# Patient Record
Sex: FEMALE | Race: WHITE | NOT HISPANIC OR LATINO | Employment: OTHER
[De-identification: names, ages, dates, MRNs, and addresses within clinical notes are randomized per-mention and may not be internally consistent; named-entity substitution may affect disease eponyms.]

---

## 2017-03-16 ENCOUNTER — RECORDS - HEALTHEAST (OUTPATIENT)
Dept: ADMINISTRATIVE | Facility: OTHER | Age: 62
End: 2017-03-16

## 2017-05-04 ENCOUNTER — RECORDS - HEALTHEAST (OUTPATIENT)
Dept: ADMINISTRATIVE | Facility: OTHER | Age: 62
End: 2017-05-04

## 2017-05-25 ENCOUNTER — RECORDS - HEALTHEAST (OUTPATIENT)
Dept: ADMINISTRATIVE | Facility: OTHER | Age: 62
End: 2017-05-25

## 2017-06-29 ENCOUNTER — COMMUNICATION - HEALTHEAST (OUTPATIENT)
Dept: SCHEDULING | Facility: CLINIC | Age: 62
End: 2017-06-29

## 2017-06-30 ENCOUNTER — COMMUNICATION - HEALTHEAST (OUTPATIENT)
Dept: INTERNAL MEDICINE | Facility: CLINIC | Age: 62
End: 2017-06-30

## 2017-06-30 ENCOUNTER — COMMUNICATION - HEALTHEAST (OUTPATIENT)
Dept: SCHEDULING | Facility: CLINIC | Age: 62
End: 2017-06-30

## 2017-06-30 ENCOUNTER — HOSPITAL ENCOUNTER (OUTPATIENT)
Dept: CT IMAGING | Facility: CLINIC | Age: 62
Discharge: HOME | End: 2017-06-30
Attending: INTERNAL MEDICINE

## 2017-06-30 ENCOUNTER — OFFICE VISIT - HEALTHEAST (OUTPATIENT)
Dept: INTERNAL MEDICINE | Facility: CLINIC | Age: 62
End: 2017-06-30

## 2017-06-30 DIAGNOSIS — I63.9 CVA (CEREBRAL VASCULAR ACCIDENT) (H): ICD-10-CM

## 2017-06-30 LAB
CHOLEST SERPL-MCNC: 154 MG/DL
FASTING STATUS PATIENT QL REPORTED: ABNORMAL
HDLC SERPL-MCNC: 20 MG/DL
LDLC SERPL CALC-MCNC: 62 MG/DL
TRIGL SERPL-MCNC: 361 MG/DL

## 2017-07-02 LAB — HBA1C MFR BLD: 13.8 % (ref 4.2–6.1)

## 2017-07-03 ENCOUNTER — COMMUNICATION - HEALTHEAST (OUTPATIENT)
Dept: INTERNAL MEDICINE | Facility: CLINIC | Age: 62
End: 2017-07-03

## 2017-07-03 ENCOUNTER — AMBULATORY - HEALTHEAST (OUTPATIENT)
Dept: INTERNAL MEDICINE | Facility: CLINIC | Age: 62
End: 2017-07-03

## 2017-07-03 DIAGNOSIS — E11.9 DIABETES (H): ICD-10-CM

## 2017-07-06 ENCOUNTER — RECORDS - HEALTHEAST (OUTPATIENT)
Dept: ADMINISTRATIVE | Facility: OTHER | Age: 62
End: 2017-07-06

## 2017-07-07 ENCOUNTER — RECORDS - HEALTHEAST (OUTPATIENT)
Dept: ADMINISTRATIVE | Facility: OTHER | Age: 62
End: 2017-07-07

## 2017-07-12 ENCOUNTER — OFFICE VISIT - HEALTHEAST (OUTPATIENT)
Dept: EDUCATION SERVICES | Facility: CLINIC | Age: 62
End: 2017-07-12

## 2017-07-14 ENCOUNTER — COMMUNICATION - HEALTHEAST (OUTPATIENT)
Dept: INTERNAL MEDICINE | Facility: CLINIC | Age: 62
End: 2017-07-14

## 2017-07-14 ENCOUNTER — COMMUNICATION - HEALTHEAST (OUTPATIENT)
Dept: ADMINISTRATIVE | Facility: CLINIC | Age: 62
End: 2017-07-14

## 2017-07-19 ENCOUNTER — RECORDS - HEALTHEAST (OUTPATIENT)
Dept: LAB | Facility: CLINIC | Age: 62
End: 2017-07-19

## 2017-07-19 ENCOUNTER — RECORDS - HEALTHEAST (OUTPATIENT)
Dept: ADMINISTRATIVE | Facility: OTHER | Age: 62
End: 2017-07-19

## 2017-07-19 ENCOUNTER — HOSPITAL ENCOUNTER (OUTPATIENT)
Dept: ULTRASOUND IMAGING | Facility: CLINIC | Age: 62
Discharge: HOME | End: 2017-07-19
Attending: PSYCHIATRY & NEUROLOGY

## 2017-07-19 ENCOUNTER — HOSPITAL ENCOUNTER (OUTPATIENT)
Dept: MRI IMAGING | Facility: CLINIC | Age: 62
Discharge: HOME | End: 2017-07-19
Attending: PSYCHIATRY & NEUROLOGY

## 2017-07-19 ENCOUNTER — HOSPITAL ENCOUNTER (OUTPATIENT)
Dept: CARDIOLOGY | Facility: CLINIC | Age: 62
Discharge: HOME | End: 2017-07-19
Attending: PSYCHIATRY & NEUROLOGY

## 2017-07-19 DIAGNOSIS — G45.9 TIA (TRANSIENT ISCHEMIC ATTACK): ICD-10-CM

## 2017-07-19 DIAGNOSIS — G45.1 TIA INVOLVING CAROTID ARTERY: ICD-10-CM

## 2017-07-19 DIAGNOSIS — G45.1 HEMISPHERIC CAROTID ARTERY SYNDROME: ICD-10-CM

## 2017-07-19 LAB
CHOLEST SERPL-MCNC: 156 MG/DL
DOP CALC LVOT AREA: 3.14 CM2
DOP CALC LVOT DIAMETER: 2 CM
DOP CALC LVOT PEAK VEL: 107 CM/S
DOP CALC LVOT STROKE VOLUME: 78.8 CM3
DOP CALCLVOT PEAK VEL VTI: 25.1 CM
ECHO EJECTION FRACTION ESTIMATED: 60 %
EJECTION FRACTION: 54 % (ref 55–75)
FASTING STATUS PATIENT QL REPORTED: YES
FRACTIONAL SHORTENING: 36.7 % (ref 28–44)
HDLC SERPL-MCNC: 39 MG/DL
INTERVENTRICULAR SEPTUM IN END DIASTOLE: 1.4 CM (ref 0.6–0.9)
IVS/PW RATIO: 1
LDLC SERPL CALC-MCNC: 76 MG/DL
LEFT ATRIUM AREA: 20.9 CM2
LEFT ATRIUM SIZE: 3.5 CM
LEFT VENTRICLE DIASTOLIC VOLUME: 108 CM3 (ref 46–106)
LEFT VENTRICLE SYSTOLIC VOLUME: 50 CM3 (ref 14–42)
LEFT VENTRICULAR INTERNAL DIMENSION IN DIASTOLE: 4.9 CM (ref 3.8–5.2)
LEFT VENTRICULAR INTERNAL DIMENSION IN SYSTOLE: 3.1 CM (ref 2.2–3.5)
LEFT VENTRICULAR MASS: 282.6 G
LEFT VENTRICULAR OUTFLOW TRACT MEAN GRADIENT: 2 MMHG
LEFT VENTRICULAR OUTFLOW TRACT MEAN VELOCITY: 69.3 CM/S
LEFT VENTRICULAR OUTFLOW TRACT PEAK GRADIENT: 5 MMHG
LEFT VENTRICULAR POSTERIOR WALL IN END DIASTOLE: 1.4 CM (ref 0.6–0.9)
MITRAL VALVE E/A RATIO: 1.2
MV AVERAGE E/E' RATIO: 11.8 CM/S
MV DECELERATION TIME: 292 MS
MV E'TISSUE VEL-LAT: 9.65 CM/S
MV E'TISSUE VEL-MED: 6.43 CM/S
MV LATERAL E/E' RATIO: 9.8
MV MEDIAL E/E' RATIO: 14.7
MV PEAK A VELOCITY: 81.9 CM/S
MV PEAK E VELOCITY: 94.8 CM/S
TRICUSPID VALVE ANULAR PLANE SYSTOLIC EXCURSION: 2.3 CM
TRIGL SERPL-MCNC: 204 MG/DL

## 2017-07-28 ENCOUNTER — COMMUNICATION - HEALTHEAST (OUTPATIENT)
Dept: INTERNAL MEDICINE | Facility: CLINIC | Age: 62
End: 2017-07-28

## 2017-07-28 ENCOUNTER — OFFICE VISIT - HEALTHEAST (OUTPATIENT)
Dept: INTERNAL MEDICINE | Facility: CLINIC | Age: 62
End: 2017-07-28

## 2017-07-28 DIAGNOSIS — E11.9 DIABETES MELLITUS, TYPE 2 (H): ICD-10-CM

## 2017-07-28 LAB
CHOLEST SERPL-MCNC: 144 MG/DL
FASTING STATUS PATIENT QL REPORTED: YES
HBA1C MFR BLD: 11.9 % (ref 3.5–6)
HDLC SERPL-MCNC: 38 MG/DL
LDLC SERPL CALC-MCNC: 74 MG/DL
TRIGL SERPL-MCNC: 158 MG/DL

## 2017-07-28 ASSESSMENT — MIFFLIN-ST. JEOR: SCORE: 1728.73

## 2017-08-10 ENCOUNTER — COMMUNICATION - HEALTHEAST (OUTPATIENT)
Dept: INTERNAL MEDICINE | Facility: CLINIC | Age: 62
End: 2017-08-10

## 2017-08-11 ENCOUNTER — OFFICE VISIT - HEALTHEAST (OUTPATIENT)
Dept: INTERNAL MEDICINE | Facility: CLINIC | Age: 62
End: 2017-08-11

## 2017-08-11 DIAGNOSIS — Z00.00 ROUTINE GENERAL MEDICAL EXAMINATION AT A HEALTH CARE FACILITY: ICD-10-CM

## 2017-08-11 DIAGNOSIS — D51.0 PERNICIOUS ANEMIA: ICD-10-CM

## 2017-08-11 LAB
CHOLEST SERPL-MCNC: 146 MG/DL
FASTING STATUS PATIENT QL REPORTED: YES
HBA1C MFR BLD: 10.6 % (ref 3.5–6)
HDLC SERPL-MCNC: 37 MG/DL
LDLC SERPL CALC-MCNC: 75 MG/DL
TRIGL SERPL-MCNC: 169 MG/DL

## 2017-08-11 ASSESSMENT — MIFFLIN-ST. JEOR: SCORE: 1737.8

## 2017-08-14 ENCOUNTER — COMMUNICATION - HEALTHEAST (OUTPATIENT)
Dept: INTERNAL MEDICINE | Facility: CLINIC | Age: 62
End: 2017-08-14

## 2017-08-30 ENCOUNTER — OFFICE VISIT - HEALTHEAST (OUTPATIENT)
Dept: EDUCATION SERVICES | Facility: CLINIC | Age: 62
End: 2017-08-30

## 2017-08-30 DIAGNOSIS — E11.9 DM (DIABETES MELLITUS), TYPE 2 (H): ICD-10-CM

## 2017-09-01 ENCOUNTER — OFFICE VISIT - HEALTHEAST (OUTPATIENT)
Dept: INTERNAL MEDICINE | Facility: CLINIC | Age: 62
End: 2017-09-01

## 2017-09-01 ENCOUNTER — COMMUNICATION - HEALTHEAST (OUTPATIENT)
Dept: INTERNAL MEDICINE | Facility: CLINIC | Age: 62
End: 2017-09-01

## 2017-09-01 DIAGNOSIS — E11.9 DIABETES MELLITUS, TYPE 2 (H): ICD-10-CM

## 2017-09-01 LAB
CHOLEST SERPL-MCNC: 148 MG/DL
FASTING STATUS PATIENT QL REPORTED: YES
HBA1C MFR BLD: 9.2 % (ref 3.5–6)
HDLC SERPL-MCNC: 42 MG/DL
LDLC SERPL CALC-MCNC: 77 MG/DL
TRIGL SERPL-MCNC: 147 MG/DL

## 2017-09-01 ASSESSMENT — MIFFLIN-ST. JEOR: SCORE: 1742.34

## 2017-09-05 ENCOUNTER — AMBULATORY - HEALTHEAST (OUTPATIENT)
Dept: INTERNAL MEDICINE | Facility: CLINIC | Age: 62
End: 2017-09-05

## 2017-09-05 ENCOUNTER — RECORDS - HEALTHEAST (OUTPATIENT)
Dept: ADMINISTRATIVE | Facility: OTHER | Age: 62
End: 2017-09-05

## 2017-09-05 ENCOUNTER — COMMUNICATION - HEALTHEAST (OUTPATIENT)
Dept: INTERNAL MEDICINE | Facility: CLINIC | Age: 62
End: 2017-09-05

## 2017-09-11 ENCOUNTER — SURGERY - HEALTHEAST (OUTPATIENT)
Dept: GASTROENTEROLOGY | Facility: HOSPITAL | Age: 62
End: 2017-09-11

## 2017-09-11 ASSESSMENT — MIFFLIN-ST. JEOR: SCORE: 1724.77

## 2017-09-13 ENCOUNTER — AMBULATORY - HEALTHEAST (OUTPATIENT)
Dept: NURSING | Facility: CLINIC | Age: 62
End: 2017-09-13

## 2017-09-13 LAB — PAP SMEAR - HIM PATIENT REPORTED: NEGATIVE

## 2017-09-18 ENCOUNTER — COMMUNICATION - HEALTHEAST (OUTPATIENT)
Dept: INTERNAL MEDICINE | Facility: CLINIC | Age: 62
End: 2017-09-18

## 2017-09-18 DIAGNOSIS — E11.9 DIABETES (H): ICD-10-CM

## 2017-09-19 ENCOUNTER — OFFICE VISIT - HEALTHEAST (OUTPATIENT)
Dept: ENDOCRINOLOGY | Facility: CLINIC | Age: 62
End: 2017-09-19

## 2017-09-19 DIAGNOSIS — E11.9 TYPE 2 DIABETES MELLITUS (H): ICD-10-CM

## 2017-09-19 ASSESSMENT — MIFFLIN-ST. JEOR: SCORE: 1761.06

## 2017-10-03 ENCOUNTER — COMMUNICATION - HEALTHEAST (OUTPATIENT)
Dept: INTERNAL MEDICINE | Facility: CLINIC | Age: 62
End: 2017-10-03

## 2017-10-03 ENCOUNTER — OFFICE VISIT - HEALTHEAST (OUTPATIENT)
Dept: INTERNAL MEDICINE | Facility: CLINIC | Age: 62
End: 2017-10-03

## 2017-10-03 DIAGNOSIS — E11.9 DIABETES MELLITUS, TYPE 2 (H): ICD-10-CM

## 2017-10-03 LAB
CHOLEST SERPL-MCNC: 129 MG/DL
FASTING STATUS PATIENT QL REPORTED: ABNORMAL
HBA1C MFR BLD: 8.3 % (ref 3.5–6)
HDLC SERPL-MCNC: 42 MG/DL
LDLC SERPL CALC-MCNC: 61 MG/DL
TRIGL SERPL-MCNC: 131 MG/DL

## 2017-10-03 ASSESSMENT — MIFFLIN-ST. JEOR: SCORE: 1756.52

## 2017-10-23 ENCOUNTER — AMBULATORY - HEALTHEAST (OUTPATIENT)
Dept: INTERNAL MEDICINE | Facility: CLINIC | Age: 62
End: 2017-10-23

## 2017-10-23 ENCOUNTER — COMMUNICATION - HEALTHEAST (OUTPATIENT)
Dept: INTERNAL MEDICINE | Facility: CLINIC | Age: 62
End: 2017-10-23

## 2017-11-01 ENCOUNTER — COMMUNICATION - HEALTHEAST (OUTPATIENT)
Dept: EDUCATION SERVICES | Facility: CLINIC | Age: 62
End: 2017-11-01

## 2017-11-01 ENCOUNTER — COMMUNICATION - HEALTHEAST (OUTPATIENT)
Dept: INTERNAL MEDICINE | Facility: CLINIC | Age: 62
End: 2017-11-01

## 2017-11-22 ENCOUNTER — OFFICE VISIT - HEALTHEAST (OUTPATIENT)
Dept: EDUCATION SERVICES | Facility: CLINIC | Age: 62
End: 2017-11-22

## 2017-11-22 DIAGNOSIS — E11.9 DM (DIABETES MELLITUS), TYPE 2 (H): ICD-10-CM

## 2017-11-27 ENCOUNTER — COMMUNICATION - HEALTHEAST (OUTPATIENT)
Dept: EDUCATION SERVICES | Facility: CLINIC | Age: 62
End: 2017-11-27

## 2017-12-04 ENCOUNTER — AMBULATORY - HEALTHEAST (OUTPATIENT)
Dept: INTERNAL MEDICINE | Facility: CLINIC | Age: 62
End: 2017-12-04

## 2017-12-04 ENCOUNTER — OFFICE VISIT - HEALTHEAST (OUTPATIENT)
Dept: INTERNAL MEDICINE | Facility: CLINIC | Age: 62
End: 2017-12-04

## 2017-12-04 DIAGNOSIS — E11.9 DIABETES MELLITUS, TYPE 2 (H): ICD-10-CM

## 2017-12-04 DIAGNOSIS — E11.9 DIABETES (H): ICD-10-CM

## 2017-12-04 LAB
CHOLEST SERPL-MCNC: 127 MG/DL
FASTING STATUS PATIENT QL REPORTED: YES
HBA1C MFR BLD: 8.7 % (ref 3.5–6)
HDLC SERPL-MCNC: 38 MG/DL
LDLC SERPL CALC-MCNC: 68 MG/DL
TRIGL SERPL-MCNC: 103 MG/DL

## 2017-12-04 ASSESSMENT — MIFFLIN-ST. JEOR: SCORE: 1792.81

## 2017-12-05 ENCOUNTER — COMMUNICATION - HEALTHEAST (OUTPATIENT)
Dept: INTERNAL MEDICINE | Facility: CLINIC | Age: 62
End: 2017-12-05

## 2017-12-07 ENCOUNTER — COMMUNICATION - HEALTHEAST (OUTPATIENT)
Dept: INTERNAL MEDICINE | Facility: CLINIC | Age: 62
End: 2017-12-07

## 2017-12-07 ENCOUNTER — COMMUNICATION - HEALTHEAST (OUTPATIENT)
Dept: EDUCATION SERVICES | Facility: CLINIC | Age: 62
End: 2017-12-07

## 2018-02-16 ENCOUNTER — AMBULATORY - HEALTHEAST (OUTPATIENT)
Dept: NURSING | Facility: CLINIC | Age: 63
End: 2018-02-16

## 2018-03-01 ENCOUNTER — AMBULATORY - HEALTHEAST (OUTPATIENT)
Dept: ENDOCRINOLOGY | Facility: CLINIC | Age: 63
End: 2018-03-01

## 2018-03-01 DIAGNOSIS — E11.9 TYPE 2 DIABETES MELLITUS (H): ICD-10-CM

## 2018-03-13 ENCOUNTER — AMBULATORY - HEALTHEAST (OUTPATIENT)
Dept: LAB | Facility: CLINIC | Age: 63
End: 2018-03-13

## 2018-03-13 DIAGNOSIS — E11.9 TYPE 2 DIABETES MELLITUS (H): ICD-10-CM

## 2018-03-13 LAB
CREAT SERPL-MCNC: 1.12 MG/DL (ref 0.6–1.1)
CREAT UR-MCNC: 22.9 MG/DL
GFR SERPL CREATININE-BSD FRML MDRD: 49 ML/MIN/1.73M2
HBA1C MFR BLD: 10.6 % (ref 3.5–6)
MICROALBUMIN UR-MCNC: 3.75 MG/DL (ref 0–1.99)
MICROALBUMIN/CREAT UR: 163.8 MG/G
POTASSIUM BLD-SCNC: 4.3 MMOL/L (ref 3.5–5)

## 2018-04-17 ENCOUNTER — COMMUNICATION - HEALTHEAST (OUTPATIENT)
Dept: INTERNAL MEDICINE | Facility: CLINIC | Age: 63
End: 2018-04-17

## 2018-04-20 ENCOUNTER — OFFICE VISIT - HEALTHEAST (OUTPATIENT)
Dept: INTERNAL MEDICINE | Facility: CLINIC | Age: 63
End: 2018-04-20

## 2018-04-20 ENCOUNTER — AMBULATORY - HEALTHEAST (OUTPATIENT)
Dept: INTERNAL MEDICINE | Facility: CLINIC | Age: 63
End: 2018-04-20

## 2018-04-20 DIAGNOSIS — E11.9 DIABETES (H): ICD-10-CM

## 2018-04-20 DIAGNOSIS — E11.9 DIABETES MELLITUS, TYPE 2 (H): ICD-10-CM

## 2018-04-20 LAB
CHOLEST SERPL-MCNC: 139 MG/DL
CREAT UR-MCNC: 52.9 MG/DL
FASTING STATUS PATIENT QL REPORTED: YES
FASTING STATUS PATIENT QL REPORTED: YES
GLUCOSE BLD-MCNC: 280 MG/DL (ref 70–125)
HBA1C MFR BLD: 12.3 % (ref 3.5–6)
HDLC SERPL-MCNC: 41 MG/DL
LDLC SERPL CALC-MCNC: 73 MG/DL
MICROALBUMIN UR-MCNC: 8.71 MG/DL (ref 0–1.99)
MICROALBUMIN/CREAT UR: 164.7 MG/G
TRIGL SERPL-MCNC: 126 MG/DL

## 2018-04-20 ASSESSMENT — MIFFLIN-ST. JEOR: SCORE: 1810.96

## 2018-04-23 ENCOUNTER — COMMUNICATION - HEALTHEAST (OUTPATIENT)
Dept: INTERNAL MEDICINE | Facility: CLINIC | Age: 63
End: 2018-04-23

## 2018-05-08 ENCOUNTER — COMMUNICATION - HEALTHEAST (OUTPATIENT)
Dept: INTERNAL MEDICINE | Facility: CLINIC | Age: 63
End: 2018-05-08

## 2018-05-17 ENCOUNTER — COMMUNICATION - HEALTHEAST (OUTPATIENT)
Dept: INTERNAL MEDICINE | Facility: CLINIC | Age: 63
End: 2018-05-17

## 2018-05-25 ENCOUNTER — RECORDS - HEALTHEAST (OUTPATIENT)
Dept: ADMINISTRATIVE | Facility: OTHER | Age: 63
End: 2018-05-25

## 2018-06-07 ENCOUNTER — RECORDS - HEALTHEAST (OUTPATIENT)
Dept: ADMINISTRATIVE | Facility: OTHER | Age: 63
End: 2018-06-07

## 2018-06-18 ENCOUNTER — OFFICE VISIT - HEALTHEAST (OUTPATIENT)
Dept: ENDOCRINOLOGY | Facility: CLINIC | Age: 63
End: 2018-06-18

## 2018-06-18 DIAGNOSIS — E11.9 TYPE 2 DIABETES MELLITUS (H): ICD-10-CM

## 2018-06-18 ASSESSMENT — MIFFLIN-ST. JEOR: SCORE: 1807.33

## 2018-06-20 ENCOUNTER — COMMUNICATION - HEALTHEAST (OUTPATIENT)
Dept: ENDOCRINOLOGY | Facility: CLINIC | Age: 63
End: 2018-06-20

## 2018-07-15 ENCOUNTER — COMMUNICATION - HEALTHEAST (OUTPATIENT)
Dept: ENDOCRINOLOGY | Facility: CLINIC | Age: 63
End: 2018-07-15

## 2018-08-10 ENCOUNTER — AMBULATORY - HEALTHEAST (OUTPATIENT)
Dept: INTERNAL MEDICINE | Facility: CLINIC | Age: 63
End: 2018-08-10

## 2018-08-13 ENCOUNTER — OFFICE VISIT - HEALTHEAST (OUTPATIENT)
Dept: INTERNAL MEDICINE | Facility: CLINIC | Age: 63
End: 2018-08-13

## 2018-08-13 DIAGNOSIS — D51.0 PERNICIOUS ANEMIA: ICD-10-CM

## 2018-08-13 DIAGNOSIS — Z00.00 ROUTINE GENERAL MEDICAL EXAMINATION AT A HEALTH CARE FACILITY: ICD-10-CM

## 2018-08-13 LAB
ALBUMIN SERPL-MCNC: 3.3 G/DL (ref 3.5–5)
ALBUMIN UR-MCNC: ABNORMAL MG/DL
ALP SERPL-CCNC: 134 U/L (ref 45–120)
ALT SERPL W P-5'-P-CCNC: 31 U/L (ref 0–45)
ANION GAP SERPL CALCULATED.3IONS-SCNC: 14 MMOL/L (ref 5–18)
APPEARANCE UR: ABNORMAL
AST SERPL W P-5'-P-CCNC: 21 U/L (ref 0–40)
BACTERIA #/AREA URNS HPF: ABNORMAL HPF
BILIRUB SERPL-MCNC: 0.5 MG/DL (ref 0–1)
BILIRUB UR QL STRIP: NEGATIVE
BUN SERPL-MCNC: 26 MG/DL (ref 8–22)
CALCIUM SERPL-MCNC: 9.7 MG/DL (ref 8.5–10.5)
CHLORIDE BLD-SCNC: 103 MMOL/L (ref 98–107)
CHOLEST SERPL-MCNC: 134 MG/DL
CO2 SERPL-SCNC: 19 MMOL/L (ref 22–31)
COLOR UR AUTO: YELLOW
CREAT SERPL-MCNC: 1.21 MG/DL (ref 0.6–1.1)
ERYTHROCYTE [DISTWIDTH] IN BLOOD BY AUTOMATED COUNT: 12.4 % (ref 11–14.5)
FASTING STATUS PATIENT QL REPORTED: YES
GFR SERPL CREATININE-BSD FRML MDRD: 45 ML/MIN/1.73M2
GLUCOSE BLD-MCNC: 250 MG/DL (ref 70–125)
GLUCOSE UR STRIP-MCNC: NEGATIVE MG/DL
HBA1C MFR BLD: 10.9 % (ref 3.5–6)
HCT VFR BLD AUTO: 43.6 % (ref 35–47)
HDLC SERPL-MCNC: 36 MG/DL
HGB BLD-MCNC: 14.4 G/DL (ref 12–16)
HGB UR QL STRIP: ABNORMAL
KETONES UR STRIP-MCNC: NEGATIVE MG/DL
LDLC SERPL CALC-MCNC: 68 MG/DL
LEUKOCYTE ESTERASE UR QL STRIP: NEGATIVE
MCH RBC QN AUTO: 29.5 PG (ref 27–34)
MCHC RBC AUTO-ENTMCNC: 32.9 G/DL (ref 32–36)
MCV RBC AUTO: 90 FL (ref 80–100)
NITRATE UR QL: NEGATIVE
PH UR STRIP: 6 [PH] (ref 5–8)
PLATELET # BLD AUTO: 330 THOU/UL (ref 140–440)
PMV BLD AUTO: 7.9 FL (ref 7–10)
POTASSIUM BLD-SCNC: 4.7 MMOL/L (ref 3.5–5)
PROT SERPL-MCNC: 7 G/DL (ref 6–8)
RBC # BLD AUTO: 4.86 MILL/UL (ref 3.8–5.4)
RBC #/AREA URNS AUTO: ABNORMAL HPF
SODIUM SERPL-SCNC: 136 MMOL/L (ref 136–145)
SP GR UR STRIP: 1.01 (ref 1–1.03)
SQUAMOUS #/AREA URNS AUTO: ABNORMAL LPF
TRIGL SERPL-MCNC: 148 MG/DL
TSH SERPL DL<=0.005 MIU/L-ACNC: 1.94 UIU/ML (ref 0.3–5)
UROBILINOGEN UR STRIP-ACNC: ABNORMAL
WBC #/AREA URNS AUTO: ABNORMAL HPF
WBC: 12.9 THOU/UL (ref 4–11)

## 2018-08-13 ASSESSMENT — MIFFLIN-ST. JEOR: SCORE: 1851.78

## 2018-08-15 LAB — BACTERIA SPEC CULT: ABNORMAL

## 2018-08-16 ENCOUNTER — COMMUNICATION - HEALTHEAST (OUTPATIENT)
Dept: INTERNAL MEDICINE | Facility: CLINIC | Age: 63
End: 2018-08-16

## 2018-09-07 ENCOUNTER — COMMUNICATION - HEALTHEAST (OUTPATIENT)
Dept: ENDOCRINOLOGY | Facility: CLINIC | Age: 63
End: 2018-09-07

## 2018-09-08 ENCOUNTER — COMMUNICATION - HEALTHEAST (OUTPATIENT)
Dept: INTERNAL MEDICINE | Facility: CLINIC | Age: 63
End: 2018-09-08

## 2018-09-20 ENCOUNTER — COMMUNICATION - HEALTHEAST (OUTPATIENT)
Dept: ENDOCRINOLOGY | Facility: CLINIC | Age: 63
End: 2018-09-20

## 2018-10-01 ENCOUNTER — COMMUNICATION - HEALTHEAST (OUTPATIENT)
Dept: ENDOCRINOLOGY | Facility: CLINIC | Age: 63
End: 2018-10-01

## 2018-10-18 ENCOUNTER — OFFICE VISIT - HEALTHEAST (OUTPATIENT)
Dept: ENDOCRINOLOGY | Facility: CLINIC | Age: 63
End: 2018-10-18

## 2018-10-18 DIAGNOSIS — E11.9 TYPE 2 DIABETES MELLITUS (H): ICD-10-CM

## 2018-10-18 ASSESSMENT — MIFFLIN-ST. JEOR: SCORE: 1878.99

## 2018-10-19 ENCOUNTER — OFFICE VISIT - HEALTHEAST (OUTPATIENT)
Dept: INTERNAL MEDICINE | Facility: CLINIC | Age: 63
End: 2018-10-19

## 2018-10-19 DIAGNOSIS — E11.9 DIABETES MELLITUS, TYPE 2 (H): ICD-10-CM

## 2018-10-19 DIAGNOSIS — Z23 NEED FOR IMMUNIZATION AGAINST INFLUENZA: ICD-10-CM

## 2018-10-19 LAB
CHOLEST SERPL-MCNC: 143 MG/DL
FASTING STATUS PATIENT QL REPORTED: YES
FASTING STATUS PATIENT QL REPORTED: YES
GLUCOSE BLD-MCNC: 239 MG/DL (ref 70–125)
HBA1C MFR BLD: 10.5 % (ref 3.5–6)
HDLC SERPL-MCNC: 41 MG/DL
LDLC SERPL CALC-MCNC: 73 MG/DL
TRIGL SERPL-MCNC: 143 MG/DL

## 2018-10-19 ASSESSMENT — MIFFLIN-ST. JEOR: SCORE: 1865.39

## 2018-10-22 ENCOUNTER — COMMUNICATION - HEALTHEAST (OUTPATIENT)
Dept: INTERNAL MEDICINE | Facility: CLINIC | Age: 63
End: 2018-10-22

## 2018-10-23 ENCOUNTER — COMMUNICATION - HEALTHEAST (OUTPATIENT)
Dept: ENDOCRINOLOGY | Facility: CLINIC | Age: 63
End: 2018-10-23

## 2018-11-17 ENCOUNTER — COMMUNICATION - HEALTHEAST (OUTPATIENT)
Dept: ENDOCRINOLOGY | Facility: CLINIC | Age: 63
End: 2018-11-17

## 2018-12-02 ENCOUNTER — COMMUNICATION - HEALTHEAST (OUTPATIENT)
Dept: ENDOCRINOLOGY | Facility: CLINIC | Age: 63
End: 2018-12-02

## 2018-12-02 DIAGNOSIS — E11.9 TYPE 2 DIABETES MELLITUS (H): ICD-10-CM

## 2018-12-09 ENCOUNTER — COMMUNICATION - HEALTHEAST (OUTPATIENT)
Dept: ENDOCRINOLOGY | Facility: CLINIC | Age: 63
End: 2018-12-09

## 2018-12-11 ENCOUNTER — COMMUNICATION - HEALTHEAST (OUTPATIENT)
Dept: INTERNAL MEDICINE | Facility: CLINIC | Age: 63
End: 2018-12-11

## 2019-01-01 ENCOUNTER — COMMUNICATION - HEALTHEAST (OUTPATIENT)
Dept: ENDOCRINOLOGY | Facility: CLINIC | Age: 64
End: 2019-01-01

## 2019-01-01 DIAGNOSIS — E11.9 TYPE 2 DIABETES MELLITUS (H): ICD-10-CM

## 2019-02-05 ENCOUNTER — COMMUNICATION - HEALTHEAST (OUTPATIENT)
Dept: ADMINISTRATIVE | Facility: CLINIC | Age: 64
End: 2019-02-05

## 2019-02-05 DIAGNOSIS — E11.9 TYPE 2 DIABETES MELLITUS (H): ICD-10-CM

## 2019-02-23 ENCOUNTER — COMMUNICATION - HEALTHEAST (OUTPATIENT)
Dept: ENDOCRINOLOGY | Facility: CLINIC | Age: 64
End: 2019-02-23

## 2019-03-12 ENCOUNTER — COMMUNICATION - HEALTHEAST (OUTPATIENT)
Dept: INTERNAL MEDICINE | Facility: CLINIC | Age: 64
End: 2019-03-12

## 2019-03-19 ENCOUNTER — COMMUNICATION - HEALTHEAST (OUTPATIENT)
Dept: ENDOCRINOLOGY | Facility: CLINIC | Age: 64
End: 2019-03-19

## 2019-03-19 DIAGNOSIS — E11.29 TYPE 2 DIABETES MELLITUS WITH MICROALBUMINURIA, WITH LONG-TERM CURRENT USE OF INSULIN (H): ICD-10-CM

## 2019-03-19 DIAGNOSIS — Z79.4 TYPE 2 DIABETES MELLITUS WITH MICROALBUMINURIA, WITH LONG-TERM CURRENT USE OF INSULIN (H): ICD-10-CM

## 2019-03-19 DIAGNOSIS — R80.9 TYPE 2 DIABETES MELLITUS WITH MICROALBUMINURIA, WITH LONG-TERM CURRENT USE OF INSULIN (H): ICD-10-CM

## 2019-03-22 ENCOUNTER — AMBULATORY - HEALTHEAST (OUTPATIENT)
Dept: ENDOCRINOLOGY | Facility: CLINIC | Age: 64
End: 2019-03-22

## 2019-03-22 DIAGNOSIS — Z79.4 TYPE 2 DIABETES MELLITUS WITHOUT COMPLICATION, WITH LONG-TERM CURRENT USE OF INSULIN (H): ICD-10-CM

## 2019-03-22 DIAGNOSIS — E11.9 TYPE 2 DIABETES MELLITUS WITHOUT COMPLICATION, WITH LONG-TERM CURRENT USE OF INSULIN (H): ICD-10-CM

## 2019-04-04 ENCOUNTER — COMMUNICATION - HEALTHEAST (OUTPATIENT)
Dept: ENDOCRINOLOGY | Facility: CLINIC | Age: 64
End: 2019-04-04

## 2019-04-04 DIAGNOSIS — E11.9 TYPE 2 DIABETES MELLITUS (H): ICD-10-CM

## 2019-04-10 ENCOUNTER — AMBULATORY - HEALTHEAST (OUTPATIENT)
Dept: LAB | Facility: CLINIC | Age: 64
End: 2019-04-10

## 2019-04-10 DIAGNOSIS — E11.9 TYPE 2 DIABETES MELLITUS WITHOUT COMPLICATION, WITH LONG-TERM CURRENT USE OF INSULIN (H): ICD-10-CM

## 2019-04-10 DIAGNOSIS — Z79.4 TYPE 2 DIABETES MELLITUS WITHOUT COMPLICATION, WITH LONG-TERM CURRENT USE OF INSULIN (H): ICD-10-CM

## 2019-04-10 LAB
CREAT SERPL-MCNC: 1.34 MG/DL (ref 0.6–1.1)
GFR SERPL CREATININE-BSD FRML MDRD: 40 ML/MIN/1.73M2
HBA1C MFR BLD: 10.3 % (ref 3.5–6)
POTASSIUM BLD-SCNC: 4.7 MMOL/L (ref 3.5–5)

## 2019-04-15 ENCOUNTER — COMMUNICATION - HEALTHEAST (OUTPATIENT)
Dept: ENDOCRINOLOGY | Facility: CLINIC | Age: 64
End: 2019-04-15

## 2019-04-19 ENCOUNTER — OFFICE VISIT - HEALTHEAST (OUTPATIENT)
Dept: INTERNAL MEDICINE | Facility: CLINIC | Age: 64
End: 2019-04-19

## 2019-04-19 ENCOUNTER — COMMUNICATION - HEALTHEAST (OUTPATIENT)
Dept: INTERNAL MEDICINE | Facility: CLINIC | Age: 64
End: 2019-04-19

## 2019-04-19 DIAGNOSIS — E11.8 TYPE 2 DIABETES MELLITUS WITH COMPLICATION, WITHOUT LONG-TERM CURRENT USE OF INSULIN (H): ICD-10-CM

## 2019-04-19 DIAGNOSIS — Z12.31 VISIT FOR SCREENING MAMMOGRAM: ICD-10-CM

## 2019-04-19 LAB
CHOLEST SERPL-MCNC: 133 MG/DL
CREAT UR-MCNC: 82.6 MG/DL
FASTING STATUS PATIENT QL REPORTED: YES
FASTING STATUS PATIENT QL REPORTED: YES
GLUCOSE BLD-MCNC: 239 MG/DL (ref 70–125)
HBA1C MFR BLD: 10 % (ref 3.5–6)
HDLC SERPL-MCNC: 40 MG/DL
LDLC SERPL CALC-MCNC: 70 MG/DL
MICROALBUMIN UR-MCNC: 12.82 MG/DL (ref 0–1.99)
MICROALBUMIN/CREAT UR: 155.2 MG/G
TRIGL SERPL-MCNC: 113 MG/DL

## 2019-04-19 ASSESSMENT — MIFFLIN-ST. JEOR: SCORE: 1892.6

## 2019-04-30 ENCOUNTER — COMMUNICATION - HEALTHEAST (OUTPATIENT)
Dept: ENDOCRINOLOGY | Facility: CLINIC | Age: 64
End: 2019-04-30

## 2019-04-30 DIAGNOSIS — E11.9 TYPE 2 DIABETES MELLITUS (H): ICD-10-CM

## 2019-05-02 ENCOUNTER — COMMUNICATION - HEALTHEAST (OUTPATIENT)
Dept: ENDOCRINOLOGY | Facility: CLINIC | Age: 64
End: 2019-05-02

## 2019-05-02 DIAGNOSIS — E11.9 TYPE 2 DIABETES MELLITUS (H): ICD-10-CM

## 2019-05-07 ENCOUNTER — RECORDS - HEALTHEAST (OUTPATIENT)
Dept: ADMINISTRATIVE | Facility: OTHER | Age: 64
End: 2019-05-07

## 2019-05-10 ENCOUNTER — AMBULATORY - HEALTHEAST (OUTPATIENT)
Dept: INTERNAL MEDICINE | Facility: CLINIC | Age: 64
End: 2019-05-10

## 2019-05-10 DIAGNOSIS — Z12.31 VISIT FOR SCREENING MAMMOGRAM: ICD-10-CM

## 2019-05-17 ENCOUNTER — COMMUNICATION - HEALTHEAST (OUTPATIENT)
Dept: ENDOCRINOLOGY | Facility: CLINIC | Age: 64
End: 2019-05-17

## 2019-05-17 DIAGNOSIS — E11.9 TYPE 2 DIABETES MELLITUS (H): ICD-10-CM

## 2019-05-20 ENCOUNTER — COMMUNICATION - HEALTHEAST (OUTPATIENT)
Dept: INTERNAL MEDICINE | Facility: CLINIC | Age: 64
End: 2019-05-20

## 2019-06-10 ENCOUNTER — COMMUNICATION - HEALTHEAST (OUTPATIENT)
Dept: ENDOCRINOLOGY | Facility: CLINIC | Age: 64
End: 2019-06-10

## 2019-06-26 ENCOUNTER — COMMUNICATION - HEALTHEAST (OUTPATIENT)
Dept: ENDOCRINOLOGY | Facility: CLINIC | Age: 64
End: 2019-06-26

## 2019-06-26 DIAGNOSIS — E11.9 TYPE 2 DIABETES MELLITUS (H): ICD-10-CM

## 2019-07-04 ENCOUNTER — COMMUNICATION - HEALTHEAST (OUTPATIENT)
Dept: ENDOCRINOLOGY | Facility: CLINIC | Age: 64
End: 2019-07-04

## 2019-07-16 ENCOUNTER — COMMUNICATION - HEALTHEAST (OUTPATIENT)
Dept: INTERNAL MEDICINE | Facility: CLINIC | Age: 64
End: 2019-07-16

## 2019-07-25 ENCOUNTER — COMMUNICATION - HEALTHEAST (OUTPATIENT)
Dept: INTERNAL MEDICINE | Facility: CLINIC | Age: 64
End: 2019-07-25

## 2019-07-25 ENCOUNTER — OFFICE VISIT - HEALTHEAST (OUTPATIENT)
Dept: INTERNAL MEDICINE | Facility: CLINIC | Age: 64
End: 2019-07-25

## 2019-07-25 DIAGNOSIS — E11.8 TYPE 2 DIABETES MELLITUS WITH COMPLICATION, WITHOUT LONG-TERM CURRENT USE OF INSULIN (H): ICD-10-CM

## 2019-07-25 DIAGNOSIS — T14.8XXA OPEN WOUND: ICD-10-CM

## 2019-07-25 LAB
CHOLEST SERPL-MCNC: 140 MG/DL
CREAT UR-MCNC: 52.8 MG/DL
FASTING STATUS PATIENT QL REPORTED: ABNORMAL
FASTING STATUS PATIENT QL REPORTED: ABNORMAL
GLUCOSE BLD-MCNC: 274 MG/DL (ref 70–125)
HBA1C MFR BLD: 11.7 % (ref 3.5–6)
HDLC SERPL-MCNC: 33 MG/DL
LDLC SERPL CALC-MCNC: 81 MG/DL
MICROALBUMIN UR-MCNC: 12.04 MG/DL (ref 0–1.99)
MICROALBUMIN/CREAT UR: 228 MG/G
TRIGL SERPL-MCNC: 128 MG/DL

## 2019-07-25 ASSESSMENT — MIFFLIN-ST. JEOR: SCORE: 1842.71

## 2019-07-26 ENCOUNTER — COMMUNICATION - HEALTHEAST (OUTPATIENT)
Dept: ENDOCRINOLOGY | Facility: CLINIC | Age: 64
End: 2019-07-26

## 2019-07-26 DIAGNOSIS — E11.9 TYPE 2 DIABETES MELLITUS (H): ICD-10-CM

## 2019-07-29 ENCOUNTER — AMBULATORY - HEALTHEAST (OUTPATIENT)
Dept: INTERNAL MEDICINE | Facility: CLINIC | Age: 64
End: 2019-07-29

## 2019-07-29 ENCOUNTER — COMMUNICATION - HEALTHEAST (OUTPATIENT)
Dept: INTERNAL MEDICINE | Facility: CLINIC | Age: 64
End: 2019-07-29

## 2019-07-29 DIAGNOSIS — E11.9 TYPE 2 DIABETES MELLITUS (H): ICD-10-CM

## 2019-08-08 ENCOUNTER — COMMUNICATION - HEALTHEAST (OUTPATIENT)
Dept: VASCULAR SURGERY | Facility: CLINIC | Age: 64
End: 2019-08-08

## 2019-08-26 ENCOUNTER — COMMUNICATION - HEALTHEAST (OUTPATIENT)
Dept: ENDOCRINOLOGY | Facility: CLINIC | Age: 64
End: 2019-08-26

## 2019-08-26 ENCOUNTER — COMMUNICATION - HEALTHEAST (OUTPATIENT)
Dept: INTERNAL MEDICINE | Facility: CLINIC | Age: 64
End: 2019-08-26

## 2019-08-26 ENCOUNTER — RECORDS - HEALTHEAST (OUTPATIENT)
Dept: ADMINISTRATIVE | Facility: OTHER | Age: 64
End: 2019-08-26

## 2019-08-26 ENCOUNTER — AMBULATORY - HEALTHEAST (OUTPATIENT)
Dept: INTERNAL MEDICINE | Facility: CLINIC | Age: 64
End: 2019-08-26

## 2019-08-26 DIAGNOSIS — N93.9 VAGINAL BLEEDING: ICD-10-CM

## 2019-08-27 ENCOUNTER — COMMUNICATION - HEALTHEAST (OUTPATIENT)
Dept: INTERNAL MEDICINE | Facility: CLINIC | Age: 64
End: 2019-08-27

## 2019-08-27 DIAGNOSIS — B37.9 YEAST INFECTION: ICD-10-CM

## 2019-08-30 ENCOUNTER — COMMUNICATION - HEALTHEAST (OUTPATIENT)
Dept: INTERNAL MEDICINE | Facility: CLINIC | Age: 64
End: 2019-08-30

## 2019-08-30 ENCOUNTER — AMBULATORY - HEALTHEAST (OUTPATIENT)
Dept: INTERNAL MEDICINE | Facility: CLINIC | Age: 64
End: 2019-08-30

## 2019-08-30 ENCOUNTER — OFFICE VISIT - HEALTHEAST (OUTPATIENT)
Dept: INTERNAL MEDICINE | Facility: CLINIC | Age: 64
End: 2019-08-30

## 2019-08-30 DIAGNOSIS — D51.0 PERNICIOUS ANEMIA: ICD-10-CM

## 2019-08-30 DIAGNOSIS — Z01.818 PRE-OP EXAM: ICD-10-CM

## 2019-08-30 LAB
ALBUMIN SERPL-MCNC: 3.2 G/DL (ref 3.5–5)
ALP SERPL-CCNC: 122 U/L (ref 45–120)
ALT SERPL W P-5'-P-CCNC: 15 U/L (ref 0–45)
ANION GAP SERPL CALCULATED.3IONS-SCNC: 11 MMOL/L (ref 5–18)
AST SERPL W P-5'-P-CCNC: 14 U/L (ref 0–40)
BILIRUB SERPL-MCNC: 0.4 MG/DL (ref 0–1)
BUN SERPL-MCNC: 20 MG/DL (ref 8–22)
CALCIUM SERPL-MCNC: 9.6 MG/DL (ref 8.5–10.5)
CHLORIDE BLD-SCNC: 102 MMOL/L (ref 98–107)
CHOLEST SERPL-MCNC: 129 MG/DL
CO2 SERPL-SCNC: 23 MMOL/L (ref 22–31)
CREAT SERPL-MCNC: 1.3 MG/DL (ref 0.6–1.1)
ERYTHROCYTE [DISTWIDTH] IN BLOOD BY AUTOMATED COUNT: 12.4 % (ref 11–14.5)
FASTING STATUS PATIENT QL REPORTED: YES
GFR SERPL CREATININE-BSD FRML MDRD: 41 ML/MIN/1.73M2
GLUCOSE BLD-MCNC: 253 MG/DL (ref 70–125)
HBA1C MFR BLD: 11.1 % (ref 3.5–6)
HCT VFR BLD AUTO: 42.2 % (ref 35–47)
HDLC SERPL-MCNC: 35 MG/DL
HGB BLD-MCNC: 14 G/DL (ref 12–16)
LDLC SERPL CALC-MCNC: 68 MG/DL
MCH RBC QN AUTO: 29 PG (ref 27–34)
MCHC RBC AUTO-ENTMCNC: 33.3 G/DL (ref 32–36)
MCV RBC AUTO: 87 FL (ref 80–100)
PLATELET # BLD AUTO: 353 THOU/UL (ref 140–440)
PMV BLD AUTO: 8.5 FL (ref 7–10)
POTASSIUM BLD-SCNC: 4.9 MMOL/L (ref 3.5–5)
PROT SERPL-MCNC: 7 G/DL (ref 6–8)
RBC # BLD AUTO: 4.85 MILL/UL (ref 3.8–5.4)
SODIUM SERPL-SCNC: 136 MMOL/L (ref 136–145)
TRIGL SERPL-MCNC: 128 MG/DL
WBC: 11.4 THOU/UL (ref 4–11)

## 2019-08-30 ASSESSMENT — MIFFLIN-ST. JEOR: SCORE: 1838.17

## 2019-09-03 ENCOUNTER — COMMUNICATION - HEALTHEAST (OUTPATIENT)
Dept: INTERNAL MEDICINE | Facility: CLINIC | Age: 64
End: 2019-09-03

## 2019-09-05 ENCOUNTER — ANESTHESIA - HEALTHEAST (OUTPATIENT)
Dept: SURGERY | Facility: HOSPITAL | Age: 64
End: 2019-09-05

## 2019-09-05 ENCOUNTER — SURGERY - HEALTHEAST (OUTPATIENT)
Dept: SURGERY | Facility: HOSPITAL | Age: 64
End: 2019-09-05

## 2019-09-09 ENCOUNTER — COMMUNICATION - HEALTHEAST (OUTPATIENT)
Dept: ENDOCRINOLOGY | Facility: CLINIC | Age: 64
End: 2019-09-09

## 2019-09-09 DIAGNOSIS — E11.9 TYPE 2 DIABETES MELLITUS (H): ICD-10-CM

## 2019-09-18 ENCOUNTER — OFFICE VISIT - HEALTHEAST (OUTPATIENT)
Dept: VASCULAR SURGERY | Facility: CLINIC | Age: 64
End: 2019-09-18

## 2019-09-18 DIAGNOSIS — Z79.4 TYPE 2 DIABETES MELLITUS WITH OTHER SKIN ULCER, WITH LONG-TERM CURRENT USE OF INSULIN (H): ICD-10-CM

## 2019-09-18 DIAGNOSIS — E66.01 MORBID OBESITY WITH BMI OF 50.0-59.9, ADULT (H): ICD-10-CM

## 2019-09-18 DIAGNOSIS — E11.622 TYPE 2 DIABETES MELLITUS WITH OTHER SKIN ULCER, WITH LONG-TERM CURRENT USE OF INSULIN (H): ICD-10-CM

## 2019-09-18 DIAGNOSIS — S31.109A OPEN WOUND OF ABDOMINAL WALL, INITIAL ENCOUNTER: ICD-10-CM

## 2019-09-18 ASSESSMENT — MIFFLIN-ST. JEOR: SCORE: 1842.71

## 2019-09-20 ENCOUNTER — COMMUNICATION - HEALTHEAST (OUTPATIENT)
Dept: VASCULAR SURGERY | Facility: CLINIC | Age: 64
End: 2019-09-20

## 2019-09-23 ENCOUNTER — COMMUNICATION - HEALTHEAST (OUTPATIENT)
Dept: ENDOCRINOLOGY | Facility: CLINIC | Age: 64
End: 2019-09-23

## 2019-10-03 ENCOUNTER — COMMUNICATION - HEALTHEAST (OUTPATIENT)
Dept: INTERNAL MEDICINE | Facility: CLINIC | Age: 64
End: 2019-10-03

## 2019-10-09 ENCOUNTER — OFFICE VISIT - HEALTHEAST (OUTPATIENT)
Dept: VASCULAR SURGERY | Facility: CLINIC | Age: 64
End: 2019-10-09

## 2019-10-09 DIAGNOSIS — E11.622 TYPE 2 DIABETES MELLITUS WITH OTHER SKIN ULCER, WITH LONG-TERM CURRENT USE OF INSULIN (H): ICD-10-CM

## 2019-10-09 DIAGNOSIS — Z79.4 TYPE 2 DIABETES MELLITUS WITH OTHER SKIN ULCER, WITH LONG-TERM CURRENT USE OF INSULIN (H): ICD-10-CM

## 2019-10-09 DIAGNOSIS — S31.109A OPEN WOUND OF ABDOMINAL WALL, INITIAL ENCOUNTER: ICD-10-CM

## 2019-10-09 DIAGNOSIS — E66.01 MORBID OBESITY WITH BMI OF 50.0-59.9, ADULT (H): ICD-10-CM

## 2019-10-09 ASSESSMENT — MIFFLIN-ST. JEOR: SCORE: 1876.73

## 2019-10-15 ENCOUNTER — COMMUNICATION - HEALTHEAST (OUTPATIENT)
Dept: ENDOCRINOLOGY | Facility: CLINIC | Age: 64
End: 2019-10-15

## 2019-10-15 DIAGNOSIS — E11.9 TYPE 2 DIABETES MELLITUS (H): ICD-10-CM

## 2019-10-17 ENCOUNTER — COMMUNICATION - HEALTHEAST (OUTPATIENT)
Dept: INTERNAL MEDICINE | Facility: CLINIC | Age: 64
End: 2019-10-17

## 2019-10-17 DIAGNOSIS — E11.9 TYPE 2 DIABETES MELLITUS (H): ICD-10-CM

## 2019-10-22 ENCOUNTER — COMMUNICATION - HEALTHEAST (OUTPATIENT)
Dept: ENDOCRINOLOGY | Facility: CLINIC | Age: 64
End: 2019-10-22

## 2019-10-25 ENCOUNTER — OFFICE VISIT - HEALTHEAST (OUTPATIENT)
Dept: INTERNAL MEDICINE | Facility: CLINIC | Age: 64
End: 2019-10-25

## 2019-10-25 ENCOUNTER — COMMUNICATION - HEALTHEAST (OUTPATIENT)
Dept: INTERNAL MEDICINE | Facility: CLINIC | Age: 64
End: 2019-10-25

## 2019-10-25 DIAGNOSIS — Z00.00 ROUTINE GENERAL MEDICAL EXAMINATION AT A HEALTH CARE FACILITY: ICD-10-CM

## 2019-10-25 LAB
ALBUMIN SERPL-MCNC: 3.3 G/DL (ref 3.5–5)
ALBUMIN UR-MCNC: ABNORMAL MG/DL
ALP SERPL-CCNC: 132 U/L (ref 45–120)
ALT SERPL W P-5'-P-CCNC: 16 U/L (ref 0–45)
ANION GAP SERPL CALCULATED.3IONS-SCNC: 10 MMOL/L (ref 5–18)
APPEARANCE UR: ABNORMAL
AST SERPL W P-5'-P-CCNC: 14 U/L (ref 0–40)
BACTERIA #/AREA URNS HPF: ABNORMAL HPF
BILIRUB SERPL-MCNC: 0.4 MG/DL (ref 0–1)
BILIRUB UR QL STRIP: NEGATIVE
BUN SERPL-MCNC: 20 MG/DL (ref 8–22)
CALCIUM SERPL-MCNC: 9.4 MG/DL (ref 8.5–10.5)
CHLORIDE BLD-SCNC: 101 MMOL/L (ref 98–107)
CHOLEST SERPL-MCNC: 142 MG/DL
CO2 SERPL-SCNC: 23 MMOL/L (ref 22–31)
COLOR UR AUTO: YELLOW
CREAT SERPL-MCNC: 1.39 MG/DL (ref 0.6–1.1)
CREAT UR-MCNC: 50 MG/DL
ERYTHROCYTE [DISTWIDTH] IN BLOOD BY AUTOMATED COUNT: 12.2 % (ref 11–14.5)
FASTING STATUS PATIENT QL REPORTED: YES
GFR SERPL CREATININE-BSD FRML MDRD: 38 ML/MIN/1.73M2
GLUCOSE BLD-MCNC: 256 MG/DL (ref 70–125)
GLUCOSE UR STRIP-MCNC: ABNORMAL MG/DL
HBA1C MFR BLD: 10.9 % (ref 3.5–6)
HCT VFR BLD AUTO: 42 % (ref 35–47)
HDLC SERPL-MCNC: 41 MG/DL
HGB BLD-MCNC: 14 G/DL (ref 12–16)
HGB UR QL STRIP: ABNORMAL
KETONES UR STRIP-MCNC: NEGATIVE MG/DL
LDLC SERPL CALC-MCNC: 74 MG/DL
LEUKOCYTE ESTERASE UR QL STRIP: ABNORMAL
MCH RBC QN AUTO: 29.2 PG (ref 27–34)
MCHC RBC AUTO-ENTMCNC: 33.4 G/DL (ref 32–36)
MCV RBC AUTO: 88 FL (ref 80–100)
MICROALBUMIN UR-MCNC: 77.61 MG/DL (ref 0–1.99)
MICROALBUMIN/CREAT UR: 1552.2 MG/G
MUCOUS THREADS #/AREA URNS LPF: ABNORMAL LPF
NITRATE UR QL: POSITIVE
PH UR STRIP: 6 [PH] (ref 5–8)
PLATELET # BLD AUTO: 341 THOU/UL (ref 140–440)
PMV BLD AUTO: 8.2 FL (ref 7–10)
POTASSIUM BLD-SCNC: 4.4 MMOL/L (ref 3.5–5)
PROT SERPL-MCNC: 7.3 G/DL (ref 6–8)
RBC # BLD AUTO: 4.79 MILL/UL (ref 3.8–5.4)
RBC #/AREA URNS AUTO: ABNORMAL HPF
SODIUM SERPL-SCNC: 134 MMOL/L (ref 136–145)
SP GR UR STRIP: 1.02 (ref 1–1.03)
SQUAMOUS #/AREA URNS AUTO: ABNORMAL LPF
TRIGL SERPL-MCNC: 136 MG/DL
TSH SERPL DL<=0.005 MIU/L-ACNC: 2.43 UIU/ML (ref 0.3–5)
UROBILINOGEN UR STRIP-ACNC: ABNORMAL
WBC #/AREA URNS AUTO: ABNORMAL HPF
WBC: 11 THOU/UL (ref 4–11)

## 2019-10-25 ASSESSMENT — MIFFLIN-ST. JEOR: SCORE: 1860.85

## 2019-10-27 LAB — BACTERIA SPEC CULT: ABNORMAL

## 2019-10-28 ENCOUNTER — COMMUNICATION - HEALTHEAST (OUTPATIENT)
Dept: INTERNAL MEDICINE | Facility: CLINIC | Age: 64
End: 2019-10-28

## 2019-10-28 DIAGNOSIS — Z00.00 ROUTINE GENERAL MEDICAL EXAMINATION AT A HEALTH CARE FACILITY: ICD-10-CM

## 2019-10-30 ENCOUNTER — OFFICE VISIT - HEALTHEAST (OUTPATIENT)
Dept: VASCULAR SURGERY | Facility: CLINIC | Age: 64
End: 2019-10-30

## 2019-10-30 DIAGNOSIS — Z79.4 TYPE 2 DIABETES MELLITUS WITH OTHER SKIN ULCER, WITH LONG-TERM CURRENT USE OF INSULIN (H): ICD-10-CM

## 2019-10-30 DIAGNOSIS — E66.01 MORBID OBESITY WITH BMI OF 50.0-59.9, ADULT (H): ICD-10-CM

## 2019-10-30 DIAGNOSIS — E11.622 TYPE 2 DIABETES MELLITUS WITH OTHER SKIN ULCER, WITH LONG-TERM CURRENT USE OF INSULIN (H): ICD-10-CM

## 2019-10-30 DIAGNOSIS — S31.109D OPEN WOUND OF ABDOMINAL WALL, SUBSEQUENT ENCOUNTER: ICD-10-CM

## 2019-10-30 ASSESSMENT — MIFFLIN-ST. JEOR: SCORE: 1892.6

## 2019-11-15 ENCOUNTER — COMMUNICATION - HEALTHEAST (OUTPATIENT)
Dept: INTERNAL MEDICINE | Facility: CLINIC | Age: 64
End: 2019-11-15

## 2019-11-15 DIAGNOSIS — E11.9 TYPE 2 DIABETES MELLITUS (H): ICD-10-CM

## 2019-11-16 RX ORDER — PEN NEEDLE, DIABETIC 32 GX 1/4"
NEEDLE, DISPOSABLE MISCELLANEOUS
Qty: 360 EACH | Refills: 3 | Status: SHIPPED | OUTPATIENT
Start: 2019-11-16

## 2019-11-21 ENCOUNTER — COMMUNICATION - HEALTHEAST (OUTPATIENT)
Dept: INTERNAL MEDICINE | Facility: CLINIC | Age: 64
End: 2019-11-21

## 2019-11-22 ENCOUNTER — OFFICE VISIT - HEALTHEAST (OUTPATIENT)
Dept: INTERNAL MEDICINE | Facility: CLINIC | Age: 64
End: 2019-11-22

## 2019-11-22 DIAGNOSIS — Z01.818 PREOPERATIVE EXAMINATION: ICD-10-CM

## 2019-11-22 LAB
ALBUMIN SERPL-MCNC: 3.2 G/DL (ref 3.5–5)
ALP SERPL-CCNC: 127 U/L (ref 45–120)
ALT SERPL W P-5'-P-CCNC: 14 U/L (ref 0–45)
ANION GAP SERPL CALCULATED.3IONS-SCNC: 13 MMOL/L (ref 5–18)
AST SERPL W P-5'-P-CCNC: 13 U/L (ref 0–40)
BILIRUB SERPL-MCNC: 0.4 MG/DL (ref 0–1)
BUN SERPL-MCNC: 24 MG/DL (ref 8–22)
CALCIUM SERPL-MCNC: 9.5 MG/DL (ref 8.5–10.5)
CHLORIDE BLD-SCNC: 102 MMOL/L (ref 98–107)
CO2 SERPL-SCNC: 22 MMOL/L (ref 22–31)
CREAT SERPL-MCNC: 1.42 MG/DL (ref 0.6–1.1)
ERYTHROCYTE [DISTWIDTH] IN BLOOD BY AUTOMATED COUNT: 12.2 % (ref 11–14.5)
GFR SERPL CREATININE-BSD FRML MDRD: 37 ML/MIN/1.73M2
GLUCOSE BLD-MCNC: 230 MG/DL (ref 70–125)
HCT VFR BLD AUTO: 40.1 % (ref 35–47)
HGB BLD-MCNC: 13.3 G/DL (ref 12–16)
MCH RBC QN AUTO: 28.9 PG (ref 27–34)
MCHC RBC AUTO-ENTMCNC: 33.3 G/DL (ref 32–36)
MCV RBC AUTO: 87 FL (ref 80–100)
PLATELET # BLD AUTO: 343 THOU/UL (ref 140–440)
PMV BLD AUTO: 8.1 FL (ref 7–10)
POTASSIUM BLD-SCNC: 4.4 MMOL/L (ref 3.5–5)
PROT SERPL-MCNC: 7.1 G/DL (ref 6–8)
RBC # BLD AUTO: 4.62 MILL/UL (ref 3.8–5.4)
SODIUM SERPL-SCNC: 137 MMOL/L (ref 136–145)
WBC: 13.4 THOU/UL (ref 4–11)

## 2019-11-22 ASSESSMENT — MIFFLIN-ST. JEOR: SCORE: 1869.92

## 2019-11-25 ENCOUNTER — COMMUNICATION - HEALTHEAST (OUTPATIENT)
Dept: INTERNAL MEDICINE | Facility: CLINIC | Age: 64
End: 2019-11-25

## 2019-11-25 ENCOUNTER — COMMUNICATION - HEALTHEAST (OUTPATIENT)
Dept: VASCULAR SURGERY | Facility: CLINIC | Age: 64
End: 2019-11-25

## 2019-11-26 ENCOUNTER — COMMUNICATION - HEALTHEAST (OUTPATIENT)
Dept: INTERNAL MEDICINE | Facility: CLINIC | Age: 64
End: 2019-11-26

## 2019-12-06 ASSESSMENT — MIFFLIN-ST. JEOR: SCORE: 1869.92

## 2019-12-09 ENCOUNTER — SURGERY - HEALTHEAST (OUTPATIENT)
Dept: SURGERY | Facility: HOSPITAL | Age: 64
End: 2019-12-09

## 2019-12-09 ENCOUNTER — ANESTHESIA - HEALTHEAST (OUTPATIENT)
Dept: SURGERY | Facility: HOSPITAL | Age: 64
End: 2019-12-09

## 2019-12-24 ENCOUNTER — COMMUNICATION - HEALTHEAST (OUTPATIENT)
Dept: INTERNAL MEDICINE | Facility: CLINIC | Age: 64
End: 2019-12-24

## 2019-12-24 DIAGNOSIS — E11.9 TYPE 2 DIABETES MELLITUS (H): ICD-10-CM

## 2020-01-14 ENCOUNTER — AMBULATORY - HEALTHEAST (OUTPATIENT)
Dept: INTERNAL MEDICINE | Facility: CLINIC | Age: 65
End: 2020-01-14

## 2020-01-14 ENCOUNTER — COMMUNICATION - HEALTHEAST (OUTPATIENT)
Dept: INTERNAL MEDICINE | Facility: CLINIC | Age: 65
End: 2020-01-14

## 2020-01-14 DIAGNOSIS — Z00.00 ROUTINE GENERAL MEDICAL EXAMINATION AT A HEALTH CARE FACILITY: ICD-10-CM

## 2020-01-21 ENCOUNTER — COMMUNICATION - HEALTHEAST (OUTPATIENT)
Dept: INTERNAL MEDICINE | Facility: CLINIC | Age: 65
End: 2020-01-21

## 2020-02-03 ENCOUNTER — RECORDS - HEALTHEAST (OUTPATIENT)
Dept: LAB | Facility: CLINIC | Age: 65
End: 2020-02-03

## 2020-02-03 LAB
ANION GAP SERPL CALCULATED.3IONS-SCNC: 6 MMOL/L (ref 5–18)
BUN SERPL-MCNC: 63 MG/DL (ref 8–22)
CALCIUM SERPL-MCNC: 8.3 MG/DL (ref 8.5–10.5)
CHLORIDE BLD-SCNC: 108 MMOL/L (ref 98–107)
CO2 SERPL-SCNC: 25 MMOL/L (ref 22–31)
CREAT SERPL-MCNC: 3.36 MG/DL (ref 0.6–1.1)
GFR SERPL CREATININE-BSD FRML MDRD: 14 ML/MIN/1.73M2
GLUCOSE BLD-MCNC: 113 MG/DL (ref 70–125)
INR PPP: 1.75 (ref 0.9–1.1)
POTASSIUM BLD-SCNC: 5.5 MMOL/L (ref 3.5–5)
SODIUM SERPL-SCNC: 139 MMOL/L (ref 136–145)

## 2020-02-04 ENCOUNTER — RECORDS - HEALTHEAST (OUTPATIENT)
Dept: LAB | Facility: CLINIC | Age: 65
End: 2020-02-04

## 2020-02-04 LAB
ANION GAP SERPL CALCULATED.3IONS-SCNC: 8 MMOL/L (ref 5–18)
BUN SERPL-MCNC: 64 MG/DL (ref 8–22)
CALCIUM SERPL-MCNC: 8.5 MG/DL (ref 8.5–10.5)
CHLORIDE BLD-SCNC: 109 MMOL/L (ref 98–107)
CO2 SERPL-SCNC: 23 MMOL/L (ref 22–31)
CREAT SERPL-MCNC: 3.13 MG/DL (ref 0.6–1.1)
ERYTHROCYTE [DISTWIDTH] IN BLOOD BY AUTOMATED COUNT: 14.7 % (ref 11–14.5)
GFR SERPL CREATININE-BSD FRML MDRD: 15 ML/MIN/1.73M2
GLUCOSE BLD-MCNC: 108 MG/DL (ref 70–125)
HCT VFR BLD AUTO: 31.8 % (ref 35–47)
HGB BLD-MCNC: 9.5 G/DL (ref 12–16)
MCH RBC QN AUTO: 29.1 PG (ref 27–34)
MCHC RBC AUTO-ENTMCNC: 29.9 G/DL (ref 32–36)
MCV RBC AUTO: 97 FL (ref 80–100)
PLATELET # BLD AUTO: 314 THOU/UL (ref 140–440)
PMV BLD AUTO: 11.2 FL (ref 8.5–12.5)
POTASSIUM BLD-SCNC: 6.2 MMOL/L (ref 3.5–5)
RBC # BLD AUTO: 3.27 MILL/UL (ref 3.8–5.4)
SODIUM SERPL-SCNC: 140 MMOL/L (ref 136–145)
WBC: 11 THOU/UL (ref 4–11)

## 2020-02-06 ENCOUNTER — RECORDS - HEALTHEAST (OUTPATIENT)
Dept: LAB | Facility: CLINIC | Age: 65
End: 2020-02-06

## 2020-02-06 LAB
ANION GAP SERPL CALCULATED.3IONS-SCNC: 7 MMOL/L (ref 5–18)
BUN SERPL-MCNC: 54 MG/DL (ref 8–22)
CALCIUM SERPL-MCNC: 8.5 MG/DL (ref 8.5–10.5)
CHLORIDE BLD-SCNC: 110 MMOL/L (ref 98–107)
CO2 SERPL-SCNC: 23 MMOL/L (ref 22–31)
CREAT SERPL-MCNC: 2.88 MG/DL (ref 0.6–1.1)
GFR SERPL CREATININE-BSD FRML MDRD: 16 ML/MIN/1.73M2
GLUCOSE BLD-MCNC: 162 MG/DL (ref 70–125)
INR PPP: 1.89 (ref 0.9–1.1)
POTASSIUM BLD-SCNC: 5.5 MMOL/L (ref 3.5–5)
SODIUM SERPL-SCNC: 140 MMOL/L (ref 136–145)

## 2020-02-07 ENCOUNTER — RECORDS - HEALTHEAST (OUTPATIENT)
Dept: LAB | Facility: CLINIC | Age: 65
End: 2020-02-07

## 2020-02-07 LAB
ANION GAP SERPL CALCULATED.3IONS-SCNC: 6 MMOL/L (ref 5–18)
ANION GAP SERPL CALCULATED.3IONS-SCNC: 8 MMOL/L (ref 5–18)
BUN SERPL-MCNC: 54 MG/DL (ref 8–22)
BUN SERPL-MCNC: 57 MG/DL (ref 8–22)
CALCIUM SERPL-MCNC: 8.9 MG/DL (ref 8.5–10.5)
CALCIUM SERPL-MCNC: 9.1 MG/DL (ref 8.5–10.5)
CHLORIDE BLD-SCNC: 105 MMOL/L (ref 98–107)
CHLORIDE BLD-SCNC: 108 MMOL/L (ref 98–107)
CO2 SERPL-SCNC: 23 MMOL/L (ref 22–31)
CO2 SERPL-SCNC: 26 MMOL/L (ref 22–31)
CREAT SERPL-MCNC: 2.88 MG/DL (ref 0.6–1.1)
CREAT SERPL-MCNC: 2.9 MG/DL (ref 0.6–1.1)
GFR SERPL CREATININE-BSD FRML MDRD: 16 ML/MIN/1.73M2
GFR SERPL CREATININE-BSD FRML MDRD: 16 ML/MIN/1.73M2
GLUCOSE BLD-MCNC: 122 MG/DL (ref 70–125)
GLUCOSE BLD-MCNC: 128 MG/DL (ref 70–125)
INR PPP: 1.78 (ref 0.9–1.1)
POTASSIUM BLD-SCNC: 5.9 MMOL/L (ref 3.5–5)
POTASSIUM BLD-SCNC: 6.2 MMOL/L (ref 3.5–5)
SODIUM SERPL-SCNC: 137 MMOL/L (ref 136–145)
SODIUM SERPL-SCNC: 139 MMOL/L (ref 136–145)

## 2020-02-10 LAB
ANION GAP SERPL CALCULATED.3IONS-SCNC: 11 MMOL/L (ref 5–18)
BUN SERPL-MCNC: 48 MG/DL (ref 8–22)
CALCIUM SERPL-MCNC: 8.4 MG/DL (ref 8.5–10.5)
CHLORIDE BLD-SCNC: 105 MMOL/L (ref 98–107)
CO2 SERPL-SCNC: 25 MMOL/L (ref 22–31)
CREAT SERPL-MCNC: 3.01 MG/DL (ref 0.6–1.1)
GFR SERPL CREATININE-BSD FRML MDRD: 16 ML/MIN/1.73M2
GLUCOSE BLD-MCNC: 95 MG/DL (ref 70–125)
INR PPP: 2.2 (ref 0.9–1.1)
POTASSIUM BLD-SCNC: 5.5 MMOL/L (ref 3.5–5)
SODIUM SERPL-SCNC: 141 MMOL/L (ref 136–145)

## 2020-02-11 ENCOUNTER — RECORDS - HEALTHEAST (OUTPATIENT)
Dept: LAB | Facility: CLINIC | Age: 65
End: 2020-02-11

## 2020-02-11 LAB
ANION GAP SERPL CALCULATED.3IONS-SCNC: 10 MMOL/L (ref 5–18)
BUN SERPL-MCNC: 46 MG/DL (ref 8–22)
CALCIUM SERPL-MCNC: 8.6 MG/DL (ref 8.5–10.5)
CHLORIDE BLD-SCNC: 105 MMOL/L (ref 98–107)
CO2 SERPL-SCNC: 25 MMOL/L (ref 22–31)
CREAT SERPL-MCNC: 2.9 MG/DL (ref 0.6–1.1)
GFR SERPL CREATININE-BSD FRML MDRD: 16 ML/MIN/1.73M2
GLUCOSE BLD-MCNC: 72 MG/DL (ref 70–125)
POTASSIUM BLD-SCNC: 5 MMOL/L (ref 3.5–5)
SODIUM SERPL-SCNC: 140 MMOL/L (ref 136–145)

## 2020-02-13 ENCOUNTER — RECORDS - HEALTHEAST (OUTPATIENT)
Dept: LAB | Facility: CLINIC | Age: 65
End: 2020-02-13

## 2020-02-13 LAB
ANION GAP SERPL CALCULATED.3IONS-SCNC: 8 MMOL/L (ref 5–18)
ANION GAP SERPL CALCULATED.3IONS-SCNC: 8 MMOL/L (ref 5–18)
BASOPHILS # BLD AUTO: 0 THOU/UL (ref 0–0.2)
BASOPHILS # BLD AUTO: 0 THOU/UL (ref 0–0.2)
BASOPHILS NFR BLD AUTO: 0 % (ref 0–2)
BASOPHILS NFR BLD AUTO: 1 % (ref 0–2)
BUN SERPL-MCNC: 47 MG/DL (ref 8–22)
BUN SERPL-MCNC: 47 MG/DL (ref 8–22)
CALCIUM SERPL-MCNC: 8.4 MG/DL (ref 8.5–10.5)
CALCIUM SERPL-MCNC: 8.4 MG/DL (ref 8.5–10.5)
CHLORIDE BLD-SCNC: 102 MMOL/L (ref 98–107)
CHLORIDE BLD-SCNC: 102 MMOL/L (ref 98–107)
CO2 SERPL-SCNC: 27 MMOL/L (ref 22–31)
CO2 SERPL-SCNC: 27 MMOL/L (ref 22–31)
CREAT SERPL-MCNC: 2.8 MG/DL (ref 0.6–1.1)
CREAT SERPL-MCNC: 2.8 MG/DL (ref 0.6–1.1)
EOSINOPHIL # BLD AUTO: 0.1 THOU/UL (ref 0–0.4)
EOSINOPHIL # BLD AUTO: 0.1 THOU/UL (ref 0–0.4)
EOSINOPHIL NFR BLD AUTO: 1 % (ref 0–6)
EOSINOPHIL NFR BLD AUTO: 2 % (ref 0–6)
ERYTHROCYTE [DISTWIDTH] IN BLOOD BY AUTOMATED COUNT: 14.6 % (ref 11–14.5)
ERYTHROCYTE [DISTWIDTH] IN BLOOD BY AUTOMATED COUNT: 14.7 % (ref 11–14.5)
GFR SERPL CREATININE-BSD FRML MDRD: 17 ML/MIN/1.73M2
GFR SERPL CREATININE-BSD FRML MDRD: 17 ML/MIN/1.73M2
GLUCOSE BLD-MCNC: 93 MG/DL (ref 70–125)
GLUCOSE BLD-MCNC: 93 MG/DL (ref 70–125)
HCT VFR BLD AUTO: 30.6 % (ref 35–47)
HCT VFR BLD AUTO: 30.6 % (ref 35–47)
HGB BLD-MCNC: 9.2 G/DL (ref 12–16)
HGB BLD-MCNC: 9.2 G/DL (ref 12–16)
INR PPP: 3.23 (ref 0.9–1.1)
LYMPHOCYTES # BLD AUTO: 1.6 THOU/UL (ref 0.8–4.4)
LYMPHOCYTES # BLD AUTO: 1.7 THOU/UL (ref 0.8–4.4)
LYMPHOCYTES NFR BLD AUTO: 25 % (ref 20–40)
LYMPHOCYTES NFR BLD AUTO: 27 % (ref 20–40)
MCH RBC QN AUTO: 28.6 PG (ref 27–34)
MCH RBC QN AUTO: 28.8 PG (ref 27–34)
MCHC RBC AUTO-ENTMCNC: 30.1 G/DL (ref 32–36)
MCHC RBC AUTO-ENTMCNC: 30.1 G/DL (ref 32–36)
MCV RBC AUTO: 95 FL (ref 80–100)
MCV RBC AUTO: 96 FL (ref 80–100)
MONOCYTES # BLD AUTO: 0.7 THOU/UL (ref 0–0.9)
MONOCYTES # BLD AUTO: 0.7 THOU/UL (ref 0–0.9)
MONOCYTES NFR BLD AUTO: 11 % (ref 2–10)
MONOCYTES NFR BLD AUTO: 11 % (ref 2–10)
NEUTROPHILS # BLD AUTO: 3.7 THOU/UL (ref 2–7.7)
NEUTROPHILS # BLD AUTO: 3.8 THOU/UL (ref 2–7.7)
NEUTROPHILS NFR BLD AUTO: 59 % (ref 50–70)
NEUTROPHILS NFR BLD AUTO: 61 % (ref 50–70)
PLATELET # BLD AUTO: 249 THOU/UL (ref 140–440)
PLATELET # BLD AUTO: 253 THOU/UL (ref 140–440)
PMV BLD AUTO: 11.1 FL (ref 8.5–12.5)
PMV BLD AUTO: 11.3 FL (ref 8.5–12.5)
POTASSIUM BLD-SCNC: 4.8 MMOL/L (ref 3.5–5)
POTASSIUM BLD-SCNC: 4.8 MMOL/L (ref 3.5–5)
RBC # BLD AUTO: 3.2 MILL/UL (ref 3.8–5.4)
RBC # BLD AUTO: 3.22 MILL/UL (ref 3.8–5.4)
SODIUM SERPL-SCNC: 137 MMOL/L (ref 136–145)
SODIUM SERPL-SCNC: 137 MMOL/L (ref 136–145)
WBC: 6.2 THOU/UL (ref 4–11)
WBC: 6.3 THOU/UL (ref 4–11)

## 2020-02-17 ENCOUNTER — RECORDS - HEALTHEAST (OUTPATIENT)
Dept: ADMINISTRATIVE | Facility: OTHER | Age: 65
End: 2020-02-17

## 2020-02-17 ENCOUNTER — RECORDS - HEALTHEAST (OUTPATIENT)
Dept: LAB | Facility: CLINIC | Age: 65
End: 2020-02-17

## 2020-02-17 LAB — POTASSIUM BLD-SCNC: 4.5 MMOL/L (ref 3.5–5)

## 2020-02-19 ENCOUNTER — RECORDS - HEALTHEAST (OUTPATIENT)
Dept: LAB | Facility: CLINIC | Age: 65
End: 2020-02-19

## 2020-02-19 ENCOUNTER — RECORDS - HEALTHEAST (OUTPATIENT)
Dept: ADMINISTRATIVE | Facility: OTHER | Age: 65
End: 2020-02-19

## 2020-02-20 ENCOUNTER — RECORDS - HEALTHEAST (OUTPATIENT)
Dept: LAB | Facility: CLINIC | Age: 65
End: 2020-02-20

## 2020-02-20 LAB — INR PPP: 3.26 (ref 0.9–1.1)

## 2020-02-21 LAB — INR PPP: 2.35 (ref 0.9–1.1)

## 2020-02-24 ENCOUNTER — COMMUNICATION - HEALTHEAST (OUTPATIENT)
Dept: INTERNAL MEDICINE | Facility: CLINIC | Age: 65
End: 2020-02-24

## 2020-02-25 ENCOUNTER — OFFICE VISIT - HEALTHEAST (OUTPATIENT)
Dept: CARDIOLOGY | Facility: CLINIC | Age: 65
End: 2020-02-25

## 2020-02-25 DIAGNOSIS — E66.813 CLASS 3 SEVERE OBESITY DUE TO EXCESS CALORIES WITH BODY MASS INDEX (BMI) OF 50.0 TO 59.9 IN ADULT, UNSPECIFIED WHETHER SERIOUS COMORBIDITY PRESENT (H): ICD-10-CM

## 2020-02-25 DIAGNOSIS — I10 ESSENTIAL HYPERTENSION: ICD-10-CM

## 2020-02-25 DIAGNOSIS — I48.0 PAROXYSMAL ATRIAL FIBRILLATION (H): ICD-10-CM

## 2020-02-25 DIAGNOSIS — E66.01 CLASS 3 SEVERE OBESITY DUE TO EXCESS CALORIES WITH BODY MASS INDEX (BMI) OF 50.0 TO 59.9 IN ADULT, UNSPECIFIED WHETHER SERIOUS COMORBIDITY PRESENT (H): ICD-10-CM

## 2020-02-27 ENCOUNTER — OFFICE VISIT - HEALTHEAST (OUTPATIENT)
Dept: INTERNAL MEDICINE | Facility: CLINIC | Age: 65
End: 2020-02-27

## 2020-02-27 ENCOUNTER — RECORDS - HEALTHEAST (OUTPATIENT)
Dept: ADMINISTRATIVE | Facility: OTHER | Age: 65
End: 2020-02-27

## 2020-02-27 ENCOUNTER — COMMUNICATION - HEALTHEAST (OUTPATIENT)
Dept: INTERNAL MEDICINE | Facility: CLINIC | Age: 65
End: 2020-02-27

## 2020-02-27 ENCOUNTER — RECORDS - HEALTHEAST (OUTPATIENT)
Dept: LAB | Facility: CLINIC | Age: 65
End: 2020-02-27

## 2020-02-27 DIAGNOSIS — E11.69 TYPE 2 DIABETES MELLITUS WITH OTHER SPECIFIED COMPLICATION, WITHOUT LONG-TERM CURRENT USE OF INSULIN (H): ICD-10-CM

## 2020-02-27 LAB
ALBUMIN SERPL-MCNC: 2.8 G/DL (ref 3.5–5)
ALBUMIN UR-MCNC: ABNORMAL MG/DL
ALP SERPL-CCNC: 188 U/L (ref 45–120)
ALT SERPL W P-5'-P-CCNC: 15 U/L (ref 0–45)
ANION GAP SERPL CALCULATED.3IONS-SCNC: 12 MMOL/L (ref 5–18)
APPEARANCE UR: ABNORMAL
AST SERPL W P-5'-P-CCNC: 11 U/L (ref 0–40)
BACTERIA #/AREA URNS HPF: ABNORMAL HPF
BILIRUB SERPL-MCNC: 0.4 MG/DL (ref 0–1)
BILIRUB UR QL STRIP: NEGATIVE
BUN SERPL-MCNC: 47 MG/DL (ref 8–22)
CALCIUM SERPL-MCNC: 10.6 MG/DL (ref 8.5–10.5)
CHLORIDE BLD-SCNC: 103 MMOL/L (ref 98–107)
CHOLEST SERPL-MCNC: 137 MG/DL
CO2 SERPL-SCNC: 23 MMOL/L (ref 22–31)
COLOR UR AUTO: YELLOW
CREAT SERPL-MCNC: 2.55 MG/DL (ref 0.6–1.1)
ERYTHROCYTE [DISTWIDTH] IN BLOOD BY AUTOMATED COUNT: 13.2 % (ref 11–14.5)
FASTING STATUS PATIENT QL REPORTED: ABNORMAL
GFR SERPL CREATININE-BSD FRML MDRD: 19 ML/MIN/1.73M2
GLUCOSE BLD-MCNC: 135 MG/DL (ref 70–125)
GLUCOSE UR STRIP-MCNC: NEGATIVE MG/DL
HBA1C MFR BLD: 7.2 % (ref 3.5–6)
HCT VFR BLD AUTO: 28.9 % (ref 35–47)
HDLC SERPL-MCNC: 30 MG/DL
HGB BLD-MCNC: 9.6 G/DL (ref 12–16)
HGB UR QL STRIP: ABNORMAL
INR PPP: 1.66 (ref 0.9–1.1)
KETONES UR STRIP-MCNC: NEGATIVE MG/DL
LDLC SERPL CALC-MCNC: 66 MG/DL
LEUKOCYTE ESTERASE UR QL STRIP: ABNORMAL
MCH RBC QN AUTO: 29.2 PG (ref 27–34)
MCHC RBC AUTO-ENTMCNC: 33.1 G/DL (ref 32–36)
MCV RBC AUTO: 88 FL (ref 80–100)
NITRATE UR QL: POSITIVE
PH UR STRIP: 6 [PH] (ref 5–8)
PLATELET # BLD AUTO: 389 THOU/UL (ref 140–440)
PMV BLD AUTO: 8.1 FL (ref 7–10)
POTASSIUM BLD-SCNC: 4.9 MMOL/L (ref 3.5–5)
PROT SERPL-MCNC: 6.8 G/DL (ref 6–8)
RBC # BLD AUTO: 3.27 MILL/UL (ref 3.8–5.4)
RBC #/AREA URNS AUTO: ABNORMAL HPF
SODIUM SERPL-SCNC: 138 MMOL/L (ref 136–145)
SP GR UR STRIP: 1.01 (ref 1–1.03)
SQUAMOUS #/AREA URNS AUTO: ABNORMAL LPF
TRIGL SERPL-MCNC: 204 MG/DL
UROBILINOGEN UR STRIP-ACNC: ABNORMAL
WBC #/AREA URNS AUTO: ABNORMAL HPF
WBC CLUMPS #/AREA URNS HPF: PRESENT /[HPF]
WBC: 13.5 THOU/UL (ref 4–11)

## 2020-03-01 LAB — BACTERIA SPEC CULT: ABNORMAL

## 2020-03-02 ENCOUNTER — COMMUNICATION - HEALTHEAST (OUTPATIENT)
Dept: INTERNAL MEDICINE | Facility: CLINIC | Age: 65
End: 2020-03-02

## 2020-03-02 ENCOUNTER — RECORDS - HEALTHEAST (OUTPATIENT)
Dept: LAB | Facility: CLINIC | Age: 65
End: 2020-03-02

## 2020-03-02 DIAGNOSIS — Z00.00 ROUTINE GENERAL MEDICAL EXAMINATION AT A HEALTH CARE FACILITY: ICD-10-CM

## 2020-03-03 LAB
ANION GAP SERPL CALCULATED.3IONS-SCNC: 9 MMOL/L (ref 5–18)
BUN SERPL-MCNC: 41 MG/DL (ref 8–22)
CALCIUM SERPL-MCNC: 10.9 MG/DL (ref 8.5–10.5)
CHLORIDE BLD-SCNC: 103 MMOL/L (ref 98–107)
CO2 SERPL-SCNC: 27 MMOL/L (ref 22–31)
CREAT SERPL-MCNC: 2.44 MG/DL (ref 0.6–1.1)
ERYTHROCYTE [DISTWIDTH] IN BLOOD BY AUTOMATED COUNT: 15.3 % (ref 11–14.5)
GFR SERPL CREATININE-BSD FRML MDRD: 20 ML/MIN/1.73M2
GLUCOSE BLD-MCNC: 120 MG/DL (ref 70–125)
HCT VFR BLD AUTO: 28.1 % (ref 35–47)
HGB BLD-MCNC: 8.3 G/DL (ref 12–16)
MCH RBC QN AUTO: 28.8 PG (ref 27–34)
MCHC RBC AUTO-ENTMCNC: 29.5 G/DL (ref 32–36)
MCV RBC AUTO: 98 FL (ref 80–100)
PLATELET # BLD AUTO: 314 THOU/UL (ref 140–440)
PMV BLD AUTO: 10.8 FL (ref 8.5–12.5)
POTASSIUM BLD-SCNC: 4.8 MMOL/L (ref 3.5–5)
RBC # BLD AUTO: 2.88 MILL/UL (ref 3.8–5.4)
SODIUM SERPL-SCNC: 139 MMOL/L (ref 136–145)
WBC: 11.4 THOU/UL (ref 4–11)

## 2020-03-05 ENCOUNTER — RECORDS - HEALTHEAST (OUTPATIENT)
Dept: LAB | Facility: CLINIC | Age: 65
End: 2020-03-05

## 2020-03-05 LAB — INR PPP: 1.68 (ref 0.9–1.1)

## 2020-03-09 ENCOUNTER — RECORDS - HEALTHEAST (OUTPATIENT)
Dept: LAB | Facility: CLINIC | Age: 65
End: 2020-03-09

## 2020-03-10 LAB
ANION GAP SERPL CALCULATED.3IONS-SCNC: 10 MMOL/L (ref 5–18)
BASOPHILS # BLD AUTO: 0.1 THOU/UL (ref 0–0.2)
BASOPHILS NFR BLD AUTO: 0 % (ref 0–2)
BUN SERPL-MCNC: 36 MG/DL (ref 8–22)
CALCIUM SERPL-MCNC: 10.1 MG/DL (ref 8.5–10.5)
CHLORIDE BLD-SCNC: 104 MMOL/L (ref 98–107)
CO2 SERPL-SCNC: 24 MMOL/L (ref 22–31)
CREAT SERPL-MCNC: 2.97 MG/DL (ref 0.6–1.1)
EOSINOPHIL # BLD AUTO: 0.4 THOU/UL (ref 0–0.4)
EOSINOPHIL NFR BLD AUTO: 3 % (ref 0–6)
ERYTHROCYTE [DISTWIDTH] IN BLOOD BY AUTOMATED COUNT: 15.8 % (ref 11–14.5)
GFR SERPL CREATININE-BSD FRML MDRD: 16 ML/MIN/1.73M2
GLUCOSE BLD-MCNC: 94 MG/DL (ref 70–125)
HCT VFR BLD AUTO: 28.4 % (ref 35–47)
HGB BLD-MCNC: 8.6 G/DL (ref 12–16)
LYMPHOCYTES # BLD AUTO: 1.8 THOU/UL (ref 0.8–4.4)
LYMPHOCYTES NFR BLD AUTO: 14 % (ref 20–40)
MCH RBC QN AUTO: 29.3 PG (ref 27–34)
MCHC RBC AUTO-ENTMCNC: 30.3 G/DL (ref 32–36)
MCV RBC AUTO: 97 FL (ref 80–100)
MONOCYTES # BLD AUTO: 0.9 THOU/UL (ref 0–0.9)
MONOCYTES NFR BLD AUTO: 7 % (ref 2–10)
NEUTROPHILS # BLD AUTO: 10 THOU/UL (ref 2–7.7)
NEUTROPHILS NFR BLD AUTO: 75 % (ref 50–70)
PLATELET # BLD AUTO: 368 THOU/UL (ref 140–440)
PMV BLD AUTO: 10.6 FL (ref 8.5–12.5)
POTASSIUM BLD-SCNC: 5.9 MMOL/L (ref 3.5–5)
RBC # BLD AUTO: 2.94 MILL/UL (ref 3.8–5.4)
SODIUM SERPL-SCNC: 138 MMOL/L (ref 136–145)
WBC: 13.3 THOU/UL (ref 4–11)

## 2020-03-11 ENCOUNTER — RECORDS - HEALTHEAST (OUTPATIENT)
Dept: LAB | Facility: CLINIC | Age: 65
End: 2020-03-11

## 2020-03-11 LAB
ANION GAP SERPL CALCULATED.3IONS-SCNC: 13 MMOL/L (ref 5–18)
BUN SERPL-MCNC: 38 MG/DL (ref 8–22)
CALCIUM SERPL-MCNC: 10.4 MG/DL (ref 8.5–10.5)
CHLORIDE BLD-SCNC: 103 MMOL/L (ref 98–107)
CO2 SERPL-SCNC: 24 MMOL/L (ref 22–31)
CREAT SERPL-MCNC: 3.27 MG/DL (ref 0.6–1.1)
GFR SERPL CREATININE-BSD FRML MDRD: 14 ML/MIN/1.73M2
GLUCOSE BLD-MCNC: 125 MG/DL (ref 70–125)
INR PPP: 1.73 (ref 0.9–1.1)
POTASSIUM BLD-SCNC: 5.2 MMOL/L (ref 3.5–5)
SODIUM SERPL-SCNC: 140 MMOL/L (ref 136–145)

## 2020-03-13 ENCOUNTER — RECORDS - HEALTHEAST (OUTPATIENT)
Dept: LAB | Facility: CLINIC | Age: 65
End: 2020-03-13

## 2020-03-13 LAB
ALBUMIN UR-MCNC: ABNORMAL MG/DL
APPEARANCE UR: ABNORMAL
BACTERIA #/AREA URNS HPF: ABNORMAL HPF
BILIRUB UR QL STRIP: NEGATIVE
COLOR UR AUTO: YELLOW
GLUCOSE UR STRIP-MCNC: NEGATIVE MG/DL
HGB UR QL STRIP: ABNORMAL
KETONES UR STRIP-MCNC: NEGATIVE MG/DL
LEUKOCYTE ESTERASE UR QL STRIP: ABNORMAL
MUCOUS THREADS #/AREA URNS LPF: ABNORMAL LPF
NITRATE UR QL: NEGATIVE
PH UR STRIP: 7 [PH] (ref 4.5–8)
RBC #/AREA URNS AUTO: ABNORMAL HPF
SP GR UR STRIP: 1.01 (ref 1–1.03)
SQUAMOUS #/AREA URNS AUTO: ABNORMAL LPF
UROBILINOGEN UR STRIP-ACNC: ABNORMAL
WBC #/AREA URNS AUTO: >100 HPF

## 2020-03-19 ENCOUNTER — COMMUNICATION - HEALTHEAST (OUTPATIENT)
Dept: GERIATRICS | Facility: CLINIC | Age: 65
End: 2020-03-19

## 2020-03-23 ENCOUNTER — OFFICE VISIT - HEALTHEAST (OUTPATIENT)
Dept: GERIATRICS | Facility: CLINIC | Age: 65
End: 2020-03-23

## 2020-03-23 DIAGNOSIS — I10 ESSENTIAL HYPERTENSION: ICD-10-CM

## 2020-03-23 DIAGNOSIS — I48.91 ATRIAL FIBRILLATION WITH RVR (H): ICD-10-CM

## 2020-03-23 DIAGNOSIS — N17.9 ACUTE KIDNEY INJURY (H): ICD-10-CM

## 2020-03-23 DIAGNOSIS — E11.42 TYPE 2 DIABETES MELLITUS WITH DIABETIC POLYNEUROPATHY, WITH LONG-TERM CURRENT USE OF INSULIN (H): ICD-10-CM

## 2020-03-23 DIAGNOSIS — Z79.4 TYPE 2 DIABETES MELLITUS WITH DIABETIC POLYNEUROPATHY, WITH LONG-TERM CURRENT USE OF INSULIN (H): ICD-10-CM

## 2020-03-23 DIAGNOSIS — N18.30 ANEMIA DUE TO STAGE 3 CHRONIC KIDNEY DISEASE (H): ICD-10-CM

## 2020-03-23 DIAGNOSIS — N30.00 ACUTE CYSTITIS WITHOUT HEMATURIA: ICD-10-CM

## 2020-03-23 DIAGNOSIS — D63.1 ANEMIA DUE TO STAGE 3 CHRONIC KIDNEY DISEASE (H): ICD-10-CM

## 2020-03-25 ENCOUNTER — COMMUNICATION - HEALTHEAST (OUTPATIENT)
Dept: GERIATRICS | Facility: CLINIC | Age: 65
End: 2020-03-25

## 2020-03-27 ENCOUNTER — OFFICE VISIT - HEALTHEAST (OUTPATIENT)
Dept: GERIATRICS | Facility: CLINIC | Age: 65
End: 2020-03-27

## 2020-03-27 DIAGNOSIS — E66.01 CLASS 3 SEVERE OBESITY DUE TO EXCESS CALORIES WITH BODY MASS INDEX (BMI) OF 50.0 TO 59.9 IN ADULT, UNSPECIFIED WHETHER SERIOUS COMORBIDITY PRESENT (H): ICD-10-CM

## 2020-03-27 DIAGNOSIS — N17.9 ACUTE KIDNEY INJURY (H): ICD-10-CM

## 2020-03-27 DIAGNOSIS — I48.91 ATRIAL FIBRILLATION WITH RVR (H): ICD-10-CM

## 2020-03-27 DIAGNOSIS — N18.9 ACUTE KIDNEY INJURY SUPERIMPOSED ON CKD (H): ICD-10-CM

## 2020-03-27 DIAGNOSIS — I10 ESSENTIAL HYPERTENSION: ICD-10-CM

## 2020-03-27 DIAGNOSIS — N17.9 ACUTE KIDNEY INJURY SUPERIMPOSED ON CKD (H): ICD-10-CM

## 2020-03-27 DIAGNOSIS — N30.00 ACUTE CYSTITIS WITHOUT HEMATURIA: ICD-10-CM

## 2020-03-27 DIAGNOSIS — E11.69 TYPE 2 DIABETES MELLITUS WITH OTHER SPECIFIED COMPLICATION, WITH LONG-TERM CURRENT USE OF INSULIN (H): ICD-10-CM

## 2020-03-27 DIAGNOSIS — Z79.4 TYPE 2 DIABETES MELLITUS WITH OTHER SPECIFIED COMPLICATION, WITH LONG-TERM CURRENT USE OF INSULIN (H): ICD-10-CM

## 2020-03-27 DIAGNOSIS — E66.813 CLASS 3 SEVERE OBESITY DUE TO EXCESS CALORIES WITH BODY MASS INDEX (BMI) OF 50.0 TO 59.9 IN ADULT, UNSPECIFIED WHETHER SERIOUS COMORBIDITY PRESENT (H): ICD-10-CM

## 2020-03-30 ENCOUNTER — COMMUNICATION - HEALTHEAST (OUTPATIENT)
Dept: GERIATRICS | Facility: CLINIC | Age: 65
End: 2020-03-30

## 2020-04-01 ENCOUNTER — COMMUNICATION - HEALTHEAST (OUTPATIENT)
Dept: GERIATRICS | Facility: CLINIC | Age: 65
End: 2020-04-01

## 2020-04-02 ENCOUNTER — COMMUNICATION - HEALTHEAST (OUTPATIENT)
Dept: GERIATRICS | Facility: CLINIC | Age: 65
End: 2020-04-02

## 2020-04-03 ENCOUNTER — OFFICE VISIT - HEALTHEAST (OUTPATIENT)
Dept: GERIATRICS | Facility: CLINIC | Age: 65
End: 2020-04-03

## 2020-04-03 DIAGNOSIS — Z79.4 TYPE 2 DIABETES MELLITUS WITH OTHER SPECIFIED COMPLICATION, WITH LONG-TERM CURRENT USE OF INSULIN (H): ICD-10-CM

## 2020-04-03 DIAGNOSIS — N30.00 ACUTE CYSTITIS WITHOUT HEMATURIA: ICD-10-CM

## 2020-04-03 DIAGNOSIS — I10 ESSENTIAL HYPERTENSION: ICD-10-CM

## 2020-04-03 DIAGNOSIS — I48.91 ATRIAL FIBRILLATION WITH RVR (H): ICD-10-CM

## 2020-04-03 DIAGNOSIS — E11.69 TYPE 2 DIABETES MELLITUS WITH OTHER SPECIFIED COMPLICATION, WITH LONG-TERM CURRENT USE OF INSULIN (H): ICD-10-CM

## 2020-04-07 ENCOUNTER — OFFICE VISIT - HEALTHEAST (OUTPATIENT)
Dept: GERIATRICS | Facility: CLINIC | Age: 65
End: 2020-04-07

## 2020-04-07 DIAGNOSIS — D63.1 ANEMIA DUE TO STAGE 3 CHRONIC KIDNEY DISEASE (H): ICD-10-CM

## 2020-04-07 DIAGNOSIS — N18.30 ANEMIA DUE TO STAGE 3 CHRONIC KIDNEY DISEASE (H): ICD-10-CM

## 2020-04-07 DIAGNOSIS — Z79.4 TYPE 2 DIABETES MELLITUS WITH OTHER SPECIFIED COMPLICATION, WITH LONG-TERM CURRENT USE OF INSULIN (H): ICD-10-CM

## 2020-04-07 DIAGNOSIS — I48.91 ATRIAL FIBRILLATION WITH RVR (H): ICD-10-CM

## 2020-04-07 DIAGNOSIS — I10 ESSENTIAL HYPERTENSION: ICD-10-CM

## 2020-04-07 DIAGNOSIS — E11.69 TYPE 2 DIABETES MELLITUS WITH OTHER SPECIFIED COMPLICATION, WITH LONG-TERM CURRENT USE OF INSULIN (H): ICD-10-CM

## 2020-04-07 DIAGNOSIS — N30.00 ACUTE CYSTITIS WITHOUT HEMATURIA: ICD-10-CM

## 2020-04-08 ENCOUNTER — COMMUNICATION - HEALTHEAST (OUTPATIENT)
Dept: GERIATRICS | Facility: CLINIC | Age: 65
End: 2020-04-08

## 2020-04-13 ENCOUNTER — COMMUNICATION - HEALTHEAST (OUTPATIENT)
Dept: GERIATRICS | Facility: CLINIC | Age: 65
End: 2020-04-13

## 2020-04-16 ENCOUNTER — COMMUNICATION - HEALTHEAST (OUTPATIENT)
Dept: GERIATRICS | Facility: CLINIC | Age: 65
End: 2020-04-16

## 2020-04-16 ENCOUNTER — OFFICE VISIT - HEALTHEAST (OUTPATIENT)
Dept: GERIATRICS | Facility: CLINIC | Age: 65
End: 2020-04-16

## 2020-04-16 DIAGNOSIS — I10 ESSENTIAL HYPERTENSION: ICD-10-CM

## 2020-04-23 ENCOUNTER — COMMUNICATION - HEALTHEAST (OUTPATIENT)
Dept: GERIATRICS | Facility: CLINIC | Age: 65
End: 2020-04-23

## 2020-04-24 ENCOUNTER — COMMUNICATION - HEALTHEAST (OUTPATIENT)
Dept: INTERNAL MEDICINE | Facility: CLINIC | Age: 65
End: 2020-04-24

## 2020-04-27 ENCOUNTER — AMBULATORY - HEALTHEAST (OUTPATIENT)
Dept: GERIATRICS | Facility: CLINIC | Age: 65
End: 2020-04-27

## 2020-04-27 ENCOUNTER — COMMUNICATION - HEALTHEAST (OUTPATIENT)
Dept: GERIATRICS | Facility: CLINIC | Age: 65
End: 2020-04-27

## 2020-04-30 ENCOUNTER — COMMUNICATION - HEALTHEAST (OUTPATIENT)
Dept: INTERNAL MEDICINE | Facility: CLINIC | Age: 65
End: 2020-04-30

## 2020-04-30 ENCOUNTER — OFFICE VISIT - HEALTHEAST (OUTPATIENT)
Dept: INTERNAL MEDICINE | Facility: CLINIC | Age: 65
End: 2020-04-30

## 2020-04-30 ENCOUNTER — AMBULATORY - HEALTHEAST (OUTPATIENT)
Dept: INTERNAL MEDICINE | Facility: CLINIC | Age: 65
End: 2020-04-30

## 2020-04-30 DIAGNOSIS — E11.9 DIABETES (H): ICD-10-CM

## 2020-04-30 DIAGNOSIS — E11.69 TYPE 2 DIABETES MELLITUS WITH OTHER SPECIFIED COMPLICATION, WITHOUT LONG-TERM CURRENT USE OF INSULIN (H): ICD-10-CM

## 2020-04-30 DIAGNOSIS — N18.9 ACUTE KIDNEY INJURY SUPERIMPOSED ON CKD (H): ICD-10-CM

## 2020-04-30 DIAGNOSIS — E11.9 TYPE 2 DIABETES MELLITUS (H): ICD-10-CM

## 2020-04-30 DIAGNOSIS — N17.9 ACUTE KIDNEY INJURY SUPERIMPOSED ON CKD (H): ICD-10-CM

## 2020-05-01 ENCOUNTER — COMMUNICATION - HEALTHEAST (OUTPATIENT)
Dept: INTERNAL MEDICINE | Facility: CLINIC | Age: 65
End: 2020-05-01

## 2020-05-01 DIAGNOSIS — E11.9 DIABETES (H): ICD-10-CM

## 2020-05-04 ENCOUNTER — COMMUNICATION - HEALTHEAST (OUTPATIENT)
Dept: INTERNAL MEDICINE | Facility: CLINIC | Age: 65
End: 2020-05-04

## 2020-05-04 DIAGNOSIS — N18.9 ACUTE KIDNEY INJURY SUPERIMPOSED ON CKD (H): ICD-10-CM

## 2020-05-04 DIAGNOSIS — N17.9 ACUTE KIDNEY INJURY SUPERIMPOSED ON CKD (H): ICD-10-CM

## 2020-05-05 ENCOUNTER — COMMUNICATION - HEALTHEAST (OUTPATIENT)
Dept: INTERNAL MEDICINE | Facility: CLINIC | Age: 65
End: 2020-05-05

## 2020-05-06 ENCOUNTER — COMMUNICATION - HEALTHEAST (OUTPATIENT)
Dept: INTERNAL MEDICINE | Facility: CLINIC | Age: 65
End: 2020-05-06

## 2020-05-06 DIAGNOSIS — N18.9 ACUTE KIDNEY INJURY SUPERIMPOSED ON CKD (H): ICD-10-CM

## 2020-05-06 DIAGNOSIS — N17.9 ACUTE KIDNEY INJURY SUPERIMPOSED ON CKD (H): ICD-10-CM

## 2020-05-08 ENCOUNTER — RECORDS - HEALTHEAST (OUTPATIENT)
Dept: ADMINISTRATIVE | Facility: OTHER | Age: 65
End: 2020-05-08

## 2020-05-11 ENCOUNTER — COMMUNICATION - HEALTHEAST (OUTPATIENT)
Dept: INTERNAL MEDICINE | Facility: CLINIC | Age: 65
End: 2020-05-11

## 2020-05-11 DIAGNOSIS — I48.91 ATRIAL FIBRILLATION WITH RVR (H): ICD-10-CM

## 2020-05-12 ENCOUNTER — RECORDS - HEALTHEAST (OUTPATIENT)
Dept: ADMINISTRATIVE | Facility: OTHER | Age: 65
End: 2020-05-12

## 2020-05-28 ENCOUNTER — COMMUNICATION - HEALTHEAST (OUTPATIENT)
Dept: INTERNAL MEDICINE | Facility: CLINIC | Age: 65
End: 2020-05-28

## 2020-05-28 DIAGNOSIS — E11.9 DIABETES (H): ICD-10-CM

## 2020-06-04 ENCOUNTER — COMMUNICATION - HEALTHEAST (OUTPATIENT)
Dept: INTERNAL MEDICINE | Facility: CLINIC | Age: 65
End: 2020-06-04

## 2020-06-08 ENCOUNTER — RECORDS - HEALTHEAST (OUTPATIENT)
Dept: ADMINISTRATIVE | Facility: OTHER | Age: 65
End: 2020-06-08

## 2020-06-11 ENCOUNTER — COMMUNICATION - HEALTHEAST (OUTPATIENT)
Dept: SCHEDULING | Facility: CLINIC | Age: 65
End: 2020-06-11

## 2020-06-12 ENCOUNTER — OFFICE VISIT - HEALTHEAST (OUTPATIENT)
Dept: INTERNAL MEDICINE | Facility: CLINIC | Age: 65
End: 2020-06-12

## 2020-06-12 DIAGNOSIS — L97.503: ICD-10-CM

## 2020-06-12 DIAGNOSIS — E11.69 TYPE 2 DIABETES MELLITUS WITH OTHER SPECIFIED COMPLICATION, WITHOUT LONG-TERM CURRENT USE OF INSULIN (H): ICD-10-CM

## 2020-06-12 DIAGNOSIS — E13.621: ICD-10-CM

## 2020-06-16 ENCOUNTER — AMBULATORY - HEALTHEAST (OUTPATIENT)
Dept: INTERNAL MEDICINE | Facility: CLINIC | Age: 65
End: 2020-06-16

## 2020-06-16 DIAGNOSIS — L97.503: ICD-10-CM

## 2020-06-16 DIAGNOSIS — E13.621: ICD-10-CM

## 2020-06-16 ASSESSMENT — MIFFLIN-ST. JEOR
SCORE: 1717.38
SCORE: 1854.04
SCORE: 1717.38
SCORE: 1854.04

## 2020-06-17 ASSESSMENT — MIFFLIN-ST. JEOR
SCORE: 1727.49
SCORE: 1727.49

## 2020-06-18 ENCOUNTER — SURGERY - HEALTHEAST (OUTPATIENT)
Dept: SURGERY | Facility: HOSPITAL | Age: 65
End: 2020-06-18

## 2020-06-18 ENCOUNTER — ANESTHESIA - HEALTHEAST (OUTPATIENT)
Dept: SURGERY | Facility: HOSPITAL | Age: 65
End: 2020-06-18

## 2020-06-19 ENCOUNTER — AMBULATORY - HEALTHEAST (OUTPATIENT)
Dept: OTHER | Facility: CLINIC | Age: 65
End: 2020-06-19

## 2020-06-21 ENCOUNTER — ANESTHESIA - HEALTHEAST (OUTPATIENT)
Dept: SURGERY | Facility: HOSPITAL | Age: 65
End: 2020-06-21

## 2020-06-23 ENCOUNTER — SURGERY - HEALTHEAST (OUTPATIENT)
Dept: SURGERY | Facility: HOSPITAL | Age: 65
End: 2020-06-23

## 2020-06-24 ENCOUNTER — COMMUNICATION - HEALTHEAST (OUTPATIENT)
Dept: CARDIOLOGY | Facility: CLINIC | Age: 65
End: 2020-06-24

## 2020-06-24 DIAGNOSIS — R79.89 TROPONIN LEVEL ELEVATED: ICD-10-CM

## 2020-06-24 DIAGNOSIS — I48.0 PAROXYSMAL ATRIAL FIBRILLATION (H): ICD-10-CM

## 2020-06-24 DIAGNOSIS — R94.31 ABNORMAL ELECTROCARDIOGRAM: ICD-10-CM

## 2020-06-29 ENCOUNTER — COMMUNICATION - HEALTHEAST (OUTPATIENT)
Dept: INTERNAL MEDICINE | Facility: CLINIC | Age: 65
End: 2020-06-29

## 2020-06-29 ENCOUNTER — RECORDS - HEALTHEAST (OUTPATIENT)
Dept: ADMINISTRATIVE | Facility: OTHER | Age: 65
End: 2020-06-29

## 2020-06-30 ENCOUNTER — COMMUNICATION - HEALTHEAST (OUTPATIENT)
Dept: GERIATRICS | Facility: CLINIC | Age: 65
End: 2020-06-30

## 2020-06-30 ENCOUNTER — OFFICE VISIT - HEALTHEAST (OUTPATIENT)
Dept: GERIATRICS | Facility: CLINIC | Age: 65
End: 2020-06-30

## 2020-06-30 ENCOUNTER — RECORDS - HEALTHEAST (OUTPATIENT)
Dept: GERIATRICS | Facility: CLINIC | Age: 65
End: 2020-06-30

## 2020-06-30 DIAGNOSIS — S31.109D CHRONIC WOUND INFECTION OF ABDOMEN, SUBSEQUENT ENCOUNTER: ICD-10-CM

## 2020-06-30 DIAGNOSIS — M86.471 CHRONIC OSTEOMYELITIS OF RIGHT FOOT WITH DRAINING SINUS (H): ICD-10-CM

## 2020-06-30 DIAGNOSIS — E11.69 TYPE 2 DIABETES MELLITUS WITH OTHER SPECIFIED COMPLICATION, WITH LONG-TERM CURRENT USE OF INSULIN (H): ICD-10-CM

## 2020-06-30 DIAGNOSIS — L08.9 CHRONIC WOUND INFECTION OF ABDOMEN, SUBSEQUENT ENCOUNTER: ICD-10-CM

## 2020-06-30 DIAGNOSIS — Z89.511 HX OF RIGHT BKA (H): ICD-10-CM

## 2020-06-30 DIAGNOSIS — Z79.4 TYPE 2 DIABETES MELLITUS WITH OTHER SPECIFIED COMPLICATION, WITH LONG-TERM CURRENT USE OF INSULIN (H): ICD-10-CM

## 2020-07-03 ENCOUNTER — RECORDS - HEALTHEAST (OUTPATIENT)
Dept: ADMINISTRATIVE | Facility: OTHER | Age: 65
End: 2020-07-03

## 2020-07-03 ENCOUNTER — OFFICE VISIT - HEALTHEAST (OUTPATIENT)
Dept: GERIATRICS | Facility: CLINIC | Age: 65
End: 2020-07-03

## 2020-07-03 DIAGNOSIS — E11.69 TYPE 2 DIABETES MELLITUS WITH OTHER SPECIFIED COMPLICATION, WITH LONG-TERM CURRENT USE OF INSULIN (H): ICD-10-CM

## 2020-07-03 DIAGNOSIS — N18.4 CKD (CHRONIC KIDNEY DISEASE) STAGE 4, GFR 15-29 ML/MIN (H): ICD-10-CM

## 2020-07-03 DIAGNOSIS — Z89.511 HX OF RIGHT BKA (H): ICD-10-CM

## 2020-07-03 DIAGNOSIS — I48.0 PAROXYSMAL ATRIAL FIBRILLATION (H): ICD-10-CM

## 2020-07-03 DIAGNOSIS — Z79.4 TYPE 2 DIABETES MELLITUS WITH OTHER SPECIFIED COMPLICATION, WITH LONG-TERM CURRENT USE OF INSULIN (H): ICD-10-CM

## 2020-07-03 DIAGNOSIS — G54.6 PHANTOM LIMB PAIN (H): ICD-10-CM

## 2020-07-03 DIAGNOSIS — I10 ESSENTIAL HYPERTENSION: ICD-10-CM

## 2020-07-07 ENCOUNTER — COMMUNICATION - HEALTHEAST (OUTPATIENT)
Dept: GERIATRICS | Facility: CLINIC | Age: 65
End: 2020-07-07

## 2020-07-07 ENCOUNTER — OFFICE VISIT - HEALTHEAST (OUTPATIENT)
Dept: GERIATRICS | Facility: CLINIC | Age: 65
End: 2020-07-07

## 2020-07-07 DIAGNOSIS — R33.9 URINARY RETENTION: ICD-10-CM

## 2020-07-07 DIAGNOSIS — Z89.511 HX OF RIGHT BKA (H): ICD-10-CM

## 2020-07-09 ENCOUNTER — OFFICE VISIT - HEALTHEAST (OUTPATIENT)
Dept: SURGERY | Facility: CLINIC | Age: 65
End: 2020-07-09

## 2020-07-09 DIAGNOSIS — Z89.511 HX OF RIGHT BKA (H): ICD-10-CM

## 2020-07-10 ENCOUNTER — COMMUNICATION - HEALTHEAST (OUTPATIENT)
Dept: INTERNAL MEDICINE | Facility: CLINIC | Age: 65
End: 2020-07-10

## 2020-07-10 ENCOUNTER — OFFICE VISIT - HEALTHEAST (OUTPATIENT)
Dept: GERIATRICS | Facility: CLINIC | Age: 65
End: 2020-07-10

## 2020-07-10 DIAGNOSIS — E87.5 HYPERKALEMIA: ICD-10-CM

## 2020-07-10 DIAGNOSIS — I10 ESSENTIAL HYPERTENSION: ICD-10-CM

## 2020-07-10 DIAGNOSIS — E11.69 TYPE 2 DIABETES MELLITUS WITH OTHER SPECIFIED COMPLICATION, WITH LONG-TERM CURRENT USE OF INSULIN (H): ICD-10-CM

## 2020-07-10 DIAGNOSIS — E87.20 METABOLIC ACIDOSIS: ICD-10-CM

## 2020-07-10 DIAGNOSIS — Z79.4 TYPE 2 DIABETES MELLITUS WITH OTHER SPECIFIED COMPLICATION, WITH LONG-TERM CURRENT USE OF INSULIN (H): ICD-10-CM

## 2020-07-10 DIAGNOSIS — N18.4 CKD (CHRONIC KIDNEY DISEASE) STAGE 4, GFR 15-29 ML/MIN (H): ICD-10-CM

## 2020-07-10 DIAGNOSIS — N17.9 ACUTE KIDNEY INJURY SUPERIMPOSED ON CKD (H): ICD-10-CM

## 2020-07-10 DIAGNOSIS — N17.9 ACUTE KIDNEY INJURY (H): ICD-10-CM

## 2020-07-10 DIAGNOSIS — R06.2 WHEEZING: ICD-10-CM

## 2020-07-10 DIAGNOSIS — Z89.511 HX OF RIGHT BKA (H): ICD-10-CM

## 2020-07-10 DIAGNOSIS — N18.9 ACUTE KIDNEY INJURY SUPERIMPOSED ON CKD (H): ICD-10-CM

## 2020-07-10 DIAGNOSIS — N18.30 CHRONIC KIDNEY DISEASE, STAGE III (MODERATE) (H): ICD-10-CM

## 2020-07-13 ENCOUNTER — RECORDS - HEALTHEAST (OUTPATIENT)
Dept: ADMINISTRATIVE | Facility: OTHER | Age: 65
End: 2020-07-13

## 2020-07-14 ENCOUNTER — OFFICE VISIT - HEALTHEAST (OUTPATIENT)
Dept: GERIATRICS | Facility: CLINIC | Age: 65
End: 2020-07-14

## 2020-07-14 ENCOUNTER — RECORDS - HEALTHEAST (OUTPATIENT)
Dept: ADMINISTRATIVE | Facility: OTHER | Age: 65
End: 2020-07-14

## 2020-07-14 DIAGNOSIS — R33.9 URINARY RETENTION: ICD-10-CM

## 2020-07-14 DIAGNOSIS — Z89.511 HX OF RIGHT BKA (H): ICD-10-CM

## 2020-07-15 ENCOUNTER — COMMUNICATION - HEALTHEAST (OUTPATIENT)
Dept: GERIATRICS | Facility: CLINIC | Age: 65
End: 2020-07-15

## 2020-07-16 ENCOUNTER — OFFICE VISIT - HEALTHEAST (OUTPATIENT)
Dept: SURGERY | Facility: CLINIC | Age: 65
End: 2020-07-16

## 2020-07-16 ENCOUNTER — COMMUNICATION - HEALTHEAST (OUTPATIENT)
Dept: SCHEDULING | Facility: CLINIC | Age: 65
End: 2020-07-16

## 2020-07-16 DIAGNOSIS — T14.8XXA OPEN WOUND: ICD-10-CM

## 2020-07-16 DIAGNOSIS — Z89.511 HX OF RIGHT BKA (H): ICD-10-CM

## 2020-07-17 ENCOUNTER — OFFICE VISIT - HEALTHEAST (OUTPATIENT)
Dept: GERIATRICS | Facility: CLINIC | Age: 65
End: 2020-07-17

## 2020-07-17 ENCOUNTER — HOSPITAL ENCOUNTER (OUTPATIENT)
Dept: CARDIOLOGY | Facility: HOSPITAL | Age: 65
Discharge: HOME | End: 2020-07-17
Attending: INTERNAL MEDICINE

## 2020-07-17 ENCOUNTER — COMMUNICATION - HEALTHEAST (OUTPATIENT)
Dept: CARDIOLOGY | Facility: CLINIC | Age: 65
End: 2020-07-17

## 2020-07-17 DIAGNOSIS — Z79.4 TYPE 2 DIABETES MELLITUS WITH OTHER SPECIFIED COMPLICATION, WITH LONG-TERM CURRENT USE OF INSULIN (H): ICD-10-CM

## 2020-07-17 DIAGNOSIS — N18.4 CKD (CHRONIC KIDNEY DISEASE) STAGE 4, GFR 15-29 ML/MIN (H): ICD-10-CM

## 2020-07-17 DIAGNOSIS — Z89.511 HX OF RIGHT BKA (H): ICD-10-CM

## 2020-07-17 DIAGNOSIS — E11.69 TYPE 2 DIABETES MELLITUS WITH OTHER SPECIFIED COMPLICATION, WITH LONG-TERM CURRENT USE OF INSULIN (H): ICD-10-CM

## 2020-07-20 ENCOUNTER — COMMUNICATION - HEALTHEAST (OUTPATIENT)
Dept: GERIATRICS | Facility: CLINIC | Age: 65
End: 2020-07-20

## 2020-07-21 ENCOUNTER — COMMUNICATION - HEALTHEAST (OUTPATIENT)
Dept: GERIATRICS | Facility: CLINIC | Age: 65
End: 2020-07-21

## 2020-07-21 ENCOUNTER — OFFICE VISIT - HEALTHEAST (OUTPATIENT)
Dept: GERIATRICS | Facility: CLINIC | Age: 65
End: 2020-07-21

## 2020-07-21 DIAGNOSIS — R33.9 URINARY RETENTION: ICD-10-CM

## 2020-07-21 DIAGNOSIS — R53.1 WEAKNESS: ICD-10-CM

## 2020-07-23 ENCOUNTER — HOSPITAL ENCOUNTER (OUTPATIENT)
Dept: NUCLEAR MEDICINE | Facility: HOSPITAL | Age: 65
Discharge: HOME | End: 2020-07-23
Attending: INTERNAL MEDICINE

## 2020-07-23 ENCOUNTER — HOSPITAL ENCOUNTER (OUTPATIENT)
Dept: CARDIOLOGY | Facility: HOSPITAL | Age: 65
Discharge: HOME | End: 2020-07-23
Attending: INTERNAL MEDICINE

## 2020-07-23 ENCOUNTER — RECORDS - HEALTHEAST (OUTPATIENT)
Dept: ADMINISTRATIVE | Facility: OTHER | Age: 65
End: 2020-07-23

## 2020-07-23 DIAGNOSIS — R79.89 OTHER SPECIFIED ABNORMAL FINDINGS OF BLOOD CHEMISTRY: ICD-10-CM

## 2020-07-23 DIAGNOSIS — R79.89 TROPONIN LEVEL ELEVATED: ICD-10-CM

## 2020-07-23 DIAGNOSIS — I48.0 PAROXYSMAL ATRIAL FIBRILLATION (H): ICD-10-CM

## 2020-07-23 DIAGNOSIS — R94.31 ABNORMAL ELECTROCARDIOGRAM: ICD-10-CM

## 2020-07-23 LAB
CV STRESS CURRENT BP HE: NORMAL
CV STRESS CURRENT HR HE: 61
CV STRESS CURRENT HR HE: 69
CV STRESS CURRENT HR HE: 70
CV STRESS CURRENT HR HE: 70
CV STRESS CURRENT HR HE: 71
CV STRESS CURRENT HR HE: 74
CV STRESS CURRENT HR HE: 76
CV STRESS CURRENT HR HE: 76
CV STRESS CURRENT HR HE: 77
CV STRESS CURRENT HR HE: 78
CV STRESS DEVIATION TIME HE: NORMAL
CV STRESS ECHO PERCENT HR HE: NORMAL
CV STRESS EXERCISE STAGE HE: NORMAL
CV STRESS FINAL RESTING BP HE: NORMAL
CV STRESS FINAL RESTING HR HE: 69
CV STRESS MAX HR HE: 79
CV STRESS MAX TREADMILL GRADE HE: 0
CV STRESS MAX TREADMILL SPEED HE: 0
CV STRESS PEAK DIA BP HE: NORMAL
CV STRESS PEAK SYS BP HE: NORMAL
CV STRESS PHASE HE: NORMAL
CV STRESS PROTOCOL HE: NORMAL
CV STRESS RESTING PT POSITION HE: NORMAL
CV STRESS RESTING PT POSITION HE: NORMAL
CV STRESS ST DEVIATION AMOUNT HE: NORMAL
CV STRESS ST DEVIATION ELEVATION HE: NORMAL
CV STRESS ST EVELATION AMOUNT HE: NORMAL
CV STRESS TEST TYPE HE: NORMAL
CV STRESS TOTAL STAGE TIME MIN 1 HE: NORMAL
NUC STRESS EJECTION FRACTION: 66 %
RATE PRESSURE PRODUCT: 8611
STRESS ECHO BASELINE DIASTOLIC HE: 58
STRESS ECHO BASELINE HR: 61
STRESS ECHO BASELINE SYSTOLIC BP: 127
STRESS ECHO CALCULATED PERCENT HR: 51 %
STRESS ECHO LAST STRESS DIASTOLIC BP: 52
STRESS ECHO LAST STRESS HR: 76
STRESS ECHO LAST STRESS SYSTOLIC BP: 109
STRESS ECHO TARGET HR: 155

## 2020-07-23 ASSESSMENT — MIFFLIN-ST. JEOR: SCORE: 1566.01

## 2020-07-24 ENCOUNTER — COMMUNICATION - HEALTHEAST (OUTPATIENT)
Dept: GERIATRICS | Facility: CLINIC | Age: 65
End: 2020-07-24

## 2020-07-28 ENCOUNTER — OFFICE VISIT - HEALTHEAST (OUTPATIENT)
Dept: GERIATRICS | Facility: CLINIC | Age: 65
End: 2020-07-28

## 2020-07-28 ENCOUNTER — COMMUNICATION - HEALTHEAST (OUTPATIENT)
Dept: GERIATRICS | Facility: CLINIC | Age: 65
End: 2020-07-28

## 2020-07-28 DIAGNOSIS — G89.18 ACUTE POST-OPERATIVE PAIN: ICD-10-CM

## 2020-07-28 DIAGNOSIS — S31.109D CHRONIC WOUND INFECTION OF ABDOMEN, SUBSEQUENT ENCOUNTER: ICD-10-CM

## 2020-07-28 DIAGNOSIS — G54.6 PHANTOM LIMB PAIN (H): ICD-10-CM

## 2020-07-28 DIAGNOSIS — Z89.511 HX OF RIGHT BKA (H): ICD-10-CM

## 2020-07-28 DIAGNOSIS — K59.01 SLOW TRANSIT CONSTIPATION: ICD-10-CM

## 2020-07-28 DIAGNOSIS — L08.9 CHRONIC WOUND INFECTION OF ABDOMEN, SUBSEQUENT ENCOUNTER: ICD-10-CM

## 2020-07-28 DIAGNOSIS — M86.471 CHRONIC OSTEOMYELITIS OF RIGHT FOOT WITH DRAINING SINUS (H): ICD-10-CM

## 2020-07-31 ENCOUNTER — OFFICE VISIT - HEALTHEAST (OUTPATIENT)
Dept: GERIATRICS | Facility: CLINIC | Age: 65
End: 2020-07-31

## 2020-07-31 DIAGNOSIS — I48.91 ATRIAL FIBRILLATION, UNSPECIFIED TYPE (H): ICD-10-CM

## 2020-08-03 ENCOUNTER — RECORDS - HEALTHEAST (OUTPATIENT)
Dept: ADMINISTRATIVE | Facility: OTHER | Age: 65
End: 2020-08-03

## 2020-08-05 ENCOUNTER — COMMUNICATION - HEALTHEAST (OUTPATIENT)
Dept: CARDIOLOGY | Facility: CLINIC | Age: 65
End: 2020-08-05

## 2020-08-06 ENCOUNTER — COMMUNICATION - HEALTHEAST (OUTPATIENT)
Dept: CARDIOLOGY | Facility: CLINIC | Age: 65
End: 2020-08-06

## 2020-08-07 ENCOUNTER — COMMUNICATION - HEALTHEAST (OUTPATIENT)
Dept: GERIATRICS | Facility: CLINIC | Age: 65
End: 2020-08-07

## 2020-08-07 ENCOUNTER — OFFICE VISIT - HEALTHEAST (OUTPATIENT)
Dept: CARDIOLOGY | Facility: CLINIC | Age: 65
End: 2020-08-07

## 2020-08-07 DIAGNOSIS — I48.0 PAROXYSMAL ATRIAL FIBRILLATION (H): ICD-10-CM

## 2020-08-07 DIAGNOSIS — I10 ESSENTIAL HYPERTENSION: ICD-10-CM

## 2020-08-07 RX ORDER — GABAPENTIN 100 MG/1
200 CAPSULE ORAL EVERY EVENING
Status: SHIPPED | COMMUNITY
Start: 2020-08-07

## 2020-08-12 ENCOUNTER — OFFICE VISIT - HEALTHEAST (OUTPATIENT)
Dept: GERIATRICS | Facility: CLINIC | Age: 65
End: 2020-08-12

## 2020-08-12 ENCOUNTER — COMMUNICATION - HEALTHEAST (OUTPATIENT)
Dept: GERIATRICS | Facility: CLINIC | Age: 65
End: 2020-08-12

## 2020-08-12 DIAGNOSIS — R53.81 PHYSICAL DECONDITIONING: ICD-10-CM

## 2020-08-12 DIAGNOSIS — Z89.511 HX OF RIGHT BKA (H): ICD-10-CM

## 2020-08-12 DIAGNOSIS — G54.6 PHANTOM LIMB PAIN (H): ICD-10-CM

## 2020-08-13 ENCOUNTER — COMMUNICATION - HEALTHEAST (OUTPATIENT)
Dept: GERIATRICS | Facility: CLINIC | Age: 65
End: 2020-08-13

## 2020-08-14 ENCOUNTER — COMMUNICATION - HEALTHEAST (OUTPATIENT)
Dept: GERIATRICS | Facility: CLINIC | Age: 65
End: 2020-08-14

## 2020-08-14 ENCOUNTER — OFFICE VISIT - HEALTHEAST (OUTPATIENT)
Dept: GERIATRICS | Facility: CLINIC | Age: 65
End: 2020-08-14

## 2020-08-14 DIAGNOSIS — I48.0 PAROXYSMAL ATRIAL FIBRILLATION (H): ICD-10-CM

## 2020-08-14 DIAGNOSIS — N18.4 CKD (CHRONIC KIDNEY DISEASE) STAGE 4, GFR 15-29 ML/MIN (H): ICD-10-CM

## 2020-08-14 DIAGNOSIS — E11.69 TYPE 2 DIABETES MELLITUS WITH OTHER SPECIFIED COMPLICATION, WITH LONG-TERM CURRENT USE OF INSULIN (H): ICD-10-CM

## 2020-08-14 DIAGNOSIS — Z79.4 TYPE 2 DIABETES MELLITUS WITH OTHER SPECIFIED COMPLICATION, WITH LONG-TERM CURRENT USE OF INSULIN (H): ICD-10-CM

## 2020-08-14 DIAGNOSIS — I10 ESSENTIAL HYPERTENSION: ICD-10-CM

## 2020-08-14 DIAGNOSIS — Z89.511 HX OF RIGHT BKA (H): ICD-10-CM

## 2020-08-16 ENCOUNTER — COMMUNICATION - HEALTHEAST (OUTPATIENT)
Dept: SCHEDULING | Facility: CLINIC | Age: 65
End: 2020-08-16

## 2020-08-19 ENCOUNTER — ANESTHESIA - HEALTHEAST (OUTPATIENT)
Dept: CARDIOLOGY | Facility: HOSPITAL | Age: 65
End: 2020-08-19

## 2020-08-19 ENCOUNTER — SURGERY - HEALTHEAST (OUTPATIENT)
Dept: SURGERY | Facility: HOSPITAL | Age: 65
End: 2020-08-19

## 2020-08-21 ENCOUNTER — COMMUNICATION - HEALTHEAST (OUTPATIENT)
Dept: GERIATRICS | Facility: CLINIC | Age: 65
End: 2020-08-21

## 2020-08-24 ENCOUNTER — COMMUNICATION - HEALTHEAST (OUTPATIENT)
Dept: GERIATRICS | Facility: CLINIC | Age: 65
End: 2020-08-24

## 2020-08-25 ENCOUNTER — COMMUNICATION - HEALTHEAST (OUTPATIENT)
Dept: GERIATRICS | Facility: CLINIC | Age: 65
End: 2020-08-25

## 2020-08-26 ENCOUNTER — COMMUNICATION - HEALTHEAST (OUTPATIENT)
Dept: GERIATRICS | Facility: CLINIC | Age: 65
End: 2020-08-26

## 2020-08-27 ENCOUNTER — OFFICE VISIT - HEALTHEAST (OUTPATIENT)
Dept: GERIATRICS | Facility: CLINIC | Age: 65
End: 2020-08-27

## 2020-08-27 DIAGNOSIS — L08.9 CHRONIC WOUND INFECTION OF ABDOMEN, SUBSEQUENT ENCOUNTER: ICD-10-CM

## 2020-08-27 DIAGNOSIS — N18.30 CHRONIC KIDNEY DISEASE, STAGE III (MODERATE) (H): ICD-10-CM

## 2020-08-27 DIAGNOSIS — G89.4 CHRONIC PAIN SYNDROME: ICD-10-CM

## 2020-08-27 DIAGNOSIS — Z89.511 HX OF RIGHT BKA (H): ICD-10-CM

## 2020-08-27 DIAGNOSIS — S31.109D CHRONIC WOUND INFECTION OF ABDOMEN, SUBSEQUENT ENCOUNTER: ICD-10-CM

## 2020-08-28 ENCOUNTER — COMMUNICATION - HEALTHEAST (OUTPATIENT)
Dept: INFECTIOUS DISEASES | Facility: CLINIC | Age: 65
End: 2020-08-28

## 2020-08-28 ENCOUNTER — COMMUNICATION - HEALTHEAST (OUTPATIENT)
Dept: GERIATRICS | Facility: CLINIC | Age: 65
End: 2020-08-28

## 2020-09-01 ENCOUNTER — OFFICE VISIT - HEALTHEAST (OUTPATIENT)
Dept: GERIATRICS | Facility: CLINIC | Age: 65
End: 2020-09-01

## 2020-09-01 DIAGNOSIS — T14.8XXA WOUND INFECTION: ICD-10-CM

## 2020-09-01 DIAGNOSIS — Z89.511 HX OF RIGHT BKA (H): ICD-10-CM

## 2020-09-01 DIAGNOSIS — I48.91 ATRIAL FIBRILLATION WITH RVR (H): ICD-10-CM

## 2020-09-01 DIAGNOSIS — G89.4 CHRONIC PAIN SYNDROME: ICD-10-CM

## 2020-09-01 DIAGNOSIS — N18.4 CKD (CHRONIC KIDNEY DISEASE) STAGE 4, GFR 15-29 ML/MIN (H): ICD-10-CM

## 2020-09-01 DIAGNOSIS — L08.9 WOUND INFECTION: ICD-10-CM

## 2020-09-02 ENCOUNTER — COMMUNICATION - HEALTHEAST (OUTPATIENT)
Dept: GERIATRICS | Facility: CLINIC | Age: 65
End: 2020-09-02

## 2020-09-03 ENCOUNTER — OFFICE VISIT - HEALTHEAST (OUTPATIENT)
Dept: GERIATRICS | Facility: CLINIC | Age: 65
End: 2020-09-03

## 2020-09-03 DIAGNOSIS — T14.8XXA WOUND INFECTION: ICD-10-CM

## 2020-09-03 DIAGNOSIS — I48.0 PAROXYSMAL ATRIAL FIBRILLATION (H): ICD-10-CM

## 2020-09-03 DIAGNOSIS — L08.9 WOUND INFECTION: ICD-10-CM

## 2020-09-04 ENCOUNTER — OFFICE VISIT - HEALTHEAST (OUTPATIENT)
Dept: GERIATRICS | Facility: CLINIC | Age: 65
End: 2020-09-04

## 2020-09-04 DIAGNOSIS — R78.81 MRSA BACTEREMIA: ICD-10-CM

## 2020-09-04 DIAGNOSIS — B95.62 MRSA BACTEREMIA: ICD-10-CM

## 2020-09-08 ENCOUNTER — COMMUNICATION - HEALTHEAST (OUTPATIENT)
Dept: GERIATRICS | Facility: CLINIC | Age: 65
End: 2020-09-08

## 2020-09-08 ENCOUNTER — OFFICE VISIT - HEALTHEAST (OUTPATIENT)
Dept: GERIATRICS | Facility: CLINIC | Age: 65
End: 2020-09-08

## 2020-09-08 DIAGNOSIS — L08.9 WOUND INFECTION: ICD-10-CM

## 2020-09-08 DIAGNOSIS — Z89.511 HX OF RIGHT BKA (H): ICD-10-CM

## 2020-09-08 DIAGNOSIS — G89.4 CHRONIC PAIN SYNDROME: ICD-10-CM

## 2020-09-08 DIAGNOSIS — T14.8XXA WOUND INFECTION: ICD-10-CM

## 2020-09-10 ENCOUNTER — OFFICE VISIT - HEALTHEAST (OUTPATIENT)
Dept: GERIATRICS | Facility: CLINIC | Age: 65
End: 2020-09-10

## 2020-09-10 DIAGNOSIS — E83.52 HYPERCALCEMIA: ICD-10-CM

## 2020-09-10 DIAGNOSIS — N18.4 CKD (CHRONIC KIDNEY DISEASE) STAGE 4, GFR 15-29 ML/MIN (H): ICD-10-CM

## 2020-09-10 DIAGNOSIS — L03.032 PARONYCHIA OF GREAT TOE, LEFT: ICD-10-CM

## 2020-09-10 DIAGNOSIS — L08.9 CHRONIC WOUND INFECTION OF ABDOMEN, SUBSEQUENT ENCOUNTER: ICD-10-CM

## 2020-09-10 DIAGNOSIS — I48.0 PAROXYSMAL ATRIAL FIBRILLATION (H): ICD-10-CM

## 2020-09-10 DIAGNOSIS — S31.109D CHRONIC WOUND INFECTION OF ABDOMEN, SUBSEQUENT ENCOUNTER: ICD-10-CM

## 2020-09-11 ENCOUNTER — COMMUNICATION - HEALTHEAST (OUTPATIENT)
Dept: GERIATRICS | Facility: CLINIC | Age: 65
End: 2020-09-11

## 2020-09-14 ENCOUNTER — COMMUNICATION - HEALTHEAST (OUTPATIENT)
Dept: GERIATRICS | Facility: CLINIC | Age: 65
End: 2020-09-14

## 2020-09-15 ENCOUNTER — OFFICE VISIT - HEALTHEAST (OUTPATIENT)
Dept: GERIATRICS | Facility: CLINIC | Age: 65
End: 2020-09-15

## 2020-09-15 DIAGNOSIS — Z89.511 HX OF RIGHT BKA (H): ICD-10-CM

## 2020-09-15 DIAGNOSIS — E83.52 HYPERCALCEMIA: ICD-10-CM

## 2020-09-15 DIAGNOSIS — R78.81 MRSA BACTEREMIA: ICD-10-CM

## 2020-09-15 DIAGNOSIS — L03.032 PARONYCHIA OF GREAT TOE, LEFT: ICD-10-CM

## 2020-09-15 DIAGNOSIS — B95.62 MRSA BACTEREMIA: ICD-10-CM

## 2020-09-17 ENCOUNTER — COMMUNICATION - HEALTHEAST (OUTPATIENT)
Dept: GERIATRICS | Facility: CLINIC | Age: 65
End: 2020-09-17

## 2020-09-17 ENCOUNTER — AMBULATORY - HEALTHEAST (OUTPATIENT)
Dept: GERIATRICS | Facility: CLINIC | Age: 65
End: 2020-09-17

## 2020-09-18 ENCOUNTER — COMMUNICATION - HEALTHEAST (OUTPATIENT)
Dept: GERIATRICS | Facility: CLINIC | Age: 65
End: 2020-09-18

## 2020-09-21 ENCOUNTER — OFFICE VISIT - HEALTHEAST (OUTPATIENT)
Dept: GERIATRICS | Facility: CLINIC | Age: 65
End: 2020-09-21

## 2020-09-21 ENCOUNTER — COMMUNICATION - HEALTHEAST (OUTPATIENT)
Dept: GERIATRICS | Facility: CLINIC | Age: 65
End: 2020-09-21

## 2020-09-21 DIAGNOSIS — R53.1 WEAKNESS: ICD-10-CM

## 2020-09-21 DIAGNOSIS — Z89.511 HX OF RIGHT BKA (H): ICD-10-CM

## 2020-09-21 DIAGNOSIS — M25.512 ACUTE PAIN OF LEFT SHOULDER: ICD-10-CM

## 2020-09-22 ENCOUNTER — COMMUNICATION - HEALTHEAST (OUTPATIENT)
Dept: GERIATRICS | Facility: CLINIC | Age: 65
End: 2020-09-22

## 2020-09-23 ENCOUNTER — OFFICE VISIT - HEALTHEAST (OUTPATIENT)
Dept: GERIATRICS | Facility: CLINIC | Age: 65
End: 2020-09-23

## 2020-09-23 DIAGNOSIS — F32.89 OTHER DEPRESSION: ICD-10-CM

## 2020-09-23 DIAGNOSIS — S88.119A BELOW-KNEE AMPUTATION (H): ICD-10-CM

## 2020-09-28 ENCOUNTER — AMBULATORY - HEALTHEAST (OUTPATIENT)
Dept: GERIATRICS | Facility: CLINIC | Age: 65
End: 2020-09-28

## 2020-09-29 ENCOUNTER — COMMUNICATION - HEALTHEAST (OUTPATIENT)
Dept: GERIATRICS | Facility: CLINIC | Age: 65
End: 2020-09-29

## 2020-09-29 ENCOUNTER — OFFICE VISIT - HEALTHEAST (OUTPATIENT)
Dept: GERIATRICS | Facility: CLINIC | Age: 65
End: 2020-09-29

## 2020-09-29 ENCOUNTER — AMBULATORY - HEALTHEAST (OUTPATIENT)
Dept: GERIATRICS | Facility: CLINIC | Age: 65
End: 2020-09-29

## 2020-09-29 DIAGNOSIS — M15.0 PRIMARY OSTEOARTHRITIS INVOLVING MULTIPLE JOINTS: ICD-10-CM

## 2020-09-29 DIAGNOSIS — D51.0 PERNICIOUS ANEMIA: ICD-10-CM

## 2020-09-29 DIAGNOSIS — R63.4 UNINTENDED WEIGHT LOSS: ICD-10-CM

## 2020-10-05 ENCOUNTER — AMBULATORY - HEALTHEAST (OUTPATIENT)
Dept: GERIATRICS | Facility: CLINIC | Age: 65
End: 2020-10-05

## 2020-10-05 ENCOUNTER — COMMUNICATION - HEALTHEAST (OUTPATIENT)
Dept: GERIATRICS | Facility: CLINIC | Age: 65
End: 2020-10-05

## 2020-10-05 DIAGNOSIS — M15.0 PRIMARY OSTEOARTHRITIS INVOLVING MULTIPLE JOINTS: ICD-10-CM

## 2020-10-06 ENCOUNTER — OFFICE VISIT - HEALTHEAST (OUTPATIENT)
Dept: GERIATRICS | Facility: CLINIC | Age: 65
End: 2020-10-06

## 2020-10-06 DIAGNOSIS — G89.4 CHRONIC PAIN SYNDROME: ICD-10-CM

## 2020-10-06 DIAGNOSIS — E66.01 MORBID OBESITY (H): ICD-10-CM

## 2020-10-06 DIAGNOSIS — R63.4 UNINTENDED WEIGHT LOSS: ICD-10-CM

## 2020-10-06 DIAGNOSIS — S88.119A BELOW-KNEE AMPUTATION (H): ICD-10-CM

## 2020-10-07 ENCOUNTER — COMMUNICATION - HEALTHEAST (OUTPATIENT)
Dept: GERIATRICS | Facility: CLINIC | Age: 65
End: 2020-10-07

## 2020-10-07 ENCOUNTER — COMMUNICATION - HEALTHEAST (OUTPATIENT)
Dept: PHARMACY | Facility: CLINIC | Age: 65
End: 2020-10-07

## 2020-10-13 ENCOUNTER — COMMUNICATION - HEALTHEAST (OUTPATIENT)
Dept: SCHEDULING | Facility: CLINIC | Age: 65
End: 2020-10-13

## 2020-10-15 ENCOUNTER — OFFICE VISIT - HEALTHEAST (OUTPATIENT)
Dept: GERIATRICS | Facility: CLINIC | Age: 65
End: 2020-10-15

## 2020-10-15 ENCOUNTER — COMMUNICATION - HEALTHEAST (OUTPATIENT)
Dept: GERIATRICS | Facility: CLINIC | Age: 65
End: 2020-10-15

## 2020-10-15 DIAGNOSIS — T14.8XXA WOUND INFECTION: ICD-10-CM

## 2020-10-15 DIAGNOSIS — N18.4 CKD (CHRONIC KIDNEY DISEASE) STAGE 4, GFR 15-29 ML/MIN (H): ICD-10-CM

## 2020-10-15 DIAGNOSIS — D51.0 PERNICIOUS ANEMIA: ICD-10-CM

## 2020-10-15 DIAGNOSIS — S88.119A BELOW-KNEE AMPUTATION (H): ICD-10-CM

## 2020-10-15 DIAGNOSIS — L08.9 WOUND INFECTION: ICD-10-CM

## 2020-10-15 RX ORDER — SACCHAROMYCES BOULARDII 250 MG
250 CAPSULE ORAL DAILY
Status: SHIPPED | COMMUNITY
Start: 2020-10-15

## 2020-10-15 ASSESSMENT — MIFFLIN-ST. JEOR: SCORE: 1506.59

## 2020-10-22 ENCOUNTER — AMBULATORY - HEALTHEAST (OUTPATIENT)
Dept: OTHER | Facility: CLINIC | Age: 65
End: 2020-10-22

## 2020-10-26 ENCOUNTER — RECORDS - HEALTHEAST (OUTPATIENT)
Dept: ADMINISTRATIVE | Facility: OTHER | Age: 65
End: 2020-10-26

## 2020-10-29 ENCOUNTER — COMMUNICATION - HEALTHEAST (OUTPATIENT)
Dept: GERIATRICS | Facility: CLINIC | Age: 65
End: 2020-10-29

## 2020-11-03 ENCOUNTER — OFFICE VISIT - HEALTHEAST (OUTPATIENT)
Dept: GERIATRICS | Facility: CLINIC | Age: 65
End: 2020-11-03

## 2020-11-03 DIAGNOSIS — I48.0 PAROXYSMAL ATRIAL FIBRILLATION (H): ICD-10-CM

## 2020-11-03 DIAGNOSIS — E66.813 CLASS 3 SEVERE OBESITY DUE TO EXCESS CALORIES WITH SERIOUS COMORBIDITY AND BODY MASS INDEX (BMI) OF 50.0 TO 59.9 IN ADULT (H): ICD-10-CM

## 2020-11-03 DIAGNOSIS — L08.9 CHRONIC WOUND INFECTION OF ABDOMEN, SUBSEQUENT ENCOUNTER: ICD-10-CM

## 2020-11-03 DIAGNOSIS — E66.01 CLASS 3 SEVERE OBESITY DUE TO EXCESS CALORIES WITH SERIOUS COMORBIDITY AND BODY MASS INDEX (BMI) OF 50.0 TO 59.9 IN ADULT (H): ICD-10-CM

## 2020-11-03 DIAGNOSIS — Z51.89 VISIT FOR WOUND CHECK: ICD-10-CM

## 2020-11-03 DIAGNOSIS — S31.109D CHRONIC WOUND INFECTION OF ABDOMEN, SUBSEQUENT ENCOUNTER: ICD-10-CM

## 2020-11-03 ASSESSMENT — MIFFLIN-ST. JEOR: SCORE: 1509.77

## 2020-11-05 ENCOUNTER — COMMUNICATION - HEALTHEAST (OUTPATIENT)
Dept: GERIATRICS | Facility: CLINIC | Age: 65
End: 2020-11-05

## 2020-11-09 ENCOUNTER — COMMUNICATION - HEALTHEAST (OUTPATIENT)
Dept: GERIATRICS | Facility: CLINIC | Age: 65
End: 2020-11-09

## 2020-11-10 ENCOUNTER — OFFICE VISIT - HEALTHEAST (OUTPATIENT)
Dept: GERIATRICS | Facility: CLINIC | Age: 65
End: 2020-11-10

## 2020-11-10 DIAGNOSIS — Z51.89 VISIT FOR WOUND CHECK: ICD-10-CM

## 2020-11-10 DIAGNOSIS — S31.109D CHRONIC WOUND INFECTION OF ABDOMEN, SUBSEQUENT ENCOUNTER: ICD-10-CM

## 2020-11-10 DIAGNOSIS — S88.119A BELOW-KNEE AMPUTATION (H): ICD-10-CM

## 2020-11-10 DIAGNOSIS — L08.9 CHRONIC WOUND INFECTION OF ABDOMEN, SUBSEQUENT ENCOUNTER: ICD-10-CM

## 2020-11-10 ASSESSMENT — MIFFLIN-ST. JEOR: SCORE: 1534.26

## 2020-12-01 ENCOUNTER — HOSPITAL ENCOUNTER (OUTPATIENT)
Dept: CT IMAGING | Facility: HOSPITAL | Age: 65
Discharge: HOME | End: 2020-12-01
Attending: FAMILY MEDICINE

## 2020-12-01 DIAGNOSIS — R91.1 PULMONARY NODULE: ICD-10-CM

## 2020-12-04 ENCOUNTER — AMBULATORY - HEALTHEAST (OUTPATIENT)
Dept: GERIATRICS | Facility: CLINIC | Age: 65
End: 2020-12-04

## 2020-12-08 ENCOUNTER — OFFICE VISIT - HEALTHEAST (OUTPATIENT)
Dept: GERIATRICS | Facility: CLINIC | Age: 65
End: 2020-12-08

## 2020-12-08 DIAGNOSIS — L08.9 CHRONIC WOUND INFECTION OF ABDOMEN, SUBSEQUENT ENCOUNTER: ICD-10-CM

## 2020-12-08 DIAGNOSIS — I48.0 PAROXYSMAL ATRIAL FIBRILLATION (H): ICD-10-CM

## 2020-12-08 DIAGNOSIS — U07.1 2019 NOVEL CORONAVIRUS DISEASE (COVID-19): ICD-10-CM

## 2020-12-08 DIAGNOSIS — N18.4 CKD (CHRONIC KIDNEY DISEASE) STAGE 4, GFR 15-29 ML/MIN (H): ICD-10-CM

## 2020-12-08 DIAGNOSIS — S31.109D CHRONIC WOUND INFECTION OF ABDOMEN, SUBSEQUENT ENCOUNTER: ICD-10-CM

## 2020-12-08 ASSESSMENT — MIFFLIN-ST. JEOR: SCORE: 1548.78

## 2020-12-09 ENCOUNTER — COMMUNICATION - HEALTHEAST (OUTPATIENT)
Dept: GERIATRICS | Facility: CLINIC | Age: 65
End: 2020-12-09

## 2020-12-10 ENCOUNTER — OFFICE VISIT - HEALTHEAST (OUTPATIENT)
Dept: GERIATRICS | Facility: CLINIC | Age: 65
End: 2020-12-10

## 2020-12-10 DIAGNOSIS — U07.1 2019 NOVEL CORONAVIRUS DISEASE (COVID-19): ICD-10-CM

## 2020-12-10 DIAGNOSIS — N18.4 CKD (CHRONIC KIDNEY DISEASE) STAGE 4, GFR 15-29 ML/MIN (H): ICD-10-CM

## 2020-12-10 ASSESSMENT — MIFFLIN-ST. JEOR: SCORE: 1548.78

## 2020-12-11 ENCOUNTER — COMMUNICATION - HEALTHEAST (OUTPATIENT)
Dept: GERIATRICS | Facility: CLINIC | Age: 65
End: 2020-12-11

## 2020-12-14 ENCOUNTER — COMMUNICATION - HEALTHEAST (OUTPATIENT)
Dept: GERIATRICS | Facility: CLINIC | Age: 65
End: 2020-12-14

## 2020-12-15 ENCOUNTER — OFFICE VISIT - HEALTHEAST (OUTPATIENT)
Dept: GERIATRICS | Facility: CLINIC | Age: 65
End: 2020-12-15

## 2020-12-15 DIAGNOSIS — Z79.01 ANTICOAGULATED: ICD-10-CM

## 2020-12-15 DIAGNOSIS — I48.0 PAROXYSMAL ATRIAL FIBRILLATION (H): ICD-10-CM

## 2020-12-15 DIAGNOSIS — R19.7 DIARRHEA, UNSPECIFIED TYPE: ICD-10-CM

## 2020-12-15 ASSESSMENT — MIFFLIN-ST. JEOR: SCORE: 1548.78

## 2020-12-16 ENCOUNTER — COMMUNICATION - HEALTHEAST (OUTPATIENT)
Dept: GERIATRICS | Facility: CLINIC | Age: 65
End: 2020-12-16

## 2020-12-18 ENCOUNTER — COMMUNICATION - HEALTHEAST (OUTPATIENT)
Dept: GERIATRICS | Facility: CLINIC | Age: 65
End: 2020-12-18

## 2020-12-21 ASSESSMENT — MIFFLIN-ST. JEOR: SCORE: 1553.31

## 2020-12-22 ENCOUNTER — OFFICE VISIT - HEALTHEAST (OUTPATIENT)
Dept: GERIATRICS | Facility: CLINIC | Age: 65
End: 2020-12-22

## 2020-12-22 ENCOUNTER — COMMUNICATION - HEALTHEAST (OUTPATIENT)
Dept: GERIATRICS | Facility: CLINIC | Age: 65
End: 2020-12-22

## 2020-12-22 DIAGNOSIS — M15.0 PRIMARY OSTEOARTHRITIS INVOLVING MULTIPLE JOINTS: ICD-10-CM

## 2020-12-22 DIAGNOSIS — R19.7 DIARRHEA, UNSPECIFIED TYPE: ICD-10-CM

## 2020-12-28 ENCOUNTER — COMMUNICATION - HEALTHEAST (OUTPATIENT)
Dept: GERIATRICS | Facility: CLINIC | Age: 65
End: 2020-12-28

## 2020-12-31 ENCOUNTER — COMMUNICATION - HEALTHEAST (OUTPATIENT)
Dept: GERIATRICS | Facility: CLINIC | Age: 65
End: 2020-12-31

## 2020-12-31 DIAGNOSIS — M15.0 PRIMARY OSTEOARTHRITIS INVOLVING MULTIPLE JOINTS: ICD-10-CM

## 2021-01-04 ENCOUNTER — COMMUNICATION - HEALTHEAST (OUTPATIENT)
Dept: GERIATRICS | Facility: CLINIC | Age: 66
End: 2021-01-04

## 2021-01-04 ENCOUNTER — RECORDS - HEALTHEAST (OUTPATIENT)
Dept: GERIATRICS | Facility: CLINIC | Age: 66
End: 2021-01-04

## 2021-01-05 ENCOUNTER — COMMUNICATION - HEALTHEAST (OUTPATIENT)
Dept: GERIATRICS | Facility: CLINIC | Age: 66
End: 2021-01-05

## 2021-01-07 ENCOUNTER — OFFICE VISIT - HEALTHEAST (OUTPATIENT)
Dept: GERIATRICS | Facility: CLINIC | Age: 66
End: 2021-01-07

## 2021-01-07 DIAGNOSIS — E44.0 MODERATE PROTEIN MALNUTRITION (H): ICD-10-CM

## 2021-01-07 DIAGNOSIS — N18.4 CKD (CHRONIC KIDNEY DISEASE) STAGE 4, GFR 15-29 ML/MIN (H): ICD-10-CM

## 2021-01-07 DIAGNOSIS — Z79.4 TYPE 2 DIABETES MELLITUS WITH OTHER SPECIFIED COMPLICATION, WITH LONG-TERM CURRENT USE OF INSULIN (H): ICD-10-CM

## 2021-01-07 DIAGNOSIS — R19.7 DIARRHEA, UNSPECIFIED TYPE: ICD-10-CM

## 2021-01-07 DIAGNOSIS — M17.0 PRIMARY OSTEOARTHRITIS OF BOTH KNEES: ICD-10-CM

## 2021-01-07 DIAGNOSIS — E11.69 TYPE 2 DIABETES MELLITUS WITH OTHER SPECIFIED COMPLICATION, WITH LONG-TERM CURRENT USE OF INSULIN (H): ICD-10-CM

## 2021-01-07 DIAGNOSIS — Z89.511 HX OF RIGHT BKA (H): ICD-10-CM

## 2021-01-07 ASSESSMENT — MIFFLIN-ST. JEOR: SCORE: 1491.62

## 2021-01-14 ENCOUNTER — COMMUNICATION - HEALTHEAST (OUTPATIENT)
Dept: GERIATRICS | Facility: CLINIC | Age: 66
End: 2021-01-14

## 2021-01-19 ENCOUNTER — COMMUNICATION - HEALTHEAST (OUTPATIENT)
Dept: GERIATRICS | Facility: CLINIC | Age: 66
End: 2021-01-19

## 2021-01-21 ENCOUNTER — OFFICE VISIT - HEALTHEAST (OUTPATIENT)
Dept: GERIATRICS | Facility: CLINIC | Age: 66
End: 2021-01-21

## 2021-01-21 DIAGNOSIS — I48.0 PAROXYSMAL ATRIAL FIBRILLATION (H): ICD-10-CM

## 2021-01-21 DIAGNOSIS — M17.0 PRIMARY OSTEOARTHRITIS OF BOTH KNEES: ICD-10-CM

## 2021-01-21 ASSESSMENT — MIFFLIN-ST. JEOR: SCORE: 1493.89

## 2021-01-22 ENCOUNTER — COMMUNICATION - HEALTHEAST (OUTPATIENT)
Dept: GERIATRICS | Facility: CLINIC | Age: 66
End: 2021-01-22

## 2021-01-22 ENCOUNTER — OFFICE VISIT - HEALTHEAST (OUTPATIENT)
Dept: GERIATRICS | Facility: CLINIC | Age: 66
End: 2021-01-22

## 2021-01-22 DIAGNOSIS — Z79.4 TYPE 2 DIABETES MELLITUS WITH OTHER SPECIFIED COMPLICATION, WITH LONG-TERM CURRENT USE OF INSULIN (H): ICD-10-CM

## 2021-01-22 DIAGNOSIS — I48.0 PAROXYSMAL ATRIAL FIBRILLATION (H): ICD-10-CM

## 2021-01-22 DIAGNOSIS — E11.69 TYPE 2 DIABETES MELLITUS WITH OTHER SPECIFIED COMPLICATION, WITH LONG-TERM CURRENT USE OF INSULIN (H): ICD-10-CM

## 2021-01-22 DIAGNOSIS — N18.4 CKD (CHRONIC KIDNEY DISEASE) STAGE 4, GFR 15-29 ML/MIN (H): ICD-10-CM

## 2021-01-22 DIAGNOSIS — I10 ESSENTIAL HYPERTENSION: ICD-10-CM

## 2021-01-22 ASSESSMENT — MIFFLIN-ST. JEOR: SCORE: 1493.89

## 2021-01-26 ENCOUNTER — COMMUNICATION - HEALTHEAST (OUTPATIENT)
Dept: GERIATRICS | Facility: CLINIC | Age: 66
End: 2021-01-26

## 2021-01-27 ENCOUNTER — OFFICE VISIT - HEALTHEAST (OUTPATIENT)
Dept: GERIATRICS | Facility: CLINIC | Age: 66
End: 2021-01-27

## 2021-01-27 DIAGNOSIS — Z00.00 WELLNESS EXAMINATION: ICD-10-CM

## 2021-01-27 DIAGNOSIS — M17.0 PRIMARY OSTEOARTHRITIS OF BOTH KNEES: ICD-10-CM

## 2021-01-27 ASSESSMENT — MIFFLIN-ST. JEOR: SCORE: 1493.89

## 2021-02-01 ENCOUNTER — COMMUNICATION - HEALTHEAST (OUTPATIENT)
Dept: GERIATRICS | Facility: CLINIC | Age: 66
End: 2021-02-01

## 2021-02-01 RX ORDER — LIDOCAINE 50 MG/G
1 PATCH TOPICAL EVERY 24 HOURS
Status: ON HOLD | COMMUNITY
Start: 2021-02-01 | End: 2021-10-12

## 2021-02-05 ENCOUNTER — COMMUNICATION - HEALTHEAST (OUTPATIENT)
Dept: GERIATRICS | Facility: CLINIC | Age: 66
End: 2021-02-05

## 2021-02-08 ENCOUNTER — COMMUNICATION - HEALTHEAST (OUTPATIENT)
Dept: GERIATRICS | Facility: CLINIC | Age: 66
End: 2021-02-08

## 2021-02-11 ENCOUNTER — COMMUNICATION - HEALTHEAST (OUTPATIENT)
Dept: ADMINISTRATIVE | Facility: CLINIC | Age: 66
End: 2021-02-11

## 2021-02-11 ENCOUNTER — OFFICE VISIT - HEALTHEAST (OUTPATIENT)
Dept: GERIATRICS | Facility: CLINIC | Age: 66
End: 2021-02-11

## 2021-02-11 DIAGNOSIS — R19.7 DIARRHEA, UNSPECIFIED TYPE: ICD-10-CM

## 2021-02-11 DIAGNOSIS — A49.8 CLOSTRIDIUM DIFFICILE INFECTION: ICD-10-CM

## 2021-02-11 ASSESSMENT — MIFFLIN-ST. JEOR: SCORE: 1536.08

## 2021-02-15 ENCOUNTER — COMMUNICATION - HEALTHEAST (OUTPATIENT)
Dept: GERIATRICS | Facility: CLINIC | Age: 66
End: 2021-02-15

## 2021-02-15 ENCOUNTER — AMBULATORY - HEALTHEAST (OUTPATIENT)
Dept: GERIATRICS | Facility: CLINIC | Age: 66
End: 2021-02-15

## 2021-02-24 ENCOUNTER — COMMUNICATION - HEALTHEAST (OUTPATIENT)
Dept: GERIATRICS | Facility: CLINIC | Age: 66
End: 2021-02-24

## 2021-02-25 ENCOUNTER — OFFICE VISIT - HEALTHEAST (OUTPATIENT)
Dept: GERIATRICS | Facility: CLINIC | Age: 66
End: 2021-02-25

## 2021-02-25 DIAGNOSIS — E66.01 MORBID OBESITY (H): ICD-10-CM

## 2021-02-25 DIAGNOSIS — A49.8 CLOSTRIDIUM DIFFICILE INFECTION: ICD-10-CM

## 2021-02-25 ASSESSMENT — MIFFLIN-ST. JEOR: SCORE: 1575.99

## 2021-03-25 ENCOUNTER — OFFICE VISIT - HEALTHEAST (OUTPATIENT)
Dept: GERIATRICS | Facility: CLINIC | Age: 66
End: 2021-03-25

## 2021-03-25 DIAGNOSIS — N18.4 CKD (CHRONIC KIDNEY DISEASE) STAGE 4, GFR 15-29 ML/MIN (H): ICD-10-CM

## 2021-03-25 DIAGNOSIS — A49.8 CLOSTRIDIUM DIFFICILE INFECTION: ICD-10-CM

## 2021-03-25 DIAGNOSIS — Z89.511 HX OF RIGHT BKA (H): ICD-10-CM

## 2021-03-25 DIAGNOSIS — M17.0 PRIMARY OSTEOARTHRITIS OF BOTH KNEES: ICD-10-CM

## 2021-03-25 ASSESSMENT — MIFFLIN-ST. JEOR: SCORE: 1578.71

## 2021-03-29 ENCOUNTER — COMMUNICATION - HEALTHEAST (OUTPATIENT)
Dept: GERIATRICS | Facility: CLINIC | Age: 66
End: 2021-03-29

## 2021-04-01 ENCOUNTER — OFFICE VISIT - HEALTHEAST (OUTPATIENT)
Dept: GERIATRICS | Facility: CLINIC | Age: 66
End: 2021-04-01

## 2021-04-01 DIAGNOSIS — R19.7 DIARRHEA, UNSPECIFIED TYPE: ICD-10-CM

## 2021-04-01 DIAGNOSIS — M17.0 PRIMARY OSTEOARTHRITIS OF BOTH KNEES: ICD-10-CM

## 2021-04-01 ASSESSMENT — MIFFLIN-ST. JEOR: SCORE: 1573.27

## 2021-04-09 ENCOUNTER — COMMUNICATION - HEALTHEAST (OUTPATIENT)
Dept: GERIATRICS | Facility: CLINIC | Age: 66
End: 2021-04-09

## 2021-04-09 ENCOUNTER — AMBULATORY - HEALTHEAST (OUTPATIENT)
Dept: GERIATRICS | Facility: CLINIC | Age: 66
End: 2021-04-09

## 2021-04-26 ENCOUNTER — HOSPITAL ENCOUNTER (OUTPATIENT)
Dept: ULTRASOUND IMAGING | Facility: HOSPITAL | Age: 66
Discharge: HOME | End: 2021-04-26
Attending: UROLOGY

## 2021-04-26 DIAGNOSIS — N28.89 RENAL MASS: ICD-10-CM

## 2021-04-26 DIAGNOSIS — N28.89 OTHER SPECIFIED DISORDERS OF KIDNEY AND URETER: ICD-10-CM

## 2021-05-04 ENCOUNTER — COMMUNICATION - HEALTHEAST (OUTPATIENT)
Dept: GERIATRICS | Facility: CLINIC | Age: 66
End: 2021-05-04

## 2021-05-04 DIAGNOSIS — M15.0 PRIMARY OSTEOARTHRITIS INVOLVING MULTIPLE JOINTS: ICD-10-CM

## 2021-05-04 RX ORDER — HYDROMORPHONE HYDROCHLORIDE 2 MG/1
TABLET ORAL
Qty: 90 TABLET | Refills: 0 | Status: SHIPPED | OUTPATIENT
Start: 2021-05-04 | End: 2021-07-29

## 2021-05-06 ENCOUNTER — RECORDS - HEALTHEAST (OUTPATIENT)
Dept: ADMINISTRATIVE | Facility: OTHER | Age: 66
End: 2021-05-06

## 2021-05-11 ENCOUNTER — OFFICE VISIT - HEALTHEAST (OUTPATIENT)
Dept: GERIATRICS | Facility: CLINIC | Age: 66
End: 2021-05-11

## 2021-05-11 DIAGNOSIS — L29.9 PRURITUS: ICD-10-CM

## 2021-05-11 DIAGNOSIS — E11.69 TYPE 2 DIABETES MELLITUS WITH OTHER SPECIFIED COMPLICATION, WITH LONG-TERM CURRENT USE OF INSULIN (H): ICD-10-CM

## 2021-05-11 DIAGNOSIS — Z79.4 TYPE 2 DIABETES MELLITUS WITH OTHER SPECIFIED COMPLICATION, WITH LONG-TERM CURRENT USE OF INSULIN (H): ICD-10-CM

## 2021-05-11 ASSESSMENT — MIFFLIN-ST. JEOR: SCORE: 1590.51

## 2021-05-14 RX ORDER — BENZOCAINE/MENTHOL 6 MG-10 MG
LOZENGE MUCOUS MEMBRANE 2 TIMES DAILY
Status: SHIPPED | COMMUNITY
Start: 2021-05-14

## 2021-05-14 RX ORDER — MICONAZOLE
POWDER (GRAM) MISCELLANEOUS 2 TIMES DAILY
Status: ON HOLD | COMMUNITY
Start: 2021-05-14 | End: 2021-10-12

## 2021-05-20 ENCOUNTER — RECORDS - HEALTHEAST (OUTPATIENT)
Dept: ADMINISTRATIVE | Facility: OTHER | Age: 66
End: 2021-05-20

## 2021-05-20 ENCOUNTER — OFFICE VISIT - HEALTHEAST (OUTPATIENT)
Dept: GERIATRICS | Facility: CLINIC | Age: 66
End: 2021-05-20

## 2021-05-20 DIAGNOSIS — E11.69 TYPE 2 DIABETES MELLITUS WITH OTHER SPECIFIED COMPLICATION, WITH LONG-TERM CURRENT USE OF INSULIN (H): ICD-10-CM

## 2021-05-20 DIAGNOSIS — Z89.511 HX OF RIGHT BKA (H): ICD-10-CM

## 2021-05-20 DIAGNOSIS — Z79.4 TYPE 2 DIABETES MELLITUS WITH OTHER SPECIFIED COMPLICATION, WITH LONG-TERM CURRENT USE OF INSULIN (H): ICD-10-CM

## 2021-05-20 DIAGNOSIS — I10 HTN (HYPERTENSION): ICD-10-CM

## 2021-05-20 DIAGNOSIS — A49.8 CLOSTRIDIUM DIFFICILE INFECTION: ICD-10-CM

## 2021-05-20 DIAGNOSIS — E66.01 MORBID OBESITY (H): ICD-10-CM

## 2021-05-20 DIAGNOSIS — N18.4 CKD (CHRONIC KIDNEY DISEASE) STAGE 4, GFR 15-29 ML/MIN (H): ICD-10-CM

## 2021-05-20 DIAGNOSIS — M17.0 PRIMARY OSTEOARTHRITIS OF BOTH KNEES: ICD-10-CM

## 2021-05-20 ASSESSMENT — MIFFLIN-ST. JEOR: SCORE: 1586.88

## 2021-05-24 ENCOUNTER — RECORDS - HEALTHEAST (OUTPATIENT)
Dept: ADMINISTRATIVE | Facility: CLINIC | Age: 66
End: 2021-05-24

## 2021-05-26 RX ORDER — INSULIN GLARGINE 100 [IU]/ML
22 INJECTION, SOLUTION SUBCUTANEOUS AT BEDTIME
Qty: 15 ML | Refills: 0 | Status: ON HOLD | OUTPATIENT
Start: 2021-05-26 | End: 2021-10-12

## 2021-05-27 ENCOUNTER — RECORDS - HEALTHEAST (OUTPATIENT)
Dept: ADMINISTRATIVE | Facility: CLINIC | Age: 66
End: 2021-05-27

## 2021-05-27 ENCOUNTER — COMMUNICATION - HEALTHEAST (OUTPATIENT)
Dept: ADMINISTRATIVE | Facility: CLINIC | Age: 66
End: 2021-05-27

## 2021-05-27 VITALS
OXYGEN SATURATION: 96 % | RESPIRATION RATE: 18 BRPM | SYSTOLIC BLOOD PRESSURE: 130 MMHG | HEART RATE: 66 BPM | DIASTOLIC BLOOD PRESSURE: 58 MMHG

## 2021-05-27 VITALS
SYSTOLIC BLOOD PRESSURE: 142 MMHG | TEMPERATURE: 97.3 F | RESPIRATION RATE: 18 BRPM | DIASTOLIC BLOOD PRESSURE: 76 MMHG | OXYGEN SATURATION: 96 % | HEART RATE: 64 BPM

## 2021-05-27 VITALS — BODY MASS INDEX: 46.22 KG/M2 | WEIGHT: 244.6 LBS | HEIGHT: 61 IN

## 2021-05-27 NOTE — TELEPHONE ENCOUNTER
----- Message from Max Perez MD sent at 4/13/2019  2:40 PM CDT -----  Please discontinue losartan and increase insulin by 3 units across the board.

## 2021-05-28 ENCOUNTER — COMMUNICATION - HEALTHEAST (OUTPATIENT)
Dept: GERIATRICS | Facility: CLINIC | Age: 66
End: 2021-05-28

## 2021-05-28 ENCOUNTER — RECORDS - HEALTHEAST (OUTPATIENT)
Dept: ADMINISTRATIVE | Facility: CLINIC | Age: 66
End: 2021-05-28

## 2021-05-28 NOTE — PROGRESS NOTES
"Office Visit - Follow up    Jazmin Bauman   63 y.o. female    Date of Visit: 4/19/2019    Chief Complaint   Patient presents with     Diabetes     Hypertension       Subjective: Diabetes mellitus type 2 with hypertension.    Super morbid obesity BMI 59.    The patient offers no new complaints denies chest pain or shortness of breath no blood in stool or urine medication list reviewed well-tolerated normal effects.  Multiple drug allergies noted and reviewed including sulfa nitrofurantoin Naprosyn latex and hydralazine.    Long history of pernicious anemia and vitamin B12 injection given today 1000 mcg IM.    Mammogram allCLEAR on March 16, 2017 and colonoscopy showed tubular adenomatous polyp 1 on March 27, 2012 there is a family history of colon cancer 1 parent dying of same.    Medication list reviewed well-tolerated normal effects.    ROS: A comprehensive review of systems was performed and was otherwise negative    Medications:  Prior to Admission medications    Medication Sig Start Date End Date Taking? Authorizing Provider   ACCU-CHEK ASM PLUS TEST STRP strips TEST 6 TIMES A DAY  Patient taking differently: TEST 1 TIME A DAY 9/18/17  Yes Lester Flores MD   acetaminophen (TYLENOL) 325 MG tablet Take 325-650 mg by mouth every 8 (eight) hours as needed.    Yes PROVIDER, HISTORICAL   allopurinol (ZYLOPRIM) 100 MG tablet Take 1 tablet (100 mg total) by mouth daily. 6/29/16  Yes Lester Flores MD   amLODIPine (NORVASC) 10 MG tablet Take 10 mg by mouth daily.   Yes PROVIDER, HISTORICAL   aspirin 81 MG EC tablet Take 81 mg by mouth daily.   Yes PROVIDER, HISTORICAL   BASAGLAR KWIKPEN U-100 INSULIN 100 unit/mL (3 mL) pen inject 49 units under the skin every morning. do not mix with any other insulin.  Patient taking differently: inject 53 units under the skin every morning. do not mix with any other insulin. 3/12/19  Yes Lester Flores MD   BD ULTRA-FINE MICRO PEN NEEDLE 32 gauge x 1/4\" Ndle " USE AS DIRECTED 4 TIMES DAILY. 4/15/19  Yes Max Perez MD   blood glucose test strips Use 1 each As Directed 6 (six) times a day. Dispense brand per patient's insurance at pharmacy discretion. 4/17/18  Yes Lester Flores MD   cholecalciferol, vitamin D3, (VITAMIN D3) 1,000 unit capsule Take 1,000 Units by mouth daily.   Yes PROVIDER, HISTORICAL   docusate sodium (COLACE) 50 MG capsule Take by mouth once daily.   Yes PROVIDER, HISTORICAL   ferrous sulfate 65 mg elemental iron (IRON) Take 1 tablet by mouth 2 (two) times a day.   Yes PROVIDER, HISTORICAL   insulin aspart U-100 (NOVOLOG FLEXPEN U-100 INSULIN) 100 unit/mL injection pen Inject 15 Units under the skin 3 (three) times a day before meals. 2/5/19 5/6/19 Yes Max Perez MD   lancets (ACCU-CHEK MULTICLIX LANCET) Misc TEST 6 TIMES A DAY  Patient taking differently: TEST 1 TIME A DAY 9/28/15  Yes Lseter Flores MD   losartan (COZAAR) 25 MG tablet Take 1 tablet (25 mg total) by mouth daily. 4/4/19  Yes Max Perez MD   magnesium 250 mg Tab Take 500 mg by mouth 2 (two) times a day.    Yes PROVIDER, HISTORICAL   colchicine (MITIGARE) 0.6 mg cap Take 0.6 mg by mouth 2 (two) times a day. 12/21/16   Lester Flores MD   glipiZIDE (GLUCOTROL) 10 MG tablet Take 1 tablet (10 mg total) by mouth 2 (two) times a day. 3/20/19   Max Perez MD       Allergies:   Allergies   Allergen Reactions     Hydralazine      Latex Itching     Skin Burns     Naprosyn [Naproxen] Swelling     throat     Nitrofurantoin Hives     Sulfa (Sulfonamide Antibiotics) Rash     Vicks Vaporub [Camphor-Eucalyptus Oil-Menthol] Rash       Immunizations:   Immunization History   Administered Date(s) Administered     INFLUENZA,RECOMBINANT,INJ,PF QUADRIVALENT 18+YRS 10/19/2018     Influenza [id number], 12/18/2009     Influenza, inj, historic,unspecified 12/18/2009, 10/14/2011     Influenza, seasonal,quad inj 36+ mos 09/28/2015     Influenza,  seasonal,quad inj 6-35 mos 10/30/2012, 09/30/2013, 11/21/2014     Influenza,seasonal quad, PF, 36+MOS 09/29/2016, 10/03/2017     Td,adult,historic,unspecified 03/02/2006     Tdap 05/15/2015       Exam Chest clear to auscultation and percussion.  Heart tones regular rhythm without murmur rub or gallop.  Abdomen soft nontender no organomegaly.  No peritoneal signs.  Extremities free of edema cyanosis or clubbing.  Neck veins nondistended no thyromegaly or scleral icterus noted, carotids full.  Skin warm and dry easily conversant good spirited.  Normal intelligence.  Neurologically intact no gross localizing findings.    144/70 pulse 71 respirations 18 O2 sats 98% on room air.    Assessment and Plan  Diabetes mellitus type 2 secondary to insulin resistance and super morbid obesity BMI 59.  Check A1c blood sugar lipid panel urine for microalbumin today.    Pernicious anemia.  Vitamin B12 1000 mcg administered intramuscularly right deltoid.    Tubular adenomatous colon polyp see colonoscopy report March 27, 2012.    Family history of colon cancer and past health history of diverticulosis.  Asymptomatic at this juncture reemphasized high-fiber diet.  Metamucil.    Super morbid obesity with insulin resistance.    Multiple drug allergies see list and reviewed.    Time: total time spent with the patient was 25 minutes of which >50% was spent in counseling and coordination of care    The following high BMI interventions were performed this visit: encouragement to exercise    Lester Flores MD    Patient Active Problem List   Diagnosis     Thrombocytopenia     Fibromyalgia     Carpal Tunnel Syndrome     Urinary Tract Infection     Endometrial Hyperplasia     Cholelithiasis     Leukocytosis     Urine Tests Nonspecific Abnormal Findings     Obesity     Essential Hypertension     Pernicious Anemia     Immunology Studies Raised Immunoglobulin Level     Vaginal bleeding     Type 2 diabetes mellitus (H)

## 2021-05-30 ENCOUNTER — RECORDS - HEALTHEAST (OUTPATIENT)
Dept: ADMINISTRATIVE | Facility: CLINIC | Age: 66
End: 2021-05-30

## 2021-05-30 NOTE — PROGRESS NOTES
Office Visit - Follow up    Jazmin Bauman   64 y.o. female    Date of Visit: 7/25/2019    Chief Complaint   Patient presents with     Hypertension     Diabetes       Subjective: Diabetes mellitus type 2 with hypertension for this 64-year-old female.  Also abdominal wound right lower abdominal area rubbing on her leg patient has super morbid obesity only 5 foot 1 inch tall but 301 pounds.  Down 11 pounds from last visit.    Cephalexin prescribed 500 mg 4 times a day and mupirocin to be applied for abdominal wound.  Plus general surgery consult.    Wound is not healing.  Could this be neoplastic.    No blood in stool or urine medication list reviewed well-tolerated normal effects.  Last laboratory tests done for diabetes April 19, 2019 reviewed in their entirety.    Pernicious anemia.  Vitamin B12 given 1000 mcg IM right deltoid.    Because of the fear of possible carcinogenic effects from losartan this was stopped.  She also stopped amlodipine.  Patient was advised to continue these especially the amlodipine.    Blood pressure was elevated today 156/70 4 repeat 150/76.  Not using glipizide for uncertain reasons.    No blood in stool or urine no chest pain or shortness of breath.    Meds reconciled.  Noncompliant.    Mammogram allCLEAR March 16, 2017 last colonoscopy dated March 27, 2012 showed tubular adenomatous polyp with Dr. India Salinas presiding.    The patient does not smoke or abuse alcohol.  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    ROS: A comprehensive review of systems was performed and was otherwise negative    Medications:  Prior to Admission medications    Medication Sig Start Date End Date Taking? Authorizing Provider   ACCU-CHEK SAM PLUS TEST STRP strips TEST 6 TIMES A DAY  Patient taking differently: TEST 1 TIME A DAY 9/18/17  Yes Lester Flores MD   acetaminophen (TYLENOL) 325 MG tablet Take 325-650 mg by mouth every 8 (eight) hours as needed.    Yes PROVIDER, HISTORICAL  "  allopurinol (ZYLOPRIM) 100 MG tablet Take 1 tablet (100 mg total) by mouth daily. 6/29/16  Yes Lester Flores MD   aspirin 81 MG EC tablet Take 81 mg by mouth daily.   Yes PROVIDER, HISTORICAL   BASAGLAR KWIKPEN U-100 INSULIN 100 unit/mL (3 mL) pen inject 53 units under the skin every morning. do not mix with any other insulin. 7/16/19  Yes Lester Flores MD   BD ULTRA-FINE MICRO PEN NEEDLE 32 gauge x 1/4\" Ndle USE AS DIRECTED 4 TIMES DAILY. 7/5/19  Yes Lester Flores MD   blood glucose test strips Use 1 each As Directed 6 (six) times a day. Dispense brand per patient's insurance at pharmacy discretion. 4/17/18  Yes Lester Flores MD   cholecalciferol, vitamin D3, (VITAMIN D3) 1,000 unit capsule Take 1,000 Units by mouth daily.   Yes PROVIDER, HISTORICAL   docusate sodium (COLACE) 50 MG capsule Take by mouth once daily.   Yes PROVIDER, HISTORICAL   ferrous sulfate 65 mg elemental iron (IRON) Take 1 tablet by mouth 2 (two) times a day.   Yes PROVIDER, HISTORICAL   insulin aspart U-100 (NOVOLOG FLEXPEN U-100 INSULIN) 100 unit/mL (3 mL) injection pen Inject 15 Units under the skin 3 (three) times a day before meals. contact dr for further refills. 6/27/19  Yes Lester Flores MD   magnesium 250 mg Tab Take 500 mg by mouth 2 (two) times a day.    Yes PROVIDER, HISTORICAL   cephalexin (KEFLEX) 500 MG capsule Take 1 capsule (500 mg total) by mouth 4 (four) times a day for 10 days. 7/25/19 8/4/19  Lester Flores MD   colchicine (MITIGARE) 0.6 mg cap Take 0.6 mg by mouth 2 (two) times a day. 12/21/16   Lester Flores MD   glipiZIDE (GLUCOTROL) 10 MG tablet Take 1 tablet (10 mg total) by mouth 2 (two) times a day. 3/20/19   Max Perez MD   insulin aspart U-100 (NOVOLOG FLEXPEN U-100 INSULIN) 100 unit/mL injection pen Inject 15 Units under the skin 3 (three) times a day before meals. 2/5/19 5/6/19  Max Perez MD   lancets (ACCU-CHEK MULTICLIX LANCET) [facility] TEST 6 " TIMES A DAY  Patient taking differently: TEST 1 TIME A DAY 9/28/15   Lester Flores MD   mupirocin (BACTROBAN) 2 % ointment Apply to affected area 3 times daily 7/25/19 8/1/19  Lester Flores MD   amLODIPine (NORVASC) 10 MG tablet Take 10 mg by mouth daily.  7/25/19  PROVIDER, HISTORICAL   losartan (COZAAR) 25 MG tablet Take 1 tablet (25 mg total) by mouth daily. 4/4/19 7/25/19  Max Perez MD       Allergies:   Allergies   Allergen Reactions     Hydralazine      Latex Itching     Skin Burns     Naprosyn [Naproxen] Swelling     throat     Nitrofurantoin Hives     Sulfa (Sulfonamide Antibiotics) Rash     Vicks Vaporub [Camphor-Eucalyptus Oil-Menthol] Rash       Immunizations:   Immunization History   Administered Date(s) Administered     INFLUENZA,RECOMBINANT,INJ,PF QUADRIVALENT 18+YRS 10/19/2018     Influenza [id number], 12/18/2009     Influenza, inj, historic,unspecified 12/18/2009, 10/14/2011     Influenza, seasonal,quad inj 36+ mos 09/28/2015     Influenza, seasonal,quad inj 6-35 mos 10/30/2012, 09/30/2013, 11/21/2014     Influenza,seasonal quad, PF, 36+MOS 09/29/2016, 10/03/2017     Td,adult,historic,unspecified 03/02/2006     Tdap 05/15/2015       Exam Chest clear to auscultation and percussion.  Heart tones regular rhythm without murmur rub or gallop.  Abdomen soft nontender no organomegaly.  No peritoneal signs.  Extremities free of edema cyanosis or clubbing.  Neck veins nondistended no thyromegaly or scleral icterus noted, carotids full.  Skin warm and dry easily conversant good spirited.  Normal intelligence.  Neurologically intact no gross localizing findings.    Assessment and Plan  Diabetes mellitus type 2 secondary to insulin resistance and super morbid obesity.  See above needs weight loss with caloric restriction regular exercise may be a bariatric surgery consult recommendation.  Check today A1c blood sugar lipid panel plus urine for microalbumin.    Hypertension not adequately  controlled.  Suggest restarting amlodipine.  She is reluctant to restart losartan.    156/74 and 150/76 today.    Diabetic foot examination okay.    Pernicious anemia vitamin B12 1000 mcg given IM right deltoid.    Open wound right lower abdominal area large try cephalexin 500 mg 4 times a day for 10-day.  Plus topical mupirocin.  It may be a candidate for wound care or clinic consultation plus [facility]    Multiple drug allergies including hydralazine latex Naprosyn nitrofurantoin sulfa and Vicks VapoRub.    Tubular adenomatous colon polyp see colonoscopy report March 27, 2012 Dr. India Gtz presiding.  RTC 3 months time.    Time: total time spent with the patient was 40 minutes of which >50% was spent in counseling and coordination of care    The following high BMI interventions were performed this visit: encouragement to exercise    Lester Flores MD    Patient Active Problem List   Diagnosis     Thrombocytopenia     Fibromyalgia     Carpal Tunnel Syndrome     Urinary Tract Infection     Endometrial Hyperplasia     Cholelithiasis     Leukocytosis     Urine Tests Nonspecific Abnormal Findings     Obesity     Essential Hypertension     Pernicious Anemia     Immunology Studies Raised Immunoglobulin Level     Vaginal bleeding     Type 2 diabetes mellitus (H)

## 2021-05-30 NOTE — TELEPHONE ENCOUNTER
Medication Question or Clarification  Who is calling: Pharmacy: [facility]  What medication are you calling about? (include dose and sig) mupirocin 2% ointment. Apply to affected area three times a day.  Who prescribed the medication?: Lester Flores MD  What is your question/concern?: The pharmacy is requesting clarification regarding the below questions:    -how much is the patient to use per application?  -what is the duration that the patient will be using this medication?  -would the provider like to increase the quantity? A 22 gram tube is very small.    Pharmacy: [id number]  Okay to leave a detailed message?: No  Site CMT - Please call the pharmacy to obtain any additional needed information.

## 2021-05-30 NOTE — TELEPHONE ENCOUNTER
Refill Approved    Rx renewed per Medication Renewal Policy. Medication was last renewed on 5/21/19.    Iza Rudolph, [facility] Triage/Med Refill 7/16/2019     Requested Prescriptions   Pending Prescriptions Disp Refills     BASAGLAR KWIKPEN U-100 INSULIN 100 unit/mL (3 mL) pen [Pharmacy Med Name: Basaglar KwikPen Subcutaneous Solution Pen-injector 100 UNIT/ML] 6 mL 3     Sig: inject 53 units under the skin every morning. do not mix with any other insulin.       Insulin/GLP-1 Refill Protocol Passed - 7/16/2019  7:55 AM        Passed - Visit with PCP or prescribing provider visit in last 6 months     Last office visit with prescriber/PCP: 4/19/2019 OR same dept: 4/19/2019 Lester Flores MD OR same specialty: 4/19/2019 Lester Flores MD Last physical: Visit date not found Last MTM visit: Visit date not found     Next appt within 3 mo: Visit date not found  Next physical within 3 mo: Visit date not found  Prescriber OR PCP: Lester Flores MD  Last diagnosis associated with med order: 1. Diabetes mellitus type 2, uncontrolled (H)  - BASAGLAR KWIKPEN U-100 INSULIN 100 unit/mL (3 mL) pen [Pharmacy Med Name: Basaglar KwikPen Subcutaneous Solution Pen-injector 100 UNIT/ML]; inject 53 units under the skin every morning. do not mix with any other insulin.  Dispense: 6 mL; Refill: 3    If protocol passes may refill for 6 months if within 3 months of last provider visit (or a total of 9 months).              Passed - A1C in last 6 months     Hemoglobin A1c   Date Value Ref Range Status   04/19/2019 10.0 (H) 3.5 - 6.0 % Final               Passed - Microalbumin in last year     Microalbumin, Random Urine   Date Value Ref Range Status   04/19/2019 12.82 (H) 0.00 - 1.99 mg/dL Final                  Passed - Blood pressure in last year     BP Readings from Last 1 Encounters:   04/19/19 144/70             Passed - Creatinine done in last year     Creatinine   Date Value Ref Range Status   04/10/2019 1.34 (H)  0.60 - 1.10 mg/dL Final

## 2021-05-30 NOTE — TELEPHONE ENCOUNTER
Wrong order placed:    Order was placed for general surgery. This needs to be changed to Amb referral to Wound Care.       Thank you,

## 2021-05-30 NOTE — TELEPHONE ENCOUNTER
Okay for 60 g tube of mupirocin ointment and 3 refills and please asked patient to apply a thin layer of mupirocin at least once daily to abdominal wound.  Please call for me.  With 3 refills.

## 2021-05-30 NOTE — TELEPHONE ENCOUNTER
Referral to wound care has been set up for Dr. Flores to review.  Lurdes GONZALEZ, MARIA EUGENIA/CMT....................10:59 AM

## 2021-05-31 VITALS — BODY MASS INDEX: 51.33 KG/M2 | WEIGHT: 273.9 LBS

## 2021-05-31 VITALS — HEIGHT: 61 IN | WEIGHT: 275 LBS | BODY MASS INDEX: 51.92 KG/M2

## 2021-05-31 VITALS — HEIGHT: 61 IN | WEIGHT: 290 LBS | BODY MASS INDEX: 54.75 KG/M2

## 2021-05-31 VITALS — BODY MASS INDEX: 51.92 KG/M2 | WEIGHT: 275 LBS | HEIGHT: 61 IN

## 2021-05-31 VITALS — BODY MASS INDEX: 54.59 KG/M2 | WEIGHT: 288.9 LBS

## 2021-05-31 VITALS — WEIGHT: 282 LBS | HEIGHT: 61 IN | BODY MASS INDEX: 53.24 KG/M2

## 2021-05-31 VITALS — WEIGHT: 277 LBS | HEIGHT: 61 IN | BODY MASS INDEX: 52.3 KG/M2

## 2021-05-31 VITALS — WEIGHT: 278 LBS | BODY MASS INDEX: 52.49 KG/M2 | HEIGHT: 61 IN

## 2021-05-31 VITALS — BODY MASS INDEX: 53.22 KG/M2 | WEIGHT: 284 LBS

## 2021-05-31 VITALS — WEIGHT: 283 LBS | HEIGHT: 61 IN | BODY MASS INDEX: 53.43 KG/M2

## 2021-05-31 VITALS — WEIGHT: 280.4 LBS | BODY MASS INDEX: 52.55 KG/M2

## 2021-05-31 NOTE — TELEPHONE ENCOUNTER
Mj Flores for orders requested below?  Orders have been set up for you to review.  Thank you.  Lurdes GONZALEZ, MARIA EUGENIA/CHILO....................2:38 PM

## 2021-05-31 NOTE — TELEPHONE ENCOUNTER
Ok for diflucan 150mg tabs and no. 3 tabs and take one daily for 3 days and one rf and please call pt and her pharm

## 2021-05-31 NOTE — TELEPHONE ENCOUNTER
Referral Request  Type of referral: OB/GYN  Who s requesting: Patient  Why the request: Possible total hysterectomy.    Have you been seen for this request: Yes  Does patient have a preference on a group/provider? Dr Beal at [facility] in [place].   Okay to leave a detailed message?  Yes

## 2021-05-31 NOTE — TELEPHONE ENCOUNTER
"Who is calling:  Jazmin  Reason for Call:  Message for Dr. Flores.  \"I spoke to the insurance company and I no longer need a call from him.\"  Date of last appointment with primary care: 8/30/19  Okay to leave a detailed message: No need to call back      "

## 2021-05-31 NOTE — PROGRESS NOTES
"Office Visit - Physical    Jazmin Bauman   64 y.o. female    Date of Visit: 8/30/2019    Chief Complaint   Patient presents with     Pre-op Exam     Hysteroscopy dilation and curettage  Dr. Grant Walden [facility] 9/5/2019       Subjective: Preoperative examination on this 64-year-old female retired from the [employer] for abnormal bleeding she anticipates hysteroscopy and D&C with Dr. Aaron Beal [facility] September 5, 2019.    Heavy postmenopausal bleeding for this 64-year-old female with type 2 diabetes.  She does have super morbid obesity with BMI 57.  She is currently retired she went through menopause years ago.  Vitamin B12 injection given today 1000 mcg IM.    ROS: A comprehensive review of systems was performed and was otherwise negative    Medications:   Prior to Admission medications    Medication Sig Start Date End Date Taking? Authorizing Provider   ACCU-CHEK SAM PLUS TEST STRP strips TEST 6 TIMES A DAY  Patient taking differently: TEST 1 TIME A DAY 9/18/17  Yes Lester Flores MD   acetaminophen (TYLENOL) 325 MG tablet Take 325-650 mg by mouth every 8 (eight) hours as needed.    Yes PROVIDER, HISTORICAL   allopurinol (ZYLOPRIM) 100 MG tablet Take 1 tablet (100 mg total) by mouth daily. 6/29/16  Yes Lester Flores MD   aspirin 81 MG EC tablet Take 81 mg by mouth daily.   Yes PROVIDER, HISTORICAL   BASAGLAR KWIKPEN U-100 INSULIN 100 unit/mL (3 mL) pen inject 53 units under the skin every morning. do not mix with any other insulin. 7/16/19  Yes Lester Flores MD   BD ULTRA-FINE MICRO PEN NEEDLE 32 gauge x 1/4\" Ndle USE AS DIRECTED 4 TIMES DAILY. 8/27/19  Yes Lester Flores MD   blood glucose test strips Use 1 each As Directed 6 (six) times a day. Dispense brand per patient's insurance at pharmacy discretion. 4/17/18  Yes Lester Flores MD   cholecalciferol, vitamin D3, (VITAMIN D3) 1,000 unit capsule Take 1,000 Units by mouth daily.   Yes PROVIDER, " HISTORICAL   docusate sodium (COLACE) 50 MG capsule Take by mouth once daily.   Yes PROVIDER, HISTORICAL   ferrous sulfate 65 mg elemental iron (IRON) Take 1 tablet by mouth 2 (two) times a day.   Yes PROVIDER, HISTORICAL   fluconazole (DIFLUCAN) 150 MG tablet Take 1 tablet (150 mg total) by mouth daily for 3 days. 8/27/19 8/30/19 Yes Lester Flores MD   insulin aspart U-100 (NOVOLOG FLEXPEN U-100 INSULIN) 100 unit/mL (3 mL) injection pen Inject 15 Units under the skin 3 (three) times a day before meals. contact dr for further refills. 7/26/19  Yes Lester Flores MD   lancets (ACCU-CHEK MULTICLIX LANCET) Misc TEST 6 TIMES A DAY  Patient taking differently: TEST 1 TIME A DAY 9/28/15  Yes Lester Flores MD   magnesium 250 mg Tab Take 500 mg by mouth 2 (two) times a day.    Yes PROVIDER, HISTORICAL   colchicine (MITIGARE) 0.6 mg cap Take 0.6 mg by mouth 2 (two) times a day. 12/21/16   Lester Flores MD   glipiZIDE (GLUCOTROL) 10 MG tablet Take 1 tablet (10 mg total) by mouth 2 (two) times a day. 3/20/19   Max Perez MD   insulin aspart U-100 (NOVOLOG FLEXPEN U-100 INSULIN) 100 unit/mL injection pen Inject 15 Units under the skin 3 (three) times a day before meals. 2/5/19 5/6/19  Max Perez MD       Allergies:  Allergies   Allergen Reactions     Hydralazine      Latex Itching     Skin Burns     Naprosyn [Naproxen] Swelling     throat     Nitrofurantoin Hives     Sulfa (Sulfonamide Antibiotics) Rash     Vicks Vaporub [Camphor-Eucalyptus Oil-Menthol] Rash       Immunizations:   Immunization History   Administered Date(s) Administered     INFLUENZA,RECOMBINANT,INJ,PF QUADRIVALENT 18+YRS 10/19/2018     Influenza T1f7-24, 12/18/2009     Influenza, inj, historic,unspecified 12/18/2009, 10/14/2011     Influenza, seasonal,quad inj 6-35 mos 10/30/2012, 09/30/2013, 11/21/2014     Influenza,seasonal quad, PF, =/> 6months 09/29/2016, 10/03/2017     Influenza,seasonal,quad inj =/>  6months 2015     Td,adult,historic,unspecified 2006     Tdap 05/15/2015       Health Maintenance: Immunizations reviewed and up-to-date.  She does not smoke or abuse alcohol.    Patient has multiple drug allergies including hydralazine latex Naprosyn nitrofurantoin with hives sulfa rash and Vicks VapoRub rash.    Mammogram -2017.    Tubular adenomatous colon polyp one in number seen in colonoscopy with Dr. India Salinas on 2012 there is a family history of colon cancer.  See below.    Past Medical History: Diabetes mellitus type 2 with history of super morbid obesity and BMI 57 insulin resistance.  History of 2 prior hysteroscopies and D&Cs for uterine bleeding postmenopausal but no cancer found.    Unexplained leukocytosis with thrombocytopenia and history of prior multiple bone marrow biopsies and hematology consultations without any malignancy being diagnosed.    Pernicious anemia vitamin B12 administered today intramuscularly 1000 mcg IM.    Pulmonary nodules and renal mass seen by [place] [facility] Dr. Feroz Sims presiding no surgery has been advised.    Past Surgical History: Bilateral carpal tunnel release.    Cholecystectomy.    2 prior hysteroscopies and D&Cs by Dr. Aaron Beal for postmenopausal heavy menstrual bleeding.    Family History: Mother living age 94.    Father  in his 80s colon cancer.    Single never  no children.  Uses crutches for ambulation.  Super morbid obesity BMI 57.    Social History: Retired from the state of [place].    Exam Chest clear to auscultation and percussion.  Heart tones regular rhythm without murmur rub or gallop.  Abdomen soft nontender no organomegaly.  No peritoneal signs.  Extremities free of edema cyanosis or clubbing.  Neck veins nondistended no thyromegaly or scleral icterus noted, carotids full.  Skin warm and dry easily conversant good spirited.  Normal intelligence.  Neurologically intact no gross localizing  findings.  Super morbid obesity with BMI 57 easily conversing good spirited.  Blood pressure slightly elevated 144/72 pulse 69 regular respirations 18 O2 sats 98%.  Not in acute distress uses a cane for ambulation bilateral.    Assessment and Plan  Medically acceptable risk for anticipated surgery preoperatively for this type II diabetic related to insulin resistance and super morbid obesity will check A1c lipid panel plus hemogram and comprehensive metabolic profile multiple drug allergies noted and reviewed.  No prior problems with general anesthesia.  There is no family history of malignant hyperthermia associated with general anesthesia.    Total time spent with the patient today was 40 minutes of which greater than 50% was spent in counseling and coordination of care.    Lester Flores MD    Patient Active Problem List   Diagnosis     Thrombocytopenia     Fibromyalgia     Carpal Tunnel Syndrome     Urinary Tract Infection     Endometrial Hyperplasia     Cholelithiasis     Leukocytosis     Urine Tests Nonspecific Abnormal Findings     Obesity     Essential Hypertension     Pernicious Anemia     Immunology Studies Raised Immunoglobulin Level     Vaginal bleeding     Type 2 diabetes mellitus (H)

## 2021-05-31 NOTE — TELEPHONE ENCOUNTER
Dr. Flores,  Spoke with the patient and relayed message below.  She verbalized understanding, but states that she now has a yeast infection and would like a prescription.    Please advise.  Thank you.  Lurdes GONZALEZ CMA/CHILO....................3:07 PM

## 2021-06-01 ENCOUNTER — RECORDS - HEALTHEAST (OUTPATIENT)
Dept: ADMINISTRATIVE | Facility: CLINIC | Age: 66
End: 2021-06-01

## 2021-06-01 ENCOUNTER — HOSPITAL ENCOUNTER (OUTPATIENT)
Dept: CT IMAGING | Facility: HOSPITAL | Age: 66
Discharge: HOME | End: 2021-06-01
Attending: UROLOGY

## 2021-06-01 VITALS — HEIGHT: 61 IN | WEIGHT: 293 LBS | BODY MASS INDEX: 55.32 KG/M2

## 2021-06-01 VITALS — HEIGHT: 61 IN | BODY MASS INDEX: 55.32 KG/M2 | WEIGHT: 293 LBS

## 2021-06-01 VITALS — WEIGHT: 293 LBS | BODY MASS INDEX: 55.32 KG/M2 | HEIGHT: 61 IN

## 2021-06-01 DIAGNOSIS — N28.89 OTHER SPECIFIED DISORDERS OF KIDNEY AND URETER: ICD-10-CM

## 2021-06-01 NOTE — ANESTHESIA PREPROCEDURE EVALUATION
Anesthesia Evaluation      Patient summary reviewed   No history of anesthetic complications     Airway   Mallampati: III  Neck ROM: full   Pulmonary     breath sounds clear to auscultation  (-) not a smokerSleep apnea: CRISTINA risk factors.                         Cardiovascular   Exercise tolerance: (Able to do stairs  Ambulates with taryn)  (+) hypertension, ,     Rhythm: regular        Neuro/Psych    (+) neuromuscular disease,      Endo/Other    (+) diabetes mellitus (A1c 11) poorly controlled using insulin, arthritis, obesity (BMI 57),      GI/Hepatic/Renal    (+)   chronic renal disease CRI,   (-) GERD     Other findings:     NPO > 8 hrs      Thrombocytopenia - resolved    Fibromyalgia    Carpal Tunnel Syndrome    Urinary Tract Infection    Endometrial Hyperplasia    Cholelithiasis    Leukocytosis    Urine Tests Nonspecific Abnormal Findings    Obesity    Essential Hypertension    Pernicious Anemia    Immunology Studies Raised Immunoglobulin Level    Vaginal bleeding    Type 2 diabetes mellitus (H)         Results for GRETA KATI K (MRN [id number]) as of 9/5/2019 8/30/2019 11:33  Sodium: 136  Potassium: 4.9  Chloride: 102  CO2: 23  Anion Gap, Calculation: 11  BUN: 20  Creatinine: 1.30 (H)  GFR MDRD Af Amer: 50 (L)  GFR MDRD Non Af Amer: 41 (L)  Calcium: 9.6  AST: 14  ALT: 15  ALBUMIN: 3.2 (L)  Protein, Total: 7.0  Cholesterol: 129  Alkaline Phosphatase: 122 (H)  Bilirubin, Total: 0.4  Triglycerides: 128  HDL Cholesterol: 35 (L)  LDL Calculated: 68  Glucose: 253 (H)  Hemoglobin A1c: 11.1 (H)  WBC: 11.4 (H)  RBC: 4.85  Hemoglobin: 14.0  Hematocrit: 42.2  MCV: 87  MCH: 29.0  MCHC: 33.3  RDW: 12.4  Platelets: 353        Dental    (+) poor dentition    Comment: Multiple missing and chipped teeth                       Anesthesia Plan  Planned anesthetic: MAC      LMA prn  Toradol if ok with surgeon at EOC    ASA 3     Anesthetic plan and risks discussed with: patient  Anesthesia plan special considerations:  antiemetics,   Post-op plan: routine recovery

## 2021-06-01 NOTE — ANESTHESIA CARE TRANSFER NOTE
Last vitals:   Vitals:    09/05/19 0820   BP: 169/71   Pulse: 93   Resp: 18   Temp: 36.4  C (97.6  F)   SpO2: 100%     Patient's level of consciousness is drowsy  Spontaneous respirations: yes  Maintains airway independently: yes  Dentition unchanged: yes  Oropharynx: oropharynx clear of all foreign objects    QCDR Measures:  ASA# 20 - Surgical Safety Checklist: WHO surgical safety checklist completed prior to induction    PQRS# 430 - Adult PONV Prevention: 4558F - Pt received => 2 anti-emetic agents (different classes) preop & intraop  ASA# 8 - Peds PONV Prevention: NA - Not pediatric patient, not GA or 2 or more risk factors NOT present  PQRS# 424 - Devi-op Temp Management: 4559F - At least one body temp DOCUMENTED => 35.5C or 95.9F within required timeframe  PQRS# 426 - PACU Transfer Protocol: - Transfer of care checklist used  ASA# 14 - Acute Post-op Pain: ASA14B - Patient did NOT experience pain >= 7 out of 10

## 2021-06-01 NOTE — ANESTHESIA POSTPROCEDURE EVALUATION
Patient: Jazmin Bauman  HYSTEROSCOPY, DILATION AND CURETTAGE  Anesthesia type: MAC    Patient location: Phase II Recovery  Last vitals:   Vitals Value Taken Time   /92 9/5/2019  8:31 AM   Temp 36.4  C (97.6  F) 9/5/2019  8:20 AM   Pulse 84 9/5/2019  8:33 AM   Resp 18 9/5/2019  8:20 AM   SpO2 98 % 9/5/2019  8:34 AM   Vitals shown include unvalidated device data.  Post vital signs: stable  Level of consciousness: awake and responds to simple questions  Post-anesthesia pain: pain controlled  Post-anesthesia nausea and vomiting: no  Pulmonary: unassisted, return to baseline  Cardiovascular: stable and blood pressure at baseline  Hydration: adequate  Anesthetic events: no    QCDR Measures:  ASA# 11 - Devi-op Cardiac Arrest: ASA11B - Patient did NOT experience unanticipated cardiac arrest  ASA# 12 - Devi-op Mortality Rate: ASA12B - Patient did NOT die  ASA# 13 - PACU Re-Intubation Rate: NA - No ETT / LMA used for case  ASA# 10 - Composite Anes Safety: ASA10A - No serious adverse event    Additional Notes:

## 2021-06-01 NOTE — TELEPHONE ENCOUNTER
Pt called and stated that she has not received her supplies from [facility]. [facility]  is down, they will contact the pt once it is up. E-mail also sent to [facility] manager Mehrdad RUDD To help the patient get her supplies. Writer informed her if it is not resolved by Monday to call us back and she can come to the vascular center to get more supplies. She has enough to change her dressing tomorrow.

## 2021-06-01 NOTE — TELEPHONE ENCOUNTER
Patient received 4 manuka honey alginate and 4 foam adhesive, No skin prep. I spoke with Mehrdad at [facility]. He will call patient and apologize. I will give patient 2 of each and patient will pick them up.

## 2021-06-01 NOTE — PATIENT INSTRUCTIONS - HE
Wound Care Instructions    every 2 days Cleanse your right abdominal wound(s) with Normal Saline or Wound Cleanser    Pat Dry    Apply skin prep to skin surrounding the wound    Apply medihoney alginate or manuka honey into/onto the wounds    Cover with allevyn border 5x5    It is ok to get your wound wet in the bath or shower; shower on dressing change days only    SEEK MEDICAL CARE IF:    You have an increase in swelling, pain, or redness around the wound.    You have an increase in the amount of pus coming from the wound.    There is a bad smell coming from the wound.    The wound appears to be worsening/enlarging    You have a fever greater than 101.5 F      Carly Arora DNP, RN, CNP, [facility]  [phone]

## 2021-06-02 VITALS — BODY MASS INDEX: 55.32 KG/M2 | HEIGHT: 61 IN | WEIGHT: 293 LBS

## 2021-06-02 VITALS — WEIGHT: 293 LBS | HEIGHT: 61 IN | BODY MASS INDEX: 55.32 KG/M2

## 2021-06-02 NOTE — PATIENT INSTRUCTIONS - HE
NOTE: wound was treated with silver nitrate this will discolor the wound a silver gray black for 1-2 weeks      Wound Care Instructions    every 2 days Cleanse your right abdominal wound(s) with Normal Saline or Wound Cleanser    Pat Dry    Apply skin prep to skin surrounding the wound    Apply medihoney alginate or manuka honey into/onto the wounds    Cover with allevyn border 5x5    It is ok to get your wound wet in the bath or shower; shower on dressing change days only    SEEK MEDICAL CARE IF:    You have an increase in swelling, pain, or redness around the wound.    You have an increase in the amount of pus coming from the wound.    There is a bad smell coming from the wound.    The wound appears to be worsening/enlarging    You have a fever greater than 101.5 F      Carly Arora DNP, RN, CNP, [facility]  [facility]  [phone]

## 2021-06-02 NOTE — PROGRESS NOTES
"Office Visit - Physical    Jazmin Bauman   64 y.o. female    Date of Visit: 10/25/2019    Chief Complaint   Patient presents with     Annual Exam     Physical Exam   fasting       Subjective: Physical examination.    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  Found recently to have precancerous lesions on hysteroscopy and D&C with Dr. Grant Beal of [facility].  This was for evaluation of postmenopausal bleeding which she is had recurrent times and recurrent hysteroscopies and D&Cs.  Most recently precancerous lesions were noted.  This was in early September 2019.  Repeat D&C hysteroscopy is planned no hysterectomy has been recommended.  Patient suffers from super morbid obesity with BMI of 58 which may preclude her from safe intervention from history of colectomy standpoint.    Colonoscopy allCLEAR but for tubular adenomatous polyp one in number of March 27, 2012 with Dr. India Gtz is presiding.  History of pernicious anemia and vitamin B12 injection 1000 mcg given IM today.    Non-smoker no excess alcohol the patient has multiple drug allergies including hydralazine latex Naprosyn nitrofurantoin sulfa and Vicks VapoRub.        ROS: A comprehensive review of systems was performed and was otherwise negative    Medications:   Prior to Admission medications    Medication Sig Start Date End Date Taking? Authorizing Provider   ACCU-CHEK SAM PLUS TEST STRP strips TEST 6 TIMES A DAY  Patient taking differently: TEST 1 TIME A DAY 9/18/17  Yes Lester Flores MD   ACCU-CHEK SAM PLUS TEST STRP strips test blood sugar level 6 times daily 10/18/19  Yes Lester Flores MD   acetaminophen (TYLENOL) 325 MG tablet Take 325-650 mg by mouth every 8 (eight) hours as needed.    Yes PROVIDER, HISTORICAL   aspirin 81 MG EC tablet Take 81 mg by mouth daily.   Yes PROVIDER, HISTORICAL   BD ULTRA-FINE MICRO PEN NEEDLE 32 gauge x 1/4\" Ndle USE AS DIRECTED 4 TIMES DAILY. 10/22/19  " Yes Lester Flores MD   cholecalciferol, vitamin D3, (VITAMIN D3) 1,000 unit capsule Take 1,000 Units by mouth daily.   Yes PROVIDER, HISTORICAL   docusate sodium (COLACE) 50 MG capsule Take by mouth once daily.   Yes PROVIDER, HISTORICAL   ferrous sulfate 65 mg elemental iron (IRON) Take 1 tablet by mouth 2 (two) times a day.   Yes PROVIDER, HISTORICAL   insulin aspart U-100 (NOVOLOG FLEXPEN U-100 INSULIN) 100 unit/mL (3 mL) injection pen Inject 15 Units under the skin 3 (three) times a day before meals. contact dr for further refills. 10/15/19  Yes Lester Flores MD   insulin glargine (BASAGLAR KWIKPEN) 100 unit/mL (3 mL) pen inject 57 units under the skin every morning. do not mix with any other insulin. 10/3/19  Yes Lester Flores MD   lancets (ACCU-CHEK MULTICLIX LANCET) Misc TEST 6 TIMES A DAY  Patient taking differently: TEST 1 TIME A DAY 9/28/15  Yes Lester Flores MD   magnesium 250 mg Tab Take 500 mg by mouth 2 (two) times a day.    Yes PROVIDER, HISTORICAL   allopurinol (ZYLOPRIM) 100 MG tablet Take 1 tablet (100 mg total) by mouth daily.  Patient taking differently: Take 100 mg by mouth daily as needed.        6/29/16   Lester Flores MD   colchicine (MITIGARE) 0.6 mg cap Take 0.6 mg by mouth 2 (two) times a day.  Patient taking differently: Take 0.6 mg by mouth 2 (two) times a day as needed.        12/21/16   Lester Flores MD   glipiZIDE (GLUCOTROL) 10 MG tablet Take 10 mg by mouth 2 (two) times a day as needed.    PROVIDER, HISTORICAL       Allergies:  Allergies   Allergen Reactions     Hydralazine      Paralysis of legs     Latex Itching     Skin Burns     Naprosyn [Naproxen] Swelling     throat     Nitrofurantoin Hives     Sulfa (Sulfonamide Antibiotics) Rash     Vicks Vaporub [Camphor-Eucalyptus Oil-Menthol] Rash       Immunizations:   Immunization History   Administered Date(s) Administered     INFLUENZA,RECOMBINANT,INJ,PF QUADRIVALENT 18+YRS 10/19/2018,  10/25/2019     Influenza A8j3-30, 2009     Influenza, inj, historic,unspecified 2009, 10/14/2011     Influenza, seasonal,quad inj 6-35 mos 10/30/2012, 2013, 2014     Influenza,seasonal quad, PF, =/> 6months 2016, 10/03/2017     Influenza,seasonal,quad inj =/> 6months 2015     Td,adult,historic,unspecified 2006     Tdap 05/15/2015       Health Maintenance: Immunizations reviewed and up-to-date.  The patient's last mammogram was done 2017 [facility].  Annual breast pelvic and rectal and Pap smear exams are done through Dr. Grant Beal her consulting gynecologist of [facility] not repeated by this examiner.    Past Medical History: Diabetes mellitus type 2 with super morbid obesity and BMI 58.  Insulin resistance.    Postmenopausal bleeding menopause in her 40s and prior hysteroscopies and D&Cs benign of late precancerous lesions were found on most recent hysteroscopy D&C.  Dr. Grant Beal.    Prior bone marrow biopsies and unexplained hemo-histologic abnormality of leukocytosis.  Pernicious anemia with vitamin B12 1000 mcg and injected today.    Pulmonary nodules renal mass followed by Dr. Feroz Sims [place] [facility].    Past Surgical History: Bilateral carpal tunnel release.    Cholecystectomy.    Multiple prior hysteroscopies and D&Cs done for benign disease of late precancerous lesions were seen on most recent hysteroscopy D&C hysterectomy is deferred possible repeat hysteroscopy and D&C as planned.    Family History: Mother living in her mid 90s.    Father  in his 80s colon cancer.    Single never  no children.    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  Uses a cane or crutches for ambulation super morbid obesity BMI 58.    Exam Chest clear to auscultation and percussion.  Heart tones regular rhythm without murmur rub or gallop.  Abdomen soft nontender no organomegaly.  No peritoneal signs.  Extremities  free of edema cyanosis or clubbing.  Neck veins nondistended no thyromegaly or scleral icterus noted, carotids full.  Skin warm and dry easily conversant good spirited.  Normal intelligence.  Neurologically intact no gross localizing findings.  Skin negative lymph negative neuro negative psych normal HEENT negative back straight no severe spine tenderness breast pelvic and rectal exam with Pap smear done per Dr. Grant Beal.  Not repeated by this examiner.  Patient uses cane or crutches for ambulation BMI is 58.    Recheck blood pressure 158/82 pulse 72 respirations 18 O2 sats 99%.    Assessment and Plan  General medical examination done at healthcare facility check comprehensive lab tests including hemogram plus comprehensive metabolic profile urinalysis lipid panel TSH A1c urine for microalbumin today.  Encourage patient weight and weight loss and more exercise patient is not a candidate she feels for bariatric surgical consultation.  Pernicious anemia and flu shot given today 1000 mcg IM vitamin B12 administered.  Family history positive for colon cancer last colonoscopy showed no sign of cancer but one tubular adenomatous colon polyp on March 27, 2012 Dr. India Gtz presiding.    The following high BMI interventions were performed this visit: encouragement to exercise    Lester Flores MD    Patient Active Problem List   Diagnosis     Thrombocytopenia     Fibromyalgia     Carpal Tunnel Syndrome     Urinary Tract Infection     Endometrial Hyperplasia     Cholelithiasis     Leukocytosis     Urine Tests Nonspecific Abnormal Findings     Obesity     Essential Hypertension     Pernicious Anemia     Immunology Studies Raised Immunoglobulin Level     Vaginal bleeding     Type 2 diabetes mellitus (H)

## 2021-06-02 NOTE — TELEPHONE ENCOUNTER
Refill Approved    Rx renewed per Medication Renewal Policy. Medication was last renewed on 4/17/18.    Iza Rudolph, [facility] Triage/Med Refill 10/18/2019     Requested Prescriptions   Pending Prescriptions Disp Refills     ACCU-CHEK SAM PLUS TEST STRP strips [Pharmacy Med Name: Accu-Chek Sam Plus In Vitro Strip]  4     Sig: test blood sugar level 6 times daily       Diabetic Supplies Refill Protocol Passed - 10/17/2019  2:52 PM        Passed - Visit with PCP or prescribing provider visit in last 6 months     Last office visit with prescriber/PCP: 7/25/2019 Lester Flores MD OR same dept: 7/25/2019 Lester Flores MD OR same specialty: 7/25/2019 Lester Flores MD  Last physical: 8/30/2019 Last MTM visit: Visit date not found   Next visit within 3 mo: Visit date not found  Next physical within 3 mo: Visit date not found  Prescriber OR PCP: Lester Flores MD  Last diagnosis associated with med order: There are no diagnoses linked to this encounter.  If protocol passes may refill for 12 months if within 3 months of last provider visit (or a total of 15 months).             Passed - A1C in last 6 months     Hemoglobin A1c   Date Value Ref Range Status   08/30/2019 11.1 (H) 3.5 - 6.0 % Final

## 2021-06-02 NOTE — PATIENT INSTRUCTIONS - HE
NOTE: wound was treated with silver nitrate this will discolor the wound a silver gray black for 1-2 weeks      Wound Care Instructions    every 2 days Cleanse your right abdominal wound(s) with Normal Saline or Wound Cleanser    Pat Dry    Apply skin prep to skin surrounding the wound    Apply medihoney alginate or manuka honey into/onto the wounds    Cover with allevyn border 5x5    It is ok to get your wound wet in the bath or shower; shower on dressing change days only    SEEK MEDICAL CARE IF:    You have an increase in swelling, pain, or redness around the wound.    You have an increase in the amount of pus coming from the wound.    There is a bad smell coming from the wound.    The wound appears to be worsening/enlarging    You have a fever greater than 101.5 F      Carly Arora DNP, RN, CNP, [facility]  [facility]  [phone]

## 2021-06-02 NOTE — TELEPHONE ENCOUNTER
RN cannot approve Refill Request    RN can NOT refill this medication med is not covered by policy/route to provider. Last office visit: 7/25/2019 Lester Flores MD Last Physical: 8/30/2019 Last MTM visit: Visit date not found Last visit same specialty: 10/18/2018 Max Perez MD.  Next visit within 3 mo: Visit date not found  Next physical within 3 mo: Visit date not found      Amanda Baez, [facility] Triage/Med Refill 10/15/2019    Requested Prescriptions   Pending Prescriptions Disp Refills     insulin aspart U-100 (NOVOLOG FLEXPEN U-100 INSULIN) 100 unit/mL (3 mL) injection pen [Pharmacy Med Name: NovoLOG FlexPen Subcutaneous Solution Pen-injector 100 UNIT/ML] 15 mL 0     Sig: Inject 15 Units under the skin 3 (three) times a day before meals. contact dr for further refills.       There is no refill protocol information for this order

## 2021-06-02 NOTE — TELEPHONE ENCOUNTER
Refill Approved    Rx renewed per Medication Renewal Policy. Medication was last renewed on 10/3/19.    Cynthia Colon, [facility] Triage/Med Refill 10/25/2019     Requested Prescriptions   Pending Prescriptions Disp Refills     insulin glargine (BASAGLAR KWIKPEN) 100 unit/mL (3 mL) pen [Pharmacy Med Name: Basaglar KwikPen Subcutaneous Solution Pen-injector 100 UNIT/ML] 6 mL 0     Sig: inject 57 units under the skin every morning. do not mix with any other insulin.       Insulin/GLP-1 Refill Protocol Passed - 10/25/2019 12:07 PM        Passed - Visit with PCP or prescribing provider visit in last 6 months     Last office visit with prescriber/PCP: 7/25/2019 OR same dept: 7/25/2019 Lester Flores MD OR same specialty: 7/25/2019 Lester Flores MD Last physical: 10/25/2019 Last MTM visit: Visit date not found     Next appt within 3 mo: Visit date not found  Next physical within 3 mo: Visit date not found  Prescriber OR PCP: Lester Flores MD  Last diagnosis associated with med order: 1. Diabetes mellitus type 2, uncontrolled (H)  - insulin glargine (BASAGLAR KWIKPEN) 100 unit/mL (3 mL) pen [Pharmacy Med Name: Basaglar KwikPen Subcutaneous Solution Pen-injector 100 UNIT/ML]; inject 57 units under the skin every morning. do not mix with any other insulin.  Dispense: 6 mL; Refill: 0    If protocol passes may refill for 6 months if within 3 months of last provider visit (or a total of 9 months).              Passed - A1C in last 6 months     Hemoglobin A1c   Date Value Ref Range Status   10/25/2019 10.9 (H) 3.5 - 6.0 % Final               Passed - Microalbumin in last year     Microalbumin, Random Urine   Date Value Ref Range Status   10/25/2019 77.61 (H) 0.00 - 1.99 mg/dL Final                  Passed - Blood pressure in last year     BP Readings from Last 1 Encounters:   10/25/19 158/82             Passed - Creatinine done in last year     Creatinine   Date Value Ref Range Status   10/25/2019 1.39  (H) 0.60 - 1.10 mg/dL Final

## 2021-06-03 VITALS
DIASTOLIC BLOOD PRESSURE: 80 MMHG | WEIGHT: 293 LBS | SYSTOLIC BLOOD PRESSURE: 150 MMHG | BODY MASS INDEX: 55.32 KG/M2 | TEMPERATURE: 97.9 F | HEART RATE: 88 BPM | HEIGHT: 61 IN | RESPIRATION RATE: 22 BRPM

## 2021-06-03 VITALS
HEART RATE: 76 BPM | WEIGHT: 293 LBS | RESPIRATION RATE: 20 BRPM | BODY MASS INDEX: 55.32 KG/M2 | DIASTOLIC BLOOD PRESSURE: 70 MMHG | HEIGHT: 61 IN | TEMPERATURE: 98.3 F | SYSTOLIC BLOOD PRESSURE: 132 MMHG

## 2021-06-03 VITALS
TEMPERATURE: 97.6 F | RESPIRATION RATE: 22 BRPM | HEART RATE: 84 BPM | SYSTOLIC BLOOD PRESSURE: 160 MMHG | HEIGHT: 61 IN | BODY MASS INDEX: 55.32 KG/M2 | DIASTOLIC BLOOD PRESSURE: 68 MMHG | WEIGHT: 293 LBS

## 2021-06-03 VITALS — WEIGHT: 293 LBS | HEIGHT: 61 IN | BODY MASS INDEX: 55.32 KG/M2

## 2021-06-03 VITALS
SYSTOLIC BLOOD PRESSURE: 158 MMHG | HEART RATE: 71 BPM | HEIGHT: 61 IN | BODY MASS INDEX: 55.32 KG/M2 | DIASTOLIC BLOOD PRESSURE: 82 MMHG | OXYGEN SATURATION: 99 % | WEIGHT: 293 LBS

## 2021-06-03 VITALS — BODY MASS INDEX: 55.32 KG/M2 | WEIGHT: 293 LBS | HEIGHT: 61 IN

## 2021-06-03 VITALS — BODY MASS INDEX: 56.68 KG/M2 | WEIGHT: 293 LBS

## 2021-06-03 NOTE — TELEPHONE ENCOUNTER
Pt is unable to come to her follow-up due to having surgery. She needs more supplies-please order. No f/u scheduled. LOV 10/30/19.

## 2021-06-03 NOTE — TELEPHONE ENCOUNTER
Refill Approved    Rx renewed per Medication Renewal Policy. Medication was last renewed on 11/17/19.    Ov: 11/22/19    Sarai Lennon, [facility] Triage/Med Refill 11/28/2019     Requested Prescriptions   Pending Prescriptions Disp Refills     insulin glargine (BASAGLAR KWIKPEN) 100 unit/mL (3 mL) pen [Pharmacy Med Name: Basaglar KwikPen Subcutaneous Solution Pen-injector 100 UNIT/ML] 6 mL 0     Sig: inject 57 units under the skin every morning. do not mix with any other insulin.       Insulin/GLP-1 Refill Protocol Passed - 11/26/2019 10:56 PM        Passed - Visit with PCP or prescribing provider visit in last 6 months     Last office visit with prescriber/PCP: 7/25/2019 OR same dept: 7/25/2019 Lester Flores MD OR same specialty: 7/25/2019 Lester Flores MD Last physical: 11/22/2019 Last MTM visit: Visit date not found     Next appt within 3 mo: Visit date not found  Next physical within 3 mo: Visit date not found  Prescriber OR PCP: Lester Flores MD  Last diagnosis associated with med order: 1. Diabetes mellitus type 2, uncontrolled (H)  - insulin glargine (BASAGLAR KWIKPEN) 100 unit/mL (3 mL) pen [Pharmacy Med Name: Basaglar KwikPen Subcutaneous Solution Pen-injector 100 UNIT/ML]; inject 57 units under the skin every morning. do not mix with any other insulin.  Dispense: 6 mL; Refill: 0    If protocol passes may refill for 6 months if within 3 months of last provider visit (or a total of 9 months).              Passed - A1C in last 6 months     Hemoglobin A1c   Date Value Ref Range Status   10/25/2019 10.9 (H) 3.5 - 6.0 % Final               Passed - Microalbumin in last year     Microalbumin, Random Urine   Date Value Ref Range Status   10/25/2019 77.61 (H) 0.00 - 1.99 mg/dL Final                  Passed - Blood pressure in last year     BP Readings from Last 1 Encounters:   11/22/19 154/78             Passed - Creatinine done in last year     Creatinine   Date Value Ref Range  Status   11/22/2019 1.42 (H) 0.60 - 1.10 mg/dL Final

## 2021-06-03 NOTE — PROGRESS NOTES
"Office Visit - Physical    Jazmin Bauman   64 y.o. female    Date of Visit: 11/22/2019    Chief Complaint   Patient presents with     Pre-op Exam     Hysteroscopy dilation and curettage Dr. ARAVIND Beal at [facility] on 12/9/2019       Subjective: Preoperative examination for this 64-year-old female with postmenopausal bleeding and menopause at age 50 not on HRT.  Hysteroscopy and dilatation curettage is planned now for the fourth time with Dr. Aaron Beal.  Previously benign lesions or precancerous polyp was noted.  Surgery is slated for December 9, 2019 [facility].  Single never  no children.  Super morbid obesity compounds the issue with BMI 58.  B12 injection given left deltoid today for pernicious anemia 1000 mcg.    Non-smoker no excess alcohol.  Multiple drug allergies noted including hydralazine latex Naprosyn nitrofurantoin sulfa and Vicks VapoRub.  Prior colonoscopy showed a tubular adenomatous polyp on March 27, 2012 with Dr. India Gtz presiding.  History of pernicious anemia and monthly B12 injections 1000 mcg administered.    ROS: A comprehensive review of systems was performed and was otherwise negative    Medications:   Prior to Admission medications    Medication Sig Start Date End Date Taking? Authorizing Provider   ACCU-CHEK SAM PLUS TEST STRP strips TEST 6 TIMES A DAY  Patient taking differently: TEST 1 TIME A DAY 9/18/17  Yes Lester Flores MD   ACCU-CHEK SAM PLUS TEST STRP strips test blood sugar level 6 times daily 10/18/19  Yes Lester Flores MD   acetaminophen (TYLENOL) 325 MG tablet Take 325-650 mg by mouth every 8 (eight) hours as needed.    Yes PROVIDER, HISTORICAL   aspirin 81 MG EC tablet Take 81 mg by mouth daily.   Yes PROVIDER, HISTORICAL   BD ULTRA-FINE MICRO PEN NEEDLE 32 gauge x 1/4\" Ndle USE AS DIRECTED 4 TIMES DAILY. 11/16/19  Yes Lester Flores MD   cholecalciferol, vitamin D3, (VITAMIN D3) 1,000 unit capsule Take 1,000 Units by mouth " daily.   Yes PROVIDER, HISTORICAL   ferrous sulfate 65 mg elemental iron (IRON) Take 1 tablet by mouth 2 (two) times a day.   Yes PROVIDER, HISTORICAL   insulin aspart U-100 (NOVOLOG FLEXPEN U-100 INSULIN) 100 unit/mL (3 mL) injection pen Inject 15 Units under the skin 3 (three) times a day before meals. contact dr for further refills. 11/17/19  Yes Lester Flores MD   insulin glargine (BASAGLAR KWIKPEN) 100 unit/mL (3 mL) pen inject 57 units under the skin every morning. do not mix with any other insulin. 11/17/19  Yes Lester Flores MD   lancets (ACCU-CHEK MULTICLIX LANCET) Misc TEST 6 TIMES A DAY  Patient taking differently: TEST 1 TIME A DAY 9/28/15  Yes Lester Flores MD   magnesium 250 mg Tab Take 500 mg by mouth 2 (two) times a day.    Yes PROVIDER, HISTORICAL   allopurinol (ZYLOPRIM) 100 MG tablet Take 1 tablet (100 mg total) by mouth daily.  Patient taking differently: Take 100 mg by mouth daily as needed.        6/29/16   Lester Flores MD   colchicine (MITIGARE) 0.6 mg cap Take 0.6 mg by mouth 2 (two) times a day.  Patient taking differently: Take 0.6 mg by mouth 2 (two) times a day as needed.        12/21/16   Lester Flores MD   docusate sodium (COLACE) 50 MG capsule Take by mouth once daily.    PROVIDER, HISTORICAL   glipiZIDE (GLUCOTROL) 10 MG tablet Take 10 mg by mouth 2 (two) times a day as needed.    PROVIDER, HISTORICAL       Allergies:  Allergies   Allergen Reactions     Hydralazine      Paralysis of legs     Latex Itching     Skin Burns     Naprosyn [Naproxen] Swelling     throat     Nitrofurantoin Hives     Sulfa (Sulfonamide Antibiotics) Rash     Vicks Vaporub [Camphor-Eucalyptus Oil-Menthol] Rash       Immunizations:   Immunization History   Administered Date(s) Administered     INFLUENZA,RECOMBINANT,INJ,PF QUADRIVALENT 18+YRS 10/19/2018, 10/25/2019     Influenza [id number], 12/18/2009     Influenza, inj, historic,unspecified 12/18/2009, 10/14/2011     Influenza,  seasonal,quad inj 6-35 mos 10/30/2012, 2013, 2014     Influenza,seasonal quad, PF, =/> 6months 2016, 10/03/2017     Influenza,seasonal,quad inj =/> 6months 2015     Td,adult,historic,unspecified 2006     Tdap 05/15/2015       Health Maintenance: Immunizations reviewed and up-to-date.  Last mammogram recorded includes that of 2017 allCLEAR.  Annual breast pelvic and rectal examination with Pap smears through Dr. Grant Beal her consulting gynecologist of [facility] not repeated by this examiner.    Past Medical History: Super morbid obesity with insulin resistance and  pounds down 5 pounds from last visit.    Blood pressure mild systolic elevation 154/78 pulse 80 respirations 18 O2 sats 98%.  Resultant type 2 diabetes.    Host menopausal bleeding not on HRT hysteroscopies and D&Cs previously x3 benign except for a precancerous polyp seen most recently.    Unexplained leukocytosis and subsequent prior bone marrow biopsies and aspirates unexplained.  Pernicious anemia with vitamin B12 administered 1000 mcg IM.    Renal mass with pulmonary nodules followed by Dr. Feroz Sims of [place] [facility] not done this year 2019 by patient    Past Surgical History: Bilateral carpal tunnel release.    Cholecystectomy.    3 prior hysteroscopies and D&Cs for benign disease and/or precancerous uterine polyps with postmenopausal bleeding not on HRT.    Family History: Mother living in her late 90s.    Father  colon cancer age 80.    Single never  no children.    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    Cane or crutch for ambulation.    Exam Chest clear to auscultation and percussion.  Heart tones regular rhythm without murmur rub or gallop.  Abdomen soft nontender no organomegaly.  No peritoneal signs.  Extremities free of edema cyanosis or clubbing.  Neck veins nondistended no thyromegaly or scleral icterus noted, carotids full.  Skin warm and  dry easily conversant good spirited.  Normal intelligence.  Neurologically intact no gross localizing findings.  Morbid obesity with BMI 58.    Weight 307 down 5 pounds from previous.  Blood pressure mild systolic elevation 154/78 pulse 81 respirations 18 O2 sats 98% uses a cane or crutch for ambulation walking and ambulation difficult because of super morbid obesity short stature not in acute distress not toxic.    Assessment and Plan  Medically acceptable risk for anticipated hysteroscopy and dilatation and curettage December 9, 2019 by Dr. Aaron Beal at [facility].    Total time spent with the patient today was 40 minutes of which greater than 50% was spent in counseling and coordination of care.    Lester Flores MD    Patient Active Problem List   Diagnosis     Thrombocytopenia     Fibromyalgia     Carpal Tunnel Syndrome     Urinary Tract Infection     Endometrial Hyperplasia     Cholelithiasis     Leukocytosis     Urine Tests Nonspecific Abnormal Findings     Obesity     Essential Hypertension     Pernicious Anemia     Immunology Studies Raised Immunoglobulin Level     Vaginal bleeding     Type 2 diabetes mellitus (H)

## 2021-06-03 NOTE — TELEPHONE ENCOUNTER
"Refill Approved: BD Pen Needles  Rx renewed per Medication Renewal Policy..    RN cannot approve Refill Requests: Novolog and Basaglar    RN can NOT refill this medication see sig for Novolog:\"contact Dr for further refills\". Please check with provider.. Last office visit: 7/25/2019 Lester Flores MD Last Physical: 10/25/2019 Last MTM visit: Visit date not found Last visit same specialty: 7/25/2019 Lester Flores MD.  Next visit within 3 mo: Visit date not found  Next physical within 3 mo: Visit date not found      Josiane Knutson, [facility] Triage/Med Refill 11/16/2019    Requested Prescriptions   Pending Prescriptions Disp Refills     insulin glargine (BASAGLAR KWIKPEN) 100 unit/mL (3 mL) pen [Pharmacy Med Name: Basaglar KwikPen Subcutaneous Solution Pen-injector 100 UNIT/ML] 6 mL 0     Sig: inject 57 units under the skin every morning. do not mix with any other insulin.       Insulin/GLP-1 Refill Protocol Passed - 11/15/2019 12:20 PM        Passed - Visit with PCP or prescribing provider visit in last 6 months     Last office visit with prescriber/PCP: 7/25/2019 OR same dept: 7/25/2019 Lester Flores MD OR same specialty: 7/25/2019 Lester Flores MD Last physical: 10/25/2019 Last MTM visit: Visit date not found     Next appt within 3 mo: Visit date not found  Next physical within 3 mo: Visit date not found  Prescriber OR PCP: Lester Flores MD  Last diagnosis associated with med order: 1. Diabetes mellitus type 2, uncontrolled (H)  - insulin glargine (BASAGLAR KWIKPEN) 100 unit/mL (3 mL) pen [Pharmacy Med Name: Basaglar KwikPen Subcutaneous Solution Pen-injector 100 UNIT/ML]; inject 57 units under the skin every morning. do not mix with any other insulin.  Dispense: 6 mL; Refill: 0  - BD ULTRA-FINE MICRO PEN NEEDLE 32 gauge x 1/4\" Ndle; USE AS DIRECTED 4 TIMES DAILY.  Dispense: 360 each; Refill: 3    2. Type 2 diabetes mellitus (H)  - insulin aspart U-100 (NOVOLOG FLEXPEN U-100 " "INSULIN) 100 unit/mL (3 mL) injection pen; Inject 15 Units under the skin 3 (three) times a day before meals. contact dr for further refills.  Dispense: 15 mL; Refill: 0    If protocol passes may refill for 6 months if within 3 months of last provider visit (or a total of 9 months).              Passed - A1C in last 6 months     Hemoglobin A1c   Date Value Ref Range Status   10/25/2019 10.9 (H) 3.5 - 6.0 % Final               Passed - Microalbumin in last year     Microalbumin, Random Urine   Date Value Ref Range Status   10/25/2019 77.61 (H) 0.00 - 1.99 mg/dL Final                  Passed - Blood pressure in last year     BP Readings from Last 1 Encounters:   10/30/19 160/68             Passed - Creatinine done in last year     Creatinine   Date Value Ref Range Status   10/25/2019 1.39 (H) 0.60 - 1.10 mg/dL Final             insulin aspart U-100 (NOVOLOG FLEXPEN U-100 INSULIN) 100 unit/mL (3 mL) injection pen 15 mL 0     Sig: Inject 15 Units under the skin 3 (three) times a day before meals. contact dr for further refills.       Insulin/GLP-1 Refill Protocol Passed - 11/15/2019 12:20 PM        Passed - Visit with PCP or prescribing provider visit in last 6 months     Last office visit with prescriber/PCP: 7/25/2019 OR same dept: 7/25/2019 Lester Flores MD OR same specialty: 7/25/2019 Lester Flores MD Last physical: 10/25/2019 Last MTM visit: Visit date not found     Next appt within 3 mo: Visit date not found  Next physical within 3 mo: Visit date not found  Prescriber OR PCP: Lester Flores MD  Last diagnosis associated with med order: 1. Diabetes mellitus type 2, uncontrolled (H)  - insulin glargine (BASAGLAR KWIKPEN) 100 unit/mL (3 mL) pen [Pharmacy Med Name: Basaglar KwikPen Subcutaneous Solution Pen-injector 100 UNIT/ML]; inject 57 units under the skin every morning. do not mix with any other insulin.  Dispense: 6 mL; Refill: 0  - BD ULTRA-FINE MICRO PEN NEEDLE 32 gauge x 1/4\" Ndle; USE AS " "DIRECTED 4 TIMES DAILY.  Dispense: 360 each; Refill: 3    2. Type 2 diabetes mellitus (H)  - insulin aspart U-100 (NOVOLOG FLEXPEN U-100 INSULIN) 100 unit/mL (3 mL) injection pen; Inject 15 Units under the skin 3 (three) times a day before meals. contact dr for further refills.  Dispense: 15 mL; Refill: 0    If protocol passes may refill for 6 months if within 3 months of last provider visit (or a total of 9 months).              Passed - A1C in last 6 months     Hemoglobin A1c   Date Value Ref Range Status   10/25/2019 10.9 (H) 3.5 - 6.0 % Final               Passed - Microalbumin in last year     Microalbumin, Random Urine   Date Value Ref Range Status   10/25/2019 77.61 (H) 0.00 - 1.99 mg/dL Final                  Passed - Blood pressure in last year     BP Readings from Last 1 Encounters:   10/30/19 160/68             Passed - Creatinine done in last year     Creatinine   Date Value Ref Range Status   10/25/2019 1.39 (H) 0.60 - 1.10 mg/dL Final           Signed Prescriptions Disp Refills    BD ULTRA-FINE MICRO PEN NEEDLE 32 gauge x 1/4\" Ndle 360 each 3     Sig: USE AS DIRECTED 4 TIMES DAILY.       Diabetic Supplies Refill Protocol Passed - 11/15/2019 12:20 PM        Passed - Visit with PCP or prescribing provider visit in last 6 months     Last office visit with prescriber/PCP: 7/25/2019 Lester Flores MD OR same dept: 7/25/2019 Lester Flores MD OR same specialty: 7/25/2019 Lester Flores MD  Last physical: 10/25/2019 Last MTM visit: Visit date not found   Next visit within 3 mo: Visit date not found  Next physical within 3 mo: Visit date not found  Prescriber OR PCP: Lester Flores MD  Last diagnosis associated with med order: 1. Diabetes mellitus type 2, uncontrolled (H)  - insulin glargine (BASAGLAR KWIKPEN) 100 unit/mL (3 mL) pen [Pharmacy Med Name: Basaglar KwikPen Subcutaneous Solution Pen-injector 100 UNIT/ML]; inject 57 units under the skin every morning. do not mix with any other " "insulin.  Dispense: 6 mL; Refill: 0  - BD ULTRA-FINE MICRO PEN NEEDLE 32 gauge x 1/4\" Ndle; USE AS DIRECTED 4 TIMES DAILY.  Dispense: 360 each; Refill: 3    2. Type 2 diabetes mellitus (H)  - insulin aspart U-100 (NOVOLOG FLEXPEN U-100 INSULIN) 100 unit/mL (3 mL) injection pen; Inject 15 Units under the skin 3 (three) times a day before meals. contact dr for further refills.  Dispense: 15 mL; Refill: 0    If protocol passes may refill for 12 months if within 3 months of last provider visit (or a total of 15 months).             Passed - A1C in last 6 months     Hemoglobin A1c   Date Value Ref Range Status   10/25/2019 10.9 (H) 3.5 - 6.0 % Final                  "

## 2021-06-03 NOTE — TELEPHONE ENCOUNTER
New Appointment Needed  What is the reason for the visit:    Pre-Op Appt Request  When is the surgery? :  Dec 09  Where is the surgery?:   [facility]  Who is the surgeon? :  Dr. Grant Beal  What type of surgery is being done?: D and C  Provider Preference: PCP only  How soon do you need to be seen?: Before surgery - Patient would like to be seen on Dec. 02 if available   Waitlist offered?: No  Okay to leave a detailed message:  Yes

## 2021-06-04 VITALS
HEIGHT: 61 IN | WEIGHT: 293 LBS | BODY MASS INDEX: 55.32 KG/M2 | DIASTOLIC BLOOD PRESSURE: 78 MMHG | OXYGEN SATURATION: 98 % | SYSTOLIC BLOOD PRESSURE: 154 MMHG | HEART RATE: 81 BPM

## 2021-06-04 VITALS — HEIGHT: 61 IN | BODY MASS INDEX: 55.32 KG/M2 | WEIGHT: 293 LBS

## 2021-06-04 VITALS — HEIGHT: 61 IN | BODY MASS INDEX: 45.31 KG/M2 | WEIGHT: 240 LBS

## 2021-06-04 NOTE — ANESTHESIA PREPROCEDURE EVALUATION
Anesthesia Evaluation      Patient summary reviewed   No history of anesthetic complications (tolerated procedure under MAC previously - fent/versed/propofol gtt 80-100mcg/kg/min)     Airway   Mallampati: III  Neck ROM: full   Pulmonary     breath sounds clear to auscultation  (-) not a smokerSleep apnea: CRISTINA risk factors.                         Cardiovascular   Exercise tolerance: (Able to do stairs  Ambulates with canes)  (+) hypertension, ,     Rhythm: regular        Neuro/Psych    (+) neuromuscular disease (fibromyalgia),      Endo/Other    (+) diabetes mellitus (A1c 11) poorly controlled using insulin, arthritis, obesity (BMI 57),      GI/Hepatic/Renal    (+)   chronic renal disease,   (-) GERD     Other findings:     NPO > 8 hrs      Thrombocytopenia - resolved    Fibromyalgia    Carpal Tunnel Syndrome    Urinary Tract Infection    Endometrial Hyperplasia    Cholelithiasis    Leukocytosis    Urine Tests Nonspecific Abnormal Findings    Obesity    Essential Hypertension    Pernicious Anemia    Immunology Studies Raised Immunoglobulin Level    Vaginal bleeding    Type 2 diabetes mellitus (H)          Dental    (+) poor dentition    Comment: Multiple missing and chipped teeth                         Anesthesia Plan  Planned anesthetic: MAC  Plan for propofol gtt w/ ketamine / fentanyl PRN for pain.  Discussed potential need to convert to GA w/ ETT vs LMA if not tolerating.  Explained that it is possible to remember certain aspects of the OR experience during MAC dependent on the depth of sedation and patient understands this.   zofran for PONV ppx.  Toradol 15mg at case completion if okay per surgeon   ASA 3     Anesthetic plan and risks discussed with: patient  Anesthesia plan special considerations: antiemetics,   Post-op plan: routine recovery

## 2021-06-04 NOTE — ANESTHESIA CARE TRANSFER NOTE
Last vitals:   Vitals:    12/09/19 1345   BP: 131/73   Pulse: (!) 109   Resp: 16   Temp: 37.6  C (99.7  F)   SpO2: 96%     Patient's level of consciousness is awake  Spontaneous respirations: yes  Maintains airway independently: yes  Dentition unchanged: yes  Oropharynx: oropharynx clear of all foreign objects    QCDR Measures:  ASA# 20 - Surgical Safety Checklist: WHO surgical safety checklist completed prior to induction    PQRS# 430 - Adult PONV Prevention: 4558F - Pt received => 2 anti-emetic agents (different classes) preop & intraop  ASA# 8 - Peds PONV Prevention: NA - Not pediatric patient, not GA or 2 or more risk factors NOT present  PQRS# 424 - Devi-op Temp Management: 4559F - At least one body temp DOCUMENTED => 35.5C or 95.9F within required timeframe  PQRS# 426 - PACU Transfer Protocol: - Transfer of care checklist used  ASA# 14 - Acute Post-op Pain: ASA14B - Patient did NOT experience pain >= 7 out of 10

## 2021-06-04 NOTE — ANESTHESIA POSTPROCEDURE EVALUATION
Patient: Jazmin Bauman  HYSTEROSCOPY, DILATION AND CURETTAGE  Anesthesia type: MAC    Patient location: Phase II Recovery  Last vitals:   Vitals Value Taken Time   /79 12/9/2019  3:03 PM   Temp 37.6  C (99.7  F) 12/9/2019  1:45 PM   Pulse 78 12/9/2019  3:03 PM   Resp 16 12/9/2019  2:10 PM   SpO2 94 % 12/9/2019  3:03 PM   Vitals shown include unvalidated device data.  Post vital signs: stable  Level of consciousness: awake and responds to simple questions  Post-anesthesia pain: pain controlled  Post-anesthesia nausea and vomiting: no  Pulmonary: unassisted, return to baseline  Cardiovascular: stable and blood pressure at baseline  Hydration: adequate  Anesthetic events: no    QCDR Measures:  ASA# 11 - Devi-op Cardiac Arrest: ASA11B - Patient did NOT experience unanticipated cardiac arrest  ASA# 12 - Devi-op Mortality Rate: ASA12B - Patient did NOT die  ASA# 13 - PACU Re-Intubation Rate: NA - No ETT / LMA used for case  ASA# 10 - Composite Anes Safety: ASA10A - No serious adverse event    Additional Notes:

## 2021-06-04 NOTE — TELEPHONE ENCOUNTER
Refill Approved    Rx renewed per Medication Renewal Policy. Medication was last renewed on 11/17/19.    Cassandra Restrepo, [facility] Triage/Med Refill 12/25/2019     Requested Prescriptions   Pending Prescriptions Disp Refills     insulin aspart U-100 (NOVOLOG FLEXPEN U-100 INSULIN) 100 unit/mL (3 mL) injection pen [Pharmacy Med Name: NovoLOG FlexPen Subcutaneous Solution Pen-injector 100 UNIT/ML] 15 mL 0     Sig: Inject 15 Units under the skin 3 (three) times a day before meals. contact dr for further refills.       Insulin/GLP-1 Refill Protocol Passed - 12/24/2019  9:43 AM        Passed - Visit with PCP or prescribing provider visit in last 6 months     Last office visit with prescriber/PCP: 7/25/2019 OR same dept: 7/25/2019 Lester Flores MD OR same specialty: 7/25/2019 Lester Flores MD Last physical: 11/22/2019 Last MTM visit: Visit date not found     Next appt within 3 mo: Visit date not found  Next physical within 3 mo: Visit date not found  Prescriber OR PCP: Lester Flores MD  Last diagnosis associated with med order: 1. Type 2 diabetes mellitus (H)  - NOVOLOG FLEXPEN U-100 INSULIN 100 unit/mL (3 mL) injection pen [Pharmacy Med Name: NovoLOG FlexPen Subcutaneous Solution Pen-injector 100 UNIT/ML]; Inject 15 Units under the skin 3 (three) times a day before meals. contact dr for further refills.  Dispense: 15 mL; Refill: 0    If protocol passes may refill for 6 months if within 3 months of last provider visit (or a total of 9 months).              Passed - A1C in last 6 months     Hemoglobin A1c   Date Value Ref Range Status   10/25/2019 10.9 (H) 3.5 - 6.0 % Final               Passed - Microalbumin in last year     Microalbumin, Random Urine   Date Value Ref Range Status   10/25/2019 77.61 (H) 0.00 - 1.99 mg/dL Final                  Passed - Blood pressure in last year     BP Readings from Last 1 Encounters:   12/09/19 174/88             Passed - Creatinine done in last year      Creatinine   Date Value Ref Range Status   11/22/2019 1.42 (H) 0.60 - 1.10 mg/dL Final

## 2021-06-05 NOTE — TELEPHONE ENCOUNTER
Medication Request  Medication name: Amlodipine 10 mg, one daily  Requested Pharmacy: [facility]  Reason for request: Patient stopped into [facility] and told pharmacist her doctor wants her to restart amlodipine.  If this is true, please send an order.  When did you use medication last?:  Unknown, request came from pharmacy.   Patient offered appointment:  Request came from pharmacy  Okay to leave a detailed message: no

## 2021-06-05 NOTE — TELEPHONE ENCOUNTER
FYI - Status Update  Who is Calling: Melissa the Nurse Care Manager at [facility] on [address]  Update: Melissa explained that patient is in ICU at [facility], and Melissa would like to know if the clinic has a Health Care Directive for patient, there at the clinic.  Please call Melissa back to advise; a call back today would be preferred. Thanks.  Melissa also advised that she had called earlier for emergency contacts, and got in touch with a friend of the patient's, and perhaps the mother will have children's contact information in her phone.  Okay to leave a detailed message?:  Yes, [phone], may have someone else's name on the voicemail, but it is a confidential voicemail and okay to leave a message.

## 2021-06-05 NOTE — TELEPHONE ENCOUNTER
Dr. Flores,  I don't see a health care directive scanned in the patient's chart.  Do you know if she has one?  Please advise.  Thank you.  Lurdes GONZALEZ, MARIA EUGENIA/CHILO....................2:16 PM

## 2021-06-06 NOTE — TELEPHONE ENCOUNTER
Tried calling number listed in message. No answer, no voicemail. Will try again.    Hope Rodriguez, CMA

## 2021-06-06 NOTE — TELEPHONE ENCOUNTER
Who is calling:  Patient  Reason for Call:  Patient will be coming with transportation to her appt with Dr. Flores on Thursday and she needs someone to come and get her once transportation drops her off and needs someone to take her down once transportation picks her up. I did speak with someone via [employer] regarding this. I did relay to patient that transportation usually drops off and picks up patients from the location but she stated that the transportation she's taking is requesting someone picks her up. She said she'll need someone to pick her up around 8:30 since her appt is at 9am.   Date of last appointment with primary care: NA  Okay to leave a detailed message: No - Pt is currently at a rehab facility at the moment and doesn't know what the phone number is to reach her.

## 2021-06-06 NOTE — TELEPHONE ENCOUNTER
Left detailed message for the patient relaying message below from Dr. Flores.  Asked that she call with any further questions.    Prescription has been set up for Dr. Flores to review per message below.  Lurdes GONZALEZ CMA/CMT....................3:24 PM

## 2021-06-06 NOTE — TELEPHONE ENCOUNTER
If the knees are injected I am not certain that they have to do anything with contrast mediated diet like iodine.  The patient has corrected iodine contrast media may make her renal function worse but I believe for this procedure of knee injection note iodine dye would be necessary.  The injection is usually done with steroids and or Synvisc--neither of  which would have a deleterious effect on kidney function.  Please call patient for me.

## 2021-06-06 NOTE — TELEPHONE ENCOUNTER
"Spoke with patient- she had her knee injected today- very lengthy discussion ensured- she has concerns about doing her taxes, if the forms have changed, if having an injection in her spine will \"kill my kidneys\"    Offered to move up her appt with PCP to discuss her health concerns, she declines for now and will plan on coming in on 3/31/20  "

## 2021-06-06 NOTE — TELEPHONE ENCOUNTER
Drug Change Request  Who is calling?:  The patient   What is the current medication?:Ciprofloxacin HCL (CIPRO) 500 MG tablet  What alternative is being requested?: Patient states something different for her UTI  Why the request to change?:  Patient states she would like a different medication because she read all the side effects and she states she is in rehab building up her muscles and doesn't want her muscles to get worse. The patient states she has not started the Cipro yet .  Requested [facility]acy?: [facility]  Okay to leave a detailed message?:  Yes

## 2021-06-06 NOTE — PROGRESS NOTES
Office Visit - Follow up    Jazmin Bauman   64 y.o. female    Date of Visit: 2/27/2020    Chief Complaint   Patient presents with     Hospital Visit Follow Up     weaness, confusion, UTI       Subjective: Diabetes mellitus type 2 with recent hospital stay from January 20 through 31 January 2020 weakness confusion urinary tract infection.  Urosepsis.  Emergency room evaluation on February 4, 2020 for hyperkalemia.  Now at [facility].    Feels stronger wheelchair-bound has a urinary catheter in place it is draining clear.    Denies chest pain shortness of breath or weakness [place] lucent and coherent denies headache fever chills night sweats.    Medication list reviewed reconciled.  Records from recent hospital stay at [facility] and discharge summary from January 31, 2020 reviewed in their entirety.  Medications were reconciled.  No blood in stool or urine medication list reviewed allergies include hydralazine latex Naprosyn plus nitrofurantoin sulfa and Vicks VapoRub.    The patient does have morbid obesity and she weighs in excess of 300 pounds.  She has insulin resistance.    ROS: A comprehensive review of systems was performed and was otherwise negative    Medications:  Prior to Admission medications    Medication Sig Start Date End Date Taking? Authorizing Provider   ACCU-CHEK SAM PLUS TEST STRP strips TEST 6 TIMES A DAY  Patient taking differently: TEST 1 TIME A DAY 9/18/17  Yes Lester Flores MD   ACCU-CHEK SAM PLUS TEST STRP strips test blood sugar level 6 times daily 10/18/19  Yes Lester Flores MD   acetaminophen (TYLENOL) 325 MG tablet Take 2 tablets (650 mg total) by mouth 3 (three) times a day. 1/31/20  Yes Francisco Vasquez MD   aspirin 81 MG EC tablet Take 81 mg by mouth daily.   Yes PROVIDER, HISTORICAL   calcium acetate (PHOSLO) 667 mg capsule Take 1 capsule (667 mg total) by mouth 3 (three) times a day with meals. 1/31/20  Yes Francisco Vasquez MD   cholecalciferol,  "vitamin D3, (VITAMIN D3) 1,000 unit capsule Take 1,000 Units by mouth daily.   Yes PROVIDER, HISTORICAL   docusate sodium (COLACE) 50 MG capsule Take by mouth daily.    Yes PROVIDER, HISTORICAL   ferrous sulfate 325 (65 FE) MG tablet Take 1 tablet by mouth 2 (two) times a day with meals.   Yes PROVIDER, HISTORICAL   gabapentin (NEURONTIN) 100 MG capsule Take 100 mg by mouth 2 (two) times a day as needed (pain).  Patient taking differently: Take 300 mg by mouth 2 (two) times a day as needed (pain).  1/31/20  Yes Francisco Vasquez MD   HYDROcodone-acetaminophen 5-325 mg per tablet Take 1 tablet by mouth every 4 (four) hours as needed for pain.   Yes PROVIDER, HISTORICAL   insulin glargine (BASAGLAR KWIKPEN) 100 unit/mL (3 mL) pen Inject 25 Units under the skin 2 (two) times a day. 1/31/20  Yes Francisco Vasquez MD   magnesium 250 mg Tab Take 500 mg by mouth 2 (two) times a day.    Yes PROVIDER, HISTORICAL   metoprolol tartrate (LOPRESSOR) 25 MG tablet Take 1 tablet (25 mg total) by mouth 2 (two) times a day. 1/31/20  Yes Francisco Vasquez MD   omeprazole (PRILOSEC) 20 MG capsule Take 1 capsule (20 mg total) by mouth at bedtime. 1/31/20  Yes Francisco Vasquez MD   warfarin ANTICOAGULANT (COUMADIN/JANTOVEN) 1 MG tablet Take 1.5 tablet daily, repeat INR in 3 days. Adjust dose based on INR results as directed. 1/31/20  Yes Francisco Vasquez MD   BD ULTRA-FINE MICRO PEN NEEDLE 32 gauge x 1/4\" Ndle USE AS DIRECTED 4 TIMES DAILY. 11/16/19   Lester Flores MD   lancets (ACCU-CHEK MULTICLIX LANCET) Misc TEST 6 TIMES A DAY  Patient taking differently: TEST 1 TIME A DAY 9/28/15   Lester Flores MD   NOVOLOG FLEXPEN U-100 INSULIN 100 unit/mL (3 mL) injection pen As directed 1/31/20   Francisco Vasquez MD   oseltamivir (TAMIFLU) 30 MG capsule Take 30 mg by mouth.    PROVIDER, HISTORICAL       Allergies:   Allergies   Allergen Reactions     Hydralazine      Paralysis of legs     Latex Itching     Skin Burns     Naprosyn [Naproxen] Swelling     " throat     Nitrofurantoin Hives     Sulfa (Sulfonamide Antibiotics) Rash     Vicks Vaporub [Camphor-Eucalyptus Oil-Menthol] Rash       Immunizations:   Immunization History   Administered Date(s) Administered     INFLUENZA,RECOMBINANT,INJ,PF QUADRIVALENT 18+YRS 10/19/2018, 10/25/2019     Influenza S3e6-20, 12/18/2009     Influenza, inj, historic,unspecified 12/18/2009, 10/14/2011     Influenza, seasonal,quad inj 6-35 mos 10/30/2012, 09/30/2013, 11/21/2014     Influenza,seasonal quad, PF, =/> 6months 09/29/2016, 10/03/2017     Influenza,seasonal,quad inj =/> 6months 09/28/2015     Td,adult,historic,unspecified 03/02/2006     Tdap 05/15/2015       Exam Chest clear to auscultation and percussion.  Heart tones regular rhythm without murmur rub or gallop.  Abdomen soft nontender no organomegaly.  No peritoneal signs.  Extremities free of edema cyanosis or clubbing.  Neck veins nondistended no thyromegaly or scleral icterus noted, carotids full.  Skin warm and dry easily conversant good spirited.  Normal intelligence.  Neurologically intact no gross localizing findings.  Wheelchair-bound urinary catheter in place draining clear urine.  No blood not turbid.  134/70 pulse 64 respirations 18 O2 sats 98% a mask is on the lower part of her face but not over her nose or mouth.  She is nonacute distress not toxic not acutely ill no central or acrocyanosis noted and no tachypnea.    Assessment and Plan  Diabetes mellitus type 2 related to super morbid obesity and insulin resistance.  Check today hemogram comprehensive metabolic profile plus urinalysis and lipid panel A1c.    Urinary tract infection with urosepsis and confusion resolving.    Hyperkalemia I counseled patient to avoid potassium in the diet and repeat potassium level pending see above comprehensive metabolic profile.    Multiple drug allergies.    Super morbid obesity with insulin resistance and diabetes mellitus type 2.    History of nontraumatic rhabdomyolysis  after fall.    Time: total time spent with the patient was 40 minutes of which >50% was spent in counseling and coordination of care    The following high BMI interventions were performed this visit: encouragement to exercise    Lester Flores MD    Patient Active Problem List   Diagnosis     Carpal Tunnel Syndrome     Urinary Tract Infection     Endometrial Hyperplasia     Cholelithiasis     Leukocytosis     Urine Tests Nonspecific Abnormal Findings     Obesity     Essential hypertension     Pernicious Anemia     Immunology Studies Raised Immunoglobulin Level     Vaginal bleeding     Type 2 diabetes mellitus (H)     Atrial fibrillation with RVR (H)     Acute kidney injury superimposed on CKD (H)     Non-traumatic rhabdomyolysis     Metabolic acidosis     Troponin level elevated     Bacteremia due to Gram-negative bacteria     Acute respiratory failure with hypoxia (H)     Acute encephalopathy     Acute pulmonary edema (H)     Wheezing     Moderate protein malnutrition (H)

## 2021-06-06 NOTE — TELEPHONE ENCOUNTER
Dr. Flores,  Spoke with the patient and relayed message below.  She would like to make sure it is okay for her to have both the back and knee injections.  Patient is questioning if you think her kidneys will be strong enough to handle the dye that they will have to use.  States that she does not know what type of dye is used, but they will need that to make sure they get the injection in the correct place.  Please advise.  Thank you.  Lurdes GONZALEZ, MARIA EUGENIA/CHILO....................10:29 AM

## 2021-06-06 NOTE — TELEPHONE ENCOUNTER
Who is calling:    Patient    Reason for Call:    Patient is scheduled to have injections in her RT knee and low back through [place].    Question if PCP is ok with patient moving forward with procedure?    Date of last appointment with primary care: 02/27/2020    Okay to leave a detailed message:   Yes    Please call patient to discuss the plan of care.

## 2021-06-07 NOTE — TELEPHONE ENCOUNTER
[facility] Nurse Triage Anticoagulation Note      Provider: VIJAY Tapia  Facility: [facility]    Facility Type: TCU    Caller: Misty  Call Back Number:  [phone]    Reason for call: INR    Today s INR: 3.9  Previous INR: 3/23 1.9(4mg daily)    Diagnosis/Goal: AFIB  Heparin/Lovenox: No   Currently on ABX: No  Other interacting Medications: Other: EC ASA 81mg daily  Missed or refused doses: No    Verbal Order/Direction given by Provider: Hold Warfarin x 2 days.  Next INR 4/1/20.      Provider giving order: VIJAY Tapia    Verbal order given to: Misty Jacobsen RN    23-Feb-2020

## 2021-06-07 NOTE — TELEPHONE ENCOUNTER
Orders being requested: INR; Vitamin B12  Reason service is needed/diagnosis: Opened up for Home care, able to do them in house instead of waiting for a clinic  When are orders needed by: ASAINGA  Where to send Orders: Phone:  [phone]  Okay to leave detailed message?  Yes

## 2021-06-07 NOTE — TELEPHONE ENCOUNTER
[facility] Nurse Triage Anticoagulation Note      Provider: VIJAY Tapia  Facility: [facility]    Facility Type: TCU    Caller: Sarai  Call Back Number:  [phone]    Reason for call: INR    Today s INR: 1.6  Previous INR: 3/17 1.7(3mg daily), 3/16 1.86(3mg)    Diagnosis/Goal: AFIB  Heparin/Lovenox: No   Currently on ABX: Yes; Amoxicillin 250mg two times a day until 3/23/20.    Other interacting Medications: EC ASA 81mg daily  Missed or refused doses: No    Nurse also reporting BMP results.  Sodium-141, potassium-4.8, Cl-103, CO2-25, Glu-88, BUN-42(prev 37 on 3/17), Creat-2.43(prev 2.54 on 3/17), GFR 20(prev 19 on 3/17), calcium-9.7.  Notable meds:  Metoprolol 25mg two times a day.  Patient has seen nephrology in the hospital([facility]).      Verbal Order/Direction given by Provider: Take Warfarin 4mg daily.  Next INR 3/23/20.  Make appointment with [facility] for follow up.      Provider giving order: BARRY Smith    Verbal order given to: Jamar Jacobsen RN

## 2021-06-07 NOTE — TELEPHONE ENCOUNTER
[facility] Nurse Triage Anticoagulation Note      Provider: VIJAY Tapia  Facility: [facility]    Facility Type: TCU    Caller: Reina  Call Back Number:  [phone]    Reason for call: INR    Today s INR: 2.8  Previous INR: 4/13/20 INR 2.9 4mg T-Th-Sat, 3.5mg AOD.    Diagnosis/Goal: AFIB  Heparin/Lovenox: No   Currently on ABX: No  Other interacting Medications: None  Missed or refused doses: No    Verbal Order/Direction given by Provider: Cont 4mg T-Th-Sat, 3.5mg AOD. NExt INR 4/23.    Provider giving order: VIJAY Tapia    Verbal order given to: Yenny Shepard RN

## 2021-06-07 NOTE — TELEPHONE ENCOUNTER
Prior Authorization Request  Who s requesting:  Pharmacy  Pharmacy Name and Location: [facility] pharmacy   Medication Name:    Disp  Refills  Start  End     blood glucose test (ACCU-CHEK SAM PLUS TEST STRP) strips  100 strip  10  4/30/2020      Sig: TEST 1 TIME A DAY     Sent to pharmacy as: blood sugar diagnostic strips (Accu-Chek Sam Plus test strp)     E-Prescribing Status: Receipt confirmed by pharmacy (4/30/2020  1:57 PM CDT)         Insurance Plan: [employer]  Insurance Member ID Number:  [id number]  CoverMyMeds Key: [id number]  Informed patient that prior authorizations can take up to 10 business days for response:   No  Okay to leave a detailed message: No

## 2021-06-07 NOTE — PROGRESS NOTES
[facility] Patient Outreach:     Discharge Date::  4/24/20      Reason for TCU stay (discharge diagnosis)::  SIMON on CKD, UTI      Are you feeling better, the same or worse since your discharge?:  Patient is feeling the same (patient is staying with a friend since her mother is too old and sick to care for her.  )          As part of your discharge plan, did they discuss home care with you?: No            Did you receive any new medications, or was there a change to your medications?: No            Do you have any follow up visits scheduled with your PCP or Specialist?:  No          I'm glad to hear you're doing well and we want you to continue to do well. Your PCP would like to see you for a follow-up visit. Can we help set that up for your today?: No        (RN) Provided patient the PCP's phone number to call if they have any questions or concerns?: No (Patient's mother knows the number and will have patient contact PCP's office.  )

## 2021-06-07 NOTE — TELEPHONE ENCOUNTER
[facility] Nurse Triage Telephone Note      Provider: VIJAY Tapia  Facility: [facility]    Facility Type: TCU    Caller: Yamini  Call Back Number:  [phone]    Allergies: Hydralazine; Latex; Naprosyn [naproxen]; Nitrofurantoin; Sulfa (sulfonamide antibiotics); and Vicks vaporub [camphor-eucalyptus oil-menthol]    Reason for call: Nurse reporting UC results which show 50,000-100,000 Enterococcus faecialis.  Sensitivities are pending.  This was ordered per nephrology.       Verbal Order/Direction given by Provider: Update Nephrology with final results.      Provider giving order: VIJAY Tapia    Verbal order given to: Yamini Jacobsen RN

## 2021-06-07 NOTE — TELEPHONE ENCOUNTER
Medication Question or Clarification  Who is calling: Pharmacy  What medication are you calling about (include dose and sig)?:   insulin aspart U-100 (NOVOLOG FLEXPEN U-100 INSULIN) 100 unit/mL (3 mL) injection pen  5 Pre-filled Pen Syringe  0  2020      Sig: Inject 2-14 Units under the skin 3 (three) times a day with meals. 0-120 0 units 121-200 2 units 201-250 4 units 251-300 6 units 301-350 8 units 351-400 10 units 401-450 12 units 451-500 14 units     Sent to pharmacy as: insulin aspart (U-100) 100 unit/mL (3 mL) subcutaneous pen (NovoLOG Flexpen U-100 Insulin)     Notes to Pharmacy: E11.9     E-Prescribing Status: Receipt confirmed by pharmacy (2020  1:18 PM CDT)       insulin lispro (HUMALOG) 100 unit/mL injection  10 mL  PRN  2020      Si.65 Type 2 with hyperglycemia     Class: Print     Notes to Pharmacy: Use sliding scale please         Who prescribed the medication?:   Lester Flores MD      What is your question/concern?:   Pharmacy states they can now get the Novolog box of Pens for $20.  Does Provider want to stay with the Novolog?  The Humalog script is for vials and no sliding scale included.  Patient has never used vials.  Please reach out to pharmacy and advise.     Requested Pharmacy: [id number]    Okay to leave a detailed message?: Yes

## 2021-06-07 NOTE — TELEPHONE ENCOUNTER
Who is calling:  [facility]  Reason for Call:    The Humalog is covered.  The Novolog is not covered by insurance.  Please send the Humalog.  Thank you  Date of last appointment with primary care: 4/30/2020  Okay to leave a detailed message: No

## 2021-06-07 NOTE — PROGRESS NOTES
Code Status:  FULL CODE  Visit Type: Follow Up (Diabetes, UTI, constipation)     Facility:  [facility] [[id number]]      Facility Type: SNF (Skilled Nursing Facility, TCU)    History of Present Illness:   Hospital Admission Date: 3/13/2020 Hospital Discharge Date: 3/17/2020      Jazmin Bauman is a 64 y.o. female who has a past medical history for type 2 diabetes, atrial fibrillation on Coumadin, CKD III-IV, urinary retention and incontinence.  She was hospitalized approximately 1.5 months ago and required temporary dialysis and [place] catheterization..  During this hospitalization she was found to have a UTI with SIMON on CKD.  She had previously completed a 10-day course of Cipro and continued to have dysuria and suprapubic tenderness.  She was given Levaquin in the ED.  Urine culture showed enterococcus facealis which was resistant to Levaquin and so then she was started on amoxicillin for 7 days.  Her creatinine of 3.2 did improve with IV fluids down to 2.54.  She also had an elevated white count at discharge at 13.8.  She also had replacement for hypomagnesemia.    Today, she reports ongoing feeling of urinary pressure with frequency.  She finishes her amoxicillin antibiotic today. She denies any CVA tenderness.  She does have Vicodin as needed for any pain.  Fasting blood sugars are good 90s to 120s.  Other blood sugars are slightly elevated in the 180s to low 200s.  She does have sliding scale insulin and is typically taking 2 units AC.    Past Medical History:   Diagnosis Date     Anemia     pernicious     Diabetes mellitus (H)     borderline     Endometrial hyperplasia      Fibromyalgia      Heart murmur     pt states her mitral valve leaks     History of recurrent UTI (urinary tract infection)      Hypertension      Thrombocytopenia (H)      Vaginal bleeding 1/2015     Past Surgical History:   Procedure Laterality Date     bilateral carpal tunnel release  2009     CHOLECYSTECTOMY        COLONOSCOPY N/A 9/11/2017    Procedure: COLONOSCOPY;  Surgeon: Tyler Bhakta MD;  Location: [facility];  Service:      COMBINED HYSTEROSCOPY DIAGNOSTIC / D&C N/A 1/28/2015    Procedure: DILATION AND CURETTAGE WITH HYSTEROSCOPY;  Surgeon: Grant Beal MD;  Location: [facility];  Service:      DILATION AND CURETTAGE OF UTERUS       IR NON TUNNELED CATHETER >5 YEARS  1/21/2020     PICC  1/20/2020          ID HYSTEROSCOPY,W/ENDO BX N/A 9/5/2019    Procedure: HYSTEROSCOPY, DILATION AND CURETTAGE;  Surgeon: Grant Beal MD;  Location: [facility] OR;  Service: Gynecology     ID HYSTEROSCOPY,W/ENDO BX N/A 12/9/2019    Procedure: HYSTEROSCOPY, DILATION AND CURETTAGE;  Surgeon: Grant Beal MD;  [place] [facility] [place];  Service: Gynecology     Family History   Problem Relation Age of Onset     Colon cancer Father      Social History     Socioeconomic History     Marital status: Single     Spouse name: Not on file     Number of children: Not on file     Years of education: Not on file     Highest education level: Not on file   Occupational History     Not on file   Social Needs     Financial resource strain: Not on file     Food insecurity     Worry: Not on file     Inability: Not on file     Transportation needs     Medical: Not on file     Non-medical: Not on file   Tobacco Use     Smoking status: Never Smoker     Smokeless tobacco: Never Used   Substance and Sexual Activity     Alcohol use: Not Currently     Comment: rare     Drug use: No     Sexual activity: Not on file   Lifestyle     Physical activity     Days per week: Not on file     Minutes per session: Not on file     Stress: Not on file   Relationships     Social connections     Talks on phone: Not on file     Gets together: Not on file     Attends [employer] service: Not on file     Active member of club or organization: Not on file     Attends meetings of clubs or organizations: Not on file     Relationship status: Not on  "file     Intimate partner violence     Fear of current or ex partner: Not on file     Emotionally abused: Not on file     Physically abused: Not on file     Forced sexual activity: Not on file   Other Topics Concern     Not on file   Social History Narrative     Not on file     Current Outpatient Medications   Medication Sig Dispense Refill     insulin lispro (HUMALOG) 100 unit/mL injection Inject 2 Units under the skin 3 (three) times a day before meals.       ACCU-CHEK SAM PLUS TEST STRP strips TEST 6 TIMES A DAY (Patient taking differently: TEST 1 TIME A DAY) 100 strip 10     ACCU-CHEK SAM PLUS TEST STRP strips test blood sugar level 6 times daily 600 strip 4     acetaminophen (TYLENOL) 325 MG tablet Take 2 tablets (650 mg total) by mouth 3 (three) times a day. (Patient taking differently: Take 650 mg by mouth 2 (two) times a day. )  0     amoxicillin (AMOXIL) 250 MG capsule Take 1 capsule (250 mg total) by mouth 2 (two) times a day for 6 days. 12 capsule 0     aspirin 81 MG EC tablet Take 81 mg by mouth daily.       BD ULTRA-FINE MICRO PEN NEEDLE 32 gauge x 1/4\" Ndle USE AS DIRECTED 4 TIMES DAILY. 360 each 3     calcium acetate (PHOSLO) 667 mg capsule Take 1 capsule (667 mg total) by mouth 3 (three) times a day with meals.  0     cholecalciferol, vitamin D3, (VITAMIN D3) 1,000 unit capsule Take 1,000 Units by mouth daily.       docusate sodium (COLACE) 50 MG capsule Take by mouth daily.        ferrous sulfate 325 (65 FE) MG tablet Take 1 tablet by mouth 2 (two) times a day with meals.       gabapentin (NEURONTIN) 300 MG capsule Take 300 mg by mouth 2 (two) times a day.       HYDROcodone-acetaminophen 5-325 mg per tablet Take 1 tablet by mouth every 4 (four) hours as needed for pain. 8 tablet 0     insulin glargine (BASAGLAR KWIKPEN) 100 unit/mL (3 mL) pen Inject 25 Units under the skin 2 (two) times a day.  0     insulin lispro (ADMELOG SOLOSTAR U-100 INSULIN) 100 unit/mL pen Inject 2-14 Units under the skin " 3 (three) times a day with meals. 0-120 0 units  121-200 2 units  201-250 4 units  251-300 6 units  301-350 8 units  351-400 10 units  401-450 12 units  451-500 14 units  501+ call TCP       lancets (ACCU-CHEK MULTICLIX LANCET) Misc TEST 6 TIMES A DAY (Patient taking differently: TEST 1 TIME A DAY) 200 each 11     magnesium 250 mg Tab Take 500 mg by mouth 2 (two) times a day.        metoprolol tartrate (LOPRESSOR) 25 MG tablet Take 1 tablet (25 mg total) by mouth 2 (two) times a day.  0     omeprazole (PRILOSEC) 20 MG capsule Take 1 capsule (20 mg total) by mouth at bedtime.  0     patiromer calcium sorbitex (VELTESSA) 8.4 gram PwPk powder packet Take 8.4 g by mouth daily.       senna-docusate (SENNOSIDES-DOCUSATE SODIUM) 8.6-50 mg tablet Take 1 tablet by mouth daily.       warfarin sodium (WARFARIN ORAL) Take by mouth. 3/19/20 INR 1.6  Take 4mg daily.  Next INR 3/23/20.       Current Facility-Administered Medications   Medication Dose Route Frequency Provider Last Rate Last Dose     cyanocobalamin injection 1,000 mcg  1,000 mcg Intramuscular Q30 Days Lester Flores MD   1,000 mcg at 11/22/19 1422     lidocaine 2 % jelly (XYLOCAINE)   Topical PRN Carly Arora NP   1 application at 09/18/19 0848     Allergies   Allergen Reactions     Hydralazine      Paralysis of legs     Latex Itching     Skin Burns     Naprosyn [Naproxen] Swelling     throat     Nitrofurantoin Hives     Sulfa (Sulfonamide Antibiotics) Rash     Vicks Vaporub [Camphor-Eucalyptus Oil-Menthol] Rash     Immunization History   Administered Date(s) Administered     DT (pediatric) 03/02/2006     INFLUENZA,RECOMBINANT,INJ,PF QUADRIVALENT 18+YRS 10/19/2018, 10/25/2019     Influenza [id number], 12/18/2009     Influenza, Seasonal, Inj PF IIV3 12/18/2009     Influenza, inj, historic,unspecified 12/18/2009, 10/14/2011     Influenza, seasonal,quad inj 6-35 mos 10/30/2012, 09/30/2013, 11/21/2014     Influenza,seasonal quad, PF, =/> 6months 09/29/2016,  10/03/2017     Influenza,seasonal, Inj IIV3 11/10/2005, 10/14/2011, 10/30/2012, 09/30/2013, 11/21/2014     Influenza,seasonal,quad inj =/> 6months 09/28/2015     Td, Adult, Absorbed 03/02/2006     Td,adult,historic,unspecified 03/02/2006     Tdap 05/15/2015       Post Discharge Medication Reconciliation Status: discharge medications reconciled and changed, per note/orders (see AVS)    Review of Systems   Patient denies fever, chills, headache, lightheadedness, dizziness, rhinorrhea, cough, congestion, shortness of breath, chest pain, palpitations, abdominal pain, n/v, diarrhea, constipation, change in appetite, dysuria, frequency, burning or pain with urination.  Other than stated in HPI all other review of systems is negative.         Physical Exam   Vital signs: /52, heart rate 59, respiratory 18, temp 97.8.  GENERAL APPEARANCE: Well developed, well nourished, in no acute distress.  HEENT: normocephalic, atraumatic  LUNGS:  respiratory effort normal.  CARD: Perfusing well to all extremities.   MSK: Muscle strength and tone were normal.  EXTREMITIES: No cyanosis, clubbing or edema.  NEURO: Alert and oriented x 3. Face is symmetric.  SKIN: Inspection of the skin reveals no rashes, ulcerations or petechiae.  PSYCH: euthymic            Labs:  All labs reviewed in the nursing home record.    Assessment:  1. Acute cystitis without hematuria     2. Acute kidney injury (H)     3. Essential hypertension     4. Atrial fibrillation with RVR (H)     5. Type 2 diabetes mellitus with diabetic polyneuropathy, with long-term current use of insulin (H)         Plan:   UTI: Complete amoxicillin today, continue to monitor.  Encourage patient to push water of at least 40 ounces a day.  Will monitor white count.  Offered Pyridium for urinary discomfort however patient does not want the side effect of staining clothing.  Bowels moving well continue to monitor for any diarrhea.  Continue on senna S    Acute kidney injury: We will  check a BMP on Wednesday.  Avoid nephrotoxic medications    Hypertension: Blood pressures are stable, continue metoprolol.    Atrial fibrillation: She has had a history of RVR continue with metoprolol.  On warfarin having an INR checked today.  She denies any bleeding.    Diabetes: Blood sugars greater than goal at this time she is on sliding scale and is averaging 2 to 4 units AC 3 times daily.  I will put her on Admelog 2 units AC 3 times daily in efforts to discontinue her sliding scale insulin.  Continue with glargine 25 units daily.    Anemia: Check a CBC        Electronically signed by: Azlu Humphrey, CNP

## 2021-06-07 NOTE — PROGRESS NOTES
[facility]    Facility:  [facility] [[id number]]    Code Status:  FULL CODE    Chief Complaint   Patient presents with     Problem Visit   : Abnormal urine culture, disability of ambulation, CKD 4, warfarin therapy                  Patient Active Problem List   Diagnosis     Carpal Tunnel Syndrome     Urinary Tract Infection     Endometrial Hyperplasia     Cholelithiasis     Leukocytosis     Urine Tests Nonspecific Abnormal Findings     Obesity     Essential hypertension     Pernicious Anemia     Immunology Studies Raised Immunoglobulin Level     Vaginal bleeding     Type 2 diabetes mellitus (H)     Atrial fibrillation with RVR (H)     Acute kidney injury superimposed on CKD (H)     Non-traumatic rhabdomyolysis     Metabolic acidosis     Troponin level elevated     Bacteremia due to Gram-negative bacteria     Acute respiratory failure with hypoxia (H)     Acute encephalopathy     Acute pulmonary edema (H)     Wheezing     Moderate protein malnutrition (H)     Abnormal cytological findings in specimens from other female genital organs     Chronic kidney disease, stage III (moderate) (H)     Hyperkalemia     Osteoarthritis     UTI (urinary tract infection)     Acute kidney injury (H)       History:   Jazmin Bauman  is a 64 year old female with history of CKD4, morbid obesity, insulin-dependent type 2 diabetes, chronic atrial fibrillation, peripheral neuropathy, GERD, peripheral edema, seen for admission to TCU on 3/28/2020     Hospital Course: Patient was admitted between March 3 and March 17 most recently.  However she was admitted from the TCU following complicated hospital stay between January 20 and January 31 where she was treated for acute encephalopathy (stroke ruled out), sepsis due to UTI (E. coli), acute kidney injury/rhabdomyolysis (did not require dialysis this time but has required it transiently once in the past), new onset atrial fibrillation with RVR, acute  "urinary retention with failed voiding trial leading to catheterization, acute respiratory failure hypoxia treated with BiPAP and right leg weakness without evidence of CVA, questionable myelopathy secondary to lumbar canal stenosis.     This admission was remarkable for acute kidney injury which was noted in the TCU but could not be reversed under those conditions.  Urine culture revealed Enterococcus faecalis treated with amoxicillin for 7 days.  Nephrology felt that the patient was \"prerenal\" and indeed her renal function improved with IV fluids.  Patient also had hypokalemia which has been a recurrent problem for her and has difficulty tolerating Kayexalate.  She is newly started on patiromer ca sorbitex.         Subjective/ROS:    -augmented by discussion with facility staff involved in direct care      Several issues today:  - Asked to evaluate patient's abnormal urine culture.  Apparently this was ordered by nephrology?  I cannot see the actual order in the chart nor can the staff track the origin.  Nurse practitioner told the staff to call it to the nephrologist office, no one can confirm that there has been any communication between this facility and that office regarding treatment.    Major issue is whether this represents true infection or not.  It is difficult to tell.  Patient reports that her urine does not feel normal.  She says she does have some burning.  However it is quite vague and somewhat inconsistent.  She does not have suprapubic pain or pressure.  She does not have back pain or flank pain or generalized belly pain.  There is been no fever sweats or chills.    - INR April 1 2.7.  She has been having elevations of her INR with need for holding of the warfarin at times.  She is due for INR on Monday.  She has not had any bleeding bruising hematuria    - Therapist talks to me today about the need for 4 wheeled walker.  They already have the order for the wheelchair.  Please see their " "documentation for additional details.    -I did obtain additional history regarding her chronic leukocytosis.  She reports that she was followed by hematologist who is now retired.  She reports that she had at least 3 bone marrow biopsies \"to be sure I did not have leukemia\".  She says as far she knows no certain diagnosis was made.  The situation has been lost to follow-up.    She reports she is otherwise doing okay.  Her bowels are working well.  She is eating well.  She has no nausea or vomiting.  She is very concerned about her nephrology follow-up scheduled for early next week.    Past Medical History:   Diagnosis Date     Anemia     pernicious     Diabetes mellitus (H)     borderline     Endometrial hyperplasia      Fibromyalgia      Heart murmur     pt states her mitral valve leaks     History of recurrent UTI (urinary tract infection)      Hypertension      Thrombocytopenia (H)      Vaginal bleeding 1/2015     Past Surgical History:   Procedure Laterality Date     bilateral carpal tunnel release  2009     CHOLECYSTECTOMY       COLONOSCOPY N/A 9/11/2017    Procedure: COLONOSCOPY;  Surgeon: Tyler Bhakta MD;  Location: [facility];  Service:      COMBINED HYSTEROSCOPY DIAGNOSTIC / D&C N/A 1/28/2015    Procedure: DILATION AND CURETTAGE WITH HYSTEROSCOPY;  Surgeon: Grant Beal MD;  Location: [facility];  Service:      DILATION AND CURETTAGE OF UTERUS       IR NON TUNNELED CATHETER >5 YEARS  1/21/2020     [facility]  1/20/2020          MS HYSTEROSCOPY,W/ENDO BX N/A 9/5/2019    Procedure: HYSTEROSCOPY, DILATION AND CURETTAGE;  Surgeon: Grant Beal MD;  Location: [facility] OR;  Service: Gynecology     MS HYSTEROSCOPY,W/ENDO BX N/A 12/9/2019    Procedure: HYSTEROSCOPY, DILATION AND CURETTAGE;  Surgeon: Grant Beal MD;  Location: [facility] OR;  Service: Gynecology          Family History   Problem Relation Age of Onset     Colon cancer Father    :       Social History "     Socioeconomic History     Marital status: Single     Spouse name: Not on file     Number of children: Not on file     Years of education: Not on file     Highest education level: Not on file   Occupational History     Not on file   Social Needs     Financial resource strain: Not on file     Food insecurity     Worry: Not on file     Inability: Not on file     Transportation needs     Medical: Not on file     Non-medical: Not on file   Tobacco Use     Smoking status: Never Smoker     Smokeless tobacco: Never Used   Substance and Sexual Activity     Alcohol use: Not Currently     Comment: rare     Drug use: No     Sexual activity: Not on file   Lifestyle     Physical activity     Days per week: Not on file     Minutes per session: Not on file     Stress: Not on file   Relationships     Social connections     Talks on phone: Not on file     Gets together: Not on file     Attends Christianity service: Not on file     Active member of club or organization: Not on file     Attends meetings of clubs or organizations: Not on file     Relationship status: Not on file     Intimate partner violence     Fear of current or ex partner: Not on file     Emotionally abused: Not on file     Physically abused: Not on file     Forced sexual activity: Not on file   Other Topics Concern     Not on file   Social History Narrative     Not on file   :        Current Outpatient Medications on File Prior to Visit   Medication Sig Dispense Refill     amoxicillin (AMOXIL) 250 MG capsule Take 250 mg by mouth 2 (two) times a day.       ACCU-CHEK SAM PLUS TEST STRP strips TEST 6 TIMES A DAY (Patient taking differently: TEST 1 TIME A DAY) 100 strip 10     ACCU-CHEK SAM PLUS TEST STRP strips test blood sugar level 6 times daily 600 strip 4     acetaminophen (TYLENOL) 325 MG tablet Take 2 tablets (650 mg total) by mouth 3 (three) times a day. (Patient taking differently: Take 650 mg by mouth 2 (two) times a day. )  0     aspirin 81 MG EC tablet  "Take 81 mg by mouth daily.       BD ULTRA-FINE MICRO PEN NEEDLE 32 gauge x 1/4\" Ndle USE AS DIRECTED 4 TIMES DAILY. 360 each 3     calcium acetate (PHOSLO) 667 mg capsule Take 1 capsule (667 mg total) by mouth 3 (three) times a day with meals.  0     cholecalciferol, vitamin D3, (VITAMIN D3) 1,000 unit capsule Take 1,000 Units by mouth daily.       docusate sodium (COLACE) 50 MG capsule Take by mouth daily.        ferrous sulfate 325 (65 FE) MG tablet Take 1 tablet by mouth 2 (two) times a day with meals.       gabapentin (NEURONTIN) 300 MG capsule Take 300 mg by mouth 2 (two) times a day.       HYDROcodone-acetaminophen 5-325 mg per tablet Take 1 tablet by mouth every 4 (four) hours as needed for pain. 8 tablet 0     insulin glargine (BASAGLAR KWIKPEN) 100 unit/mL (3 mL) pen Inject 25 Units under the skin 2 (two) times a day.  0     insulin lispro (ADMELOG SOLOSTAR U-100 INSULIN) 100 unit/mL pen Inject 2-14 Units under the skin 3 (three) times a day with meals. 0-120 0 units  121-200 2 units  201-250 4 units  251-300 6 units  301-350 8 units  351-400 10 units  401-450 12 units  451-500 14 units  501+ call TCP       insulin lispro (HUMALOG) 100 unit/mL injection Inject 2 Units under the skin 3 (three) times a day before meals.       lancets (ACCU-CHEK MULTICLIX LANCET) Misc TEST 6 TIMES A DAY (Patient taking differently: TEST 1 TIME A DAY) 200 each 11     magnesium 250 mg Tab Take 500 mg by mouth 2 (two) times a day.        metoprolol tartrate (LOPRESSOR) 25 MG tablet Take 1 tablet (25 mg total) by mouth 2 (two) times a day.  0     omeprazole (PRILOSEC) 20 MG capsule Take 1 capsule (20 mg total) by mouth at bedtime.  0     patiromer calcium sorbitex (VELTESSA) 8.4 gram PwPk powder packet Take 8.4 g by mouth daily.       senna-docusate (SENNOSIDES-DOCUSATE SODIUM) 8.6-50 mg tablet Take 1 tablet by mouth daily.       warfarin sodium (WARFARIN ORAL) Take by mouth. 4/1/20 INR 2.7 4mg Tu-Th-Sat, 3.5mg AOD. Next INR " 4/8.  3/30/20 INR 3.9  Hold x 2 days.  Next INR 4/1/20.    3/23/20 INR 1.9  Cont 4mg daily.  Next INR 3/30/20.  3/19/20 INR 1.6  Take 4mg daily.  Next INR 3/23/20.       Current Facility-Administered Medications on File Prior to Visit   Medication Dose Route Frequency Provider Last Rate Last Dose     cyanocobalamin injection 1,000 mcg  1,000 mcg Intramuscular Q30 Days Lester Flores MD   1,000 mcg at 11/22/19 1422     lidocaine 2 % jelly (XYLOCAINE)   Topical PRN Carly Arora NP   1 application at 09/18/19 0848   :      ALLERGIES:  Hydralazine; Latex; Naprosyn [naproxen]; Nitrofurantoin; Sulfa (sulfonamide antibiotics); and Vicks vaporub [camphor-eucalyptus oil-menthol]    Vitals:  There were no vitals taken for this visit. except as noted below    Vital signs: Reviewed per facility EMR vitals including as follows:              Blood pressure 167/50 though most of her systolics have been in the 130s to 150s.  Temperature 96.6 heart rate 68 respirations 19 blood glucose 119 most of her sugars have been below 200 although not all of them.  O2 sats 100% on room air  There is no height or weight on file to calculate BMI.    Physical exam:    General:  Alert  oriented x3 appears well, in no apparent distress.  She demonstrates good motion of her head.  Her gaze is conjugate with clear sclera.  She moves all 4 extremities without pain      Due to the 2020 Covid 19 pandemic, except as noted above, the patient was visually observed at a 6 foot plus distance.  An observational exam was performed in an effort to keep patient safe from Covid 19 and other communicable diseases.   Labs:  Lab Results   Component Value Date    WBC 13.8 (H) 03/17/2020    HGB 9.2 (L) 03/17/2020    HCT 30.5 (L) 03/17/2020    MCV 96 03/17/2020     03/17/2020     Results for orders placed or performed during the hospital encounter of 03/13/20   Basic Metabolic Panel   Result Value Ref Range    Sodium 141 136 - 145 mmol/L    Potassium  4.6 3.5 - 5.0 mmol/L    Chloride 101 98 - 107 mmol/L    CO2 29 22 - 31 mmol/L    Anion Gap, Calculation 11 5 - 18 mmol/L    Glucose 111 70 - 125 mg/dL    Calcium 10.2 8.5 - 10.5 mg/dL    BUN 37 (H) 8 - 22 mg/dL    Creatinine 2.54 (H) 0.60 - 1.10 mg/dL    GFR MDRD Af Amer 23 (L) >60 mL/min/1.73m2    GFR MDRD Non Af Amer 19 (L) >60 mL/min/1.73m2         Lab Results   Component Value Date    TSH 2.20 01/20/2020     Lab Results   Component Value Date    HGBA1C 7.2 (H) 02/27/2020     [unfilled]  Lab Results   Component Value Date    QEZXLJHA56 285 01/07/2011     Lab Results   Component Value Date     (H) 01/20/2020     [unfilled]  Most Recent EKG     Units 03/14/20  1304   VENTRATE BPM 71   ATRIALRATE BPM 71   QRSDURATION ms 90   QTINTERVAL ms 416   QTCCALC ms 452   P [place] degrees 30   RAXIS degrees -37   TAXIS degrees 49   MUSEDX  Normal sinus rhythm with sinus arrhythmia  Left axis deviation  Inferior infarct (cited on or before 13-MAR-2020)  Abnormal ECG  When compared with ECG of 13-MAR-2020 21:26,  No significant change was found  Confirmed by TONY GIFFORD MD LOC: ([id number]) on 3/14/2020 2:38:51 PM           Assessment/Plan:      ICD-10-CM    1. Essential hypertension  I10    2. Type 2 diabetes mellitus with other specified complication, with long-term current use of insulin (H)  E11.69     Z79.4    3. Atrial fibrillation with RVR (H)  I48.91    4. Acute cystitis without hematuria  N30.00        Abnormal urine culture  Enterococcus faecalis  K  Mild intermittent dysuria   Patient was treated for the same organism during her hospital stay recently.  It is unclear whether this might represent colonization.  This  was a catheterized specimen and she does have mild dysuria so we elected to treat in this case  - Amoxicillin 250 mg (renal dosed) twice daily for 7 days  -We will attempt to add BMP to her blood work which was drawn earlier today, if unable we will add to the blood work on Monday when she is  due for her next INR.    CKD 4   Creatinine on March 17 2.54 GFR estimated 19.  Renal dose of amoxicillin as above.  Anticipate nephrology input next week    Hyperkalemia   No new information.  4.6 on March 17.  She continues on patiromer     Generalized weakness  Peripheral neuropathy  Morbid obesity   Appreciate assistance of therapy.  Patient qualifies for four-wheel walker, bariatric type, order placed today    Leukocytosis  Thrombocytosis  Anemia of chronic kidney disease   I was unaware patient's history of hematology/bone marrow biopsies  -I asked staff to try to track down bone marrow biopsies and hematology notes for review.  That may prove difficult as patient is shaky on the dates and locations and apparently her physician has retired.  -Recommend reestablishing with hematology following discharge from TCU    Multiple other medical issues as noted in my prior note are reviewed without change in treatment today                                                          Case discussed with:  Facility staff    .  Harry Blackwood  4/3/2020      The patient has mobility limitation significantly impairing his/her ability to participate in mobility-related activities of daily living, such as toileting, feeding, dressing, grooming, and bathing in customary locations in the home. The mobility limitation can at times, in certain situations, be sufficiently and safely resolved by use of an appropriately fitted four-wheel walker walker.  The patient's home provides adequate space/access between the rooms, and maneuvering space within the rooms/counters for use of the walker the patient has not expressed an unwillingness to use the walker that is provided in his home..   Four-wheel bariatric walker is appropriate, see therapy for additional details      Harry Blackwood  4/3/2020      Harry Blackwood MD

## 2021-06-07 NOTE — PROGRESS NOTES
"[facility]    Facility:   [facility] [[id number]]   Code Status: FULL CODE  PCP: Lester Flores MD   Phone: [phone]   Fax: [phone]      CHIEF COMPLAINT/REASON FOR VISIT:  Chief Complaint   Patient presents with     Discharge Summary       HISTORY COURSE:  Jazmin Bauman  is a 64 year old female with history of CKD4, morbid obesity, insulin-dependent type 2 diabetes, chronic atrial fibrillation, peripheral neuropathy, GERD, peripheral edema, seen for admission to TCU on 3/28/2020     Hospital Course: Patient was admitted between March 3 and March 17 most recently.  However she was admitted from the TCU following complicated hospital stay between January 20 and January 31 where she was treated for acute encephalopathy (stroke ruled out), sepsis due to UTI (E. coli), acute kidney injury/rhabdomyolysis (did not require dialysis this time but has required it transiently once in the past), new onset atrial fibrillation with RVR, acute urinary retention with failed voiding trial leading to catheterization, acute respiratory failure hypoxia treated with BiPAP and right leg weakness without evidence of CVA, questionable myelopathy secondary to lumbar canal stenosis.     This admission was remarkable for acute kidney injury which was noted in the TCU but could not be reversed under those conditions.  Urine culture revealed Enterococcus faecalis treated with amoxicillin for 7 days.  Nephrology felt that the patient was \"prerenal\" and indeed her renal function improved with IV fluids.  Patient also had hypokalemia which has been a recurrent problem for her and has difficulty tolerating Kayexalate.  She is newly started on patiromer ca sorbitex.      Facility course:  -Patient complained of recurrent dysuria.  Repeat urine culture showed  K Enterococcus faecalis, this was a catheterized specimen.  We elected to treat again with amoxicillin 250 twice daily for 7 days " (renal dosing) with good response.  -CKD 4 was monitored with reassuring laboratory work on April 8 with creatinine 1.73 and normal electrolytes.  -Insulin-dependent type 2 diabetes with adequate control of her sugars are on home insulin regimen.  -History of atrial fibrillation though patient currently in sinus rhythm.  Warfarin was monitored with therapeutic INRs most recently 2.9 on April 13  -No recurrence of hyperkalemia    -I became aware of patient's history of multiple bone marrow biopsies in work-up for chronic leukocytosis/thrombocytosis/anemia of chronic disease.  Hematologist is now retired.  I asked for old records which have been difficult to obtain directly from that office according to staff.  It is possible that Dr. Flores is aware of that work-up but I asked the patient to follow-up with him regarding this issue which is ongoing.  Patient told me that no diagnosis could be reached but that she was generally reassured by the now retired physician and has not thought too much about it in the years since.    -Generalized weakness complicated by morbid obesity and peripheral neuropathy were managed by PT, OT.  Patient will need a four-wheel walker bariatric type which was ordered.  She plans to discharge to a friend's house with support there.  She needs PT, OT, RN, home health aide in addition to the walker.  See therapy notes for additional details.  -Patient developed loose stools April 15/April 16 with improvement over that 48-hour time period, following discontinuation of her laxatives.  As of this dictation, C. difficile will not be tested as long as her loose stools resolved, however if they continue after discontinuation of laxatives C. difficile will be sent and will need to update the record.    -Hypomagnesemia was replaced    Review of Systems    Current Vitals   BP: 142/64 mmHg  4/16/2020 15:39  Temp:96.8  F  4/16/2020 15:39  Pulse: 61 bpm  4/16/2020 15:39  Weight: 277.2 Lbs  4/15/2020  "07:57  Resp: 17 Breaths/min  4/16/2020 15:39  BS: 154 mg/dL  4/16/2020 19:21  O2: 97 %  4/16/2020 15:39  Pain: 0  4/16/2020 14:06  Physical Exam    MEDICATION LIST:  Current Outpatient Medications   Medication Sig     ACCU-CHEK SAM PLUS TEST STRP strips TEST 6 TIMES A DAY (Patient taking differently: TEST 1 TIME A DAY)     ACCU-CHEK SAM PLUS TEST STRP strips test blood sugar level 6 times daily     acetaminophen (TYLENOL) 325 MG tablet Take 2 tablets (650 mg total) by mouth 3 (three) times a day. (Patient taking differently: Take 650 mg by mouth 2 (two) times a day. )     aspirin 81 MG EC tablet Take 81 mg by mouth daily.     BD ULTRA-FINE MICRO PEN NEEDLE 32 gauge x 1/4\" Ndle USE AS DIRECTED 4 TIMES DAILY.     calcium acetate (PHOSLO) 667 mg capsule Take 1 capsule (667 mg total) by mouth 3 (three) times a day with meals.     cholecalciferol, vitamin D3, (VITAMIN D3) 1,000 unit capsule Take 1,000 Units by mouth daily.     docusate sodium (COLACE) 50 MG capsule Take by mouth daily.      ferrous sulfate 325 (65 FE) MG tablet Take 1 tablet by mouth 2 (two) times a day with meals.     gabapentin (NEURONTIN) 300 MG capsule Take 300 mg by mouth 2 (two) times a day.     HYDROcodone-acetaminophen 5-325 mg per tablet Take 1 tablet by mouth every 4 (four) hours as needed for pain.     insulin glargine (BASAGLAR KWIKPEN) 100 unit/mL (3 mL) pen Inject 25 Units under the skin 2 (two) times a day.     insulin lispro (ADMELOG SOLOSTAR U-100 INSULIN) 100 unit/mL pen Inject 2-14 Units under the skin 3 (three) times a day with meals. 0-120 0 units  121-200 2 units  201-250 4 units  251-300 6 units  301-350 8 units  351-400 10 units  401-450 12 units  451-500 14 units  501+ call TCP     insulin lispro (HUMALOG) 100 unit/mL injection Inject 2 Units under the skin 3 (three) times a day before meals.     lancets (ACCU-CHEK MULTICLIX LANCET) Misc TEST 6 TIMES A DAY (Patient taking differently: TEST 1 TIME A DAY)     magnesium 250 mg " Tab Take 500 mg by mouth 2 (two) times a day.      metoprolol tartrate (LOPRESSOR) 25 MG tablet Take 1 tablet (25 mg total) by mouth 2 (two) times a day.     omeprazole (PRILOSEC) 20 MG capsule Take 1 capsule (20 mg total) by mouth at bedtime.     patiromer calcium sorbitex (VELTESSA) 8.4 gram PwPk powder packet Take 8.4 g by mouth daily.     senna-docusate (SENNOSIDES-DOCUSATE SODIUM) 8.6-50 mg tablet Take 1 tablet by mouth daily.     warfarin sodium (WARFARIN ORAL) Take by mouth. 4/13/20 INR 2.9 Cont 4mg T-Th-Sat, 3.5mg AOD. Next INR 4/16.  4/8/20 INR 2.6  Cont 4mg Tues-Thurs-Sat and 3.5mg AOD.  Next INR 4/13/20.    4/1/20 INR 2.7 4mg Tu-Th-Sat, 3.5mg AOD. Next INR 4/8.  3/30/20 INR 3.9  Hold x 2 days.  Next INR 4/1/20.    3/23/20 INR 1.9  Cont 4mg daily.  Next INR 3/30/20.  3/19/20 INR 1.6  Take 4mg daily.  Next INR 3/23/20.       DISCHARGE DIAGNOSIS:    ICD-10-CM    1. Essential hypertension  I10    Acute kidney injury on CKD 4             Improved during her course here as noted above  -Avoid nephrotoxins  -Has follow-up with nephrology     Hyperkalemia              Resolved, newly taking patiromer.       Peripheral edema             Much improved with lymphedema treatment here     Generalized weakness              PT, OT, RN, home health aide involved, discharge anticipated to friend's house for additional support next week    Insulin-dependent type 2 diabetes              Continue glargine 25 twice daily as current.  Consider lowering of at bedtime dose of she has any true hypoglycemia.     Atrial fibrillation              Relatively new diagnosis as noted above.  She is on warfarin and metoprolol.    Most recent EKG with sinus rhythm.   INR has remained therapeutic.  See orders for next INR check     Peripheral neuropathy              Continue gabapentin     GERD              Continue omeprazole     Leukocytosis              See above discussion     Morbid obesity              With attendant risks.  Mobilize  as able.  Continue physical therapy at home.     Enterococcus faecalis UTI              Improvement after second course of amoxicillin here, the first was in the hospital    MEDICAL EQUIPMENT NEEDS:  Four-wheel walker please see previous documentation/face-to-face    DISCHARGE PLAN/FACE TO FACE:  I certify that services are/were furnished while this patient was under the care of a physician and that a physician or an allowed non-physician practitioner (NPP), had a face-to-face encounter that meets the physician face-to-face encounter requirements. The encounter was in whole, or in part, related to the primary reason for home health. The patient is confined to his/her home and needs intermittent skilled nursing, physical therapy,  continued need for occupational therapy, home health aide. A plan of care has been established by a physician and is periodically reviewed by a physician.  Date of Face-to-Face Encounter: 4/16/2020      I certify that, based on my findings, the following services are medically necessary home health services:     My clinical findings support the need for the above skilled services because: Mobility impairment due to peripheral neuropathy associated with diabetes and multiple comorbidities including obesity.    This patient is homebound because: She is unable to leave the home without assist of another person and with assistive devices    The patient is, or has been, under my care and I have initiated the establishment of the plan of care. This patient will be followed by a physician who will periodically review the plan of care.    Schedule follow up visit with primary care provider within 7 days to reestablish care.      40 minutes    Electronically signed by: Harry Blackwood MD

## 2021-06-07 NOTE — TELEPHONE ENCOUNTER
Central PA team  [phone]  Pool: HE PA MED ([id number])          PA has been initiated.       PA form completed and faxed insurance via Cover My Meds     Key:  [id number]     Medication:  accuchek jose d test strips    Insurance:  [employer]        Response will be received via fax and may take up to 5-10 business days depending on plan

## 2021-06-07 NOTE — TELEPHONE ENCOUNTER
Medication Request  Medication name:    Disp  Refills  Start  End     calcium acetate,phosphat bind, (PHOSLO) 667 mg capsule   0  4/30/2020      Sig - Route: Take 1 capsule (667 mg total) by mouth 3 (three) times a day with meals. - Oral     Class: No Print         Requested Pharmacy: [id number]  Reason for request: medication was suppose to get sent over but never did.  When did you use medication last?:    Patient offered appointment:  patient declined  Okay to leave a detailed message: yes

## 2021-06-07 NOTE — PROGRESS NOTES
[facility]    NAME:  Jazmin Bauman             :  1955  MRN: [id number]  CODE STATUS:  FULL CODE and POLST AVAILABLE    FACILITY:  [facility] [[id number]]         Chief Complaint   Patient presents with     Problem Visit     UTI and Acute on CKD 4     HISTORY OF PRESENT ILLNESS: Jazmin Bauman is a 64 y.o. female morbidly obese  with IDDMII, afib on coumadin/CKDIII-IV required temporary hemodialysis last  Admission 1.5months ago, urinary retention/incontinence requiring yeager catheter currently not on yeager who was brought in by TCU for worsening renal fxn/mild hyperkalemia/burning on urination admitted with UTI/worsening renal function.     1. SIMON on CKDIV:   -Cr of 3.20. She required temporary HD last admission 1.5 months ago. +urinary retention/incontinence requiring yeager and urology consult last admission. Recently finished 10 day course of cipro. She c/o some dysuria and suprapubic tenderness.   -Was given 500mg of levaquin in ED.   -U/C showed enterococcus fecalis resistant to levaquin. Started PO Amoxicillin x total 7 days  -Renal function is improving. Recieved IVF per nephrology     2. Leukocytosis:   -Likely d/t UTI. Finished 10 day course of cipro last dose 3/12/2020. Was given one time does of 500mg levaquin IV in ED.  -Urine culture with enterococcus fecalis. Started PO amoxicillin  -Trend leucocytosis     3. Recurrent hyperkalemia:   -K of 5.6 on admission. Came down with D50/insulin, now up to 6.1 again.  -Pt states she had bad reaction with kayexalate in the past  -Treat per protocol. Consulted nephrology     4. IDDMII:   -Resumed home insulin regimen     5. Afib:   -Rate  Controlled. EKG NSR. Warfarin dosing per pharmacy     6. Hypomagnesemia:  -Replaced  Patient was stabilized and transferred to TCU for continued rehabilitation.    Today, patient is seen at the bedside.  She reports chronic generalized pain.  Currently on  Gabapentin, Tylenol, Norco, and ASA. Today at 1:20pm, patient had a Virtual visit with Dr. Frost at [facility] except follow up UA/UC with f/u sometime in June.  Date is dependent on the COVID19.     Past Medical History:   Diagnosis Date     Anemia     pernicious     Diabetes mellitus (H)     borderline     Endometrial hyperplasia      Fibromyalgia      Heart murmur     pt states her mitral valve leaks     History of recurrent UTI (urinary tract infection)      Hypertension      Thrombocytopenia (H)      Vaginal bleeding 1/2015     Past Surgical History:   Procedure Laterality Date     bilateral carpal tunnel release  2009     CHOLECYSTECTOMY       COLONOSCOPY N/A 9/11/2017    Procedure: COLONOSCOPY;  Surgeon: Tyler Bhakta MD;  Location: [facility];  Service:      COMBINED HYSTEROSCOPY DIAGNOSTIC / D&C N/A 1/28/2015    Procedure: DILATION AND CURETTAGE WITH HYSTEROSCOPY;  Surgeon: Grant Beal MD;  Location: [facility];  Service:      DILATION AND CURETTAGE OF UTERUS       IR NON TUNNELED CATHETER >5 YEARS  1/21/2020     [facility]  1/20/2020          NE HYSTEROSCOPY,W/ENDO BX N/A 9/5/2019    Procedure: HYSTEROSCOPY, DILATION AND CURETTAGE;  Surgeon: Grant Beal MD;  Location: [facility] OR;  Service: Gynecology     NE HYSTEROSCOPY,W/ENDO BX N/A 12/9/2019    Procedure: HYSTEROSCOPY, DILATION AND CURETTAGE;  Surgeon: Grant Beal MD;  Location: [facility];  Service: Gynecology     Family History   Problem Relation Age of Onset     Colon cancer Father      Social History     Socioeconomic History     Marital status: Single     Spouse name: Not on file     Number of children: Not on file     Years of education: Not on file     Highest education level: Not on file   Occupational History     Not on file   Social Needs     Financial resource strain: Not on file     Food insecurity     Worry: Not on file     Inability: Not on file     Transportation  "needs     Medical: Not on file     Non-medical: Not on file   Tobacco Use     Smoking status: Never Smoker     Smokeless tobacco: Never Used   Substance and Sexual Activity     Alcohol use: Not Currently     Comment: rare     Drug use: No     Sexual activity: Not on file   Lifestyle     Physical activity     Days per week: Not on file     Minutes per session: Not on file     Stress: Not on file   Relationships     Social connections     Talks on phone: Not on file     Gets together: Not on file     Attends Sikh service: Not on file     Active member of club or organization: Not on file     Attends meetings of clubs or organizations: Not on file     Relationship status: Not on file     Intimate partner violence     Fear of current or ex partner: Not on file     Emotionally abused: Not on file     Physically abused: Not on file     Forced sexual activity: Not on file   Other Topics Concern     Not on file   Social History Narrative     Not on file     Allergies   Allergen Reactions     Hydralazine      Paralysis of legs     Latex Itching     Skin Burns     Naprosyn [Naproxen] Swelling     throat     Nitrofurantoin Hives     Sulfa (Sulfonamide Antibiotics) Rash     Vicks Vaporub [Camphor-Eucalyptus Oil-Menthol] Rash     Current Outpatient Medications   Medication Sig Dispense Refill     ACCU-CHEK SAM PLUS TEST STRP strips TEST 6 TIMES A DAY (Patient taking differently: TEST 1 TIME A DAY) 100 strip 10     ACCU-CHEK SAM PLUS TEST STRP strips test blood sugar level 6 times daily 600 strip 4     acetaminophen (TYLENOL) 325 MG tablet Take 2 tablets (650 mg total) by mouth 3 (three) times a day. (Patient taking differently: Take 650 mg by mouth 2 (two) times a day. )  0     aspirin 81 MG EC tablet Take 81 mg by mouth daily.       BD ULTRA-FINE MICRO PEN NEEDLE 32 gauge x 1/4\" Ndle USE AS DIRECTED 4 TIMES DAILY. 360 each 3     calcium acetate (PHOSLO) 667 mg capsule Take 1 capsule (667 mg total) by mouth 3 (three) " times a day with meals.  0     cholecalciferol, vitamin D3, (VITAMIN D3) 1,000 unit capsule Take 1,000 Units by mouth daily.       docusate sodium (COLACE) 50 MG capsule Take by mouth daily.        ferrous sulfate 325 (65 FE) MG tablet Take 1 tablet by mouth 2 (two) times a day with meals.       gabapentin (NEURONTIN) 300 MG capsule Take 300 mg by mouth 2 (two) times a day.       HYDROcodone-acetaminophen 5-325 mg per tablet Take 1 tablet by mouth every 4 (four) hours as needed for pain. 8 tablet 0     insulin glargine (BASAGLAR KWIKPEN) 100 unit/mL (3 mL) pen Inject 25 Units under the skin 2 (two) times a day.  0     insulin lispro (ADMELOG SOLOSTAR U-100 INSULIN) 100 unit/mL pen Inject 2-14 Units under the skin 3 (three) times a day with meals. 0-120 0 units  121-200 2 units  201-250 4 units  251-300 6 units  301-350 8 units  351-400 10 units  401-450 12 units  451-500 14 units  501+ call TCP       insulin lispro (HUMALOG) 100 unit/mL injection Inject 2 Units under the skin 3 (three) times a day before meals.       lancets (ACCU-CHEK MULTICLIX LANCET) Misc TEST 6 TIMES A DAY (Patient taking differently: TEST 1 TIME A DAY) 200 each 11     magnesium 250 mg Tab Take 500 mg by mouth 2 (two) times a day.        metoprolol tartrate (LOPRESSOR) 25 MG tablet Take 1 tablet (25 mg total) by mouth 2 (two) times a day.  0     omeprazole (PRILOSEC) 20 MG capsule Take 1 capsule (20 mg total) by mouth at bedtime.  0     patiromer calcium sorbitex (VELTESSA) 8.4 gram PwPk powder packet Take 8.4 g by mouth daily.       senna-docusate (SENNOSIDES-DOCUSATE SODIUM) 8.6-50 mg tablet Take 1 tablet by mouth daily.       warfarin sodium (WARFARIN ORAL) Take by mouth. 4/8/20 INR 2.6  Cont 4mg Tues-Thurs-Sat and 3.5mg AOD.  Next INR 4/13/20.    4/1/20 INR 2.7 4mg Tu-Th-Sat, 3.5mg AOD. Next INR 4/8.  3/30/20 INR 3.9  Hold x 2 days.  Next INR 4/1/20.    3/23/20 INR 1.9  Cont 4mg daily.  Next INR 3/30/20.  3/19/20 INR 1.6  Take 4mg daily.   Next INR 3/23/20.       Current Facility-Administered Medications   Medication Dose Route Frequency Provider Last Rate Last Dose     cyanocobalamin injection 1,000 mcg  1,000 mcg Intramuscular Q30 Days Lester Flores MD   1,000 mcg at 11/22/19 1422     lidocaine 2 % jelly (XYLOCAINE)   Topical PRN Carly Arora, NP   1 application at 09/18/19 0848       REVIEW OF SYSTEMS:    Currently, no fever, chills, or rigors. Does not have any visual or hearing problems. Denies any chest pain, headaches, palpitations, lightheadedness, dizziness, shortness of breath, or cough. Appetite is good. Denies any GERD symptoms. Denies any difficulty with swallowing, nausea, or vomiting.  Denies any abdominal pain, diarrhea or constipation. Denies any urinary symptoms. No insomnia. No active bleeding. No rash.       PHYSICAL EXAMINATION:  Vitals:    04/12/20 1736   BP: 144/78   Pulse: 60   Resp: 18   Temp: 97  F (36.1  C)   SpO2: 94%   Weight: (!) 285 lb 9.6 oz (129.5 kg)       GENERAL: Awake, Alert, oriented x3, not in any form of acute distress, answers questions appropriately, follows simple commands, conversant  HEENT: Head is normocephalic with normal hair distribution. No evidence of trauma. Ears: No acute purulent discharge. Eyes: Conjunctivae pink with no scleral jaundice. Nose: Normal mucosa and septum. NECK: Supple with no cervical or supraclavicular lymphadenopathy. Trachea is midline.   CHEST: No tenderness or deformity, no crepitus  LUNG: Clear to auscultation with good chest expansion. There are no crackles or wheezes, normal AP diameter.  BACK: No kyphosis of the thoracic spine. Symmetric, no curvature, ROM normal, no CVA tenderness, no spinal tenderness   CVS: There is good S1  S2, there are no murmurs, rubs, gallops, or heaves, rhythm is regular,  2+ pulses symmetric in all extremities.  ABDOMEN: Globular and soft, nontender to palpation, non distended, no masses, no organomegaly, good bowel sounds, no rebound  or guarding, no peritoneal signs.   EXTREMITIES:  Full range of motion on both upper and lower extremities, there is no tenderness to palpation, no pedal edema, no cyanosis or clubbing, no calf tenderness.  Pulses equal in all extremities, normal cap refill, no joint swelling.  SKIN: Warm and dry, no erythema noted.  Skin color, texture, no rashes or lesions.  NEUROLOGICAL: The patient is oriented to person, place and time. Strength and sensation are grossly intact. Face is symmetric.    LABS:      Lab Results   Component Value Date    WBC 13.8 (H) 03/17/2020    HGB 9.2 (L) 03/17/2020    HCT 30.5 (L) 03/17/2020    MCV 96 03/17/2020     03/17/2020     Results for orders placed or performed during the hospital encounter of 03/13/20   Basic Metabolic Panel   Result Value Ref Range    Sodium 141 136 - 145 mmol/L    Potassium 4.6 3.5 - 5.0 mmol/L    Chloride 101 98 - 107 mmol/L    CO2 29 22 - 31 mmol/L    Anion Gap, Calculation 11 5 - 18 mmol/L    Glucose 111 70 - 125 mg/dL    Calcium 10.2 8.5 - 10.5 mg/dL    BUN 37 (H) 8 - 22 mg/dL    Creatinine 2.54 (H) 0.60 - 1.10 mg/dL    GFR MDRD Af Amer 23 (L) >60 mL/min/1.73m2    GFR MDRD Non Af Amer 19 (L) >60 mL/min/1.73m2     Lab Results   Component Value Date    HGBA1C 7.2 (H) 02/27/2020     Vitamin D, Total (25-Hydroxy)   Date Value Ref Range Status   04/28/2016 29.8 (L) 30.0 - 80.0 ng/mL Final     Lab Results   Component Value Date    NYTYJHWA87 285 01/07/2011       ASSESSMENT/PLAN:    1. Acute cystitis without hematuria  - Finished 10 day course of Cipro last dose 3/12/2020. Was given one time does of 500mg Levaquin IV in ED. Urine culture with enterococcus fecalis. Started PO amoxicillin. Will obtain a follow up UA/UC Margot elkins at [facility].    2. Essential hypertension - Blood pressures are within target range, will continue Metoprolol   3. Type 2 diabetes mellitus with other specified complication, with long-term current use of insulin (H) -  Continue current insulin regimen   4. Atrial fibrillation with RVR (H) - Rate controlled with Coumadin   5. Anemia due to stage 3 chronic kidney disease (H) -  Continue Ferrous sulfate daily, will monitor closely         Electronically signed by:  Manolo Castillo CNP

## 2021-06-07 NOTE — TELEPHONE ENCOUNTER
LMOM that PCP is out of the office today- will forward to him for Monday to confirm he will be following

## 2021-06-07 NOTE — TELEPHONE ENCOUNTER
[facility] Nurse Triage Telephone Note      Provider: VIJAY Tapia  Facility: [facility]    Facility Type: TCU    Caller: Reina  Call Back Number:  [phone]    Allergies: Hydralazine; Latex; Naprosyn [naproxen]; Nitrofurantoin; Sulfa (sulfonamide antibiotics); and Vicks vaporub [camphor-eucalyptus oil-menthol]    Reason for call: Nurse reporting Heme 1 and BMP results.  WBC-13.9(prev 13.8 on 3/17), RBC-3.31, Hgb-9.9(prev 9.2 on 3/17), Hct-32.5, MCV-98.2, PLT-339.  BMP:  Sodium-138, potassium-4.7, Cl-104, CO2-25, Glu-195, BUN-38(prev 42 on 3/19), Creat-2.03(prev 2.43 on 3/19), GFR 25(prev 20 on 3/19), calcium 10.2.  Patient recently complete a course of Amoxicillin for a UTI.  Afebrile.  VSS.      Verbal Order/Direction given by Provider: No new orders.      Provider giving order: VIJAY Walker    Verbal order given to: Cassandra Jacobsen RN

## 2021-06-07 NOTE — TELEPHONE ENCOUNTER
Who is calling:  Adenike from [facility]   Reason for Call: Caller is requesting provider will follow patient home care once he discharges from TCU . Please advise .  Date of last appointment with primary care: 02/27/20  Okay to leave a detailed message: No

## 2021-06-07 NOTE — TELEPHONE ENCOUNTER
April notified ok for requested orders below per PCP. Home care will also check patient's INR on the initial home care visit.

## 2021-06-07 NOTE — TELEPHONE ENCOUNTER
[facility] Nurse Triage Anticoagulation Note      Provider: VIJAY Tapia  Facility: [facility]    Facility Type: TCU    Caller: Shaina  Call Back Number:  [phone]    Reason for call: INR    Today s INR: 2.2  Previous INR: 4/16 2.8(4mg Tues-Thurs-Sat and 3.5mg AOD)    Diagnosis/Goal: AFIB  Heparin/Lovenox: No   Currently on ABX: No  Other interacting Medications: ASA  Missed or refused doses: No    Verbal Order/Direction given by Provider: Continue Warfarin 4mg Tues-Thurs-Sat and 3.5mg all other days.  Next INR 4/30/20.      Provider giving order: VIJAY Tapia    Verbal order given to: Shaina Jacobsen RN

## 2021-06-07 NOTE — TELEPHONE ENCOUNTER
[facility] Nurse Triage Anticoagulation Note      Provider: VIJAY Tapia  Facility: [facility]    Facility Type: TCU    Caller: Dominguez  Call Back Number:  [phone]    Reason for call: INR    Today s INR: 2.9  Previous INR: 4/8/20 INR 2.6 4mg T-Th-Sat, 3.5mg AOD.    Diagnosis/Goal: AFIB  Heparin/Lovenox: No   Currently on ABX: No  LD 4/10  Other interacting Medications: None  Missed or refused doses: No    Verbal Order/Direction given by Provider: Cont 4mg T-Th-Sat, 3.5mg AOD. Next INR 4/16.    Provider giving order: VIJAY Tapia    Verbal order given to: Dominguez Shepard RN

## 2021-06-07 NOTE — TELEPHONE ENCOUNTER
[facility] Nurse Triage Anticoagulation Note      Provider: VIJAY Tapia  Facility: [facility]    Facility Type: TCU    Caller: Shaina  Call Back Number:  [phone]    Reason for call: INR    Today s INR: 2.6  Previous INR: 4/1 2.7(4mg Tues-Thurs-Sat and 3.5mg AOD), 3/30 3.9(hold x 2 days)    Diagnosis/Goal: AFIB  Heparin/Lovenox: No   Currently on ABX: Yes; Amoxicillin ends on 4/10.    Other interacting Medications: EC ASA 81mg daily  Missed or refused doses: No    Nurse reporting BMP results.  Of note patient does follow with nephrology.  Sodium-138, potassium-4.3, Cl-106, CO2-20, Glu-217, BUN-40(prev 38 on 3/25), creat-1.73(prev 2.03 on 3/25), GFR 31(prev 25 on 3/25), calcium-9.8.      Verbal Order/Direction given by Provider: Continue Warfarin 4mg Tues-Thurs-Sat and 3.5mg all other days.  Next INR 4/13/20.      Provider giving order: VIJAY Tapia    Verbal order given to: Shaina Jacobsen RN

## 2021-06-07 NOTE — PROGRESS NOTES
[facility]    [facility] [[id number]]    Code Status:  FULL CODE    Chief Complaint   Patient presents with     H & P   :                    Patient Active Problem List   Diagnosis     Carpal Tunnel Syndrome     Urinary Tract Infection     Endometrial Hyperplasia     Cholelithiasis     Leukocytosis     Urine Tests Nonspecific Abnormal Findings     Obesity     Essential hypertension     Pernicious Anemia     Immunology Studies Raised Immunoglobulin Level     Vaginal bleeding     Type 2 diabetes mellitus (H)     Atrial fibrillation with RVR (H)     Acute kidney injury superimposed on CKD (H)     Non-traumatic rhabdomyolysis     Metabolic acidosis     Troponin level elevated     Bacteremia due to Gram-negative bacteria     Acute respiratory failure with hypoxia (H)     Acute encephalopathy     Acute pulmonary edema (H)     Wheezing     Moderate protein malnutrition (H)     Abnormal cytological findings in specimens from other female genital organs     Chronic kidney disease, stage III (moderate) (H)     Hyperkalemia     Osteoarthritis     UTI (urinary tract infection)     Acute kidney injury (H)       History:  Jazmin Bauman  is a 64 year old female with history of CKD4, morbid obesity, insulin-dependent type 2 diabetes, chronic atrial fibrillation, peripheral neuropathy, GERD, peripheral edema, seen for admission to TCU on 3/28/2020    Hospital Course: Patient was admitted between March 3 and March 17 most recently.  However she was admitted from the TCU following complicated hospital stay between January 20 and January 31 where she was treated for acute encephalopathy (stroke ruled out), sepsis due to UTI (E. coli), acute kidney injury/rhabdomyolysis (did not require dialysis this time but has required it transiently once in the past), new onset atrial fibrillation with RVR, acute urinary retention with failed voiding trial leading to catheterization, acute  "respiratory failure hypoxia treated with BiPAP and right leg weakness without evidence of CVA, questionable myelopathy secondary to lumbar canal stenosis.    This admission was remarkable for acute kidney injury which was noted in the TCU but could not be reversed under those conditions.  Urine culture revealed Enterococcus faecalis treated with amoxicillin for 7 days.  Nephrology felt that the patient was \"prerenal\" and indeed her renal function improved with IV fluids.  Patient also had hypokalemia which has been a recurrent problem for her and has difficulty tolerating Kayexalate.  She is newly started on patiromer ca sorbitex.    Subjective/ROS:    -augmented by discussion with facility staff involved in direct care      Patient reports dissatisfaction with her care not only here but at the last TCU.  She says things go better in the hospital where she gets more attention for her needs.  History is difficult as patient tends to perseverate on past injustices she has suffered at the hands of the medical system.  When I asked her her current complaints and if there is anything I can do to help her, she says she has been asking for copies of her lab work and not getting them.  I reviewed those carefully with her today.  She is also very upset that she is not going to be able to see Dr hughes personally due to the pandemic and change in protocols associated with it.    Otherwise it turns out that the patient does not have any active concerns.  She reports that her peripheral neuropathy is \"the same\" and that her pain is adequately treated with gabapentin and PRN acetaminophen.  She reports satisfaction with her blood sugars which we reviewed.  She does get a little low for my liking at times but she has not had any true hypoglycemia and says she can still sense that and wants to continue her current glargine dosage of 25 twice daily.  She is not feeling heartburn and takes omeprazole.  She has many questions sign " request prognostication regarding her renal failure.  She denies fever sweats chills change in vision speaking swallowing falls injuries skin rashes lymphadenopathy mood changes orthopnea PND chest pain.  She denies peripheral edema though it is clearly present even on the distance exam performed today.  Remainder 13 system ROS negative    Past Medical History:   Diagnosis Date     Anemia     pernicious     Diabetes mellitus (H)     borderline     Endometrial hyperplasia      Fibromyalgia      Heart murmur     pt states her mitral valve leaks     History of recurrent UTI (urinary tract infection)      Hypertension      Thrombocytopenia (H)      Vaginal bleeding 1/2015     Past Surgical History:   Procedure Laterality Date     bilateral carpal tunnel release  2009     CHOLECYSTECTOMY       COLONOSCOPY N/A 9/11/2017    Procedure: COLONOSCOPY;  Surgeon: Tyler Bhakta MD;  Location: [facility];  Service:      COMBINED HYSTEROSCOPY DIAGNOSTIC / D&C N/A 1/28/2015    Procedure: DILATION AND CURETTAGE WITH HYSTEROSCOPY;  Surgeon: Grant Beal MD;  Location: [facility];  Service:      DILATION AND CURETTAGE OF UTERUS       IR NON TUNNELED CATHETER >5 YEARS  1/21/2020     PICC  1/20/2020          MN HYSTEROSCOPY,W/ENDO BX N/A 9/5/2019    Procedure: HYSTEROSCOPY, DILATION AND CURETTAGE;  Surgeon: Gratn Beal MD;  Location: [facility] OR;  Service: Gynecology     MN HYSTEROSCOPY,W/ENDO BX N/A 12/9/2019    Procedure: HYSTEROSCOPY, DILATION AND CURETTAGE;  Surgeon: Grant Beal MD;  Location: [facility];  Service: Gynecology          Family History   Problem Relation Age of Onset     Colon cancer Father    :       Social History     Socioeconomic History     Marital status: Single     Spouse name: Not on file     Number of children: Not on file     Years of education: Not on file     Highest education level: Not on file   Occupational History     Not on file   Social Needs     Financial  "resource strain: Not on file     Food insecurity     Worry: Not on file     Inability: Not on file     Transportation needs     Medical: Not on file     Non-medical: Not on file   Tobacco Use     Smoking status: Never Smoker     Smokeless tobacco: Never Used   Substance and Sexual Activity     Alcohol use: Not Currently     Comment: rare     Drug use: No     Sexual activity: Not on file   Lifestyle     Physical activity     Days per week: Not on file     Minutes per session: Not on file     Stress: Not on file   Relationships     Social connections     Talks on phone: Not on file     Gets together: Not on file     Attends Judaism service: Not on file     Active member of club or organization: Not on file     Attends meetings of clubs or organizations: Not on file     Relationship status: Not on file     Intimate partner violence     Fear of current or ex partner: Not on file     Emotionally abused: Not on file     Physically abused: Not on file     Forced sexual activity: Not on file   Other Topics Concern     Not on file   Social History Narrative     Not on file   :    Patient reports that she lives with her mother nearby this facility.  She says she has stairs to get in but only 3    Current Outpatient Medications on File Prior to Visit   Medication Sig Dispense Refill     ACCU-CHEK SAM PLUS TEST STRP strips TEST 6 TIMES A DAY (Patient taking differently: TEST 1 TIME A DAY) 100 strip 10     ACCU-CHEK SAM PLUS TEST STRP strips test blood sugar level 6 times daily 600 strip 4     acetaminophen (TYLENOL) 325 MG tablet Take 2 tablets (650 mg total) by mouth 3 (three) times a day. (Patient taking differently: Take 650 mg by mouth 2 (two) times a day. )  0     aspirin 81 MG EC tablet Take 81 mg by mouth daily.       BD ULTRA-FINE MICRO PEN NEEDLE 32 gauge x 1/4\" Ndle USE AS DIRECTED 4 TIMES DAILY. 360 each 3     calcium acetate (PHOSLO) 667 mg capsule Take 1 capsule (667 mg total) by mouth 3 (three) times a day " with meals.  0     cholecalciferol, vitamin D3, (VITAMIN D3) 1,000 unit capsule Take 1,000 Units by mouth daily.       docusate sodium (COLACE) 50 MG capsule Take by mouth daily.        ferrous sulfate 325 (65 FE) MG tablet Take 1 tablet by mouth 2 (two) times a day with meals.       gabapentin (NEURONTIN) 300 MG capsule Take 300 mg by mouth 2 (two) times a day.       HYDROcodone-acetaminophen 5-325 mg per tablet Take 1 tablet by mouth every 4 (four) hours as needed for pain. 8 tablet 0     insulin glargine (BASAGLAR KWIKPEN) 100 unit/mL (3 mL) pen Inject 25 Units under the skin 2 (two) times a day.  0     insulin lispro (ADMELOG SOLOSTAR U-100 INSULIN) 100 unit/mL pen Inject 2-14 Units under the skin 3 (three) times a day with meals. 0-120 0 units  121-200 2 units  201-250 4 units  251-300 6 units  301-350 8 units  351-400 10 units  401-450 12 units  451-500 14 units  501+ call TCP       insulin lispro (HUMALOG) 100 unit/mL injection Inject 2 Units under the skin 3 (three) times a day before meals.       lancets (ACCU-CHEK MULTICLIX LANCET) Misc TEST 6 TIMES A DAY (Patient taking differently: TEST 1 TIME A DAY) 200 each 11     magnesium 250 mg Tab Take 500 mg by mouth 2 (two) times a day.        metoprolol tartrate (LOPRESSOR) 25 MG tablet Take 1 tablet (25 mg total) by mouth 2 (two) times a day.  0     omeprazole (PRILOSEC) 20 MG capsule Take 1 capsule (20 mg total) by mouth at bedtime.  0     patiromer calcium sorbitex (VELTESSA) 8.4 gram PwPk powder packet Take 8.4 g by mouth daily.       senna-docusate (SENNOSIDES-DOCUSATE SODIUM) 8.6-50 mg tablet Take 1 tablet by mouth daily.       warfarin sodium (WARFARIN ORAL) Take by mouth. 3/19/20 INR 1.6  Take 4mg daily.  Next INR 3/23/20.       Current Facility-Administered Medications on File Prior to Visit   Medication Dose Route Frequency Provider Last Rate Last Dose     cyanocobalamin injection 1,000 mcg  1,000 mcg Intramuscular Q30 Days Lester Flores MD    1,000 mcg at 11/22/19 1422     lidocaine 2 % jelly (XYLOCAINE)   Topical PRN Carly Arora, NP   1 application at 09/18/19 0848   :      ALLERGIES:  Hydralazine; Latex; Naprosyn [naproxen]; Nitrofurantoin; Sulfa (sulfonamide antibiotics); and Vicks vaporub [camphor-eucalyptus oil-menthol]    Vitals:  There were no vitals taken for this visit.      Vital signs from the facility record:              Blood pressure 165/68 which is the highest she has had.  She is generally running in the normal to mildly elevated range with her systolic pressures on review.  Her blood sugars are generally between 90 and 170.  Temperature is 98 respirations 20 heart rate 56 generally running between 50 and 70.  O2 sats 98% weight is 286 pounds  There is no height or weight on file to calculate BMI.    Physical exam:    General:  Alert  oriented x3 appears comfortable and normally conversant    Head appears normal I think would see clear sclera gaze is conjugate with EOMI she demonstrates good range of motion of her neck.  She can raise both arms above her head.  She can lift both knees and straighten both lower legs.  She has fairly deep sock lines in her ankles indicating symmetric edema.  Skin is clear without venous stasis dermatitis however      Due to the 2020 Covid 19 pandemic, the patient was visually observed at a 6 foot plus distance.  An observational exam was performed in an effort to keep patient safe from Covid 19 and other communicable diseases.   Labs:  Lab Results   Component Value Date    WBC 13.8 (H) 03/17/2020    HGB 9.2 (L) 03/17/2020    HCT 30.5 (L) 03/17/2020    MCV 96 03/17/2020     03/17/2020     Results for orders placed or performed during the hospital encounter of 03/13/20   Basic Metabolic Panel   Result Value Ref Range    Sodium 141 136 - 145 mmol/L    Potassium 4.6 3.5 - 5.0 mmol/L    Chloride 101 98 - 107 mmol/L    CO2 29 22 - 31 mmol/L    Anion Gap, Calculation 11 5 - 18 mmol/L    Glucose 111 70  - 125 mg/dL    Calcium 10.2 8.5 - 10.5 mg/dL    BUN 37 (H) 8 - 22 mg/dL    Creatinine 2.54 (H) 0.60 - 1.10 mg/dL    GFR MDRD Af Amer 23 (L) >60 mL/min/1.73m2    GFR MDRD Non Af Amer 19 (L) >60 mL/min/1.73m2         Lab Results   Component Value Date    TSH 2.20 01/20/2020     @LASTA!C@  [unfilled]  Lab Results   Component Value Date    PNEIYXYP53 285 01/07/2011     Lab Results   Component Value Date     (H) 01/20/2020     [unfilled]  Most Recent EKG     Units 03/14/20  1304   VENTRATE BPM 71   ATRIALRATE BPM 71   QRSDURATION ms 90   QTINTERVAL ms 416   QTCCALC ms 452   P [place] degrees 30   RAXIS degrees -37   TAXIS degrees 49   MUSEDX  Normal sinus rhythm with sinus arrhythmia  Left axis deviation  Inferior infarct (cited on or before 13-MAR-2020)  Abnormal ECG  When compared with ECG of 13-MAR-2020 21:26,  No significant change was found  Confirmed by ИРИНА THORPE, TONY LOC: ([id number]) on 3/14/2020 2:38:51 PM           Assessment/Plan:      ICD-10-CM    1. Essential hypertension  I10    2. Type 2 diabetes mellitus with other specified complication, with long-term current use of insulin (H)  E11.69     Z79.4    3. Atrial fibrillation with RVR (H)  I48.91    4. Acute cystitis without hematuria  N30.00    5. Class 3 severe obesity due to excess calories with body mass index (BMI) of 50.0 to 59.9 in adult, unspecified whether serious comorbidity present (H)  E66.01     Z68.43    6. Acute kidney injury (H)  N17.9    7. Acute kidney injury superimposed on CKD (H)  N17.9     N18.9        Acute kidney injury on CKD 4   Patient with worrisome history of prior transient dialysis need, and some progression of her CKD now stage IV.  However her creatinine has continued to improve since discharge down to 2.03 on March 25.  -Avoid nephrotoxins  -Has follow-up with nephrology    Hyperkalemia   newly taking patiromer.  Potassium 4.7.  No changes made    Peripheral edema   Encourage elevation of legs above heart level when  she is resting rather than dangling down in his wheelchair all day    Generalized weakness   PT, OT,  involved, patient anticipates discharge back to her home    Insulin-dependent type 2 diabetes   Continue glargine 25 twice daily as current.  Consider lowering of at bedtime dose of she has any true hypoglycemia.    Atrial fibrillation   Relatively new diagnosis as noted above.  She is on warfarin and metoprolol.  Rate control appears adequate.  INR is subtherapeutic at 1.76 on March 17.  She has another INR scheduled for March 30    Peripheral neuropathy   Continue gabapentin    GERD   Continue omeprazole    Leukocytosis   Persistent.  Has been noted in previous hospitalizations and attributed to stress demargination.  If patient is able to establish stable health for a few weeks and if the white blood count is rechecked and remains elevated at that point, I would recommend peripheral smear, probable hematology consultation.    Morbid obesity   With attendant risks.  Mobilize as able.  Continue physical therapy.    Enterococcus faecalis UTI   Patient reports she is asymptomatic at this point has completed 7 days amoxicillin        Case discussed with:    Facility staff               Harry Blackwood MD

## 2021-06-07 NOTE — TELEPHONE ENCOUNTER
Left message to call back for: Jazmin  Information to relay to patient:  Dr Flores will follow patient.

## 2021-06-07 NOTE — TELEPHONE ENCOUNTER
"Medication Question or Clarification  Who is calling: [facility] Pharmacist    What medication are you calling about (include dose and sig)?:   1)  Warfarin, as directed.  2) Admelog Solostar, sliding scale  3) Humalog vial, as directed    Who prescribed the medication?: Lester Flores MD     What is your question/concern?:     1) \"As directed\" is not acceptable by insurance.  Prescription needs to state how long 100 tablets of warfarin may last patient or list her current warfarin dose.    2) Ademelog Solostar is not a preferred medication.  Insurance wants Novolog    3) \"As directed\" is not acceptable by insurance without the sliding scale.    Pharmacist would like to speak to Dr. Flores to correct all three presriptions.      Requested Pharmacy: [facility]  Okay to leave a detailed message?: No        "

## 2021-06-07 NOTE — TELEPHONE ENCOUNTER
Called and left a detailed message with patient. Advised that Novolog and Humalog scripts were sent to pharmacy. Patient can let the pharmacy know which one to fill as both scripts are there.     Luci Mckinley LPN

## 2021-06-07 NOTE — TELEPHONE ENCOUNTER
Needs help with the exact dosing and call the pharmacy for me regarding the warfarin and the insulin.

## 2021-06-07 NOTE — PROGRESS NOTES
"Jazmin Bauman is a 64 y.o. female who is being evaluated via a billable telephone visit.      The patient has been notified of following:     \"This telephone visit will be conducted via a call between you and your physician/provider. We have found that certain health care needs can be provided without the need for a physical exam.  This service lets us provide the care you need with a short phone conversation.  If a prescription is necessary we can send it directly to your pharmacy.  If lab work is needed we can place an order for that and you can then stop by our lab to have the test done at a later time.    Telephone visits are billed at different rates depending on your insurance coverage. During this emergency period, for some insurers they may be billed the same as an in-person visit.  Please reach out to your insurance provider with any questions.    If during the course of the call the physician/provider feels a telephone visit is not appropriate, you will not be charged for this service.\"    Patient has given verbal consent to a Telephone visit? Yes    Patient would like to receive their AVS by AVS Preference: Yolanda.    Additional provider notes: Diabetes mellitus on insulin.    Fell off couch in January injured her right leg required lengthy hospital stays at least 3 in number with TCU between then.  First hospital stay was in the ICU for 2 weeks.    Currently without chest pain or shortness of breath no blood in stool or urine.    Medication list reviewed reconciled.    Needs refill for calcium acetate test strips and insulin bolus and warfarin.  Feels that warfarin makes her fatigued or weak    Assessment/Plan:  Diabetes mellitus type 2 refill meds as listed above needs lab testing done once the clinic opens up again would suggest hemogram comprehensive metabolic profile A1c lipid panel and attenuation of same meds encouraged.  Call if problems.  Phone visit in 1 month.      Phone call duration:  21 " minutes    India Coreas, CMA    .e

## 2021-06-07 NOTE — TELEPHONE ENCOUNTER
[facility] Nurse Triage Anticoagulation Note      Provider: VIJAY Tapia  Facility: [facility]    Facility Type: TCU    Caller: Elizabet  Call Back Number:  [phone]    Reason for call: INR  UA results    Today s INR: 2.7  Previous INR: 3/30/20 INR 3.9 Held x 2 days    Diagnosis/Goal: AFIB  Heparin/Lovenox: No   Currently on ABX: No  Other interacting Medications:  ASA 81mg qd  Missed or refused doses: Yes held X 2 days    Verbal Order/Direction given by Provider: 4mg Tu-Th-Sat, 3.5mg AOD. Next INR 4/8. Await UC final & sens.    Provider giving order: VIJAY Tapia    Verbal order given to: Elizabet Shepard RN

## 2021-06-08 NOTE — TELEPHONE ENCOUNTER
Concerned friend calling about patient that is staying in her home.    She has been helping her soak her foot in epsom salts for her daily.  The sore one her right heal is not healing. She is diabetic.    She is calling to make sure pcp knows the sore is bad, covers her whole right heal.  Loose skin on it.    She feels patient is eating a lot and not taking care of her diabetes either.  Eating large proportions still when at her home.  Patient is suppose to live with her 96 yo mother who no longer can care for Jazmin.    Caller is getting trouble with her landlord over Jamzin staying at her home overnight too much and not being on the lease.    She has a phone visit tomorrow with pcp.    Amanda Baez RN [facility] Triage Nurse Advisor

## 2021-06-08 NOTE — TELEPHONE ENCOUNTER
All 3 rxs keyed up and sent to Dr Flores to sign.  LMOM that these will be sent  Julienne Russo CMA

## 2021-06-08 NOTE — PROGRESS NOTES
"Jazmin Bauman is a 65 y.o. female who is being evaluated via a billable telephone visit.      The patient has been notified of following:     \"This telephone visit will be conducted via a call between you and your physician/provider. We have found that certain health care needs can be provided without the need for a physical exam.  This service lets us provide the care you need with a short phone conversation.  If a prescription is necessary we can send it directly to your pharmacy.  If lab work is needed we can place an order for that and you can then stop by our lab to have the test done at a later time.    Telephone visits are billed at different rates depending on your insurance coverage. During this emergency period, for some insurers they may be billed the same as an in-person visit.  Please reach out to your insurance provider with any questions.    If during the course of the call the physician/provider feels a telephone visit is not appropriate, you will not be charged for this service.\"    Patient has given verbal consent to a Telephone visit? Yes    What phone number would you like to be contacted at? [phone]    Patient would like to receive their AVS by AVS Preference: Yolanda.    Additional provider notes: Diabetes mellitus type 2 with insulin resistance and super morbid obesity.    The patient has skin infections sounds like cellulitis and/or diabetic foot ulcers that have become secondarily infected.  Mupirocin will be ordered plus cephalexin multiple drug allergies are noted in the chart but none to cephalexin or penicillin.    No blood in stool or urine no chest pain shortness of breath.    Medicines reviewed and reconciled.    Assessment/Plan:  Diabetes mellitus type 2 has not been checking blood sugars of late.    Diabetic foot ulcer with multiple areas of cellulitis infection rule out pregangrenous changes general surgery consult ordered plus I did asked the patient to start cephalexin " 500 mg 4 times a day for 10-day.  Plus mupirocin ointment as a topical ointment to use to infected areas that are open 2 or 3 times a day a thin layer.  Call in 1 week's time regarding status.      Phone call duration:  21 minutes    India Coreas, [facility]

## 2021-06-08 NOTE — TELEPHONE ENCOUNTER
Called and notified patient- states she can figure out who the original prescriber is and ask them.

## 2021-06-08 NOTE — TELEPHONE ENCOUNTER
"She is complaining that this medication \"burns going down\" and wants to know if there is an alternative?   "

## 2021-06-08 NOTE — TELEPHONE ENCOUNTER
I am not certain if there is an alternative medication for this.  I am not certain I even wrote this for the patient originally.  Please check with the original prescribing doctor if there is a alternative.

## 2021-06-08 NOTE — TELEPHONE ENCOUNTER
Left message to call back for: Jazmin (?)   Information to relay to patient:  See message from PCP

## 2021-06-08 NOTE — TELEPHONE ENCOUNTER
Medication Request  Medication name: metoprolol tartrate 25 mg - take twice a day  Medication name: gabapentin 300 mg - take twice a day  Medication name:  patiromer calcium sorbitex (VELTESSA) 8.4 gram PwPk powder packet   Requested Pharmacy: [facility]  Reason for request: Patient stated she was in a nursing home and now she is out of refills. Patient stated she would like to pick these up tomorrow.  When did you use medication last?:  This morning  Patient offered appointment:  yes, patient stated she has an appointment with Lester Flores MD next week already.  Okay to leave a detailed message: yes [phone]

## 2021-06-08 NOTE — TELEPHONE ENCOUNTER
Medication Question or Clarification    Who is calling: Patient    What medication are you calling about (include dose and sig)?:   patiromer calcium sorbitex (VELTESSA) 8.4 gram PwPk powder packet  90 packet  03  5/11/2020      Sig - Route: Take 1 packet (8.4 g total) by mouth daily        Who prescribed the medication?: PCP    What is your question/concern?:   Requesting an alternative med.  States it burns going down.    Requested Pharmacy: [id number] [place] [id number] [address]      Okay to leave a detailed message?: Yes    Please send script to patients pharmacy and inform the patient of the outcome to this request.

## 2021-06-08 NOTE — TELEPHONE ENCOUNTER
RN cannot approve Refill Request    RN can NOT refill this medication med is not covered by policy/route to provider. Last office visit: 2/27/2020 Lester Flores MD Last Physical: 11/22/2019 Last MTM visit: Visit date not found Last visit same specialty: 2/27/2020 Lester Flores MD.  Next visit within 3 mo: Visit date not found  Next physical within 3 mo: Visit date not found      Luh Cooley, [facility] Triage/Med Refill 5/30/2020    Requested Prescriptions   Pending Prescriptions Disp Refills     warfarin ANTICOAGULANT (COUMADIN/JANTOVEN) 1 MG tablet [Pharmacy Med Name: Warfarin Sodium Oral Tablet 1 MG] 100 tablet 0     Sig: take 4 tablets (4mg) on Tues, Thurs, and Sat. take 3 & 1/2 (three and a half) tablets (3.5mg) other days per week.       Warfarin Refill Protocol  Failed - 5/28/2020 10:40 AM        Failed -  Route to appropriate pool/provider     Last Anticoagulation Summary:           Passed - Provider visit in last year     Last office visit with prescriber/PCP: 2/27/2020 Lester Flores MD OR same dept: 2/27/2020 Lester Flores MD OR same specialty: 2/27/2020 Lester Flores MD  Last physical: 11/22/2019 Last MTM visit: Visit date not found    Next appt within 3 mo: Visit date not found Next physical within 3 mo: Visit date not found  Prescriber OR PCP: Lester Flores MD  Last diagnosis associated with med order: 1. Diabetes (H)  - warfarin ANTICOAGULANT (COUMADIN/JANTOVEN) 1 MG tablet [Pharmacy Med Name: Warfarin Sodium Oral Tablet 1 MG]; take 4 tablets (4mg) on Tues, Thurs, and Sat. take 3 & 1/2 (three and a half) tablets (3.5mg) other days per week.  Dispense: 100 tablet; Refill: 0    If protocol passes may refill for 6 months if within 3 months of last provider visit (or a total of 9 months).

## 2021-06-08 NOTE — ANESTHESIA PROCEDURE NOTES
Peripheral Block    Patient location during procedure: pre-op  Start time: 6/18/2020 8:56 AM  End time: 6/18/2020 9:02 AM  post-op analgesia per surgeon order as noted in medical record  Staffing:  Performing  Anesthesiologist: Camacho Antonio MD  Preanesthetic Checklist  Completed: patient identified, site marked, risks, benefits, and alternatives discussed, timeout performed, consent obtained, airway assessed, oxygen available, suction available, emergency drugs available and hand hygiene performed  Peripheral Block  Block type: sciatic, popliteal  Prep: ChloraPrep  Patient position: supine  Patient monitoring: cardiac monitor, continuous pulse oximetry, heart rate and blood pressure  Laterality: right  Injection technique: ultrasound guided    Ultrasound used to visualize needle placement in proximity to nerve being blocked: yes   US used to visualize anesthetic spread  Visualized anatomic structures normal  No Pathological Findings  Permanent ultrasound image captured for medical record  Sterile gel and probe cover used for ultrasound.  Needle  Needle type: Stimuplex   Needle gauge: 20G  Needle length: 6 in  no peripheral nerve catheter placed  Assessment  Injection assessment: no difficulty with injection, negative aspiration for heme, no paresthesia on injection and incremental injection  Additional Notes  This was a challenging block given obesity and anatomy. I did the best I could to scroll up leg and get peroneal branch in addition to the tibial nerve branch, but anatomy was deep, and the patient had significant trouble keeping her leg in neutral position. She preferred to have her leg significantly externally rotated which made it difficult.

## 2021-06-08 NOTE — ANESTHESIA PREPROCEDURE EVALUATION
Anesthesia Evaluation      Patient summary reviewed   No history of anesthetic complications (tolerated procedure under MAC previously - fent/versed/propofol gtt 80-100mcg/kg/min)     Airway   Mallampati: III  Neck ROM: full   Pulmonary     breath sounds clear to auscultation  (-) not a smokerSleep apnea: CRISTINA risk factors.                         Cardiovascular   Exercise tolerance: < 4 METS (Able to do stairs  Ambulates with canes)  (+) hypertension, ,     ECG reviewed  Rhythm: regular     ROS comment: 6/2020 TTE  Summary     Normal left ventricular size and systolic function.  Left ventricle ejection fraction is normal. The calculated left ventricular ejection fraction is 61%.  Normal right ventricular size and systolic function.  No hemodynamically significant valvular heart abnormalities.  When compared to the previous study dated 1/21/2020, normal RV function on current study         Neuro/Psych    (+) neuromuscular disease (fibromyalgia),      Endo/Other    (+) diabetes mellitus (A1c 11) poorly controlled using insulin, arthritis, obesity (BMI 57),      GI/Hepatic/Renal    (+)   chronic renal disease,   (-) GERD     Other findings:   Results for KATI HERNANDES (MRN [id number]) as of 6/18/2020 08:30    6/18/2020 05:13  WBC: 11.8 (H)  RBC: 3.05 (L)  Hemoglobin: 8.7 (L)  Hematocrit: 28.4 (L)  MCV: 93  MCH: 28.5  MCHC: 30.6 (L)  RDW: 14.4  Platelets: 363    NPO > 8 hrs      Thrombocytopenia - resolved    Fibromyalgia    Carpal Tunnel Syndrome    Urinary Tract Infection    Endometrial Hyperplasia    Cholelithiasis    Leukocytosis    Urine Tests Nonspecific Abnormal Findings    Obesity    Essential Hypertension    Pernicious Anemia    Immunology Studies Raised Immunoglobulin Level    Vaginal bleeding    Type 2 diabetes mellitus (H)          Dental    (+) poor dentition    Comment: Multiple missing and chipped teeth                         Anesthesia Plan  Planned anesthetic: general  endotracheal  GAETT  Antiemetics  Pop/saph block for POP    ASA 3   Induction: intravenous   Anesthetic plan and risks discussed with: patient  Anesthesia plan special considerations: antiemetics,   Post-op plan: routine recovery

## 2021-06-09 NOTE — TELEPHONE ENCOUNTER
[facility] Nurse Triage Anticoagulation Note      Provider: VIJAY Vargas  Facility: [facility]    Facility Type: TCU    Caller: Carolyn  Call Back Number:  [phone]    Reason for call: INR    Today s INR: 3.1  Previous INR: 7/14 2.3(5mg daily), 7/13 2.3(5mg), 7/7 2.5(5mg daily).      Diagnosis/Goal: AFIB  Heparin/Lovenox: No   Currently on ABX: Yes; Cipro x 3 days for UTI  Other interacting Medications: Other: ASA 81mg daily  Missed or refused doses: No      Nurse also reporting new UA result collected yesterday:  Clarity-turbid, blood-small, leukocyte esterase-large, WBC-41, Bacteria-occasional.      Verbal Order/Direction given by Provider: Decrease Warfarin to 3mg daily.  Next INR 7/24/20.  Call with UC and sensitivities.      Provider giving order: VIJAY Vargas    Verbal order given to: Carolyn Jacobsen RN

## 2021-06-09 NOTE — PROGRESS NOTES
"  [facility]      NAME:  Jazmin Bauman             :  1955    MRN: [id number]    CODE STATUS:  FULL CODE    FACILITY: [facility] [[id number]]         CHIEF COMPLAIN/REASON FOR VISIT:  Chief Complaint   Patient presents with     Review Of Multiple Medical Conditions     urinary retention       HISTORY OF PRESENT ILLNESS: Jazmin Bauman is a 65 y.o. female being seen for review of multiple medical conditions. Seen in her room , she has been moved to a LTC but remains skilled at this time.  Pt was at [facility] from  to  and underwent a RBKA r/t ongoing DM ulcer with osteomyelitis.Per her EMR dc summary \" with HTN,morbid obesity, DM, A fib, CKD 4 presented for evaluation of R foot swelling, difficulty ambulation, pain found to have osteomyelitis now s/p amputation. She has now been to the OR twice this stay, last was on 20.   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   Patient had a chronic diabetic foot ulcer on her R heel, presented for increased swelling, difficulty ambulating, and pain. MRI showed extensive soft tissue necrosis and some osteomyelitis along with cellulitis  Met sepsis criteria on admission with leukocytosis and elevated CRP  Podiatry saw and the foot was not felt to be salvageable and BKA was recommended, -now POD #9  From guillotine amputation by Dr Crocoran. POD #5 revision of stump.  Treated with zosyn /vanvo, blood culture positive for Peptostreptococcus and Streptococcus, changing to Augmentin for 4 more days.  WBC now normal.  Appreciate ID consultation.  Continue home gabapentin and has PRN dilaudid as well.  Pain team following.     Acute on chronic blood loss anemia-likely from anemia chronic disease, iron deficiency, chronic kidney disease, surgery.  needed pRBC on --stable at 7.9.  Iron deficient, nephrology started IV iron.  Discharged on oral iron     Gram-positive " bacteremia x2 species  Patient with positive blood culture from 6/16 growing Strep anginosus and Peptostreptococcus  Changed from Zosyn to Augmentin.     Left abdominal wall cellulitis,  On antibiotics as above without improvement, surg did debridement too.  Ultrasound showed no abscess.  ID and surgery doubts calciphylaxis, minimal pain, no significant necrosis though she does have risk factors.  Does not need biopsy at this point, continue current wound care and antibiotics.    Discussed with surgery, nephrology and ID.  Needs close follow-up at the TCU     Chronic right abdominal wound-no signs of worsening, small scab, no induration or signs of abscess or fluctuance.  Has been present for months.  Wound care following, continue to monitor.  Doubt calciphylaxis Will need to follow-up with nephrology as outpatient, holding on thiosulfate here.     Insulin-dependent diabetes  Poorly controlled with A1c 10.3  Had some hypoglycemia, Lantus dose decreased to 10 units, discharged on NovoLog 4 units pre-meal and sliding scale with better control.  Suspect she was noncompliant with her insulin as her A1c was so and she did not require what her med rec stated for her insulin.       Essential hypertension  Elevated, asymptomatic, continued metoprolol, added furosemide and amlodipine with improved blood pressure control.      CKD 4  -Home PhosLo  -Avoid nephrotoxins  -Follows with Dr Frost, with concern of possible Calciphylaxis, renal seeing here  Creatinine stable.  Started furosemide.       HyperK  On admission  -got kayexalate  -This is chronic intermittent problem for her  -low K diet   Potassium normal at discharge     Paroxysmal atrial fibrillation.  Held warfarin for surgery.  Restarted after surgery.  Pharmacy dosing.  Continue home metoprolol.  INR still subtherapeutic.  Recheck tomorrow     Coronary artery disease  She had a detectable troponin level when hospitalized back in January and outpatient stress test  "with a 2-day protocol was recommended but she never followed up  Cardiology saw and recommend outpatient stress test, but ok with surgery  Echocardiogram showed no wall motion abnormality and a normal EF  -also needs outpt CRISTINA eval  Continue metoprolol and aspirin.     Morbid obesity  Affecting her mobility and her ability to live independently as well as her overall health  Cardiology also recommend a sleep evaluation for probable CRISTINA     Urinary retention-Yegaer placed due to retaining 900 cc of urine, trial of Yeager removal at the TCU in 1 week, follow-up with [facility] as an outpatient if she fails trial of removal.\"     On coumadin for Afib, no excessive bleeding or bruising we dosed her coumadin at 5 mg po daily with recheck scheduled in a week. She now has a yeager in place due to retention, urine is clear and she denies burning, a UA is outstanding.  I was able to look at her right BKA stump and this is well healed. Jazmin reports some frustration due to her therapy progress. She is still having difficulties with pivot transfers, her left leg is weak and knee will buckle, she uses the EZ stand for transfers.    Allergies   Allergen Reactions     Hydralazine      Paralysis of legs     Latex Itching     Skin Burns     Naprosyn [Naproxen] Swelling     throat     Nitrofurantoin Hives     Sulfa (Sulfonamide Antibiotics) Rash     Vicks Vaporub [Camphor-Eucalyptus Oil-Menthol] Rash   :     Current Outpatient Medications   Medication Sig     acetaminophen (TYLENOL) 500 MG tablet Take 2 tablets (1,000 mg total) by mouth every 6 (six) hours as needed for pain or fever.     amLODIPine (NORVASC) 10 MG tablet Take 1 tablet (10 mg total) by mouth daily. (Patient taking differently: Take 10 mg by mouth daily. Hold for systolic blood pressure less than 105)     aspirin 81 MG EC tablet Take 81 mg by mouth daily.     BD ULTRA-FINE MICRO PEN NEEDLE 32 gauge x 1/4\" Ndle USE AS DIRECTED 4 TIMES DAILY.     blood glucose test " (ACCU-CHEK SAM PLUS TEST STRP) strips TEST 1 TIME A DAY     blood glucose test (ACCU-CHEK SAM PLUS TEST STRP) strips test blood sugar level 6 times daily     calcium acetate,phosphat bind, (PHOSLO) 667 mg capsule Take 1 capsule (667 mg total) by mouth 3 (three) times a day with meals.     cholecalciferol, vitamin D3, (VITAMIN D3) 1,000 unit capsule Take 1,000 Units by mouth daily.     collagenase ointment Apply daily per WOC     ferrous sulfate 325 (65 FE) MG tablet Take 1 tablet by mouth 2 (two) times a day with meals.     furosemide (LASIX) 20 MG tablet Take 1 tablet (20 mg total) by mouth daily.     gabapentin (NEURONTIN) 400 MG capsule Take 1 capsule (400 mg total) by mouth 3 (three) times a day.     HYDROmorphone (DILAUDID) 2 MG tablet Take 1 tablet (2 mg total) by mouth every 4 (four) hours as needed.     insulin glargine (BASAGLAR KWIKPEN) 100 unit/mL (3 mL) pen Inject 10 Units under the skin at bedtime. (Patient taking differently: Inject 20 Units under the skin at bedtime. )     lancets (ACCU-CHEK MULTICLIX LANCET) Misc TEST 6 TIMES A DAY     magnesium 250 mg Tab Take 500 mg by mouth 2 (two) times a day.      metoprolol tartrate (LOPRESSOR) 25 MG tablet Take 1 tablet (25 mg total) by mouth 2 (two) times a day. (Patient taking differently: Take 25 mg by mouth 2 (two) times a day. Hold for systolic blood pressure less than 105)     NOVOLOG FLEXPEN U-100 INSULIN 100 unit/mL (3 mL) injection pen Check blood sugar four (4) times daily.  11. (Patient taking differently: Inject 10 Units under the skin 3 (three) times a day before meals. 10 unit three times a day with meals     PLUS:      if 141 - 180 = 1 unit;   181 - 220 = 2 units;  Plus sliding scale   221 - 260 = 3 units;   261 - 300 = 4 units;   301 - 340 = 5 units;   341 - 400 = 6 units;   401 - 999 = 7 units,)     NOVOLOG FLEXPEN U-100 INSULIN 100 unit/mL (3 mL) injection pen Check blood sugar four (4) times daily.  {     omeprazole (PRILOSEC) 20 MG  capsule Take 20 mg by mouth.     senna-docusate (PERICOLACE) 8.6-50 mg tablet Take 1 tablet by mouth 2 (two) times a day.     sodium bicarbonate 650 MG tablet Take 1 tablet (650 mg total) by mouth 2 (two) times a day. OTC product     warfarin sodium (WARFARIN ORAL) Take by mouth. 7/21/20 INR 3.1  Take 3mg daily.  Next INR 7/24/20.(currently on Cipro x 3 days)    7/14/20 INR 2.3  Cont 5mg daily.  Next INR 7/21/20.  7/13/20 INR 2.3  Take 5mg.  Next INR 7/14/20.  7/7/20 INR 2.5  Cont 5mg daily.  Next INR 7/10/20.         REVIEW OF SYSTEMS:    Currently, no fever, chills, or rigors. Does not have any visual or hearing problems. Denies any chest pain, headaches, palpitations, lightheadedness, dizziness, shortness of breath, or cough. Appetite is good. Denies any GERD symptoms. Denies any difficulty with swallowing, nausea, or vomiting.  Denies any abdominal pain, diarrhea or constipation. Denies any urinary symptoms, yeager in place. No insomnia. No active bleeding. No rash.       PHYSICAL EXAMINATION:  Vitals:    07/22/20 1911   BP: 129/88   Pulse: 70   Temp: 97.1  F (36.2  C)   Weight: (!) 259 lb (117.5 kg)         GENERAL: Awake, Alert, oriented x3, not in any form of acute distress, answers questions appropriately, follows simple commands, conversant with flat affect  HEENT: Head is normocephalic with normal hair distribution. No evidence of trauma. Ears: No acute purulent discharge. Eyes: Conjunctivae pink with no scleral jaundice. Nose: Normal mucosa and septum. NECK: Supple with no cervical or supraclavicular lymphadenopathy. Trachea is midline.   CHEST: No tenderness or deformity, no crepitus  LUNG: Clear to auscultation with good chest expansion. There DM BS  BACK: No kyphosis of the thoracic spine. Symmetric, no curvature, ROM normal, no CVA tenderness, no spinal tenderness   CVS: There is good S1  S2,  rhythm is regular.  ABDOMEN: Globular and ROTUND tender to palpation, non distended, no masses, no  organomegaly, good bowel sounds, no rebound or guarding, no peritoneal signs. Wound to left side of abdomen, dressed today, no open area to left hip as well. Unable to visualize as they are dressed and she is fully clothed. Followed by wound   EXTREMITIES: UE Full ROM. Right BKA, IN a  with protected brace  SKIN: Warm and dry, no erythema noted, open area with dressing on abdomen  NEUROLOGICAL: The patient is oriented to person, place and time. Strength and sensation are grossly intact. Face is symmetric.          LABS:    Lab Results   Component Value Date    WBC 9.5 06/27/2020    HGB 7.9 (L) 06/27/2020    HCT 25.5 (L) 06/27/2020    MCV 95 06/27/2020     06/27/2020       Results for orders placed or performed during the hospital encounter of 06/16/20   Basic Metabolic Panel   Result Value Ref Range    Sodium 141 136 - 145 mmol/L    Potassium 4.2 3.5 - 5.0 mmol/L    Chloride 107 98 - 107 mmol/L    CO2 27 22 - 31 mmol/L    Anion Gap, Calculation 7 5 - 18 mmol/L    Glucose 116 70 - 125 mg/dL    Calcium 8.8 8.5 - 10.5 mg/dL    BUN 30 (H) 8 - 22 mg/dL    Creatinine 1.57 (H) 0.60 - 1.10 mg/dL    GFR MDRD Af Amer 40 (L) >60 mL/min/1.73m2    GFR MDRD Non Af Amer 33 (L) >60 mL/min/1.73m2           Lab Results   Component Value Date    HGBA1C 10.3 (H) 06/17/2020     Vitamin D, Total (25-Hydroxy)   Date Value Ref Range Status   04/28/2016 29.8 (L) 30.0 - 80.0 ng/mL Final     Lab Results   Component Value Date    FKVICGBJ90 285 01/07/2011       ASSESSMENT/PLAN:  1. Urinary retention    2. Weakness      1.. RBKA: Reports pain stable during visit. Unable to assess actual stump, in  and brace. Attend therapies as needed PT/OT. SN for chronic medical conditions management. Pt goal isto walk, working on transfers out of bed at this time, using mechanical lift.Coumadin dosing completed, 5 mg po daily, next week we will recheck INR.    2. Urinary retenton: [place] has been reinserted. Outstanding UA, awaiting  results..       Electronically signed by:  Azalea Kevin CNP  This progress note was completed using Dragon software and there may be grammatical errors.      .

## 2021-06-09 NOTE — ANESTHESIA PROCEDURE NOTES
Peripheral Block    Patient location during procedure: pre-op  Start time: 6/23/2020 2:43 PM  End time: 6/23/2020 2:47 PM  post-op analgesia per surgeon order as noted in medical record  Staffing:  Performing  Anesthesiologist: Vane Mcarthur MD  Preanesthetic Checklist  Completed: patient identified, site marked, risks, benefits, and alternatives discussed, timeout performed, consent obtained, airway assessed, oxygen available, suction available, emergency drugs available and hand hygiene performed  Peripheral Block  Block type: sciatic, popliteal  Prep: ChloraPrep  Patient position: supine  Patient monitoring: cardiac monitor, continuous pulse oximetry, heart rate and blood pressure  Laterality: right  Injection technique: ultrasound guided    Ultrasound used to visualize needle placement in proximity to nerve being blocked: yes   US used to visualize anesthetic spread  Visualized anatomic structures normal  No Pathological Findings  Permanent ultrasound image captured for medical record  Sterile gel and probe cover used for ultrasound.  Needle  Needle type: echogenic   Needle gauge: 20G  Needle length: 4 in  no peripheral nerve catheter placed  Assessment  Injection assessment: no difficulty with injection, negative aspiration for heme, no paresthesia on injection and incremental injection  Additional Notes    Vane Mcarthur MD  Anesthesiologist  [facility]

## 2021-06-09 NOTE — TELEPHONE ENCOUNTER
[facility] Nurse Triage Anticoagulation Note      Provider: Harry Blackwood MD   Facility: [facility]    Facility Type: TCU    Caller: Reina Fox Back Number:  [phone]    Reason for call: INR    Today s INR: 2.5  Previous INR: 6/30 1.7(5mg daily)    Diagnosis/Goal: AFIB  Heparin/Lovenox: No   Currently on ABX: No  Other interacting Medications: Other: ASA 81mg daily  Missed or refused doses: No    Nurse also reporting Heme 2 and BMP results:  WBC-10.9(prev 9.7 on 6/30), RBC-3.59, Hgb-10.3(prev 8.9 on 6/30), Hct-34.4, MCV-95.8, PLT-391.  BMP:  Sodium-135, potassium 4.7, Cl-98, CO2-28, Glu-212, BUN-29(prev 26 on 6/30), creat-1.78(prev 1.52 on 6/30), GFR 30(prev 41 on 6/30), calcium 9.9.      Notable meds:  Ferrous sulfate 325mg two times a day, Norvasc 10mg daily, Lasix 20mg daily, Metoprolol 25mg two times a day.  Patient has an order to follow up with nephrology(Dr. Frost), but that appointment has not been set up yet.  Staff at TCU say they'll set up that appointment tomorrow.      Verbal Order/Direction given by Provider: Continue Warfarin 5mg daily.  Check INR and BMP on 7/10/20.      Provider giving order: Harry Blackwood MD     Verbal order given to: Reina Jacobsen, RN

## 2021-06-09 NOTE — TELEPHONE ENCOUNTER
[facility] Nurse Triage Anticoagulation Note      Provider: Harry Blackwood MD   Facility: [facility]    Facility Type: LT    Caller: Yesenia  Call Back Number:  [phone]    Reason for call: INR    Today s INR: 2.4   Previous INR: 7/21 3mg daily     Diagnosis/Goal: AFIB  Heparin/Lovenox: No   Currently on ABX: No  Other interacting Medications: None  Missed or refused doses: No    BMP- , K+4.7, BUN 53, Creat 2.14, GFR 26,CA 10.9      Verbal Order/Direction given by Provider: 4mg M,W,F and 3mg AOD. Next INR 7/28 and BMP      Provider giving order: Harry Blackwood MD     Verbal order given to: Karina Cartagena RN

## 2021-06-09 NOTE — TELEPHONE ENCOUNTER
[facility] Nurse Triage Anticoagulation Note      Provider: VIJAY Vargas  Facility: [facility]    Facility Type: TCU    Caller: James  Call Back Number:  [phone]    Reason for call: INR    Today s INR: 1.7  Previous INR: 6/28 1.64 5mg daily     Diagnosis/Goal: AFIB  Heparin/Lovenox: No   Currently on ABX: Yes  Other interacting Medications: None  Missed or refused doses: No    Mag- 1.8, CBC- Hgb 8.9, WBC 9.7, PLT-415  BMP- Creat 1.52, BUN 26, , K+ 4.2 GFR 41  Verbal Order/Direction given by Provider: 5mg daily Next INR 1 week    Provider giving order: VIJAY Vargas    Verbal order given to: James Cartagena RN

## 2021-06-09 NOTE — PROGRESS NOTES
"  [facility]      NAME:  Jazmin Bauman             :  1955    MRN: [id number]    CODE STATUS:  FULL CODE    FACILITY: [facility] [[id number]]         CHIEF COMPLAIN/REASON FOR VISIT:  Chief Complaint   Patient presents with     Review Of Multiple Medical Conditions     weakness       HISTORY OF PRESENT ILLNESS: Jazmin Bauman is a 65 y.o. female being seen for review of multiple medical conditions. Seen in her room after breakfast this am.  Pt was at [facility] from  to  and underwent a RBKA r/t ongoing DM ulcer with osteomyelitis.Per her EMR dc summary \" with HTN,morbid obesity, DM, A fib, CKD 4 presented for evaluation of R foot swelling, difficulty ambulation, pain found to have osteomyelitis now s/p amputation. She has now been to the OR twice this stay, last was on 20.   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   Patient had a chronic diabetic foot ulcer on her R heel, presented for increased swelling, difficulty ambulating, and pain. MRI showed extensive soft tissue necrosis and some osteomyelitis along with cellulitis  Met sepsis criteria on admission with leukocytosis and elevated CRP  Podiatry saw and the foot was not felt to be salvageable and BKA was recommended, -now POD #9  From guillotine amputation by Dr Corcoran. POD #5 revision of stump.  Treated with zosyn /vanvo, blood culture positive for Peptostreptococcus and Streptococcus, changing to Augmentin for 4 more days.  WBC now normal.  Appreciate ID consultation.  Continue home gabapentin and has PRN dilaudid as well.  Pain team following.     Acute on chronic blood loss anemia-likely from anemia chronic disease, iron deficiency, chronic kidney disease, surgery.  needed pRBC on --stable at 7.9.  Iron deficient, nephrology started IV iron.  Discharged on oral iron     Gram-positive bacteremia x2 species  Patient with positive blood " culture from 6/16 growing Strep anginosus and Peptostreptococcus  Changed from Zosyn to Augmentin.     Left abdominal wall cellulitis,  On antibiotics as above without improvement, surg did debridement too.  Ultrasound showed no abscess.  ID and surgery doubts calciphylaxis, minimal pain, no significant necrosis though she does have risk factors.  Does not need biopsy at this point, continue current wound care and antibiotics.    Discussed with surgery, nephrology and ID.  Needs close follow-up at the TCU     Chronic right abdominal wound-no signs of worsening, small scab, no induration or signs of abscess or fluctuance.  Has been present for months.  Wound care following, continue to monitor.  Doubt calciphylaxis Will need to follow-up with nephrology as outpatient, holding on thiosulfate here.     Insulin-dependent diabetes  Poorly controlled with A1c 10.3  Had some hypoglycemia, Lantus dose decreased to 10 units, discharged on NovoLog 4 units pre-meal and sliding scale with better control.  Suspect she was noncompliant with her insulin as her A1c was so and she did not require what her med rec stated for her insulin.       Essential hypertension  Elevated, asymptomatic, continued metoprolol, added furosemide and amlodipine with improved blood pressure control.      CKD 4  -Home PhosLo  -Avoid nephrotoxins  -Follows with Dr Frost, with concern of possible Calciphylaxis, renal seeing here  Creatinine stable.  Started furosemide.       HyperK  On admission  -got kayexalate  -This is chronic intermittent problem for her  -low K diet   Potassium normal at discharge     Paroxysmal atrial fibrillation.  Held warfarin for surgery.  Restarted after surgery.  Pharmacy dosing.  Continue home metoprolol.  INR still subtherapeutic.  Recheck tomorrow     Coronary artery disease  She had a detectable troponin level when hospitalized back in January and outpatient stress test with a 2-day protocol was recommended but she never  "followed up  Cardiology saw and recommend outpatient stress test, but ok with surgery  Echocardiogram showed no wall motion abnormality and a normal EF  -also needs outpt CRISTINA eval  Continue metoprolol and aspirin.     Morbid obesity  Affecting her mobility and her ability to live independently as well as her overall health  Cardiology also recommend a sleep evaluation for probable CRISTINA     Urinary retention-Canchola placed due to retaining 900 cc of urine, trial of Canchola removal at the TCU in 1 week, follow-up with [facility] as an outpatient if she fails trial of removal.\"   On coumadin for Afib, no excessive bleeding or bruising observed. INR draw today, no excessive bleeding or bruising is observed.     Allergies   Allergen Reactions     Hydralazine      Paralysis of legs     Latex Itching     Skin Burns     Naprosyn [Naproxen] Swelling     throat     Nitrofurantoin Hives     Sulfa (Sulfonamide Antibiotics) Rash     Vicks Vaporub [Camphor-Eucalyptus Oil-Menthol] Rash   :     Current Outpatient Medications   Medication Sig     acetaminophen (TYLENOL) 500 MG tablet Take 2 tablets (1,000 mg total) by mouth every 6 (six) hours as needed for pain or fever.     amLODIPine (NORVASC) 10 MG tablet Take 1 tablet (10 mg total) by mouth daily.     aspirin 81 MG EC tablet Take 81 mg by mouth daily.     BD ULTRA-FINE MICRO PEN NEEDLE 32 gauge x 1/4\" Ndle USE AS DIRECTED 4 TIMES DAILY.     blood glucose test (ACCU-CHEK SAM PLUS TEST STRP) strips TEST 1 TIME A DAY     blood glucose test (ACCU-CHEK SAM PLUS TEST STRP) strips test blood sugar level 6 times daily     calcium acetate,phosphat bind, (PHOSLO) 667 mg capsule Take 1 capsule (667 mg total) by mouth 3 (three) times a day with meals.     cholecalciferol, vitamin D3, (VITAMIN D3) 1,000 unit capsule Take 1,000 Units by mouth daily.     collagenase ointment Apply daily per WOC     ferrous sulfate 325 (65 FE) MG tablet Take 1 tablet by mouth 2 (two) times a day with meals. "     furosemide (LASIX) 20 MG tablet Take 1 tablet (20 mg total) by mouth daily.     gabapentin (NEURONTIN) 400 MG capsule Take 1 capsule (400 mg total) by mouth 3 (three) times a day.     HYDROmorphone (DILAUDID) 2 MG tablet Take 1 tablet (2 mg total) by mouth every 4 (four) hours as needed.     insulin glargine (BASAGLAR KWIKPEN) 100 unit/mL (3 mL) pen Inject 10 Units under the skin at bedtime.     lancets (ACCU-CHEK MULTICLIX LANCET) Misc TEST 6 TIMES A DAY     magnesium 250 mg Tab Take 500 mg by mouth 2 (two) times a day.      metoprolol tartrate (LOPRESSOR) 25 MG tablet Take 1 tablet (25 mg total) by mouth 2 (two) times a day.     NOVOLOG FLEXPEN U-100 INSULIN 100 unit/mL (3 mL) injection pen Check blood sugar four (4) times daily.  11. (Patient taking differently: Inject 4 Units under the skin 3 (three) times a day before meals. )     NOVOLOG FLEXPEN U-100 INSULIN 100 unit/mL (3 mL) injection pen Check blood sugar four (4) times daily.  {     senna-docusate (PERICOLACE) 8.6-50 mg tablet Take 1 tablet by mouth 2 (two) times a day.     sodium bicarbonate 650 MG tablet Take 1 tablet (650 mg total) by mouth 2 (two) times a day. OTC product     warfarin sodium (WARFARIN ORAL) Take by mouth. 7/7/20 INR 2.5  Cont 5mg daily.  Next INR 7/10/20.         REVIEW OF SYSTEMS:    Currently, no fever, chills, or rigors. Does not have any visual or hearing problems. Denies any chest pain, headaches, palpitations, lightheadedness, dizziness, shortness of breath, or cough. Appetite is good. Denies any GERD symptoms. Denies any difficulty with swallowing, nausea, or vomiting.  Denies any abdominal pain, diarrhea or constipation. Denies any urinary symptoms. No insomnia. No active bleeding. No rash.       PHYSICAL EXAMINATION:  Vitals:    07/08/20 0800   BP: 135/60   Pulse: 63   Temp: 98.2  F (36.8  C)   Weight: (!) 273 lb 6.4 oz (124 kg)         GENERAL: Awake, Alert, oriented x3, not in any form of acute distress, answers  questions appropriately, follows simple commands, conversant with flat affect  HEENT: Head is normocephalic with normal hair distribution. No evidence of trauma. Ears: No acute purulent discharge. Eyes: Conjunctivae pink with no scleral jaundice. Nose: Normal mucosa and septum. NECK: Supple with no cervical or supraclavicular lymphadenopathy. Trachea is midline.   CHEST: No tenderness or deformity, no crepitus  LUNG: Clear to auscultation with good chest expansion. There DM BS  BACK: No kyphosis of the thoracic spine. Symmetric, no curvature, ROM normal, no CVA tenderness, no spinal tenderness   CVS: There is good S1  S2,  rhythm is regular.  ABDOMEN: Globular and ROTUND tender to palpation, non distended, no masses, no organomegaly, good bowel sounds, no rebound or guarding, no peritoneal signs. Wound to left side of abdomen, upper 2 o'clock with tunneling, yellow slough to bed. Followed by wound dr at facility.  EXTREMITIES: UE Full ROM. Right BKA, IN a  with protected brace  SKIN: Warm and dry, no erythema noted, open area with dressing on abdomen  NEUROLOGICAL: The patient is oriented to person, place and time. Strength and sensation are grossly intact. Face is symmetric.          LABS:    Lab Results   Component Value Date    WBC 9.5 06/27/2020    HGB 7.9 (L) 06/27/2020    HCT 25.5 (L) 06/27/2020    MCV 95 06/27/2020     06/27/2020       Results for orders placed or performed during the hospital encounter of 06/16/20   Basic Metabolic Panel   Result Value Ref Range    Sodium 141 136 - 145 mmol/L    Potassium 4.2 3.5 - 5.0 mmol/L    Chloride 107 98 - 107 mmol/L    CO2 27 22 - 31 mmol/L    Anion Gap, Calculation 7 5 - 18 mmol/L    Glucose 116 70 - 125 mg/dL    Calcium 8.8 8.5 - 10.5 mg/dL    BUN 30 (H) 8 - 22 mg/dL    Creatinine 1.57 (H) 0.60 - 1.10 mg/dL    GFR MDRD Af Amer 40 (L) >60 mL/min/1.73m2    GFR MDRD Non Af Amer 33 (L) >60 mL/min/1.73m2           Lab Results   Component Value Date     HGBA1C 10.3 (H) 06/17/2020     Vitamin D, Total (25-Hydroxy)   Date Value Ref Range Status   04/28/2016 29.8 (L) 30.0 - 80.0 ng/mL Final     Lab Results   Component Value Date    EMYVTIHO49 285 01/07/2011       ASSESSMENT/PLAN:  1. Hx of right BKA (H)    2. Urinary retention      1.. RBKA: Reports pain stable during visit. Unable to assess actual stump, in  and brace. Attend therapies as needed PT/OT. SN for chronic medical conditions management. Pt goal isto walk, working on transfers out of bed at this time, using mechanical lift.    2. Urinary retenton: No PVR to review,, catheter is out and pt reporting vding, however using bedpan at night due to transfer difficulties at night, upset this is not going well. Spoke to LN on duty with pt to see if pt could get up on commode to vd today and check PVR.        Electronically signed by:  Azalea Kevin CNP  This progress note was completed using Dragon software and there may be grammatical errors.

## 2021-06-09 NOTE — TELEPHONE ENCOUNTER
[facility] Nurse Triage Telephone Note      Provider: Harry Blackwood MD   Facility[facility]    Facility Type: TCU    Caller: Carolyn  Call Back Number:  [phone]    Allergies: Hydralazine; Latex; Naprosyn [naproxen]; Nitrofurantoin; Sulfa (sulfonamide antibiotics); and Vicks vaporub [camphor-eucalyptus oil-menthol]    Reason for call: Mag 2.3, , CA 11.3 (10.8), BUN 51 (49), Creat 2.13 (2.36) GFR 24 (21)  Lasix was D/C'd. Yeager reinserted 7/17, UA/UC obtained. UC: >100,000 Multiple Organisms. (Gm neg bacilli). Still C/O burning, Afeb.    Verbal Order/Direction given by Provider: Change yeager, send new UC. Check Ca , ionized Ca with INR in am.    Provider giving order: Harry Blackwood MD     Verbal order given to: Carolyn Shepard RN

## 2021-06-09 NOTE — ANESTHESIA CARE TRANSFER NOTE
Last vitals:   Vitals:    06/23/20 1706   BP: (!) 190/80   Pulse: 66   Resp: 22   Temp:    SpO2: 100%     Patient's level of consciousness is drowsy  Spontaneous respirations: yes  Maintains airway independently: yes  Dentition unchanged: yes  Oropharynx: oropharynx clear of all foreign objects    QCDR Measures:  ASA# 20 - Surgical Safety Checklist: WHO surgical safety checklist completed prior to induction    PQRS# 430 - Adult PONV Prevention: 4558F - Pt received => 2 anti-emetic agents (different classes) preop & intraop  ASA# 8 - Peds PONV Prevention: NA - Not pediatric patient, not GA or 2 or more risk factors NOT present  PQRS# 424 - Devi-op Temp Management: 4559F - At least one body temp DOCUMENTED => 35.5C or 95.9F within required timeframe  PQRS# 426 - PACU Transfer Protocol: - Transfer of care checklist used  ASA# 14 - Acute Post-op Pain: ASA14B - Patient did NOT experience pain >= 7 out of 10

## 2021-06-09 NOTE — TELEPHONE ENCOUNTER
Pt is currently resides in the TCU at [facility].   Pt has a stress test and radiology appointment.   Pt wondering if she can take her meds before the stress nuclear test.   Stress test was ordered by Dr. Rosa Zepeda, [facility] on 6/24/20.  Pt also states she cannot have dye in her body d/t her hx and meds. Triage RN does not find anything in Chart Review/notes that indicates.    8:49 PM called nurse at TCU at [facility]. Spoke to NAYAN Leon [phone]. Not aware of any meds being held.     9:01 PM Paged [facility] on call Dr. Beth Murray. Confirmed that the pharmacological nuclear stress test uses a tracer/not a dye. Reviewed meds to ensure that pt does not have med such as Persantine. Triage RN verifies, Persantine is not on current med list. Dr. Lobato advises that pt should not have caffeine for 24 hours but can have all her medications.     9:13 PM called nurse at TCU at [facility]. Spoke to NAYAN Leon, relayed on call message for pt to take all meds as scheduled tomorrow before stress test. Verbalizes understanding.     9:16 PM called and spoke with pt. Relayed above information. Pt verbalizes understanding.     Paty Kowalski RN Triage Nurse Advisor 9:18 PM

## 2021-06-09 NOTE — PROGRESS NOTES
HPI: Pt is here for follow up s/p right BKA with Dr. Corcoran on 6/23/20.   she is doing well.  Pain is well controlled:  Yes. No difficulties with the surgical wound/wounds.  she is eating well and denies fever and chills. She was in last week where a couple of the staples and sutures were removed, but the rest were left in place to allow for more healing.            EXAM:  GENERAL:Appears well  Abd: mepilex in place LLQ, no surrounding erythema, wound bed 50% slough, no areas of fluctuance, drainage, or other signs of infection  SURGICAL WOUNDS:  Incisions healing well, no induration or drainage., the rest of the staples and sutures were removed, steristrips were left in place      Assessment/Plan: she is recovering well. She should start wearing  on RLQ. Continue current wound cares at TCU for abdominal wound. Follow up as needed.      Harman Jean PA-C  [phone]  General Surgery

## 2021-06-09 NOTE — PROGRESS NOTES
HPI: Pt is here for follow up s/p right BKA with Dr. Corcoran on 6/23/20.   she is doing well.  Pain is well controlled:  Yes. No difficulties with the surgical wound/wounds.  she is eating well and denies fever and chills.        There were no vitals taken for this visit.    EXAM:  GENERAL:Appears well  SURGICAL WOUNDS:  Incisions healing well, no induration or drainage., removed 1 staple from the medial edge of the incision and the skin edges seperated slightly, the rest of the staples were left intact, I did remove 3 sutures along the incision as well that had staples in the same area.      Assessment/Plan: She is doing well overall, but would like to give her incision some more time to heal up. Will have her follow up in 1 week for possible staple and suture removal.     Harman Jean PA-C  [phone]  General Surgery

## 2021-06-09 NOTE — TELEPHONE ENCOUNTER
Received form.     Filled out and faxed form.    Diane BOJORQUEZ LPN .......... 1:45 PM  06/30/20  [facility]

## 2021-06-09 NOTE — TELEPHONE ENCOUNTER
----- Message from Rosa Zepeda MD sent at 6/19/2020  9:36 AM CDT -----  Patient that I saw in January, going home today, needs outpatient pharmacological stress nuclear in July, will need follow-up with me in August.  Can you help facilitate?LF          Noted. Pt remains in patient but stress test ordered. Msg sent to schedulers to arrange this when discharged and when stable- July and then follow up with [facility] in August. -[facility] [place]

## 2021-06-09 NOTE — PROGRESS NOTES
"  [facility]      NAME:  Jazmin Bauman             :  1955    MRN: [id number]    CODE STATUS:  FULL CODE    FACILITY: [facility] [[id number]]         CHIEF COMPLAIN/REASON FOR VISIT:  Chief Complaint   Patient presents with     Review Of Multiple Medical Conditions     REHAB PROGRESS       HISTORY OF PRESENT ILLNESS: Jazmin Bauman is a 65 y.o. female being seen for review of multiple medical conditions. Seen in her room , she has been moved to a LTC but remains skilled at this time. Spoke to SW about room change, and the IDT felt she would have slow progress and need LTC. Pt is hoping to go home to care for her elderly mother, however that is a challenge she is unable to meet at this time.  Pt was at [facility] from  to  and underwent a RBKA r/t ongoing DM ulcer with osteomyelitis.Per her EMR dc summary \" with HTN,morbid obesity, DM, A fib, CKD 4 presented for evaluation of R foot swelling, difficulty ambulation, pain found to have osteomyelitis now s/p amputation. She has now been to the OR twice this stay, last was on 20.   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   Patient had a chronic diabetic foot ulcer on her R heel, presented for increased swelling, difficulty ambulating, and pain. MRI showed extensive soft tissue necrosis and some osteomyelitis along with cellulitis  Met sepsis criteria on admission with leukocytosis and elevated CRP  Podiatry saw and the foot was not felt to be salvageable and BKA was recommended, -now POD #9  From guillotine amputation by Dr Corcoran. POD #5 revision of stump.  Treated with zosyn /vanvo, blood culture positive for Peptostreptococcus and Streptococcus, changing to Augmentin for 4 more days.  WBC now normal.  Appreciate ID consultation.  Continue home gabapentin and has PRN dilaudid as well.  Pain team following.     Acute on chronic blood loss anemia-likely " from anemia chronic disease, iron deficiency, chronic kidney disease, surgery.  needed pRBC on 6/23--stable at 7.9.  Iron deficient, nephrology started IV iron.  Discharged on oral iron     Gram-positive bacteremia x2 species  Patient with positive blood culture from 6/16 growing Strep anginosus and Peptostreptococcus  Changed from Zosyn to Augmentin.     Left abdominal wall cellulitis,  On antibiotics as above without improvement, surg did debridement too.  Ultrasound showed no abscess.  ID and surgery doubts calciphylaxis, minimal pain, no significant necrosis though she does have risk factors.  Does not need biopsy at this point, continue current wound care and antibiotics.    Discussed with surgery, nephrology and ID.  Needs close follow-up at the TCU     Chronic right abdominal wound-no signs of worsening, small scab, no induration or signs of abscess or fluctuance.  Has been present for months.  Wound care following, continue to monitor.  Doubt calciphylaxis Will need to follow-up with nephrology as outpatient, holding on thiosulfate here.     Insulin-dependent diabetes  Poorly controlled with A1c 10.3  Had some hypoglycemia, Lantus dose decreased to 10 units, discharged on NovoLog 4 units pre-meal and sliding scale with better control.  Suspect she was noncompliant with her insulin as her A1c was so and she did not require what her med rec stated for her insulin.       Essential hypertension  Elevated, asymptomatic, continued metoprolol, added furosemide and amlodipine with improved blood pressure control.      CKD 4  -Home PhosLo  -Avoid nephrotoxins  -Follows with Dr Frost, with concern of possible Calciphylaxis, renal seeing here  Creatinine stable.  Started furosemide.       HyperK  On admission  -got kayexalate  -This is chronic intermittent problem for her  -low K diet   Potassium normal at discharge     Paroxysmal atrial fibrillation.  Held warfarin for surgery.  Restarted after surgery.  Pharmacy  "dosing.  Continue home metoprolol.  INR still subtherapeutic.  Recheck tomorrow     Coronary artery disease  She had a detectable troponin level when hospitalized back in January and outpatient stress test with a 2-day protocol was recommended but she never followed up  Cardiology saw and recommend outpatient stress test, but ok with surgery  Echocardiogram showed no wall motion abnormality and a normal EF  -also needs outpt CRISTINA eval  Continue metoprolol and aspirin.     Morbid obesity  Affecting her mobility and her ability to live independently as well as her overall health  Cardiology also recommend a sleep evaluation for probable CRISTINA     Urinary retention-Yeager placed due to retaining 900 cc of urine, trial of Yeager removal at the TCU in 1 week, follow-up with [facility] as an outpatient if she fails trial of removal.\"   On coumadin for Afib, no excessive bleeding or bruising we dosed her coumadin at 5 mg po daily with recheck scheduled in a week. She continues w/o yeager. She wishes she had it as difficult to transfer to toilet. Explained risk benefits of having indwelling yeager remain. Unable to see any residuals but per nursing staff she is voiding QS.     Allergies   Allergen Reactions     Hydralazine      Paralysis of legs     Latex Itching     Skin Burns     Naprosyn [Naproxen] Swelling     throat     Nitrofurantoin Hives     Sulfa (Sulfonamide Antibiotics) Rash     Vicks Vaporub [Camphor-Eucalyptus Oil-Menthol] Rash   :     Current Outpatient Medications   Medication Sig     acetaminophen (TYLENOL) 500 MG tablet Take 2 tablets (1,000 mg total) by mouth every 6 (six) hours as needed for pain or fever.     amLODIPine (NORVASC) 10 MG tablet Take 1 tablet (10 mg total) by mouth daily. (Patient taking differently: Take 10 mg by mouth daily. Hold for systolic blood pressure less than 105)     aspirin 81 MG EC tablet Take 81 mg by mouth daily.     BD ULTRA-FINE MICRO PEN NEEDLE 32 gauge x 1/4\" Ndle USE AS " DIRECTED 4 TIMES DAILY.     blood glucose test (ACCU-CHEK SAM PLUS TEST STRP) strips TEST 1 TIME A DAY     blood glucose test (ACCU-CHEK SAM PLUS TEST STRP) strips test blood sugar level 6 times daily     calcium acetate,phosphat bind, (PHOSLO) 667 mg capsule Take 1 capsule (667 mg total) by mouth 3 (three) times a day with meals.     cholecalciferol, vitamin D3, (VITAMIN D3) 1,000 unit capsule Take 1,000 Units by mouth daily.     collagenase ointment Apply daily per WOC     ferrous sulfate 325 (65 FE) MG tablet Take 1 tablet by mouth 2 (two) times a day with meals.     furosemide (LASIX) 20 MG tablet Take 1 tablet (20 mg total) by mouth daily.     gabapentin (NEURONTIN) 400 MG capsule Take 1 capsule (400 mg total) by mouth 3 (three) times a day.     HYDROmorphone (DILAUDID) 2 MG tablet Take 1 tablet (2 mg total) by mouth every 4 (four) hours as needed.     insulin glargine (BASAGLAR KWIKPEN) 100 unit/mL (3 mL) pen Inject 10 Units under the skin at bedtime. (Patient taking differently: Inject 15 Units under the skin at bedtime. )     lancets (ACCU-CHEK MULTICLIX LANCET) Misc TEST 6 TIMES A DAY     magnesium 250 mg Tab Take 500 mg by mouth 2 (two) times a day.      metoprolol tartrate (LOPRESSOR) 25 MG tablet Take 1 tablet (25 mg total) by mouth 2 (two) times a day. (Patient taking differently: Take 25 mg by mouth 2 (two) times a day. Hold for systolic blood pressure less than 105)     NOVOLOG FLEXPEN U-100 INSULIN 100 unit/mL (3 mL) injection pen Check blood sugar four (4) times daily.  11. (Patient taking differently: Inject 7 Units under the skin 3 (three) times a day before meals. if 141 - 180 = 1 unit;   181 - 220 = 2 units;  Plus sliding scale   221 - 260 = 3 units;   261 - 300 = 4 units;   301 - 340 = 5 units;   341 - 400 = 6 units;   401 - 999 = 7 units,)     NOVOLOG FLEXPEN U-100 INSULIN 100 unit/mL (3 mL) injection pen Check blood sugar four (4) times daily.  {     omeprazole (PRILOSEC) 20 MG capsule  Take 20 mg by mouth.     senna-docusate (PERICOLACE) 8.6-50 mg tablet Take 1 tablet by mouth 2 (two) times a day.     sodium bicarbonate 650 MG tablet Take 1 tablet (650 mg total) by mouth 2 (two) times a day. OTC product     warfarin sodium (WARFARIN ORAL) Take by mouth. 7/7/20 INR 2.5  Cont 5mg daily.  Next INR 7/10/20.         REVIEW OF SYSTEMS:    Currently, no fever, chills, or rigors. Does not have any visual or hearing problems. Denies any chest pain, headaches, palpitations, lightheadedness, dizziness, shortness of breath, or cough. Appetite is good. Denies any GERD symptoms. Denies any difficulty with swallowing, nausea, or vomiting.  Denies any abdominal pain, diarrhea or constipation. Denies any urinary symptoms. No insomnia. No active bleeding. No rash.       PHYSICAL EXAMINATION:  Vitals:    07/15/20 1515   BP: 134/86   Pulse: 60   Temp: 96.9  F (36.1  C)   Weight: (!) 259 lb 9.6 oz (117.8 kg)         GENERAL: Awake, Alert, oriented x3, not in any form of acute distress, answers questions appropriately, follows simple commands, conversant with flat affect  HEENT: Head is normocephalic with normal hair distribution. No evidence of trauma. Ears: No acute purulent discharge. Eyes: Conjunctivae pink with no scleral jaundice. Nose: Normal mucosa and septum. NECK: Supple with no cervical or supraclavicular lymphadenopathy. Trachea is midline.   CHEST: No tenderness or deformity, no crepitus  LUNG: Clear to auscultation with good chest expansion. There DM BS  BACK: No kyphosis of the thoracic spine. Symmetric, no curvature, ROM normal, no CVA tenderness, no spinal tenderness   CVS: There is good S1  S2,  rhythm is regular.  ABDOMEN: Globular and ROTUND tender to palpation, non distended, no masses, no organomegaly, good bowel sounds, no rebound or guarding, no peritoneal signs. Wound to left side of abdomen, upper 2 o'clock with tunneling, yellow slough to bed. Followed by wound dr at facility.  EXTREMITIES:  UE Full ROM. Right BKA, IN a  with protected brace  SKIN: Warm and dry, no erythema noted, open area with dressing on abdomen  NEUROLOGICAL: The patient is oriented to person, place and time. Strength and sensation are grossly intact. Face is symmetric.          LABS:    Lab Results   Component Value Date    WBC 9.5 06/27/2020    HGB 7.9 (L) 06/27/2020    HCT 25.5 (L) 06/27/2020    MCV 95 06/27/2020     06/27/2020       Results for orders placed or performed during the hospital encounter of 06/16/20   Basic Metabolic Panel   Result Value Ref Range    Sodium 141 136 - 145 mmol/L    Potassium 4.2 3.5 - 5.0 mmol/L    Chloride 107 98 - 107 mmol/L    CO2 27 22 - 31 mmol/L    Anion Gap, Calculation 7 5 - 18 mmol/L    Glucose 116 70 - 125 mg/dL    Calcium 8.8 8.5 - 10.5 mg/dL    BUN 30 (H) 8 - 22 mg/dL    Creatinine 1.57 (H) 0.60 - 1.10 mg/dL    GFR MDRD Af Amer 40 (L) >60 mL/min/1.73m2    GFR MDRD Non Af Amer 33 (L) >60 mL/min/1.73m2           Lab Results   Component Value Date    HGBA1C 10.3 (H) 06/17/2020     Vitamin D, Total (25-Hydroxy)   Date Value Ref Range Status   04/28/2016 29.8 (L) 30.0 - 80.0 ng/mL Final     Lab Results   Component Value Date    MYDPGEBJ91 285 01/07/2011       ASSESSMENT/PLAN:  1. Hx of right BKA (H)    2. Urinary retention      1.. RBKA: Reports pain stable during visit. Unable to assess actual stump, in  and brace. Attend therapies as needed PT/OT. SN for chronic medical conditions management. Pt goal isto walk, working on transfers out of bed at this time, using mechanical lift.Coumadin dosing completed, 5 mg po daily, next week we will recheck INR.    2. Urinary retenton: No PVR to review,, catheter is out and pt reporting vding,nurses reports of QS voiding. Educated provided to pt on leaving yeager out vs convenience of keeping one in place.     Electronically signed by:  Azalea Kevin CNP  This progress note was completed using Dragon software and there may be  grammatical errors.

## 2021-06-09 NOTE — PROGRESS NOTES
"S: Patient is a 64 y/o female, 5'2\", 273 lbs seen at [facility], room 408 for the fitting and delivery of BK shrinkers for her right side on order from Aaliyah Diaz PA-C for the treatment of Dx: s/p complete amputation of right leg, below knee.     O: Patient presented supine in her hospital bed at the time of my arrival. Patient was semi-conscious through our fitting and could no be fully awakened. Patient displays body morphology that would not allow her to be fit with a BK protector per normal post-op amputation protocol. Patient has excessive abdominal tissues that would adversely contact the superior trimline of a protector and cannot full extend her knee, both of which could cause high risk of skin issues through the use of a BK protector. Patient also has a large abscess on the left lateral aspect of her abdomen which would be irritated with the use of a suspension belt.     G: Patient's shrinkers will provide compression for the patient's residual limb that will aid in decreasing swelling of her residual limb and venous return. Patient should not wear her shrinkers until such time as her sutures from her surgery have been removed so as not to cause dehiscence of her suture line.     A: I measured the patient for BK shrinkers and found she would best be fit with a size VI. I tried to discuss care and use with the patient, but she could not be awakened. Patient indicated that she wanted nursing staff to sign off on delivery ticket as she did not want to herself.     P: Patient was given my contact information and asked to alert nursing staff if there are any questions regarding her Bk shrinkers in the future.   "

## 2021-06-09 NOTE — ANESTHESIA POSTPROCEDURE EVALUATION
Patient: Jazmin Bauman  Procedure(s):  REVISION AMPUTATION, BELOW KNEE (Right)  DEBRIDEMENT ABDOMINAL WOUND  Anesthesia type: general    Patient location: PACU  Last vitals:   Vitals Value Taken Time   /69 6/23/2020  5:50 PM   Temp 36.7  C (98  F) 6/23/2020  5:39 PM   Pulse 57 6/23/2020  5:56 PM   Resp 11 6/23/2020  5:56 PM   SpO2 100 % 6/23/2020  5:56 PM   Vitals shown include unvalidated device data.  Post vital signs: stable  Level of consciousness: awake and responds to simple questions  Post-anesthesia pain: pain controlled  Post-anesthesia nausea and vomiting: no  Pulmonary: unassisted, return to baseline  Cardiovascular: stable and blood pressure at baseline  Hydration: adequate  Anesthetic events: no    QCDR Measures:  ASA# 11 - Devi-op Cardiac Arrest: ASA11B - Patient did NOT experience unanticipated cardiac arrest  ASA# 12 - Devi-op Mortality Rate: ASA12B - Patient did NOT die  ASA# 13 - PACU Re-Intubation Rate: ASA13B - Patient did NOT require a new airway mgmt  ASA# 10 - Composite Anes Safety: ASA10A - No serious adverse event    Additional Notes:

## 2021-06-09 NOTE — PROGRESS NOTES
[facility]    [facility] [[id number]]    Code Status:  FULL CODE    Chief Complaint   Patient presents with     Review Of Multiple Medical Conditions   :                    Patient Active Problem List   Diagnosis     Carpal Tunnel Syndrome     Urinary Tract Infection     Endometrial Hyperplasia     Cholelithiasis     Leukocytosis     Urine Tests Nonspecific Abnormal Findings     Obesity     Essential hypertension     Pernicious Anemia     Immunology Studies Raised Immunoglobulin Level     Vaginal bleeding     Type 2 diabetes mellitus (H)     Atrial fibrillation with RVR (H)     Acute kidney injury superimposed on CKD (H)     Non-traumatic rhabdomyolysis     Metabolic acidosis     Troponin level elevated     Bacteremia     Acute respiratory failure with hypoxia (H)     Acute encephalopathy     Acute pulmonary edema (H)     Wheezing     Moderate protein malnutrition (H)     Abnormal cytological findings in specimens from other female genital organs     Chronic kidney disease, stage III (moderate) (H)     Hyperkalemia     Osteoarthritis     Acute kidney injury (H)     Sepsis (H)     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)     Sepsis, due to unspecified organism, unspecified whether acute organ dysfunction present (H)     Cellulitis of right foot     CKD (chronic kidney disease) stage 4, GFR 15-29 ml/min (H)     Chronic wound infection of abdomen, subsequent encounter     Acute post-operative pain     Phantom limb pain (H)     Paroxysmal atrial fibrillation (H)     Urinary retention     Coronary artery disease without angina pectoris     Hx of right BKA (H)       History:  Jazmin Bauman  is a 65 year old female with history of CKD 4, morbid obesity, insulin-dependent type 2 diabetes, chronic atrial fibrillation, peripheral neuropathy, GERD, peripheral edema, obesity, anemia of chronic disease with iron deficiency seen for follow-up of multiple  medical issues as well as new acute kidney injury on 7/17/2020    Hospital Course: Patient was admitted between June 16 and June 28.    Patient has history of chronic nonhealing right heel ulcer which became infected without her knowledge.  Patient's housemate summoned help and patient was transported to the emergency room.  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  Patient was septic and treated with Vanco/Zosyn.  Blood culture was positive for Peptostreptococcus and Streptococcus.  As patient improved she was transitioned to Augmentin.     Guillotine amputation converted to BKA on June 23.    Hospitalization was complicated for bilateral abdominal wall issues.  On the right side patient was felt to have chronic wounds/rash.  On the left she had acute abscess and infection resulting in I&D in the operating room.  Calciphyxis considered possible though not confirmed.  Patient left with wound ulcer on the left abdomen requiring wound care treatment which is ongoing.  Please bilateral infectious issues occur in the skin and subcutaneous tissues in the dependent pannus of the lower flanks.  I do not see that any wound cultures were obtained from these areas.  Patient describes many months of work with wound care and soft tissue surgery/debridements to clear up the infection of the right abdominal wall in the past.    Hospitalization also remarkable for hypokalemia at outset improved with Kayexalate.  Patient has paroxysmal atrial fibrillation, her warfarin needed to be held due to the multiple surgeries but she is back on it.  There is also concern over coronary artery disease with positive troponin January 2020 for which she was lost to follow-up with stress test/cardiology never did so.  Finally she had retention resulted in Canchola catheterization which is ongoing.    Subjective/ROS:    -augmented by discussion with facility  staff involved in direct care    - Patient's creatinine was worse this past Monday 2.36.  It is been 2.21 last week so only mildly so.  Nonetheless this result was not called to our office.  No one has been notified of it.  Baseline after discharge was 1.78.  We discontinued her Lasix but despite that she is lost several more pounds down to 260.    -Patient was to to have her stress test today.  She says she canceled that this morning as she did not feel well.  She had some sort of vague abdominal complaint which was transient.  She rescheduled that for next Thursday the 23rd.    - I was present for wound treatment today, large deep ulcers noted with partial granulation and little slough.  There is some scabbing on the left side on the skin adjacent.  I do not know if there is some adhesive issue there or not.    -Patient's blood sugars remain inadequately controlled although she remains below her baseline insulin delivery as well.  She has not had any hypoglycemia.    -Staff is worried about blood clot found in her underwear/depends garment apparently from the urethral area.  She has needed catheterization several times this week for intermittent PVRs greater than 300.  Indeed once it was 900 cc.  At other times she feels like she empties her bladder completely.  Part of it may be dependent on trying to void in bed from awkward position and bedpan etc.  Nonetheless I am concerned about some obstructive phenomenon contributing to her worsening renal function and I am recommending leaving the Canchola in this time and rescheduling with urologist.    -Her renal failure is a concern.  Lasix has been on hold.  We will recheck her blood work on Monday morning.  Dr. Kay knows her and may be able to help out.    -Magnesium level was also elevated at 2.9.  Again this was not called to our office and has been unknown by anyone that I can see for the last 4 days.  I did discuss this with facility staff so that they can run an  incident review and see where the problem lies.    INR therapeutic 2.3    Otherwise patient is feeling a bit more comfortable here in the facility.  She is moved to room 310.  She is eating well without nausea vomiting.  She says her sleep is improved.  She has no constipation or diarrhea remainder negative  ===================================================        Past Medical History:   Diagnosis Date     Anemia     pernicious     Diabetes mellitus (H)     borderline     Endometrial hyperplasia      Fibromyalgia      History of recurrent UTI (urinary tract infection)      History of transfusion      Hypertension      Thrombocytopenia (H)      Vaginal bleeding 1/2015     Past Surgical History:   Procedure Laterality Date     BELOW KNEE LEG AMPUTATION Right 6/18/2020    Procedure: AMPUTATION, BELOW KNEE;  Surgeon: Epi Corcoran MD;  Location: [facility];  Service: General     BELOW KNEE LEG AMPUTATION Right 6/23/2020    Procedure: REVISION AMPUTATION, BELOW KNEE;  Surgeon: Epi Corcoran MD;  Location: [facility];  Service: General     bilateral carpal tunnel release  2009     CHOLECYSTECTOMY       COLONOSCOPY N/A 9/11/2017    Procedure: COLONOSCOPY;  Surgeon: Tyler Bhakta MD;  Location: [facility];  Service:      COMBINED HYSTEROSCOPY DIAGNOSTIC / D&C N/A 1/28/2015    Procedure: DILATION AND CURETTAGE WITH HYSTEROSCOPY;  Surgeon: Grant Beal MD;  Location: [facility];  Service:      DILATION AND CURETTAGE OF UTERUS       IR NON TUNNELED CATHETER >5 YEARS  1/21/2020     [facility]  1/20/2020          VA HYSTEROSCOPY,W/ENDO BX N/A 9/5/2019    Procedure: HYSTEROSCOPY, DILATION AND CURETTAGE;  Surgeon: Grant Beal MD;  Location: [facility];  Service: Gynecology     VA HYSTEROSCOPY,W/ENDO BX N/A 12/9/2019    Procedure: HYSTEROSCOPY, DILATION AND CURETTAGE;  Surgeon: Grant Beal MD;  Location: [facility];  Service: Gynecology          Family History    Problem Relation Age of Onset     Colon cancer Father    :       Social History     Socioeconomic History     Marital status: Single     Spouse name: Not on file     Number of children: Not on file     Years of education: Not on file     Highest education level: Not on file   Occupational History     Not on file   Social Needs     Financial resource strain: Not on file     Food insecurity     Worry: Not on file     Inability: Not on file     Transportation needs     Medical: Not on file     Non-medical: Not on file   Tobacco Use     Smoking status: Never Smoker     Smokeless tobacco: Never Used   Substance and Sexual Activity     Alcohol use: Not Currently     Comment: rare     Drug use: No     Sexual activity: Not on file   Lifestyle     Physical activity     Days per week: Not on file     Minutes per session: Not on file     Stress: Not on file   Relationships     Social connections     Talks on phone: Not on file     Gets together: Not on file     Attends [employer]ism service: Not on file     Active member of club or organization: Not on file     Attends meetings of clubs or organizations: Not on file     Relationship status: Not on file     Intimate partner violence     Fear of current or ex partner: Not on file     Emotionally abused: Not on file     Physically abused: Not on file     Forced sexual activity: Not on file   Other Topics Concern     Not on file   Social History Narrative     Not on file   :    Currently living at a friend's house.  Normally she would live at her most.    Current Outpatient Medications on File Prior to Visit   Medication Sig Dispense Refill     acetaminophen (TYLENOL) 500 MG tablet Take 2 tablets (1,000 mg total) by mouth every 6 (six) hours as needed for pain or fever.  0     calcium acetate,phosphat bind, (PHOSLO) 667 mg capsule Take 1 capsule (667 mg total) by mouth 3 (three) times a day with meals. 270 capsule 3     collagenase ointment Apply daily per WOC  0     ferrous sulfate  "325 (65 FE) MG tablet Take 1 tablet by mouth 2 (two) times a day with meals.       insulin glargine (BASAGLAR KWIKPEN) 100 unit/mL (3 mL) pen Inject 10 Units under the skin at bedtime. (Patient taking differently: Inject 20 Units under the skin at bedtime. )  0     NOVOLOG FLEXPEN U-100 INSULIN 100 unit/mL (3 mL) injection pen Check blood sugar four (4) times daily.  11. (Patient taking differently: Inject 10 Units under the skin 3 (three) times a day before meals. 10 unit three times a day with meals     PLUS:      if 141 - 180 = 1 unit;   181 - 220 = 2 units;  Plus sliding scale   221 - 260 = 3 units;   261 - 300 = 4 units;   301 - 340 = 5 units;   341 - 400 = 6 units;   401 - 999 = 7 units,)  0     sodium bicarbonate 650 MG tablet Take 1 tablet (650 mg total) by mouth 2 (two) times a day. OTC product  0     amLODIPine (NORVASC) 10 MG tablet Take 1 tablet (10 mg total) by mouth daily. (Patient taking differently: Take 10 mg by mouth daily. Hold for systolic blood pressure less than 105)  0     aspirin 81 MG EC tablet Take 81 mg by mouth daily.       BD ULTRA-FINE MICRO PEN NEEDLE 32 gauge x 1/4\" Ndle USE AS DIRECTED 4 TIMES DAILY. 360 each 3     blood glucose test (ACCU-CHEK SAM PLUS TEST STRP) strips TEST 1 TIME A  strip 10     blood glucose test (ACCU-CHEK SAM PLUS TEST STRP) strips test blood sugar level 6 times daily 600 strip 4     cholecalciferol, vitamin D3, (VITAMIN D3) 1,000 unit capsule Take 1,000 Units by mouth daily.       furosemide (LASIX) 20 MG tablet Take 1 tablet (20 mg total) by mouth daily.  0     gabapentin (NEURONTIN) 400 MG capsule Take 1 capsule (400 mg total) by mouth 3 (three) times a day.  0     HYDROmorphone (DILAUDID) 2 MG tablet Take 1 tablet (2 mg total) by mouth every 4 (four) hours as needed. 30 tablet 0     lancets (ACCU-CHEK MULTICLIX LANCET) Misc TEST 6 TIMES A  each 11     magnesium 250 mg Tab Take 500 mg by mouth 2 (two) times a day.        metoprolol tartrate " (LOPRESSOR) 25 MG tablet Take 1 tablet (25 mg total) by mouth 2 (two) times a day. (Patient taking differently: Take 25 mg by mouth 2 (two) times a day. Hold for systolic blood pressure less than 105) 180 tablet 3     NOVOLOG FLEXPEN U-100 INSULIN 100 unit/mL (3 mL) injection pen Check blood sugar four (4) times daily.  {  0     omeprazole (PRILOSEC) 20 MG capsule Take 20 mg by mouth.       senna-docusate (PERICOLACE) 8.6-50 mg tablet Take 1 tablet by mouth 2 (two) times a day.  0     warfarin sodium (WARFARIN ORAL) Take by mouth. 7/7/20 INR 2.5  Cont 5mg daily.  Next INR 7/10/20.       No current facility-administered medications on file prior to visit.    :      ALLERGIES:  Hydralazine; Latex; Naprosyn [naproxen]; Nitrofurantoin; Sulfa (sulfonamide antibiotics); and Vicks vaporub [camphor-eucalyptus oil-menthol]    Vitals:      Current Vitals   BP: 130/64 mmHg  7/17/2020 14:41    Temp:96.9  F  7/17/2020 14:41  Pulse: 64 bpm  7/17/2020 14:41    Weight: 249 Lbs  7/17/2020 15:45  Resp: 18 Breaths/min  7/17/2020 14:41  BS: 219 mg/dL  7/17/2020 17:06  O2: 98 %  7/17/2020 14:41  Pain: 9  7/17/2020 15:0  Physical exam:    General:  Alert  oriented x3 appears comfortable    Patient is lying in bed.  She is having her wounds dressed by the nurse.  She tolerates that very well and says she has minimal pain in those areas.  She is alert oriented x3 normally conversant she is breathing comfortably without tachypnea or accessory muscle use.  Speech is fluent she answers questions appropriately.  She has the brace on her right BKA.  Left leg shows some wrinkling in the pretibial skin and ankle with very little edema and good perfusion.    Wounds are large ovoid and deep with partial granulation and very little slough.  Some skin scabbing adjacent to the one on the left questionable adhesive injury?      Due to the 2020 Covid 19 pandemic, except as noted above, the patient was visually observed at a 6 foot plus distance.  An  observational exam was performed in an effort to keep patient safe from Covid 19 and other communicable diseases.   Labs:  Lab Results   Component Value Date    WBC 9.5 06/27/2020    HGB 7.9 (L) 06/27/2020    HCT 25.5 (L) 06/27/2020    MCV 95 06/27/2020     06/27/2020     Results for orders placed or performed during the hospital encounter of 06/16/20   Basic Metabolic Panel   Result Value Ref Range    Sodium 141 136 - 145 mmol/L    Potassium 4.2 3.5 - 5.0 mmol/L    Chloride 107 98 - 107 mmol/L    CO2 27 22 - 31 mmol/L    Anion Gap, Calculation 7 5 - 18 mmol/L    Glucose 116 70 - 125 mg/dL    Calcium 8.8 8.5 - 10.5 mg/dL    BUN 30 (H) 8 - 22 mg/dL    Creatinine 1.57 (H) 0.60 - 1.10 mg/dL    GFR MDRD Af Amer 40 (L) >60 mL/min/1.73m2    GFR MDRD Non Af Amer 33 (L) >60 mL/min/1.73m2         Lab Results   Component Value Date    TSH 1.83 06/17/2020     Lab Results   Component Value Date    HGBA1C 10.3 (H) 06/17/2020     [unfilled]  Lab Results   Component Value Date    ULUTKZXU64 285 01/07/2011     Lab Results   Component Value Date     (H) 01/20/2020     [unfilled]  Most Recent EKG     Units 06/16/20  1820   VENTRATE BPM 76   ATRIALRATE BPM 76   QRSDURATION ms 94   QTINTERVAL ms 428   QTCCALC ms 481   P Axis degrees 55   RAXIS degrees -43   TAXIS degrees 12   MUSEDX  Normal sinus rhythm  Left axis deviation  Inferior infarct (cited on or before 13-MAR-2020)  Abnormal ECG  When compared with ECG of 14-MAR-2020 13:04,  No significant change was found  Confirmed by DENNIS THORPE, [facility] LOC:MAK ([id number]) on 6/17/2020 1:41:23 PM       From Monday sodium 137 potassium down to 4.5 (had been 5.2) chloride 27 CO2 29 BUN 49 currently 2.36 up from 2.21 last week and 1.78 on July 7.  INR is 2.3 on July 13.  Magnesium was elevated at 2.9.  Assessment/Plan:      ICD-10-CM    1. Hx of right BKA (H)  Z89.511    2. CKD (chronic kidney disease) stage 4, GFR 15-29 ml/min (H)  N18.4    3. Type 2 diabetes mellitus with other  specified complication, with long-term current use of insulin (H)  E11.69     Z79.4    Acute kidney injury, recurrent  Suspected volume depletion  CKD 4  -We stopped Lasix last week due to the bump in the creatinine.  Unfortunately her creatinine did not improve over the weekend.  She is down several more pounds without any diuretic.  Unclear if obstructive phenomena may be contributing as she does need to be catheterized several times during the week and even when not needing to be cathed might still have PVRs significantly elevated just less than 300.  -Continue off Lasix  -Continue to push fluids orally  -Hold parameters continue on her beta-blocker and amlodipine  - Continue renal dosing of her gabapentin, decreased it to 200 mg twice daily (200-700 single daily dose recommended considering her estimated creatinine clearance)  - Discontinue PVRs in favor of Canchola catheterization at this point  -Canchola catheter  -UA UC as patient is having mild dysuria although that might be from repetitive cath situation.  No antibiotics were started but she does not have signs of systemic infection.  -Urology consultation ordered.  Might need urodynamic studies?  - Follow-up lab work necessary on Monday, to make sure we are reversing her failure.  -Incident investigation requested due to failure to call critical labs this week.    Electrolyte disturbance   Potassium improved off Lasix.  However her creatinine did worsen somewhat.  She now also has hypomagnesemia  -Discontinue magnesium replacement  -Continue off potassium replacement  -Push fluids  -Continue to hold Lasix  -Recheck on Monday    Acute urinary retention  Urethral bleed   See discussion above.  Intermittent elevations above 300 all the way up to 900 requiring intermittent catheterization.  It looks like she had some urethral bleeding that nurses report that there has been some blood-tinged urine.  -At this point it makes sense to put in a Canchola catheter to avoid  further trauma as it looks like she is going to continue to retain urine at least at times.  -Urology follow-up/possible urodynamic studies requested and ordered.  -UA UC to rule out infection    Type 2 diabetes, insulin-dependent, uncontrolled   See my previous notes.  Inadequately controlled.  Increase her Lantus to 20 units, recall that her baseline dose at home is 25 units.  We are going to increase her mealtime aspart insulin to 10 units, recall that her baseline at home is 15 units with each meal.  She also has sliding scale.  If she has no hypoglycemia we will likely increase to her baseline doses next week.    .  Recall that her most recent A1c 10.3 so she could need higher than her baseline doses eventually.    - We should continue to adjust her doses as necessary in the weeks to come    Chronic right foot ulcer now status post BKA  Right foot/leg cellulitis now status post BKA  Guillotine amputation June 30  Stump revision/BKA June 23   Patient has been back to the surgeon.  They remove the rest of the sutures.  They have okayed her for her compression sleeves.  She remains concerned that it is too soon.  Encouraged her to continue to work with her specialist.  Pain is improved.    -Gabapentin needs to be decreased due to her renal function as noted above.      Constipation   Resolved.  Bowel programs in place with senna docusate 1 tablet twice daily.     Abdominal wounds  Calciphylaxis considered possible here, though far from a certainty.  There was lack of agreement among her specialists.. Left side is worse but is showing some granulation with very little slough.  Continue collagenase ointment and other wound care as outlined by wound care specialist  -Follow-up with wound care team here at the facility and/or at the wound care clinic.        Paroxysmal atrial fibrillation   INR has fallen down into the therapeutic range again this week.  Recheck INR Monday      Chronic anemia   Likely anemia of  chronic disease.  Her nephrologist notes an iron deficiency component and she is on iron infusion therapy last hemoglobin 7.9 which she is tolerating well.  She is generally more in the 9 range.  Hemoglobin was stable.  Do not anticipate further monitoring/treatment in the next couple of weeks anyway.      Obesity   Complicates her movement, limits mobility and increase his difficulty of therapy.  Associated with other increased risk of morbidity as well.      Hypertension   No new thoughts here.  Blood pressures are better than when she first got here, but perhaps at the expense of volume status?  Continue metoprolol with hold parameters.  Continue amlodipine with hold parameters.  Lasix is on hold.     Social stressors and factors   Social service involved.        Case discussed with:    Facility staff             Harry Blackwood MD

## 2021-06-09 NOTE — PROGRESS NOTES
[facility]    [facility] [[id number]]    Code Status:  FULL CODE    Chief Complaint   Patient presents with     Review Of Multiple Medical Conditions     Problem Visit   :                    Patient Active Problem List   Diagnosis     Carpal Tunnel Syndrome     Urinary Tract Infection     Endometrial Hyperplasia     Cholelithiasis     Leukocytosis     Urine Tests Nonspecific Abnormal Findings     Obesity     Essential hypertension     Pernicious Anemia     Immunology Studies Raised Immunoglobulin Level     Vaginal bleeding     Type 2 diabetes mellitus (H)     Atrial fibrillation with RVR (H)     Acute kidney injury superimposed on CKD (H)     Non-traumatic rhabdomyolysis     Metabolic acidosis     Troponin level elevated     Bacteremia     Acute respiratory failure with hypoxia (H)     Acute encephalopathy     Acute pulmonary edema (H)     Wheezing     Moderate protein malnutrition (H)     Abnormal cytological findings in specimens from other female genital organs     Chronic kidney disease, stage III (moderate) (H)     Hyperkalemia     Osteoarthritis     Acute kidney injury (H)     Sepsis (H)     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)     Sepsis, due to unspecified organism, unspecified whether acute organ dysfunction present (H)     Cellulitis of right foot     CKD (chronic kidney disease) stage 4, GFR 15-29 ml/min (H)     Chronic wound infection of abdomen, subsequent encounter     Acute post-operative pain     Phantom limb pain (H)     Paroxysmal atrial fibrillation (H)     Urinary retention     Coronary artery disease without angina pectoris     Hx of right BKA (H)       History:  Jazmin Bauman  is a 65 year old female with history of CKD 4, morbid obesity, insulin-dependent type 2 diabetes, chronic atrial fibrillation, peripheral neuropathy, GERD, peripheral edema, obesity, anemia of chronic disease with iron deficiency seen for  admission to TCU on 7/10/2020    Hospital Course: Patient was admitted between June 16 and June 28.    Patient has history of chronic nonhealing right heel ulcer which became infected without her knowledge.  Patient's housemate summoned help and patient was transported to the emergency room.  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  Patient was septic and treated with Vanco/Zosyn.  Blood culture was positive for Peptostreptococcus and Streptococcus.  As patient improved she was transitioned to Augmentin.     Guillotine amputation converted to BKA on June 23.    Hospitalization was complicated for bilateral abdominal wall issues.  On the right side patient was felt to have chronic wounds/rash.  On the left she had acute abscess and infection resulting in I&D in the operating room.  Calciphyxis considered possible though not confirmed.  Patient left with wound ulcer on the left abdomen requiring wound care treatment which is ongoing.  Please bilateral infectious issues occur in the skin and subcutaneous tissues in the dependent pannus of the lower flanks.  I do not see that any wound cultures were obtained from these areas.  Patient describes many months of work with wound care and soft tissue surgery/debridements to clear up the infection of the right abdominal wall in the past.    Hospitalization also remarkable for hypokalemia at outset improved with Kayexalate.  Patient has paroxysmal atrial fibrillation, her warfarin needed to be held due to the multiple surgeries but she is back on it.  There is also concern over coronary artery disease with positive troponin January 2020 for which she was lost to follow-up with stress test/cardiology never did so.  Finally she had retention resulted in Canchola catheterization which is ongoing.    Subjective/ROS:    -augmented by discussion with facility staff involved in direct  care      Patient is upset with me today.  She says my comment last week about her medical situation being in pretty good shape and that she was here to focus on therapy made her feel uncomfortable.  She says she has lots of medical problems and they are not in good shape.  We talked that through and I explained I was simply trying to be positive and encouraging her to work with the therapy teams with the hope that her medical issues will remain stable.  Unfortunately that has not been the case.    Patient's blood work comes back today showing that her creatinine is up to 2.21 and a potassium is up to 5.2.  Sodium 137 chloride 100 CO2 27 anion gap 10 BUN 38.  Her INR was also supratherapeutic at 3.5.    She reports that she has not been feeling well but that she never feels well.  She feels generally weak.  She notes that her blood sugars have been consistently high.  Her appetite is been okay.  She is not had diarrhea.  She is very frustrated with her inability to get to the bathroom.  Nurse practitioner HUI had talked about getting a commode at bedside that has not happened.  It appears that she has not been deemed safe for transfers and is having trouble using the sliding board.    Patient's weight is down to 265 pounds, 10 pounds below recent weight.  She continues to take the Lasix.  She has a history of recurrent acute kidney injury and hyperkalemia in the past.  She also has a recent history of urinary retention and needed Canchola catheter.    She has had some intertrigo although it is been treated and getting better.    She had her Canchola catheter out earlier this week, and feels like she is voiding fine.  I do not see her post void results    Blood pressures have generally been okay, improved though that may be at the expense of her volume status?      Blood sugars have worsened.  She is above 300 this morning.  She has been usually above 200.  She has had no low blood sugars and only rare below 150.  She  points out that she is used to taking 25 of Lantus +15 units of aspart with meals and is well below that.  We discussed the reasoning for that at the hospital but now that she is getting back to her usual appetite will need to make insulin changes.  She is in favor of it.    Coronary artery disease work-up has been put on hold due to the pandemic, but she requires follow-up when appropriate.    Her INR was subtherapeutic until today when it is not supratherapeutic, perhaps because she is not feeling as well.  Is up to 3.5.  There is been no bleeding issues.    Obstructive sleep apnea suspected, sleep study recommended from the hospital.      Patient continues to deny fever sweats chills.  She is eating well.  Her bowels are working fine.  She is having no dysuria.  No back or flank pain.  No abdominal pain other than the superficial wound pain with dressing changes which is somewhat better.  She denies falls or injuries.  She is not having headaches.  She does not feel shortness of breath or chest pain.  Remainder 13 system ROS negative    Past Medical History:   Diagnosis Date     Anemia     pernicious     Diabetes mellitus (H)     borderline     Endometrial hyperplasia      Fibromyalgia      History of recurrent UTI (urinary tract infection)      History of transfusion      Hypertension      Thrombocytopenia (H)      Vaginal bleeding 1/2015     Past Surgical History:   Procedure Laterality Date     BELOW KNEE LEG AMPUTATION Right 6/18/2020    Procedure: AMPUTATION, BELOW KNEE;  Surgeon: Epi Corcoran MD;  Location: [facility] OR;  Service: General     BELOW KNEE LEG AMPUTATION Right 6/23/2020    Procedure: REVISION AMPUTATION, BELOW KNEE;  Surgeon: Epi Corcoran MD;  Location: [facility] OR;  Service: General     bilateral carpal tunnel release  2009     CHOLECYSTECTOMY       COLONOSCOPY N/A 9/11/2017    Procedure: COLONOSCOPY;  Surgeon: Tyler Bhakta MD;  Location: [facility] GI;  Service:       COMBINED HYSTEROSCOPY DIAGNOSTIC / D&C N/A 1/28/2015    Procedure: DILATION AND CURETTAGE WITH HYSTEROSCOPY;  Surgeon: Grant Beal MD;  Location: [facility];  Service:      DILATION AND CURETTAGE OF UTERUS       IR NON TUNNELED CATHETER >5 YEARS  1/21/2020     [facility]  1/20/2020          IA HYSTEROSCOPY,W/ENDO BX N/A 9/5/2019    Procedure: HYSTEROSCOPY, DILATION AND CURETTAGE;  Surgeon: Grant Beal MD;  Location: [facility];  Service: Gynecology     IA HYSTEROSCOPY,W/ENDO BX N/A 12/9/2019    Procedure: HYSTEROSCOPY, DILATION AND CURETTAGE;  Surgeon: Grant Beal MD;  Location: [facility];  Service: Gynecology          Family History   Problem Relation Age of Onset     Colon cancer Father    :       Social History     Socioeconomic History     Marital status: Single     Spouse name: Not on file     Number of children: Not on file     Years of education: Not on file     Highest education level: Not on file   Occupational History     Not on file   Social Needs     Financial resource strain: Not on file     Food insecurity     Worry: Not on file     Inability: Not on file     Transportation needs     Medical: Not on file     Non-medical: Not on file   Tobacco Use     Smoking status: Never Smoker     Smokeless tobacco: Never Used   Substance and Sexual Activity     Alcohol use: Not Currently     Comment: rare     Drug use: No     Sexual activity: Not on file   Lifestyle     Physical activity     Days per week: Not on file     Minutes per session: Not on file     Stress: Not on file   Relationships     Social connections     Talks on phone: Not on file     Gets together: Not on file     Attends [employer] service: Not on file     Active member of club or organization: Not on file     Attends meetings of clubs or organizations: Not on file     Relationship status: Not on file     Intimate partner violence     Fear of current or ex partner: Not on file     Emotionally abused: Not on  file     Physically abused: Not on file     Forced sexual activity: Not on file   Other Topics Concern     Not on file   Social History Narrative     Not on file   :    Currently living at a friend's house.  Normally she would live at her most.    Current Outpatient Medications on File Prior to Visit   Medication Sig Dispense Refill     acetaminophen (TYLENOL) 500 MG tablet Take 2 tablets (1,000 mg total) by mouth every 6 (six) hours as needed for pain or fever.  0     amLODIPine (NORVASC) 10 MG tablet Take 1 tablet (10 mg total) by mouth daily. (Patient taking differently: Take 10 mg by mouth daily. Hold for systolic blood pressure less than 105)  0     aspirin 81 MG EC tablet Take 81 mg by mouth daily.       calcium acetate,phosphat bind, (PHOSLO) 667 mg capsule Take 1 capsule (667 mg total) by mouth 3 (three) times a day with meals. 270 capsule 3     cholecalciferol, vitamin D3, (VITAMIN D3) 1,000 unit capsule Take 1,000 Units by mouth daily.       collagenase ointment Apply daily per WOC  0     ferrous sulfate 325 (65 FE) MG tablet Take 1 tablet by mouth 2 (two) times a day with meals.       gabapentin (NEURONTIN) 400 MG capsule Take 1 capsule (400 mg total) by mouth 3 (three) times a day.  0     HYDROmorphone (DILAUDID) 2 MG tablet Take 1 tablet (2 mg total) by mouth every 4 (four) hours as needed. 30 tablet 0     insulin glargine (BASAGLAR KWIKPEN) 100 unit/mL (3 mL) pen Inject 10 Units under the skin at bedtime. (Patient taking differently: Inject 15 Units under the skin at bedtime. )  0     magnesium 250 mg Tab Take 500 mg by mouth 2 (two) times a day.        metoprolol tartrate (LOPRESSOR) 25 MG tablet Take 1 tablet (25 mg total) by mouth 2 (two) times a day. (Patient taking differently: Take 25 mg by mouth 2 (two) times a day. Hold for systolic blood pressure less than 105) 180 tablet 3     NOVOLOG FLEXPEN U-100 INSULIN 100 unit/mL (3 mL) injection pen Check blood sugar four (4) times daily.  11. (Patient  "taking differently: Inject 7 Units under the skin 3 (three) times a day before meals. if 141 - 180 = 1 unit;   181 - 220 = 2 units;  Plus sliding scale   221 - 260 = 3 units;   261 - 300 = 4 units;   301 - 340 = 5 units;   341 - 400 = 6 units;   401 - 999 = 7 units,)  0     NOVOLOG FLEXPEN U-100 INSULIN 100 unit/mL (3 mL) injection pen Check blood sugar four (4) times daily.  {  0     omeprazole (PRILOSEC) 20 MG capsule Take 20 mg by mouth.       senna-docusate (PERICOLACE) 8.6-50 mg tablet Take 1 tablet by mouth 2 (two) times a day.  0     sodium bicarbonate 650 MG tablet Take 1 tablet (650 mg total) by mouth 2 (two) times a day. OTC product  0     warfarin sodium (WARFARIN ORAL) Take by mouth. 7/7/20 INR 2.5  Cont 5mg daily.  Next INR 7/10/20.       BD ULTRA-FINE MICRO PEN NEEDLE 32 gauge x 1/4\" Ndle USE AS DIRECTED 4 TIMES DAILY. 360 each 3     blood glucose test (ACCU-CHEK SAM PLUS TEST STRP) strips TEST 1 TIME A  strip 10     blood glucose test (ACCU-CHEK SAM PLUS TEST STRP) strips test blood sugar level 6 times daily 600 strip 4     furosemide (LASIX) 20 MG tablet Take 1 tablet (20 mg total) by mouth daily.  0     lancets (ACCU-CHEK MULTICLIX LANCET) Misc TEST 6 TIMES A  each 11     No current facility-administered medications on file prior to visit.    :      ALLERGIES:  Hydralazine; Latex; Naprosyn [naproxen]; Nitrofurantoin; Sulfa (sulfonamide antibiotics); and Vicks vaporub [camphor-eucalyptus oil-menthol]    Vitals:    Current Vitals   BP: 125/30 mmHg  7/10/2020 18:07    Temp:60  F  7/10/2020 18:07  Pulse: 60 bpm  7/10/2020 20:38    Weight: 264.6 Lbs  7/8/2020 12:48  Resp: 18 Breaths/min  7/10/2020 10:24  BS: 297 mg/dL  7/10/2020 20:46  O2: 99 %  7/10/2020 18:07  Pain: 1  7/10/2020 09:32  Physical exam:    General:  Alert  oriented x3 appears comfortable    Patient is up in her chair.  She is brushing her hair.  She is just getting ready to eat lunch.  She is alert oriented x3 and " normally conversant.  She is breathing comfortably without tachypnea or accessory muscle use.  She has her orthotic on the BKA stump.  She moves her other 3 extremities purposefully.  She demonstrates good range of motion of her neck with rotation of her head.  Speech is strong and fluent she answers questions appropriately      Due to the 2020 Covid 19 pandemic, except as noted above, the patient was visually observed at a 6 foot plus distance.  An observational exam was performed in an effort to keep patient safe from Covid 19 and other communicable diseases.   Labs:  Lab Results   Component Value Date    WBC 9.5 06/27/2020    HGB 7.9 (L) 06/27/2020    HCT 25.5 (L) 06/27/2020    MCV 95 06/27/2020     06/27/2020     Results for orders placed or performed during the hospital encounter of 06/16/20   Basic Metabolic Panel   Result Value Ref Range    Sodium 141 136 - 145 mmol/L    Potassium 4.2 3.5 - 5.0 mmol/L    Chloride 107 98 - 107 mmol/L    CO2 27 22 - 31 mmol/L    Anion Gap, Calculation 7 5 - 18 mmol/L    Glucose 116 70 - 125 mg/dL    Calcium 8.8 8.5 - 10.5 mg/dL    BUN 30 (H) 8 - 22 mg/dL    Creatinine 1.57 (H) 0.60 - 1.10 mg/dL    GFR MDRD Af Amer 40 (L) >60 mL/min/1.73m2    GFR MDRD Non Af Amer 33 (L) >60 mL/min/1.73m2         Lab Results   Component Value Date    TSH 1.83 06/17/2020     Lab Results   Component Value Date    HGBA1C 10.3 (H) 06/17/2020     [unfilled]  Lab Results   Component Value Date    QUPNREXT84 285 01/07/2011     Lab Results   Component Value Date     (H) 01/20/2020     [unfilled]  Most Recent EKG     Units 06/16/20  1820   VENTRATE BPM 76   ATRIALRATE BPM 76   QRSDURATION ms 94   QTINTERVAL ms 428   QTCCALC ms 481   P Axis degrees 55   RAXIS degrees -43   TAXIS degrees 12   MUSEDX  Normal sinus rhythm  Left axis deviation  Inferior infarct (cited on or before 13-MAR-2020)  Abnormal ECG  When compared with ECG of 14-MAR-2020 13:04,  No significant change was  found  Confirmed by DENNIS THORPE, MARYANA LOC:JN ([id number]) on 6/17/2020 1:41:23 PM       Potassium 5.2, creatinine 2.21, see above  3.5 today  Assessment/Plan:      ICD-10-CM    1. CKD (chronic kidney disease) stage 4, GFR 15-29 ml/min (H)  N18.4    2. Hx of right BKA (H)  Z89.511    3. Chronic kidney disease, stage III (moderate) (H)  N18.3    4. Hyperkalemia  E87.5    5. Acute kidney injury (H)  N17.9    6. Metabolic acidosis  E87.2    7. Acute kidney injury superimposed on CKD (H)  N17.9     N18.9    8. Wheezing  R06.2    9. Essential hypertension  I10    10. Type 2 diabetes mellitus with other specified complication, with long-term current use of insulin (H)  E11.69     Z79.4    Acute kidney injury, recurrent  Suspected volume depletion  CKD 4  -Discussed with patient.  She is disappointed.  Her weight is down to 265 pounds.  I suspect volume depletion.  Postobstructive renal failure is another consideration considering her recent acute urinary retention and removal of her Canchola earlier this week  -Discontinue Lasix  -Push fluids  -Hold parameters on her beta-blocker and amlodipine  - Renal dosing of her gabapentin, decreased it to 200 mg twice daily (200-700 single daily dose recommended considering her estimated creatinine clearance)  - Check PVRs, if she has greater than 300 cc given regarding the need for Canchola and urology follow-up  - Follow-up lab work necessary on Monday, to make sure we are reversing her failure.  We will also need to decide when to reinstitute Lasix.    Electrolyte disturbance   Potassium is high again at 5.2 after being low last week and getting some replacement doses.  This is related to her fibrillation/renal failure  -She is not on any replacement at this time  -Push fluids  -Hold Lasix  -Recheck on Monday    Acute urinary retention   The thought was it had resolved.  Her Canchola was removed several days ago and she was supposed to have post void residual checks although I do not find them on  the record.  Patient is on aware if she had them or not.  We will restart postvoid residual checks now, replace Canchola if she is retaining as postobstructive renal failure is always a possibility here, though as above I favor volume depletion more likely    Type 2 diabetes, insulin-dependent, uncontrolled   Patient's blood sugar control has worsened in the last week.  This is unfortunate as she is trying to heal her BKA stump.  She did see her surgery team this week who did not like the look of things well enough to remove her staples.  They can recheck things next week.  Meanwhile we will try to bring her sugars under better control.  She is well below her insulin doses at baseline.  She usually takes 25 of Lantus and 15 units 3 times daily of aspartame roughly.  She was doing some carbohydrate counting at home.  Recall that her most recent A1c 10.3  -Increase Lantus to 15 units  -Increase mealtime insulin to 7 units 3 times daily  -Continues on sliding scale in addition  - We should continue to adjust her doses as necessary in the weeks to come    Chronic right foot ulcer now status post BKA  Right foot/leg cellulitis now status post BKA  Guillotine amputation June 30  Stump revision/BKA June 23   Patient completed her surgical follow-up.  They did not remove all the staples.  She says they took 1 did not like the look of them so they decided to leave the rest in.  She is going to follow-up again next week.  Despite her concerns about transport things went quite well she says.   Patient is no longer on antibiotics and doing well.  PT, OT,  all involved.  Pain control is reviewed with patient, things are improving.  She does not feel ready to be off the hydromorphone but is trying to not take it as much.  She is not showing any sign of excessive sedation  -Gabapentin needs to be decreased due to her renal function as noted above.      Constipation   Bowel programs in place with senna docusate 1 tablet  twice daily.  She reports good results    Abdominal wounds  Calciphylaxis considered possible here, though far from a certainty.  There was lack of agreement among her specialists..  However she appears to be responding to wound care.  She had left-sided abscess drained during her recent hospitalization.  Continue collagenase ointment and other wound care as outlined by wound care specialist  -Follow-up with wound care team here at the facility and/or at the wound care clinic.  -New Lasix discontinued today as noted above      Paroxysmal atrial fibrillation   Back on warfarin but now supratherapeutic.  Unfortunately we are headed into the weekend.  Thus I am going to hold her warfarin tonight and tomorrow give her 3 mg on Sunday and recheck in on Monday to make further decisions.  Hopefully she will be feeling better and may be back to her usual metabolism.       Chronic anemia   Likely anemia of chronic disease.  Her nephrologist notes an iron deficiency component and she is on iron infusion therapy last hemoglobin 7.9 which she is tolerating well.  She is generally more in the 9 range.  Hemoglobin was stable.  Do not anticipate further monitoring/treatment in the next couple of weeks anyway.      Obesity   Complicates her movement, limits mobility and increase his difficulty of therapy.  Associated with other increased risk of morbidity as well.      Hypertension   Blood pressures are better this week, but perhaps at the expense of volume status?  Continue metoprolol.  She was also newly started on amlodipine and Lasix.  However her Lasix is discontinued today for above reasons.  I also put hold parameters on her metoprolol and amlodipine.    Social stressors and factors   Social service consultation        Case discussed with:    Facility staff       Time: 44 minutes with > 50% in counseling and coordination of care as noted above         Harry Blackwood MD

## 2021-06-09 NOTE — PROGRESS NOTES
[facility]/ [facility]iatrics    Facility:  [facility] AT [facility] [[id number]]    Code Status:  FULL CODE    Chief Complaint   Patient presents with     H & P   :                    Patient Active Problem List   Diagnosis     Carpal Tunnel Syndrome     Urinary Tract Infection     Endometrial Hyperplasia     Cholelithiasis     Leukocytosis     Urine Tests Nonspecific Abnormal Findings     Obesity     Essential hypertension     Pernicious Anemia     Immunology Studies Raised Immunoglobulin Level     Vaginal bleeding     Type 2 diabetes mellitus (H)     Atrial fibrillation with RVR (H)     Acute kidney injury superimposed on CKD (H)     Non-traumatic rhabdomyolysis     Metabolic acidosis     Troponin level elevated     Bacteremia     Acute respiratory failure with hypoxia (H)     Acute encephalopathy     Acute pulmonary edema (H)     Wheezing     Moderate protein malnutrition (H)     Abnormal cytological findings in specimens from other female genital organs     Chronic kidney disease, stage III (moderate) (H)     Hyperkalemia     Osteoarthritis     Acute kidney injury (H)     Sepsis (H)     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)     Sepsis, due to unspecified organism, unspecified whether acute organ dysfunction present (H)     Cellulitis of right foot     CKD (chronic kidney disease) stage 4, GFR 15-29 ml/min (H)     Chronic wound infection of abdomen, subsequent encounter     Acute post-operative pain     Phantom limb pain (H)     Paroxysmal atrial fibrillation (H)     Urinary retention     Coronary artery disease without angina pectoris     Hx of right BKA (H)       History:  Jazmin Bauman  is a 65 year old female with history of CKD 4, morbid obesity, insulin-dependent type 2 diabetes, chronic atrial fibrillation, peripheral neuropathy, GERD, peripheral edema, obesity, anemia of chronic disease with iron deficiency seen for admission to TCU on 7/3/2020    Hospital Course:  Patient was admitted between June 16 and June 28.    Patient has history of chronic nonhealing right heel ulcer which became infected without her knowledge.  Patient's housemate summoned help and patient was transported to the emergency room.  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  Patient was septic and treated with Vanco/Zosyn.  Blood culture was positive for Peptostreptococcus and Streptococcus.  As patient improved she was transitioned to Augmentin.     Guillotine amputation converted to BKA on June 23.    Hospitalization was complicated for bilateral abdominal wall issues.  On the right side patient was felt to have chronic wounds/rash.  On the left she had acute abscess and infection resulting in I&D in the operating room.  Calciphyxis considered possible though not confirmed.  Patient left with wound ulcer on the left abdomen requiring wound care treatment which is ongoing.  Please bilateral infectious issues occur in the skin and subcutaneous tissues in the dependent pannus of the lower flanks.  I do not see that any wound cultures were obtained from these areas.  Patient describes many months of work with wound care and soft tissue surgery/debridements to clear up the infection of the right abdominal wall in the past.    Hospitalization also remarkable for hypokalemia at outset improved with Kayexalate.  Patient has paroxysmal atrial fibrillation, her warfarin needed to be held due to the multiple surgeries but she is back on it.  There is also concern over coronary artery disease with positive troponin January 2020 for which she was lost to follow-up with stress test/cardiology never did so.  Finally she had retention resulted in Canchola catheterization which is ongoing.    Subjective/ROS:    -augmented by discussion with facility staff involved in direct care      Patient is quite discouraged today.  She discusses  her medical and social problems and does not see a way out.  She normally would be living with her mother but has been out of that house living with her friend since her last TCU discharge as the friend's house has no stairs.  Meanwhile she says her sister has cleaned out her materials out of her mother's house and that she is lost important documents including her certificates.  She also says her sister is trying to move her mother out of the house and sell the house which would leave her homeless.  She has had a hard time keeping up with her bills from a distance.  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    She feels that she cannot wait months for her prosthesis that she needs to be up and around now to take care of business.    On the right side she feels well physically.  She describes difficulty with physical therapy so far.  She could not handle the sliding board.  She is Neftali lift dependent at the moment.  She had phantom leg pains at the hospital which are better.  However she still using Dilaudid.  She is on gabapentin as well.    Her blood pressure was elevated at the hospital she had addition of amlodipine and Lasix and has been continued on metoprolol.  She is tolerating these changes well and her blood pressures generally been okay here.    Blood sugars have been acceptable here generally less than 200 with some exceptions.    She is tolerating the Canchola catheter well.  She is to have a voiding trial about today.  However it is a holiday with reduced staff and I do not think it is the best day to do it.  She wants to keep the catheter in but we discussed the ramifications of that.  We agreed to suspend the voiding trial until early next week when we have got a full staff and can check PVRs closely etc.    Coronary artery disease she says that everything got put on hold due to pandemic but that she is willing to follow-up when available.    Her INR remains  subtherapeutic 1.7 on June 30.  She has an INR early next week.    Obstructive sleep apnea suspected, sleep study recommended from the hospital.      Patient denies fever sweats chills.  She is eating well.  Her bowels are working fine.  She is having no dysuria.  No back or flank pain.  No abdominal pain other than the superficial wound pain.  She denies falls or injuries.  She is not having headaches.  She does not feel shortness of breath or chest pain.  Remainder 13 system ROS negative    Past Medical History:   Diagnosis Date     Anemia     pernicious     Diabetes mellitus (H)     borderline     Endometrial hyperplasia      Fibromyalgia      History of recurrent UTI (urinary tract infection)      History of transfusion      Hypertension      Thrombocytopenia (H)      Vaginal bleeding 1/2015     Past Surgical History:   Procedure Laterality Date     BELOW KNEE LEG AMPUTATION Right 6/18/2020    Procedure: AMPUTATION, BELOW KNEE;  Surgeon: Epi Corcoran MD;  Location: [facility];  Service: General     BELOW KNEE LEG AMPUTATION Right 6/23/2020    Procedure: REVISION AMPUTATION, BELOW KNEE;  Surgeon: Epi Corcoran MD;  Location: [facility] OR;  Service: General     bilateral carpal tunnel release  2009     CHOLECYSTECTOMY       COLONOSCOPY N/A 9/11/2017    Procedure: COLONOSCOPY;  Surgeon: Tyler Bhakta MD;  Location: [facility];  Service:      COMBINED HYSTEROSCOPY DIAGNOSTIC / D&C N/A 1/28/2015    Procedure: DILATION AND CURETTAGE WITH HYSTEROSCOPY;  Surgeon: Grant Beal MD;  Location: [facility] OR;  Service:      DILATION AND CURETTAGE OF UTERUS       IR NON TUNNELED CATHETER >5 YEARS  1/21/2020     PICC  1/20/2020          NM HYSTEROSCOPY,W/ENDO BX N/A 9/5/2019    Procedure: HYSTEROSCOPY, DILATION AND CURETTAGE;  Surgeon: Grant Beal MD;  Location: [facility];  Service: Gynecology     NM HYSTEROSCOPY,W/ENDO BX N/A 12/9/2019    Procedure: HYSTEROSCOPY,  DILATION AND CURETTAGE;  Surgeon: Grant Beal MD;  Location: [facility] OR;  Service: Gynecology          Family History   Problem Relation Age of Onset     Colon cancer Father    :       Social History     Socioeconomic History     Marital status: Single     Spouse name: Not on file     Number of children: Not on file     Years of education: Not on file     Highest education level: Not on file   Occupational History     Not on file   Social Needs     Financial resource strain: Not on file     Food insecurity     Worry: Not on file     Inability: Not on file     Transportation needs     Medical: Not on file     Non-medical: Not on file   Tobacco Use     Smoking status: Never Smoker     Smokeless tobacco: Never Used   Substance and Sexual Activity     Alcohol use: Not Currently     Comment: rare     Drug use: No     Sexual activity: Not on file   Lifestyle     Physical activity     Days per week: Not on file     Minutes per session: Not on file     Stress: Not on file   Relationships     Social connections     Talks on phone: Not on file     Gets together: Not on file     Attends Christianity service: Not on file     Active member of club or organization: Not on file     Attends meetings of clubs or organizations: Not on file     Relationship status: Not on file     Intimate partner violence     Fear of current or ex partner: Not on file     Emotionally abused: Not on file     Physically abused: Not on file     Forced sexual activity: Not on file   Other Topics Concern     Not on file   Social History Narrative     Not on file   :    Currently living at a friend's house.  Normally she would live at her most.    Current Outpatient Medications on File Prior to Visit   Medication Sig Dispense Refill     acetaminophen (TYLENOL) 500 MG tablet Take 2 tablets (1,000 mg total) by mouth every 6 (six) hours as needed for pain or fever.  0     amLODIPine (NORVASC) 10 MG tablet Take 1 tablet (10 mg total) by mouth daily.  " 0     aspirin 81 MG EC tablet Take 81 mg by mouth daily.       calcium acetate,phosphat bind, (PHOSLO) 667 mg capsule Take 1 capsule (667 mg total) by mouth 3 (three) times a day with meals. 270 capsule 3     cholecalciferol, vitamin D3, (VITAMIN D3) 1,000 unit capsule Take 1,000 Units by mouth daily.       collagenase ointment Apply daily per WOC  0     ferrous sulfate 325 (65 FE) MG tablet Take 1 tablet by mouth 2 (two) times a day with meals.       furosemide (LASIX) 20 MG tablet Take 1 tablet (20 mg total) by mouth daily.  0     gabapentin (NEURONTIN) 400 MG capsule Take 1 capsule (400 mg total) by mouth 3 (three) times a day.  0     HYDROmorphone (DILAUDID) 2 MG tablet Take 1 tablet (2 mg total) by mouth every 4 (four) hours as needed. 30 tablet 0     insulin glargine (BASAGLAR KWIKPEN) 100 unit/mL (3 mL) pen Inject 10 Units under the skin at bedtime.  0     magnesium 250 mg Tab Take 500 mg by mouth 2 (two) times a day.        metoprolol tartrate (LOPRESSOR) 25 MG tablet Take 1 tablet (25 mg total) by mouth 2 (two) times a day. 180 tablet 3     NOVOLOG FLEXPEN U-100 INSULIN 100 unit/mL (3 mL) injection pen Check blood sugar four (4) times daily.  11. (Patient taking differently: Inject 4 Units under the skin 3 (three) times a day before meals. )  0     senna-docusate (PERICOLACE) 8.6-50 mg tablet Take 1 tablet by mouth 2 (two) times a day.  0     sodium bicarbonate 650 MG tablet Take 1 tablet (650 mg total) by mouth 2 (two) times a day. OTC product  0     warfarin ANTICOAGULANT (COUMADIN/JANTOVEN) 1 MG tablet Take 5 mg on 6/28/20, redose warfarin based on INR on 6/29  0     BD ULTRA-FINE MICRO PEN NEEDLE 32 gauge x 1/4\" Ndle USE AS DIRECTED 4 TIMES DAILY. 360 each 3     blood glucose test (ACCU-CHEK SAM PLUS TEST STRP) strips TEST 1 TIME A  strip 10     blood glucose test (ACCU-CHEK SAM PLUS TEST STRP) strips test blood sugar level 6 times daily 600 strip 4     lancets (ACCU-CHEK MULTICLIX LANCET) " Misc TEST 6 TIMES A  each 11     NOVOLOG FLEXPEN U-100 INSULIN 100 unit/mL (3 mL) injection pen Check blood sugar four (4) times daily.  {  0     No current facility-administered medications on file prior to visit.    :      ALLERGIES:  Hydralazine; Latex; Naprosyn [naproxen]; Nitrofurantoin; Sulfa (sulfonamide antibiotics); and Vicks vaporub [camphor-eucalyptus oil-menthol]    Vitals:  There were no vitals taken for this visit. except as noted below    Vital signs: Reviewed per facility EMR vitals including as follows:                Current Vitals   BP: 130/43 mmHg  7/3/2020 15:52  Temp:97.8  F  7/3/2020 15:52  Pulse: 62 bpm  7/3/2020 15:52  Weight: 273.4 Lbs  7/2/2020 10:45  Resp: 18 Breaths/min  7/3/2020 15:52  BS: 252 mg/dL  7/3/2020 15:41  O2: 90 %  7/3/2020 15:52  Pain: 6  7/3/2020 12:  There is no height or weight on file to calculate BMI.    Physical exam:    General:  Alert  oriented x3 appears comfortable    Normocephalic atraumatic sclera clear EOMI gaze is conjugate demonstrates good range of motion of neck with rotation of head in all directions.  Demonstrates purposeful movement of all extremities.  Patient has BKA right with rigid plastic splint/supporting device in place.  Left foot shows excellent perfusion with strong pulses and no edema.  Left abdominal wound is approximately an inch and a half in diameter estimated 4 mm depth with minimal slough on the base.  Surrounding purplish erythema and slight induration which is mildly tender.  On the right she has chronic hyperpigmentation no drainage with small ulcer    Abdomen shows that she has redundant soft adipose tissues and it is the dependent area of these in the flanks that have the ulcerations and chronic edematous changes.    Patient is normally conversant with strong speech breathing comfortably without tachypnea or accessory muscle use    Canchola catheter shows clear yellow urine      Due to the 2020 Covid 19 pandemic, except as noted  above, the patient was visually observed at a 6 foot plus distance.  An observational exam was performed in an effort to keep patient safe from Covid 19 and other communicable diseases.   Labs:  Lab Results   Component Value Date    WBC 9.5 06/27/2020    HGB 7.9 (L) 06/27/2020    HCT 25.5 (L) 06/27/2020    MCV 95 06/27/2020     06/27/2020     Results for orders placed or performed during the hospital encounter of 06/16/20   Basic Metabolic Panel   Result Value Ref Range    Sodium 141 136 - 145 mmol/L    Potassium 4.2 3.5 - 5.0 mmol/L    Chloride 107 98 - 107 mmol/L    CO2 27 22 - 31 mmol/L    Anion Gap, Calculation 7 5 - 18 mmol/L    Glucose 116 70 - 125 mg/dL    Calcium 8.8 8.5 - 10.5 mg/dL    BUN 30 (H) 8 - 22 mg/dL    Creatinine 1.57 (H) 0.60 - 1.10 mg/dL    GFR MDRD Af Amer 40 (L) >60 mL/min/1.73m2    GFR MDRD Non Af Amer 33 (L) >60 mL/min/1.73m2         Lab Results   Component Value Date    TSH 1.83 06/17/2020     Lab Results   Component Value Date    HGBA1C 10.3 (H) 06/17/2020     [unfilled]  Lab Results   Component Value Date    YVKWGBXU41 285 01/07/2011     Lab Results   Component Value Date     (H) 01/20/2020     [unfilled]  Most Recent EKG     Units 06/16/20  1820   VENTRATE BPM 76   ATRIALRATE BPM 76   QRSDURATION ms 94   QTINTERVAL ms 428   QTCCALC ms 481   P Axis degrees 55   RAXIS degrees -43   TAXIS degrees 12   MUSEDX  Normal sinus rhythm  Left axis deviation  Inferior infarct (cited on or before 13-MAR-2020)  Abnormal ECG  When compared with ECG of 14-MAR-2020 13:04,  No significant change was found  Confirmed by MARYANA COLLINS MD LOC:[facility] ([id number]) on 6/17/2020 1:41:23 PM         INR 1.7 on June 30  Assessment/Plan:      ICD-10-CM    1. CKD (chronic kidney disease) stage 4, GFR 15-29 ml/min (H)  N18.4    2. Phantom limb pain (H)  G54.6    3. Hx of right BKA (H)  Z89.511    4. Paroxysmal atrial fibrillation (H)  I48.0    5. Type 2 diabetes mellitus with other specified complication, with  long-term current use of insulin (H)  E11.69     Z79.4    6. Essential hypertension  I10        Chronic right foot ulcer now status post BKA  Right foot/leg cellulitis now status post BKA  Guillotine amputation June 30  Stump revision/BKA June 23   Patient has surgical follow-up on the ninth.  She does not want to pay to go there.  I discussed this with staff will take it up with her.  She needs appropriate surgical follow-up to assure adequate healing and plans for prosthesis etc.  Patient is no longer on antibiotics and doing well.  PT, OT,  all involved.  Pain control has been adequate.  Would hope to taper off the hydromorphone over the next week or 2 and we set some goals in that regard.  She is also on gabapentin and has acetaminophen.    Constipation   Bowel programs in place with senna docusate 1 tablet twice daily.  She reports good results    Abdominal wounds  Calciphylaxis considered possible here, though far from a certainty.  There was some doubt from some of her specialist..  However she appears to be responding to wound care.  She had left-sided abscess drained.  Continue collagenase ointment and other wound care as outlined by wound care specialist  -Follow-up with wound care team here at the facility and/or at the wound care clinic.  -New Lasix might help the edematous issue that she is having in the dependent flank areas.    Hypokalemia   Resolved    Paroxysmal atrial fibrillation   Back on warfarin but subtherapeutic.  Her warfarin has been adjusted and she will have an INR early next week.  Rate is controlled on metoprolol.    CKD 4   I added BMP and CBC to her next INR draw early next week    Chronic anemia   Likely anemia of chronic disease.  Her nephrologist notes an iron deficiency component and she is on iron infusion therapy last hemoglobin 7.9 which she is tolerating well.  She is generally more in the 9 range.  Recheck CBC next week as noted.  She takes oral iron as well  unclear that she is observing it.    Acute urinary retention   Patient reports it was really hard to urinate in bed on a bedpan etc.  Hopefully she will be more mobile in the next few days.  -Anticipate voiding trial early next week, Canchola was left in place until then    Obesity   Complicates her movement, limits mobility and increase his difficulty of therapy.  Associated with other increased risk of morbidity as well.    Insulin-dependent type 2 diabetes.  Uncontrolled   A1c 10.3.  Her sugars are better than that here likely due to carbohydrate controlled diet and structured setting.  She is on aspartame 4 units with each meal.  She has on 10 units glargine at at bedtime.  I make no changes today based on recent results.  However I suspect she is somewhat underdosed we may need to increase over the next days.    Hypertension   Continue metoprolol.  New Lasix and amlodipine are in order.    Social stressors and factors   Social service consultation        Case discussed with:    Facility staff       Time: 55 minutes with > 50% in counseling and coordination of care as noted above         Harry Blackwood MD

## 2021-06-09 NOTE — PROGRESS NOTES
"  [facility]      NAME:  Jazmin Bauman             :  1955    MRN: [id number]    CODE STATUS:  FULL CODE    FACILITY: [facility] [[id number]]         CHIEF COMPLAIN/REASON FOR VISIT:  Chief Complaint   Patient presents with     Review Of Multiple Medical Conditions     wound check       HISTORY OF PRESENT ILLNESS: Jazmin Bauman is a 65 y.o. female being seen for review of multiple medical conditions. Seen in her room as lab unable to get phlebotomy draw. INR due today as well as other labs as ordered per hospital. Approved to draw from left ft this am. On coumadin for Afib, no excessive bleeding or bruising observed. Pt was at [facility] from  to  and underwent a RBKA r/t ongoing DM ulcer with osteomyelitis.Per her EMR dc summary \" with HTN,morbid obesity, DM, A fib, CKD 4 presented for evaluation of R foot swelling, difficulty ambulation, pain found to have osteomyelitis now s/p amputation. She has now been to the OR twice this stay, last was on 20. 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   Patient had a chronic diabetic foot ulcer on her R heel, presented for increased swelling, difficulty ambulating, and pain. MRI showed extensive soft tissue necrosis and some osteomyelitis along with cellulitis  Met sepsis criteria on admission with leukocytosis and elevated CRP  Podiatry saw and the foot was not felt to be salvageable and BKA was recommended, -now POD #9  From guillotine amputation by Dr Corcoran. POD #5 revision of stump.  Treated with zosyn /vanvo, blood culture positive for Peptostreptococcus and Streptococcus, changing to Augmentin for 4 more days.  WBC now normal.  Appreciate ID consultation.  Continue home gabapentin and has PRN dilaudid as well.  Pain team following.     Acute on chronic blood loss anemia-likely from anemia chronic disease, iron " deficiency, chronic kidney disease, surgery.  needed pRBC on 6/23--stable at 7.9.  Iron deficient, nephrology started IV iron.  Discharged on oral iron     Gram-positive bacteremia x2 species  Patient with positive blood culture from 6/16 growing Strep anginosus and Peptostreptococcus  Changed from Zosyn to Augmentin.     Left abdominal wall cellulitis,  On antibiotics as above without improvement, surg did debridement too.  Ultrasound showed no abscess.  ID and surgery doubts calciphylaxis, minimal pain, no significant necrosis though she does have risk factors.  Does not need biopsy at this point, continue current wound care and antibiotics.    Discussed with surgery, nephrology and ID.  Needs close follow-up at the TCU     Chronic right abdominal wound-no signs of worsening, small scab, no induration or signs of abscess or fluctuance.  Has been present for months.  Wound care following, continue to monitor.  Doubt calciphylaxis Will need to follow-up with nephrology as outpatient, holding on thiosulfate here.     Insulin-dependent diabetes  Poorly controlled with A1c 10.3  Had some hypoglycemia, Lantus dose decreased to 10 units, discharged on NovoLog 4 units pre-meal and sliding scale with better control.  Suspect she was noncompliant with her insulin as her A1c was so and she did not require what her med rec stated for her insulin.       Essential hypertension  Elevated, asymptomatic, continued metoprolol, added furosemide and amlodipine with improved blood pressure control.      CKD 4  -Home PhosLo  -Avoid nephrotoxins  -Follows with Dr Frost, with concern of possible Calciphylaxis, renal seeing here  Creatinine stable.  Started furosemide.       HyperK  On admission  -got kayexalate  -This is chronic intermittent problem for her  -low K diet   Potassium normal at discharge     Paroxysmal atrial fibrillation.  Held warfarin for surgery.  Restarted after surgery.  Pharmacy dosing.  Continue home metoprolol.  " INR still subtherapeutic.  Recheck tomorrow     Coronary artery disease  She had a detectable troponin level when hospitalized back in January and outpatient stress test with a 2-day protocol was recommended but she never followed up  Cardiology saw and recommend outpatient stress test, but ok with surgery  Echocardiogram showed no wall motion abnormality and a normal EF  -also needs outpt CRISTINA eval  Continue metoprolol and aspirin.     Morbid obesity  Affecting her mobility and her ability to live independently as well as her overall health  Cardiology also recommend a sleep evaluation for probable CRISTINA     Urinary retention-Canchola placed due to retaining 900 cc of urine, trial of Canchola removal at the TCU in 1 week, follow-up with [facility] as an outpatient if she fails trial of removal.\"  We will attempt vding trial next week. She is seen in room and has flat affect and has toldme that God has talked to her twice about family matters. She would like to go home, states she cares for her elderly 94 yo mother. We discussed her ongoing care needs including therapies and med management today.    Allergies   Allergen Reactions     Hydralazine      Paralysis of legs     Latex Itching     Skin Burns     Naprosyn [Naproxen] Swelling     throat     Nitrofurantoin Hives     Sulfa (Sulfonamide Antibiotics) Rash     Vicks Vaporub [Camphor-Eucalyptus Oil-Menthol] Rash   :     Current Outpatient Medications   Medication Sig     acetaminophen (TYLENOL) 500 MG tablet Take 2 tablets (1,000 mg total) by mouth every 6 (six) hours as needed for pain or fever.     amLODIPine (NORVASC) 10 MG tablet Take 1 tablet (10 mg total) by mouth daily.     amoxicillin-clavulanate (AUGMENTIN) 875-125 mg per tablet Take 1 tablet by mouth 2 (two) times a day for 2 days.     aspirin 81 MG EC tablet Take 81 mg by mouth daily.     BD ULTRA-FINE MICRO PEN NEEDLE 32 gauge x 1/4\" Ndle USE AS DIRECTED 4 TIMES DAILY.     blood glucose test (ACCU-CHEK SAM " PLUS TEST STRP) strips TEST 1 TIME A DAY     blood glucose test (ACCU-CHEK SAM PLUS TEST STRP) strips test blood sugar level 6 times daily     calcium acetate,phosphat bind, (PHOSLO) 667 mg capsule Take 1 capsule (667 mg total) by mouth 3 (three) times a day with meals.     cholecalciferol, vitamin D3, (VITAMIN D3) 1,000 unit capsule Take 1,000 Units by mouth daily.     collagenase ointment Apply daily per WOC     ferrous sulfate 325 (65 FE) MG tablet Take 1 tablet by mouth 2 (two) times a day with meals.     furosemide (LASIX) 20 MG tablet Take 1 tablet (20 mg total) by mouth daily.     gabapentin (NEURONTIN) 400 MG capsule Take 1 capsule (400 mg total) by mouth 3 (three) times a day.     HYDROmorphone (DILAUDID) 2 MG tablet Take 1 tablet (2 mg total) by mouth every 4 (four) hours as needed.     insulin glargine (BASAGLAR KWIKPEN) 100 unit/mL (3 mL) pen Inject 10 Units under the skin at bedtime.     lancets (ACCU-CHEK MULTICLIX LANCET) Misc TEST 6 TIMES A DAY     magnesium 250 mg Tab Take 500 mg by mouth 2 (two) times a day.      metoprolol tartrate (LOPRESSOR) 25 MG tablet Take 1 tablet (25 mg total) by mouth 2 (two) times a day.     NOVOLOG FLEXPEN U-100 INSULIN 100 unit/mL (3 mL) injection pen Check blood sugar four (4) times daily.  11.     NOVOLOG FLEXPEN U-100 INSULIN 100 unit/mL (3 mL) injection pen Check blood sugar four (4) times daily.  {     senna-docusate (PERICOLACE) 8.6-50 mg tablet Take 1 tablet by mouth 2 (two) times a day.     sodium bicarbonate 650 MG tablet Take 1 tablet (650 mg total) by mouth 2 (two) times a day. OTC product     warfarin ANTICOAGULANT (COUMADIN/JANTOVEN) 1 MG tablet Take 5 mg on 6/28/20, redose warfarin based on INR on 6/29         REVIEW OF SYSTEMS:    Currently, no fever, chills, or rigors. Does not have any visual or hearing problems. Denies any chest pain, headaches, palpitations, lightheadedness, dizziness, shortness of breath, or cough. Appetite is good. Denies any GERD  symptoms. Denies any difficulty with swallowing, nausea, or vomiting.  Denies any abdominal pain, diarrhea or constipation. Denies any urinary symptoms. No insomnia. No active bleeding. No rash.       PHYSICAL EXAMINATION:  Vitals:    06/30/20 1704   BP: 146/67   Pulse: 77   Temp: 97.7  F (36.5  C)   Weight: (!) 284 lb 8 oz (129 kg)         GENERAL: Awake, Alert, oriented x3, not in any form of acute distress, answers questions appropriately, follows simple commands, conversant with flat affect  HEENT: Head is normocephalic with normal hair distribution. No evidence of trauma. Ears: No acute purulent discharge. Eyes: Conjunctivae pink with no scleral jaundice. Nose: Normal mucosa and septum. NECK: Supple with no cervical or supraclavicular lymphadenopathy. Trachea is midline.   CHEST: No tenderness or deformity, no crepitus  LUNG: Clear to auscultation with good chest expansion. There DM BS  BACK: No kyphosis of the thoracic spine. Symmetric, no curvature, ROM normal, no CVA tenderness, no spinal tenderness   CVS: There is good S1  S2,  rhythm is regular.  ABDOMEN: Globular and ROTUND tender to palpation, non distended, no masses, no organomegaly, good bowel sounds, no rebound or guarding, no peritoneal signs. Wound to left side of abdomen, upper 2 o'clock with tunneling, yellow slough to bed. Followed by wound dr at facility.  EXTREMITIES: UE Full ROM. Right BKA, IN a  with protected brace  SKIN: Warm and dry, no erythema noted, open area with dressing on abdomen  NEUROLOGICAL: The patient is oriented to person, place and time. Strength and sensation are grossly intact. Face is symmetric.          LABS:    Lab Results   Component Value Date    WBC 9.5 06/27/2020    HGB 7.9 (L) 06/27/2020    HCT 25.5 (L) 06/27/2020    MCV 95 06/27/2020     06/27/2020       Results for orders placed or performed during the hospital encounter of 06/16/20   Basic Metabolic Panel   Result Value Ref Range    Sodium 141 136  - 145 mmol/L    Potassium 4.2 3.5 - 5.0 mmol/L    Chloride 107 98 - 107 mmol/L    CO2 27 22 - 31 mmol/L    Anion Gap, Calculation 7 5 - 18 mmol/L    Glucose 116 70 - 125 mg/dL    Calcium 8.8 8.5 - 10.5 mg/dL    BUN 30 (H) 8 - 22 mg/dL    Creatinine 1.57 (H) 0.60 - 1.10 mg/dL    GFR MDRD Af Amer 40 (L) >60 mL/min/1.73m2    GFR MDRD Non Af Amer 33 (L) >60 mL/min/1.73m2           Lab Results   Component Value Date    HGBA1C 10.3 (H) 06/17/2020     Vitamin D, Total (25-Hydroxy)   Date Value Ref Range Status   04/28/2016 29.8 (L) 30.0 - 80.0 ng/mL Final     Lab Results   Component Value Date    WLEGMHUU50 285 01/07/2011       ASSESSMENT/PLAN:  1. Type 2 diabetes mellitus with other specified complication, with long-term current use of insulin (H)    2. Hx of right BKA (H)    3. Chronic wound infection of abdomen, subsequent encounter      1. DM: 10 units Glaragine at night with 4 units novolog with meals. , APPEARS SHE IS RUNNING HIGH 100 TO , WE WILL MONITOR.    2. RBKA: Reports pain stable during visit. Unable to assess actual stump, in  and brace. Attend therapies as needed PT/OT. SN for chronic medical conditions management    3. Chronic abdomen wound, left side, daily dressing per nursing staff, a bit red today with slough, I will defer to wound rounds today as wound rounds will take place, tender to touch.    Pt did seem flat as well as some comments mad related to auditory hallucinations. She told me God has talked to her now twice on the TCU. I recommend ACP services at this time, referral made.      Electronically signed by:  Azalea Kevin CNP  This progress note was completed using Dragon software and there may be grammatical errors.

## 2021-06-09 NOTE — TELEPHONE ENCOUNTER
Forms Request  Name of form/paperwork: Other:  Medicare new prescriptio order for diabetic testing supplies  Have you been seen for this request: N/A  Do we have the form: Yes- will refax again to fax number [phone]  When is form needed by: as soon as possible   How would you like the form returned: Fax:  fax number provided on the form  Patient Notified form requests are processed in 3-5 business days: No    Okay to leave a detailed message? NO  [phone]    FYI: Caller stated this form is incomplete. Caller stated everything that has a star must be completed before faxing back. Caller stated this has been going back and forth and the forms just keeps coming back to them incomplete.    Please scan this form in patient's chart for records.

## 2021-06-09 NOTE — ANESTHESIA PREPROCEDURE EVALUATION
Anesthesia Evaluation      Patient summary reviewed   No history of anesthetic complications (tolerated procedure under MAC previously - fent/versed/propofol gtt 80-100mcg/kg/min)     Airway   Mallampati: III  Neck ROM: full   Pulmonary    (+) decreased breath sounds,   (-) not a smokerSleep apnea: CRISTINA risk factors.                         Cardiovascular   Exercise tolerance: < 4 METS (Able to do stairs  Ambulates with canes)  (+) hypertension, ,     ECG reviewed     ROS comment: 6/2020 TTE  Summary     Normal left ventricular size and systolic function.  Left ventricle ejection fraction is normal. The calculated left ventricular ejection fraction is 61%.  Normal right ventricular size and systolic function.  No hemodynamically significant valvular heart abnormalities.  When compared to the previous study dated 1/21/2020, normal RV function on current study         Neuro/Psych    (+) neuromuscular disease (Fibromyalgia),      Endo/Other    (+) diabetes mellitus (A1C 10.3) poorly controlled using insulin, arthritis, obesity (BMI 57),      GI/Hepatic/Renal    (+)   chronic renal disease (acute renal insufficiency),   (-) GERD     Other findings:   Results for KATI HERNANDES (MRN [id number]) as of 6/18/2020 08:30    6/18/2020 05:13  WBC: 11.8 (H)  RBC: 3.05 (L)  Hemoglobin: 8.7 (L)  Hematocrit: 28.4 (L)  MCV: 93  MCH: 28.5  MCHC: 30.6 (L)  RDW: 14.4  Platelets: 363    NPO > 8 hrs      Thrombocytopenia - resolved    Fibromyalgia    Carpal Tunnel Syndrome    Urinary Tract Infection    Endometrial Hyperplasia    Cholelithiasis    Leukocytosis    Urine Tests Nonspecific Abnormal Findings    Obesity    Essential Hypertension    Pernicious Anemia    Immunology Studies Raised Immunoglobulin Level    Vaginal bleeding    Type 2 diabetes mellitus (H)     Obese      Dental    (+) poor dentition    Comment: Multiple missing and chipped teeth                         Anesthesia Plan  Planned anesthetic: general endotracheal  Will  discuss with surgeon need for cardiac consultation prior to OR today.    Femoral PNB for post-operative analgesia.    Will transfuse a unit of blood prior to OR today.     ASA 4   Induction: intravenous   Anesthetic plan and risks discussed with: patient  Anesthesia plan special considerations: antiemetics,   Post-op plan: routine recovery

## 2021-06-09 NOTE — TELEPHONE ENCOUNTER
Dr. Flores tried calling patient 3 times between 3:15pm and 4pm (after I checked her in and told her he would call soon and she agreed she was ready) He called one more time at 4pm today. There was no answer all these attempts Patient should reschedule if she calls back. Thank you Michelle

## 2021-06-09 NOTE — ANESTHESIA POSTPROCEDURE EVALUATION
Patient: Jazmin Bauman  Procedure(s):  AMPUTATION, BELOW KNEE (Right)  Anesthesia type: general    Patient location: PACU  Last vitals:   Vitals Value Taken Time   /60 6/18/2020 12:29 PM   Temp 36.8  C (98.3  F) 6/18/2020 12:24 PM   Pulse 78 6/18/2020 12:29 PM   Resp 20 6/18/2020 12:24 PM   SpO2 96 % 6/18/2020 12:29 PM     Post vital signs: stable  Level of consciousness: awake and responds to simple questions  Post-anesthesia pain: pain controlled  Post-anesthesia nausea and vomiting: no  Pulmonary: unassisted, return to baseline  Cardiovascular: stable and blood pressure at baseline  Hydration: adequate  Anesthetic events: no    QCDR Measures:  ASA# 11 - Devi-op Cardiac Arrest: ASA11B - Patient did NOT experience unanticipated cardiac arrest  ASA# 12 - Devi-op Mortality Rate: ASA12B - Patient did NOT die  ASA# 13 - PACU Re-Intubation Rate: ASA13B - Patient did NOT require a new airway mgmt  ASA# 10 - Composite Anes Safety: ASA10A - No serious adverse event    Additional Notes:

## 2021-06-09 NOTE — ANESTHESIA CARE TRANSFER NOTE
Last vitals:   Vitals:    06/18/20 1043   BP: 143/63   Pulse: 88   Resp: 20   Temp: 37.8  C (100.1  F)   SpO2: 98%     Patient's level of consciousness is drowsy  Spontaneous respirations: yes  Maintains airway independently: yes  Dentition unchanged: yes  Oropharynx: oropharynx clear of all foreign objects    QCDR Measures:  ASA# 20 - Surgical Safety Checklist: WHO surgical safety checklist completed prior to induction    PQRS# 430 - Adult PONV Prevention: 4558F-8P - Pt did NOT receive => 2 anti-emetic agents  ASA# 8 - Peds PONV Prevention: NA - Not pediatric patient, not GA or 2 or more risk factors NOT present  PQRS# 424 - Devi-op Temp Management: 4559F - At least one body temp DOCUMENTED => 35.5C or 95.9F within required timeframe  PQRS# 426 - PACU Transfer Protocol: - Transfer of care checklist used  ASA# 14 - Acute Post-op Pain: ASA14B - Patient did NOT experience pain >= 7 out of 10

## 2021-06-09 NOTE — ANESTHESIA PROCEDURE NOTES
Peripheral Block    Patient location during procedure: pre-op  Start time: 6/23/2020 2:47 PM  End time: 6/23/2020 2:48 PM  post-op analgesia per surgeon order as noted in medical record  Staffing:  Performing  Anesthesiologist: Vane Mcarthur MD  Preanesthetic Checklist  Completed: patient identified, site marked, risks, benefits, and alternatives discussed, timeout performed, consent obtained, airway assessed, oxygen available, suction available, emergency drugs available and hand hygiene performed  Peripheral Block  Block type: saphenous, adductor canal block  Prep: ChloraPrep  Patient position: supine  Patient monitoring: cardiac monitor, continuous pulse oximetry, heart rate and blood pressure  Laterality: right  Injection technique: ultrasound guided    Ultrasound used to visualize needle placement in proximity to nerve being blocked: yes   US used to visualize anesthetic spread  Visualized anatomic structures normal  No Pathological Findings  Permanent ultrasound image captured for medical record  Sterile gel and probe cover used for ultrasound.  Needle  Needle type: echogenic   Needle gauge: 20G  Needle length: 4 in  no peripheral nerve catheter placed  Assessment  Injection assessment: no difficulty with injection, negative aspiration for heme, no paresthesia on injection and incremental injection  Additional Notes    Vane Mcarthur MD  Anesthesiologist  [facility]

## 2021-06-09 NOTE — TELEPHONE ENCOUNTER
[facility] Nurse Triage Telephone Note      Provider: VIJAY Vargas  Facility: [facility]    Facility Type: TCU    Caller: Patricia  Call Back Number:  [phone]    Allergies: Hydralazine; Latex; Naprosyn [naproxen]; Nitrofurantoin; Sulfa (sulfonamide antibiotics); and Vicks vaporub [camphor-eucalyptus oil-menthol]    Reason for call: Aide reported blood clot in pt brief approx 2cm. Sushant gomez DC'd. Nurse hadn't scanned for PVR yet. Pt also C/O burning.     Verbal Order/Direction given by Provider: Make sure checking PVR & Str cathing per previous orders given & document in PCC. Str cath UA cond UC.    Provider giving order: VIJAY Vargas    Verbal order given to: Patricia Shepard RN

## 2021-06-09 NOTE — TELEPHONE ENCOUNTER
----- Message from Taniya Rob sent at 7/17/2020 10:29 AM CDT -----  Regarding: Test question  Caller: Jazmin    Primary cardiologist: Dr. Zepeda    Detailed reason for call: Jazmin has Stress Test scheduled for 7/23/2020 and asking if there are any restrictions on medications, she is diabetic. All other test prep info was given. Please call back.     Best phone number: [phone]   Best time to contact: Today    Ok to leave a detailed message? Yes    Device? No          Called back Jazmin to address her concerns. See telephone encounter with triage nurse dated 7/16/2020. She was updated on test prep. Her stress test is not until 12:45 so she was reminded to be NPO 4 hours prior and no caffeine x24 hours. She should make sure the TCU nurse checks her blood sugar before she has her appt and hold any noon time insulin due to NPO status. She she monitor BG in the AM prior to breakfast if she is able to eat before 8 am approximately. Would recommend holding depending on size of breakfast and what sugar reveals. Updated patient that RN at TCU will make this assessment and was spoken with yesterday in regards to medication holds. [place] [place]

## 2021-06-10 NOTE — PROGRESS NOTES
[facility]    Facility:  [facility] AT [facility] [[id number]]    Code Status:  FULL CODE    Chief Complaint   Patient presents with     Review Of Multiple Medical Conditions     Problem Visit   :                    Patient Active Problem List   Diagnosis     Carpal Tunnel Syndrome     Urinary Tract Infection     Endometrial Hyperplasia     Cholelithiasis     Leukocytosis     Urine Tests Nonspecific Abnormal Findings     Obesity     Essential hypertension     Pernicious Anemia     Immunology Studies Raised Immunoglobulin Level     Vaginal bleeding     Type 2 diabetes mellitus (H)     Atrial fibrillation with RVR (H)     Acute kidney injury superimposed on CKD (H)     Non-traumatic rhabdomyolysis     Metabolic acidosis     Troponin level elevated     Bacteremia     Acute respiratory failure with hypoxia (H)     Acute encephalopathy     Acute pulmonary edema (H)     Wheezing     Moderate protein malnutrition (H)     Abnormal cytological findings in specimens from other female genital organs     Chronic kidney disease, stage III (moderate) (H)     Hyperkalemia     Osteoarthritis     Acute kidney injury (H)     Sepsis (H)     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)     Sepsis, due to unspecified organism, unspecified whether acute organ dysfunction present (H)     Cellulitis of right foot     CKD (chronic kidney disease) stage 4, GFR 15-29 ml/min (H)     Chronic wound infection of abdomen, subsequent encounter     Acute post-operative pain     Phantom limb pain (H)     Paroxysmal atrial fibrillation (H)     Urinary retention     Coronary artery disease without angina pectoris     Hx of right BKA (H)     Weakness       History:  Jazmin Bauman  is a 65 year old female with history of CKD 4, morbid obesity, insulin-dependent type 2 diabetes, chronic atrial fibrillation, peripheral neuropathy, GERD, peripheral edema, obesity, anemia of chronic disease with iron deficiency  seen for follow-up of multiple medical issues as well as new acute kidney injury on 7/31/2020    Hospital Course: Patient was admitted between June 16 and June 28.    Patient has history of chronic nonhealing right heel ulcer which became infected without her knowledge.  Patient's housemate summoned help and patient was transported to the emergency room.  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  Patient was septic and treated with Vanco/Zosyn.  Blood culture was positive for Peptostreptococcus and Streptococcus.  As patient improved she was transitioned to Augmentin.     Guillotine amputation converted to BKA on June 23.    Hospitalization was complicated for bilateral abdominal wall issues.  On the right side patient was felt to have chronic wounds/rash.  On the left she had acute abscess and infection resulting in I&D in the operating room.  Calciphyxis considered possible though not confirmed.  Patient left with wound ulcer on the left abdomen requiring wound care treatment which is ongoing.  Please bilateral infectious issues occur in the skin and subcutaneous tissues in the dependent pannus of the lower flanks.  I do not see that any wound cultures were obtained from these areas.  Patient describes many months of work with wound care and soft tissue surgery/debridements to clear up the infection of the right abdominal wall in the past.    Hospitalization also remarkable for hypokalemia at outset improved with Kayexalate.  Patient has paroxysmal atrial fibrillation, her warfarin needed to be held due to the multiple surgeries but she is back on it.  There is also concern over coronary artery disease with positive troponin January 2020 for which she was lost to follow-up with stress test/cardiology never did so.  Finally she had retention resulted in Canchola catheterization which is ongoing.    Subjective/ROS:    -augmented  by discussion with facility staff involved in direct care    -Patient's creatinine remains elevated 2.23 on July 28, similar to other recent readings.  Baseline at time of recent discharge was 1.78.  Has continued to lose weight down to 244.5 pounds, she had a peak of 273 on July 2.  This ongoing weight loss is despite being off Lasix which we discontinued a couple of weeks ago.  She has follow-up with nephrology on August 21.  She is not on nephrotoxic medications at the moment.    -We reviewed her stress test results which are reassuring.  Questions were answered.  She has follow-up with Dr. Zepeda in 1 month    -Patient was to to have her stress test today.  She says she canceled that this morning as she did not feel well.  She had some sort of vague abdominal complaint which was transient.  She rescheduled that for next Thursday the 23rd.    - I was present for wound treatment today, large deep ulcers noted with partial granulation and little slough.  There is some scabbing on the left side on the skin adjacent.  I do not know if there is some adhesive issue there or not.    -Blood sugar control has improved though she still has readings above 200 at times.    -We discussed her urinary retention.  Canchola has been back in for a week due to PVRs as high as 900.  She has follow-up with urology on Monday, August 3.  Thus we decided to leave the catheter in until that follow-up.  Voiding trial/urodynamics may be necessary?    -Patient complains of constipation despite being on senna +1 twice daily.  She was formally on Colace and wonders what happened to that.    -INR therapeutic, new orders left  - Patient has been seeing wound care, she has some concern over the right flank wound which is been slow to heal but wound care notes are encouraging.      ===================================================        Past Medical History:   Diagnosis Date     Anemia     pernicious     Diabetes mellitus (H)     borderline      Endometrial hyperplasia      Fibromyalgia      History of recurrent UTI (urinary tract infection)      History of transfusion      Hypertension      Thrombocytopenia (H)      Vaginal bleeding 1/2015     Past Surgical History:   Procedure Laterality Date     BELOW KNEE LEG AMPUTATION Right 6/18/2020    Procedure: AMPUTATION, BELOW KNEE;  Surgeon: Epi Corcoran MD;  Location: [facility];  Service: General     BELOW KNEE LEG AMPUTATION Right 6/23/2020    Procedure: REVISION AMPUTATION, BELOW KNEE;  Surgeon: Epi Corcoran MD;  Location: [facility];  Service: General     bilateral carpal tunnel release  2009     CHOLECYSTECTOMY       COLONOSCOPY N/A 9/11/2017    Procedure: COLONOSCOPY;  Surgeon: Tyler Bhakta MD;  Location: [facility];  Service:      COMBINED HYSTEROSCOPY DIAGNOSTIC / D&C N/A 1/28/2015    Procedure: DILATION AND CURETTAGE WITH HYSTEROSCOPY;  Surgeon: Grant Beal MD;  Location: [facility];  Service:      DILATION AND CURETTAGE OF UTERUS       IR NON TUNNELED CATHETER >5 YEARS  1/21/2020     [facility]  1/20/2020          KS HYSTEROSCOPY,W/ENDO BX N/A 9/5/2019    Procedure: HYSTEROSCOPY, DILATION AND CURETTAGE;  Surgeon: Grant Beal MD;  Location: [facility];  Service: Gynecology     KS HYSTEROSCOPY,W/ENDO BX N/A 12/9/2019    Procedure: HYSTEROSCOPY, DILATION AND CURETTAGE;  Surgeon: Grant Beal MD;  Location: [facility];  Service: Gynecology          Family History   Problem Relation Age of Onset     Colon cancer Father    :       Social History     Socioeconomic History     Marital status: Single     Spouse name: Not on file     Number of children: Not on file     Years of education: Not on file     Highest education level: Not on file   Occupational History     Not on file   Social Needs     Financial resource strain: Not on file     Food insecurity     Worry: Not on file     Inability: Not on file     Transportation needs     Medical:  Not on file     Non-medical: Not on file   Tobacco Use     Smoking status: Never Smoker     Smokeless tobacco: Never Used   Substance and Sexual Activity     Alcohol use: Not Currently     Comment: rare     Drug use: No     Sexual activity: Not on file   Lifestyle     Physical activity     Days per week: Not on file     Minutes per session: Not on file     Stress: Not on file   Relationships     Social connections     Talks on phone: Not on file     Gets together: Not on file     Attends Hinduism service: Not on file     Active member of club or organization: Not on file     Attends meetings of clubs or organizations: Not on file     Relationship status: Not on file     Intimate partner violence     Fear of current or ex partner: Not on file     Emotionally abused: Not on file     Physically abused: Not on file     Forced sexual activity: Not on file   Other Topics Concern     Not on file   Social History Narrative     Not on file   :    Currently living at a friend's house.  Normally she would live at her own place    Current Outpatient Medications on File Prior to Visit   Medication Sig Dispense Refill     bisacodyL (DULCOLAX) 10 mg suppository Insert 10 mg into the rectum daily as needed. No stool greater than 72 hours       docusate sodium (COLACE) 100 MG capsule Take 100 mg by mouth daily.       polyethylene glycol (MIRALAX) 17 gram packet Take 17 g by mouth daily. Hold for loose stool       senna (SENOKOT) 8.6 mg tablet Take 2 tablets by mouth 2 (two) times a day. Hold for loose stool       acetaminophen (TYLENOL) 500 MG tablet Take 2 tablets (1,000 mg total) by mouth every 6 (six) hours as needed for pain or fever.  0     amLODIPine (NORVASC) 10 MG tablet Take 1 tablet (10 mg total) by mouth daily. (Patient taking differently: Take 10 mg by mouth daily. Hold for systolic blood pressure less than 105)  0     aspirin 81 MG EC tablet Take 81 mg by mouth daily.       BD ULTRA-FINE MICRO PEN NEEDLE 32 gauge x  "1/4\" Ndle USE AS DIRECTED 4 TIMES DAILY. 360 each 3     blood glucose test (ACCU-CHEK SAM PLUS TEST STRP) strips TEST 1 TIME A  strip 10     blood glucose test (ACCU-CHEK SAM PLUS TEST STRP) strips test blood sugar level 6 times daily 600 strip 4     calcium acetate,phosphat bind, (PHOSLO) 667 mg capsule Take 1 capsule (667 mg total) by mouth 3 (three) times a day with meals. 270 capsule 3     cholecalciferol, vitamin D3, (VITAMIN D3) 1,000 unit capsule Take 1,000 Units by mouth daily.       collagenase ointment Apply daily per WOC  0     ferrous sulfate 325 (65 FE) MG tablet Take 1 tablet by mouth 2 (two) times a day with meals.       furosemide (LASIX) 20 MG tablet Take 1 tablet (20 mg total) by mouth daily.  0     gabapentin (NEURONTIN) 400 MG capsule Take 1 capsule (400 mg total) by mouth 3 (three) times a day.  0     HYDROmorphone (DILAUDID) 2 MG tablet Take 1 tablet (2 mg total) by mouth every 4 (four) hours as needed. 30 tablet 0     insulin glargine (BASAGLAR KWIKPEN) 100 unit/mL (3 mL) pen Inject 10 Units under the skin at bedtime. (Patient taking differently: Inject 20 Units under the skin at bedtime. )  0     lancets (ACCU-CHEK MULTICLIX LANCET) Misc TEST 6 TIMES A  each 11     magnesium 250 mg Tab Take 500 mg by mouth 2 (two) times a day.        metoprolol tartrate (LOPRESSOR) 25 MG tablet Take 1 tablet (25 mg total) by mouth 2 (two) times a day. (Patient taking differently: Take 25 mg by mouth 2 (two) times a day. Hold for systolic blood pressure less than 105) 180 tablet 3     NOVOLOG FLEXPEN U-100 INSULIN 100 unit/mL (3 mL) injection pen Check blood sugar four (4) times daily.  11. (Patient taking differently: Inject 10 Units under the skin 3 (three) times a day before meals. 10 unit three times a day with meals     PLUS:      if 141 - 180 = 1 unit;   181 - 220 = 2 units;  Plus sliding scale   221 - 260 = 3 units;   261 - 300 = 4 units;   301 - 340 = 5 units;   341 - 400 = 6 units;   " 401 - 999 = 7 units,)  0     NOVOLOG FLEXPEN U-100 INSULIN 100 unit/mL (3 mL) injection pen Check blood sugar four (4) times daily.  {  0     omeprazole (PRILOSEC) 20 MG capsule Take 20 mg by mouth.       sodium bicarbonate 650 MG tablet Take 1 tablet (650 mg total) by mouth 2 (two) times a day. OTC product  0     warfarin sodium (WARFARIN ORAL) Take by mouth. 7/24/20 INR 2.4 Take 4mg M, W,Fand 3mg AOD. Next INR 7/28 7/21/20 INR 3.1  Take 3mg daily.  Next INR 7/24/20.(currently on Cipro x 3 days)    7/14/20 INR 2.3  Cont 5mg daily.  Next INR 7/21/20.  7/13/20 INR 2.3  Take 5mg.  Next INR 7/14/20.  7/7/20 INR 2.5  Cont 5mg daily.  Next INR 7/10/20.       [DISCONTINUED] senna-docusate (PERICOLACE) 8.6-50 mg tablet Take 1 tablet by mouth 2 (two) times a day.  0     No current facility-administered medications on file prior to visit.    :      ALLERGIES:  Hydralazine; Latex; Naprosyn [naproxen]; Nitrofurantoin; Sulfa (sulfonamide antibiotics); and Vicks vaporub [camphor-eucalyptus oil-menthol]    Vitals:        Current Vitals   BP: 118/58 mmHg  7/31/2020 23:00    Temp:96.6  F  7/31/2020 23:00  Pulse: 73 bpm  7/31/2020 23:00    Weight: 244.5 Lbs  7/26/2020 13:04  Resp: 18 Breaths/min  7/31/2020 23:00  BS: 154 mg/dL  7/31/2020 22:01  O2: 95 %  7/31/2020 23:00  Pain: 0  7/31/2020 22:59    Physical exam:    General:  Alert  oriented x3 appears comfortable    Patient lying in bed.  She is alert oriented x3.  Normally conversant with many questions and concerns as noted above.  Speech is fluent and clear.  She answers questions appropriately.  She demonstrates purposeful movement of her extremities including the BKA leg    Due to the 2020 Covid 19 pandemic, except as noted above, the patient was visually observed at a 6 foot plus distance.  An observational exam was performed in an effort to keep patient safe from Covid 19 and other communicable diseases.   Labs:  Lab Results   Component Value Date    WBC 9.5 06/27/2020     HGB 7.9 (L) 06/27/2020    HCT 25.5 (L) 06/27/2020    MCV 95 06/27/2020     06/27/2020     Results for orders placed or performed during the hospital encounter of 06/16/20   Basic Metabolic Panel   Result Value Ref Range    Sodium 141 136 - 145 mmol/L    Potassium 4.2 3.5 - 5.0 mmol/L    Chloride 107 98 - 107 mmol/L    CO2 27 22 - 31 mmol/L    Anion Gap, Calculation 7 5 - 18 mmol/L    Glucose 116 70 - 125 mg/dL    Calcium 8.8 8.5 - 10.5 mg/dL    BUN 30 (H) 8 - 22 mg/dL    Creatinine 1.57 (H) 0.60 - 1.10 mg/dL    GFR MDRD Af Amer 40 (L) >60 mL/min/1.73m2    GFR MDRD Non Af Amer 33 (L) >60 mL/min/1.73m2         Lab Results   Component Value Date    TSH 1.83 06/17/2020     Lab Results   Component Value Date    HGBA1C 10.3 (H) 06/17/2020     [unfilled]  Lab Results   Component Value Date    IIIKHZPS53 285 01/07/2011     Lab Results   Component Value Date     (H) 01/20/2020     [unfilled]  Most Recent EKG     Units 06/16/20  1820   VENTRATE BPM 76   ATRIALRATE BPM 76   QRSDURATION ms 94   QTINTERVAL ms 428   QTCCALC ms 481   P Axis degrees 55   RAXIS degrees -43   TAXIS degrees 12   MUSEDX  Normal sinus rhythm  Left axis deviation  Inferior infarct (cited on or before 13-MAR-2020)  Abnormal ECG  When compared with ECG of 14-MAR-2020 13:04,  No significant change was found  Confirmed by MARYANA COLLINS MD LOC:MAK ([id number]) on 6/17/2020 1:41:23 PM       728 shows sodium 136 potassium 3.8 creatinine 2.23 with a BUN of 56      Assessment/Plan:    Elevated creatinine/acute kidney injury, recurrent  Suspected volume depletion  CKD 4  -I do not know why her renal function has deteriorated.  We stopped the Lasix and she has continued to lose weight but does not look hypovolemic at this point.  We we were worried about post obstructive failure/VUR but have placed a catheter and creatinine has not improved.  -Urology evaluation Karen 3 for bladder assessment  -Canchola remains in place as of now though I expect voiding trial  either at the urologist office or upon return  -Sees her nephrologist on August 21  -Anticipate weekly BMP  -Continue off Lasix  -Hold parameters continue on her beta-blocker and amlodipine  - Continue recently adjusted renal dosing of her gabapentin, decreased it to 200 mg twice daily   - Has had her catheter in for a week, we discussed options and will evening over the weekend since she sees urologist Monday and will likely have voiding trial there.    -Urine culture showed mixed loren, she did receive renally dosed fluoroquinolone for 3 days.  She continues to complain of dysuria  Sees urologist on Monday will wait for their opinion.  Might need urodynamic studies?    Electrolyte disturbance   Potassium normalized.  We had to discontinue magnesium potassium replacement last week but were better shape this week.  -Continue off potassium magnesium replacement at this time  -Continue to hold Lasix  -Recheck labs weekly    Acute urinary retention  Urethral bleed  Dysuria   See discussion above.    -Canchola catheter remains in place  -Urology follow-up/possible urodynamic studies August 3  -Polymicrobial urine culture result of dubious significance.  Nonetheless she did receive fluoroquinolone for 3 days.    Type 2 diabetes, insulin-dependent, uncontrolled   Improved with our changes of last week.  She is up to 20 units of Lantus although her baseline dose at home was 25 units.  We increased her mealtime aspart to 10 units 3 times daily although she takes 15 units at home.  She also has sliding scale  - We should continue to adjust her doses as necessary in the weeks to come, I anticipate increases to her home/baseline dosing over time  -However, recall that her most recent A1c 10.3 so she could need higher than her baseline doses eventually.        Chronic right foot ulcer now status post BKA  Right foot/leg cellulitis now status post BKA  Guillotine amputation June 30  Stump revision/BKA June 23   Patient working with  PT.  She has questions about her compression sleeves which I am unable to answer and I redirect her to the appropriate source for getting those answers.    -Gabapentin was recently decreased due to her renal function as noted above.  She has not noticed any difference in her neuropathy.    Constipation   Patient says that she has had recurrence.  -Discontinue senna plus  - Colace 100 mg just once daily (too much Colace can lead to the all mush no push phenomenon)  - Senna 8.6 tablets 2 p.o. twice daily hold for loose stool  -MiraLAX 17 g daily, hold for loose stool    Abdominal wounds  Calciphylaxis considered possible here, though far from a certainty.  There was lack of agreement among her specialists.. Patient working with wound care specialist, appreciate their help here.   - Continue collagenase ointment and other wound care as outlined by wound care specialist          Paroxysmal atrial fibrillation   Continue to monitor INR and adjust warfarin appropriately      Chronic anemia   Likely anemia of chronic disease.  Her nephrologist notes an iron deficiency component and she is on iron infusion therapy last hemoglobin 7.9 which she is tolerating well.  She is generally more in the 9 range.  Hemoglobin was stable.  Do not anticipate further monitoring/treatment in the next couple of weeks anyway.      Obesity   Complicates her movement, limits mobility and increase his difficulty of therapy.  Associated with other increased risk of morbidity as well.      Hypertension   No new thoughts here.  Blood pressures acceptable  - continue amlodipine with hold parameters.   -Continue metoprolol with hold parameters   -Lasix is on hold.     Social stressors and factors   Social service involved.        Case discussed with:    Facility staff             Harry Blackwood MD

## 2021-06-10 NOTE — TELEPHONE ENCOUNTER
[facility] Nurse Triage Anticoagulation Note      Provider: BARRY Smith  Facility: [facility]    Facility Type: [facility]    Caller: Carolyn  Call Back Number:  [phone]    Reason for call: INR    Today s INR: 2.8  Previous INR: 8/7 2.8(4mg daily), 7/31 2.3(4mg daily)    Diagnosis/Goal: AFIB  Heparin/Lovenox: No   Currently on ABX: No; ended Cipro on 8/13/20.    Other interacting Medications: None  Missed or refused doses: No    Addendum:  Nurse is also reporting patient's ionized calcium results of 1.47(norm range 1.11-1.30) and ionized calcium at pH 7.4 is 1.36(norm range 1.11-1.30).    Verbal Order/Direction given by Provider: Continue Warfarin 4mg daily.  Next INR 8/25/20.      Addendum:  No new orders regarding the ionized calcium results.      Provider giving order: Harry Blackwood MD     Verbal order given to: Carolyn Jacobsen RN

## 2021-06-10 NOTE — TELEPHONE ENCOUNTER
Alma posadas/Daisha called to arrange a follow up appointment. Please advise on if appointment should be in office or virtual telephone/video visit.     Alma @ [phone]

## 2021-06-10 NOTE — PROGRESS NOTES
[facility]    Facility:  [facility] AT [facility] [[id number]]    Code Status:  FULL CODE    Chief Complaint   Patient presents with     Problem Visit   :   Check bleeding                 Patient Active Problem List   Diagnosis     Carpal Tunnel Syndrome     Urinary Tract Infection     Endometrial Hyperplasia     Cholelithiasis     Leukocytosis     Urine Tests Nonspecific Abnormal Findings     Obesity     Essential hypertension     Pernicious Anemia     Immunology Studies Raised Immunoglobulin Level     Vaginal bleeding     Type 2 diabetes mellitus (H)     Atrial fibrillation with RVR (H)     Acute kidney injury superimposed on CKD (H)     Non-traumatic rhabdomyolysis     Metabolic acidosis     Troponin level elevated     Bacteremia     Acute respiratory failure with hypoxia (H)     Acute encephalopathy     Acute pulmonary edema (H)     Wheezing     Moderate protein malnutrition (H)     Abnormal cytological findings in specimens from other female genital organs     Chronic kidney disease, stage III (moderate) (H)     Hyperkalemia     Osteoarthritis     Acute kidney injury (H)     Sepsis (H)     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)     Sepsis, due to unspecified organism, unspecified whether acute organ dysfunction present (H)     Cellulitis of right foot     CKD (chronic kidney disease) stage 4, GFR 15-29 ml/min (H)     Chronic wound infection of abdomen, subsequent encounter     Acute post-operative pain     Phantom limb pain (H)     Paroxysmal atrial fibrillation (H)     Urinary retention     Coronary artery disease without angina pectoris     Hx of right BKA (H)     Weakness     Slow transit constipation     Wound infection     Fever and chills     Acute pain of left shoulder       History:  Jazmin Bauman  is a 65 year old female with history of CKD 4, morbid obesity, insulin-dependent type 2 diabetes, chronic atrial fibrillation, peripheral neuropathy, GERD,  peripheral edema, obesity, anemia of chronic disease with iron deficiency seen at urgent request of facility staff on 8/16/2020    Hospital Course: Patient was admitted between June 16 and June 28.    Patient has history of chronic nonhealing right heel ulcer which became infected without her knowledge.  Patient's housemate summoned help and patient was transported to the emergency room.  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  Patient was septic and treated with Vanco/Zosyn.  Blood culture was positive for Peptostreptococcus and Streptococcus.  As patient improved she was transitioned to Augmentin.     Guillotine amputation converted to BKA on June 23.    Hospitalization was complicated for bilateral abdominal wall issues.  On the right side patient was felt to have chronic wounds/rash.  On the left she had acute abscess and infection resulting in I&D in the operating room.  Calciphyxis considered possible though not confirmed.  Patient left with wound ulcer on the left abdomen requiring wound care treatment which is ongoing.  Please bilateral infectious issues occur in the skin and subcutaneous tissues in the dependent pannus of the lower flanks.  I do not see that any wound cultures were obtained from these areas.  Patient describes many months of work with wound care and soft tissue surgery/debridements to clear up the infection of the right abdominal wall in the past.    Hospitalization also remarkable for hypokalemia at outset improved with Kayexalate.  Patient has paroxysmal atrial fibrillation, her warfarin needed to be held due to the multiple surgeries but she is back on it.  There is also concern over coronary artery disease with positive troponin January 2020 for which she was lost to follow-up with stress test/cardiology never did so.  Finally she had retention resulted in [place] catheterization which is  ongoing.    Subjective/ROS:    -augmented by discussion with facility staff involved in direct care    -Facility staff come and find me in another area of the facility to come evaluate patient's bleeding.  They stated they were just talking to her about other issues when they found blood on her bedding.  They did not see active bleeding but wanted me to check her out.  Patient did have a blood draw with antecubital area this morning.    Patient says she was unaware of any bleeding.  And that she feels fine, her usual self.  I look at the bedding and it is near her right flank area and there is a red substance that has leaked into the bedding.  I am not sure his blood.  It is certainly reddish/cherry colored but there is no substance to it there is no clotted material.  I check over her entire body including the area that would need to this area of the bedding and there is no skin breakdown rash lesions.  I also checked antecubital areas and there is no evidence of blood.  There is no blood or dried blood on the skin.  Patient's INR comes back therapeutic today.    Meanwhile patient's phosphorus is 2.8 ionized calcium 1.47.  Her calcium acetate was discontinued last week.  She has a follow-up with her nephrologist next week.  Her weight is stable 243 pounds.  This is despite being off the furosemide.  She denies symptoms of hypercalcemia    Patient has seen urology they requested urine culture and her Proteus infection was treated with ciprofloxacin on or about August 8.  No dysuria.    No other complaints today.    ===================================================        Past Medical History:   Diagnosis Date     Anemia     pernicious     Diabetes mellitus (H)     borderline     Endometrial hyperplasia      Fibromyalgia      History of recurrent UTI (urinary tract infection)      History of transfusion      Hypertension      Thrombocytopenia (H)      Vaginal bleeding 1/2015     Past Surgical History:   Procedure  Laterality Date     BELOW KNEE LEG AMPUTATION Right 6/18/2020    Procedure: AMPUTATION, BELOW KNEE;  Surgeon: Epi Corcoran MD;  Location: [facility] OR;  Service: General     BELOW KNEE LEG AMPUTATION Right 6/23/2020    Procedure: REVISION AMPUTATION, BELOW KNEE;  Surgeon: Epi Corcoran MD;  Location: [facility] OR;  Service: General     bilateral carpal tunnel release  2009     CHOLECYSTECTOMY       COLONOSCOPY N/A 9/11/2017    Procedure: COLONOSCOPY;  Surgeon: Tyler Bhakta MD;  Location: [facility] GI;  Service:      COMBINED HYSTEROSCOPY DIAGNOSTIC / D&C N/A 1/28/2015    Procedure: DILATION AND CURETTAGE WITH HYSTEROSCOPY;  Surgeon: Grant Beal MD;  Location: [facility] OR;  Service:      DILATION AND CURETTAGE OF UTERUS       IR NON TUNNELED CATHETER >5 YEARS  1/21/2020     [facility]  1/20/2020          AK HYSTEROSCOPY,W/ENDO BX N/A 9/5/2019    Procedure: HYSTEROSCOPY, DILATION AND CURETTAGE;  Surgeon: Grant Beal MD;  Location: [facility] OR;  Service: Gynecology     AK HYSTEROSCOPY,W/ENDO BX N/A 12/9/2019    Procedure: HYSTEROSCOPY, DILATION AND CURETTAGE;  Surgeon: Grant Beal MD;  Location: [facility] OR;  Service: Gynecology          Family History   Problem Relation Age of Onset     Colon cancer Father    :       Social History     Socioeconomic History     Marital status: Single     Spouse name: Not on file     Number of children: Not on file     Years of education: Not on file     Highest education level: Not on file   Occupational History     Not on file   Social Needs     Financial resource strain: Not on file     Food insecurity     Worry: Not on file     Inability: Not on file     Transportation needs     Medical: Not on file     Non-medical: Not on file   Tobacco Use     Smoking status: Never Smoker     Smokeless tobacco: Never Used   Substance and Sexual Activity     Alcohol use: Not Currently     Comment: rare     Drug use: No     Sexual  "activity: Not on file   Lifestyle     Physical activity     Days per week: Not on file     Minutes per session: Not on file     Stress: Not on file   Relationships     Social connections     Talks on phone: Not on file     Gets together: Not on file     Attends [employer] service: Not on file     Active member of club or organization: Not on file     Attends meetings of clubs or organizations: Not on file     Relationship status: Not on file     Intimate partner violence     Fear of current or ex partner: Not on file     Emotionally abused: Not on file     Physically abused: Not on file     Forced sexual activity: Not on file   Other Topics Concern     Not on file   Social History Narrative     Not on file   :    Currently living at a friend's house.  Normally she would live at her own place    No current facility-administered medications on file prior to visit.      Current Outpatient Medications on File Prior to Visit   Medication Sig Dispense Refill     acetaminophen (TYLENOL) 500 MG tablet Take 2 tablets (1,000 mg total) by mouth every 6 (six) hours as needed for pain or fever.  0     amLODIPine (NORVASC) 10 MG tablet Take 1 tablet (10 mg total) by mouth daily. (Patient taking differently: Take 10 mg by mouth daily. Hold for systolic blood pressure less than 105)  0     aspirin 81 MG EC tablet Take 81 mg by mouth daily.       BD ULTRA-FINE MICRO PEN NEEDLE 32 gauge x 1/4\" Ndle USE AS DIRECTED 4 TIMES DAILY. 360 each 3     bisacodyL (DULCOLAX) 10 mg suppository Insert 10 mg into the rectum daily as needed. No stool greater than 72 hours       blood glucose test (ACCU-CHEK SAM PLUS TEST STRP) strips TEST 1 TIME A  strip 10     blood glucose test (ACCU-CHEK ASM PLUS TEST STRP) strips test blood sugar level 6 times daily 600 strip 4     cholecalciferol, vitamin D3, (VITAMIN D3) 1,000 unit capsule Take 1,000 Units by mouth daily.       collagenase ointment Apply daily per WOC  0     docusate sodium (COLACE) " 100 MG capsule Take 100 mg by mouth daily.       ferrous sulfate 325 (65 FE) MG tablet Take 1 tablet by mouth 2 (two) times a day with meals.       gabapentin (NEURONTIN) 100 MG capsule Take 200 mg by mouth 2 (two) times a day.       HYDROmorphone (DILAUDID) 2 MG tablet Take 1 tablet (2 mg total) by mouth every 4 (four) hours as needed. 30 tablet 0     lancets (ACCU-CHEK MULTICLIX LANCET) Misc TEST 6 TIMES A  each 11     lidocaine (XYLOCAINE) 2 % jelly Apply 1 application topically daily.        metoprolol tartrate (LOPRESSOR) 25 MG tablet Take 1 tablet (25 mg total) by mouth 2 (two) times a day. (Patient taking differently: Take 25 mg by mouth 2 (two) times a day. Hold for systolic blood pressure less than 105) 180 tablet 3     NOVOLOG FLEXPEN U-100 INSULIN 100 unit/mL (3 mL) injection pen Check blood sugar four (4) times daily.  11. (Patient taking differently: Inject 10 Units under the skin 3 (three) times a day before meals. 10 unit three times a day with meals     PLUS:      if 141 - 180 = 1 unit;   181 - 220 = 2 units;  Plus sliding scale   221 - 260 = 3 units;   261 - 300 = 4 units;   301 - 340 = 5 units;   341 - 400 = 6 units;   401 - 999 = 7 units,)  0     polyethylene glycol (MIRALAX) 17 gram packet Take 17 g by mouth daily. Hold for loose stool       senna (SENOKOT) 8.6 mg tablet Take 1 tablet by mouth 2 (two) times a day. Hold for loose stool        sodium bicarbonate 650 MG tablet Take 1 tablet (650 mg total) by mouth 2 (two) times a day. OTC product  0     warfarin sodium (WARFARIN ORAL) Take by mouth. 8/14/20 INR 2.8  Cont 4mg daily.  Next INR 8/25/20.    8/7/20 INR 2.8 Take 4mg daily Next INR 8/14 7/24/20 INR 2.4 Take 4mg M, W,Fand 3mg AOD. Next INR 7/28 7/21/20 INR 3.1  Take 3mg daily.  Next INR 7/24/20.(currently on Cipro x 3 days)    7/14/20 INR 2.3  Cont 5mg daily.  Next INR 7/21/20.  7/13/20 INR 2.3  Take 5mg.  Next INR 7/14/20.  7/7/20 INR 2.5  Cont 5mg daily.  Next INR 7/10/20.     :       ALLERGIES:  Hydralazine; Latex; Naprosyn [naproxen]; Nitrofurantoin; Sulfa (sulfonamide antibiotics); and Vicks vaporub [camphor-eucalyptus oil-menthol]    Vitals:      Blood pressure 152/70 respiration 20 temperature 95 glucose 244 heart rate 71 O2 sats 94% weight is 243 pounds she peaked out at 264 on July 8 and was at at 239 recently.    Physical exam:    General:  Alert  oriented x3 appears comfortable    Patient lying in bed.  She is alert oriented x3.  Normally conversant     Exam noted above.  No bleeding source is found.  No blood dry or wet on the mated area of her skin to the area where the redness is on the bed.  I am not sure this is blood.    Due to the 2020 Covid 19 pandemic, except as noted above, the patient was visually observed at a 6 foot plus distance.  An observational exam was performed in an effort to keep patient safe from Covid 19 and other communicable diseases.   Labs:  Lab Results   Component Value Date    WBC 14.4 (H) 08/16/2020    HGB 10.4 (L) 08/16/2020    HCT 32.8 (L) 08/16/2020    MCV 89 08/16/2020     08/16/2020     Results for orders placed or performed during the hospital encounter of 06/16/20   Basic Metabolic Panel   Result Value Ref Range    Sodium 141 136 - 145 mmol/L    Potassium 4.2 3.5 - 5.0 mmol/L    Chloride 107 98 - 107 mmol/L    CO2 27 22 - 31 mmol/L    Anion Gap, Calculation 7 5 - 18 mmol/L    Glucose 116 70 - 125 mg/dL    Calcium 8.8 8.5 - 10.5 mg/dL    BUN 30 (H) 8 - 22 mg/dL    Creatinine 1.57 (H) 0.60 - 1.10 mg/dL    GFR MDRD Af Amer 40 (L) >60 mL/min/1.73m2    GFR MDRD Non Af Amer 33 (L) >60 mL/min/1.73m2         Lab Results   Component Value Date    TSH 1.83 06/17/2020     Lab Results   Component Value Date    HGBA1C 10.3 (H) 06/17/2020     [unfilled]  Lab Results   Component Value Date    XEQEVSAP56 285 01/07/2011     Lab Results   Component Value Date     (H) 01/20/2020     [unfilled]  Most Recent EKG     Units 06/16/20  1820   VENTRATE BPM  76   ATRIALRATE BPM 76   QRSDURATION ms 94   QTINTERVAL ms 428   QTCCALC ms 481   P Axis degrees 55   RAXIS degrees -43   TAXIS degrees 12   MUSEDX  Normal sinus rhythm  Left axis deviation  Inferior infarct (cited on or before 13-MAR-2020)  Abnormal ECG  When compared with ECG of 14-MAR-2020 13:04,  No significant change was found  Confirmed by DENNIS THORPE, MARYANA LOC:MAK ([id number]) on 6/17/2020 1:41:23 PM       728 shows sodium 136 potassium 3.8 creatinine 2.23 with a BUN of 56      Assessment/Plan:    Possible bleeding of unknown source      I am suspicious this is not blood.  Patient has no dried blood on her she has no wound in the area.  We checked her over every place else and there is nothing bleeding.  I do not find any dried caked blood.  Bilateral flank wounds are dressed there is no blood on the dressing and no evidence of any recent bleeding there.  I asked the staff to check the dietary menu.  Could this be melted Jell-O?  Spilled red juice of some type?  INR is therapeutic.  - No change in plan, monitor for any evidence of bleeding.  Patient and staff state understanding.            elevated creatinine/acute kidney injury, recurrent  Suspected volume depletion  CKD 4  Hypercalcemia  -I do not know why her renal function has deteriorated.  Despite the fact that we stopped her Lasix several weeks ago she continued to lose weight and is now stabilized near the low 240s.  We were worried about post obstructive phenomenon but catheterization did not lead to improvement.  She is since done well without the catheter with low PVRs.  She has since seen urology and been treated for Proteus UTI.  Creatinine most recently around 2.  Meanwhile she has had hypercalcemia leading to discontinuation of calcium acetate.  Check of ionized calcium reveals that she is truly elevated this week 1.47.    Fortunately she has nephrology appointment next week and I hope they can answer my questions about why her creatinine has not  returned to previous baseline, has she had permanent injury?  Does she need further work-up for her calcium or we just need to give it more time off the calcium acetate?      -Sees her nephrologist on August 21  -Send all labs to nephrology.  -Continue off Lasix  -Blood pressure is no longer low for the most part, normal to mild elevations at times.  I had previously written hold parameters on her beta-blocker and amlodipine and they remain in place.  - Continue recently adjusted renal dosing of her gabapentin, decreased it to 200 mg twice daily     Electrolyte disturbance   Potassium normalized.  We had to discontinue magnesium potassium replacement last week but were better shape this week.  See calcium discussion above.  -Continue off potassium magnesium replacement at this time  -Continue to hold Lasix  -Recheck labs weekly    Acute urinary retention  Urethral bleed  Dysuria   Patient has seen urology    Type 2 diabetes, insulin-dependent, uncontrolled   Improved with our changes of last week.  She is up to 20 units of Lantus although her baseline dose at home was 25 units.  We increased her mealtime aspart to 10 units 3 times daily although she takes 15 units at home.  She also has sliding scale  - We should continue to adjust her doses as necessary in the weeks to come, I anticipate increases to her home/baseline dosing over time  -However, recall that her most recent A1c 10.3 so she could need higher than her baseline doses eventually.        Chronic right foot ulcer now status post BKA  Right foot/leg cellulitis now status post BKA  Guillotine amputation June 30  Stump revision/BKA June 23   Patient working with PT.  She has questions about her compression sleeves which I am unable to answer and I redirect her to the appropriate source for getting those answers.    -Gabapentin was recently decreased due to her renal function as noted above.  She has not noticed any difference in her  neuropathy.    Constipation   Patient says that she has had recurrence.  -Discontinue senna plus  - Colace 100 mg just once daily (too much Colace can lead to the all mush no push phenomenon)  - Senna 8.6 tablets 2 p.o. 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  Patient has been improving and following with wound care specialist.  No evidence of bleeding from the wounds today.  There is no erythema swelling or drainage  -Per wound care specialist.          Paroxysmal atrial fibrillation   Continue to monitor INR and adjust warfarin appropriately INR is therapeutic.      Chronic anemia   Likely anemia of chronic disease.  Her nephrologist notes an iron deficiency component and she is on iron infusion therapy last hemoglobin 7.9 which she is tolerating well.  She is generally more in the 9 range.  Hemoglobin was stable.  Do not anticipate further monitoring/treatment in the next couple of weeks anyway.  Has nephrology appointment on August 21.      Obesity   Complicates her movement, limits mobility and increase his difficulty of therapy.  Associated with other increased risk of morbidity as well.      Hypertension   No new thoughts here.  Blood pressures acceptable  - continue amlodipine with hold parameters.   -Continue metoprolol with hold parameters   -Lasix is on hold.     Social stressors and factors   Social service involved.        Case discussed with:    Facility staff             Harry Blackwood MD

## 2021-06-10 NOTE — TELEPHONE ENCOUNTER
[facility] Nurse Triage Telephone Note      Provider: BARRY Smith  Facility: [facility]    Facility Type: TCU    Caller: Annita  Call Back Number:  [phone]    Allergies: Hydralazine; Latex; Naprosyn [naproxen]; Nitrofurantoin; Sulfa (sulfonamide antibiotics); and Vicks vaporub [camphor-eucalyptus oil-menthol]    Reason for call: Pt re-admitted to the [facility] from the hospital with a carb counting order for insulin and is on s/s insulin as well and the facility cannot accomodate the carb counting    Verbal Order/Direction given by Provider: d/c carb counting    Provider giving order: BARRY Smith    Verbal order given to: Annita Cartagena RN

## 2021-06-10 NOTE — TELEPHONE ENCOUNTER
[facility] Nurse Triage Anticoagulation Note      Provider: BARRY Smith  Facility: [facility]    Facility Type: TCU    Caller: Dominguez  Call Back Number:  [phone]    Reason for call: INR  Hgb 10.3 Iron 36, TIBC 234 on Fe 1 BID. Creat 1.128, GFR 44, Alb 2.6, Trygl 398, HDL 26 not on cholesterol med.    Today s INR: 2.2  Previous INR: 8/21/20 INR 3.02 Held X 1 then 3mg daily. Decrease Iron to 1 daily.Next INR 8/28    Diagnosis/Goal: AFIB  Heparin/Lovenox: No   Currently on ABX: Yes  IV Vanco  Other interacting Medications: ASA 81mg qd  Missed or refused doses: Yes  Held 8/21    Verbal Order/Direction given by Provider: Coumadin 3mg daily. Next INR 8/28    Provider giving order: BARRY Smith    Verbal order given to: Dominguez Shepard RN

## 2021-06-10 NOTE — ANESTHESIA POSTPROCEDURE EVALUATION
Patient: Jazmin Bauman  * No procedures listed *  Anesthesia type: MAC    Patient location: Phase II Recovery  Last vitals: No vitals data found for the desired time range.    Post vital signs: stable  Level of consciousness: awake and responds to simple questions  Post-anesthesia pain: pain controlled  Post-anesthesia nausea and vomiting: no  Pulmonary: unassisted, return to baseline  Cardiovascular: stable and blood pressure at baseline  Hydration: adequate  Anesthetic events: no    QCDR Measures:  ASA# 11 - Devi-op Cardiac Arrest: ASA11B - Patient did NOT experience unanticipated cardiac arrest  ASA# 12 - Devi-op Mortality Rate: ASA12B - Patient did NOT die  ASA# 13 - PACU Re-Intubation Rate: NA - No ETT / LMA used for case  ASA# 10 - Composite Anes Safety: ASA10A - No serious adverse event    Additional Notes:

## 2021-06-10 NOTE — TELEPHONE ENCOUNTER
[facility] Nurse Triage Telephone Note      Provider: Harry Blackwood MD   Facility: [facility]    Facility Type: TCU    Caller: Carolyn  Call Back Number:  [phone]    Allergies: Hydralazine; Latex; Naprosyn [naproxen]; Nitrofurantoin; Sulfa (sulfonamide antibiotics); and Vicks vaporub [camphor-eucalyptus oil-menthol]    Reason for call: Nurse reporting phosphorus-2.6(normal 2.3-4.7).  Patient's ionized calcium level is still pending.  Last calcium level on 8/7/20 was 12.2.       Verbal Order/Direction given by Provider: No new orders.      Provider giving order: Harry Blackwood MD     Verbal order given to: Carolyn Jacobsen RN

## 2021-06-10 NOTE — ANESTHESIA CARE TRANSFER NOTE
Last vitals:   Vitals:    08/19/20 1122   BP: 126/60   Pulse: 69   Resp: 16   Temp: 37  C (98.6  F)   SpO2: 95%     Patient's level of consciousness is drowsy  Spontaneous respirations: yes  Maintains airway independently: yes  Dentition unchanged: yes  Oropharynx: oropharynx clear of all foreign objects    QCDR Measures:  ASA# 20 - Surgical Safety Checklist: WHO surgical safety checklist completed prior to induction    PQRS# 430 - Adult PONV Prevention: NA - Not adult patient, not GA or 3 or more risk factors NOT present  ASA# 8 - Peds PONV Prevention: NA - Not pediatric patient, not GA or 2 or more risk factors NOT present  PQRS# 424 - Devi-op Temp Management: NA - MAC anesthesia or case < 60 minutes  PQRS# 426 - PACU Transfer Protocol:NA - Patient did not go to PACU  ASA# 14 - Acute Post-op Pain: ASA14B - Patient did NOT experience pain >= 7 out of 10

## 2021-06-10 NOTE — ANESTHESIA PREPROCEDURE EVALUATION
Anesthesia Evaluation      Patient summary reviewed   No history of anesthetic complications (tolerated procedure under MAC previously - fent/versed/propofol gtt 80-100mcg/kg/min)     Airway   Mallampati: III  Neck ROM: full   Pulmonary     breath sounds clear to auscultation  (-) not a smokerSleep apnea: CRISTINA risk factors.                         Cardiovascular   Exercise tolerance: < 4 METS (Able to do stairs  Ambulates with canes)  (+) hypertension, CAD, ,     ECG reviewed  Rhythm: regular     ROS comment: 6/2020 TTE  Summary     Normal left ventricular size and systolic function.  Left ventricle ejection fraction is normal. The calculated left ventricular ejection fraction is 61%.  Normal right ventricular size and systolic function.  No hemodynamically significant valvular heart abnormalities.  When compared to the previous study dated 1/21/2020, normal RV function on current study         Neuro/Psych    (+) neuromuscular disease (fibromyalgia),  chronic pain    Endo/Other    (+) diabetes mellitus (A1c 11) poorly controlled using insulin, arthritis, obesity (BMI 57),      GI/Hepatic/Renal    (+)   chronic renal disease,   (-) GERD     Other findings:     NPO > 8 hrs      Thrombocytopenia - resolved    Fibromyalgia    Carpal Tunnel Syndrome    Urinary Tract Infection    Endometrial Hyperplasia    Cholelithiasis    Leukocytosis    Urine Tests Nonspecific Abnormal Findings    Obesity    Essential Hypertension    Pernicious Anemia    Immunology Studies Raised Immunoglobulin Level    Vaginal bleeding    Type 2 diabetes mellitus (H)          Dental    (+) poor dentition    Comment: Multiple missing and chipped teeth                         Anesthesia Plan  Planned anesthetic: MAC    ASA 3   Induction: intravenous   Anesthetic plan and risks discussed with: patient  Anesthesia plan special considerations: antiemetics,   Post-op plan: routine recovery

## 2021-06-10 NOTE — TELEPHONE ENCOUNTER
[facility] Nurse Triage Anticoagulation Note      Provider: BARRY Smith  Facility: [facility]    Facility Type: LT    Caller: Cherrie  Call Back Number:  [phone]    Reason for call: INR    Today s INR: 2.8  Previous INR: 7/31 2.3 4mg qd    Diagnosis/Goal: AFIB  Heparin/Lovenox: No   Currently on ABX: No  Other interacting Medications: None  Missed or refused doses: No    BMP  BUN 61,Creat 2.08, GFR24, , K+4.1, CA 12.2  On sodium bicab 650mg two times a day   phoslo 667 three times a day     Verbal Order/Direction given by Provider: 4mg daily Next INR 8/14, D/C calcium acetate, Draw a Ionized calcium and phosphorus 8/11      Provider giving order: BARRY Smith, And Harry Blackwood    Verbal order given to: Narda Cartagena RN

## 2021-06-10 NOTE — PROGRESS NOTES
"[facility]    Facility:   [facility] [[id number]]   Code Status: FULL CODE and POLST AVAILABLE      CHIEF COMPLAINT/REASON FOR VISIT:  Chief Complaint   Patient presents with     Review Of Multiple Medical Conditions     physical deconditioning, right BKA, phantom limb pain       HISTORY:      HPI: Jazmin is a 65 y.o. female who  has a past medical history of Anemia, Diabetes mellitus (H), Endometrial hyperplasia, Fibromyalgia, History of recurrent UTI (urinary tract infection), History of transfusion, Hypertension, Thrombocytopenia (H), and Vaginal bleeding (1/2015). She also has no past medical history of Family history of malignant hyperthermia, Hard to intubate, History of anesthesia complications, Malignant hyperthermia due to anesthesia, PONV (postoperative nausea and vomiting), or Sleep apnea. Per previous TCU provider: \"Patient was admitted between June 16 and June 28.    Patient has history of chronic nonhealing right heel ulcer which became infected without her knowledge.  Patient's housemate summoned help and patient was transported to the emergency room.  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  Patient was septic and treated with Vanco/Zosyn.  Blood culture was positive for Peptostreptococcus and Streptococcus.  As patient improved she was transitioned to Augmentin.     Guillotine amputation converted to BKA on June 23.    Hospitalization was complicated for bilateral abdominal wall issues.  On the right side patient was felt to have chronic wounds/rash.  On the left she had acute abscess and infection resulting in I&D in the operating room.  Calciphyxis considered possible though not confirmed.  Patient left with wound ulcer on the left abdomen requiring wound care treatment which is ongoing.  Please bilateral infectious issues occur in the skin and subcutaneous tissues in " "the dependent pannus of the lower flanks.  I do not see that any wound cultures were obtained from these areas.  Patient describes many months of work with wound care and soft tissue surgery/debridements to clear up the infection of the right abdominal wall in the past.    Hospitalization also remarkable for hypokalemia at outset improved with Kayexalate.  Patient has paroxysmal atrial fibrillation, her warfarin needed to be held due to the multiple surgeries but she is back on it.  There is also concern over coronary artery disease with positive troponin January 2020 for which she was lost to follow-up with stress test/cardiology never did so.  Finally she had retention resulted in Canchola catheterization which is ongoing.\"    Today Jazmin is being evaluated for a routine review of multiple medical problems while in the TCU. Jazmin states she feels things are going fine. She does not have any acute issues she would like to discuss. She is working with therapy during our visit, she believes therapy is going well. Physical therapist did not have anything out of the ordinary to report. She has been eating, drinking and eliminating well. No issues with sleep. Pain is well controlled. Jazmin denies any other concerns including fevers/chills, cough or cold symptoms, headaches, vision changes, chest pain/pressure, difficulty breathing, SOB, abdominal pain, nausea, vomiting, diarrhea, dysuria, increasing weakness, increasing pain.     Past Medical History:   Diagnosis Date     Anemia     pernicious     Diabetes mellitus (H)     borderline     Endometrial hyperplasia      Fibromyalgia      History of recurrent UTI (urinary tract infection)      History of transfusion      Hypertension      Thrombocytopenia (H)      Vaginal bleeding 1/2015             Family History   Problem Relation Age of Onset     Colon cancer Father      Social History     Socioeconomic History     Marital status: Single     Spouse name: Not on file     " Number of children: Not on file     Years of education: Not on file     Highest education level: Not on file   Occupational History     Not on file   Social Needs     Financial resource strain: Not on file     Food insecurity     Worry: Not on file     Inability: Not on file     Transportation needs     Medical: Not on file     Non-medical: Not on file   Tobacco Use     Smoking status: Never Smoker     Smokeless tobacco: Never Used   Substance and Sexual Activity     Alcohol use: Not Currently     Comment: rare     Drug use: No     Sexual activity: Not on file   Lifestyle     Physical activity     Days per week: Not on file     Minutes per session: Not on file     Stress: Not on file   Relationships     Social connections     Talks on phone: Not on file     Gets together: Not on file     Attends Mandaeism service: Not on file     Active member of club or organization: Not on file     Attends meetings of clubs or organizations: Not on file     Relationship status: Not on file     Intimate partner violence     Fear of current or ex partner: Not on file     Emotionally abused: Not on file     Physically abused: Not on file     Forced sexual activity: Not on file   Other Topics Concern     Not on file   Social History Narrative     Not on file       REVIEW OF SYSTEM:  Per HPI    PHYSICAL EXAM:   /60   Pulse 70   Temp 98.6  F (37  C)   Resp 18   Wt (!) 242 lb 12.8 oz (110.1 kg)   SpO2 98%   BMI 45.88 kg/m      A limited exam was performed due to recommendations for care during COVID-19 pandemic. Due to the 2020 COVID-19 pandemic, except as noted above, the patient was visually observed at a 6 foot plus distance.  An observational exam was performed in an effort to keep patient safe from COVID-19 and other communicable diseases.     General appearance: alert, appears stated age and cooperative  HEENT: Head is normocephalic with normal hair distribution. No evidence of trauma. Ears: Without lesions or  deformity. No acute purulent discharge. Eyes: Conjunctivae pink with no scleral icterus or erythema. Nose: Normal. Oropharnyx: mmm.  Lungs: respirations without effort.  Extremities: extremities normal, atraumatic, no cyanosis.  Skin: Skin color, texture normal. No rashes or lesions on exposed skin.   Neurologic: Grossly normal   Psych: interacts well with caregivers, exhibits logical thought processes and connections, pleasant.      LABS:   None today.     ASSESSMENT:      ICD-10-CM    1. Phantom limb pain (H)  G54.6    2. Physical deconditioning  R53.81    3. Hx of right BKA (H)  Z89.511        PLAN:    Physical Deconditioning  -Continue PT/OT and other therapies as per care plan.  -Encouraged good nutrition and movement habits.   -Discussed care plan and expected course of stay.   -Continue to follow-up per routine schedule or sooner if needed.     Right BKA, Phantom Limb Pain  -Tylenol 1 gram by mouth three times a day as needed for pain.   -ASA 81 mg by mouth daily.   -Dilaudid 2 mg by mouth every 4 hours as needed.     Otherwise continue current care plan for all other chronic medical conditions, as they are stable. Encouraged patient to engage in healthy lifestyle behaviors such as engaging in social activities, exercising (PT/OT), eating well, and following care plan. Follow up for routine check-up, or sooner if needed. Will continue to monitor patient and work with nursing staff collaboratively to work toward positive patient outcomes.    Electronically signed by: Chelsy Joshi CNP

## 2021-06-10 NOTE — TELEPHONE ENCOUNTER
08/28 - called [facility] - Wondering if we will manage the vanco dose? The medication will end soon.

## 2021-06-10 NOTE — TELEPHONE ENCOUNTER
[facility] Nurse Triage Anticoagulation Note      Provider: BARRY Smith  Facility: [facility]    Facility Type: TCU    Caller: Meron  Call Back Number:  [phone]    Reason for call: INR    Today s INR: 2.3  Previous INR: 8/21 3.02(hold x 1 then 3mg daily)    Diagnosis/Goal: AFIB  Heparin/Lovenox: No   Currently on ABX: Yes; IV Vanco  Other interacting Medications: EC ASA 81mg daily  Missed or refused doses: No    Verbal Order/Direction given by Provider: Continue Warfarin 3mg daily.  Next INR 8/27/20.      Provider giving order: BARRY Smith    Verbal order given to: James Jacobsen RN

## 2021-06-10 NOTE — TELEPHONE ENCOUNTER
[facility] Nurse Triage Telephone Note      Provider: Harry Blackwood MD   Facility: [facility]    Facility Type: LT    Caller: Carolyn  Call Back Number:  [phone]    Allergies: Hydralazine; Latex; Naprosyn [naproxen]; Nitrofurantoin; Sulfa (sulfonamide antibiotics); and Vicks vaporub [camphor-eucalyptus oil-menthol]    Reason for call: Pt had an order for an Ionized calcium and a phosphorus to be drawn 8/11 but this was not done d/t the pt was not around in the building and the nurse was wondering when it should be done.     Verbal Order/Direction given by Provider: Draw the labs 8/13    Provider giving order: Harry Blackwood MD     Verbal order given to: Carolyn Cartagena, NAYAN

## 2021-06-10 NOTE — PROGRESS NOTES
"  [facility]      NAME:  Jazmin Bauman             :  1955    MRN: [id number]    CODE STATUS:  FULL CODE    FACILITY: [facility] [[id number]]         CHIEF COMPLAIN/REASON FOR VISIT:  Chief Complaint   Patient presents with     Problem Visit     Pain management, constipation.       HISTORY OF PRESENT ILLNESS: Jazmin Bauman is a 65 y.o. female. Pt was at [facility] from  to  and underwent a RBKA r/t ongoing DM ulcer with osteomyelitis. Per her EMR dc summary \" with HTN,morbid obesity, DM, A fib, CKD 4 presented for evaluation of R foot swelling, difficulty ambulation, pain found to have osteomyelitis now s/p amputation. She has now been to the OR twice this stay, last was on 20.   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   Patient had a chronic diabetic foot ulcer on her R heel, presented for increased swelling, difficulty ambulating, and pain. MRI showed extensive soft tissue necrosis and some osteomyelitis along with cellulitis  Met sepsis criteria on admission with leukocytosis and elevated CRP  Podiatry saw and the foot was not felt to be salvageable and BKA was recommended\"     Acute on chronic blood loss anemia-likely from anemia chronic disease, iron deficiency, chronic kidney disease, surgery.  She did receive 1 unit while hospitalized, she has been stable in the TCU.  On oral iron.     Left abdominal wall cellulitis and right side: She did receive a course of antibiotics and a surgical debridement.  There is no abscess on ultrasound.  She is followed by wound care in the TCU.     Insulin-dependent diabetes: A1c 10.3.  Now on glargine 20 units nightly and 10 units of NovoLog 3 times daily with meals.  Blood sugars actually look pretty good here in the TCU, all less than 220.     Essential hypertension: Satisfactory in TCU.  -metoprolol  -furosemide  -amlodipine      CKD 4: Followed by Dr." "Rasheed.  -Continue PhosLo.     Coronary artery disease:  -also needs outpt CRISTINA eval  -Continue metoprolol and aspirin.     Urinary retention: Continues to have Canchola in place, she follows with [facility] for urinary retention.    KYLE. Tyler on Coumadin: INR due tomorrow, recent variable INRs.    TCU/PT report: Using the easy stand for transfer at this time due to left leg weakness.    Today: She is seen for pain management and constipation discussion.  She has been on Dilaudid for pain management secondary to postop pain secondary to right BKA.  She also has some phantom limb pain.  The Dilaudid is helping and she uses it frequently throughout the day.  We will continue her current dosing.  Additionally she is complaining of some constipation and is not on any current scheduled laxatives or stool softeners.  We will add those today as well.  I did view her left sided and right-sided abdominal wall cellulitis with the left being worse.  She is followed by wound care at the nursing home and there is no obvious signs of her symptoms of secondary infection.  Wound bed is beefy red with moderate amount of sanguinous drainage.    Allergies   Allergen Reactions     Hydralazine      Paralysis of legs     Latex Itching     Skin Burns     Naprosyn [Naproxen] Swelling     throat     Nitrofurantoin Hives     Sulfa (Sulfonamide Antibiotics) Rash     Vicks Vaporub [Camphor-Eucalyptus Oil-Menthol] Rash   :     Current Outpatient Medications   Medication Sig     acetaminophen (TYLENOL) 500 MG tablet Take 2 tablets (1,000 mg total) by mouth every 6 (six) hours as needed for pain or fever.     amLODIPine (NORVASC) 10 MG tablet Take 1 tablet (10 mg total) by mouth daily. (Patient taking differently: Take 10 mg by mouth daily. Hold for systolic blood pressure less than 105)     aspirin 81 MG EC tablet Take 81 mg by mouth daily.     BD ULTRA-FINE MICRO PEN NEEDLE 32 gauge x 1/4\" Ndle USE AS DIRECTED 4 TIMES DAILY.     bisacodyL (DULCOLAX) " 10 mg suppository Insert 10 mg into the rectum daily as needed. No stool greater than 72 hours     blood glucose test (ACCU-CHEK SAM PLUS TEST STRP) strips TEST 1 TIME A DAY     blood glucose test (ACCU-CHEK SAM PLUS TEST STRP) strips test blood sugar level 6 times daily     calcium acetate,phosphat bind, (PHOSLO) 667 mg capsule Take 1 capsule (667 mg total) by mouth 3 (three) times a day with meals.     cholecalciferol, vitamin D3, (VITAMIN D3) 1,000 unit capsule Take 1,000 Units by mouth daily.     collagenase ointment Apply daily per WOC     docusate sodium (COLACE) 100 MG capsule Take 100 mg by mouth daily.     ferrous sulfate 325 (65 FE) MG tablet Take 1 tablet by mouth 2 (two) times a day with meals.     furosemide (LASIX) 20 MG tablet Take 1 tablet (20 mg total) by mouth daily.     gabapentin (NEURONTIN) 400 MG capsule Take 1 capsule (400 mg total) by mouth 3 (three) times a day.     HYDROmorphone (DILAUDID) 2 MG tablet Take 1 tablet (2 mg total) by mouth every 4 (four) hours as needed.     insulin glargine (BASAGLAR KWIKPEN) 100 unit/mL (3 mL) pen Inject 10 Units under the skin at bedtime. (Patient taking differently: Inject 20 Units under the skin at bedtime. )     lancets (ACCU-CHEK MULTICLIX LANCET) Misc TEST 6 TIMES A DAY     magnesium 250 mg Tab Take 500 mg by mouth 2 (two) times a day.      metoprolol tartrate (LOPRESSOR) 25 MG tablet Take 1 tablet (25 mg total) by mouth 2 (two) times a day. (Patient taking differently: Take 25 mg by mouth 2 (two) times a day. Hold for systolic blood pressure less than 105)     NOVOLOG FLEXPEN U-100 INSULIN 100 unit/mL (3 mL) injection pen Check blood sugar four (4) times daily.  11. (Patient taking differently: Inject 10 Units under the skin 3 (three) times a day before meals. 10 unit three times a day with meals     PLUS:      if 141 - 180 = 1 unit;   181 - 220 = 2 units;  Plus sliding scale   221 - 260 = 3 units;   261 - 300 = 4 units;   301 - 340 = 5 units;    341 - 400 = 6 units;   401 - 999 = 7 units,)     NOVOLOG FLEXPEN U-100 INSULIN 100 unit/mL (3 mL) injection pen Check blood sugar four (4) times daily.  {     omeprazole (PRILOSEC) 20 MG capsule Take 20 mg by mouth.     polyethylene glycol (MIRALAX) 17 gram packet Take 17 g by mouth daily. Hold for loose stool     senna (SENOKOT) 8.6 mg tablet Take 2 tablets by mouth 2 (two) times a day. Hold for loose stool     sodium bicarbonate 650 MG tablet Take 1 tablet (650 mg total) by mouth 2 (two) times a day. OTC product     warfarin sodium (WARFARIN ORAL) Take by mouth. 7/24/20 INR 2.4 Take 4mg M, W,Fand 3mg AOD. Next INR 7/28 7/21/20 INR 3.1  Take 3mg daily.  Next INR 7/24/20.(currently on Cipro x 3 days)    7/14/20 INR 2.3  Cont 5mg daily.  Next INR 7/21/20.  7/13/20 INR 2.3  Take 5mg.  Next INR 7/14/20.  7/7/20 INR 2.5  Cont 5mg daily.  Next INR 7/10/20.         REVIEW OF SYSTEMS:    Currently, no fever, chills, or rigors. Does not have any visual or hearing problems. Denies any chest pain, headaches, palpitations, lightheadedness, dizziness, shortness of breath, or cough. Appetite is good. Denies any GERD symptoms. Denies any difficulty with swallowing, nausea, or vomiting.  Denies any abdominal pain, diarrhea or constipation. Denies any urinary symptoms, yeager in place. No insomnia. No active bleeding. No rash.       PHYSICAL EXAMINATION:  Vitals:    08/05/20 1003   BP: 127/73   Pulse: 67   Temp: (!) 96.4  F (35.8  C)   SpO2: 99%   Weight: (!) 236 lb (107 kg)         GENERAL: Awake, Alert, oriented x3, not in any form of acute distress, answers questions appropriately, follows simple commands, conversant with flat affect  HEENT: Head is normocephalic with normal hair distribution. No evidence of trauma. Ears: No acute purulent discharge. Eyes: Sclera anicteric.  CHEST: No tenderness or deformity, no crepitus  LUNG: Clear to auscultation with good chest expansion. There DM BS  BACK: No kyphosis of the thoracic spine.  Symmetric, no curvature, ROM normal, no CVA tenderness, no spinal tenderness   CVS: There is good S1  S2,  rhythm is regular.  ABDOMEN: Globular and ROTUND tender to palpation, non distended, no masses, no organomegaly, good bowel sounds, no rebound or guarding, no peritoneal signs. Wound to left side of abdomen, dressed today, no open area to left hip as well. Unable to visualize as they are dressed and she is fully clothed. Followed by wound   EXTREMITIES: UE Full ROM. Right BKA, IN a  with protected brace  SKIN: Warm and dry, no erythema noted, open area on left pannus with dressing on abdomen  NEUROLOGICAL: The patient is oriented to person, place and time. Strength and sensation are grossly intact. Face is symmetric.          LABS:    Lab Results   Component Value Date    WBC 9.5 06/27/2020    HGB 7.9 (L) 06/27/2020    HCT 25.5 (L) 06/27/2020    MCV 95 06/27/2020     06/27/2020       Results for orders placed or performed during the hospital encounter of 06/16/20   Basic Metabolic Panel   Result Value Ref Range    Sodium 141 136 - 145 mmol/L    Potassium 4.2 3.5 - 5.0 mmol/L    Chloride 107 98 - 107 mmol/L    CO2 27 22 - 31 mmol/L    Anion Gap, Calculation 7 5 - 18 mmol/L    Glucose 116 70 - 125 mg/dL    Calcium 8.8 8.5 - 10.5 mg/dL    BUN 30 (H) 8 - 22 mg/dL    Creatinine 1.57 (H) 0.60 - 1.10 mg/dL    GFR MDRD Af Amer 40 (L) >60 mL/min/1.73m2    GFR MDRD Non Af Amer 33 (L) >60 mL/min/1.73m2           Lab Results   Component Value Date    HGBA1C 10.3 (H) 06/17/2020     Vitamin D, Total (25-Hydroxy)   Date Value Ref Range Status   04/28/2016 29.8 (L) 30.0 - 80.0 ng/mL Final     Lab Results   Component Value Date    ZSYCPRXC52 285 01/07/2011       ASSESSMENT/PLAN:  1. Hx of right BKA (H)    2. Phantom limb pain (H)    3. Acute post-operative pain    4. Chronic wound infection of abdomen, subsequent encounter    5. Slow transit constipation      1.  Right BKA: Also with postoperative pain and  phantom limb pain. Unable to assess stump, in  and brace.  -Renew Dilaudid at current dosing.  -Continue PT and OT as directed by them.    2. Urinary retenton: Canchola has been reinserted.  Urine culture returned and currently on antibiotic.    3. Constipation:   -senna S1 tab p.o. twice daily.    -Start MiraLAX 17 g p.o. daily.    4.  Left pannus wound: Viewed today, currently has wound care orders that we will continue the same.  Does not appear to have any secondary infection at this time.    35 minutes with 21 minutes face-to-face with patient in room discussing wound care, constipation, pain management discussion and education regarding risks versus benefits as well as discussing long-term plan and disposition.    Electronically signed by:  Zoey Leung CNP  This progress note was completed using Dragon software and there may be grammatical errors.      .

## 2021-06-11 NOTE — PROGRESS NOTES
"  [facility]      NAME:  Jazmin Bauman             :  1955    MRN: [id number]    CODE STATUS:  FULL CODE    FACILITY: [facility] [[id number]]         CHIEF COMPLAIN/REASON FOR VISIT:  Chief Complaint   Patient presents with     Review Of Multiple Medical Conditions       HISTORY OF PRESENT ILLNESS: Jazmin Bauman is a 65 y.o. female. Pt was at [facility] from  to  and underwent a RBKA r/t ongoing DM ulcer with osteomyelitis. Per her EMR dc summary \" with HTN,morbid obesity, DM, A fib, CKD 4 presented for evaluation of R foot swelling, difficulty ambulation, pain found to have osteomyelitis now s/p amputation. She has now been to the OR twice this stay, last was on 20.   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   Patient had a chronic diabetic foot ulcer on her R heel, presented for increased swelling, difficulty ambulating, and pain. MRI showed extensive soft tissue necrosis and some osteomyelitis along with cellulitis  Met sepsis criteria on admission with leukocytosis and elevated CRP  Podiatry saw and the foot was not felt to be salvageable and BKA was recommended\"       August 15-: Hospitalized for sepsis with MRSA bacteremia, she had possible endocarditis and TTE revealing vegetation of the aortic valve. She had a CT of the abdomen and pelvis that showed left lower abdominal pannus ulceration but without abscess.  Her right BKA stump ulceration was negative for osteomyelitis or cellulitis.  She was treated with IV Vanco for 14 days which will be completed on .  Her left lower abdominal ulceration has never been biopsied.  It was not biopsied in the hospital and there was no surgical intervention.  Her right BKA stump was negative for osteomyelitis.  CKD stage IV with baseline creatinine 2.5-3.  She was at 1.82 at the end of hospitalization.  She is discharged on sodium bicarb twice " "daily.  Her insulin was adjusted and she is now on sliding scale insulin with 10 units of Lantus at bedtime.  Blood sugars have been less than 200 on average.    For her hypertension, Norvasc has been discontinued.  Blood pressures remain in the 130s over 70s on average.  She was incidentally found to have a lung nodule on CT scan, will need to follow-up with this.          Allergies   Allergen Reactions     Hydralazine      Paralysis of legs     Latex Itching     Skin Burns     Naprosyn [Naproxen] Swelling     throat     Nitrofurantoin Hives     Sulfa (Sulfonamide Antibiotics) Rash     Vicks Vaporub [Camphor-Eucalyptus Oil-Menthol] Rash   :     Current Outpatient Medications   Medication Sig     acetaminophen (TYLENOL) 500 MG tablet Take 2 tablets (1,000 mg total) by mouth 3 (three) times a day.     aspirin 81 MG EC tablet Take 81 mg by mouth daily.     BD ULTRA-FINE MICRO PEN NEEDLE 32 gauge x 1/4\" Ndle USE AS DIRECTED 4 TIMES DAILY.     bisacodyL (DULCOLAX) 10 mg suppository Insert 10 mg into the rectum daily as needed. No stool greater than 72 hours     cholecalciferol, vitamin D3, (VITAMIN D3) 1,000 unit capsule Take 1,000 Units by mouth daily.     collagenase ointment Apply daily per WOC     docusate sodium (COLACE) 100 MG capsule Take 100 mg by mouth daily.     ferrous sulfate 325 (65 FE) MG tablet Take 1 tablet by mouth 2 (two) times a day with meals.     gabapentin (NEURONTIN) 100 MG capsule Take 200 mg by mouth 2 (two) times a day.     HYDROmorphone (DILAUDID) 2 MG tablet Take 1 tablet (2 mg total) by mouth every 3 (three) hours as needed.     HYDROmorphone (DILAUDID) 2 MG tablet Take 1 tablet (2 mg total) by mouth daily.     lancets (ACCU-CHEK MULTICLIX LANCET) Misc TEST 6 TIMES A DAY     LANTUS SOLOSTAR U-100 INSULIN 100 unit/mL (3 mL) pen Inject 10 Units under the skin at bedtime. 11.65 Type 2 with hyperglycemia  Contact provider if insulin prescribed is not the preferred insulin per insurance.     " lidocaine (XYLOCAINE) 5 % ointment Apply topically 4 (four) times a day. Apply to left shoulder or around wound (not inside wound)     metoprolol tartrate (LOPRESSOR) 25 MG tablet Take 1 tablet (25 mg total) by mouth 2 (two) times a day.     miconazole (MICOTIN) 2 % powder Apply topically 2 (two) times a day. Apply to b/l groin/underneath breasts/underneath pannus     morphine 0.1% Gel Apply 2 click (1 g total) topically 3 (three) times a day. Apply to LLQ a pea-sized amount     NOVOLOG FLEXPEN U-100 INSULIN 100 unit/mL (3 mL) injection pen Check blood sugar four (4) times daily.  11.65 Type 2 with hyperglycemia  BD Ultra-fine Therese Pen Needles - NDC [address]-39 - dispense 1 case,  refill PRN for 1 year  OneTouch Verio Flex  Meter  OneTouch Verio strips 2 boxes of 50  Delica Lancets box of 100     NOVOLOG U-100 INSULIN ASPART 100 unit/mL injection Check blood sugar four (4) times daily.  11.65 Type 2 with hyperglycemia  OneTouch Verio Flex  Meter  OneTouch Verio strips 2 boxes of 50  Delica Lancets box of 100  BD Ultra-fine Therese Pen Needles - NDC [address]-29 - dispense 1 case, refill PRN for 1 year     polyethylene glycol (MIRALAX) 17 gram packet Take 1 packet (17 g total) by mouth daily as needed.     senna (SENOKOT) 8.6 mg tablet Take 1 tablet by mouth 2 (two) times a day.     sodium bicarbonate 650 MG tablet Take 1 tablet (650 mg total) by mouth 2 (two) times a day. OTC product     warfarin sodium (WARFARIN ORAL) Take by mouth. 8/28/20 INR 2.6  Cont 3mg daily.  Next INR 9/1/20.    8/25/20 INR 2.2 Cont 3mg daily. Next INR 8/28.  8/24/20 INR 2.3  Take 3mg daily.  Next INR 8/27/20.         REVIEW OF SYSTEMS:    Currently, no fever, chills, or rigors. Does not have any visual or hearing problems. Denies any chest pain, headaches, palpitations, lightheadedness, dizziness, shortness of breath, or cough. Appetite is good. Denies any GERD symptoms. Denies any difficulty with swallowing, nausea, or vomiting.  Denies  any abdominal pain, diarrhea or constipation. Denies any urinary symptoms, yeager in place. No insomnia. No active bleeding. No rash.       PHYSICAL EXAMINATION:  Vitals:    09/02/20 0917   BP: 115/50   Pulse: (!) 54   Temp: 97  F (36.1  C)   SpO2: 98%   Weight: (!) 241 lb (109.3 kg)         GENERAL: Awake, Alert, oriented x3, not in any form of acute distress, answers questions appropriately, follows simple commands, conversant with flat affect  HEENT: Head is normocephalic with normal hair distribution. No evidence of trauma. Ears: No acute purulent discharge. Eyes: Sclera anicteric.  CHEST: No tenderness or deformity, no crepitus  LUNG: Clear to auscultation with good chest expansion. There DM BS  BACK: No kyphosis of the thoracic spine. Symmetric, no curvature, ROM normal, no CVA tenderness, no spinal tenderness   CVS: There is good S1  S2,  rhythm is regular.  ABDOMEN: Globular and ROTUND tender to palpation, non distended, no masses, no organomegaly, good bowel sounds, no rebound or guarding, no peritoneal signs. Wound to left side of abdomen, dressed today, no open area to left hip as well. Unable to visualize as they are dressed and she is fully clothed. Followed by wound   EXTREMITIES: UE Full ROM. Right BKA, IN a  with protected brace  SKIN: Warm and dry, no erythema noted, open area on left pannus with dressing on abdomen  NEUROLOGICAL: The patient is oriented to person, place and time. Strength and sensation are grossly intact. Face is symmetric.          LABS:    Lab Results   Component Value Date    WBC 7.9 08/21/2020    HGB 9.2 (L) 08/21/2020    HCT 28.6 (L) 08/21/2020    MCV 89 08/21/2020     08/21/2020       Results for orders placed or performed during the hospital encounter of 08/15/20   Basic Metabolic Panel   Result Value Ref Range    Sodium 137 136 - 145 mmol/L    Potassium 4.1 3.5 - 5.0 mmol/L    Chloride 103 98 - 107 mmol/L    CO2 23 22 - 31 mmol/L    Anion Gap, Calculation 11 5  - 18 mmol/L    Glucose 178 (H) 70 - 125 mg/dL    Calcium 11.0 (H) 8.5 - 10.5 mg/dL    BUN 37 (H) 8 - 22 mg/dL    Creatinine 2.05 (H) 0.60 - 1.10 mg/dL    GFR MDRD Af Amer 29 (L) >60 mL/min/1.73m2    GFR MDRD Non Af Amer 24 (L) >60 mL/min/1.73m2           Lab Results   Component Value Date    HGBA1C 10.3 (H) 06/17/2020     Vitamin D, Total (25-Hydroxy)   Date Value Ref Range Status   04/28/2016 29.8 (L) 30.0 - 80.0 ng/mL Final     Lab Results   Component Value Date    CKLCXTDQ47 285 01/07/2011       ASSESSMENT/PLAN:  1. Chronic wound infection of abdomen, subsequent encounter    2. Hx of right BKA (H)    3. Chronic kidney disease, stage III (moderate) (H)    4. Chronic pain syndrome      Right BKA: Also with postoperative pain and phantom limb pain. Unable to assess stump, in  and brace.  -Continues on Dilaudid.  -Continue PT and OT as directed by them.    Acute on chronic blood loss anemia-likely from anemia chronic disease, iron deficiency, chronic kidney disease, surgery. On oral iron.     Left abdominal wall cellulitis and right side: She will remain on vanco x 14 days. There is no abscess on ultrasound.  She is followed by wound care in the TCU.     Insulin-dependent diabetes: A1c 10.3.  Now on glargine 10 units nightly and sliding scale NovoLog 3 times daily with meals.  Blood sugars actually look pretty good here in the TCU, all less than 220.      Essential hypertension: Satisfactory in TCU thus far, amlodipine d.cd.   -metoprolol    CKD 4: Followed by Dr. Iqbal.  Creatinine today 1.57.  -Continue PhosLo.   -Sodium bicarb.     Coronary artery disease:  -also needs outpt CRISTINA eval  -Continue metoprolol and aspirin.     Urinary retention: Continues to have [place] in place, she follows with [facility] for urinary retention.    A. Fib on Coumadin: INR due tomorrow, recent variable INRs.    TCU/PT report: Using the easy stand for transfer at this time due to left leg weakness.    35 minutes with >50%  face-to-face with patient in room discussing wound, vanco treatment plan, pain management.     Electronically signed by:  Zoey Leung CNP  This progress note was completed using Dragon software and there may be grammatical errors.      .

## 2021-06-11 NOTE — PROGRESS NOTES
"  [facility]      NAME:  Jazmin Bauman             :  1955    MRN: [id number]    CODE STATUS:  FULL CODE    FACILITY: [facility] [[id number]]         CHIEF COMPLAIN/REASON FOR VISIT:  Chief Complaint   Patient presents with     Review Of Multiple Medical Conditions     RBKA       HISTORY OF PRESENT ILLNESS: Jazmin Bauman is a 65 y.o. female being seen for review of multiple medical conditions. Seen in her room today and in good spirits. On TCU after a stay at [facility] from 8/15 to 2020. Per EMR \"  with history significant for morbid obesity, hypertension, DM-II, CKD-III, paroxysmal atrial fibrillation, PAD, s/p right BKA\". Pt did not want wound to abdomen checked as she was having lunch with a friend and fully dressed. On coumadin for Afib, no excessive bleeding or bruising  INR scheduled for .    Allergies   Allergen Reactions     Hydralazine      Paralysis of legs     Latex Itching     Skin Burns     Naprosyn [Naproxen] Swelling     throat     Nitrofurantoin Hives     Sulfa (Sulfonamide Antibiotics) Rash     Vicks Vaporub [Camphor-Eucalyptus Oil-Menthol] Rash   :     Current Outpatient Medications   Medication Sig     acetaminophen (TYLENOL) 500 MG tablet Take 2 tablets (1,000 mg total) by mouth 3 (three) times a day.     aspirin 81 MG EC tablet Take 81 mg by mouth daily.     BD ULTRA-FINE MICRO PEN NEEDLE 32 gauge x \" Ndle USE AS DIRECTED 4 TIMES DAILY.     bisacodyL (DULCOLAX) 10 mg suppository Insert 10 mg into the rectum daily as needed. No stool greater than 72 hours     cholecalciferol, vitamin D3, (VITAMIN D3) 1,000 unit capsule Take 1,000 Units by mouth daily.     collagenase ointment Apply daily per WOC     docusate sodium (COLACE) 100 MG capsule Take 100 mg by mouth daily.     ferrous sulfate 325 (65 FE) MG tablet Take 1 tablet by mouth 2 (two) times a day with meals.     gabapentin (NEURONTIN) 100 MG capsule Take 200 mg by mouth 2 (two) " times a day.     HYDROmorphone (DILAUDID) 2 MG tablet Take 1 tablet (2 mg total) by mouth daily. May also take 1 tablet (2 mg total) every 3 (three) hours as needed for pain.     lancets (ACCU-CHEK MULTICLIX LANCET) Misc TEST 6 TIMES A DAY     LANTUS SOLOSTAR U-100 INSULIN 100 unit/mL (3 mL) pen Inject 10 Units under the skin at bedtime. 11.65 Type 2 with hyperglycemia  Contact provider if insulin prescribed is not the preferred insulin per insurance. (Patient taking differently: Inject 15 Units under the skin at bedtime. 11.65 Type 2 with hyperglycemia  Contact provider if insulin prescribed is not the preferred insulin per insurance.)     lidocaine (XYLOCAINE) 5 % ointment Apply topically 4 (four) times a day. Apply to left shoulder or around wound (not inside wound)     metoprolol tartrate (LOPRESSOR) 25 MG tablet Take 1 tablet (25 mg total) by mouth 2 (two) times a day.     miconazole (MICOTIN) 2 % powder Apply topically 2 (two) times a day. Apply to b/l groin/underneath breasts/underneath pannus     NOVOLOG U-100 INSULIN ASPART 100 unit/mL injection Check blood sugar four (4) times daily.  11.65 Type 2 with hyperglycemia  OneTouch Verio Flex  Meter  OneTouch Verio strips 2 boxes of 50  Delica Lancets box of 100  BD Ultra-fine Therese Pen Needles - NDC [address]-31 - dispense 1 case, refill PRN for 1 year (Patient taking differently: Inject 7 Units under the skin 3 (three) times a day before meals. )     polyethylene glycol (MIRALAX) 17 gram packet Take 1 packet (17 g total) by mouth daily as needed.     senna (SENOKOT) 8.6 mg tablet Take 1 tablet by mouth 2 (two) times a day.     sodium bicarbonate 650 MG tablet Take 1 tablet (650 mg total) by mouth 2 (two) times a day. OTC product     warfarin sodium (WARFARIN ORAL) Take by mouth. 9/18/20 INR 1.6 5mg tonight, then 3mg daily. Next INR 9/22.  9/14/20 INR 1.7 Cont 2.5,g daily. Next INR 9/17 9/11/20 INR 1.9 Take 2.5mg daily Next INR 9/14 9/8/20 INR 1.6  Take 2.5mg  daily.  Next INR 9/11/20.    9/3/20 INR 3.4  Hold x 2 days, then take 2mg daily.  Next INR 9/8/20.  9/2/20 INR 3.4 Hold 9/2 recheck INR 9/3  8/28/20 INR 2.6  Cont 3mg daily.  Next INR 9/1/20.    8/25/20 INR 2.2 Cont 3mg daily. Next INR 8/28.  8/24/20 INR 2.3  Take 3mg daily.  Next INR 8/27/20.         REVIEW OF SYSTEMS:    Currently, no fever, chills, or rigors. Does not have any visual or hearing problems. Denies any chest pain, headaches, palpitations, lightheadedness, dizziness, shortness of breath, or cough. Appetite is good. Denies any GERD symptoms. Denies any difficulty with swallowing, nausea, or vomiting.  Denies any abdominal pain, diarrhea or constipation. Denies any urinary symptoms. No insomnia. No active bleeding. No rash.       PHYSICAL EXAMINATION:  Vitals:    09/21/20 1525   BP: 170/68   Pulse: 63   Temp: 97.8  F (36.6  C)   Weight: (!) 251 lb 1.6 oz (113.9 kg)         GENERAL: Awake, Alert, oriented x3, not in any form of acute distress, answers questions appropriately, follows simple commands, conversant with flat affect  HEENT: Head is normocephalic with normal hair distribution. No evidence of trauma. Ears: No acute purulent discharge. Eyes: Conjunctivae pink with no scleral jaundice. Nose: Normal mucosa and septum. NECK: Supple with no cervical or supraclavicular lymphadenopathy. Trachea is midline.   CHEST: No tenderness or deformity, no crepitus  LUNG: Clear to auscultation with good chest expansion. There DM BS  BACK: No kyphosis of the thoracic spine. Symmetric, no curvature, ROM normal, no CVA tenderness, no spinal tenderness   CVS: There is good S1  S2,  rhythm is regular.  ABDOMEN: Globular and ROTUND tender to palpation, non distended, no masses, no organomegaly, good bowel sounds, no rebound or guarding, no peritoneal signs. Wound to left side of abdomen, dressed today, no open area to left hip as well. Unable to visualize as they are dressed and she is fully clothed. Followed by wound    EXTREMITIES: UE Full ROM. Right BKA, IN a  with protected brace  SKIN: Warm and dry, no erythema noted, open area with dressing on abdomen  NEUROLOGICAL: The patient is oriented to person, place and time. Strength and sensation are grossly intact. Face is symmetric.    Social distancing with PPE practiced during assessment.      LABS:    Lab Results   Component Value Date    WBC 7.9 08/21/2020    HGB 9.2 (L) 08/21/2020    HCT 28.6 (L) 08/21/2020    MCV 89 08/21/2020     08/21/2020       Results for orders placed or performed during the hospital encounter of 08/15/20   Basic Metabolic Panel   Result Value Ref Range    Sodium 137 136 - 145 mmol/L    Potassium 4.1 3.5 - 5.0 mmol/L    Chloride 103 98 - 107 mmol/L    CO2 23 22 - 31 mmol/L    Anion Gap, Calculation 11 5 - 18 mmol/L    Glucose 178 (H) 70 - 125 mg/dL    Calcium 11.0 (H) 8.5 - 10.5 mg/dL    BUN 37 (H) 8 - 22 mg/dL    Creatinine 2.05 (H) 0.60 - 1.10 mg/dL    GFR MDRD Af Amer 29 (L) >60 mL/min/1.73m2    GFR MDRD Non Af Amer 24 (L) >60 mL/min/1.73m2           Lab Results   Component Value Date    HGBA1C 10.3 (H) 06/17/2020     Vitamin D, Total (25-Hydroxy)   Date Value Ref Range Status   04/28/2016 29.8 (L) 30.0 - 80.0 ng/mL Final     Lab Results   Component Value Date    [id number] 285 01/07/2011       ASSESSMENT/PLAN:  1. Hx of right BKA (H)    2. Weakness      1.. RBKA: Reports pain stable during visit. Unable to assess actual stump, in  and brace. Attend therapies as needed PT/OT. SN for chronic medical conditions management. Pt goal is to walk, working on transfers out of bed at this time, using mechanical lift.. Therapist reports prothesis will be here soon. Pt only to stand about 1 minutes at a time, poor endurance.    2. Weakness: Has ongoing fall H/O, weakness. Pt was in TCU, dc to friends house but fell. No falls at facility. Continues to progress with rehab slowly.     Electronically signed by:  Azalea Kevin, CHARMAINE  This  progress note was completed using Dragon software and there may be grammatical errors.      .

## 2021-06-11 NOTE — TELEPHONE ENCOUNTER
[facility] Nurse Triage Anticoagulation Note      Provider: BARRY Smith  Facility: [facility]    Facility Type: TCU    Caller: Chris  Call Back Number:  [phone]    Reason for call: INR    Today s INR: 1.9  Previous INR: 9/5 1.6 2.5mg daily     Diagnosis/Goal: AFIB  Heparin/Lovenox: No   Currently on ABX: No  Other interacting Medications: None  Missed or refused doses: No    BMP ,K+4.7,BUN 48, Creat 2.41, GFR 20, CA 10.7last 11.3  CBC-HGB 10.7, WBC 9.8  UA- WBC >180, Leuk-larg, Nit neg, Bact- Mod.   Pt had an order for IV 1000cc of NS and the IV line infiltrated and only received 400cc, per the pt she doesn't want the IV replaced.       Verbal Order/Direction given by Provider: Encourage fluids every 4hrs, Warfarin 2.5mg daily and INR and BMP on 9/14    Provider giving order: BARRY Smith    Verbal order given to: Reina Cartagena RN

## 2021-06-11 NOTE — PROGRESS NOTES
"  [facility]      NAME:  Jazmin Bauman             :  1955    MRN: [id number]    CODE STATUS:  FULL CODE    FACILITY: [facility] [[id number]]         CHIEF COMPLAIN/REASON FOR VISIT:  Chief Complaint   Patient presents with     Review Of Multiple Medical Conditions     Hypercalcemia, paronychia, UA UC.       HISTORY OF PRESENT ILLNESS: Jazmin Bauman is a 65 y.o. female. Pt was at [facility] from  to  and underwent a RBKA r/t ongoing DM ulcer with osteomyelitis. Per her EMR dc summary \" with HTN,morbid obesity, DM, A fib, CKD 4 presented for evaluation of R foot swelling, difficulty ambulation, pain found to have osteomyelitis now s/p amputation. She has now been to the OR twice this stay, last was on 20.   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   Patient had a chronic diabetic foot ulcer on her R heel, presented for increased swelling, difficulty ambulating, and pain. MRI showed extensive soft tissue necrosis and some osteomyelitis along with cellulitis  Met sepsis criteria on admission with leukocytosis and elevated CRP  Podiatry saw and the foot was not felt to be salvageable and BKA was recommended\"       August 15-: Hospitalized for sepsis with MRSA bacteremia, she had possible endocarditis and TTE revealing vegetation of the aortic valve. She had a CT of the abdomen and pelvis that showed left lower abdominal pannus ulceration but without abscess.  Her right BKA stump ulceration was negative for osteomyelitis or cellulitis.  She was treated with IV Vanco for 14 days which will be completed on .  Her left lower abdominal ulceration has never been biopsied.  It was not biopsied in the hospital and there was no surgical intervention.  Her right BKA stump was negative for osteomyelitis.  CKD stage IV with baseline creatinine 2.5-3.  She was at 1.82 at the end of hospitalization.  She is " "discharged on sodium bicarb twice daily.  Her insulin was adjusted and she is now on sliding scale insulin with 10 units of Lantus at bedtime.  Blood sugars have been less than 200 on average.    For her hypertension, Norvasc has been discontinued.  Blood pressures remain in the 130s over 70s on average.  She was incidentally found to have a lung nodule on CT scan, will need to follow-up with this.    Today: Seen today to follow-up on recent labs.  On Tuesday her creatinine came back elevated at 2.4 with hypercalcemia to 11.7.  Repeat after 1 day with pushing fluids was similar with no real improvement in the hypercalcemia.  She is not on calcium or calcitonin.  No history of parathyroid issues.  She is agreeable to starting fluids and says that she is trying to drink and eat normally.  She has had a significant weight loss since her admission (~30lbs).  Of note she did have a lung nodule found on her CT scan that will be followed up within the next 2 months.  We will start fluids, treat for possible infection and obtain a UA UC, and recheck her BMP tomorrow.  If fluids do not help to correct calcium, I will obtain a PTH and ionized calcium.  Will be following up with her nephrologist at the end of September however lung nodule is also of concern in this setting.      Allergies   Allergen Reactions     Hydralazine      Paralysis of legs     Latex Itching     Skin Burns     Naprosyn [Naproxen] Swelling     throat     Nitrofurantoin Hives     Sulfa (Sulfonamide Antibiotics) Rash     Vicks Vaporub [Camphor-Eucalyptus Oil-Menthol] Rash   :     Current Outpatient Medications   Medication Sig     acetaminophen (TYLENOL) 500 MG tablet Take 2 tablets (1,000 mg total) by mouth 3 (three) times a day.     aspirin 81 MG EC tablet Take 81 mg by mouth daily.     BD ULTRA-FINE MICRO PEN NEEDLE 32 gauge x 1/4\" Ndle USE AS DIRECTED 4 TIMES DAILY.     bisacodyL (DULCOLAX) 10 mg suppository Insert 10 mg into the rectum daily as " needed. No stool greater than 72 hours     cholecalciferol, vitamin D3, (VITAMIN D3) 1,000 unit capsule Take 1,000 Units by mouth daily.     collagenase ointment Apply daily per WOC     docusate sodium (COLACE) 100 MG capsule Take 100 mg by mouth daily.     ferrous sulfate 325 (65 FE) MG tablet Take 1 tablet by mouth 2 (two) times a day with meals.     gabapentin (NEURONTIN) 100 MG capsule Take 200 mg by mouth 2 (two) times a day.     HYDROmorphone (DILAUDID) 2 MG tablet Take 1 tablet (2 mg total) by mouth every 3 (three) hours as needed.     HYDROmorphone (DILAUDID) 2 MG tablet Take 1 tablet (2 mg total) by mouth daily.     lancets (ACCU-CHEK MULTICLIX LANCET) Misc TEST 6 TIMES A DAY     LANTUS SOLOSTAR U-100 INSULIN 100 unit/mL (3 mL) pen Inject 10 Units under the skin at bedtime. 11.65 Type 2 with hyperglycemia  Contact provider if insulin prescribed is not the preferred insulin per insurance. (Patient taking differently: Inject 15 Units under the skin at bedtime. 11.65 Type 2 with hyperglycemia  Contact provider if insulin prescribed is not the preferred insulin per insurance.)     lidocaine (XYLOCAINE) 5 % ointment Apply topically 4 (four) times a day. Apply to left shoulder or around wound (not inside wound)     metoprolol tartrate (LOPRESSOR) 25 MG tablet Take 1 tablet (25 mg total) by mouth 2 (two) times a day.     miconazole (MICOTIN) 2 % powder Apply topically 2 (two) times a day. Apply to b/l groin/underneath breasts/underneath pannus     NOVOLOG U-100 INSULIN ASPART 100 unit/mL injection Check blood sugar four (4) times daily.  11.65 Type 2 with hyperglycemia  OneTouch Verio Flex  Meter  OneTouch Verio strips 2 boxes of 50  Delica Lancets box of 100  BD Ultra-fine Therese Pen Needles - NDC [address]-75 - dispense 1 case, refill PRN for 1 year (Patient taking differently: Inject 7 Units under the skin 3 (three) times a day before meals. )     polyethylene glycol (MIRALAX) 17 gram packet Take 1 packet (17 g  total) by mouth daily as needed.     senna (SENOKOT) 8.6 mg tablet Take 1 tablet by mouth 2 (two) times a day.     sodium bicarbonate 650 MG tablet Take 1 tablet (650 mg total) by mouth 2 (two) times a day. OTC product     warfarin sodium (WARFARIN ORAL) Take by mouth. 9/14/20 INR 1.7 Cont 2.5,g daily. Next INR 9/17 9/11/20 INR 1.9 Take 2.5mg daily Next INR 9/14 9/8/20 INR 1.6  Take 2.5mg daily.  Next INR 9/11/20.    9/3/20 INR 3.4  Hold x 2 days, then take 2mg daily.  Next INR 9/8/20.  9/2/20 INR 3.4 Hold 9/2 recheck INR 9/3  8/28/20 INR 2.6  Cont 3mg daily.  Next INR 9/1/20.    8/25/20 INR 2.2 Cont 3mg daily. Next INR 8/28.  8/24/20 INR 2.3  Take 3mg daily.  Next INR 8/27/20.         REVIEW OF SYSTEMS:    Currently, no fever, chills, or rigors. Does not have any visual or hearing problems. Denies any chest pain, headaches, palpitations, lightheadedness, dizziness, shortness of breath, or cough. Appetite is good. Denies any GERD symptoms. Denies any difficulty with swallowing, nausea, or vomiting.  Denies any abdominal pain, diarrhea or constipation. Denies any urinary symptoms, yeager in place. No insomnia. No active bleeding. No rash.       PHYSICAL EXAMINATION:  Vitals:    09/16/20 0953   BP: 141/62   Pulse: (!) 56   Temp: 96.6  F (35.9  C)   SpO2: 99%   Weight: (!) 230 lb (104.3 kg)         GENERAL: Awake, Alert, oriented x3, not in any form of acute distress, answers questions appropriately, follows simple commands, conversant with flat affect  HEENT: Head is normocephalic with normal hair distribution. No evidence of trauma. Ears: No acute purulent discharge. Eyes: Sclera anicteric.  CHEST: No tenderness or deformity, no crepitus  LUNG: Clear to auscultation with good chest expansion. There DM BS  BACK: No kyphosis of the thoracic spine. Symmetric, no curvature, ROM normal, no CVA tenderness, no spinal tenderness   CVS: There is good S1  S2,  rhythm is regular.  ABDOMEN: Globular and ROTUND tender to  palpation, non distended, no masses, no organomegaly, good bowel sounds, no rebound or guarding, no peritoneal signs. Wound to left side of abdomen, dressed today, no open area to left hip as well. Unable to visualize as they are dressed and she is fully clothed. Followed by wound   EXTREMITIES: UE Full ROM. Right BKA, IN a  with protected brace  SKIN: Warm and dry, no erythema noted, open area on left pannus with dressing on abdomen  NEUROLOGICAL: The patient is oriented to person, place and time. Strength and sensation are grossly intact. Face is symmetric.          LABS:    Lab Results   Component Value Date    WBC 7.9 08/21/2020    HGB 9.2 (L) 08/21/2020    HCT 28.6 (L) 08/21/2020    MCV 89 08/21/2020     08/21/2020       Results for orders placed or performed during the hospital encounter of 08/15/20   Basic Metabolic Panel   Result Value Ref Range    Sodium 137 136 - 145 mmol/L    Potassium 4.1 3.5 - 5.0 mmol/L    Chloride 103 98 - 107 mmol/L    CO2 23 22 - 31 mmol/L    Anion Gap, Calculation 11 5 - 18 mmol/L    Glucose 178 (H) 70 - 125 mg/dL    Calcium 11.0 (H) 8.5 - 10.5 mg/dL    BUN 37 (H) 8 - 22 mg/dL    Creatinine 2.05 (H) 0.60 - 1.10 mg/dL    GFR MDRD Af Amer 29 (L) >60 mL/min/1.73m2    GFR MDRD Non Af Amer 24 (L) >60 mL/min/1.73m2           Lab Results   Component Value Date    HGBA1C 10.3 (H) 06/17/2020     Vitamin D, Total (25-Hydroxy)   Date Value Ref Range Status   04/28/2016 29.8 (L) 30.0 - 80.0 ng/mL Final     Lab Results   Component Value Date    [id number] 285 01/07/2011       ASSESSMENT/PLAN:  1. CKD (chronic kidney disease) stage 4, GFR 15-29 ml/min (H)    2. Chronic wound infection of abdomen, subsequent encounter    3. Paroxysmal atrial fibrillation (H)    4. Hypercalcemia    5. Paronychia of great toe, left      Right BKA: Also with postoperative pain and phantom limb pain. Unable to assess stump, in  and brace.  -Continues on Dilaudid.  -Continue PT and OT as  directed by them.    Acute on chronic blood loss anemia-likely from anemia chronic disease, iron deficiency, chronic kidney disease, surgery. On oral iron.     Left abdominal wall cellulitis and right side: Viewed wound today, wound bed is beefy red, no purulent drainage, the surrounding tissue is pink, minimal tenderness.     Insulin-dependent diabetes: A1c 10.3.  Now on glargine 10 units nightly and sliding scale NovoLog 3 times daily with meals.  Blood sugars actually look pretty good here in the TCU, all less than 220.      Essential hypertension: Satisfactory in TCU thus far, amlodipine d.cd.   -metoprolol    CKD 4: Followed by Dr. Iqbal.  BMP with elevated creatinine to 2.4 and hypercalcemia at 11.7.  Repeat today with same results.  The rest of her electrolytes are stable.  She is agreeable to IV fluids, will also check a UA UC and treat her toe infection.  -Start normal saline 1000 cc at 100 cc/h.   Nephrology appointment on September 28.   -Continue PhosLo.   -Sodium bicarb.     Coronary artery disease:  -also needs outpt CRISTINA eval  -Continue metoprolol and aspirin.     Urinary retention: Obtain UA UC.    Paronychia: Left great toe.  Bright red with tenderness.  Has history of MRSA.  -Start Keflex 500 mg p.o. 3 times daily x10 days.  -Start doxycycline 100 mg p.o twice daily. x10 days.      A. Fib on Coumadin: INR today 1.6.Coumadin 2.5, recheck Friday.     TCU/PT report: Using the easy stand for transfer at this time due to left leg weakness.    Electronically signed by:  Zoey Leung CNP  This progress note was completed using Dragon software and there may be grammatical errors.      .

## 2021-06-11 NOTE — PROGRESS NOTES
"  [facility]      NAME:  Jazmin Bauman             :  1955    MRN: [id number]    CODE STATUS:  FULL CODE    FACILITY: [facility] [[id number]]         CHIEF COMPLAIN/REASON FOR VISIT:  Chief Complaint   Patient presents with     Problem Visit     foot pain.        HISTORY OF PRESENT ILLNESS: Jazmin Bauman is a 65 y.o. female. Pt was at [facility] from  to  and underwent a RBKA r/t ongoing DM ulcer with osteomyelitis. Per her EMR dc summary \" with HTN,morbid obesity, DM, A fib, CKD 4 presented for evaluation of R foot swelling, difficulty ambulation, pain found to have osteomyelitis now s/p amputation. She has now been to the OR twice this stay, last was on 20.   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   Patient had a chronic diabetic foot ulcer on her R heel, presented for increased swelling, difficulty ambulating, and pain. MRI showed extensive soft tissue necrosis and some osteomyelitis along with cellulitis  Met sepsis criteria on admission with leukocytosis and elevated CRP  Podiatry saw and the foot was not felt to be salvageable and BKA was recommended\"       August 15-: Hospitalized for sepsis with MRSA bacteremia, she had possible endocarditis and TTE revealing vegetation of the aortic valve. She had a CT of the abdomen and pelvis that showed left lower abdominal pannus ulceration but without abscess.  Her right BKA stump ulceration was negative for osteomyelitis or cellulitis.  She was treated with IV Vanco for 14 days which will be completed on .  Her left lower abdominal ulceration has never been biopsied.  It was not biopsied in the hospital and there was no surgical intervention.  Her right BKA stump was negative for osteomyelitis.  CKD stage IV with baseline creatinine 2.5-3.  She was at 1.82 at the end of hospitalization.  She is discharged on sodium bicarb twice daily.  Her " "insulin was adjusted and she is now on sliding scale insulin with 10 units of Lantus at bedtime.  Blood sugars have been less than 200 on average.    For her hypertension, Norvasc has been discontinued.  Blood pressures remain in the 130s over 70s on average.  She was incidentally found to have a lung nodule on CT scan, will need to follow-up with this.    Today: Seen for INR today and general discussion.  Today her INR was 1.6; she is feeling well was seen by the wound care team.  She does have a BMP and CBC due today.  She is complaining of pain in her toe that was seen by Dr. Blackwood on Friday.  No new antibiotic orders and no open area drainage is noted.  It is pain so we will try soaking it for now but may need antibiotics.  Will recheck on Thursday.  Otherwise denying any constipation, diarrhea, urinary burning or frequency, shortness of breath or chest pain.        Allergies   Allergen Reactions     Hydralazine      Paralysis of legs     Latex Itching     Skin Burns     Naprosyn [Naproxen] Swelling     throat     Nitrofurantoin Hives     Sulfa (Sulfonamide Antibiotics) Rash     Vicks Vaporub [Camphor-Eucalyptus Oil-Menthol] Rash   :     Current Outpatient Medications   Medication Sig     acetaminophen (TYLENOL) 500 MG tablet Take 2 tablets (1,000 mg total) by mouth 3 (three) times a day.     aspirin 81 MG EC tablet Take 81 mg by mouth daily.     BD ULTRA-FINE MICRO PEN NEEDLE 32 gauge x 1/4\" Ndle USE AS DIRECTED 4 TIMES DAILY.     bisacodyL (DULCOLAX) 10 mg suppository Insert 10 mg into the rectum daily as needed. No stool greater than 72 hours     cholecalciferol, vitamin D3, (VITAMIN D3) 1,000 unit capsule Take 1,000 Units by mouth daily.     collagenase ointment Apply daily per WOC     docusate sodium (COLACE) 100 MG capsule Take 100 mg by mouth daily.     ferrous sulfate 325 (65 FE) MG tablet Take 1 tablet by mouth 2 (two) times a day with meals.     gabapentin (NEURONTIN) 100 MG capsule Take 200 mg by " mouth 2 (two) times a day.     HYDROmorphone (DILAUDID) 2 MG tablet Take 1 tablet (2 mg total) by mouth every 3 (three) hours as needed.     HYDROmorphone (DILAUDID) 2 MG tablet Take 1 tablet (2 mg total) by mouth daily.     lancets (ACCU-CHEK MULTICLIX LANCET) Misc TEST 6 TIMES A DAY     LANTUS SOLOSTAR U-100 INSULIN 100 unit/mL (3 mL) pen Inject 10 Units under the skin at bedtime. 11.65 Type 2 with hyperglycemia  Contact provider if insulin prescribed is not the preferred insulin per insurance. (Patient taking differently: Inject 15 Units under the skin at bedtime. 11.65 Type 2 with hyperglycemia  Contact provider if insulin prescribed is not the preferred insulin per insurance.)     lidocaine (XYLOCAINE) 5 % ointment Apply topically 4 (four) times a day. Apply to left shoulder or around wound (not inside wound)     metoprolol tartrate (LOPRESSOR) 25 MG tablet Take 1 tablet (25 mg total) by mouth 2 (two) times a day.     miconazole (MICOTIN) 2 % powder Apply topically 2 (two) times a day. Apply to b/l groin/underneath breasts/underneath pannus     NOVOLOG U-100 INSULIN ASPART 100 unit/mL injection Check blood sugar four (4) times daily.  11.65 Type 2 with hyperglycemia  OneTouch Verio Flex  Meter  OneTouch Verio strips 2 boxes of 50  Delica Lancets box of 100  BD Ultra-fine Therese Pen Needles - NDC [address]-68 - dispense 1 case, refill PRN for 1 year (Patient taking differently: Inject 7 Units under the skin 3 (three) times a day before meals. )     polyethylene glycol (MIRALAX) 17 gram packet Take 1 packet (17 g total) by mouth daily as needed.     senna (SENOKOT) 8.6 mg tablet Take 1 tablet by mouth 2 (two) times a day.     sodium bicarbonate 650 MG tablet Take 1 tablet (650 mg total) by mouth 2 (two) times a day. OTC product     warfarin sodium (WARFARIN ORAL) Take by mouth. 9/11/20 INR 1.9 Take 2.5mg daily Next INR 9/14 9/8/20 INR 1.6  Take 2.5mg daily.  Next INR 9/11/20.    9/3/20 INR 3.4  Hold x 2 days,  then take 2mg daily.  Next INR 9/8/20.  9/2/20 INR 3.4 Hold 9/2 recheck INR 9/3  8/28/20 INR 2.6  Cont 3mg daily.  Next INR 9/1/20.    8/25/20 INR 2.2 Cont 3mg daily. Next INR 8/28.  8/24/20 INR 2.3  Take 3mg daily.  Next INR 8/27/20.         REVIEW OF SYSTEMS:    Currently, no fever, chills, or rigors. Does not have any visual or hearing problems. Denies any chest pain, headaches, palpitations, lightheadedness, dizziness, shortness of breath, or cough. Appetite is good. Denies any GERD symptoms. Denies any difficulty with swallowing, nausea, or vomiting.  Denies any abdominal pain, diarrhea or constipation. Denies any urinary symptoms, yeager in place. No insomnia. No active bleeding. No rash.       PHYSICAL EXAMINATION:  Vitals:    09/13/20 1436   BP: 165/66   Pulse: 60   Temp: 97  F (36.1  C)   SpO2: 98%   Weight: (!) 231 lb (104.8 kg)         GENERAL: Awake, Alert, oriented x3, not in any form of acute distress, answers questions appropriately, follows simple commands, conversant with flat affect  HEENT: Head is normocephalic with normal hair distribution. No evidence of trauma. Ears: No acute purulent discharge. Eyes: Sclera anicteric.  CHEST: No tenderness or deformity, no crepitus  LUNG: Clear to auscultation with good chest expansion. There DM BS  BACK: No kyphosis of the thoracic spine. Symmetric, no curvature, ROM normal, no CVA tenderness, no spinal tenderness   CVS: There is good S1  S2,  rhythm is regular.  ABDOMEN: Globular and ROTUND tender to palpation, non distended, no masses, no organomegaly, good bowel sounds, no rebound or guarding, no peritoneal signs. Wound to left side of abdomen, dressed today, no open area to left hip as well. Unable to visualize as they are dressed and she is fully clothed. Followed by wound   EXTREMITIES: UE Full ROM. Right BKA, IN a  with protected brace  SKIN: Warm and dry, no erythema noted, open area on left pannus with dressing on abdomen  NEUROLOGICAL: The  patient is oriented to person, place and time. Strength and sensation are grossly intact. Face is symmetric.          LABS:    Lab Results   Component Value Date    WBC 7.9 08/21/2020    HGB 9.2 (L) 08/21/2020    HCT 28.6 (L) 08/21/2020    MCV 89 08/21/2020     08/21/2020       Results for orders placed or performed during the hospital encounter of 08/15/20   Basic Metabolic Panel   Result Value Ref Range    Sodium 137 136 - 145 mmol/L    Potassium 4.1 3.5 - 5.0 mmol/L    Chloride 103 98 - 107 mmol/L    CO2 23 22 - 31 mmol/L    Anion Gap, Calculation 11 5 - 18 mmol/L    Glucose 178 (H) 70 - 125 mg/dL    Calcium 11.0 (H) 8.5 - 10.5 mg/dL    BUN 37 (H) 8 - 22 mg/dL    Creatinine 2.05 (H) 0.60 - 1.10 mg/dL    GFR MDRD Af Amer 29 (L) >60 mL/min/1.73m2    GFR MDRD Non Af Amer 24 (L) >60 mL/min/1.73m2           Lab Results   Component Value Date    HGBA1C 10.3 (H) 06/17/2020     Vitamin D, Total (25-Hydroxy)   Date Value Ref Range Status   04/28/2016 29.8 (L) 30.0 - 80.0 ng/mL Final     Lab Results   Component Value Date    [id number] 285 01/07/2011       ASSESSMENT/PLAN:  1. Chronic pain syndrome    2. Wound infection    3. Hx of right BKA (H)      Right BKA: Also with postoperative pain and phantom limb pain. Unable to assess stump, in  and brace.  -Continues on Dilaudid.  -Continue PT and OT as directed by them.    Acute on chronic blood loss anemia-likely from anemia chronic disease, iron deficiency, chronic kidney disease, surgery. On oral iron.     Left abdominal wall cellulitis and right side: Wound care MD saw this today.     Insulin-dependent diabetes: A1c 10.3.  Now on glargine 10 units nightly and sliding scale NovoLog 3 times daily with meals.  Blood sugars actually look pretty good here in the TCU, all less than 220.      Essential hypertension: Satisfactory in TCU thus far, amlodipine d.cd.   -metoprolol    CKD 4: Followed by Dr. Iqbal.  Recent Creat 1.57; recheck due today; waiting on labs.    -Continue PhosLo.   -Sodium bicarb.     Coronary artery disease:  -also needs outpt CRISTINA eval  -Continue metoprolol and aspirin.     Urinary retention: Continues to have [place] in place, she follows with [facility] for urinary retention.     A. Fib on Coumadin: INR today 1.6.Coumadin 2.5, recheck Friday.     TCU/PT report: Using the easy stand for transfer at this time due to left leg weakness.    Electronically signed by:  Zoey Leung CNP  This progress note was completed using Dragon software and there may be grammatical errors.      .

## 2021-06-11 NOTE — PROGRESS NOTES
Assessment: Jazmin is here today per the request of her PCP for a consult regarding her diabetes management - based upon a recent significant increase in her A1c. She arrived to today's appointment unaccompanied and had her meter with her. Per her report, she has only recently started checking her blood sugars on a regular basis She takes her glipizide daily as directed.     Nutrition: Currently eating 3 meals daily and states she monitors her CHO intake to 45 - 60 grams per meal. Beverages consist of water or diet cranberry juice and when her current intake was examined , overall her meals appear well balanced. Jazmin states she bases her dietary intake on both carb monitored and potassium restricted choices.     BG Summary: reviewed last 8 days of BG readings : per meter 7 day average = 309 mg/dL  FB, 295, 288, 285, 293, 302, 283,314  Pre lunch: 289, 234, 295, 271, 232, 309  Pre dinner: 442, 290, 319, 281, 300, 363, 379    Reviewed these numbers with Jazmin and discussed them in depth- I reviewed the pathophysiology of diabetes with her and we talked about the progression of the disease as well as insulin resistance. I informed her that her A1c has reached a point that is critically high and needs to be addressed before permanent health damages occur- I approached her with the suggestion of both insulin as well as a GLP1. Informing her that as she goes longer and longer with significantly high blood sugars, the beta cells in her pancreas are working themselves to exhaustion - by initiating insulin , this will help with the workload and very possibly prolong their longevity. She said this made sense to her and she would be willing to start insulin - opposed to any GLP1's as she is afraid of side effects. Attempted to explain to her that the side effects were not very common but she adamantly was opposed to this idea today.    Activity: Limited due to recent fall. Presently using walker and because of her weight  it is difficult for her to move about - seems to tire easily. Encouraged her to do what she can daily - informing her every little bit helps        Plan: Prior to today's appointment discussed with Dr Flores initiation of insulin, GLP 1 or both as based upon Jazmin's A1c adjunct therapy was indicated - He was in favor of this plan. At this time Jazmin has agreed to start basal insulin - she was provided a sample Lantus pen from the clinic today and based upon her weight & A1c was instructed to begin with 35 units daily. Education was provided to her on pen use, storage and expiration, injection technique, site rotation and safe needle disposal. She was able to successfully perform a return demonstration using demo pen from clinic. Will continue Glipizide at present dose.    It was felt that Jazmin would be competent to independently increase dose by 2 units every other day until morning blood sugars were less than 180 mg/dL. This was discussed with her and she stated she felt comfortable in doing so.  May need to consider approaching her with mealtime insulin in the future but for now will advance therapy slowly as we don't want to make her fell overwhelmed or lose the progress we have made.  She was instructed to continue monitoring her carb intake art meals and was asked to check her blood sugars 3 times daily.  Will follow up with her again 8/16 Jazmin was instructed to call with any questions / concerns that may arise before then.       Subjective and Objective:      Jazmin Bauman is referred by Dr Flores for Diabetes Education.     Lab Results   Component Value Date    HGBA1C 13.8 (H) 06/30/2017     Wt (!) 273 lb 14.4 oz (124.2 kg)  BMI 51.33 kg/m2      Current diabetes medications:  Glipizide 10 mg BID    Goals       Medication            1. Take diabetes medications as prescribed  Glipizide 10 mg PO BID        Monitor            1. Check blood sugars 3-4  times each day  remember to bring meter and log  book to all appointments          Nutrition            1. Eat 3 balanced meals each day - Monitor carb intake and limit to 3-4 choices (45-60 grams) per meal    Do not wait longer than 4-5 hours to eat something              Follow up:   Primary care visit  Endocrinology, in 9/19/17  CDE (certified diabetic educator)  8/16      Education:     Monitoring   Meter (per above goals): Assessed, Discussed and Competent  Monitoring: Assessed and Discussed  BG goals: Assessed, Discussed and Needs instruction/review at follow-up    Nutrition Management  Nutrition Management: Assessed and Discussed  Weight: Assessed and Discussed  Portions/Balance: Assessed and Discussed  Carb ID/Count: Assessed and Discussed  Label Reading: Discussed  Heart Healthy Fats: Discussed  Menu Planning: Assessed and Discussed  Dining Out: Discussed  Physical Activity: Assessed, Discussed and Needs instruction/review at follow-up  Medications: Assessed and Discussed  Orals: Assessed and Discussed  Injected Medications: Assessed, Discussed, Literature provided and Patient returned demonstration   Storage/Exp:Assessed, Discussed and Literature provided   Site Rotation: Assessed, Discussed and Literature provided   Sites Assessed: no    Diabetes Disease Process: Assessed, Discussed and Needs instruction/review at follow-up    Acute Complications: Prevent, Detect, Treat:  Hypoglycemia: Discussed  Hyperglycemia: Assessed and Discussed  Sick Days: Not addressed  Driving: Not addressed      Goal Setting and Problem Solving: Assessed and Discussed  Barriers: Assessed, Discussed and Needs instruction/review at follow-up  Psychosocial Adjustments: Assessed and Discussed      Time spent with the patient: 60 minutes for diabetes education and counseling.   Previous Education: yes  Visit Type:JIMMYT        Marilu Yoder RN CDE  Diabetes Education  7/12/2017

## 2021-06-11 NOTE — TELEPHONE ENCOUNTER
[facility] Nurse Triage Anticoagulation Note      Provider: BARRY Smith  Facility: [facility]    Facility Type: TCU    Caller: Reina  Call Back Number:  [phone]    Reason for call: INR    Today s INR: 3.1  Previous INR: 9/21/20 INR 2.1 3mg daily.    Diagnosis/Goal: AFIB  Heparin/Lovenox: No   Currently on ABX: No  Finished 9/20  Other interacting Medications: None  Missed or refused doses: No    Verbal Order/Direction given by Provider:  Coumadin 2.5mg daily. Next INR 10/6    Provider giving order: BARRY Smith    Verbal order given to: Reina Shepard RN

## 2021-06-11 NOTE — PROGRESS NOTES
"  [facility]      NAME:  Jazmin Bauman             :  1955    MRN: [id number]    CODE STATUS:  FULL CODE    FACILITY: [facility] [[id number]]         CHIEF COMPLAIN/REASON FOR VISIT:  Chief Complaint   Patient presents with     Review Of Multiple Medical Conditions     B12 levels, knee pain.       HISTORY OF PRESENT ILLNESS: Jazmin Bauman is a 65 y.o. female. Pt was at [facility] from  to  and underwent a RBKA r/t ongoing DM ulcer with osteomyelitis. Per her EMR dc summary \" with HTN,morbid obesity, DM, A fib, CKD 4 presented for evaluation of R foot swelling, difficulty ambulation, pain found to have osteomyelitis now s/p amputation. She has now been to the OR twice this stay, last was on 20.   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   Patient had a chronic diabetic foot ulcer on her R heel, presented for increased swelling, difficulty ambulating, and pain. MRI showed extensive soft tissue necrosis and some osteomyelitis along with cellulitis  Met sepsis criteria on admission with leukocytosis and elevated CRP  Podiatry saw and the foot was not felt to be salvageable and BKA was recommended\"       August 15-: Hospitalized for sepsis with MRSA bacteremia, she had possible endocarditis and TTE revealing vegetation of the aortic valve. She had a CT of the abdomen and pelvis that showed left lower abdominal pannus ulceration but without abscess.  Her right BKA stump ulceration was negative for osteomyelitis or cellulitis.  She was treated with IV Vanco for 14 days which will be completed on .  Her left lower abdominal ulceration has never been biopsied.  It was not biopsied in the hospital and there was no surgical intervention.  Her right BKA stump was negative for osteomyelitis.  CKD stage IV with baseline creatinine 2.5-3.  She was at 1.82 at the end of hospitalization.  She is discharged on " sodium bicarb twice daily.  Her insulin was adjusted and she is now on sliding scale insulin with 10 units of Lantus at bedtime.  Blood sugars have been less than 200 on average.    For her hypertension, Norvasc has been discontinued.  Blood pressures remain in the 130s over 70s on average.  She was incidentally found to have a lung nodule on CT scan, will need to follow-up with this.  Please see imaging report from August hospitalization.  Multiple nodules and enlarged lymph nodes.    Today: Seen today per nursing request to discuss B12 levels at the patient's request.  She reportedly was telling therapy that she will not do any therapies until her B12 is checked because she used to be on injections until December and has not had them since then.  She does have a mild anemia that is likely related to her CKD/anemia of chronic disease.  She is also on iron.  I will go ahead and check B12 levels to happen to catch lab in the room with her.  We discussed that we will get the level before dosing B12 again.  Again she also wanted to talk about her osteoarthritis and knee pain.  She has had knee injections in the right knee in the past that would like them done in both knees so that she can enjoy physical therapy again.  Physical therapy had planned on discharging her at the end of the week due to limited participation.  She did see her nephrologist earlier this week and she was noted to have hyperuricemia to 11.  She does not have any inflammation, redness or swelling on exam so doubt this is correlated to her knee or joint pains but certainly could be.  Will continue monitoring and discuss whether injections are appropriate.  She has not needed extra pain pills and has been stable on her current Dilaudid.  We will discontinue her topical morphine today.      Allergies   Allergen Reactions     Hydralazine      Paralysis of legs     Latex Itching     Skin Burns     Naprosyn [Naproxen] Swelling     throat      "Nitrofurantoin Hives     Sulfa (Sulfonamide Antibiotics) Rash     Vicks Vaporub [Camphor-Eucalyptus Oil-Menthol] Rash   :     Current Outpatient Medications   Medication Sig     acetaminophen (TYLENOL) 500 MG tablet Take 2 tablets (1,000 mg total) by mouth 3 (three) times a day.     aspirin 81 MG EC tablet Take 81 mg by mouth daily.     BD ULTRA-FINE MICRO PEN NEEDLE 32 gauge x 1/4\" Ndle USE AS DIRECTED 4 TIMES DAILY.     bisacodyL (DULCOLAX) 10 mg suppository Insert 10 mg into the rectum daily as needed. No stool greater than 72 hours     cholecalciferol, vitamin D3, (VITAMIN D3) 1,000 unit capsule Take 1,000 Units by mouth daily.     collagenase ointment Apply daily per WOC     docusate sodium (COLACE) 100 MG capsule Take 100 mg by mouth daily as needed.      ferrous sulfate 325 (65 FE) MG tablet Take 1 tablet by mouth 2 (two) times a day with meals.     gabapentin (NEURONTIN) 100 MG capsule Take 200 mg by mouth 2 (two) times a day.     HYDROmorphone (DILAUDID) 2 MG tablet Take 1 tablet (2 mg total) by mouth daily. May also take 1 tablet (2 mg total) every 3 (three) hours as needed for pain.     lancets (ACCU-CHEK MULTICLIX LANCET) Misc TEST 6 TIMES A DAY     LANTUS SOLOSTAR U-100 INSULIN 100 unit/mL (3 mL) pen Inject 10 Units under the skin at bedtime. 11.65 Type 2 with hyperglycemia  Contact provider if insulin prescribed is not the preferred insulin per insurance. (Patient taking differently: Inject 15 Units under the skin at bedtime. 11.65 Type 2 with hyperglycemia  Contact provider if insulin prescribed is not the preferred insulin per insurance.)     lidocaine (XYLOCAINE) 5 % ointment Apply topically 4 (four) times a day. Apply to left shoulder or around wound (not inside wound)     metoprolol tartrate (LOPRESSOR) 25 MG tablet Take 1 tablet (25 mg total) by mouth 2 (two) times a day.     miconazole (MICOTIN) 2 % powder Apply topically 2 (two) times a day. Apply to b/l groin/underneath breasts/underneath " pannus     NOVOLOG U-100 INSULIN ASPART 100 unit/mL injection Check blood sugar four (4) times daily.  11.65 Type 2 with hyperglycemia  OneTouch Verio Flex  Meter  OneTouch Verio strips 2 boxes of 50  Delica Lancets box of 100  BD Ultra-fine Therese Pen Needles - NDC [address]-14 - dispense 1 case, refill PRN for 1 year (Patient taking differently: Inject 7 Units under the skin 3 (three) times a day before meals. )     polyethylene glycol (MIRALAX) 17 gram packet Take 1 packet (17 g total) by mouth daily as needed.     senna (SENOKOT) 8.6 mg tablet Take 1 tablet by mouth 2 (two) times a day. (Patient taking differently: Take 1 tablet by mouth 2 (two) times a day as needed. )     sodium bicarbonate 650 MG tablet Take 1 tablet (650 mg total) by mouth 2 (two) times a day. OTC product     warfarin sodium (WARFARIN ORAL) Take by mouth. 9/29/20 INR 3.1 Take 2.5mg daily. Next INR 10/6.  9/22/20 INR 2.1 Take 3mg daily Next INR 9/29  (LD Antib 7/20)  9/18/20 INR 1.6 5mg tonight, then 3mg daily. Next INR 9/22.  9/14/20 INR 1.7 Cont 2.5,g daily. Next INR 9/17 9/11/20 INR 1.9 Take 2.5mg daily Next INR 9/14 9/8/20 INR 1.6  Take 2.5mg daily.  Next INR 9/11/20.    9/3/20 INR 3.4  Hold x 2 days, then take 2mg daily.  Next INR 9/8/20.  9/2/20 INR 3.4 Hold 9/2 recheck INR 9/3  8/28/20 INR 2.6  Cont 3mg daily.  Next INR 9/1/20.    8/25/20 INR 2.2 Cont 3mg daily. Next INR 8/28.  8/24/20 INR 2.3  Take 3mg daily.  Next INR 8/27/20.         REVIEW OF SYSTEMS:    Currently, no fever, chills, or rigors. Does not have any visual or hearing problems. Denies any chest pain, headaches, palpitations, lightheadedness, dizziness, shortness of breath, or cough. Appetite is good. Denies any GERD symptoms. Denies any difficulty with swallowing, nausea, or vomiting.  Denies any abdominal pain, diarrhea or constipation. Denies any urinary symptoms, yeagre in place. No insomnia. No active bleeding. No rash.       PHYSICAL EXAMINATION:  Vitals:     09/30/20 1015   BP: 126/57   Pulse: 64   Temp: 97.5  F (36.4  C)   SpO2: 96%   Weight: (!) 225 lb (102.1 kg)         GENERAL: Awake, Alert, oriented x3, not in any form of acute distress, answers questions appropriately, follows simple commands, conversant with flat affect  HEENT: Head is normocephalic with normal hair distribution. No evidence of trauma. Ears: No acute purulent discharge. Eyes: Sclera anicteric.  CHEST: No tenderness or deformity, no crepitus  LUNG: Clear to auscultation with good chest expansion. There DM BS  BACK: No kyphosis of the thoracic spine. Symmetric, no curvature, ROM normal, no CVA tenderness, no spinal tenderness   CVS: There is good S1  S2,  rhythm is regular.  ABDOMEN: Globular and ROTUND tender to palpation, non distended, no masses, no organomegaly, good bowel sounds, no rebound or guarding, no peritoneal signs. Wound to left side of abdomen, dressed today, no open area to left hip as well. Unable to visualize as they are dressed and she is fully clothed. Followed by wound   EXTREMITIES: UE Full ROM. Right BKA, IN a  with protected brace  SKIN: Warm and dry, no erythema noted, open area on left pannus with dressing on abdomen  NEUROLOGICAL: The patient is oriented to person, place and time. Strength and sensation are grossly intact. Face is symmetric.          LABS:    Lab Results   Component Value Date    WBC 7.9 08/21/2020    HGB 9.2 (L) 08/21/2020    HCT 28.6 (L) 08/21/2020    MCV 89 08/21/2020     08/21/2020       Results for orders placed or performed during the hospital encounter of 08/15/20   Basic Metabolic Panel   Result Value Ref Range    Sodium 137 136 - 145 mmol/L    Potassium 4.1 3.5 - 5.0 mmol/L    Chloride 103 98 - 107 mmol/L    CO2 23 22 - 31 mmol/L    Anion Gap, Calculation 11 5 - 18 mmol/L    Glucose 178 (H) 70 - 125 mg/dL    Calcium 11.0 (H) 8.5 - 10.5 mg/dL    BUN 37 (H) 8 - 22 mg/dL    Creatinine 2.05 (H) 0.60 - 1.10 mg/dL    GFR MDRD Af Amer 29  (L) >60 mL/min/1.73m2    GFR MDRD Non Af Amer 24 (L) >60 mL/min/1.73m2           Lab Results   Component Value Date    HGBA1C 10.3 (H) 06/17/2020     Vitamin D, Total (25-Hydroxy)   Date Value Ref Range Status   04/28/2016 29.8 (L) 30.0 - 80.0 ng/mL Final     Lab Results   Component Value Date    CNSRWTAT09 285 01/07/2011       ASSESSMENT/PLAN:  1. Pernicious anemia    2. Primary osteoarthritis involving multiple joints    3. Unintended weight loss      Right BKA: Also with postoperative pain and phantom limb pain. Unable to assess stump, in  and brace.  -Continues on Dilaudid.  -Continue PT and OT as directed by them.  -Discontinue topical morphine for nonuse.    Unintended weight loss: Patient has had greater than 20 pound weight loss since July.  She says the food is just bad here and it is always cold.  Monitoring.  We will put her on a protein supplement for wound healing.     Osteoarthritis: Complaining of knee pain and has had injections in the past.  She is interested in getting injections again so we will review her past history with these and move forward if knee pain continues to bother her.  Physical therapy says they will be discharging her at the end of the week mainly because she does not participate.    Acute on chronic blood loss anemia-likely from anemia chronic disease, iron deficiency, chronic kidney disease, surgery. On oral iron.  She says she used to be on B12 injections every month until December when these were stopped.  She is concerned that she is no longer getting her B12 so we will check a level today and go from there.  -Check B12 level.     Left abdominal wall cellulitis and right side: Not viewed today, however no complaints and she is followed by wound doctor at the facility.     Insulin-dependent diabetes: A1c 10.3.  Now on glargine 10 units nightly and sliding scale NovoLog 3 times daily with meals.  Blood sugars are okay.     Essential hypertension: Satisfactory in TCU  thus far, amlodipine d.cd.   -metoprolol    CKD 4: Followed by Dr. Iqbal whom she saw yesterday.  He ordered repeat BMP in a month.  Is not concerned about calcium that has stayed stable at 11.3.  Of note she did have elevated uric acid level at 11.  She is complaining of knee pain but no other redness, swelling or sources of gout.  Her toe pain improved with an biotics.  However might be worth it to treat with renally dosed allopurinol.  -Sodium bicarb.     Coronary artery disease:  -also needs outpt CRISTINA eval  -Continue metoprolol and aspirin.     Urinary retention: recent UTI, treated.     Paronychia: Left great toe. Has history of MRSA.  Improved.  Initiated biotics.     A. Fib on Coumadin: INR today 3.1.Coumadin 2.5 mg and recheck 1 week.    TCU/PT report: Using the easy stand for transfer at this time due to left leg weakness.    Communicated concerns with patient, nursing.    Electronically signed by:  Zoey Leung CNP  This progress note was completed using Dragon software and there may be grammatical errors.      .

## 2021-06-11 NOTE — PROGRESS NOTES
"  [facility]      NAME:  Jazmin Bauman             :  1955    MRN: [id number]    CODE STATUS:  FULL CODE    FACILITY: [facility] [[id number]]         CHIEF COMPLAIN/REASON FOR VISIT:  Chief Complaint   Patient presents with     Review Of Multiple Medical Conditions     INR, vanco completion, wound        HISTORY OF PRESENT ILLNESS: Jazmin Bauman is a 65 y.o. female. Pt was at [facility] from  to  and underwent a RBKA r/t ongoing DM ulcer with osteomyelitis. Per her EMR dc summary \" with HTN,morbid obesity, DM, A fib, CKD 4 presented for evaluation of R foot swelling, difficulty ambulation, pain found to have osteomyelitis now s/p amputation. She has now been to the OR twice this stay, last was on 20.   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   Patient had a chronic diabetic foot ulcer on her R heel, presented for increased swelling, difficulty ambulating, and pain. MRI showed extensive soft tissue necrosis and some osteomyelitis along with cellulitis  Met sepsis criteria on admission with leukocytosis and elevated CRP  Podiatry saw and the foot was not felt to be salvageable and BKA was recommended\"       August 15-: Hospitalized for sepsis with MRSA bacteremia, she had possible endocarditis and TTE revealing vegetation of the aortic valve. She had a CT of the abdomen and pelvis that showed left lower abdominal pannus ulceration but without abscess.  Her right BKA stump ulceration was negative for osteomyelitis or cellulitis.  She was treated with IV Vanco for 14 days which will be completed on .  Her left lower abdominal ulceration has never been biopsied.  It was not biopsied in the hospital and there was no surgical intervention.  Her right BKA stump was negative for osteomyelitis.  CKD stage IV with baseline creatinine 2.5-3.  She was at 1.82 at the end of hospitalization.  She is " "discharged on sodium bicarb twice daily.  Her insulin was adjusted and she is now on sliding scale insulin with 10 units of Lantus at bedtime.  Blood sugars have been less than 200 on average.    For her hypertension, Norvasc has been discontinued.  Blood pressures remain in the 130s over 70s on average.  She was incidentally found to have a lung nodule on CT scan, will need to follow-up with this.    Today: She is seen for INR draw and follow up to wounds and medication discussion. She is concerned that she was supposed to be on vanco longer and perseverates on this despite my reassurance. She eventually agrees that she does remember starting this in the hospital so its \"probably been 2 weeks\". She was on vanco per ID from 8/16-8/31. Her wound is stable and is followed by wound care nursing. She has had no fevers, body aches, chills. Her pain is controlled on her current medications.We discussed her plans for eventual prosthesis to RLE. For now we are waiting on clearance from surgery.   She has nephrology appt at the end of the month. Will check bmp in the next week.         Allergies   Allergen Reactions     Hydralazine      Paralysis of legs     Latex Itching     Skin Burns     Naprosyn [Naproxen] Swelling     throat     Nitrofurantoin Hives     Sulfa (Sulfonamide Antibiotics) Rash     Vicks Vaporub [Camphor-Eucalyptus Oil-Menthol] Rash   :     Current Outpatient Medications   Medication Sig     acetaminophen (TYLENOL) 500 MG tablet Take 2 tablets (1,000 mg total) by mouth 3 (three) times a day.     aspirin 81 MG EC tablet Take 81 mg by mouth daily.     BD ULTRA-FINE MICRO PEN NEEDLE 32 gauge x 1/4\" Ndle USE AS DIRECTED 4 TIMES DAILY.     bisacodyL (DULCOLAX) 10 mg suppository Insert 10 mg into the rectum daily as needed. No stool greater than 72 hours     cholecalciferol, vitamin D3, (VITAMIN D3) 1,000 unit capsule Take 1,000 Units by mouth daily.     collagenase ointment Apply daily per WOC     docusate sodium " (COLACE) 100 MG capsule Take 100 mg by mouth daily.     ferrous sulfate 325 (65 FE) MG tablet Take 1 tablet by mouth 2 (two) times a day with meals.     gabapentin (NEURONTIN) 100 MG capsule Take 200 mg by mouth 2 (two) times a day.     HYDROmorphone (DILAUDID) 2 MG tablet Take 1 tablet (2 mg total) by mouth every 3 (three) hours as needed.     HYDROmorphone (DILAUDID) 2 MG tablet Take 1 tablet (2 mg total) by mouth daily.     lancets (ACCU-CHEK MULTICLIX LANCET) Misc TEST 6 TIMES A DAY     LANTUS SOLOSTAR U-100 INSULIN 100 unit/mL (3 mL) pen Inject 10 Units under the skin at bedtime. 11.65 Type 2 with hyperglycemia  Contact provider if insulin prescribed is not the preferred insulin per insurance.     lidocaine (XYLOCAINE) 5 % ointment Apply topically 4 (four) times a day. Apply to left shoulder or around wound (not inside wound)     metoprolol tartrate (LOPRESSOR) 25 MG tablet Take 1 tablet (25 mg total) by mouth 2 (two) times a day.     miconazole (MICOTIN) 2 % powder Apply topically 2 (two) times a day. Apply to b/l groin/underneath breasts/underneath pannus     morphine 0.1% Gel Apply 2 click (1 g total) topically 3 (three) times a day. Apply to LLQ a pea-sized amount     NOVOLOG FLEXPEN U-100 INSULIN 100 unit/mL (3 mL) injection pen Check blood sugar four (4) times daily.  11.65 Type 2 with hyperglycemia  BD Ultra-fine Therese Pen Needles - NDC [address]-92 - dispense 1 case,  refill PRN for 1 year  OneTouch Verio Flex  Meter  OneTouch Verio strips 2 boxes of 50  Delica Lancets box of 100     NOVOLOG U-100 INSULIN ASPART 100 unit/mL injection Check blood sugar four (4) times daily.  11.65 Type 2 with hyperglycemia  OneTouch Verio Flex  Meter  OneTouch Verio strips 2 boxes of 50  Delica Lancets box of 100  BD Ultra-fine Therese Pen Needles - NDC [address]-55 - dispense 1 case, refill PRN for 1 year     polyethylene glycol (MIRALAX) 17 gram packet Take 1 packet (17 g total) by mouth daily as needed.     senna  (SENOKOT) 8.6 mg tablet Take 1 tablet by mouth 2 (two) times a day.     sodium bicarbonate 650 MG tablet Take 1 tablet (650 mg total) by mouth 2 (two) times a day. OTC product     warfarin sodium (WARFARIN ORAL) Take by mouth. 9/2/20 INR 3.4 Hold 9/2 recheck INR 9/3  8/28/20 INR 2.6  Cont 3mg daily.  Next INR 9/1/20.    8/25/20 INR 2.2 Cont 3mg daily. Next INR 8/28.  8/24/20 INR 2.3  Take 3mg daily.  Next INR 8/27/20.         REVIEW OF SYSTEMS:    Currently, no fever, chills, or rigors. Does not have any visual or hearing problems. Denies any chest pain, headaches, palpitations, lightheadedness, dizziness, shortness of breath, or cough. Appetite is good. Denies any GERD symptoms. Denies any difficulty with swallowing, nausea, or vomiting.  Denies any abdominal pain, diarrhea or constipation. Denies any urinary symptoms, yeager in place. No insomnia. No active bleeding. No rash.       PHYSICAL EXAMINATION:  Vitals:    09/04/20 1529   BP: 139/63   Pulse: (!) 57   Temp: 97  F (36.1  C)   SpO2: 96%   Weight: (!) 231 lb (104.8 kg)         GENERAL: Awake, Alert, oriented x3, not in any form of acute distress, answers questions appropriately, follows simple commands, conversant with flat affect  HEENT: Head is normocephalic with normal hair distribution. No evidence of trauma. Ears: No acute purulent discharge. Eyes: Sclera anicteric.  CHEST: No tenderness or deformity, no crepitus  LUNG: Clear to auscultation with good chest expansion. There DM BS  BACK: No kyphosis of the thoracic spine. Symmetric, no curvature, ROM normal, no CVA tenderness, no spinal tenderness   CVS: There is good S1  S2,  rhythm is regular.  ABDOMEN: Globular and ROTUND tender to palpation, non distended, no masses, no organomegaly, good bowel sounds, no rebound or guarding, no peritoneal signs. Wound to left side of abdomen, dressed today, no open area to left hip as well. Unable to visualize as they are dressed and she is fully clothed. Followed by  wound   EXTREMITIES: UE Full ROM. Right BKA, IN a  with protected brace  SKIN: Warm and dry, no erythema noted, open area on left pannus with dressing on abdomen  NEUROLOGICAL: The patient is oriented to person, place and time. Strength and sensation are grossly intact. Face is symmetric.          LABS:    Lab Results   Component Value Date    WBC 7.9 08/21/2020    HGB 9.2 (L) 08/21/2020    HCT 28.6 (L) 08/21/2020    MCV 89 08/21/2020     08/21/2020       Results for orders placed or performed during the hospital encounter of 08/15/20   Basic Metabolic Panel   Result Value Ref Range    Sodium 137 136 - 145 mmol/L    Potassium 4.1 3.5 - 5.0 mmol/L    Chloride 103 98 - 107 mmol/L    CO2 23 22 - 31 mmol/L    Anion Gap, Calculation 11 5 - 18 mmol/L    Glucose 178 (H) 70 - 125 mg/dL    Calcium 11.0 (H) 8.5 - 10.5 mg/dL    BUN 37 (H) 8 - 22 mg/dL    Creatinine 2.05 (H) 0.60 - 1.10 mg/dL    GFR MDRD Af Amer 29 (L) >60 mL/min/1.73m2    GFR MDRD Non Af Amer 24 (L) >60 mL/min/1.73m2           Lab Results   Component Value Date    HGBA1C 10.3 (H) 06/17/2020     Vitamin D, Total (25-Hydroxy)   Date Value Ref Range Status   04/28/2016 29.8 (L) 30.0 - 80.0 ng/mL Final     Lab Results   Component Value Date    NFUBYQKX80 285 01/07/2011       ASSESSMENT/PLAN:  1. Chronic pain syndrome    2. Wound infection    3. Hx of right BKA (H)    4. CKD (chronic kidney disease) stage 4, GFR 15-29 ml/min (H)    5. Atrial fibrillation with RVR (H)      Right BKA: Also with postoperative pain and phantom limb pain. Unable to assess stump, in  and brace.  -Continues on Dilaudid.  -Continue PT and OT as directed by them.    Acute on chronic blood loss anemia-likely from anemia chronic disease, iron deficiency, chronic kidney disease, surgery. On oral iron.     Left abdominal wall cellulitis and right side: There is no abscess on ultrasound.  She is followed by wound care in the TCU. Vanco complete; discussed with patient that  this is appropriate as she started her vanco on August 16.      Insulin-dependent diabetes: A1c 10.3.  Now on glargine 10 units nightly and sliding scale NovoLog 3 times daily with meals.  Blood sugars actually look pretty good here in the TCU, all less than 220.      Essential hypertension: Satisfactory in TCU thus far, amlodipine d.cd.   -metoprolol    CKD 4: Followed by Dr. Iqbal.  Creatinine today 1.57.  -Continue PhosLo.   -Sodium bicarb.     Coronary artery disease:  -also needs outpt CRISTINA eval  -Continue metoprolol and aspirin.     Urinary retention: Continues to have [place] in place, she follows with [facility] for urinary retention.     A. Fib on Coumadin: INR today 3.4 Hold tonight and redraw tomorrow.     TCU/PT report: Using the easy stand for transfer at this time due to left leg weakness.    Electronically signed by:  Zoey Leung CNP  This progress note was completed using Dragon software and there may be grammatical errors.      .

## 2021-06-11 NOTE — PROGRESS NOTES
Office Visit - Follow up    Jazmin Bauman   62 y.o. female    Date of Visit: 6/30/2017    Chief Complaint   Patient presents with     Vaginal Discharge     this week, fell on Tuesday hurt legs, knees and ankles       Subjective: Stroke.  With vaginal discharge after fall.  Diabetic foot examination done.    Fall occurred in November.  Left side weak left leg especially wheelchair-bound also left arm week.  Patient notes that she has vaginal discharge she is a diabetic the patient has morbid obesity is wheelchair bound and exam would be difficult of any pelvic area.  Her leg feels better today the left like that is.  The patient had a negative mammogram March 16, 2017 colon polyps but no cancer was seen on colonoscopy dated March 27, 2012 there is a family history of colon cancer one tubular adenomatous colon polyp was seen in the colonoscopy dated March 27, 2012 with Dr. Salinas another hyperplastic polyp was seen with background diverticulosis but no cancer.    No blood in stool or urine or vagina.  Medication list reviewed generally well-tolerated.    ROS: A comprehensive review of systems was performed and was otherwise negative    Medications:  Prior to Admission medications    Medication Sig Start Date End Date Taking? Authorizing Provider   acetaminophen (TYLENOL) 325 MG tablet Take 325-650 mg by mouth every 8 (eight) hours as needed.    Yes PROVIDER, HISTORICAL   allopurinol (ZYLOPRIM) 100 MG tablet Take 1 tablet (100 mg total) by mouth daily. 6/29/16  Yes Lester Flores MD   amLODIPine (NORVASC) 10 MG tablet Take 10 mg by mouth daily.   Yes PROVIDER, HISTORICAL   aspirin 81 MG EC tablet Take 81 mg by mouth daily.   Yes PROVIDER, HISTORICAL   blood sugar diagnostic (ACCU-CHEK SAM PLUS TEST STRP) Strp TEST 6 TIMES A DAY 9/28/15  Yes Lester Flores MD   cholecalciferol, vitamin D3, (VITAMIN D3) 1,000 unit capsule Take 1,000 Units by mouth daily.   Yes PROVIDER, HISTORICAL   colchicine (MITIGARE)  0.6 mg cap Take 0.6 mg by mouth 2 (two) times a day. 12/21/16  Yes Lester Flores MD   docusate sodium (COLACE) 50 MG capsule Take by mouth once daily.   Yes PROVIDER, HISTORICAL   ferrous sulfate 65 mg elemental iron (IRON) Take 1 tablet by mouth 2 (two) times a day.   Yes PROVIDER, HISTORICAL   glipiZIDE (GLUCOTROL) 10 MG tablet Take 10 mg by mouth 2 (two) times a day.  1/31/16  Yes PROVIDER, HISTORICAL   lancets (ACCU-CHEK MULTICLIX LANCET) Misc TEST 6 TIMES A DAY 9/28/15  Yes Lester Flores MD   magnesium 250 mg Tab Take 500 mg by mouth 2 (two) times a day.    Yes PROVIDER, HISTORICAL   sodium polystyrene sulfonate (KAYEXALATE) 15 gram/60 mL Susp suspension Take 30 g by mouth every other day.    Yes PROVIDER, HISTORICAL   fluconazole (DIFLUCAN) 150 MG tablet Take 1 tablet (150 mg total) by mouth once for 1 dose. 6/30/17 6/30/17  Lester Flores MD       Allergies:   Allergies   Allergen Reactions     Hydralazine      Latex Itching     Skin Burns     Naprosyn [Naproxen] Swelling     throat     Nitrofurantoin Hives     Sulfa (Sulfonamide Antibiotics) Rash     Vicks Vaporub [Camphor-Eucalyptus Oil-Menthol] Rash       Immunizations:   Immunization History   Administered Date(s) Administered     Influenza R3o9-20, 12/18/2009     Influenza, inj, historic 12/18/2009, 10/14/2011     Influenza, seasonal,quad inj 36+ mos 09/28/2015     Influenza, seasonal,quad inj 6-35 mos 10/30/2012, 09/30/2013, 11/21/2014     Influenza,seasonal quad, PF, 36+MOS 09/29/2016     Td, historic 03/02/2006     Tdap 05/15/2015       Exam wheelchair-bound.    Chest clear.  Heart tones normal.  Super morbid obesity limited exam.  Extremities show trace edema neck veins nondistended no thyromegaly or scleral icterus hygiene poor.    Assessment and Plan  Stroke syndrome with diabetes mellitus type 2 and super morbid obesity check CT scan of head plus neurologic consultation.    Start to check also urine for microalbumin lipid panel  blood sugar and A1c level.    Vaginitis likely Candida try Diflucan 150 mg tablet take 1 now and repeat in 1 week if no better.    Multiple drug allergies including hydralazine latex Naprosyn nitrofurantoin sulfa Vicks.    Gout asymptomatic at this juncture.    Tubular adenomatous colon polyp see colonoscopy report March 27, 2012 RTC 1 month.    Time: total time spent with the patient was 40 minutes of which >50% was spent in counseling and coordination of care    The following high BMI interventions were performed this visit: encouragement to exercise    Lester Flores MD    Patient Active Problem List   Diagnosis     Thrombocytopenia     Fibromyalgia     Carpal Tunnel Syndrome     Urinary Tract Infection     Endometrial Hyperplasia     Cholelithiasis     Leukocytosis     Urine Tests Nonspecific Abnormal Findings     Type 2 Diabetes Mellitus     Obesity     Essential Hypertension     Pernicious Anemia     Immunology Studies Raised Immunoglobulin Level     Vaginal bleeding

## 2021-06-11 NOTE — TELEPHONE ENCOUNTER
[facility] Nurse Triage Anticoagulation Note      Provider: BARRY Smith  Facility: [facility]    Facility Type: TCU    Caller: Rola  Call Back Number:  [phone]    Reason for call: INR    Today s INR: 1.7     ALSO: WBC 8.3, Hgb 11.5, Plt 275, , K 4.0, Ca 11.1, Bun 33 (48), Cr 1.84 (2.41), GFR 28 (20)    Previous INR: 9/11/20 INR 1.9 Cont 2.5mg daily.    Diagnosis/Goal: AFIB  Heparin/Lovenox: No   Currently on ABX:  YES  Doxy & Keflex  Other interacting Medications: None  Missed or refused doses: No    Verbal Order/Direction given by Provider:  Cont Coumadin 2.5mg daily. 9/17Check INR, BMP, PTH, Ionized Calcium.    Provider giving order: BARRY Smith    Verbal order given to: Rola Shepard RN

## 2021-06-11 NOTE — PROGRESS NOTES
"  [facility]      NAME:  Jazmin Bauman             :  1955    MRN: [id number]    CODE STATUS:  FULL CODE    FACILITY: [facility] [[id number]]         CHIEF COMPLAIN/REASON FOR VISIT:  Chief Complaint   Patient presents with     Review Of Multiple Medical Conditions     Constipation, hypercalcemia, paronychia.       HISTORY OF PRESENT ILLNESS: Jazmin Bauman is a 65 y.o. female. Pt was at [facility] from  to  and underwent a RBKA r/t ongoing DM ulcer with osteomyelitis. Per her EMR dc summary \" with HTN,morbid obesity, DM, A fib, CKD 4 presented for evaluation of R foot swelling, difficulty ambulation, pain found to have osteomyelitis now s/p amputation. She has now been to the OR twice this stay, last was on 20.   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   Patient had a chronic diabetic foot ulcer on her R heel, presented for increased swelling, difficulty ambulating, and pain. MRI showed extensive soft tissue necrosis and some osteomyelitis along with cellulitis  Met sepsis criteria on admission with leukocytosis and elevated CRP  Podiatry saw and the foot was not felt to be salvageable and BKA was recommended\"       August 15-: Hospitalized for sepsis with MRSA bacteremia, she had possible endocarditis and TTE revealing vegetation of the aortic valve. She had a CT of the abdomen and pelvis that showed left lower abdominal pannus ulceration but without abscess.  Her right BKA stump ulceration was negative for osteomyelitis or cellulitis.  She was treated with IV Vanco for 14 days which will be completed on .  Her left lower abdominal ulceration has never been biopsied.  It was not biopsied in the hospital and there was no surgical intervention.  Her right BKA stump was negative for osteomyelitis.  CKD stage IV with baseline creatinine 2.5-3.  She was at 1.82 at the end of hospitalization.  She " is discharged on sodium bicarb twice daily.  Her insulin was adjusted and she is now on sliding scale insulin with 10 units of Lantus at bedtime.  Blood sugars have been less than 200 on average.    For her hypertension, Norvasc has been discontinued.  Blood pressures remain in the 130s over 70s on average.  She was incidentally found to have a lung nodule on CT scan, will need to follow-up with this.  Please see imaging report from August hospitalization.  Multiple nodules and enlarged lymph nodes.    Today: Seen again to follow-up on recent labs as well as paronychia of the left toe.  Left toe looks good today, it is pink with some drainage.  Tenderness is improved.  I am able to palpate this without much discomfort.  She was unable to get the full thousand cc bag of fluids on Friday due to infiltrating IV.  She did continue to drink fluids over the weekend and her creatinine came down to 1.8.  Her calcium is still elevated at 11.2.  We will add on an ionized calcium and PTH and vitamin D.  Her PhosLo was discontinued.  I did review labs from prior hospitalizations and she had some elevated calcium at her August hospitalization which did slightly improved with fluids however not sure this was corrected for her albumin which is low at 2.5.  Seems that she has been trending up since June.  She does have a nephrology appointment at the end of the month so I will start labs and continue to push fluids until she can be seen by nephrology on the 28th.  She is otherwise stable without any nausea, muscle weakness, confusion or nausea.  She did say that she drinks quite a bit of milk so I told her to cut back on that as well and stick to clear fluids.      Allergies   Allergen Reactions     Hydralazine      Paralysis of legs     Latex Itching     Skin Burns     Naprosyn [Naproxen] Swelling     throat     Nitrofurantoin Hives     Sulfa (Sulfonamide Antibiotics) Rash     Vicks Vaporub [Camphor-Eucalyptus Oil-Menthol] Rash  "  :     Current Outpatient Medications   Medication Sig     acetaminophen (TYLENOL) 500 MG tablet Take 2 tablets (1,000 mg total) by mouth 3 (three) times a day.     aspirin 81 MG EC tablet Take 81 mg by mouth daily.     BD ULTRA-FINE MICRO PEN NEEDLE 32 gauge x 1/4\" Ndle USE AS DIRECTED 4 TIMES DAILY.     bisacodyL (DULCOLAX) 10 mg suppository Insert 10 mg into the rectum daily as needed. No stool greater than 72 hours     cholecalciferol, vitamin D3, (VITAMIN D3) 1,000 unit capsule Take 1,000 Units by mouth daily.     collagenase ointment Apply daily per WOC     docusate sodium (COLACE) 100 MG capsule Take 100 mg by mouth daily.     ferrous sulfate 325 (65 FE) MG tablet Take 1 tablet by mouth 2 (two) times a day with meals.     gabapentin (NEURONTIN) 100 MG capsule Take 200 mg by mouth 2 (two) times a day.     HYDROmorphone (DILAUDID) 2 MG tablet Take 1 tablet (2 mg total) by mouth daily. May also take 1 tablet (2 mg total) every 3 (three) hours as needed for pain.     lancets (ACCU-CHEK MULTICLIX LANCET) Misc TEST 6 TIMES A DAY     LANTUS SOLOSTAR U-100 INSULIN 100 unit/mL (3 mL) pen Inject 10 Units under the skin at bedtime. 11.65 Type 2 with hyperglycemia  Contact provider if insulin prescribed is not the preferred insulin per insurance. (Patient taking differently: Inject 15 Units under the skin at bedtime. 11.65 Type 2 with hyperglycemia  Contact provider if insulin prescribed is not the preferred insulin per insurance.)     lidocaine (XYLOCAINE) 5 % ointment Apply topically 4 (four) times a day. Apply to left shoulder or around wound (not inside wound)     metoprolol tartrate (LOPRESSOR) 25 MG tablet Take 1 tablet (25 mg total) by mouth 2 (two) times a day.     miconazole (MICOTIN) 2 % powder Apply topically 2 (two) times a day. Apply to b/l groin/underneath breasts/underneath pannus     NOVOLOG U-100 INSULIN ASPART 100 unit/mL injection Check blood sugar four (4) times daily.  11.65 Type 2 with " hyperglycemia  OneTouch Verio Flex  Meter  OneTouch Verio strips 2 boxes of 50  Delica Lancets box of 100  BD Ultra-fine Therese Pen Needles - NDC [address]-68 - dispense 1 case, refill PRN for 1 year (Patient taking differently: Inject 7 Units under the skin 3 (three) times a day before meals. )     polyethylene glycol (MIRALAX) 17 gram packet Take 1 packet (17 g total) by mouth daily as needed.     senna (SENOKOT) 8.6 mg tablet Take 1 tablet by mouth 2 (two) times a day.     sodium bicarbonate 650 MG tablet Take 1 tablet (650 mg total) by mouth 2 (two) times a day. OTC product     warfarin sodium (WARFARIN ORAL) Take by mouth. 9/18/20 INR 1.6 5mg tonight, then 3mg daily. Next INR 9/22.  9/14/20 INR 1.7 Cont 2.5,g daily. Next INR 9/17 9/11/20 INR 1.9 Take 2.5mg daily Next INR 9/14 9/8/20 INR 1.6  Take 2.5mg daily.  Next INR 9/11/20.    9/3/20 INR 3.4  Hold x 2 days, then take 2mg daily.  Next INR 9/8/20.  9/2/20 INR 3.4 Hold 9/2 recheck INR 9/3  8/28/20 INR 2.6  Cont 3mg daily.  Next INR 9/1/20.    8/25/20 INR 2.2 Cont 3mg daily. Next INR 8/28.  8/24/20 INR 2.3  Take 3mg daily.  Next INR 8/27/20.         REVIEW OF SYSTEMS:    Currently, no fever, chills, or rigors. Does not have any visual or hearing problems. Denies any chest pain, headaches, palpitations, lightheadedness, dizziness, shortness of breath, or cough. Appetite is good. Denies any GERD symptoms. Denies any difficulty with swallowing, nausea, or vomiting.  Denies any abdominal pain, diarrhea or constipation. Denies any urinary symptoms, yeager in place. No insomnia. No active bleeding. No rash.       PHYSICAL EXAMINATION:  Vitals:    09/22/20 1203   BP: 148/68   Pulse: 61   Temp: 97.4  F (36.3  C)   SpO2: 97%   Weight: (!) 231 lb (104.8 kg)         GENERAL: Awake, Alert, oriented x3, not in any form of acute distress, answers questions appropriately, follows simple commands, conversant with flat affect  HEENT: Head is normocephalic with normal hair  distribution. No evidence of trauma. Ears: No acute purulent discharge. Eyes: Sclera anicteric.  CHEST: No tenderness or deformity, no crepitus  LUNG: Clear to auscultation with good chest expansion. There DM BS  BACK: No kyphosis of the thoracic spine. Symmetric, no curvature, ROM normal, no CVA tenderness, no spinal tenderness   CVS: There is good S1  S2,  rhythm is regular.  ABDOMEN: Globular and ROTUND tender to palpation, non distended, no masses, no organomegaly, good bowel sounds, no rebound or guarding, no peritoneal signs. Wound to left side of abdomen, dressed today, no open area to left hip as well. Unable to visualize as they are dressed and she is fully clothed. Followed by wound   EXTREMITIES: UE Full ROM. Right BKA, IN a  with protected brace  SKIN: Warm and dry, no erythema noted, open area on left pannus with dressing on abdomen  NEUROLOGICAL: The patient is oriented to person, place and time. Strength and sensation are grossly intact. Face is symmetric.          LABS:    Lab Results   Component Value Date    WBC 7.9 08/21/2020    HGB 9.2 (L) 08/21/2020    HCT 28.6 (L) 08/21/2020    MCV 89 08/21/2020     08/21/2020       Results for orders placed or performed during the hospital encounter of 08/15/20   Basic Metabolic Panel   Result Value Ref Range    Sodium 137 136 - 145 mmol/L    Potassium 4.1 3.5 - 5.0 mmol/L    Chloride 103 98 - 107 mmol/L    CO2 23 22 - 31 mmol/L    Anion Gap, Calculation 11 5 - 18 mmol/L    Glucose 178 (H) 70 - 125 mg/dL    Calcium 11.0 (H) 8.5 - 10.5 mg/dL    BUN 37 (H) 8 - 22 mg/dL    Creatinine 2.05 (H) 0.60 - 1.10 mg/dL    GFR MDRD Af Amer 29 (L) >60 mL/min/1.73m2    GFR MDRD Non Af Amer 24 (L) >60 mL/min/1.73m2           Lab Results   Component Value Date    HGBA1C 10.3 (H) 06/17/2020     Vitamin D, Total (25-Hydroxy)   Date Value Ref Range Status   04/28/2016 29.8 (L) 30.0 - 80.0 ng/mL Final     Lab Results   Component Value Date    [id number] 285  01/07/2011       ASSESSMENT/PLAN:  1. Paronychia of great toe, left    2. Hypercalcemia    3. MRSA bacteremia    4. Hx of right BKA (H)      Right BKA: Also with postoperative pain and phantom limb pain. Unable to assess stump, in  and brace.  -Continues on Dilaudid.  -Continue PT and OT as directed by them.    Acute on chronic blood loss anemia-likely from anemia chronic disease, iron deficiency, chronic kidney disease, surgery. On oral iron.     Left abdominal wall cellulitis and right side: Not viewed today, however no complaints and she is followed by wound doctor at the facility.     Insulin-dependent diabetes: A1c 10.3.  Now on glargine 10 units nightly and sliding scale NovoLog 3 times daily with meals.  Blood sugars are okay.     Essential hypertension: Satisfactory in TCU thus far, amlodipine d.cd.   -metoprolol    CKD 4: Followed by Dr. Iqbal.  BMP with creatinine improved now at 1.8, continues with hypercalcemia at 11.2.  -Continue to encourage fluids.  Patient is fully capable of managing and drinking fluids independently.  Nephrology appointment on September 28.   -Check vitamin D, PTH and ionized calcium on Thursday with repeat BMP.  -Sodium bicarb.     Coronary artery disease:  -also needs outpt CRISTINA eval  -Continue metoprolol and aspirin.     Urinary retention: Obtain UA UC.    Paronychia: Left great toe. Has history of MRSA.  Improved, tenderness and redness drastically improved.  Mild drainage noted at the border of the nailbed.  This is good.  -Start Keflex 500 mg p.o. 3 times daily x10 days.  -Start doxycycline 100 mg p.o twice daily. x10 days.      A. Fib on Coumadin: INR today 1.7.Coumadin 3mg and recheck Friday.    TCU/PT report: Using the easy stand for transfer at this time due to left leg weakness.    Communicated concerns with patient and house MD, Dr. Blackwood.    Electronically signed by:  Zoey Leung CNP  This progress note was completed using Dragon software and there  may be grammatical errors.      .

## 2021-06-11 NOTE — TELEPHONE ENCOUNTER
[facility] Nurse Triage Anticoagulation Note      Provider: BARRY Smith  Facility: [facility]    Facility Type: TCU    Caller: Yamini  Call Back Number:  [phone]    Reason for call: INR    Today s INR: 2.6  Previous INR: 8/25 2.2(3mg daily)    Diagnosis/Goal: AFIB  Heparin/Lovenox: No   Currently on ABX: Yes; IV Vanco  Other interacting Medications: Other: ASA 81mg daily  Missed or refused doses: No    Verbal Order/Direction given by Provider: Continue Warfarin 3mg daily.  Next INR 9/1/20.      Provider giving order: BARRY Smith    Verbal order given to: Annita Jacobsen RN

## 2021-06-11 NOTE — PROGRESS NOTES
[facility]    Facility:  hospitals AT [facility] [[id number]]    Code Status:  FULL CODE    Chief Complaint   Patient presents with     H & P   :                    Patient Active Problem List   Diagnosis     Carpal Tunnel Syndrome     Urinary Tract Infection     Endometrial Hyperplasia     Cholelithiasis     Leukocytosis     Urine Tests Nonspecific Abnormal Findings     Obesity     Essential hypertension     Pernicious Anemia     Immunology Studies Raised Immunoglobulin Level     Vaginal bleeding     Type 2 diabetes mellitus (H)     Atrial fibrillation with RVR (H)     Acute kidney injury superimposed on CKD (H)     Non-traumatic rhabdomyolysis     Metabolic acidosis     Troponin level elevated     Bacteremia     Acute respiratory failure with hypoxia (H)     Acute encephalopathy     Acute pulmonary edema (H)     Wheezing     Moderate protein malnutrition (H)     Abnormal cytological findings in specimens from other female genital organs     Chronic kidney disease, stage III (moderate) (H)     Hyperkalemia     Osteoarthritis     Acute kidney injury (H)     Sepsis (H)     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)     Sepsis, due to unspecified organism, unspecified whether acute organ dysfunction present (H)     Cellulitis of right foot     CKD (chronic kidney disease) stage 4, GFR 15-29 ml/min (H)     Chronic wound infection of abdomen, subsequent encounter     Acute post-operative pain     Phantom limb pain (H)     Paroxysmal atrial fibrillation (H)     Urinary retention     Coronary artery disease without angina pectoris     Hx of right BKA (H)     Weakness     Slow transit constipation     Wound infection     Fever and chills     Acute pain of left shoulder     Physical deconditioning     Gram-positive bacteremia     Chronic pain syndrome     MRSA bacteremia       History:Jazmin Bauman  is a 65 year old female with history of BKA June 23, 2020, bilateral chronic  abdominal wounds, CKD 4, morbid obesity, insulin-dependent type 2 diabetes, chronic atrial fibrillation, peripheral neuropathy, GERD, peripheral edema, obesity, anemia of chronic disease with iron deficiencyseen for admission to TCU on 9/4/2020    Hospital Course: Patient was hospitalized August 15 through August 22 after developing fever and chills at her TCU facility.  Patient was felt to have secondary infection of her chronic abdominal wound at outset and was started on vancomycin plus Zosyn.    August 15 culture came back positive for MRSA.  MRSA persisted in August 16 cultures but have been negative since then.  Her MSSA bacteremia was of unknown source but there were several possibilities with right BKA stump drainage which was new abdominal wounds.  Patient also had acute left shoulder pain concerning for seeding of the joint, as well as abnormal TTE on August 17 of the mobile atrial valve vegetation concerning for endocarditis although the vegetation was not seen but if she had her KEMI on August 19.  ID recommends IV Vanco through August 30.    Chronic abdominal pannus ulcerations were seen by wound care with updated wound instructions and all agree steady improvement.    Right BKA stump ulceration was seen by Dr. Corcoran with debridement, no evidence for cellulitis or abscess.    Left shoulder pain evaluated including with CT no evidence of septic joint, degenerative changes noted.  Chronic pain management ongoing.    Patient had sodium bicarbonate added to her her program due to CKD 4/low bicarb    Diabetes was managed with changes in insulin dosage.    Paroxysmal atrial fibrillation treated with ongoing warfarin    Lung nodules were noted although patient is pretty sure that the right lower lobe 1.3 cm nodule has been seen in the past.  Other noncalcified nodules which are small noted.  Questionably infectious?  Radiology recommended short-term follow-up.  Patient had no respiratory symptoms.    CRISTINA was  again suspected with outpatient sleep study recommended, patient continues to decline that.    Chronic pain with acute flare evaluated by pain care team.  Patient now on renally dosed Neurontin p.o. Dilaudid 2 mg every 3 hours and prior to dressing changes along with scheduled Tylenol lidocaine ointment.  She was on morphine gel with dressing changes although that has not been continued here at the TCU.      Subjective/ROS:    -augmented by discussion with facility staff involved in direct care      Patient reports that she is actually in pretty well.  She is somewhat apprehensive about moving.  The staff is planning on moving her to a different unit likely back to the 300 unit.  Her abdominal wounds which were worse on the left than the right are better and she has decreased pain on the left which she takes is a very good sign.  She is not having fever sweats or chills.  Is been no rashes.  She notices no symptoms while she has vancomycin infusion.  She is disappointed with the debridement on the stump she thought it was almost healed and now she feels like it is a setback.    Regarding her diabetes at home she was taking Lantus 25 units and aspart 20 units 3 times daily with meals.  Prior to her most recent admission we had her on Lantus 20 units and aspart 10 units 3 times daily.  She is currently on 10 units of Lantus with 0 units of scheduled mealtime aspart.  Her blood sugars are high but not as high as I might have expected them to be with this dramatic decrease insulin dose.    Patient is concerned or disappointed about her sodium bicarbonate treatment.  She does not like being on a low-sodium diet and says that getting sodium from a pill instead of from food just makes matters worse.  I explained the bicarbonate recommendation with her chronic kidney disease/acidosis.  I encouraged her to discuss it with  at their appointment which was rescheduled to September 28.    We discussed the probable CRISTINA,  patient says that she would not use a CPAP machine in any event and does not want sleep study.    We discussed the lung nodules again she is sure that she is had the right lower lobe nodule but is amenable to a follow-up of all nodules in 3 months time as recommended by radiology.    Patient planes of ingrown toenail on the left great toe which is bothering her and draining a bit.    Patient has normal appetite she says she is sleeping pretty well.  She denies headaches change in vision speaking swallowing hearing.  She denies chest pain cough shortness of breath orthopnea PND.  She is not edematous or if so very minimally.  No falls injuries fever sweats chills remainder negative    Past Medical History:   Diagnosis Date     Anemia     pernicious     Diabetes mellitus (H)     borderline     Endometrial hyperplasia      Fibromyalgia      History of recurrent UTI (urinary tract infection)      History of transfusion      Hypertension      Thrombocytopenia (H)      Vaginal bleeding 1/2015     Past Surgical History:   Procedure Laterality Date     BELOW KNEE LEG AMPUTATION Right 6/18/2020    Procedure: AMPUTATION, BELOW KNEE;  Surgeon: Epi Corcoran MD;  Location: [facility];  Service: General     BELOW KNEE LEG AMPUTATION Right 6/23/2020    Procedure: REVISION AMPUTATION, BELOW KNEE;  Surgeon: Epi Corcoran MD;  Location: [facility];  Service: General     bilateral carpal tunnel release  2009     CHOLECYSTECTOMY       COLONOSCOPY N/A 9/11/2017    Procedure: COLONOSCOPY;  Surgeon: Tyler Bhakta MD;  Location: [facility];  Service:      COMBINED HYSTEROSCOPY DIAGNOSTIC / D&C N/A 1/28/2015    Procedure: DILATION AND CURETTAGE WITH HYSTEROSCOPY;  Surgeon: Grant Beal MD;  Location: [facility];  Service:      DILATION AND CURETTAGE OF UTERUS       IR NON TUNNELED CATHETER >5 YEARS  1/21/2020     PICC  1/20/2020          PICC  8/21/2020          CA HYSTEROSCOPY,W/ENDO BX N/A  9/5/2019    Procedure: HYSTEROSCOPY, DILATION AND CURETTAGE;  Surgeon: Grant Beal MD;  [facility];  Service: Gynecology     KS HYSTEROSCOPY,W/ENDO BX N/A 12/9/2019    Procedure: HYSTEROSCOPY, DILATION AND CURETTAGE;  Surgeon: Grant Beal MD;  [facility];  Service: Gynecology          Family History   Problem Relation Age of Onset     Colon cancer Father    :       Social History     Socioeconomic History     Marital status: Single     Spouse name: Not on file     Number of children: Not on file     Years of education: Not on file     Highest education level: Not on file   Occupational History     Not on file   Social Needs     Financial resource strain: Not on file     Food insecurity     Worry: Not on file     Inability: Not on file     Transportation needs     Medical: Not on file     Non-medical: Not on file   Tobacco Use     Smoking status: Never Smoker     Smokeless tobacco: Never Used   Substance and Sexual Activity     Alcohol use: Not Currently     Comment: rare     Drug use: No     Sexual activity: Not on file   Lifestyle     Physical activity     Days per week: Not on file     Minutes per session: Not on file     Stress: Not on file   Relationships     Social connections     Talks on phone: Not on file     Gets together: Not on file     Attends [employer] service: Not on file     Active member of club or organization: Not on file     Attends meetings of clubs or organizations: Not on file     Relationship status: Not on file     Intimate partner violence     Fear of current or ex partner: Not on file     Emotionally abused: Not on file     Physically abused: Not on file     Forced sexual activity: Not on file   Other Topics Concern     Not on file   Social History Narrative     Not on file   :        Current Outpatient Medications on File Prior to Visit   Medication Sig Dispense Refill     acetaminophen (TYLENOL) 500 MG tablet Take 2 tablets (1,000 mg total)  by mouth 3 (three) times a day.  0     aspirin 81 MG EC tablet Take 81 mg by mouth daily.       bisacodyL (DULCOLAX) 10 mg suppository Insert 10 mg into the rectum daily as needed. No stool greater than 72 hours       ferrous sulfate 325 (65 FE) MG tablet Take 1 tablet by mouth 2 (two) times a day with meals.       gabapentin (NEURONTIN) 100 MG capsule Take 200 mg by mouth 2 (two) times a day.       HYDROmorphone (DILAUDID) 2 MG tablet Take 1 tablet (2 mg total) by mouth every 3 (three) hours as needed. 12 tablet 0     HYDROmorphone (DILAUDID) 2 MG tablet Take 1 tablet (2 mg total) by mouth daily. 7 tablet 0     lancets (ACCU-CHEK MULTICLIX LANCET) Misc TEST 6 TIMES A  each 11     LANTUS SOLOSTAR U-100 INSULIN 100 unit/mL (3 mL) pen Inject 10 Units under the skin at bedtime. 11.65 Type 2 with hyperglycemia  Contact provider if insulin prescribed is not the preferred insulin per insurance. (Patient taking differently: Inject 15 Units under the skin at bedtime. 11.65 Type 2 with hyperglycemia  Contact provider if insulin prescribed is not the preferred insulin per insurance.) 50 mL 0     lidocaine (XYLOCAINE) 5 % ointment Apply topically 4 (four) times a day. Apply to left shoulder or around wound (not inside wound) 35.44 g 0     metoprolol tartrate (LOPRESSOR) 25 MG tablet Take 1 tablet (25 mg total) by mouth 2 (two) times a day. 180 tablet 3     miconazole (MICOTIN) 2 % powder Apply topically 2 (two) times a day. Apply to b/l groin/underneath breasts/underneath pannus 100 g 0     NOVOLOG U-100 INSULIN ASPART 100 unit/mL injection Check blood sugar four (4) times daily.  11.65 Type 2 with hyperglycemia  OneTouch Verio Flex  Meter  OneTouch Verio strips 2 boxes of 50  Delica Lancets box of 100  BD Ultra-fine Therese Pen Needles - NDC [address]-85 - dispense 1 case, refill PRN for 1 year (Patient taking differently: Inject 7 Units under the skin 3 (three) times a day before meals. ) 10 mL PRN     polyethylene  "glycol (MIRALAX) 17 gram packet Take 1 packet (17 g total) by mouth daily as needed.  0     senna (SENOKOT) 8.6 mg tablet Take 1 tablet by mouth 2 (two) times a day.  0     sodium bicarbonate 650 MG tablet Take 1 tablet (650 mg total) by mouth 2 (two) times a day. OTC product  0     warfarin sodium (WARFARIN ORAL) Take by mouth. 9/2/20 INR 3.4 Hold 9/2 recheck INR 9/3  8/28/20 INR 2.6  Cont 3mg daily.  Next INR 9/1/20.    8/25/20 INR 2.2 Cont 3mg daily. Next INR 8/28.  8/24/20 INR 2.3  Take 3mg daily.  Next INR 8/27/20.       BD ULTRA-FINE MICRO PEN NEEDLE 32 gauge x 1/4\" Ndle USE AS DIRECTED 4 TIMES DAILY. 360 each 3     cholecalciferol, vitamin D3, (VITAMIN D3) 1,000 unit capsule Take 1,000 Units by mouth daily.       collagenase ointment Apply daily per WOC  0     docusate sodium (COLACE) 100 MG capsule Take 100 mg by mouth daily.       [DISCONTINUED] morphine 0.1% Gel Apply 2 click (1 g total) topically 3 (three) times a day. Apply to LLQ a pea-sized amount 100 g 0     [DISCONTINUED] NOVOLOG FLEXPEN U-100 INSULIN 100 unit/mL (3 mL) injection pen Check blood sugar four (4) times daily.  11.65 Type 2 with hyperglycemia  BD Ultra-fine Therese Pen Needles - NDC [address]-41 - dispense 1 case,  refill PRN for 1 year  OneTouch Verio Flex  Meter  OneTouch Verio strips 2 boxes of 50  Delica Lancets box of 100 50 mL 0     No current facility-administered medications on file prior to visit.    :      ALLERGIES:  Hydralazine; Latex; Naprosyn [naproxen]; Nitrofurantoin; Sulfa (sulfonamide antibiotics); and Vicks vaporub [camphor-eucalyptus oil-menthol]    Vitals:  Vital signs: Reviewed per facility EMR vitals including as follows:              Weight is 231 pounds blood pressure 107/65 temperature 97.5 heart rate 73 respirations 18 O2 sats 96%    Physical exam:    Patient is alert oriented x3 pleasant normally conversant up eating her lunch.  Normocephalic/atraumatic sclera clear gaze is conjugate swallowing without " difficulty breathing comfortably without accessory muscle use or tachypnea abdominal wounds are dressed without any drainage right stump with minimal drainage on the dressing trace edema in the left ankle her left great toe shows ingrowing of a excessively curved left great nail it is ingrowing on the medial side/tibial side.  Minimal redness and clear drainage from the area.    Due to the 2020 Covid 19 pandemic, except as noted above, the patient was visually observed at a 6 foot plus distance.  An observational exam was performed in an effort to keep patient safe from Covid 19 and other communicable diseases.   Labs:  Lab Results   Component Value Date    WBC 7.9 08/21/2020    HGB 9.2 (L) 08/21/2020    HCT 28.6 (L) 08/21/2020    MCV 89 08/21/2020     08/21/2020     Results for orders placed or performed during the hospital encounter of 08/15/20   Basic Metabolic Panel   Result Value Ref Range    Sodium 137 136 - 145 mmol/L    Potassium 4.1 3.5 - 5.0 mmol/L    Chloride 103 98 - 107 mmol/L    CO2 23 22 - 31 mmol/L    Anion Gap, Calculation 11 5 - 18 mmol/L    Glucose 178 (H) 70 - 125 mg/dL    Calcium 11.0 (H) 8.5 - 10.5 mg/dL    BUN 37 (H) 8 - 22 mg/dL    Creatinine 2.05 (H) 0.60 - 1.10 mg/dL    GFR MDRD Af Amer 29 (L) >60 mL/min/1.73m2    GFR MDRD Non Af Amer 24 (L) >60 mL/min/1.73m2         Lab Results   Component Value Date    TSH 1.83 06/17/2020     Lab Results   Component Value Date    HGBA1C 10.3 (H) 06/17/2020     [unfilled]  Lab Results   Component Value Date    ELLGKUPP52 285 01/07/2011     Lab Results   Component Value Date     (H) 01/20/2020     [unfilled]  Most Recent EKG     Units 08/17/20  1110   VENTRATE BPM 68   ATRIALRATE BPM 68   QRSDURATION ms 94   QTINTERVAL ms 398   QTCCALC ms 423   P [place] degrees 26   RAXIS degrees -45   TAXIS degrees 19   MUSEDX  Sinus rhythm  Left axis deviation  Inferior infarct (cited on or before 20 Jan 2020  Possible Anterior infarct (cited on or before  20 Jan 2020  Abnormal ECG  When compared with ECG of 16-JUN-2020 18:20,  QT has shortened  Confirmed by CHAYITO DEMARCO MD LOC:[facility] ([id number]) on 8/17/2020 3:58:22 PM            Ct Chest Abdomen Pelvis Without Oral Without Iv Contrast     Result Date: 8/15/2020  EXAM: CT CHEST ABDOMEN PELVIS WO ORAL WO IV CONTRAST LOCATION: [facility] DATE/TIME: 8/15/2020 5:14 PM INDICATION: Fever, large left abdominal wall wound, small right abdominal wall wound. COMPARISON: 06/04/2018 unenhanced CT TECHNIQUE: CT scan of the chest, abdomen, and pelvis was performed without IV contrast. Multiplanar reformats were obtained. Dose reduction techniques were used. CONTRAST: None. FINDINGS: LUNGS AND PLEURA: Motion artifact. Due to the motion, there is a poorly characterized 1.3 cm nodule or nodular infiltrate within the subpleural right lower lobe. Follow-up examination is recommended to assure resolution and to better characterize this region. In addition, there are a few additional small uncalcified nodules in both lungs, the largest measuring 6 mm also indeterminate. MEDIASTINUM/AXILLAE: Stable 1.6 cm hypodense left thyroid nodule. HEPATOBILIARY: Fatty liver. Motion. Cholecystectomy. PANCREAS: Normal. SPLEEN: Normal. ADRENAL GLANDS: Stable right adrenal nodule compatible with adenoma. KIDNEYS/BLADDER: Multiple simple and complex cysts in the kidneys with the largest cluster in the lower right kidney containing regions of high attenuation compatible with proteinaceous or hemorrhagic debris. This is stable. Motion artifact. BOWEL: No appendicitis. LYMPH NODES: Normal. VASCULATURE: Unremarkable. PELVIC ORGANS: There is an open defect in the lower left abdominal pannus with some ill-defined soft tissue stranding but no abscess. MUSCULOSKELETAL: Normal.      1.  Motion artifact. There is a new poorly characterized 1.3 cm nodule in the right lower lobe with a few additional small new bilateral uncalcified pulmonary nodules. While these  may be infectious, short-term follow-up is recommended to assure resolution. 2.  Open wound defect in the lower left abdominal pannus with underlying soft tissue haziness and stranding but no abscess. 3.  No appendicitis. 4.  Cholecystectomy. 5.  Multiple complex and simple renal cysts unchanged but poorly characterized due to motion. 6.  Hepatomegaly with fatty infiltration of the liver. 7.  Stable right adrenal adenoma.     Xr Humerus Left 2 Vws     Result Date: 8/15/2020  EXAM: XR HUMERUS LEFT 2 VWS LOCATION: [facility] DATE/TIME: 8/15/2020 5:27 PM INDICATION: Left humerus pain COMPARISON: Forearm same date      Within normal limits. No fracture.     Ct Head Without Contrast     Result Date: 8/17/2020  EXAM: CT HEAD WO CONTRAST LOCATION: [facility] DATE/TIME: 8/17/2020 4:31 PM INDICATION: Headache, acute, normal neuro exam possible infective endocarditis, on coumadin, r/o any bleed COMPARISON: CT head 01/21/2020 TECHNIQUE: Routine without IV contrast. Multiplanar reformats. Dose reduction techniques were used. FINDINGS: INTRACRANIAL CONTENTS: No intracranial hemorrhage, extraaxial collection, or mass effect.  No CT evidence of acute infarct. Unchanged mild diffuse parenchymal volume loss with ex vacuo ventricular dilatation. Unchanged periventricular low-attenuation most in keeping with sequela of chronic microvascular ischemic change. VISUALIZED ORBITS/SINUSES/MASTOIDS: No intraorbital abnormality. No paranasal sinus mucosal disease. No middle ear or mastoid effusion. BONES/SOFT TISSUES: No acute abnormality.      1.  No acute intracranial abnormality.     Xr Forearm Left     Result Date: 8/15/2020  EXAM: XR FOREARM LEFT LOCATION: [facility] DATE/TIME: 8/15/2020 5:28 PM INDICATION: Left forearm pain COMPARISON: None      Degenerative disease. No acute fracture or dislocation.     Ct Shoulder Without Contrast Left     Result Date: 8/15/2020  EXAM: CT SHOULDER WO CONTRAST LEFT LOCATION:  [facility] DATE/TIME: 8/15/2020 8:47 PM INDICATION: Pain, inability to move. COMPARISON: 08/15/2020 radiographs. TECHNIQUE: Noncontrast. Axial, sagittal and coronal thin-section reconstruction. Dose reduction techniques were used. CONTRAST: None. FINDINGS: No evidence of a fracture. Mild degenerative changes at the glenohumeral joint. Mild AC joint arthrosis. No muscle atrophy. Visualized left ribs normal. No soft tissue edema.      1.  No evidence of a fracture. 2.  Mild degenerative changes at the glenohumeral and acromioclavicular joints.      Mr Knee Without Contrast Right     Result Date: 8/17/2020  EXAM: MR KNEE WO CONTRAST RIGHT LOCATION: [facility] DATE/TIME: 8/17/2020 3:40 PM INDICATION: Knee pain, persistent, > 6wks of conservative treatment bacteremia, right BKA stump ulceration, r/o abscess/osteo. COMPARISON: None. TECHNIQUE: Unenhanced. FINDINGS: Postoperative changes of a below-knee amputation at the level of the midportion of the tibial and fibular diaphyses. No evidence of osteomyelitis. There is a tiny fluid collection along the medial margin of the tibial amputation site as seen on series 7 image 17. This collection measures about 7 mm in diameter and maybe seroma or a tiny abscess. Marked global atrophy of the proximal calf musculature with some intramuscular T2 signal likely secondary to denervation change. The neurovascular structures are intact. No evidence of a fracture.      1.  Postop changes of a below-knee amputation at the level of the midportion of the tibial and fibular diaphyses. 2.  No evidence of osteomyelitis. 3.  Tiny fluid collection along the medial margin of the tibial amputation site may represent seroma or tiny abscess.     Nm Mpi With Lexiscan     Result Date: 7/23/2020     The nuclear stress test is probably negative for inducible myocardial ischemia or infarction.    The patient is at a low risk of future cardiac ischemic events.    The left ventricular  "ejection fraction at stress is 66%.    The  images demonstrate breast attenuation as well as subdiaphragmatic RAMP artifacts.    There is no prior study for comparison.      Poc Us 3cg Picc Placement Guidance     Result Date: 8/20/2020  Exam was performed as guidance for PICC line insertion.  Click \"PACS images\" hyperlink below to view any stored images.  For specific procedure details, view procedure note authored by PICC/Vascular Access Nurse.        Please review labs in patient's chart  Indications     Troponin level elevated    Paroxysmal atrial fibrillation (H)    Abnormal electrocardiogram    Conclusion          The nuclear stress test is probably negative for inducible myocardial ischemia or infarction.     The patient is at a low risk of future cardiac ischemic events.     The left ventricular ejection fraction at stress is 66%.     The  images demonstrate breast attenuation as well as subdiaphragmatic RAMP artifacts.     There is no prior study for comparison.      ECG Summary     ECG  Baseline electrocardiogram demonstrates sinus rhythm.   The stress electrocardiogram is negative for inducible ischemic EKG changes. There were no arrhythmias during stress. Arrhythmias during recovery: Occasional PACs.        Assessment/Plan:      ICD-10-CM    1. MRSA bacteremia  R78.81     B95.62        MRSA bacteremia   Source unclear as she had many possibilities.  - Vancomycin through August 30 with ID follow-up as they request.  They are managing the IV antibiotic therapy.    Acute left shoulder pain   Has quieted down nicely.  Etiology was not.  There was concern for seeding of bacteria with her bacteremia.  She was discovered to have degenerative changes.  -Chronic pain management as current    Abdominal wounds   Improved, left may have been a source of infection as it was irritated and has been worse than the right.  -Continue wound cares as per wound care team  -Sees wound care team " here on Tuesdays    Right stump wound   Management per wound care physician/team    Ingrown toenail left   Facility is aware and discussed it with the wound care doctor who sounds like they would be willing to debrided/remove ingrown portion here next week.  -Soak in warm soapy water 5 minutes twice daily.  Dry thoroughly.  Bactroban ointment to the area twice daily.  Monitor clinically for any evidence of ascending cellulitis or other complication    DM 2   See above for recent insulin dosages and changes.  - Increase Lantus to 15 units  -Add mealtime insulin at 7 units 3 times daily  -Anticipate need for increased insulin as she continues to recover with improving appetite etc.  She was recently on 20 to 25 units of Lantus and 10-15 3 times daily aspart with meals.  We will see how it goes.  -Continue diabetic diet  -Patient has done a good job of weight loss despite her inability backslash.    Hypertension   Amlodipine was held in the hospital and has not been restarted here.  Blood pressures are acceptable, even good overall here.    Paroxysmal atrial fibrillation   Continue warfarin with INR monitoring.  Continue metoprolol 25 mg twice daily.  Monitor for any worsening of bradycardia    CKD 4  Metabolic acidosis   Sodium bicarbonate 650 twice daily added during the hospital stay.  Follow-up with nephrology.  BMP next week on Tuesday.  Avoid nephrotoxins as able.  Patient is on vancomycin currently managed by infectious disease    History of peripheral edema and Lasix use   Lasix has been discontinued, no significant edema, continue to monitor clinically.    Constipation   No recurrence she has her bowel program worked out now with some confidence.    BKA June 23   Some debridement of wound this hospital stay.  She is disappointed as she sees that as a step back to prosthetic device walking etc.    Lung nodules   No respiratory symptoms.  Radiology recommends short-term follow-up CT without contrast in 3 months.   Patient says she is not worried because she has been through this before.    Acute on chronic pain with current opiate use.   See new pain consult recommendation.  I hope we can taper off the Dilaudid in the foreseeable future.  I did not make any changes in her dosages today however.  Gabapentin acetaminophen topical lidocaine are all continued.      Case discussed with:    Facility staff           Harry Blackwood MD

## 2021-06-11 NOTE — TELEPHONE ENCOUNTER
[facility] Nurse Triage Anticoagulation Note      Provider: BARRY Smith  Facility: [facility]    Facility Type: TCU    Caller: James  Call Back Number:  [phone]    Reason for call: INR    Today s INR: 1.6  Previous INR: 9/3 3.4(hold x 2 days, then 2mg daily), 9/2 3.4(hold x 1).  Previously receiving 3mg daily.      Diagnosis/Goal: AFIB  Heparin/Lovenox: No   Currently on ABX: No  Other interacting Medications: Other: EC ASA 81mg daily  Missed or refused doses: No    Nurse also reporting BMP results.  Sodium-138, potassium-4.7, chloride-104, CO2-23, Glu-140, BUN-40(prev 25 on 8/20), creat-2.51(prev 1.81 on 8/20), GFR 19(prev 28 on 8/20), calcium 11.5(prev 8.3 on 8/20).  Notable meds:  Vitamin D 1000 units daily, Metoprolol 25mg two times a day, sodium bicarb 650mg two times a day.  Of note, patient does follow with Dr. Frost(nephrology).      Verbal Order/Direction given by Provider: Warfarin 2.5mg daily.  Next INR 9/11/20.  Call BMP results to Dr. Frost.  Encourage fluids.  Give Lasix 20mg x 1 dose now due to hypercalcemia.  Check BMP tomorrow.      Provider giving order: BARRY Smith    Verbal order given to: James Jacobsen RN

## 2021-06-11 NOTE — TELEPHONE ENCOUNTER
[facility] Nurse Triage Anticoagulation Note      Provider: BARRY Smith  Facility: [facility]    Facility Type: TCU    Caller: James   Call Back Number:  [phone]    Reason for call: INR    Today s INR: 2.1  Previous INR: 9/18/20 1.6 take 5mg x1 then 3mg daily     Diagnosis/Goal: AFIB  Heparin/Lovenox: No   Currently on ABX: No  Other interacting Medications: None  Missed or refused doses: No    UA- WBC <1, Leuk- neg, Nit- neg, Squam- occasional     Verbal Order/Direction given by Provider: 3mg daily Next INR 9/29    Provider giving order: VIJAY Vargas    Verbal order given to: James Cartagena RN

## 2021-06-11 NOTE — TELEPHONE ENCOUNTER
[facility] Nurse Triage Anticoagulation Note      Provider: BARRY Smith  Facility: [facility]    Facility Type: TCU    Caller: James  Call Back Number:  [phone]    Reason for call: INR    Today s INR: 1.6    Also Ionized Ca 1.54,  PTH 8, Ca 11.2, Bun 49, Cr 2.05 GFR 25    Previous INR: 9/14/20 INR 1.7 2.5mg daily.    Diagnosis/Goal: AFIB  Heparin/Lovenox: No   Currently on ABX: Yes Doxy  & Keflex  Other interacting Medications: None  Missed or refused doses: No    Verbal Order/Direction given by Provider: Coumadin 5mg tonight, then 3mg daily. Next INR 9/22    Provider giving order: BARRY Smith    Verbal order given to: Nelli Shepard RN

## 2021-06-11 NOTE — TELEPHONE ENCOUNTER
[facility] Nurse Triage Telephone Note      Provider: BARRY Smith  Facility: [facility]    Facility Type: [facility]    Caller: Reina  Call Back Number:  [phone]    Allergies: Hydralazine; Latex; Naprosyn [naproxen]; Nitrofurantoin; Sulfa (sulfonamide antibiotics); and Vicks vaporub [camphor-eucalyptus oil-menthol]    Reason for call: Nurse reporting BMP and PTH results.  Sodium-140, potassium-4.3, Chloride-102, CO2-16, Glu-264, BUN-49(prev 33 on 9/14), creat-2.05(prev 1.84 on 9/14), GFR 25(prev 28 on 9/14), calcium-11.2(prev 11.1 on 9/14).  PTH-8(prev 16 on 8/25).  Of note, patient follows with nephrology.       Verbal Order/Direction given by Provider: Fax and call lab results to patient's nephrologist.  Continue to encourage fluids.      Provider giving order: BARRY Smith    Verbal order given to: Reina Jacobsen RN

## 2021-06-11 NOTE — TELEPHONE ENCOUNTER
[facility] Nurse Triage Anticoagulation Note      Provider: BARRY Smith  Facility: [facility]    Facility Type: TCU    Caller: Reina   Call Back Number:  [phone]    Reason for call: INR    Today s INR: 3.4  Previous INR: 8/28 2.6 3mg daily     Diagnosis/Goal: AFIB  Heparin/Lovenox: No   Currently on ABX: No recently on vancomycin  Other interacting Medications: None  Missed or refused doses: No    Verbal Order/Direction given by Provider: hold 9/2 recheck INR 9/3    Provider giving order: BARRY Smith    Verbal order given to: Annita Cartagena RN

## 2021-06-11 NOTE — PROGRESS NOTES
"  [facility]      NAME:  Jazmin Bauman             :  1955    MRN: [id number]    CODE STATUS:  FULL CODE    FACILITY: [facility] [[id number]]         CHIEF COMPLAIN/REASON FOR VISIT:  Chief Complaint   Patient presents with     Review Of Multiple Medical Conditions     INR, abdominal wo0und        HISTORY OF PRESENT ILLNESS: Jazmin Bauman is a 65 y.o. female. Pt was at [facility] from  to  and underwent a RBKA r/t ongoing DM ulcer with osteomyelitis. Per her EMR dc summary \" with HTN,morbid obesity, DM, A fib, CKD 4 presented for evaluation of R foot swelling, difficulty ambulation, pain found to have osteomyelitis now s/p amputation. She has now been to the OR twice this stay, last was on 20.   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   Patient had a chronic diabetic foot ulcer on her R heel, presented for increased swelling, difficulty ambulating, and pain. MRI showed extensive soft tissue necrosis and some osteomyelitis along with cellulitis  Met sepsis criteria on admission with leukocytosis and elevated CRP  Podiatry saw and the foot was not felt to be salvageable and BKA was recommended\"       August 15-: Hospitalized for sepsis with MRSA bacteremia, she had possible endocarditis and TTE revealing vegetation of the aortic valve. She had a CT of the abdomen and pelvis that showed left lower abdominal pannus ulceration but without abscess.  Her right BKA stump ulceration was negative for osteomyelitis or cellulitis.  She was treated with IV Vanco for 14 days which will be completed on .  Her left lower abdominal ulceration has never been biopsied.  It was not biopsied in the hospital and there was no surgical intervention.  Her right BKA stump was negative for osteomyelitis.  CKD stage IV with baseline creatinine 2.5-3.  She was at 1.82 at the end of hospitalization.  She is discharged on " "sodium bicarb twice daily.  Her insulin was adjusted and she is now on sliding scale insulin with 10 units of Lantus at bedtime.  Blood sugars have been less than 200 on average.    For her hypertension, Norvasc has been discontinued.  Blood pressures remain in the 130s over 70s on average.  She was incidentally found to have a lung nodule on CT scan, will need to follow-up with this.    Today: Seen for INR today and general discussion.  Today her INR was 3.4 which is unchanged from previous INR.  Will hold for 48 hours and start a lower dose at 2 mg daily and then recheck on Tuesday.  As the weekend is coming up.  She is feeling well, there is no excessive bruising, hematuria or melena.  Her pain management is okay on current orders.  She is visiting often with her previous roommate and friend come down the [place].  She seems content in this setting for now.   She has nephrology appt at the end of the month. Will check bmp in the next week.         Allergies   Allergen Reactions     Hydralazine      Paralysis of legs     Latex Itching     Skin Burns     Naprosyn [Naproxen] Swelling     throat     Nitrofurantoin Hives     Sulfa (Sulfonamide Antibiotics) Rash     Vicks Vaporub [Camphor-Eucalyptus Oil-Menthol] Rash   :     Current Outpatient Medications   Medication Sig     acetaminophen (TYLENOL) 500 MG tablet Take 2 tablets (1,000 mg total) by mouth 3 (three) times a day.     aspirin 81 MG EC tablet Take 81 mg by mouth daily.     BD ULTRA-FINE MICRO PEN NEEDLE 32 gauge x 1/4\" Ndle USE AS DIRECTED 4 TIMES DAILY.     bisacodyL (DULCOLAX) 10 mg suppository Insert 10 mg into the rectum daily as needed. No stool greater than 72 hours     cholecalciferol, vitamin D3, (VITAMIN D3) 1,000 unit capsule Take 1,000 Units by mouth daily.     collagenase ointment Apply daily per WOC     docusate sodium (COLACE) 100 MG capsule Take 100 mg by mouth daily.     ferrous sulfate 325 (65 FE) MG tablet Take 1 tablet by mouth 2 (two) times " a day with meals.     gabapentin (NEURONTIN) 100 MG capsule Take 200 mg by mouth 2 (two) times a day.     HYDROmorphone (DILAUDID) 2 MG tablet Take 1 tablet (2 mg total) by mouth every 3 (three) hours as needed.     HYDROmorphone (DILAUDID) 2 MG tablet Take 1 tablet (2 mg total) by mouth daily.     lancets (ACCU-CHEK MULTICLIX LANCET) Misc TEST 6 TIMES A DAY     LANTUS SOLOSTAR U-100 INSULIN 100 unit/mL (3 mL) pen Inject 10 Units under the skin at bedtime. 11.65 Type 2 with hyperglycemia  Contact provider if insulin prescribed is not the preferred insulin per insurance. (Patient taking differently: Inject 15 Units under the skin at bedtime. 11.65 Type 2 with hyperglycemia  Contact provider if insulin prescribed is not the preferred insulin per insurance.)     lidocaine (XYLOCAINE) 5 % ointment Apply topically 4 (four) times a day. Apply to left shoulder or around wound (not inside wound)     metoprolol tartrate (LOPRESSOR) 25 MG tablet Take 1 tablet (25 mg total) by mouth 2 (two) times a day.     miconazole (MICOTIN) 2 % powder Apply topically 2 (two) times a day. Apply to b/l groin/underneath breasts/underneath pannus     NOVOLOG U-100 INSULIN ASPART 100 unit/mL injection Check blood sugar four (4) times daily.  11.65 Type 2 with hyperglycemia  OneTouch Verio Flex  Meter  OneTouch Verio strips 2 boxes of 50  Delica Lancets box of 100  BD Ultra-fine Therese Pen Needles - NDC [address]-79 - dispense 1 case, refill PRN for 1 year (Patient taking differently: Inject 7 Units under the skin 3 (three) times a day before meals. )     polyethylene glycol (MIRALAX) 17 gram packet Take 1 packet (17 g total) by mouth daily as needed.     senna (SENOKOT) 8.6 mg tablet Take 1 tablet by mouth 2 (two) times a day.     sodium bicarbonate 650 MG tablet Take 1 tablet (650 mg total) by mouth 2 (two) times a day. OTC product     warfarin sodium (WARFARIN ORAL) Take by mouth. 9/8/20 INR 1.6  Take 2.5mg daily.  Next INR 9/11/20.    9/3/20  INR 3.4  Hold x 2 days, then take 2mg daily.  Next INR 9/8/20.  9/2/20 INR 3.4 Hold 9/2 recheck INR 9/3  8/28/20 INR 2.6  Cont 3mg daily.  Next INR 9/1/20.    8/25/20 INR 2.2 Cont 3mg daily. Next INR 8/28.  8/24/20 INR 2.3  Take 3mg daily.  Next INR 8/27/20.         REVIEW OF SYSTEMS:    Currently, no fever, chills, or rigors. Does not have any visual or hearing problems. Denies any chest pain, headaches, palpitations, lightheadedness, dizziness, shortness of breath, or cough. Appetite is good. Denies any GERD symptoms. Denies any difficulty with swallowing, nausea, or vomiting.  Denies any abdominal pain, diarrhea or constipation. Denies any urinary symptoms, yeager in place. No insomnia. No active bleeding. No rash.       PHYSICAL EXAMINATION:  Vitals:    09/09/20 1145   BP: 140/65   Pulse: 65   Temp: 97  F (36.1  C)   SpO2: 97%   Weight: (!) 231 lb (104.8 kg)         GENERAL: Awake, Alert, oriented x3, not in any form of acute distress, answers questions appropriately, follows simple commands, conversant with flat affect  HEENT: Head is normocephalic with normal hair distribution. No evidence of trauma. Ears: No acute purulent discharge. Eyes: Sclera anicteric.  CHEST: No tenderness or deformity, no crepitus  LUNG: Clear to auscultation with good chest expansion. There DM BS  BACK: No kyphosis of the thoracic spine. Symmetric, no curvature, ROM normal, no CVA tenderness, no spinal tenderness   CVS: There is good S1  S2,  rhythm is regular.  ABDOMEN: Globular and ROTUND tender to palpation, non distended, no masses, no organomegaly, good bowel sounds, no rebound or guarding, no peritoneal signs. Wound to left side of abdomen, dressed today, no open area to left hip as well. Unable to visualize as they are dressed and she is fully clothed. Followed by wound   EXTREMITIES: UE Full ROM. Right BKA, IN a  with protected brace  SKIN: Warm and dry, no erythema noted, open area on left pannus with dressing on  abdomen  NEUROLOGICAL: The patient is oriented to person, place and time. Strength and sensation are grossly intact. Face is symmetric.          LABS:    Lab Results   Component Value Date    WBC 7.9 08/21/2020    HGB 9.2 (L) 08/21/2020    HCT 28.6 (L) 08/21/2020    MCV 89 08/21/2020     08/21/2020       Results for orders placed or performed during the hospital encounter of 08/15/20   Basic Metabolic Panel   Result Value Ref Range    Sodium 137 136 - 145 mmol/L    Potassium 4.1 3.5 - 5.0 mmol/L    Chloride 103 98 - 107 mmol/L    CO2 23 22 - 31 mmol/L    Anion Gap, Calculation 11 5 - 18 mmol/L    Glucose 178 (H) 70 - 125 mg/dL    Calcium 11.0 (H) 8.5 - 10.5 mg/dL    BUN 37 (H) 8 - 22 mg/dL    Creatinine 2.05 (H) 0.60 - 1.10 mg/dL    GFR MDRD Af Amer 29 (L) >60 mL/min/1.73m2    GFR MDRD Non Af Amer 24 (L) >60 mL/min/1.73m2           Lab Results   Component Value Date    HGBA1C 10.3 (H) 06/17/2020     Vitamin D, Total (25-Hydroxy)   Date Value Ref Range Status   04/28/2016 29.8 (L) 30.0 - 80.0 ng/mL Final     Lab Results   Component Value Date    YADJXTBK31 285 01/07/2011       ASSESSMENT/PLAN:  1. Wound infection    2. Paroxysmal atrial fibrillation (H)      Right BKA: Also with postoperative pain and phantom limb pain. Unable to assess stump, in  and brace.  -Continues on Dilaudid.  -Continue PT and OT as directed by them.    Acute on chronic blood loss anemia-likely from anemia chronic disease, iron deficiency, chronic kidney disease, surgery. On oral iron.     Left abdominal wall cellulitis and right side: Wound care MD saw this today.     Insulin-dependent diabetes: A1c 10.3.  Now on glargine 10 units nightly and sliding scale NovoLog 3 times daily with meals.  Blood sugars actually look pretty good here in the TCU, all less than 220.      Essential hypertension: Satisfactory in TCU thus far, amlodipine d.cd.   -metoprolol    CKD 4: Followed by Dr. Iqbal.  Creatinine today 1.57.  -Continue PhosLo.    -Sodium bicarb.     Coronary artery disease:  -also needs outpt CRISTINA eval  -Continue metoprolol and aspirin.     Urinary retention: Continues to have [place] in place, she follows with [facility] for urinary retention.     A. Fib on Coumadin: INR today 3.4, no change from 1 day ago, hold x48 hours then start Coumadin 2 mg p.o. q. evening and then check INR on Tuesday.    TCU/PT report: Using the easy stand for transfer at this time due to left leg weakness.    Electronically signed by:  Zoey Leung CNP  This progress note was completed using Dragon software and there may be grammatical errors.      .

## 2021-06-12 NOTE — PROGRESS NOTES
"  [facility]      NAME:  Jazmin Bauman             :  1955    MRN: [id number]    CODE STATUS:  FULL CODE    FACILITY: [facility] [[id number]]         CHIEF COMPLAIN/REASON FOR VISIT:  No chief complaint on file.      HISTORY OF PRESENT ILLNESS: Jazmin Bauman is a 65 y.o. female. Pt was at [facility] from  to  and underwent a RBKA r/t ongoing DM ulcer with osteomyelitis. Per her EMR dc summary \" with HTN,morbid obesity, DM, A fib, CKD 4 presented for evaluation of R foot swelling, difficulty ambulation, pain found to have osteomyelitis now s/p amputation. She has now been to the OR twice this stay, last was on 20.   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   Patient had a chronic diabetic foot ulcer on her R heel, presented for increased swelling, difficulty ambulating, and pain. MRI showed extensive soft tissue necrosis and some osteomyelitis along with cellulitis  Met sepsis criteria on admission with leukocytosis and elevated CRP  Podiatry saw and the foot was not felt to be salvageable and BKA was recommended\"       August 15-: Hospitalized for sepsis with MRSA bacteremia, she had possible endocarditis and TTE revealing vegetation of the aortic valve. She had a CT of the abdomen and pelvis that showed left lower abdominal pannus ulceration but without abscess.  Her right BKA stump ulceration was negative for osteomyelitis or cellulitis.  She was treated with IV Vanco for 14 days which will be completed on .  Her left lower abdominal ulceration has never been biopsied.  It was not biopsied in the hospital and there was no surgical intervention.  Her right BKA stump was negative for osteomyelitis.  CKD stage IV with baseline creatinine 2.5-3.  She was at 1.82 at the end of hospitalization.  She is discharged on sodium bicarb twice daily.  Her insulin was adjusted and she is now on sliding " "scale insulin with 10 units of Lantus at bedtime.  Blood sugars have been less than 200 on average.    For her hypertension, Norvasc has been discontinued.  Blood pressures remain in the 130s over 70s on average.  She was incidentally found to have a lung nodule on CT scan, will need to follow-up with this.  Please see imaging report from August hospitalization.  Multiple nodules and enlarged lymph nodes.    Today: Follow up today as she recently transferred [facility]. She has completed therapy secondary to stagnation in progress. She is having her wounds dressed by nursing during my visit and I am able to view her abdominal open area which is healing nicely. Good granulation tissue at base. Scant drainage. She also brings up her pernicious anemia and requests for b12 and I again reminded her that she her level was therapeutic. Pain well controlled. Weights stable at 227 x 3 weeks now. Eating and drinking well.       Allergies   Allergen Reactions     Hydralazine      Paralysis of legs     Latex Itching     Skin Burns     Naprosyn [Naproxen] Swelling     throat     Nitrofurantoin Hives     Sulfa (Sulfonamide Antibiotics) Rash     Vicks Vaporub [Camphor-Eucalyptus Oil-Menthol] Rash   :     Current Outpatient Medications   Medication Sig     acetaminophen (TYLENOL) 500 MG tablet Take 2 tablets (1,000 mg total) by mouth 3 (three) times a day.     aspirin 81 MG EC tablet Take 81 mg by mouth daily.     BD ULTRA-FINE MICRO PEN NEEDLE 32 gauge x 1/4\" Ndle USE AS DIRECTED 4 TIMES DAILY.     bisacodyL (DULCOLAX) 10 mg suppository Insert 10 mg into the rectum daily as needed. No stool greater than 72 hours     docusate sodium (COLACE) 100 MG capsule Take 100 mg by mouth daily as needed.      ferrous sulfate 325 (65 FE) MG tablet Take 1 tablet by mouth 2 (two) times a day with meals.     gabapentin (NEURONTIN) 100 MG capsule Take 200 mg by mouth 2 (two) times a day.     HYDROmorphone (DILAUDID) 2 MG tablet (Take 2mg " daily and 2mg Q 3 hours PRN)     lancets (ACCU-CHEK MULTICLIX LANCET) Misc TEST 6 TIMES A DAY     LANTUS SOLOSTAR U-100 INSULIN 100 unit/mL (3 mL) pen Inject 10 Units under the skin at bedtime. 11.65 Type 2 with hyperglycemia  Contact provider if insulin prescribed is not the preferred insulin per insurance. (Patient taking differently: Inject 15 Units under the skin at bedtime. 11.65 Type 2 with hyperglycemia  Contact provider if insulin prescribed is not the preferred insulin per insurance.)     lidocaine (XYLOCAINE) 5 % ointment Apply topically 4 (four) times a day. Apply to left shoulder or around wound (not inside wound)     metoprolol tartrate (LOPRESSOR) 25 MG tablet Take 1 tablet (25 mg total) by mouth 2 (two) times a day.     miconazole (MICOTIN) 2 % powder Apply topically 2 (two) times a day. Apply to b/l groin/underneath breasts/underneath pannus     NOVOLOG U-100 INSULIN ASPART 100 unit/mL injection Check blood sugar four (4) times daily.  11.65 Type 2 with hyperglycemia  OneTouch Verio Flex  Meter  OneTouch Verio strips 2 boxes of 50  Delica Lancets box of 100  BD Ultra-fine Therees Pen Needles - NDC [address]-86 - dispense 1 case, refill PRN for 1 year (Patient taking differently: Inject 7 Units under the skin 3 (three) times a day before meals. )     polyethylene glycol (MIRALAX) 17 gram packet Take 1 packet (17 g total) by mouth daily as needed.     Saccharomyces boulardii (FLORASTOR) 250 mg capsule Take 250 mg by mouth daily.     senna (SENOKOT) 8.6 mg tablet Take 1 tablet by mouth 2 (two) times a day. (Patient taking differently: Take 1 tablet by mouth 2 (two) times a day as needed. )     sodium bicarbonate 650 MG tablet Take 1 tablet (650 mg total) by mouth 2 (two) times a day. OTC product     cholecalciferol, vitamin D3, (VITAMIN D3) 1,000 unit capsule Take 1,000 Units by mouth daily.     collagenase ointment Apply daily per WOC     warfarin sodium (WARFARIN ORAL) Take by mouth. 10/15/20 INR 2.3  "3mg W, Sat and 2.5mg AOD. Next INR 10/29  10/6/20 INR 2.3  Take 3mg Wed and Sat and 2.5mg AOD.  Next INR 10/15/20.    9/29/20 INR 3.1 Take 2.5mg daily. Next INR 10/6.  9/22/20 INR 2.1 Take 3mg daily Next INR 9/29  (LD Antib 7/20)  9/18/20 INR 1.6 5mg tonight, then 3mg daily. Next INR 9/22.  9/14/20 INR 1.7 Cont 2.5,g daily. Next INR 9/17 9/11/20 INR 1.9 Take 2.5mg daily Next INR 9/14 9/8/20 INR 1.6  Take 2.5mg daily.  Next INR 9/11/20.    9/3/20 INR 3.4  Hold x 2 days, then take 2mg daily.  Next INR 9/8/20.  9/2/20 INR 3.4 Hold 9/2 recheck INR 9/3  8/28/20 INR 2.6  Cont 3mg daily.  Next INR 9/1/20.         REVIEW OF SYSTEMS:    Currently, no fever, chills, or rigors. Does not have any visual or hearing problems. Denies any chest pain, headaches, palpitations, lightheadedness, dizziness, shortness of breath, or cough. Appetite is good. Denies any GERD symptoms. Denies any difficulty with swallowing, nausea, or vomiting.  Denies any abdominal pain, diarrhea or constipation. Denies any urinary symptoms, yeager in place. No insomnia. No active bleeding. No rash.       PHYSICAL EXAMINATION:  Vitals:    10/15/20 1144   BP: 123/89   Pulse: 78   Resp: 17   Temp: 97.8  F (36.6  C)   SpO2: 97%   Weight: (!) 226 lb 14.4 oz (102.9 kg)   Height: 5' 1\" (1.549 m)         GENERAL: Awake, Alert, oriented x3, not in any form of acute distress, answers questions appropriately, follows simple commands, conversant with flat affect  HEENT: Head is normocephalic with normal hair distribution. No evidence of trauma. Ears: No acute purulent discharge. Eyes: Sclera anicteric.  CHEST: No tenderness or deformity, no crepitus  LUNG: Clear to auscultation with good chest expansion. There DM BS  BACK: ROM normal, no CVA tenderness, no spinal tenderness   CVS: There is good S1  S2,  rhythm is regular.  ABDOMEN: Globular and ROTUND tender to palpation, non distended, no masses, no organomegaly, good bowel sounds, no rebound or guarding, no " peritoneal signs.   EXTREMITIES: UE Full ROM. Right BKA, IN a  with protected brace  SKIN: Warm and dry, no erythema noted, open area on abdomen viewed. Good granulation tissue and reduced circumference and depth. Scant drainage.    NEUROLOGICAL: The patient is oriented to person, place and time. Strength and sensation are grossly intact. Face is symmetric.    LABS:    Lab Results   Component Value Date    WBC 7.9 08/21/2020    HGB 9.2 (L) 08/21/2020    HCT 28.6 (L) 08/21/2020    MCV 89 08/21/2020     08/21/2020       Results for orders placed or performed during the hospital encounter of 08/15/20   Basic Metabolic Panel   Result Value Ref Range    Sodium 137 136 - 145 mmol/L    Potassium 4.1 3.5 - 5.0 mmol/L    Chloride 103 98 - 107 mmol/L    CO2 23 22 - 31 mmol/L    Anion Gap, Calculation 11 5 - 18 mmol/L    Glucose 178 (H) 70 - 125 mg/dL    Calcium 11.0 (H) 8.5 - 10.5 mg/dL    BUN 37 (H) 8 - 22 mg/dL    Creatinine 2.05 (H) 0.60 - 1.10 mg/dL    GFR MDRD Af Amer 29 (L) >60 mL/min/1.73m2    GFR MDRD Non Af Amer 24 (L) >60 mL/min/1.73m2           Lab Results   Component Value Date    HGBA1C 10.3 (H) 06/17/2020     Vitamin D, Total (25-Hydroxy)   Date Value Ref Range Status   04/28/2016 29.8 (L) 30.0 - 80.0 ng/mL Final     Lab Results   Component Value Date    SQQFBMIM15 285 01/07/2011       ASSESSMENT/PLAN:  1. Below-knee amputation (H)    2. Wound infection    3. Pernicious anemia    4. CKD (chronic kidney disease) stage 4, GFR 15-29 ml/min (H)      Right BKA: Hx of phantom limb pain. Improved. Unable to assess stump, in  and brace.   -Continues on Dilaudid.  -Continue PT and OT as directed by them.    Unintended weight loss: Patient has had greater than 20 pound weight loss since July.  She says the food is just bad here and it is always cold.  Monitoring.  We will put her on a protein supplement for wound healing.   Weight today 226. Stable now x 3 weeks. Reports decent po intake.     Acute  on chronic blood loss anemia-likely from anemia chronic disease, iron deficiency, chronic kidney disease, surgery. On oral iron .    History of B12 deficiency: Had inquired about having her B12 resumed, we obtained a level which was 1400 so no need for injections at this time.  She is okay with this.  -discussed this again at her request and reminded her of her recent level of 1400.      Left abdominal wall cellulitis and right side: Viewed today with nursing. IMPROVED. Reduction in size and depth. Edges are healing with good granulation tissue at the base. Scant drainage. No s/sx of secondary infection. Great progress.      Insulin-dependent diabetes: A1c 10.3.  Now on glargine 15 units nightly and sliding scale NovoLog 3 times daily with meals.  Blood sugars average 130-200. A few outliers to 280.      Essential hypertension: Satisfactory in TCU thus far, amlodipine d.cd.   -metoprolol    CKD 4: Followed by Dr. Iqbal whom she saw yesterday.  He ordered repeat BMP in a month.  Is not concerned about calcium that has stayed stable at 11.3.  Of note she did have elevated uric acid level at 11. Will not treat unless symptomatic.   -Sodium bicarb.     Coronary artery disease:  -also needs outpt CRISTINA eval  -Continue metoprolol and aspirin.     Urinary retention: recent UTI, treated.      A. Fib on Coumadin: INR therapeutic.     TCU/PT report: Using the easy stand for transfer at this time due to left leg weakness.    Communicated concerns with patient, nursing.    Electronically signed by:  Zoey Leung, CNP  This progress note was completed using Dragon software and there may be grammatical errors.

## 2021-06-12 NOTE — TELEPHONE ENCOUNTER
[facility] Nurse Triage Anticoagulation Note      Provider: BARRY Smith  Facility: [facility]    Facility Type: TCU    Caller: Reina  Call Back Number:  [phone]    Reason for call: INR    Today s INR: 2.3  Previous INR: 10/6/20 2.3 3mg W, Sat and 2.5mg AOD.     Diagnosis/Goal: AFIB  Heparin/Lovenox: No   Currently on ABX: No  Other interacting Medications: None  Missed or refused doses: No    Verbal Order/Direction given by Provider: 3mg W, Sat and 2.5mg AOD    Provider giving order: BARRY Smith    Verbal order given to: Reina Cartagena RN

## 2021-06-12 NOTE — PROGRESS NOTES
Office Visit - Follow up    Jazmin Bauman   62 y.o. female    Date of Visit: 9/1/2017    Chief Complaint   Patient presents with     Urinary Tract Infection     follow up       Subjective: Diabetes mellitus plus recent urinary tract infection.  Here in follow-up.  Questions regarding glipizide extended release.  Colonoscopy is scheduled for September 11, 2017 with colorectal surgery group at [facility].  The patient's family history is positive for colon cancer.    The last colonoscopy dated March 27, 2012 showed polyps one tubular adenomatous in type last mammogram allCLEAR March 16, 2017.    Denies polyuria polydipsia no chest pain or shortness of breath no blood in stool or urine.    Medication list reviewed generally well-tolerated.  Uses 2 canes for ambulation.    ROS: A comprehensive review of systems was performed and was otherwise negative    Medications:  Prior to Admission medications    Medication Sig Start Date End Date Taking? Authorizing Provider   acetaminophen (TYLENOL) 325 MG tablet Take 325-650 mg by mouth every 8 (eight) hours as needed.    Yes PROVIDER, HISTORICAL   allopurinol (ZYLOPRIM) 100 MG tablet Take 1 tablet (100 mg total) by mouth daily. 6/29/16  Yes Lester Flores MD   amLODIPine (NORVASC) 10 MG tablet Take 10 mg by mouth daily.   Yes PROVIDER, HISTORICAL   aspirin 81 MG EC tablet Take 81 mg by mouth daily.   Yes PROVIDER, HISTORICAL   blood sugar diagnostic (ACCU-CHEK SAM PLUS TEST STRP) Strp TEST 6 TIMES A DAY 9/28/15  Yes Lester Flores MD   cholecalciferol, vitamin D3, (VITAMIN D3) 1,000 unit capsule Take 1,000 Units by mouth daily.   Yes PROVIDER, HISTORICAL   docusate sodium (COLACE) 50 MG capsule Take by mouth once daily.   Yes PROVIDER, HISTORICAL   ferrous sulfate 65 mg elemental iron (IRON) Take 1 tablet by mouth 2 (two) times a day.   Yes PROVIDER, HISTORICAL   glipiZIDE (GLUCOTROL) 10 MG tablet Take 10 mg by mouth 2 (two) times a day.  1/31/16  Yes  "PROVIDER, HISTORICAL   insulin glargine (LANTUS SOLOSTAR) 100 unit/mL (3 mL) pen Inject 35 Units under the skin every morning. Do not mix Lantus with any other insulin  Patient taking differently: Inject 41 Units under the skin every morning. Do not mix Lantus with any other insulin 7/12/17  Yes Lester Flores MD   magnesium 250 mg Tab Take 500 mg by mouth 2 (two) times a day.    Yes PROVIDER, HISTORICAL   colchicine (MITIGARE) 0.6 mg cap Take 0.6 mg by mouth 2 (two) times a day. 12/21/16   Lester Flores MD   lancets (ACCU-CHEK MULTICLIX LANCET) Misc TEST 6 TIMES A DAY 9/28/15   Lester Flores MD   pen needle, diabetic (NOVOFINE 32) 32 gauge x 1/4\" Ndle Use 1 Device As Directed daily. 7/12/17   Sydni Garland, NP   sodium polystyrene sulfonate (KAYEXALATE) 15 gram/60 mL Susp suspension Take 30 g by mouth every other day.     PROVIDER, HISTORICAL       Allergies:   Allergies   Allergen Reactions     Hydralazine      Latex Itching     Skin Burns     Naprosyn [Naproxen] Swelling     throat     Nitrofurantoin Hives     Sulfa (Sulfonamide Antibiotics) Rash     Vicks Vaporub [Camphor-Eucalyptus Oil-Menthol] Rash       Immunizations:   Immunization History   Administered Date(s) Administered     Influenza L2g4-95, 12/18/2009     Influenza, inj, historic 12/18/2009, 10/14/2011     Influenza, seasonal,quad inj 36+ mos 09/28/2015     Influenza, seasonal,quad inj 6-35 mos 10/30/2012, 09/30/2013, 11/21/2014     Influenza,seasonal quad, PF, 36+MOS 09/29/2016     Td, historic 03/02/2006     Tdap 05/15/2015       Exam Chest clear to auscultation and percussion.  Heart tones regular rhythm without murmur rub or gallop.  Abdomen soft nontender no organomegaly.  No peritoneal signs.  Extremities free of edema cyanosis or clubbing.  Neck veins nondistended no thyromegaly or scleral icterus noted, carotids full.  Skin warm and dry easily conversant good spirited.  Normal intelligence.  Neurologically intact no gross " localizing findings.  Super morbid obesity noted with BMI 52 uses 2 canes for ambulation.    Assessment and Plan  Diabetes mellitus type 2 check A1c blood sugar urine for microalbumin lipid panel and plain urinalysis today.    Urinary tract infection UA UC pending.    Colon polyp see colonoscopy report March 27, 2012.    Family history positive for colon cancer.    Multiple drug allergies.    Hypertension needs recheck blood pressure 1 month.    Time: total time spent with the patient was 25 minutes of which >50% was spent in counseling and coordination of care    The following high BMI interventions were performed this visit: encouragement to exercise    Lester Flores MD    Patient Active Problem List   Diagnosis     Thrombocytopenia     Fibromyalgia     Carpal Tunnel Syndrome     Urinary Tract Infection     Endometrial Hyperplasia     Cholelithiasis     Leukocytosis     Urine Tests Nonspecific Abnormal Findings     Type 2 Diabetes Mellitus     Obesity     Essential Hypertension     Pernicious Anemia     Immunology Studies Raised Immunoglobulin Level     Vaginal bleeding

## 2021-06-12 NOTE — TELEPHONE ENCOUNTER
[facility] Nurse Triage Anticoagulation Note      Provider: BARRY Smith  Facility: [facility]    Facility Type: [facility]    Caller: Anita Levy  Call Back Number:  [phone]    Reason for call: INR    Today s INR: 2.2  Previous INR: 10/29/20 INR 2.2 3mg W & Sat, 2.5mg AOD.    ALSO: WBC 12.3 AFeb, Hgb 12.5, Plt 358, Creat 1.96, BUN 74, GFR 26,  K 3.9, CA 11.1       Diagnosis/Goal: AFIB  Heparin/Lovenox: No   Currently on ABX: No  Other interacting Medications: None  Missed or refused doses: No    Verbal Order/Direction given by Provider: Cont same 3mg W & Sat, 2.5mg AOD. NExt INR 11/19.    Provider giving order: BARRY Smith    Verbal order given to: Sarai Shepard RN

## 2021-06-12 NOTE — PROGRESS NOTES
"  [facility]      NAME:  Jazmin Bauman             :  1955    MRN: [id number]    CODE STATUS:  FULL CODE    FACILITY: [facility] [[id number]]         CHIEF COMPLAIN/REASON FOR VISIT:  Chief Complaint   Patient presents with     Follow-up     Wound follow up        HISTORY OF PRESENT ILLNESS: Jazmin Bauman is a 65 y.o. female. Pt was at [facility] from  to  and underwent a RBKA r/t ongoing DM ulcer with osteomyelitis. Per her EMR dc summary \" with HTN,morbid obesity, DM, A fib, CKD 4 presented for evaluation of R foot swelling, difficulty ambulation, pain found to have osteomyelitis now s/p amputation. She has now been to the OR twice this stay, last was on 20.   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   Patient had a chronic diabetic foot ulcer on her R heel, presented for increased swelling, difficulty ambulating, and pain. MRI showed extensive soft tissue necrosis and some osteomyelitis along with cellulitis  Met sepsis criteria on admission with leukocytosis and elevated CRP  Podiatry saw and the foot was not felt to be salvageable and BKA was recommended\"       August 15-: Hospitalized for sepsis with MRSA bacteremia, she had possible endocarditis and TTE revealing vegetation of the aortic valve. She had a CT of the abdomen and pelvis that showed left lower abdominal pannus ulceration but without abscess.  Her right BKA stump ulceration was negative for osteomyelitis or cellulitis.  She was treated with IV Vanco for 14 days which will be completed on .  Her left lower abdominal ulceration has never been biopsied.  It was not biopsied in the hospital and there was no surgical intervention.  Her right BKA stump was negative for osteomyelitis.  CKD stage IV with baseline creatinine 2.5-3.  She was at 1.82 at the end of hospitalization.  She is discharged on sodium bicarb twice daily.  Her " "insulin was adjusted and she is now on sliding scale insulin with 10 units of Lantus at bedtime.  Blood sugars have been less than 200 on average.    For her hypertension, Norvasc has been discontinued.  Blood pressures remain in the 130s over 70s on average.  She was incidentally found to have a lung nodule on CT scan, will need to follow-up with this.  Please see imaging report from August hospitalization.  Multiple nodules and enlarged lymph nodes.    Today: Patient is being seen at request of nursing for increased bloody drainage from her chronic wounds.  She has chronic abdominal wounds and a small wound to the right AKA stump.  AKA stump had begun bleeding a day ago and she showed me pictures that she took with excessive amount of bleeding in the bandage.  Her abdominal wound is also increased drainage and tenderness.  The surrounding edges look good with no signs or symptoms of cellulitis.  She is followed by the wound team who is here today and dressed the wounds.  There was still increased drainage so we will check her INR which has recently been stable.  She is actually been very stable with her INR over the last few months.  We will also check a BMP as we have not done so in a month and we will check a CBC to ensure there is no infection given her history.      Allergies   Allergen Reactions     Hydralazine      Paralysis of legs     Latex Itching     Skin Burns     Naprosyn [Naproxen] Swelling     throat     Nitrofurantoin Hives     Sulfa (Sulfonamide Antibiotics) Rash     Vicks Vaporub [Camphor-Eucalyptus Oil-Menthol] Rash   :     Current Outpatient Medications   Medication Sig     acetaminophen (TYLENOL) 500 MG tablet Take 2 tablets (1,000 mg total) by mouth 3 (three) times a day.     aspirin 81 MG EC tablet Take 81 mg by mouth daily.     BD ULTRA-FINE MICRO PEN NEEDLE 32 gauge x 1/4\" Ndle USE AS DIRECTED 4 TIMES DAILY.     bisacodyL (DULCOLAX) 10 mg suppository Insert 10 mg into the rectum daily as " needed. No stool greater than 72 hours     cholecalciferol, vitamin D3, (VITAMIN D3) 1,000 unit capsule Take 1,000 Units by mouth daily.     collagenase ointment Apply daily per WOC     docusate sodium (COLACE) 100 MG capsule Take 100 mg by mouth daily as needed.      ferrous sulfate 325 (65 FE) MG tablet Take 1 tablet by mouth 2 (two) times a day with meals.     gabapentin (NEURONTIN) 100 MG capsule Take 200 mg by mouth 2 (two) times a day.     HYDROmorphone (DILAUDID) 2 MG tablet (Take 2mg daily and 2mg Q 3 hours PRN)     lancets (ACCU-CHEK MULTICLIX LANCET) Misc TEST 6 TIMES A DAY     LANTUS SOLOSTAR U-100 INSULIN 100 unit/mL (3 mL) pen Inject 10 Units under the skin at bedtime. 11.65 Type 2 with hyperglycemia  Contact provider if insulin prescribed is not the preferred insulin per insurance. (Patient taking differently: Inject 15 Units under the skin at bedtime. 11.65 Type 2 with hyperglycemia  Contact provider if insulin prescribed is not the preferred insulin per insurance.)     lidocaine (XYLOCAINE) 5 % ointment Apply topically 4 (four) times a day. Apply to left shoulder or around wound (not inside wound)     metoprolol tartrate (LOPRESSOR) 25 MG tablet Take 1 tablet (25 mg total) by mouth 2 (two) times a day.     miconazole (MICOTIN) 2 % powder Apply topically 2 (two) times a day. Apply to b/l groin/underneath breasts/underneath pannus     NOVOLOG U-100 INSULIN ASPART 100 unit/mL injection Check blood sugar four (4) times daily.  11.65 Type 2 with hyperglycemia  OneTouch Verio Flex  Meter  OneTouch Verio strips 2 boxes of 50  Delica Lancets box of 100  BD Ultra-fine Therese Pen Needles - NDC [address]-00 - dispense 1 case, refill PRN for 1 year (Patient taking differently: Inject 7 Units under the skin 3 (three) times a day before meals. )     polyethylene glycol (MIRALAX) 17 gram packet Take 1 packet (17 g total) by mouth daily as needed.     Saccharomyces boulardii (FLORASTOR) 250 mg capsule Take 250 mg by  "mouth daily.     senna (SENOKOT) 8.6 mg tablet Take 1 tablet by mouth 2 (two) times a day. (Patient taking differently: Take 1 tablet by mouth 2 (two) times a day as needed. )     sodium bicarbonate 650 MG tablet Take 1 tablet (650 mg total) by mouth 2 (two) times a day. OTC product     warfarin sodium (WARFARIN ORAL) Take by mouth. 11/5/20 INR 2.2 Cont 3mg W & Sat, 2.5mg AOD. Next INR 11/19.  10/29/20 INR 2.2 Cont 3mg W & Sat, 2.5mg AOD. Next INR 11/19  10/15/20 INR 2.3 3mg W, Sat and 2.5mg AOD. Next INR 10/29  10/6/20 INR 2.3  Take 3mg Wed and Sat and 2.5mg AOD.  Next INR 10/15/20.    9/29/20 INR 3.1 Take 2.5mg daily. Next INR 10/6.  9/22/20 INR 2.1 Take 3mg daily Next INR 9/29  (LD Antib 7/20)  9/18/20 INR 1.6 5mg tonight, then 3mg daily. Next INR 9/22.  9/14/20 INR 1.7 Cont 2.5,g daily. Next INR 9/17 9/11/20 INR 1.9 Take 2.5mg daily Next INR 9/14 9/8/20 INR 1.6  Take 2.5mg daily.  Next INR 9/11/20.         REVIEW OF SYSTEMS:    Currently, no fever, chills, or rigors. Does not have any visual or hearing problems. Denies any chest pain, headaches, palpitations, lightheadedness, dizziness, shortness of breath, or cough. Appetite is good. Denies any GERD symptoms. Denies any difficulty with swallowing, nausea, or vomiting.  Denies any abdominal pain, diarrhea or constipation. Denies any urinary symptoms, yeager in place. No insomnia. No active bleeding. No rash.       PHYSICAL EXAMINATION:  Vitals:    11/03/20 1454   BP: 134/77   Pulse: 75   Resp: 18   Temp: 97.1  F (36.2  C)   SpO2: 96%   Weight: (!) 227 lb 9.6 oz (103.2 kg)   Height: 5' 1\" (1.549 m)         GENERAL: Awake, Alert, oriented x3, not in any form of acute distress, answers questions appropriately, follows simple commands, conversant with flat affect  HEENT: Head is normocephalic with normal hair distribution. No evidence of trauma. Ears: No acute purulent discharge. Eyes: Sclera anicteric.  CHEST: No tenderness or deformity, no crepitus  LUNG: Clear " to auscultation with good chest expansion. There DM BS  BACK: ROM normal, no CVA tenderness, no spinal tenderness   CVS: There is good S1  S2,  rhythm is regular.  ABDOMEN: Globular and ROTUND.  Chronic abdominal wall wounds bilaterally, the left side has more drainage today, the edges are more macerated than last week.  I did not see the wound bases it was covered with calcium alginate by wound team earlier this morning.  The wound edges look good without any signs or symptoms of cellulitis.  EXTREMITIES: UE Full ROM. Right BKA, wound with moderate sanguinous drainage on bandage.  SKIN: Warm and dry, no erythema noted, open area on abdomen viewed. Good granulation tissue and reduced circumference and depth. Scant drainage.    NEUROLOGICAL: The patient is oriented to person, place and time. Strength and sensation are grossly intact. Face is symmetric.    LABS:    Lab Results   Component Value Date    WBC 7.9 08/21/2020    HGB 9.2 (L) 08/21/2020    HCT 28.6 (L) 08/21/2020    MCV 89 08/21/2020     08/21/2020       Results for orders placed or performed during the hospital encounter of 08/15/20   Basic Metabolic Panel   Result Value Ref Range    Sodium 137 136 - 145 mmol/L    Potassium 4.1 3.5 - 5.0 mmol/L    Chloride 103 98 - 107 mmol/L    CO2 23 22 - 31 mmol/L    Anion Gap, Calculation 11 5 - 18 mmol/L    Glucose 178 (H) 70 - 125 mg/dL    Calcium 11.0 (H) 8.5 - 10.5 mg/dL    BUN 37 (H) 8 - 22 mg/dL    Creatinine 2.05 (H) 0.60 - 1.10 mg/dL    GFR MDRD Af Amer 29 (L) >60 mL/min/1.73m2    GFR MDRD Non Af Amer 24 (L) >60 mL/min/1.73m2           Lab Results   Component Value Date    HGBA1C 10.3 (H) 06/17/2020     Vitamin D, Total (25-Hydroxy)   Date Value Ref Range Status   04/28/2016 29.8 (L) 30.0 - 80.0 ng/mL Final     Lab Results   Component Value Date    YDHZRJOG74 285 01/07/2011       ASSESSMENT/PLAN:  1. Visit for wound check    2. Paroxysmal atrial fibrillation (H)    3. Class 3 severe obesity due to excess  calories with serious comorbidity and body mass index (BMI) of 50.0 to 59.9 in adult (H)    4. Chronic wound infection of abdomen, subsequent encounter      Right BKA: Hx of phantom limb pain. Improved. Unable to assess stump, in  and brace.   -Continues on Dilaudid.  -Continue PT and OT as directed by them.    Unintended weight loss: Patient has had greater than 20 pound weight loss since July.  She says the food is just bad here and it is always cold.  Monitoring.  We will put her on a protein supplement for wound healing.   Weight today 226. Stable now x 3 weeks. Reports decent po intake.     Acute on chronic blood loss anemia-likely from anemia chronic disease, iron deficiency, chronic kidney disease, surgery. On oral iron .     Left abdominal wall cellulitis and right side: This was her main focus today at the request of nursing.  Wound increased moisture, bleeding.  It was dressed today by wound team decided not completely undressed that but enough to see the edges which are slightly macerated and open, no redness or swelling i indicative of cellulitis at this time.  There is moderate purulent drainage.  -Check INR, CBC with differential, BMP.  -Continue orders per wound care team and change dressing as needed for increased drainage.    Also reviewed right knee AKA wound: This 1 also is having increased drainage and bleeding.  Her dressing was changed this morning by wound team and is already a moderate amount of sanguinous drainage on the dressing.  Wound bed is bright red purulence.     Insulin-dependent diabetes: A1c 10.3.  Now on glargine 15 units nightly and sliding scale NovoLog 3 times daily with meals.  Blood sugars average 130-200. A few outliers to 280.      Essential hypertension: Satisfactory in TCU thus far, amlodipine d.cd.   -metoprolol    CKD 4: Followed by Dr. Iqbal. He ordered repeat BMP in a month.  Is not concerned about calcium that has stayed stable at 11.3.  Of note she did have  elevated uric acid level at 11. Will not treat unless symptomatic.   -Sodium bicarb.     Coronary artery disease:  -also needs outpt CRISTINA eval   -Continue metoprolol and aspirin.     Urinary retention: recent UTI, treated.       A. Fib on Coumadin: INR therapeutic.  Was not due for recheck until November 19 however will recheck this week due to increased bleeding.    TCU/PT report: Using the easy stand for transfer at this time due to left leg weakness.    Communicated concerns with patient, nursing.    Electronically signed by:  Zoey Leung CNP  This progress note was completed using Dragon software and there may be grammatical errors.

## 2021-06-12 NOTE — PROGRESS NOTES
Assessment: Jazmin is here today for a follow up and review regarding her diabetes management. She arrived to today's appointment unaccompanied and had both her log book and meter with her. She has been taking her medications as prescribed each day and has been checking her blood sugars typically once daily ( am fasting) sometimes twice. Jazmin states she has been eating three meals each day and continues to work on monitoring her carbohydrates - indicated she is not counting specifically counting carbohydrates, but has increased awareness of the food choices she makes.     BG Summary: 8/30 - 8/17  Review of her log book shows fasting blood sugars ranging from 127 - 203 mg/dL   Ave = 161 mg/dL  Pre lunch readings were 175 and 164 and one pre dinner of 156 mg/dL. I then showed Jazmin the data from her previous visits regarding her blood sugars so she could see the significant progress she has made. Requested moving forward she get some post meal BG data informing her that this would help determine efficacy of glipizide- asked if she would be willing to do this and she stated she would. No incidences of hypoglycemia.    Activity:Mobility is somewhat limited, Jazmin is using 2 canes for assistance but she states she does walk daily - Encouraged her to continue to be as physically active as she is able , reminding her that every little bit counts      Plan: Overall she is doing well and steadily improving her diabetes health. Jazmin stated she has appointment with PCP later this week - recommendations; Continue 10 mg Glipizide BID and may need to increase Lantus to achieve tighter glycemic control - 2 unit increments. Jazmin was instructed to call with any questions/ concerns.    Subjective and Objective:      Jazmin Bauman is referred by Dr Flores for Diabetes Education.     Lab Results   Component Value Date    HGBA1C 10.6 (H) 08/11/2017    Her progress in the reduction of her A1c was discussed today and although she  is not currently at goal I informed Jazmin she should be very proud of the progress she has made in the last couple of months and this reduction is a direct reflection of her efforts      Wt (!) 280 lb 6.4 oz (127.2 kg)  BMI 52.55 kg/m2      Current diabetes medications:  Glipizide 10 mg BID, Lantus currently at 41 units subq QD    Goals       Medication            1. Take diabetes medications as prescribed  Lantus - beginning 35 units subq QD  Glipizide 10 mg PO BID  7/12/2017   Lantus diose is now increased to 41 units   MET 8/30/2017            Monitor            1. Check blood sugars 3-4  times each day  remember to bring meter and log book to all appointments  7/12/2017  SOMETIMES MET  8/30/2017          Nutrition            1. Eat 3 balanced meals each day - Monitor carb intake and limit to 3-4 choices (45-60 grams) per meal    Do not wait longer than 4-5 hours to eat something  7/12/2017  SOMETIMES MET  8/30/2017                Follow up:   Primary care visit  CDE (certified diabetic educator) will continue to follow - next appointment scheduled for October      Education:     Monitoring   Meter (per above goals): Assessed and Discussed  Monitoring: Assessed and Discussed  BG goals: Assessed, Discussed and Needs instruction/review at follow-up    Nutrition Management  Nutrition Management: Assessed and Discussed  Weight: Assessed and Discussed  Portions/Balance: Assessed and Discussed  Carb ID/Count: Assessed and Discussed  Label Reading: Discussed  Heart Healthy Fats: Discussed  Menu Planning: Assessed and Discussed  Dining Out: Discussed  Physical Activity: Assessed and Discussed  Medications: Assessed and Discussed  Orals: Assessed and Discussed  Injected Medications: Assessed and Discussed   Storage/Exp:Discussed   Site Rotation: Discussed   Sites Assessed: no    Diabetes Disease Process: Assessed and Discussed    Acute Complications: Prevent, Detect, Treat:  Hypoglycemia: Assessed and  Discussed  Hyperglycemia: Assessed, Discussed and Needs instruction/review at follow-up  Sick Days: Discussed  Driving: Not addressed    Chronic Complications  Foot Care:Discussed  Skin Care: Discussed  Eye: Not addressed  ABC: Discussed  Teeth:Not addressed  Goal Setting and Problem Solving: Assessed and Discussed  Barriers: Assessed and Discussed  Psychosocial Adjustments: Assessed and Discussed      Time spent with the patient: 60 minutes for diabetes education and counseling.   Previous Education: yes  Visit Type:DSMT        Marilu Yoder RN CDE  Diabetes education  8/30/2017

## 2021-06-12 NOTE — PROGRESS NOTES
Office Visit - Physical    Jazmin Bauman   62 y.o. female    Date of Visit: 8/11/2017    Chief Complaint   Patient presents with     Annual Exam     Physical Exam   fasting       Subjective: Physical exam.    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  I did recommend colonoscopy last done on March 27, 2012 there is a family history of colon cancer father dying of same the patient herself had 2 polyps one tubulous adenoma second was hyperplastic.  Last mammogram all clear March 16, 2017.  Pap pelvic and breast and rectal exams to be done by Dr. Aaron Murry consulting gynecologist.    Previously seen by Dr. mendez for diabetes mellitus type 2.    May require insulin therapy.    Podiatry consult recommended previously foot examination done today all clear.    Prior history of hysteroscopy for abnormal uterine bleeding postmenopausal seen then by Dr. Grant Murry whom she follows up with as well.    Allergies latex and Naprosyn.    ROS: A comprehensive review of systems was performed and was otherwise negative    Medications:   Prior to Admission medications    Medication Sig Start Date End Date Taking? Authorizing Provider   acetaminophen (TYLENOL) 325 MG tablet Take 325-650 mg by mouth every 8 (eight) hours as needed.    Yes PROVIDER, HISTORICAL   allopurinol (ZYLOPRIM) 100 MG tablet Take 1 tablet (100 mg total) by mouth daily. 6/29/16  Yes Lester Flores MD   amLODIPine (NORVASC) 10 MG tablet Take 10 mg by mouth daily.   Yes PROVIDER, HISTORICAL   aspirin 81 MG EC tablet Take 81 mg by mouth daily.   Yes PROVIDER, HISTORICAL   blood sugar diagnostic (ACCU-CHEK SAM PLUS TEST STRP) Strp TEST 6 TIMES A DAY 9/28/15  Yes Lester Flores MD   cholecalciferol, vitamin D3, (VITAMIN D3) 1,000 unit capsule Take 1,000 Units by mouth daily.   Yes PROVIDER, HISTORICAL   docusate sodium (COLACE) 50 MG capsule Take by mouth once daily.   Yes PROVIDER, HISTORICAL   ferrous sulfate 65 mg  "elemental iron (IRON) Take 1 tablet by mouth 2 (two) times a day.   Yes PROVIDER, HISTORICAL   glipiZIDE (GLUCOTROL) 10 MG tablet Take 10 mg by mouth 2 (two) times a day.  1/31/16  Yes PROVIDER, HISTORICAL   insulin glargine (LANTUS SOLOSTAR) 100 unit/mL (3 mL) pen Inject 35 Units under the skin every morning. Do not mix Lantus with any other insulin 7/12/17  Yes Lester Flores MD   lancets (ACCU-CHEK MULTICLIX LANCET) Misc TEST 6 TIMES A DAY 9/28/15  Yes Lester Flores MD   magnesium 250 mg Tab Take 500 mg by mouth 2 (two) times a day.    Yes PROVIDER, HISTORICAL   pen needle, diabetic (NOVOFINE 32) 32 gauge x 1/4\" Ndle Use 1 Device As Directed daily. 7/12/17  Yes Sydni Garland NP   colchicine (MITIGARE) 0.6 mg cap Take 0.6 mg by mouth 2 (two) times a day. 12/21/16   Lester Flores MD   sodium polystyrene sulfonate (KAYEXALATE) 15 gram/60 mL Susp suspension Take 30 g by mouth every other day.     PROVIDER, HISTORICAL       Allergies:  Allergies   Allergen Reactions     Hydralazine      Latex Itching     Skin Burns     Naprosyn [Naproxen] Swelling     throat     Nitrofurantoin Hives     Sulfa (Sulfonamide Antibiotics) Rash     Vicks Vaporub [Camphor-Eucalyptus Oil-Menthol] Rash       Immunizations:   Immunization History   Administered Date(s) Administered     Influenza [id number], 12/18/2009     Influenza, inj, historic 12/18/2009, 10/14/2011     Influenza, seasonal,quad inj 36+ mos 09/28/2015     Influenza, seasonal,quad inj 6-35 mos 10/30/2012, 09/30/2013, 11/21/2014     Influenza,seasonal quad, PF, 36+MOS 09/29/2016     Td, historic 03/02/2006     Tdap 05/15/2015       Health Maintenance: Immunizations reviewed and up-to-date.  Colonoscopy and mammograms as alluded to before up-to-date along with Pap smear done by Dr. Aaron Beal of [facility] here in our building 6 floor.    Past Medical History: Diabetes mellitus type 2.    Followed by Dr. mendez and diabetic educator.    Hypertension and " pernicious anemia but no history of hyperlipidemia.    Renal mass previously followed by Dr. DEX Sims [facility] lately the mass has been shrinking.    History of pulmonary nodules also previously noted by Dr. Sims and he is following them.    Past Surgical History: Bilateral carpal tunnel release.    Cholecystectomy.    For abnormal uterine bleeding hysteroscopy has been done by Dr. Aaron Beal who continues to follow the patient for same no malignancy found in the uterus.    Family History: Mother living in her early 90s.    Father  in his 80s of colon cancer patient is single never  no children.    Social History: Retired from the state of [place] has super morbid obesity with BMI 52.  Limits activity.    Exam Chest clear to auscultation and percussion.  Heart tones regular rhythm without murmur rub or gallop.  Abdomen soft nontender no organomegaly.  No peritoneal signs.  Extremities free of edema cyanosis or clubbing.  Neck veins nondistended no thyromegaly or scleral icterus noted, carotids full.  Skin warm and dry easily conversant good spirited.  Normal intelligence.  Neurologically intact no gross localizing findings.  Rest of examination showed skin dry lymph negative neuro negative psych normal uses a cane for ambulation super morbid obese examination was limited because of morbid obesity.  Good pulses noted in all 4 extremities no carotid bruits abdomen obese limited exam chest and heart negative HEENT otherwise negative no carotid bruits.  No thyromegaly    Assessment and Plan  General medical examination at healthcare facility with history of diabetes mellitus type 2 family history of colon cancer and history of hypertension and pernicious anemia with multiple drug allergies including latex and Naprosyn.    The following high BMI interventions were performed this visit: encouragement to exercise today linked to the general medical examination and diabetes mellitus type 2 check A1c  plus lipid panel plus hemogram comprehensive metabolic profile urinalysis TSH and A1c.  Urine for microalbumin also to be done RTC 2-4 weeks.    Lester Flores MD    Patient Active Problem List   Diagnosis     Thrombocytopenia     Fibromyalgia     Carpal Tunnel Syndrome     Urinary Tract Infection     Endometrial Hyperplasia     Cholelithiasis     Leukocytosis     Urine Tests Nonspecific Abnormal Findings     Type 2 Diabetes Mellitus     Obesity     Essential Hypertension     Pernicious Anemia     Immunology Studies Raised Immunoglobulin Level     Vaginal bleeding

## 2021-06-12 NOTE — TELEPHONE ENCOUNTER
[facility] Nurse Triage Anticoagulation Note      Provider: BARRY Smith  Facility: [facility]    Facility Type: TCU    Caller: Reina  Call Back Number:  [phone]    Reason for call: INR    10/6/20 INR: 2.3  Previous INR: 9/30 3.1(2.5mg daily), 9/22 2.1(3mg daily), 9/18 1.6(5mg x 1, then 3mg daily), 9/14 1.7(2.5mg daily).      Diagnosis/Goal: AFIB  Heparin/Lovenox: No   Currently on ABX: No  Other interacting Medications: Other: EC ASA 81mg daily  Missed or refused doses: No    Verbal Order/Direction given by Provider: Warfarin 3mg Wed and Sat and 2.5mg all other days.  Next INR 10/15/20.      Provider giving order: BARRY Smith    Verbal order given to: Reina Jacobsen RN

## 2021-06-12 NOTE — TELEPHONE ENCOUNTER
[facility] Nurse Triage Anticoagulation Note      Provider: BARRY Smith  Facility: [facility]    Facility Type: TCU    Caller: Reina  Call Back Number:  [phone]    Reason for call: INR    Today s INR: 2.2  Previous INR: 10/15/20 INR 2.3 3mg W & Sat, 2.5mg AOD.    Diagnosis/Goal: AFIB  Heparin/Lovenox: No   Currently on ABX: No  Other interacting Medications: None  Missed or refused doses: No    Verbal Order/Direction given by Provider: Cont 3mg W & Sat, 2.5mg AOD> Next INR 11/19    Provider giving order: BARRY Smith    Verbal order given to: Reina Shepard RN

## 2021-06-12 NOTE — PROGRESS NOTES
Office Visit - Follow up    Jazmin Bauman   62 y.o. female    Date of Visit: 7/28/2017    Chief Complaint   Patient presents with     Diabetes     Follow-up     ECHO, US carotids and MR cipriano       Subjective: Diabetes mellitus type 2.  Previously seen by diabetic educator now on insulin.  Morning insulin dose 35 units.  On fasting days she wants to decrease insulin because of fear of hypoglycemia.  Permissible to decrease to 20 from 35.    Echocardiogram of the heart showed no significant abnormalities the ejection fraction was 60% MRI scan of the head showed no significant abnormalities and ultrasound the carotids showed minimal athero-sclerotic plaque in carotid arteries but no significant stenoses.    The workup was done by neurology as well as this examiner.  CT scan of the head showed no acute abnormalities.  At the time of presentation for neurologic evaluation she had left leg numbness and had a neuro consultation.  Neurologic testing was done.    No blood in stool or urine no chest pain shortness of breath.  She is retired now from the [employer].  Medication list is reviewed and well-tolerated normal effects.    Mammogram allCLEAR March 16, 2017 colonoscopy done 3/27/2012 showed one tubular adenomatous polyp and one hyperplastic polyp    ROS: A comprehensive review of systems was performed and was otherwise negative    Medications:  Prior to Admission medications    Medication Sig Start Date End Date Taking? Authorizing Provider   acetaminophen (TYLENOL) 325 MG tablet Take 325-650 mg by mouth every 8 (eight) hours as needed.    Yes PROVIDER, HISTORICAL   allopurinol (ZYLOPRIM) 100 MG tablet Take 1 tablet (100 mg total) by mouth daily. 6/29/16  Yes Lester Flores MD   amLODIPine (NORVASC) 10 MG tablet Take 10 mg by mouth daily.   Yes PROVIDER, HISTORICAL   aspirin 81 MG EC tablet Take 81 mg by mouth daily.   Yes PROVIDER, HISTORICAL   blood sugar diagnostic (ACCU-CHEK SAM PLUS TEST  "STRP) Strp TEST 6 TIMES A DAY 9/28/15  Yes Lester Flores MD   cholecalciferol, vitamin D3, (VITAMIN D3) 1,000 unit capsule Take 1,000 Units by mouth daily.   Yes PROVIDER, HISTORICAL   docusate sodium (COLACE) 50 MG capsule Take by mouth once daily.   Yes PROVIDER, HISTORICAL   ferrous sulfate 65 mg elemental iron (IRON) Take 1 tablet by mouth 2 (two) times a day.   Yes PROVIDER, HISTORICAL   glipiZIDE (GLUCOTROL) 10 MG tablet Take 10 mg by mouth 2 (two) times a day.  1/31/16  Yes PROVIDER, HISTORICAL   insulin glargine (LANTUS SOLOSTAR) 100 unit/mL (3 mL) pen Inject 35 Units under the skin every morning. Do not mix Lantus with any other insulin 7/12/17  Yes Lester Flores MD   lancets (ACCU-CHEK MULTICLIX LANCET) Misc TEST 6 TIMES A DAY 9/28/15  Yes Lester Flores MD   magnesium 250 mg Tab Take 500 mg by mouth 2 (two) times a day.    Yes PROVIDER, HISTORICAL   pen needle, diabetic (NOVOFINE 32) 32 gauge x 1/4\" Ndle Use 1 Device As Directed daily. 7/12/17  Yes Sydni Garland NP   colchicine (MITIGARE) 0.6 mg cap Take 0.6 mg by mouth 2 (two) times a day. 12/21/16   Lester Flores MD   sodium polystyrene sulfonate (KAYEXALATE) 15 gram/60 mL Susp suspension Take 30 g by mouth every other day.     PROVIDER, HISTORICAL       Allergies:   Allergies   Allergen Reactions     Hydralazine      Latex Itching     Skin Burns     Naprosyn [Naproxen] Swelling     throat     Nitrofurantoin Hives     Sulfa (Sulfonamide Antibiotics) Rash     Vicks Vaporub [Camphor-Eucalyptus Oil-Menthol] Rash       Immunizations:   Immunization History   Administered Date(s) Administered     Influenza L1x0-34, 12/18/2009     Influenza, inj, historic 12/18/2009, 10/14/2011     Influenza, seasonal,quad inj 36+ mos 09/28/2015     Influenza, seasonal,quad inj 6-35 mos 10/30/2012, 09/30/2013, 11/21/2014     Influenza,seasonal quad, PF, 36+MOS 09/29/2016     Td, historic 03/02/2006     Tdap 05/15/2015       Exam Chest clear to " auscultation and percussion.  Heart tones regular rhythm without murmur rub or gallop.  Abdomen soft nontender no organomegaly.  No peritoneal signs.  Extremities free of edema cyanosis or clubbing.  Neck veins nondistended no thyromegaly or scleral icterus noted, carotids full.  Skin warm and dry easily conversant good spirited.  Normal intelligence.  Neurologically intact no gross localizing findings.  Morbidly obese in fact super morbidly obese with BMI of 52.  Uses 2 canes.    Assessment and Plan  Diabetes mellitus type 2 with recent fall may have been neurologic event like transient ischemic attack.  Associated with left leg numbness and generalized fatigue weakness since has resolved.  Check diabetic labs to include blood sugar A1c lipid panel and urine for microalbumin today.    Multiple drug allergies including hydralazine latex Naprosyn nitrofurantoin sulfa and Vicks VapoRub.    Gout currently asymptomatic.    Hypertension controlled.    Super morbid obesity discussed see below.  RTC 1 month's time.    Time: total time spent with the patient was 40 minutes of which >50% was spent in counseling and coordination of care    The following high BMI interventions were performed this visit: encouragement to exercise    Lester Flores MD    Patient Active Problem List   Diagnosis     Thrombocytopenia     Fibromyalgia     Carpal Tunnel Syndrome     Urinary Tract Infection     Endometrial Hyperplasia     Cholelithiasis     Leukocytosis     Urine Tests Nonspecific Abnormal Findings     Type 2 Diabetes Mellitus     Obesity     Essential Hypertension     Pernicious Anemia     Immunology Studies Raised Immunoglobulin Level     Vaginal bleeding

## 2021-06-12 NOTE — PROGRESS NOTES
"  [facility]      NAME:  Jazmin Bauman             :  1955    MRN: [id number]    CODE STATUS:  FULL CODE    FACILITY: [facility] [[id number]]         CHIEF COMPLAIN/REASON FOR VISIT:  Chief Complaint   Patient presents with     Review Of Multiple Medical Conditions     f/u b12        HISTORY OF PRESENT ILLNESS: Jazmin Bauman is a 65 y.o. female. Pt was at [facility] from  to  and underwent a RBKA r/t ongoing DM ulcer with osteomyelitis. Per her EMR dc summary \" with HTN,morbid obesity, DM, A fib, CKD 4 presented for evaluation of R foot swelling, difficulty ambulation, pain found to have osteomyelitis now s/p amputation. She has now been to the OR twice this stay, last was on 20.   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   Patient had a chronic diabetic foot ulcer on her R heel, presented for increased swelling, difficulty ambulating, and pain. MRI showed extensive soft tissue necrosis and some osteomyelitis along with cellulitis  Met sepsis criteria on admission with leukocytosis and elevated CRP  Podiatry saw and the foot was not felt to be salvageable and BKA was recommended\"       August 15-: Hospitalized for sepsis with MRSA bacteremia, she had possible endocarditis and TTE revealing vegetation of the aortic valve. She had a CT of the abdomen and pelvis that showed left lower abdominal pannus ulceration but without abscess.  Her right BKA stump ulceration was negative for osteomyelitis or cellulitis.  She was treated with IV Vanco for 14 days which will be completed on .  Her left lower abdominal ulceration has never been biopsied.  It was not biopsied in the hospital and there was no surgical intervention.  Her right BKA stump was negative for osteomyelitis.  CKD stage IV with baseline creatinine 2.5-3.  She was at 1.82 at the end of hospitalization.  She is discharged on sodium bicarb " "twice daily.  Her insulin was adjusted and she is now on sliding scale insulin with 10 units of Lantus at bedtime.  Blood sugars have been less than 200 on average.    For her hypertension, Norvasc has been discontinued.  Blood pressures remain in the 130s over 70s on average.  She was incidentally found to have a lung nodule on CT scan, will need to follow-up with this.  Please see imaging report from August hospitalization.  Multiple nodules and enlarged lymph nodes.    Today: Seen today for follow-up to B12 levels and recent complaints of osteoarthritis pain.  B12 level was 1400 so no need for injections at this time.  She is also not complaining of any knee pain or postop stump pain and we have already discontinued her topical morphine.  She has steady weight loss at 226 pounds.  Which we discussed at length, she reports drinking and eating okay.  She recently saw her nephrologist who is not concerned with her mild hypercalcemia.  Did have an elevated uric acid but no gout symptoms at this time so we will not treat until or unless symptoms.  Reviewed the chart between 130 and 200 with a few a outliers to 280.      Allergies   Allergen Reactions     Hydralazine      Paralysis of legs     Latex Itching     Skin Burns     Naprosyn [Naproxen] Swelling     throat     Nitrofurantoin Hives     Sulfa (Sulfonamide Antibiotics) Rash     Vicks Vaporub [Camphor-Eucalyptus Oil-Menthol] Rash   :     Current Outpatient Medications   Medication Sig     acetaminophen (TYLENOL) 500 MG tablet Take 2 tablets (1,000 mg total) by mouth 3 (three) times a day.     aspirin 81 MG EC tablet Take 81 mg by mouth daily.     BD ULTRA-FINE MICRO PEN NEEDLE 32 gauge x 1/4\" Ndle USE AS DIRECTED 4 TIMES DAILY.     bisacodyL (DULCOLAX) 10 mg suppository Insert 10 mg into the rectum daily as needed. No stool greater than 72 hours     cholecalciferol, vitamin D3, (VITAMIN D3) 1,000 unit capsule Take 1,000 Units by mouth daily.     collagenase " ointment Apply daily per WOC     docusate sodium (COLACE) 100 MG capsule Take 100 mg by mouth daily as needed.      ferrous sulfate 325 (65 FE) MG tablet Take 1 tablet by mouth 2 (two) times a day with meals.     gabapentin (NEURONTIN) 100 MG capsule Take 200 mg by mouth 2 (two) times a day.     HYDROmorphone (DILAUDID) 2 MG tablet (Take 2mg daily and 2mg Q 3 hours PRN)     lancets (ACCU-CHEK MULTICLIX LANCET) Misc TEST 6 TIMES A DAY     LANTUS SOLOSTAR U-100 INSULIN 100 unit/mL (3 mL) pen Inject 10 Units under the skin at bedtime. 11.65 Type 2 with hyperglycemia  Contact provider if insulin prescribed is not the preferred insulin per insurance. (Patient taking differently: Inject 15 Units under the skin at bedtime. 11.65 Type 2 with hyperglycemia  Contact provider if insulin prescribed is not the preferred insulin per insurance.)     lidocaine (XYLOCAINE) 5 % ointment Apply topically 4 (four) times a day. Apply to left shoulder or around wound (not inside wound)     metoprolol tartrate (LOPRESSOR) 25 MG tablet Take 1 tablet (25 mg total) by mouth 2 (two) times a day.     miconazole (MICOTIN) 2 % powder Apply topically 2 (two) times a day. Apply to b/l groin/underneath breasts/underneath pannus     NOVOLOG U-100 INSULIN ASPART 100 unit/mL injection Check blood sugar four (4) times daily.  11.65 Type 2 with hyperglycemia  OneTouch Verio Flex  Meter  OneTouch Verio strips 2 boxes of 50  Delica Lancets box of 100  BD Ultra-fine Therese Pen Needles - NDC [address]-51 - dispense 1 case, refill PRN for 1 year (Patient taking differently: Inject 7 Units under the skin 3 (three) times a day before meals. )     polyethylene glycol (MIRALAX) 17 gram packet Take 1 packet (17 g total) by mouth daily as needed.     senna (SENOKOT) 8.6 mg tablet Take 1 tablet by mouth 2 (two) times a day. (Patient taking differently: Take 1 tablet by mouth 2 (two) times a day as needed. )     sodium bicarbonate 650 MG tablet Take 1 tablet (650 mg  total) by mouth 2 (two) times a day. OTC product     warfarin sodium (WARFARIN ORAL) Take by mouth. 10/6/20 INR 2.3  Take 3mg Wed and Sat and 2.5mg AOD.  Next INR 10/15/20.    9/29/20 INR 3.1 Take 2.5mg daily. Next INR 10/6.  9/22/20 INR 2.1 Take 3mg daily Next INR 9/29  (LD Antib 7/20)  9/18/20 INR 1.6 5mg tonight, then 3mg daily. Next INR 9/22.  9/14/20 INR 1.7 Cont 2.5,g daily. Next INR 9/17 9/11/20 INR 1.9 Take 2.5mg daily Next INR 9/14 9/8/20 INR 1.6  Take 2.5mg daily.  Next INR 9/11/20.    9/3/20 INR 3.4  Hold x 2 days, then take 2mg daily.  Next INR 9/8/20.  9/2/20 INR 3.4 Hold 9/2 recheck INR 9/3  8/28/20 INR 2.6  Cont 3mg daily.  Next INR 9/1/20.    8/25/20 INR 2.2 Cont 3mg daily. Next INR 8/28.  8/24/20 INR 2.3  Take 3mg daily.  Next INR 8/27/20.         REVIEW OF SYSTEMS:    Currently, no fever, chills, or rigors. Does not have any visual or hearing problems. Denies any chest pain, headaches, palpitations, lightheadedness, dizziness, shortness of breath, or cough. Appetite is good. Denies any GERD symptoms. Denies any difficulty with swallowing, nausea, or vomiting.  Denies any abdominal pain, diarrhea or constipation. Denies any urinary symptoms, yeager in place. No insomnia. No active bleeding. No rash.       PHYSICAL EXAMINATION:  Vitals:    10/12/20 0932   BP: 130/82   Pulse: 76   Temp: 97.4  F (36.3  C)   SpO2: 98%   Weight: (!) 226 lb (102.5 kg)         GENERAL: Awake, Alert, oriented x3, not in any form of acute distress, answers questions appropriately, follows simple commands, conversant with flat affect  HEENT: Head is normocephalic with normal hair distribution. No evidence of trauma. Ears: No acute purulent discharge. Eyes: Sclera anicteric.  CHEST: No tenderness or deformity, no crepitus  LUNG: Clear to auscultation with good chest expansion. There DM BS  BACK: No kyphosis of the thoracic spine. Symmetric, no curvature, ROM normal, no CVA tenderness, no spinal tenderness   CVS: There is good  S1  S2,  rhythm is regular.  ABDOMEN: Globular and ROTUND tender to palpation, non distended, no masses, no organomegaly, good bowel sounds, no rebound or guarding, no peritoneal signs. Wound to left side of abdomen, dressed today, no open area to left hip as well. Unable to visualize as they are dressed and she is fully clothed. Followed by wound   EXTREMITIES: UE Full ROM. Right BKA, IN a  with protected brace  SKIN: Warm and dry, no erythema noted, open area on left pannus with dressing on abdomen  NEUROLOGICAL: The patient is oriented to person, place and time. Strength and sensation are grossly intact. Face is symmetric.          LABS:    Lab Results   Component Value Date    WBC 7.9 08/21/2020    HGB 9.2 (L) 08/21/2020    HCT 28.6 (L) 08/21/2020    MCV 89 08/21/2020     08/21/2020       Results for orders placed or performed during the hospital encounter of 08/15/20   Basic Metabolic Panel   Result Value Ref Range    Sodium 137 136 - 145 mmol/L    Potassium 4.1 3.5 - 5.0 mmol/L    Chloride 103 98 - 107 mmol/L    CO2 23 22 - 31 mmol/L    Anion Gap, Calculation 11 5 - 18 mmol/L    Glucose 178 (H) 70 - 125 mg/dL    Calcium 11.0 (H) 8.5 - 10.5 mg/dL    BUN 37 (H) 8 - 22 mg/dL    Creatinine 2.05 (H) 0.60 - 1.10 mg/dL    GFR MDRD Af Amer 29 (L) >60 mL/min/1.73m2    GFR MDRD Non Af Amer 24 (L) >60 mL/min/1.73m2           Lab Results   Component Value Date    HGBA1C 10.3 (H) 06/17/2020     Vitamin D, Total (25-Hydroxy)   Date Value Ref Range Status   04/28/2016 29.8 (L) 30.0 - 80.0 ng/mL Final     Lab Results   Component Value Date    LTZBZSDX30 285 01/07/2011       ASSESSMENT/PLAN:  1. Unintended weight loss    2. Below-knee amputation (H)    3. Morbid obesity (H)    4. Chronic pain syndrome      Right BKA: Hx of phantom limb pain. Improved. Unable to assess stump, in  and brace.   -Continues on Dilaudid.  -Continue PT and OT as directed by them.    Unintended weight loss: Patient has had  greater than 20 pound weight loss since July.  She says the food is just bad here and it is always cold.  Monitoring.  We will put her on a protein supplement for wound healing.   Weight today 226.     Acute on chronic blood loss anemia-likely from anemia chronic disease, iron deficiency, chronic kidney disease, surgery. On oral iron .    History of B12 deficiency: Had inquired about having her B12 resumed, we obtained a level which was 1400 so no need for injections at this time.  She is okay with this.     Left abdominal wall cellulitis and right side: Not viewed today, however no complaints and she is followed by wound doctor at the facility.     Insulin-dependent diabetes: A1c 10.3.  Now on glargine 10 units nightly and sliding scale NovoLog 3 times daily with meals.  Blood sugars average 130-200. A few outliers to 280.      Essential hypertension: Satisfactory in TCU thus far, amlodipine d.cd.   -metoprolol    CKD 4: Followed by Dr. Iqbal whom she saw yesterday.  He ordered repeat BMP in a month.  Is not concerned about calcium that has stayed stable at 11.3.  Of note she did have elevated uric acid level at 11. Will not treat unless symptomatic.   -Sodium bicarb.     Coronary artery disease:  -also needs outpt CRISTINA eval  -Continue metoprolol and aspirin.     Urinary retention: recent UTI, treated.      A. Fib on Coumadin: INR therapeutic.     TCU/PT report: Using the easy stand for transfer at this time due to left leg weakness.    Communicated concerns with patient, nursing.    Electronically signed by:  Zoey Leung, CNP  This progress note was completed using Dragon software and there may be grammatical errors.

## 2021-06-13 NOTE — TELEPHONE ENCOUNTER
[facility] Nurse Triage Anticoagulation Note      Provider: BARRY Smith  Facility: [facility]    Facility Type: LTC    Caller: Narda  Call Back Number:  [phone]    Reason for call: INR    Today s INR:   Missed INR draw today as just moved back to this unit from COVID unit.  Previous INR: 11/19/20 INR 2.4 3mg W & Sat, 2.5mg AOD.    Diagnosis/Goal: AFIB  Heparin/Lovenox: No   Currently on ABX: No  Other interacting Medications: None  Missed or refused doses: No    Verbal Order/Direction given by Provider: Continue usual dose & check INR 12/14.    Provider giving order: BARRY Smith    Verbal order given to: Narda Shepard RN

## 2021-06-13 NOTE — PROGRESS NOTES
Good news below, I have not had a chance to report this to pt yet.     EXAM: CT CHEST WO CONTRAST  LOCATION: [facility]  DATE/TIME: 12/1/2020 1:46 PM     INDICATION: Solitary pulmonary nodule  COMPARISON: CT of the chest 09/06/2016, 08/15/2020  TECHNIQUE: CT chest without IV contrast. Multiplanar reformats were obtained. Dose reduction techniques were used.  CONTRAST: None.     FINDINGS:   LUNGS AND PLEURA: A poorly characterized nodular opacity in the lateral basal right lower lobe near the diaphragmatic pleura 08/15/2020 has resolved, consistent with focal inflammation. There are several peripheral/subpleural parenchymal bands in the low   lateral segment right middle lobe, anterior/lateral/posterior right lower lobe, and posterior left lower lobe superior segment consistent with focal scar. A 3-4 mm nodule in the anterolateral right upper lobe (series 4, image 74) and 2-3 mm nodule in   the left upper lobe (series 4, image 104) unchanged from 2016 consistent with a benign nodule.     Trachea and central airways are patent and normal caliber. There is no pleural fluid.     MEDIASTINUM: Cardiac chambers are normal in size. No pericardial effusion. No atheromatous coronary calcifications. Conventional arch anatomy. No thoracic aorta ectasia or aneurysm. Hilar and mediastinal lymph nodes are normal by size criteria. The   esophagus is decompressed.     UPPER ABDOMEN: Postcholecystectomy. A right adrenal nodule measures 14 x 23 mm (series 4, image 227) unchanged from 2016 when measured in a similar fashion. Benign cortical cyst arising from the superior left kidney does not require additional workup or   follow-up. Low-attenuation of the liver parenchyma suggests steatosis.     MUSCULOSKELETAL: Mild to moderate mid and lower thoracic spine flowing degenerative osteophytes. No focal vertebral compression deformities or aggressive/destructive bone lesions.     IMPRESSION:      1.  A poorly  characterized nodular opacity in the basal right lower lobe 08/15/2020 has resolved consistent with an inflammatory nodule. There are no lung nodules which require dedicated imaging follow-up.  2.  Hepatic steatosis.  3.  Stable 16 mm left thyroid nodule.     Harry Blackwood

## 2021-06-13 NOTE — TELEPHONE ENCOUNTER
[facility] Nurse Triage Anticoagulation Note      Provider: BARRY Smith  Facility: [facility]    Facility Type: LT    Caller: Carolyn  Call Back Number:  [phone]    Trace    Reason for call: INR    Today s INR: 2.5  Previous INR: 12/16/20 INR 3.3 Hold X 2, 12/14/20 INR 3.2 held X 1 then 2.5mg x 1    Diagnosis/Goal: AFIB  Heparin/Lovenox: No   Currently on ABX: No  Other interacting Medications: None  Missed or refused doses: No    Verbal Order/Direction given by Provider: Coumadin 2.5mg daily. Next INR & Hgb 12/22. Nursing to observe stools, if loose stools=send specimen for c.diff.    Provider giving order: BARRY Smith    Verbal order given to: Carolyn Shepard RN

## 2021-06-13 NOTE — PROGRESS NOTES
Office Visit - Follow up    Jazmin Bauman   62 y.o. female    Date of Visit: 10/3/2017    Chief Complaint   Patient presents with     Hypertension     Diabetes     fasting       Subjective: Diabetes mellitus type 2 with hypertension and pernicious anemia.    Blood sugars higher.  Weight up 4 pounds.  Some dietary indiscretion patient admitted admits to.  Patient also has been seen by endocrinologist Dr. ADAM on September 19, 2017.  More medications have been prescribed the patient is not interested in using them.  The patient feels more hungry of late.    Mammogram allCLEAR March 16, 2017 last colonoscopy normal per patient's report of low reports not seen in chart the colonoscopy was performed on September 11, 2017 by Dr. Tyler Alvarez.  There is a family history of colon cancer 1.  Dying of same.    Multiple drug allergies noted including hydralazine latex Naprosyn nitrofurantoin causing hives plus sulfa and Vicks.    No blood in stool or urine no chest pain shortness of breath.  Medication list reviewed well-tolerated normal effects.    ROS: A comprehensive review of systems was performed and was otherwise negative    Medications:  Prior to Admission medications    Medication Sig Start Date End Date Taking? Authorizing Provider   ACCU-CHEK SAM PLUS TEST STRP strips TEST 6 TIMES A DAY 9/18/17  Yes Lester Flores MD   acetaminophen (TYLENOL) 325 MG tablet Take 325-650 mg by mouth every 8 (eight) hours as needed.    Yes PROVIDER, HISTORICAL   allopurinol (ZYLOPRIM) 100 MG tablet Take 1 tablet (100 mg total) by mouth daily. 6/29/16  Yes Lester Flores MD   amLODIPine (NORVASC) 10 MG tablet Take 10 mg by mouth daily.   Yes PROVIDER, HISTORICAL   aspirin 81 MG EC tablet Take 81 mg by mouth daily.   Yes PROVIDER, HISTORICAL   cholecalciferol, vitamin D3, (VITAMIN D3) 1,000 unit capsule Take 1,000 Units by mouth daily.   Yes PROVIDER, HISTORICAL   docusate sodium (COLACE) 50 MG capsule Take by mouth once  "daily.   Yes PROVIDER, HISTORICAL   ferrous sulfate 65 mg elemental iron (IRON) Take 1 tablet by mouth 2 (two) times a day.   Yes PROVIDER, HISTORICAL   glipiZIDE (GLUCOTROL) 10 MG tablet Take 10 mg by mouth 2 (two) times a day.  1/31/16  Yes PROVIDER, HISTORICAL   insulin glargine (LANTUS SOLOSTAR) 100 unit/mL (3 mL) pen Inject 35 Units under the skin every morning. Do not mix Lantus with any other insulin  Patient taking differently: Inject 45 Units under the skin every morning. Do not mix Lantus with any other insulin 7/12/17  Yes Lester Flores MD   lancets (ACCU-CHEK MULTICLIX LANCET) Misc TEST 6 TIMES A DAY 9/28/15  Yes Lester Flores MD   magnesium 250 mg Tab Take 500 mg by mouth 2 (two) times a day.    Yes PROVIDER, HISTORICAL   pen needle, diabetic (NOVOFINE 32) 32 gauge x 1/4\" Ndle Use 1 Device As Directed daily. 7/12/17  Yes Sydni Garland NP   colchicine (MITIGARE) 0.6 mg cap Take 0.6 mg by mouth 2 (two) times a day. 12/21/16   Lester Flores MD       Allergies:   Allergies   Allergen Reactions     Hydralazine      Latex Itching     Skin Burns     Naprosyn [Naproxen] Swelling     throat     Nitrofurantoin Hives     Sulfa (Sulfonamide Antibiotics) Rash     Vicks Vaporub [Camphor-Eucalyptus Oil-Menthol] Rash       Immunizations:   Immunization History   Administered Date(s) Administered     Influenza [id number], 12/18/2009     Influenza, inj, historic 12/18/2009, 10/14/2011     Influenza, seasonal,quad inj 36+ mos 09/28/2015     Influenza, seasonal,quad inj 6-35 mos 10/30/2012, 09/30/2013, 11/21/2014     Influenza,seasonal quad, PF, 36+MOS 09/29/2016, 10/03/2017     Td, historic 03/02/2006     Tdap 05/15/2015       Exam Chest clear to auscultation and percussion.  Heart tones regular rhythm without murmur rub or gallop.  Abdomen soft nontender no organomegaly.  No peritoneal signs.  Extremities free of edema cyanosis or clubbing.  Neck veins nondistended no thyromegaly or scleral icterus " noted, carotids full.  Skin warm and dry easily conversant good spirited.  Normal intelligence.  Neurologically intact no gross localizing findings.  Super morbid obesity with BMI of 53+.  Patient uses 2 canes for ambulation.    Assessment and Plan  Diabetes mellitus type 2 check A1c plus blood sugar urine for microalbumin and lipid panel today.  Appreciate consultative efforts from Dr. Perez of endocrine.    Vitamin B12 given today 1000 mcg IM for pernicious anemia plus flu shot left deltoid.    Hypertension control.    Family history of colon cancer with one parent dying of same last colonoscopy allCLEAR per patient's report September 11, 2017 with Dr. Tyler Alvarez at [facility] but no results are found in epic.    Time: total time spent with the patient was 25 minutes of which >50% was spent in counseling and coordination of care    The following high BMI interventions were performed this visit: encouragement to exercise return to clinic 2 months time.    Lester Flores MD    Patient Active Problem List   Diagnosis     Thrombocytopenia     Fibromyalgia     Carpal Tunnel Syndrome     Urinary Tract Infection     Endometrial Hyperplasia     Cholelithiasis     Leukocytosis     Urine Tests Nonspecific Abnormal Findings     Obesity     Essential Hypertension     Pernicious Anemia     Immunology Studies Raised Immunoglobulin Level     Vaginal bleeding     Type 2 diabetes mellitus

## 2021-06-13 NOTE — PROGRESS NOTES
"  [facility]      NAME:  Jazmin Bauman             :  1955    MRN: [id number]    CODE STATUS:  FULL CODE    FACILITY: [facility] [[id number]]         CHIEF COMPLAIN/REASON FOR VISIT:  Chief Complaint   Patient presents with     Follow-up     Problem Visit       HISTORY OF PRESENT ILLNESS: Jazmin Bauman is a 65 y.o. female. Pt was at [facility] from  to  and underwent a RBKA r/t ongoing DM ulcer with osteomyelitis. Per her EMR dc summary \" with HTN,morbid obesity, DM, A fib, CKD 4 presented for evaluation of R foot swelling, difficulty ambulation, pain found to have osteomyelitis now s/p amputation. She has now been to the OR twice this stay, last was on 20.   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   Patient had a chronic diabetic foot ulcer on her R heel, presented for increased swelling, difficulty ambulating, and pain. MRI showed extensive soft tissue necrosis and some osteomyelitis along with cellulitis  Met sepsis criteria on admission with leukocytosis and elevated CRP  Podiatry saw and the foot was not felt to be salvageable and BKA was recommended\"       August 15-: Hospitalized for sepsis with MRSA bacteremia, she had possible endocarditis and TTE revealing vegetation of the aortic valve. She had a CT of the abdomen and pelvis that showed left lower abdominal pannus ulceration but without abscess.  Her right BKA stump ulceration was negative for osteomyelitis or cellulitis.  She was treated with IV Vanco for 14 days which will be completed on .  Her left lower abdominal ulceration has never been biopsied.  It was not biopsied in the hospital and there was no surgical intervention.  Her right BKA stump was negative for osteomyelitis.  CKD stage IV with baseline creatinine 2.5-3.  She was at 1.82 at the end of hospitalization.  She is discharged on sodium bicarb twice daily.  Her " insulin was adjusted and she is now on sliding scale insulin with 10 units of Lantus at bedtime.  Blood sugars have been less than 200 on average.    For her hypertension, Norvasc has been discontinued.  Blood pressures remain in the 130s over 70s on average.  She was incidentally found to have a lung nodule on CT scan; follow up CT was on December 1 and revealed resolution of the nodule.    Today: Seen today in her room for complaints of frequent loose stools.  They developed about 48 hours ago and she reports they start out hard and then become soft and loose and watery.  She is denying any nausea or appetite changes but typically has low appetite at baseline.  Her weights have improved however.  She was recently on  Covid unit and had a mild increase CRP, fatigue but no other overt symptoms.  Unknown if this is a late symptom of Covid.  Will check a C. difficile PCR as well.  She is more dismissive today than her usual.  She typically will talk with me and open up to me about things going on but today she appears fatigued and that she just wants me to leave her room.  She is alert and oriented able to answer my questions.  She denies any abdominal tenderness on exam.  Bowel sounds hypoactive.  Denies any GERD symptoms.  Has had these loose stools in the past.  On Florastor for this.  She is also on aspirin and Coumadin with no GI protection so we will start omeprazole twice daily and then once daily after 30 days.  Consider reevaluating aspirin and Coumadin use with cardiology.  Vital signs are stable.  No hypotension or tachycardia.      Allergies   Allergen Reactions     Hydralazine      Paralysis of legs     Latex Itching     Skin Burns     Naprosyn [Naproxen] Swelling     throat     Nitrofurantoin Hives     Sulfa (Sulfonamide Antibiotics) Rash     Vicks Vaporub [Camphor-Eucalyptus Oil-Menthol] Rash   :     Current Outpatient Medications   Medication Sig     acetaminophen (TYLENOL) 500 MG tablet Take 2 tablets  "(1,000 mg total) by mouth 3 (three) times a day.     aspirin 81 MG EC tablet Take 81 mg by mouth daily.     BD ULTRA-FINE MICRO PEN NEEDLE 32 gauge x 1/4\" Ndle USE AS DIRECTED 4 TIMES DAILY.     bisacodyL (DULCOLAX) 10 mg suppository Insert 10 mg into the rectum daily as needed. No stool greater than 72 hours     cholecalciferol, vitamin D3, (VITAMIN D3) 1,000 unit capsule Take 1,000 Units by mouth daily.     collagenase ointment Apply daily per WOC     docusate sodium (COLACE) 100 MG capsule Take 100 mg by mouth daily as needed.      ferrous sulfate 325 (65 FE) MG tablet Take 1 tablet by mouth 2 (two) times a day with meals.     gabapentin (NEURONTIN) 100 MG capsule Take 200 mg by mouth 2 (two) times a day.     HYDROmorphone (DILAUDID) 2 MG tablet (Take 2mg daily and 2mg Q 3 hours PRN)     lancets (ACCU-CHEK MULTICLIX LANCET) Misc TEST 6 TIMES A DAY     LANTUS SOLOSTAR U-100 INSULIN 100 unit/mL (3 mL) pen Inject 10 Units under the skin at bedtime. 11.65 Type 2 with hyperglycemia  Contact provider if insulin prescribed is not the preferred insulin per insurance. (Patient taking differently: Inject 15 Units under the skin at bedtime. 11.65 Type 2 with hyperglycemia  Contact provider if insulin prescribed is not the preferred insulin per insurance.)     lidocaine (XYLOCAINE) 5 % ointment Apply topically 4 (four) times a day. Apply to left shoulder or around wound (not inside wound)     metoprolol tartrate (LOPRESSOR) 25 MG tablet Take 1 tablet (25 mg total) by mouth 2 (two) times a day.     miconazole (MICOTIN) 2 % powder Apply topically 2 (two) times a day. Apply to b/l groin/underneath breasts/underneath pannus     NOVOLOG U-100 INSULIN ASPART 100 unit/mL injection Check blood sugar four (4) times daily.  11.65 Type 2 with hyperglycemia  OneTouch Verio Flex  Meter  OneTouch Verio strips 2 boxes of 50  Delica Lancets box of 100  BD Ultra-fine Therese Pen Needles - NDC [address]-27 - dispense 1 case, refill PRN for 1 " "year (Patient taking differently: Inject 7 Units under the skin 3 (three) times a day before meals. )     polyethylene glycol (MIRALAX) 17 gram packet Take 1 packet (17 g total) by mouth daily as needed.     Saccharomyces boulardii (FLORASTOR) 250 mg capsule Take 250 mg by mouth daily.     senna (SENOKOT) 8.6 mg tablet Take 1 tablet by mouth 2 (two) times a day. (Patient taking differently: Take 1 tablet by mouth 2 (two) times a day as needed. )     sodium bicarbonate 650 MG tablet Take 1 tablet (650 mg total) by mouth 2 (two) times a day. OTC product     warfarin sodium (WARFARIN ORAL) Take by mouth. 12/16/20 INR 3.3 Hold x2 Next INR 12/18 12/14/20 INR 3.2 Hold tonight, then resume 3mg W & Sat, 2.5mg AOD. Next INR 12/22.  12/11/20 missed INR cont same, next INR 12/15.  11/19/20 INR 2.4 3mg W & Sat, 2.5mg AOD. Next INR 12/10.  11/5/20 INR 2.2 Cont 3mg W & Sat, 2.5mg AOD. Next INR 11/19.  10/29/20 INR 2.2 Cont 3mg W & Sat, 2.5mg AOD. Next INR 11/19  10/15/20 INR 2.3 3mg W, Sat and 2.5mg AOD. Next INR 10/29  10/6/20 INR 2.3  Take 3mg Wed and Sat and 2.5mg AOD.  Next INR 10/15/20.    9/29/20 INR 3.1 Take 2.5mg daily. Next INR 10/6.  9/22/20 INR 2.1 Take 3mg daily Next INR 9/29  (LD Antib 7/20)         REVIEW OF SYSTEMS:    Currently, no fever, chills, or rigors. Does not have any visual or hearing problems. Denies any chest pain, headaches, palpitations, lightheadedness, dizziness, shortness of breath, or cough. Appetite is good. Denies any GERD symptoms. Denies any difficulty with swallowing, nausea, or vomiting.  Denies any abdominal pain, diarrhea or constipation. Denies any urinary symptoms, yeager in place. No insomnia. No active bleeding. No rash.       PHYSICAL EXAMINATION:  Vitals:    12/15/20 1331   BP: 139/67   Pulse: 63   Resp: 21   Temp: 96.8  F (36  C)   SpO2: 97%   Weight: (!) 236 lb 3.2 oz (107.1 kg)   Height: 5' 1\" (1.549 m)         GENERAL: Awake, Alert, oriented x3, not in any form of acute distress, " answers questions appropriately, follows simple commands, conversant with flat affect.  Patient did have PFT  HEENT: Head is normocephalic with normal hair distribution. No evidence of trauma. Ears: No acute purulent discharge. Eyes: Sclera anicteric.  LUNG: Clear to auscultation with good chest expansion.   BACK: ROM normal, no CVA tenderness.  CVS: There is good S1  S2,  rhythm is regular.  ABDOMEN: Globular and ROTUND.  Nontender with palpation. chronic abdominal wall wounds bilaterally that are healing well.  EXTREMITIES: UE Full ROM. Right BKA, wound covered, healing.  SKIN: Warm and dry, no erythema noted, open area on abdomen.    NEUROLOGICAL: The patient is oriented to person, place and time. Strength and sensation are grossly intact. Face is symmetric.    LABS:    Lab Results   Component Value Date    WBC 7.9 08/21/2020    HGB 9.2 (L) 08/21/2020    HCT 28.6 (L) 08/21/2020    MCV 89 08/21/2020     08/21/2020       Results for orders placed or performed during the hospital encounter of 08/15/20   Basic Metabolic Panel   Result Value Ref Range    Sodium 137 136 - 145 mmol/L    Potassium 4.1 3.5 - 5.0 mmol/L    Chloride 103 98 - 107 mmol/L    CO2 23 22 - 31 mmol/L    Anion Gap, Calculation 11 5 - 18 mmol/L    Glucose 178 (H) 70 - 125 mg/dL    Calcium 11.0 (H) 8.5 - 10.5 mg/dL    BUN 37 (H) 8 - 22 mg/dL    Creatinine 2.05 (H) 0.60 - 1.10 mg/dL    GFR MDRD Af Amer 29 (L) >60 mL/min/1.73m2    GFR MDRD Non Af Amer 24 (L) >60 mL/min/1.73m2           Lab Results   Component Value Date    HGBA1C 10.3 (H) 06/17/2020     Vitamin D, Total (25-Hydroxy)   Date Value Ref Range Status   04/28/2016 29.8 (L) 30.0 - 80.0 ng/mL Final     Lab Results   Component Value Date    [id number] 285 01/07/2011       ASSESSMENT/PLAN:  1. Paroxysmal atrial fibrillation (H)    2. Diarrhea, unspecified type    3. Anticoagulated         COVID-19: positive test identified 12/5 via PCR. Resolved.  Although unknown if recent loose stools  are related to this.     Loose stool: Less dismissive but the room clearly smelled of recent stools.  Nursing reports is going on for about 48 hours.  We will check her renal function to ensure she is adequately hydrated although she does report she is drinking okay and denies nausea.  Is on iron supplementation so we will hold off on fecal occult blood test but can do that if there is increased black tarry stools noted.   I do not see evidence of colonoscopy in her chart, given her age she is likely due for one if she has not had colon cancer screening.  -Obtain C. difficile sample.   -CBC, BMP, INR, procalcitonin-ensure no GI bleed.       Anticoagulated:  -Omeprazole 20 mg p.o. twice daily x30 days then once daily.     Left abdominal wall open area: Followed by the wound team.  The wound is drastically improved and almost closed with no redness or drainage.  This is a huge improvement and the patient is very happy about this.  -Continue orders per wound care team and change dressing as needed for increased drainage.    Right knee AKA wound: Dressing changes by wound team.    Chronic conditions, reviewed:     Insulin-dependent diabetes: A1c 10.3.  Now on glargine 15 units nightly and sliding scale NovoLog 3 times daily with meals.  Blood sugars average 130-200. A few outliers to 280.      Essential hypertension: Satisfactory in TCU thus far, amlodipine d.cd.   -metoprolol    Acute on chronic blood loss anemia-likely from anemia chronic disease, iron deficiency, chronic kidney disease, surgery. On oral iron .    CKD 4: Followed by Dr. Iqbal.  Calcium improved at 9.1.  -Sodium bicarb.     Coronary artery disease:  -also needs outpt CRISTINA eval   -Continue metoprolol and aspirin.  Will consider discontinuing aspirin after reviewing chart and discussion with patient.        A. Fib on Coumadin: INR therapeutic.      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 Improved. Unable to assess stump, in  and brace.   -Continues on  Trisha.    Unintended weight loss: Patient has had greater than 20 pound weight loss since July.  She says the food is just bad here and it is always cold.    -Weight improved now at 236.  This is her baseline.    Communicated concerns with patient, nursing.     Electronically signed by:  Zoey Leung CNP  This progress note was completed using Dragon software and there may be grammatical errors.

## 2021-06-13 NOTE — PROGRESS NOTES
"  [facility]      NAME:  Jazmin Bauman             :  1955    MRN: [id number]    CODE STATUS:  FULL CODE    FACILITY: [facility] [[id number]]         CHIEF COMPLAIN/REASON FOR VISIT:  Chief Complaint   Patient presents with     Follow-up     Covid, tired, on day 10       HISTORY OF PRESENT ILLNESS: Jazmin Bauman is a 65 y.o. female. Pt was at [facility] from  to  and underwent a RBKA r/t ongoing DM ulcer with osteomyelitis. Per her EMR dc summary \" with HTN,morbid obesity, DM, A fib, CKD 4 presented for evaluation of R foot swelling, difficulty ambulation, pain found to have osteomyelitis now s/p amputation. She has now been to the OR twice this stay, last was on 20.   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   Patient had a chronic diabetic foot ulcer on her R heel, presented for increased swelling, difficulty ambulating, and pain. MRI showed extensive soft tissue necrosis and some osteomyelitis along with cellulitis  Met sepsis criteria on admission with leukocytosis and elevated CRP  Podiatry saw and the foot was not felt to be salvageable and BKA was recommended\"       August 15-: Hospitalized for sepsis with MRSA bacteremia, she had possible endocarditis and TTE revealing vegetation of the aortic valve. She had a CT of the abdomen and pelvis that showed left lower abdominal pannus ulceration but without abscess.  Her right BKA stump ulceration was negative for osteomyelitis or cellulitis.  She was treated with IV Vanco for 14 days which will be completed on .  Her left lower abdominal ulceration has never been biopsied.  It was not biopsied in the hospital and there was no surgical intervention.  Her right BKA stump was negative for osteomyelitis.  CKD stage IV with baseline creatinine 2.5-3.  She was at 1.82 at the end of hospitalization.  She is discharged on sodium bicarb twice " "daily.  Her insulin was adjusted and she is now on sliding scale insulin with 10 units of Lantus at bedtime.  Blood sugars have been less than 200 on average.    For her hypertension, Norvasc has been discontinued.  Blood pressures remain in the 130s over 70s on average.  She was incidentally found to have a lung nodule on CT scan; follow up CT was on December 1 and revealed resolution of the nodule.    Today: Seen today follow-up for COVID-19.  She is on day 10 and per nursing staff she will be moving off the unit.  She remains fatigued but is otherwise eating and drinking okay, no cough, body aches, sore throat or otherwise notable symptoms for COVID-19.  She is looking forward to moving off the unit.  Her wounds are looking good and she is followed by the wound care team.  Received labs the other day and mildly elevated CRP at 5.5 however she does have the abdominal wound and right BKA wound.  She has been afebrile.  Her weights are stable and vital signs are stable.      Allergies   Allergen Reactions     Hydralazine      Paralysis of legs     Latex Itching     Skin Burns     Naprosyn [Naproxen] Swelling     throat     Nitrofurantoin Hives     Sulfa (Sulfonamide Antibiotics) Rash     Vicks Vaporub [Camphor-Eucalyptus Oil-Menthol] Rash   :     Current Outpatient Medications   Medication Sig     acetaminophen (TYLENOL) 500 MG tablet Take 2 tablets (1,000 mg total) by mouth 3 (three) times a day.     aspirin 81 MG EC tablet Take 81 mg by mouth daily.     BD ULTRA-FINE MICRO PEN NEEDLE 32 gauge x 1/4\" Ndle USE AS DIRECTED 4 TIMES DAILY.     bisacodyL (DULCOLAX) 10 mg suppository Insert 10 mg into the rectum daily as needed. No stool greater than 72 hours     cholecalciferol, vitamin D3, (VITAMIN D3) 1,000 unit capsule Take 1,000 Units by mouth daily.     collagenase ointment Apply daily per WOC     docusate sodium (COLACE) 100 MG capsule Take 100 mg by mouth daily as needed.      ferrous sulfate 325 (65 FE) MG " tablet Take 1 tablet by mouth 2 (two) times a day with meals.     gabapentin (NEURONTIN) 100 MG capsule Take 200 mg by mouth 2 (two) times a day.     HYDROmorphone (DILAUDID) 2 MG tablet (Take 2mg daily and 2mg Q 3 hours PRN)     lancets (ACCU-CHEK MULTICLIX LANCET) Misc TEST 6 TIMES A DAY     LANTUS SOLOSTAR U-100 INSULIN 100 unit/mL (3 mL) pen Inject 10 Units under the skin at bedtime. 11.65 Type 2 with hyperglycemia  Contact provider if insulin prescribed is not the preferred insulin per insurance. (Patient taking differently: Inject 15 Units under the skin at bedtime. 11.65 Type 2 with hyperglycemia  Contact provider if insulin prescribed is not the preferred insulin per insurance.)     lidocaine (XYLOCAINE) 5 % ointment Apply topically 4 (four) times a day. Apply to left shoulder or around wound (not inside wound)     metoprolol tartrate (LOPRESSOR) 25 MG tablet Take 1 tablet (25 mg total) by mouth 2 (two) times a day.     miconazole (MICOTIN) 2 % powder Apply topically 2 (two) times a day. Apply to b/l groin/underneath breasts/underneath pannus     NOVOLOG U-100 INSULIN ASPART 100 unit/mL injection Check blood sugar four (4) times daily.  11.65 Type 2 with hyperglycemia  OneTouch Verio Flex  Meter  OneTouch Verio strips 2 boxes of 50  Delica Lancets box of 100  BD Ultra-fine Therese Pen Needles - NDC [address]-80 - dispense 1 case, refill PRN for 1 year (Patient taking differently: Inject 7 Units under the skin 3 (three) times a day before meals. )     polyethylene glycol (MIRALAX) 17 gram packet Take 1 packet (17 g total) by mouth daily as needed.     Saccharomyces boulardii (FLORASTOR) 250 mg capsule Take 250 mg by mouth daily.     senna (SENOKOT) 8.6 mg tablet Take 1 tablet by mouth 2 (two) times a day. (Patient taking differently: Take 1 tablet by mouth 2 (two) times a day as needed. )     sodium bicarbonate 650 MG tablet Take 1 tablet (650 mg total) by mouth 2 (two) times a day. OTC product     warfarin  "sodium (WARFARIN ORAL) Take by mouth. 12/16/20 INR 3.3 Hold x2 Next INR 12/18 12/14/20 INR 3.2 Hold tonight, then resume 3mg W & Sat, 2.5mg AOD. Next INR 12/22.  12/11/20 missed INR cont same, next INR 12/15.  11/19/20 INR 2.4 3mg W & Sat, 2.5mg AOD. Next INR 12/10.  11/5/20 INR 2.2 Cont 3mg W & Sat, 2.5mg AOD. Next INR 11/19.  10/29/20 INR 2.2 Cont 3mg W & Sat, 2.5mg AOD. Next INR 11/19  10/15/20 INR 2.3 3mg W, Sat and 2.5mg AOD. Next INR 10/29  10/6/20 INR 2.3  Take 3mg Wed and Sat and 2.5mg AOD.  Next INR 10/15/20.    9/29/20 INR 3.1 Take 2.5mg daily. Next INR 10/6.  9/22/20 INR 2.1 Take 3mg daily Next INR 9/29  (LD Antib 7/20)         REVIEW OF SYSTEMS:    Currently, no fever, chills, or rigors. Does not have any visual or hearing problems. Denies any chest pain, headaches, palpitations, lightheadedness, dizziness, shortness of breath, or cough. Appetite is good. Denies any GERD symptoms. Denies any difficulty with swallowing, nausea, or vomiting.  Denies any abdominal pain, diarrhea or constipation. Denies any urinary symptoms, yeager in place. No insomnia. No active bleeding. No rash.       PHYSICAL EXAMINATION:  Vitals:    12/10/20 1356   BP: 107/63   Pulse: 65   Resp: 16   Temp: 97.7  F (36.5  C)   SpO2: 95%   Weight: (!) 236 lb 3.2 oz (107.1 kg)   Height: 5' 1\" (1.549 m)         GENERAL: Awake, Alert, oriented x3, not in any form of acute distress, answers questions appropriately, follows simple commands, conversant with flat affect  HEENT: Head is normocephalic with normal hair distribution. No evidence of trauma. Ears: No acute purulent discharge. Eyes: Sclera anicteric.  CHEST: No tenderness or deformity, no crepitus  LUNG: Clear to auscultation with good chest expansion. There DM BS  BACK: ROM normal, no CVA tenderness, no spinal tenderness   CVS: There is good S1  S2,  rhythm is regular.  ABDOMEN: Globular and ROTUND.  Chronic abdominal wall wounds bilaterally, left side with moderate drainage I did " not see the wound bases it was covered with calcium alginate by wound team earlier this morning.   EXTREMITIES: UE Full ROM. Right BKA, wound with moderate sanguinous drainage on bandage.  SKIN: Warm and dry, no erythema noted, open area on abdomen.    NEUROLOGICAL: The patient is oriented to person, place and time. Strength and sensation are grossly intact. Face is symmetric.    LABS:    Lab Results   Component Value Date    WBC 7.9 08/21/2020    HGB 9.2 (L) 08/21/2020    HCT 28.6 (L) 08/21/2020    MCV 89 08/21/2020     08/21/2020       Results for orders placed or performed during the hospital encounter of 08/15/20   Basic Metabolic Panel   Result Value Ref Range    Sodium 137 136 - 145 mmol/L    Potassium 4.1 3.5 - 5.0 mmol/L    Chloride 103 98 - 107 mmol/L    CO2 23 22 - 31 mmol/L    Anion Gap, Calculation 11 5 - 18 mmol/L    Glucose 178 (H) 70 - 125 mg/dL    Calcium 11.0 (H) 8.5 - 10.5 mg/dL    BUN 37 (H) 8 - 22 mg/dL    Creatinine 2.05 (H) 0.60 - 1.10 mg/dL    GFR MDRD Af Amer 29 (L) >60 mL/min/1.73m2    GFR MDRD Non Af Amer 24 (L) >60 mL/min/1.73m2           Lab Results   Component Value Date    HGBA1C 10.3 (H) 06/17/2020     Vitamin D, Total (25-Hydroxy)   Date Value Ref Range Status   04/28/2016 29.8 (L) 30.0 - 80.0 ng/mL Final     Lab Results   Component Value Date    YICSGGMR04 285 01/07/2011       ASSESSMENT/PLAN:  1. 2019 novel coronavirus disease (COVID-19)    2. CKD (chronic kidney disease) stage 4, GFR 15-29 ml/min (H)         COVID-19: positive test identified 12/5 via PCR obtained a few days ago; now resides on covid unit. She is fatigued but otherwise eating and drinking okay, and denies cough or body aches.  Sats have been in the mid 90s on room air.  -droplet and airborne precuations.  -CRP, CBC and BMP done yesterday; crp mildly elevated 5.5; cbc and bmp baseline.   -O2 1-4 L via NC to keep sats >88%.   -Continue supportive treatment with guaifenesin, albuterol inhalers, encourage p.o.  intake.  -Monitor for saturations less than 90%, or requirement of O2 via nasal cannula and update provider so that oral corticosteroids can be initiated.       Left abdominal wall open area: Followed by the wound team.  The wound is drastically improved and almost closed with no redness or drainage.  This is a huge improvement and the patient is very happy about this.  -Continue orders per wound care team and change dressing as needed for increased drainage.    Right knee AKA wound: Dressing changes by wound team.    Chronic conditions, reviewed:     Insulin-dependent diabetes: A1c 10.3.  Now on glargine 15 units nightly and sliding scale NovoLog 3 times daily with meals.  Blood sugars average 130-200. A few outliers to 280.      Essential hypertension: Satisfactory in TCU thus far, amlodipine d.cd.   -metoprolol    Acute on chronic blood loss anemia-likely from anemia chronic disease, iron deficiency, chronic kidney disease, surgery. On oral iron .    CKD 4: Followed by Dr. Iqbal.  Calcium improved at 9.1.  -Sodium bicarb.     Coronary artery disease:  -also needs outpt CRISTINA eval   -Continue metoprolol and aspirin.     Urinary retention: recent UTI, treated.       A. Fib on Coumadin: INR therapeutic.      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 Improved. Unable to assess stump, in  and brace.   -Continues on Dilaudid.    Unintended weight loss: Patient has had greater than 20 pound weight loss since July.  She says the food is just bad here and it is always cold.    -Weight improved now at 236.  This is her baseline.    Communicated concerns with patient, nursing.    Electronically signed by:  Zoey Leung CNP  This progress note was completed using Dragon software and there may be grammatical errors.

## 2021-06-13 NOTE — PROGRESS NOTES
"  [facility]      NAME:  Jazmin Bauman             :  1955    MRN: [id number]    CODE STATUS:  FULL CODE    FACILITY: [facility] [[id number]]         CHIEF COMPLAIN/REASON FOR VISIT:  Chief Complaint   Patient presents with     Follow-up     Covid 19        HISTORY OF PRESENT ILLNESS: Jazmin Bauman is a 65 y.o. female. Pt was at [facility] from  to  and underwent a RBKA r/t ongoing DM ulcer with osteomyelitis. Per her EMR dc summary \" with HTN,morbid obesity, DM, A fib, CKD 4 presented for evaluation of R foot swelling, difficulty ambulation, pain found to have osteomyelitis now s/p amputation. She has now been to the OR twice this stay, last was on 20.   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   Patient had a chronic diabetic foot ulcer on her R heel, presented for increased swelling, difficulty ambulating, and pain. MRI showed extensive soft tissue necrosis and some osteomyelitis along with cellulitis  Met sepsis criteria on admission with leukocytosis and elevated CRP  Podiatry saw and the foot was not felt to be salvageable and BKA was recommended\"       August 15-: Hospitalized for sepsis with MRSA bacteremia, she had possible endocarditis and TTE revealing vegetation of the aortic valve. She had a CT of the abdomen and pelvis that showed left lower abdominal pannus ulceration but without abscess.  Her right BKA stump ulceration was negative for osteomyelitis or cellulitis.  She was treated with IV Vanco for 14 days which will be completed on .  Her left lower abdominal ulceration has never been biopsied.  It was not biopsied in the hospital and there was no surgical intervention.  Her right BKA stump was negative for osteomyelitis.  CKD stage IV with baseline creatinine 2.5-3.  She was at 1.82 at the end of hospitalization.  She is discharged on sodium bicarb twice daily.  Her insulin " "was adjusted and she is now on sliding scale insulin with 10 units of Lantus at bedtime.  Blood sugars have been less than 200 on average.    For her hypertension, Norvasc has been discontinued.  Blood pressures remain in the 130s over 70s on average.  She was incidentally found to have a lung nodule on CT scan; follow up CT was on December 1 and revealed resolution of the nodule.    Today: Seen for COVID-19 and viewed wounds as well.  She was diagnosed last week and has been generally asymptomatic although she does report increased fatigue.  Will obtain general Covid labs and as needed orders in the event that she becomes symptomatic.  She is eating although her appetite has been poor for the last 6 months in general.  Weight is actually up back to her August baseline at 236.  I will check a BMP given her CKD as well as CRP and CBC.  Her quarantine should be up by Thursday of this week as long as she does not develop symptoms.  I reviewed her abdominal wounds which are markedly improved from the last time I saw them.  The right side is pretty much resolved and the left side has only a small pocket with scar tissue and no open area.      Allergies   Allergen Reactions     Hydralazine      Paralysis of legs     Latex Itching     Skin Burns     Naprosyn [Naproxen] Swelling     throat     Nitrofurantoin Hives     Sulfa (Sulfonamide Antibiotics) Rash     Vicks Vaporub [Camphor-Eucalyptus Oil-Menthol] Rash   :     Current Outpatient Medications   Medication Sig     acetaminophen (TYLENOL) 500 MG tablet Take 2 tablets (1,000 mg total) by mouth 3 (three) times a day.     aspirin 81 MG EC tablet Take 81 mg by mouth daily.     BD ULTRA-FINE MICRO PEN NEEDLE 32 gauge x 1/4\" Ndle USE AS DIRECTED 4 TIMES DAILY.     bisacodyL (DULCOLAX) 10 mg suppository Insert 10 mg into the rectum daily as needed. No stool greater than 72 hours     cholecalciferol, vitamin D3, (VITAMIN D3) 1,000 unit capsule Take 1,000 Units by mouth daily. "     collagenase ointment Apply daily per WOC     docusate sodium (COLACE) 100 MG capsule Take 100 mg by mouth daily as needed.      ferrous sulfate 325 (65 FE) MG tablet Take 1 tablet by mouth 2 (two) times a day with meals.     gabapentin (NEURONTIN) 100 MG capsule Take 200 mg by mouth 2 (two) times a day.     HYDROmorphone (DILAUDID) 2 MG tablet (Take 2mg daily and 2mg Q 3 hours PRN)     lancets (ACCU-CHEK MULTICLIX LANCET) Misc TEST 6 TIMES A DAY     LANTUS SOLOSTAR U-100 INSULIN 100 unit/mL (3 mL) pen Inject 10 Units under the skin at bedtime. 11.65 Type 2 with hyperglycemia  Contact provider if insulin prescribed is not the preferred insulin per insurance. (Patient taking differently: Inject 15 Units under the skin at bedtime. 11.65 Type 2 with hyperglycemia  Contact provider if insulin prescribed is not the preferred insulin per insurance.)     lidocaine (XYLOCAINE) 5 % ointment Apply topically 4 (four) times a day. Apply to left shoulder or around wound (not inside wound)     metoprolol tartrate (LOPRESSOR) 25 MG tablet Take 1 tablet (25 mg total) by mouth 2 (two) times a day.     miconazole (MICOTIN) 2 % powder Apply topically 2 (two) times a day. Apply to b/l groin/underneath breasts/underneath pannus     NOVOLOG U-100 INSULIN ASPART 100 unit/mL injection Check blood sugar four (4) times daily.  11.65 Type 2 with hyperglycemia  OneTouch Verio Flex  Meter  OneTouch Verio strips 2 boxes of 50  Delica Lancets box of 100  BD Ultra-fine Therese Pen Needles - NDC [address]-90 - dispense 1 case, refill PRN for 1 year (Patient taking differently: Inject 7 Units under the skin 3 (three) times a day before meals. )     polyethylene glycol (MIRALAX) 17 gram packet Take 1 packet (17 g total) by mouth daily as needed.     Saccharomyces boulardii (FLORASTOR) 250 mg capsule Take 250 mg by mouth daily.     senna (SENOKOT) 8.6 mg tablet Take 1 tablet by mouth 2 (two) times a day. (Patient taking differently: Take 1 tablet by  "mouth 2 (two) times a day as needed. )     sodium bicarbonate 650 MG tablet Take 1 tablet (650 mg total) by mouth 2 (two) times a day. OTC product     warfarin sodium (WARFARIN ORAL) Take by mouth. 12/11/20 missed INR cont same, next INR 12/15.  11/19/20 INR 2.4 3mg W & Sat, 2.5mg AOD. Next INR 12/10.  11/5/20 INR 2.2 Cont 3mg W & Sat, 2.5mg AOD. Next INR 11/19.  10/29/20 INR 2.2 Cont 3mg W & Sat, 2.5mg AOD. Next INR 11/19  10/15/20 INR 2.3 3mg W, Sat and 2.5mg AOD. Next INR 10/29  10/6/20 INR 2.3  Take 3mg Wed and Sat and 2.5mg AOD.  Next INR 10/15/20.    9/29/20 INR 3.1 Take 2.5mg daily. Next INR 10/6.  9/22/20 INR 2.1 Take 3mg daily Next INR 9/29  (LD Antib 7/20)  9/18/20 INR 1.6 5mg tonight, then 3mg daily. Next INR 9/22.  9/14/20 INR 1.7 Cont 2.5,g daily. Next INR 9/17 9/11/20 INR 1.9 Take 2.5mg daily Next INR 9/14 9/8/20 INR 1.6  Take 2.5mg daily.  Next INR 9/11/20.         REVIEW OF SYSTEMS:    Currently, no fever, chills, or rigors. Does not have any visual or hearing problems. Denies any chest pain, headaches, palpitations, lightheadedness, dizziness, shortness of breath, or cough. Appetite is good. Denies any GERD symptoms. Denies any difficulty with swallowing, nausea, or vomiting.  Denies any abdominal pain, diarrhea or constipation. Denies any urinary symptoms, yeager in place. No insomnia. No active bleeding. No rash.       PHYSICAL EXAMINATION:  Vitals:    12/08/20 1253   BP: 120/57   Pulse: 64   Resp: 18   Temp: 97.7  F (36.5  C)   SpO2: 96%   Weight: (!) 236 lb 3.2 oz (107.1 kg)   Height: 5' 1\" (1.549 m)         GENERAL: Awake, Alert, oriented x3, not in any form of acute distress, answers questions appropriately, follows simple commands, conversant with flat affect  HEENT: Head is normocephalic with normal hair distribution. No evidence of trauma. Ears: No acute purulent discharge. Eyes: Sclera anicteric.  CHEST: No tenderness or deformity, no crepitus  LUNG: Clear to auscultation with good chest " expansion. There DM BS  BACK: ROM normal, no CVA tenderness, no spinal tenderness   CVS: There is good S1  S2,  rhythm is regular.  ABDOMEN: Globular and ROTUND.  Chronic abdominal wall wounds bilaterally, left side with moderate drainage I did not see the wound bases it was covered with calcium alginate by wound team earlier this morning.   EXTREMITIES: UE Full ROM. Right BKA, wound with moderate sanguinous drainage on bandage.  SKIN: Warm and dry, no erythema noted, open area on abdomen.    NEUROLOGICAL: The patient is oriented to person, place and time. Strength and sensation are grossly intact. Face is symmetric.    LABS:    Lab Results   Component Value Date    WBC 7.9 08/21/2020    HGB 9.2 (L) 08/21/2020    HCT 28.6 (L) 08/21/2020    MCV 89 08/21/2020     08/21/2020       Results for orders placed or performed during the hospital encounter of 08/15/20   Basic Metabolic Panel   Result Value Ref Range    Sodium 137 136 - 145 mmol/L    Potassium 4.1 3.5 - 5.0 mmol/L    Chloride 103 98 - 107 mmol/L    CO2 23 22 - 31 mmol/L    Anion Gap, Calculation 11 5 - 18 mmol/L    Glucose 178 (H) 70 - 125 mg/dL    Calcium 11.0 (H) 8.5 - 10.5 mg/dL    BUN 37 (H) 8 - 22 mg/dL    Creatinine 2.05 (H) 0.60 - 1.10 mg/dL    GFR MDRD Af Amer 29 (L) >60 mL/min/1.73m2    GFR MDRD Non Af Amer 24 (L) >60 mL/min/1.73m2           Lab Results   Component Value Date    HGBA1C 10.3 (H) 06/17/2020     Vitamin D, Total (25-Hydroxy)   Date Value Ref Range Status   04/28/2016 29.8 (L) 30.0 - 80.0 ng/mL Final     Lab Results   Component Value Date    [id number] 285 01/07/2011       ASSESSMENT/PLAN:  1. CKD (chronic kidney disease) stage 4, GFR 15-29 ml/min (H)    2. Paroxysmal atrial fibrillation (H)    3. 2019 novel coronavirus disease (COVID-19)    4. Chronic wound infection of abdomen, subsequent encounter         COVID-19: positive test identified 12/5 via PCR obtained a few days ago; now resides on covid unit.  -droplet and airborne  precuations.  -Add CRP, CBC and BMP for labs tomorrow.  -O2 1-4 L via NC to keep sats >88%.   -Continue supportive treatment with guaifenesin, albuterol inhalers, encourage p.o. intake.  -Monitor for saturations less than 90%, or requirement of O2 via nasal cannula and update provider so that oral corticosteroids can be initiated.       Left abdominal wall open area: Followed by the wound team.  The wound is drastically improved and almost closed with no redness or drainage.  This is a huge improvement and the patient is very happy about this.  -Continue orders per wound care team and change dressing as needed for increased drainage.    Right knee AKA wound: Dressing changes morning by wound team.    Chronic conditions, reviewed:     Insulin-dependent diabetes: A1c 10.3.  Now on glargine 15 units nightly and sliding scale NovoLog 3 times daily with meals.  Blood sugars average 130-200. A few outliers to 280.      Essential hypertension: Satisfactory in TCU thus far, amlodipine d.cd.   -metoprolol    Acute on chronic blood loss anemia-likely from anemia chronic disease, iron deficiency, chronic kidney disease, surgery. On oral iron .    CKD 4: Followed by Dr. Iqbal.  Calcium improved at 9.1.  -Sodium bicarb.     Coronary artery disease:  -also needs outpt CRISTINA eval   -Continue metoprolol and aspirin.     Urinary retention: recent UTI, treated.       A. Fib on Coumadin: INR therapeutic.      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 Improved. Unable to assess stump, in  and brace.   -Continues on Dilaudid.    Unintended weight loss: Patient has had greater than 20 pound weight loss since July.  She says the food is just bad here and it is always cold.    -Weight improved now at 236.  This is her baseline.    Communicated concerns with patient, nursing.    Electronically signed by:  Zoey Leung, CNP  This progress note was completed using Dragon software and there may be grammatical errors.

## 2021-06-13 NOTE — PROGRESS NOTES
Progress Note    Reason for Visit:  Chief Complaint     Diabetes Mellitus          Progress Note:    HPI:      This pleasant 62-year-old female patient is here for follow-up because of type 2 diabetes.    She is currently on Glucotrol 10 mg twice a day Lantus 45 units in the morning.    She has been diabetic for 16 years.  Her A1c came down from 10.6-9.2.    Review of Systems:    Nervous System: No headache, dizziness, fainting or memory loss. No tingling sensation of hand or feet.  Ears: No hearing loss or ringing in the ears  Eyes: No blurring of vision, redness, itching or dryness.  Nose: No nosebleed or loss of smell  Mouth: No mouth sores or loss of taste  Throat: No hoarseness or difficulty swallowing  Neck: No enlarged thyroid or lymph nodes.  Heart: No chest pain, palpitation or irregular heartbeat. No swelling of hands or feet  Lungs: No shortness of breath, cough, night sweats, wheezing or hemoptysis.  Gastrointestinal: No nausea or vomiting, constipation or diarrhea.  No acid reflux, abdominal pain or blood in stools.  Kidney/Bladdr: No polyuria, polydipsia, nocturia or hematuria.  Genital/Sexual: No loss of libido  Skin: No rash, hair loss or hirsutism.  No abnormal striae  Muscles/Joints/Bones: No morning stiffness, muscle aches and pain or loss of height.    Current Medications:  Current Outpatient Prescriptions   Medication Sig     ACCU-CHEK SAM PLUS TEST STRP strips TEST 6 TIMES A DAY     acetaminophen (TYLENOL) 325 MG tablet Take 325-650 mg by mouth every 8 (eight) hours as needed.      allopurinol (ZYLOPRIM) 100 MG tablet Take 1 tablet (100 mg total) by mouth daily.     amLODIPine (NORVASC) 10 MG tablet Take 10 mg by mouth daily.     aspirin 81 MG EC tablet Take 81 mg by mouth daily.     cholecalciferol, vitamin D3, (VITAMIN D3) 1,000 unit capsule Take 1,000 Units by mouth daily.     colchicine (MITIGARE) 0.6 mg cap Take 0.6 mg by mouth 2 (two) times a day.     docusate sodium (COLACE) 50 MG  "capsule Take by mouth once daily.     ferrous sulfate 65 mg elemental iron (IRON) Take 1 tablet by mouth 2 (two) times a day.     glipiZIDE (GLUCOTROL) 10 MG tablet Take 10 mg by mouth 2 (two) times a day.      insulin glargine (LANTUS SOLOSTAR) 100 unit/mL (3 mL) pen Inject 35 Units under the skin every morning. Do not mix Lantus with any other insulin (Patient taking differently: Inject 45 Units under the skin every morning. Do not mix Lantus with any other insulin)     lancets (ACCU-CHEK MULTICLIX LANCET) Misc TEST 6 TIMES A DAY     magnesium 250 mg Tab Take 500 mg by mouth 2 (two) times a day.      pen needle, diabetic (NOVOFINE 32) 32 gauge x 1/4\" Ndle Use 1 Device As Directed daily.       Patients Active Problems:  Patient Active Problem List   Diagnosis     Thrombocytopenia     Fibromyalgia     Carpal Tunnel Syndrome     Urinary Tract Infection     Endometrial Hyperplasia     Cholelithiasis     Leukocytosis     Urine Tests Nonspecific Abnormal Findings     Type 2 Diabetes Mellitus     Obesity     Essential Hypertension     Pernicious Anemia     Immunology Studies Raised Immunoglobulin Level     Vaginal bleeding       History:   reports that she has never smoked. She has never used smokeless tobacco. She reports that she does not drink alcohol or use illicit drugs.   reports that she has never smoked. She has never used smokeless tobacco. She reports that she does not drink alcohol or use illicit drugs.  History   Smoking Status     Never Smoker   Smokeless Tobacco     Never Used      reports that she has never smoked. She has never used smokeless tobacco. She reports that she does not drink alcohol or use illicit drugs.  History   Sexual Activity     Sexual activity: Not on file     Past Medical History:   Diagnosis Date     Anemia     pernicious     B12 deficiency      Carpal tunnel syndrome     had surgery     Cholelithiasis     had surgery     Diabetes mellitus     borderline     Endometrial hyperplasia  " "    Fibromyalgia      Heart murmur     pt states her mitral valve leaks     History of recurrent UTI (urinary tract infection)      Hypertension      Obesity      Thrombocytopenia      Vaginal bleeding 1/2015     No family history on file.  Past Medical History:   Diagnosis Date     Anemia     pernicious     B12 deficiency      Carpal tunnel syndrome     had surgery     Cholelithiasis     had surgery     Diabetes mellitus     borderline     Endometrial hyperplasia      Fibromyalgia      Heart murmur     pt states her mitral valve leaks     History of recurrent UTI (urinary tract infection)      Hypertension      Obesity      Thrombocytopenia      Vaginal bleeding 1/2015     Past Surgical History:   Procedure Laterality Date     bilateral carpal tunnel release  2009     CHOLECYSTECTOMY       COLONOSCOPY N/A 9/11/2017    Procedure: COLONOSCOPY;  Surgeon: Tyler Bhakta MD;  Location: [facility];  Service:      COMBINED HYSTEROSCOPY DIAGNOSTIC / D&C N/A 1/28/2015    Procedure: DILATION AND CURETTAGE WITH HYSTEROSCOPY;  Surgeon: Grant Beal MD;  Location: [facility] OR;  Service:      DILATION AND CURETTAGE OF UTERUS         Vitals   height is 5' 1\" (1.549 m) and weight is 283 lb (128.4 kg) (abnormal). Her blood pressure is 132/62.         Exam  General appearance: The patient looked well, not in acute distress.  Eyes: no evidence of thyroid eye disease.   Retinal exam: No evidence of diabetic retinopathy.  Mouth and Throat: Normal  Neck: No evidence of thyromegaly, enlarged lymph node or tenderness  Chest: Trachea is central. Chest is clear to auscultation and percussion. Breat sounds are normal.  Cardiovascular exam: JVP is not raised. Heart sounds are normal, no murmurs or rub  Peripheral pulses are palpable.   Abdomen: No masses or tenderness.    Back: No vertebral tenderness or kyphosis.  Extremities: No evidence of leg edema.   Skin: Normal to touch.  No abnormal striae  Neurologic exam:  Visual " fields are intact by confrontation, grossly intact. No evidence of peripheral neuropathy.  Detailed foot exam normal.        Diagnosis:  No diagnosis found.    Orders:   No orders of the defined types were placed in this encounter.        Assessment and Plan: Type 2 diabetes the patient was taken lisinopril before 30 mg twice a day that caused kidney function deterioration I took her off the lisinopril.    The patient checked her blood sugar once or twice a day and averages 167 the highest is 307 14 the lowest is 124.    I did advise the patient to take NovoLog 4 units before each meal but she declined that.    She takes allopurinol 100 mg Norvasc 10 mg blood pressure 132/62 she takes a baby aspirin 81 mg vitamin D 1000 units a day.    Creatinine was 1.44 she does have microalbuminuria she refused to take losartan.    At this Debra is stage I did advise the patient continue with diet and exercise.    She will return to clinic in 6 months I will check A1c before.    I did spend 45 minutes with the patient more than 50% was spent on counseling and managing her care.

## 2021-06-13 NOTE — TELEPHONE ENCOUNTER
[facility] Nurse Triage Anticoagulation Note      Provider: BARRY Smith  Facility: [facility]    Facility Type: [facility]    Caller: Luci  Call Back Number:  [phone]  [phone]    Reason for call: INR    Today s INR: 3.2  Previous INR: 11/19/20 INR 2.4 3mg W & Sat, 2.5mg AOD.    Diagnosis/Goal: AFIB  Heparin/Lovenox: No   Currently on ABX: No  Other interacting Medications: None  Missed or refused doses: No    Verbal Order/Direction given by Provider: Hold for 1 day today, resume tomorrow 3mg W & Sat, 2.5mg AOD. Next INR 12/22.    Provider giving order: BARRY Smith    Verbal order given to: Luci Shepard RN

## 2021-06-13 NOTE — PROGRESS NOTES
"  [facility]      NAME:  Jazmin Bauman             :  1955    MRN: [id number]    CODE STATUS:  FULL CODE    FACILITY: [facility] [[id number]]         CHIEF COMPLAIN/REASON FOR VISIT:  Chief Complaint   Patient presents with     Follow-up     Wound follow up        HISTORY OF PRESENT ILLNESS: Jazmin Bauman is a 65 y.o. female. Pt was at [facility] from  to  and underwent a RBKA r/t ongoing DM ulcer with osteomyelitis. Per her EMR dc summary \" with HTN,morbid obesity, DM, A fib, CKD 4 presented for evaluation of R foot swelling, difficulty ambulation, pain found to have osteomyelitis now s/p amputation. She has now been to the OR twice this stay, last was on 20.   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   Patient had a chronic diabetic foot ulcer on her R heel, presented for increased swelling, difficulty ambulating, and pain. MRI showed extensive soft tissue necrosis and some osteomyelitis along with cellulitis  Met sepsis criteria on admission with leukocytosis and elevated CRP  Podiatry saw and the foot was not felt to be salvageable and BKA was recommended\"       August 15-: Hospitalized for sepsis with MRSA bacteremia, she had possible endocarditis and TTE revealing vegetation of the aortic valve. She had a CT of the abdomen and pelvis that showed left lower abdominal pannus ulceration but without abscess.  Her right BKA stump ulceration was negative for osteomyelitis or cellulitis.  She was treated with IV Vanco for 14 days which will be completed on .  Her left lower abdominal ulceration has never been biopsied.  It was not biopsied in the hospital and there was no surgical intervention.  Her right BKA stump was negative for osteomyelitis.  CKD stage IV with baseline creatinine 2.5-3.  She was at 1.82 at the end of hospitalization.  She is discharged on sodium bicarb twice daily.  Her " insulin was adjusted and she is now on sliding scale insulin with 10 units of Lantus at bedtime.  Blood sugars have been less than 200 on average.    For her hypertension, Norvasc has been discontinued.  Blood pressures remain in the 130s over 70s on average.  She was incidentally found to have a lung nodule on CT scan, will need to follow-up with this.  Please see imaging report from August hospitalization.  Multiple nodules and enlarged lymph nodes.    Today: Patient seen to follow-up on recent initiation of antibiotics for chronic wound infection.  She has abdominal wound on the left and right side however the left had increased bleeding and drainage last week.  She also had increased bleeding from her right lower extremity stump.  She is followed by the wound team who changed her dressing to calcium alginate.  We obtained an INR which was therapeutic at 2.2, as well as a CBC and BMP.  CBC revealed a mild white count elevation to 12.3.  Due to her history of MRSA and abdominal pannus infection requiring surgical debridement, I did start her on antibiotics, to cover MRSA as well.  She was started on Keflex and doxy on Friday of last week.  I ordered a recheck of the CBC on Monday which revealed improvement in white count down to 10.3.  She is also reporting improvement in pain and less drainage in the wound sites.  She looks good she is up wheeling around the unit independently.  She is alert and oriented to her baseline.  No notable side effects from antibiotics.  She was seen by wound team this morning as well.      Allergies   Allergen Reactions     Hydralazine      Paralysis of legs     Latex Itching     Skin Burns     Naprosyn [Naproxen] Swelling     throat     Nitrofurantoin Hives     Sulfa (Sulfonamide Antibiotics) Rash     Vicks Vaporub [Camphor-Eucalyptus Oil-Menthol] Rash   :     Current Outpatient Medications   Medication Sig     acetaminophen (TYLENOL) 500 MG tablet Take 2 tablets (1,000 mg total) by  "mouth 3 (three) times a day.     aspirin 81 MG EC tablet Take 81 mg by mouth daily.     BD ULTRA-FINE MICRO PEN NEEDLE 32 gauge x 1/4\" Ndle USE AS DIRECTED 4 TIMES DAILY.     bisacodyL (DULCOLAX) 10 mg suppository Insert 10 mg into the rectum daily as needed. No stool greater than 72 hours     cholecalciferol, vitamin D3, (VITAMIN D3) 1,000 unit capsule Take 1,000 Units by mouth daily.     collagenase ointment Apply daily per WOC     docusate sodium (COLACE) 100 MG capsule Take 100 mg by mouth daily as needed.      ferrous sulfate 325 (65 FE) MG tablet Take 1 tablet by mouth 2 (two) times a day with meals.     gabapentin (NEURONTIN) 100 MG capsule Take 200 mg by mouth 2 (two) times a day.     HYDROmorphone (DILAUDID) 2 MG tablet (Take 2mg daily and 2mg Q 3 hours PRN)     lancets (ACCU-CHEK MULTICLIX LANCET) Misc TEST 6 TIMES A DAY     LANTUS SOLOSTAR U-100 INSULIN 100 unit/mL (3 mL) pen Inject 10 Units under the skin at bedtime. 11.65 Type 2 with hyperglycemia  Contact provider if insulin prescribed is not the preferred insulin per insurance. (Patient taking differently: Inject 15 Units under the skin at bedtime. 11.65 Type 2 with hyperglycemia  Contact provider if insulin prescribed is not the preferred insulin per insurance.)     lidocaine (XYLOCAINE) 5 % ointment Apply topically 4 (four) times a day. Apply to left shoulder or around wound (not inside wound)     metoprolol tartrate (LOPRESSOR) 25 MG tablet Take 1 tablet (25 mg total) by mouth 2 (two) times a day.     miconazole (MICOTIN) 2 % powder Apply topically 2 (two) times a day. Apply to b/l groin/underneath breasts/underneath pannus     NOVOLOG U-100 INSULIN ASPART 100 unit/mL injection Check blood sugar four (4) times daily.  11.65 Type 2 with hyperglycemia  OneTouch Verio Flex  Meter  OneTouch Verio strips 2 boxes of 50  Delica Lancets box of 100  BD Ultra-fine Therese Pen Needles - NDC [address]-53 - dispense 1 case, refill PRN for 1 year (Patient taking " "differently: Inject 7 Units under the skin 3 (three) times a day before meals. )     polyethylene glycol (MIRALAX) 17 gram packet Take 1 packet (17 g total) by mouth daily as needed.     Saccharomyces boulardii (FLORASTOR) 250 mg capsule Take 250 mg by mouth daily.     senna (SENOKOT) 8.6 mg tablet Take 1 tablet by mouth 2 (two) times a day. (Patient taking differently: Take 1 tablet by mouth 2 (two) times a day as needed. )     sodium bicarbonate 650 MG tablet Take 1 tablet (650 mg total) by mouth 2 (two) times a day. OTC product     warfarin sodium (WARFARIN ORAL) Take by mouth. 11/5/20 INR 2.2 Cont 3mg W & Sat, 2.5mg AOD. Next INR 11/19.  10/29/20 INR 2.2 Cont 3mg W & Sat, 2.5mg AOD. Next INR 11/19  10/15/20 INR 2.3 3mg W, Sat and 2.5mg AOD. Next INR 10/29  10/6/20 INR 2.3  Take 3mg Wed and Sat and 2.5mg AOD.  Next INR 10/15/20.    9/29/20 INR 3.1 Take 2.5mg daily. Next INR 10/6.  9/22/20 INR 2.1 Take 3mg daily Next INR 9/29  (LD Antib 7/20)  9/18/20 INR 1.6 5mg tonight, then 3mg daily. Next INR 9/22.  9/14/20 INR 1.7 Cont 2.5,g daily. Next INR 9/17 9/11/20 INR 1.9 Take 2.5mg daily Next INR 9/14 9/8/20 INR 1.6  Take 2.5mg daily.  Next INR 9/11/20.         REVIEW OF SYSTEMS:    Currently, no fever, chills, or rigors. Does not have any visual or hearing problems. Denies any chest pain, headaches, palpitations, lightheadedness, dizziness, shortness of breath, or cough. Appetite is good. Denies any GERD symptoms. Denies any difficulty with swallowing, nausea, or vomiting.  Denies any abdominal pain, diarrhea or constipation. Denies any urinary symptoms, yeager in place. No insomnia. No active bleeding. No rash.       PHYSICAL EXAMINATION:  Vitals:    11/10/20 1536   BP: 129/71   Pulse: 60   Resp: 18   Temp: 96.8  F (36  C)   SpO2: 96%   Weight: (!) 233 lb (105.7 kg)   Height: 5' 1\" (1.549 m)         GENERAL: Awake, Alert, oriented x3, not in any form of acute distress, answers questions appropriately, follows simple " commands, conversant with flat affect  HEENT: Head is normocephalic with normal hair distribution. No evidence of trauma. Ears: No acute purulent discharge. Eyes: Sclera anicteric.  CHEST: No tenderness or deformity, no crepitus  LUNG: Clear to auscultation with good chest expansion. There DM BS  BACK: ROM normal, no CVA tenderness, no spinal tenderness   CVS: There is good S1  S2,  rhythm is regular.  ABDOMEN: Globular and ROTUND.  Chronic abdominal wall wounds bilaterally, left side with moderate drainage I did not see the wound bases it was covered with calcium alginate by wound team earlier this morning.   EXTREMITIES: UE Full ROM. Right BKA, wound with moderate sanguinous drainage on bandage.  SKIN: Warm and dry, no erythema noted, open area on abdomen.    NEUROLOGICAL: The patient is oriented to person, place and time. Strength and sensation are grossly intact. Face is symmetric.    LABS:    Lab Results   Component Value Date    WBC 7.9 08/21/2020    HGB 9.2 (L) 08/21/2020    HCT 28.6 (L) 08/21/2020    MCV 89 08/21/2020     08/21/2020       Results for orders placed or performed during the hospital encounter of 08/15/20   Basic Metabolic Panel   Result Value Ref Range    Sodium 137 136 - 145 mmol/L    Potassium 4.1 3.5 - 5.0 mmol/L    Chloride 103 98 - 107 mmol/L    CO2 23 22 - 31 mmol/L    Anion Gap, Calculation 11 5 - 18 mmol/L    Glucose 178 (H) 70 - 125 mg/dL    Calcium 11.0 (H) 8.5 - 10.5 mg/dL    BUN 37 (H) 8 - 22 mg/dL    Creatinine 2.05 (H) 0.60 - 1.10 mg/dL    GFR MDRD Af Amer 29 (L) >60 mL/min/1.73m2    GFR MDRD Non Af Amer 24 (L) >60 mL/min/1.73m2           Lab Results   Component Value Date    HGBA1C 10.3 (H) 06/17/2020     Vitamin D, Total (25-Hydroxy)   Date Value Ref Range Status   04/28/2016 29.8 (L) 30.0 - 80.0 ng/mL Final     Lab Results   Component Value Date    SLHDNXUV90 285 01/07/2011       ASSESSMENT/PLAN:  1. Visit for wound check    2. Below-knee amputation (H)    3. Chronic  wound infection of abdomen, subsequent encounter        Left abdominal wall cellulitis and right side: Seen by wound team and dressing was changed with new orders for calcium alginate.  She is reporting improvement in pain, there is less bleeding, less drainage.  CBC yesterday with white count now down to 10.4 from 12.3.   -Continue doxycycline and Keflex as ordered.  -Continue orders per wound care team and change dressing as needed for increased drainage.    Also reviewed right knee AKA wound: Dressing changes morning by wound team, minimal to no pain.  Drainage improved, bleeding scant.    Chronic conditions, reviewed:     Insulin-dependent diabetes: A1c 10.3.  Now on glargine 15 units nightly and sliding scale NovoLog 3 times daily with meals.  Blood sugars average 130-200. A few outliers to 280.      Essential hypertension: Satisfactory in TCU thus far, amlodipine d.cd.   -metoprolol    Acute on chronic blood loss anemia-likely from anemia chronic disease, iron deficiency, chronic kidney disease, surgery. On oral iron .    CKD 4: Followed by Dr. Iqbal. He ordered repeat BMP in a month.  Is not concerned about calcium that has stayed stable at 11.3.  Of note she did have elevated uric acid level at 11. Will not treat unless symptomatic.   -Sodium bicarb.     Coronary artery disease:  -also needs outpt CRISTINA eval   -Continue metoprolol and aspirin.     Urinary retention: recent UTI, treated.       A. Fib on Coumadin: INR therapeutic.      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 Improved. Unable to assess stump, in  and brace.   -Continues on Dilaudid.    Unintended weight loss: Patient has had greater than 20 pound weight loss since July.  She says the food is just bad here and it is always cold.  Monitoring.  We will put her on a protein supplement for wound healing.   Weight today 226. Stable now x 3 weeks. Reports decent po intake.     Communicated concerns with patient, nursing.    Electronically signed by:   Zoey Leung CNP  This progress note was completed using Dragon software and there may be grammatical errors.

## 2021-06-14 NOTE — PROGRESS NOTES
"  [facility]      NAME:  Jazmin Bauman             :  1955    MRN: [id number]    CODE STATUS:  FULL CODE    FACILITY: [facility] [[id number]]         CHIEF COMPLAIN/REASON FOR VISIT:  Chief Complaint   Patient presents with     Problem Visit       HISTORY OF PRESENT ILLNESS: Jazmin Bauman is a 65 y.o. female. Pt was at [facility] from  to  and underwent a RBKA r/t ongoing DM ulcer with osteomyelitis. Per her EMR dc summary \" with HTN,morbid obesity, DM, A fib, CKD 4 presented for evaluation of R foot swelling, difficulty ambulation, pain found to have osteomyelitis now s/p amputation. She has now been to the OR twice this stay, last was on 20.   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   Patient had a chronic diabetic foot ulcer on her R heel, presented for increased swelling, difficulty ambulating, and pain. MRI showed extensive soft tissue necrosis and some osteomyelitis along with cellulitis  Met sepsis criteria on admission with leukocytosis and elevated CRP  Podiatry saw and the foot was not felt to be salvageable and BKA was recommended\"     August 15-: Hospitalized for sepsis with MRSA bacteremia, she had possible endocarditis and TTE revealing vegetation of the aortic valve. She had a CT of the abdomen and pelvis that showed left lower abdominal pannus ulceration but without abscess.  Her right BKA stump ulceration was negative for osteomyelitis or cellulitis.  She was treated with IV Vanco for 14 days which will be completed on .  Her left lower abdominal ulceration has never been biopsied.  It was not biopsied in the hospital and there was no surgical intervention.  Her right BKA stump was negative for osteomyelitis.  CKD stage IV with baseline creatinine 2.5-3.  She was at 1.82 at the end of hospitalization.  She is discharged on sodium bicarb twice daily.  Her insulin was adjusted " "and she is now on sliding scale insulin with 10 units of Lantus at bedtime.  Blood sugars have been less than 200 on average.    For her hypertension, Norvasc has been discontinued.  Blood pressures remain in the 130s over 70s on average.  She was incidentally found to have a lung nodule on CT scan; follow up CT was on December 1 and revealed resolution of the nodule.    Today: Seen to follow-up on recent complaints of loose stool and nursing's concern for GI bleed.  Her hemoglobin was stable at 11.6.  Blood pressures have been stable and no tachycardia.  She is denying any pain or loose stools today.  She looks more alert and energetic today compared to last week.  She is denying any nausea or abdominal discomfort.  She does have a history of diverticulitis however no further complaints of pain or loose stool.    She did request a discussion regarding knee injections for osteoarthritis.  She has a desire to complete therapy and work to get a prosthesis for possibly ambulation.  She is not been very engaged in therapy had her on in the past.  We will have to have a bigger discussion of the risks versus benefits and discussed with therapy whether they be willing to take her on.      Allergies   Allergen Reactions     Hydralazine      Paralysis of legs     Latex Itching     Skin Burns     Naprosyn [Naproxen] Swelling     throat     Nitrofurantoin Hives     Sulfa (Sulfonamide Antibiotics) Rash     Vicks Vaporub [Camphor-Eucalyptus Oil-Menthol] Rash   :     Current Outpatient Medications   Medication Sig     acetaminophen (TYLENOL) 500 MG tablet Take 2 tablets (1,000 mg total) by mouth 3 (three) times a day.     aspirin 81 MG EC tablet Take 81 mg by mouth daily.     BD ULTRA-FINE MICRO PEN NEEDLE 32 gauge x 1/4\" Ndle USE AS DIRECTED 4 TIMES DAILY.     bisacodyL (DULCOLAX) 10 mg suppository Insert 10 mg into the rectum daily as needed. No stool greater than 72 hours     cholecalciferol, vitamin D3, (VITAMIN D3) 1,000 " unit capsule Take 1,000 Units by mouth daily.     collagenase ointment Apply daily per WOC     docusate sodium (COLACE) 100 MG capsule Take 100 mg by mouth daily as needed.      ferrous sulfate 325 (65 FE) MG tablet Take 1 tablet by mouth 2 (two) times a day with meals.     gabapentin (NEURONTIN) 100 MG capsule Take 200 mg by mouth 2 (two) times a day.     HYDROmorphone (DILAUDID) 2 MG tablet (Take 2mg daily and 2mg Q 3 hours PRN)     lancets (ACCU-CHEK MULTICLIX LANCET) Misc TEST 6 TIMES A DAY     LANTUS SOLOSTAR U-100 INSULIN 100 unit/mL (3 mL) pen Inject 10 Units under the skin at bedtime. 11.65 Type 2 with hyperglycemia  Contact provider if insulin prescribed is not the preferred insulin per insurance. (Patient taking differently: Inject 15 Units under the skin at bedtime. 11.65 Type 2 with hyperglycemia  Contact provider if insulin prescribed is not the preferred insulin per insurance.)     lidocaine (XYLOCAINE) 5 % ointment Apply topically 4 (four) times a day. Apply to left shoulder or around wound (not inside wound)     metoprolol tartrate (LOPRESSOR) 25 MG tablet Take 1 tablet (25 mg total) by mouth 2 (two) times a day.     miconazole (MICOTIN) 2 % powder Apply topically 2 (two) times a day. Apply to b/l groin/underneath breasts/underneath pannus     NOVOLOG U-100 INSULIN ASPART 100 unit/mL injection Check blood sugar four (4) times daily.  11.65 Type 2 with hyperglycemia  OneTouch Verio Flex  Meter  OneTouch Verio strips 2 boxes of 50  Delica Lancets box of 100  BD Ultra-fine Therese Pen Needles - NDC [address]-82 - dispense 1 case, refill PRN for 1 year (Patient taking differently: Inject 7 Units under the skin 3 (three) times a day before meals. )     polyethylene glycol (MIRALAX) 17 gram packet Take 1 packet (17 g total) by mouth daily as needed.     Saccharomyces boulardii (FLORASTOR) 250 mg capsule Take 250 mg by mouth daily.     senna (SENOKOT) 8.6 mg tablet Take 1 tablet by mouth 2 (two) times a day.  "(Patient taking differently: Take 1 tablet by mouth 2 (two) times a day as needed. )     sodium bicarbonate 650 MG tablet Take 1 tablet (650 mg total) by mouth 2 (two) times a day. OTC product     warfarin sodium (WARFARIN ORAL) Take by mouth. 12/22/20 INR 2.1  Take 3mg Tues and Fri and 2.5mg AOD.  Next INR 12/28/20.    12/18/20 INR 2.5 2.5mg daily. Next INR 12/22.  12/16/20 INR 3.3 Hold x2 Next INR 12/18 12/14/20 INR 3.2 Hold tonight, then resume 3mg W & Sat, 2.5mg AOD. Next INR 12/22.  12/11/20 missed INR cont same, next INR 12/15.  11/19/20 INR 2.4 3mg W & Sat, 2.5mg AOD. Next INR 12/10.  11/5/20 INR 2.2 Cont 3mg W & Sat, 2.5mg AOD. Next INR 11/19.  10/29/20 INR 2.2 Cont 3mg W & Sat, 2.5mg AOD. Next INR 11/19  10/15/20 INR 2.3 3mg W, Sat and 2.5mg AOD. Next INR 10/29  10/6/20 INR 2.3  Take 3mg Wed and Sat and 2.5mg AOD.  Next INR 10/15/20.    9/29/20 INR 3.1 Take 2.5mg daily. Next INR 10/6.  9/22/20 INR 2.1 Take 3mg daily Next INR 9/29  (LD Antib 7/20)         REVIEW OF SYSTEMS:    Currently, no fever, chills, or rigors. Does not have any visual or hearing problems. Denies any chest pain, headaches, palpitations, lightheadedness, dizziness, shortness of breath, or cough. Appetite is good. Denies any GERD symptoms. Denies any difficulty with swallowing, nausea, or vomiting.  Denies any abdominal pain, diarrhea or constipation. Denies any urinary symptoms, yeager in place. No insomnia. No active bleeding. No rash.       PHYSICAL EXAMINATION:  Vitals:    12/21/20 1244   BP: 121/60   Pulse: 74   Resp: 18   Temp: 97.5  F (36.4  C)   SpO2: 97%   Weight: (!) 237 lb 3.2 oz (107.6 kg)   Height: 5' 1\" (1.549 m)         GENERAL: Awake, Alert, oriented x3, not in any form of acute distress, answers questions appropriately, follows simple commands, conversant with flat affect.  Patient did have PFT  HEENT: Head is normocephalic with normal hair distribution. No evidence of trauma. Ears: No acute purulent discharge. Eyes: " Sclera anicteric.  LUNG: Clear to auscultation with good chest expansion.   BACK: ROM normal, no CVA tenderness.  CVS: There is good S1  S2,  rhythm is regular.  ABDOMEN: Globular and ROTUND.  Nontender with palpation. chronic abdominal wall wounds bilaterally that are healing well.  EXTREMITIES: UE Full ROM. Right BKA, wound covered, healing.  SKIN: Warm and dry, no erythema noted, open area on abdomen.    NEUROLOGICAL: The patient is oriented to person, place and time. Strength and sensation are grossly intact. Face is symmetric.    LABS:    Lab Results   Component Value Date    WBC 7.9 08/21/2020    HGB 9.2 (L) 08/21/2020    HCT 28.6 (L) 08/21/2020    MCV 89 08/21/2020     08/21/2020       Results for orders placed or performed during the hospital encounter of 08/15/20   Basic Metabolic Panel   Result Value Ref Range    Sodium 137 136 - 145 mmol/L    Potassium 4.1 3.5 - 5.0 mmol/L    Chloride 103 98 - 107 mmol/L    CO2 23 22 - 31 mmol/L    Anion Gap, Calculation 11 5 - 18 mmol/L    Glucose 178 (H) 70 - 125 mg/dL    Calcium 11.0 (H) 8.5 - 10.5 mg/dL    BUN 37 (H) 8 - 22 mg/dL    Creatinine 2.05 (H) 0.60 - 1.10 mg/dL    GFR MDRD Af Amer 29 (L) >60 mL/min/1.73m2    GFR MDRD Non Af Amer 24 (L) >60 mL/min/1.73m2           Lab Results   Component Value Date    HGBA1C 10.3 (H) 06/17/2020     Vitamin D, Total (25-Hydroxy)   Date Value Ref Range Status   04/28/2016 29.8 (L) 30.0 - 80.0 ng/mL Final     Lab Results   Component Value Date    [id number] 285 01/07/2011       ASSESSMENT/PLAN:  1. Diarrhea, unspecified type    2. Primary osteoarthritis involving multiple joints         COVID-19: positive test identified 12/5 via PCR. Resolved.  Although unknown if recent loose stools are related to this.     Loose stool: Reports that this is resolved and no longer having loose stools.  Appears to be feeling better, well hydrated eating and drinking.  -Most recent colonoscopy in 2017.  Some evidence of diverticulitis  otherwise clear.    Osteoarthritis: Requests discussion regarding knee injections in order to complete therapy and work with a prosthesis.  Will discuss at next visit in depth the risks and benefits as well as whether therapy will take her on again.    Anticoagulated:  -Omeprazole 20 mg p.o. twice daily x30 days then once daily.     Left abdominal wall open area: Followed by the wound team.  The wound is drastically improved and almost closed with no redness or drainage.  This is a huge improvement and the patient is very happy about this.  -Continue orders per wound care team and change dressing as needed for increased drainage.    Chronic conditions, reviewed:     Insulin-dependent diabetes: A1c 10.3.  Now on glargine 15 units nightly and sliding scale NovoLog 3 times daily with meals.  Blood sugars average 130-200. A few outliers to 280.      Essential hypertension: Satisfactory in TCU thus far, amlodipine d.cd.   -metoprolol    Acute on chronic blood loss anemia-likely from anemia chronic disease, iron deficiency, chronic kidney disease, surgery. On oral iron .    CKD 4: Followed by Dr. Iqbal.  Calcium improved at 9.1.  -Sodium bicarb.     Coronary artery disease:  -also needs outpt CRISTINA eval   -Continue metoprolol and aspirin.       A. Fib on Coumadin: INR therapeutic.      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 Improved. Unable to assess stump, in  and brace.   -Continues on Dilaudid.    Unintended weight loss: Patient has had greater than 20 pound weight loss since July.  She says the food is just bad here and it is always cold.    -Weight improved now at 236.  This is her baseline.    Communicated concerns with patient, nursing.     Electronically signed by:  Zoey Leung CNP  This progress note was completed using Dragon software and there may be grammatical errors.

## 2021-06-14 NOTE — PROGRESS NOTES
"  [facility]      NAME:  Jazmin Bauman             :  1955    MRN: [id number]    CODE STATUS:  FULL CODE    FACILITY: [facility] [[id number]]         CHIEF COMPLAIN/REASON FOR VISIT:  Chief Complaint   Patient presents with     Problem Visit     Loose stools        HISTORY OF PRESENT ILLNESS: Jazmin Bauman is a 65 y.o. female. Pt was at [facility] from  to  and underwent a RBKA r/t ongoing DM ulcer with osteomyelitis. Per her EMR dc summary \" with HTN,morbid obesity, DM, A fib, CKD 4 presented for evaluation of R foot swelling, difficulty ambulation, pain found to have osteomyelitis now s/p amputation. She has now been to the OR twice this stay, last was on 20.   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   Patient had a chronic diabetic foot ulcer on her R heel, presented for increased swelling, difficulty ambulating, and pain. MRI showed extensive soft tissue necrosis and some osteomyelitis along with cellulitis  Met sepsis criteria on admission with leukocytosis and elevated CRP  Podiatry saw and the foot was not felt to be salvageable and BKA was recommended\"     August 15-: Hospitalized for sepsis with MRSA bacteremia, she had possible endocarditis and TTE revealing vegetation of the aortic valve. She had a CT of the abdomen and pelvis that showed left lower abdominal pannus ulceration but without abscess.  Her right BKA stump ulceration was negative for osteomyelitis or cellulitis.  She was treated with IV Vanco for 14 days which will be completed on .  Her left lower abdominal ulceration has never been biopsied.  It was not biopsied in the hospital and there was no surgical intervention.  Her right BKA stump was negative for osteomyelitis.  CKD stage IV with baseline creatinine 2.5-3.  She was at 1.82 at the end of hospitalization.  She is discharged on sodium bicarb twice daily.  Her " "insulin was adjusted and she is now on sliding scale insulin with 10 units of Lantus at bedtime.  Blood sugars have been less than 200 on average.    For her hypertension, Norvasc has been discontinued.  Blood pressures remain in the 130s over 70s on average.  She was incidentally found to have a lung nodule on CT scan; follow up CT was on December 1 and revealed resolution of the nodule.    Today: Jazmin was seen for continued concerns of loose stools however lab has rejected any C. difficile culture because they are not liquid or diarrhea.  She is having semi-formed stools but is somewhat incontinent which frustrates her.  She denies nausea today.  Weights have remained stable in the 230s.  She reports frustration with nursing and that she will be resuming therapies again.  She would like to reconsider knee injections again now that she is starting therapy again and she would like to eventually walk with a prosthesis.  Her wound on her abdomen is completely closed now.  She is feeling frustrated with her financial situation and feels like she is always looking for money to pay in the nursing home.  I did relay this these concerns to nurse manager Mary and asked that maybe she or social work touch base with the patient.      Allergies   Allergen Reactions     Hydralazine      Paralysis of legs     Latex Itching     Skin Burns     Naprosyn [Naproxen] Swelling     throat     Nitrofurantoin Hives     Sulfa (Sulfonamide Antibiotics) Rash     Vicks Vaporub [Camphor-Eucalyptus Oil-Menthol] Rash   :     Current Outpatient Medications   Medication Sig     acetaminophen (TYLENOL) 500 MG tablet Take 2 tablets (1,000 mg total) by mouth 3 (three) times a day.     aspirin 81 MG EC tablet Take 81 mg by mouth daily.     BD ULTRA-FINE MICRO PEN NEEDLE 32 gauge x 1/4\" Ndle USE AS DIRECTED 4 TIMES DAILY.     bisacodyL (DULCOLAX) 10 mg suppository Insert 10 mg into the rectum daily as needed. No stool greater than 72 hours     " cholecalciferol, vitamin D3, (VITAMIN D3) 1,000 unit capsule Take 1,000 Units by mouth daily.     collagenase ointment Apply daily per WOC     docusate sodium (COLACE) 100 MG capsule Take 100 mg by mouth daily as needed.      ferrous sulfate 325 (65 FE) MG tablet Take 1 tablet by mouth 2 (two) times a day with meals.     gabapentin (NEURONTIN) 100 MG capsule Take 200 mg by mouth 2 (two) times a day.     HYDROmorphone (DILAUDID) 2 MG tablet (Take 2mg daily and 2mg Q 3 hours PRN)     lancets (ACCU-CHEK MULTICLIX LANCET) Misc TEST 6 TIMES A DAY     LANTUS SOLOSTAR U-100 INSULIN 100 unit/mL (3 mL) pen Inject 10 Units under the skin at bedtime. 11.65 Type 2 with hyperglycemia  Contact provider if insulin prescribed is not the preferred insulin per insurance. (Patient taking differently: Inject 15 Units under the skin at bedtime. 11.65 Type 2 with hyperglycemia  Contact provider if insulin prescribed is not the preferred insulin per insurance.)     lidocaine (XYLOCAINE) 5 % ointment Apply topically 4 (four) times a day. Apply to left shoulder or around wound (not inside wound)     metoprolol tartrate (LOPRESSOR) 25 MG tablet Take 1 tablet (25 mg total) by mouth 2 (two) times a day.     miconazole (MICOTIN) 2 % powder Apply topically 2 (two) times a day. Apply to b/l groin/underneath breasts/underneath pannus     NOVOLOG U-100 INSULIN ASPART 100 unit/mL injection Check blood sugar four (4) times daily.  11.65 Type 2 with hyperglycemia  OneTouch Verio Flex  Meter  OneTouch Verio strips 2 boxes of 50  Delica Lancets box of 100  BD Ultra-fine Therese Pen Needles - NDC [address]-89 - dispense 1 case, refill PRN for 1 year (Patient taking differently: Inject 7 Units under the skin 3 (three) times a day before meals. )     polyethylene glycol (MIRALAX) 17 gram packet Take 1 packet (17 g total) by mouth daily as needed.     Saccharomyces boulardii (FLORASTOR) 250 mg capsule Take 250 mg by mouth daily.     senna (SENOKOT) 8.6 mg  "tablet Take 1 tablet by mouth 2 (two) times a day. (Patient taking differently: Take 1 tablet by mouth 2 (two) times a day as needed. )     sodium bicarbonate 650 MG tablet Take 1 tablet (650 mg total) by mouth 2 (two) times a day. OTC product     warfarin sodium (WARFARIN ORAL) Take by mouth. 1/5/21 INR 2.3  Cont 3mg Tues and Fri and 2.5mg AOD.  Next INR 1/19/21.    1/4/21 INR missed.  Take 2.5mg on 1/4.  Next INR 1/5/21.    12/28/20 INR 2.9  Cont 3mg Tues and Fri and 2.5mg AOD.  Next INR 1/4/21.  12/22/20 INR 2.1  Take 3mg Tues and Fri and 2.5mg AOD.  Next INR 12/28/20.    12/18/20 INR 2.5 2.5mg daily. Next INR 12/22.  12/16/20 INR 3.3 Hold x2 Next INR 12/18 12/14/20 INR 3.2 Hold tonight, then resume 3mg W & Sat, 2.5mg AOD. Next INR 12/22.  12/11/20 missed INR cont same, next INR 12/15.  11/19/20 INR 2.4 3mg W & Sat, 2.5mg AOD. Next INR 12/10.  11/5/20 INR 2.2 Cont 3mg W & Sat, 2.5mg AOD. Next INR 11/19.  10/29/20 INR 2.2 Cont 3mg W & Sat, 2.5mg AOD. Next INR 11/19  10/15/20 INR 2.3 3mg W, Sat and 2.5mg AOD. Next INR 10/29  10/6/20 INR 2.3  Take 3mg Wed and Sat and 2.5mg AOD.  Next INR 10/15/20.    9/29/20 INR 3.1 Take 2.5mg daily. Next INR 10/6.  9/22/20 INR 2.1 Take 3mg daily Next INR 9/29  (LD Antib 7/20)         REVIEW OF SYSTEMS:    Currently, no fever, chills, or rigors. Does not have any visual or hearing problems. Denies any chest pain, headaches, palpitations, lightheadedness, dizziness, shortness of breath, or cough. Appetite is good. Denies any GERD symptoms. Denies any difficulty with swallowing, nausea, or vomiting.  Denies any abdominal pain, diarrhea or constipation. Denies any urinary symptoms, yeager in place. No insomnia. No active bleeding. No rash.       PHYSICAL EXAMINATION:  Vitals:    01/07/21 1125   BP: 160/69   Pulse: 76   Resp: 18   Temp: 97.1  F (36.2  C)   SpO2: 97%   Weight: (!) 223 lb 9.6 oz (101.4 kg)   Height: 5' 1\" (1.549 m)         GENERAL: Awake, Alert, oriented x3, not in any " form of acute distress, answers questions appropriately, follows simple commands, conversant with flat affect.  Patient did have PFT  HEENT: Head is normocephalic with normal hair distribution. No evidence of trauma. Ears: No acute purulent discharge. Eyes: Sclera anicteric.  LUNG: Clear to auscultation with good chest expansion.   BACK: ROM normal, no CVA tenderness.  CVS: There is good S1  S2,  rhythm is regular.  ABDOMEN: Globular and ROTUND.  Nontender with palpation. chronic abdominal wall wounds bilaterally that are healing well.  EXTREMITIES: UE Full ROM. Right BKA, wound covered, healing.  SKIN: Warm and dry, no erythema noted, open area on abdomen.    NEUROLOGICAL: The patient is oriented to person, place and time. Strength and sensation are grossly intact. Face is symmetric.    LABS:    Lab Results   Component Value Date    WBC 7.9 08/21/2020    HGB 9.2 (L) 08/21/2020    HCT 28.6 (L) 08/21/2020    MCV 89 08/21/2020     08/21/2020       Results for orders placed or performed during the hospital encounter of 08/15/20   Basic Metabolic Panel   Result Value Ref Range    Sodium 137 136 - 145 mmol/L    Potassium 4.1 3.5 - 5.0 mmol/L    Chloride 103 98 - 107 mmol/L    CO2 23 22 - 31 mmol/L    Anion Gap, Calculation 11 5 - 18 mmol/L    Glucose 178 (H) 70 - 125 mg/dL    Calcium 11.0 (H) 8.5 - 10.5 mg/dL    BUN 37 (H) 8 - 22 mg/dL    Creatinine 2.05 (H) 0.60 - 1.10 mg/dL    GFR MDRD Af Amer 29 (L) >60 mL/min/1.73m2    GFR MDRD Non Af Amer 24 (L) >60 mL/min/1.73m2           Lab Results   Component Value Date    HGBA1C 10.3 (H) 06/17/2020     Vitamin D, Total (25-Hydroxy)   Date Value Ref Range Status   04/28/2016 29.8 (L) 30.0 - 80.0 ng/mL Final     Lab Results   Component Value Date    [id number] 285 01/07/2011       ASSESSMENT/PLAN:  1. Diarrhea, unspecified type    2. Hx of right BKA (H)    3. Moderate protein malnutrition (H)    4. Type 2 diabetes mellitus with other specified complication, with long-term  current use of insulin (H)    5. CKD (chronic kidney disease) stage 4, GFR 15-29 ml/min (H)    6. Primary osteoarthritis of both knees         COVID-19: positive test identified 12/5 via PCR. Resolved.  Although unknown if recent loose stools are related to this.     Loose stool: Have been getting varying reports regarding loose stools.  Reports today that they are semiformed.  Has been rejected by lab for C. Difficile.  The frustration is more in her incontinence.   -Start Florastor 1 capsule p.o. daily.  -If continues will consider Metamucil for stool bulking.  -Most recent colonoscopy in 2017.  Some evidence of diverticulitis otherwise clear.    Osteoarthritis: Discussed knee injections, will order triamcinolone and complete knee injections next week.  Will begin working with therapies again.  -Triamcinolone 40 mg per vial x2 vials.  -Lidocaine 1%, 5 cc bottle.  -Betadine solution.  -25 gauge syringe.    Anticoagulated:  -Omeprazole 20 mg p.o. twice daily x30 days then once daily.     Left abdominal wall open area: Followed by the wound team.  The wound is drastically improved and almost closed with no redness or drainage.  This is a huge improvement and the patient is very happy about this.  -Continue orders per wound care team and change dressing as needed for increased drainage.    Chronic conditions, reviewed:     Insulin-dependent diabetes: A1c 10.3--> 8.4.  Now on glargine 15 units nightly and sliding scale NovoLog 3 times daily with meals.  Blood sugars average 130-200. A few outliers to 280.      Essential hypertension: Satisfactory. amlodipine d.cd.   -metoprolol    Acute on chronic blood loss anemia-likely from anemia chronic disease, iron deficiency, chronic kidney disease, surgery. On oral iron .    CKD 4: Followed by Dr. Iqbal.  Calcium improved at 9.1.  -Sodium bicarb.     Coronary artery disease:  -also needs outpt CRISTINA eval   -Continue metoprolol and aspirin.       A. Fib on Coumadin: INR  therapeutic.      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 Improved. Unable to assess stump, in  and brace.   -Continues on Dilaudid.    Unintended weight loss: Patient has had greater than 20 pound weight loss since July.  She says the food is just bad here and it is always cold.    -Weight improved now at 236.  This is her baseline now.    Communicated concerns with patient, nursing.     Electronically signed by:  Zoey Leung, CNP  This progress note was completed using Dragon software and there may be grammatical errors.

## 2021-06-14 NOTE — PROGRESS NOTES
"  [facility]      NAME:  Jazmin Bauman             :  1955    MRN: [id number]    CODE STATUS:  FULL CODE    FACILITY: [facility] [[id number]]         CHIEF COMPLAIN/REASON FOR VISIT:  Chief Complaint   Patient presents with     Problem Visit     Eliquis, knee injection        HISTORY OF PRESENT ILLNESS: Jazmin Bauman is a 65 y.o. female. Pt was at [facility] from  to  and underwent a RBKA r/t ongoing DM ulcer with osteomyelitis. Per her EMR dc summary \" with HTN,morbid obesity, DM, A fib, CKD 4 presented for evaluation of R foot swelling, difficulty ambulation, pain found to have osteomyelitis now s/p amputation. She has now been to the OR twice this stay, last was on 20.   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   Patient had a chronic diabetic foot ulcer on her R heel, presented for increased swelling, difficulty ambulating, and pain. MRI showed extensive soft tissue necrosis and some osteomyelitis along with cellulitis  Met sepsis criteria on admission with leukocytosis and elevated CRP  Podiatry saw and the foot was not felt to be salvageable and BKA was recommended\"     August 15-: Hospitalized for sepsis with MRSA bacteremia, she had possible endocarditis and TTE revealing vegetation of the aortic valve. She had a CT of the abdomen and pelvis that showed left lower abdominal pannus ulceration but without abscess.  Her right BKA stump ulceration was negative for osteomyelitis or cellulitis.  She was treated with IV Vanco for 14 days which will be completed on .  Her left lower abdominal ulceration has never been biopsied.  It was not biopsied in the hospital and there was no surgical intervention.  Her right BKA stump was negative for osteomyelitis.  CKD stage IV with baseline creatinine 2.5-3.  She was at 1.82 at the end of hospitalization.  She is discharged on sodium bicarb twice " "daily.  Her insulin was adjusted and she is now on sliding scale insulin with 10 units of Lantus at bedtime.  Blood sugars have been less than 200 on average.    For her hypertension, Norvasc has been discontinued.  Blood pressures remain in the 130s over 70s on average.  She was incidentally found to have a lung nodule on CT scan; follow up CT was on December 1 and revealed resolution of the nodule.    Today:  Seen today for joint injections: see procedure note below.   Also discussed transitioning from coumadin to elaquis to eliminate the INR draws. We have not made this switch yet but she is open to discussing it further. Will switch to elaquis after vancomycin is complete.       Allergies   Allergen Reactions     Hydralazine      Paralysis of legs     Latex Itching     Skin Burns     Naprosyn [Naproxen] Swelling     throat     Nitrofurantoin Hives     Sulfa (Sulfonamide Antibiotics) Rash     Vicks Vaporub [Camphor-Eucalyptus Oil-Menthol] Rash   :     Current Outpatient Medications   Medication Sig     acetaminophen (TYLENOL) 500 MG tablet Take 2 tablets (1,000 mg total) by mouth 3 (three) times a day.     aspirin 81 MG EC tablet Take 81 mg by mouth daily.     BD ULTRA-FINE MICRO PEN NEEDLE 32 gauge x 1/4\" Ndle USE AS DIRECTED 4 TIMES DAILY.     bisacodyL (DULCOLAX) 10 mg suppository Insert 10 mg into the rectum daily as needed. No stool greater than 72 hours     docusate sodium (COLACE) 100 MG capsule Take 100 mg by mouth daily as needed.      ferrous sulfate 325 (65 FE) MG tablet Take 1 tablet by mouth 2 (two) times a day with meals.     gabapentin (NEURONTIN) 100 MG capsule Take 200 mg by mouth 2 (two) times a day.     HYDROmorphone (DILAUDID) 2 MG tablet (Take 2mg daily and 2mg Q 3 hours PRN)     lancets (ACCU-CHEK MULTICLIX LANCET) Misc TEST 6 TIMES A DAY     LANTUS SOLOSTAR U-100 INSULIN 100 unit/mL (3 mL) pen Inject 10 Units under the skin at bedtime. 11.65 Type 2 with hyperglycemia  Contact provider if " insulin prescribed is not the preferred insulin per insurance. (Patient taking differently: Inject 17 Units under the skin at bedtime. 11.65 Type 2 with hyperglycemia  Contact provider if insulin prescribed is not the preferred insulin per insurance.)     lidocaine (LIDODERM) 5 % Place 1 patch on the skin daily. Remove & Discard patch within 12 hours or as directed by MD     metoprolol tartrate (LOPRESSOR) 25 MG tablet Take 1 tablet (25 mg total) by mouth 2 (two) times a day.     miconazole (MICOTIN) 2 % powder Apply topically 2 (two) times a day. Apply to b/l groin/underneath breasts/underneath pannus     NOVOLOG U-100 INSULIN ASPART 100 unit/mL injection Check blood sugar four (4) times daily.  11.65 Type 2 with hyperglycemia  OneTouch Verio Flex  Meter  OneTouch Verio strips 2 boxes of 50  Delica Lancets box of 100  BD Ultra-fine Therese Pen Needles - NDC [address]-41 - dispense 1 case, refill PRN for 1 year (Patient taking differently: Inject under the skin see administration instructions. 7 B  10 L  10 S)     omeprazole (PRILOSEC) 20 MG capsule Take 20 mg by mouth 2 (two) times a day before meals.     polyethylene glycol (MIRALAX) 17 gram packet Take 1 packet (17 g total) by mouth daily as needed.     Saccharomyces boulardii (FLORASTOR) 250 mg capsule Take 250 mg by mouth daily.     senna (SENOKOT) 8.6 mg tablet Take 1 tablet by mouth 2 (two) times a day. (Patient taking differently: Take 1 tablet by mouth 2 (two) times a day as needed. )     sodium bicarbonate 650 MG tablet Take 1 tablet (650 mg total) by mouth 2 (two) times a day. OTC product     warfarin sodium (WARFARIN ORAL) Take by mouth. 1/26/21 INR 2.0  Cont 3mg Tues and Fri and 2.5mg AOD.  Next INR 2/9/21.    1/19/21  INR 2.1 Cont 3mg T & Fr, 2.5mg AOD. Next INR 1/26 1/5/21 INR 2.3  Cont 3mg Tues and Fri and 2.5mg AOD.  Next INR 1/19/21.    1/4/21 INR missed.  Take 2.5mg on 1/4.  Next INR 1/5/21.    12/28/20 INR 2.9  Cont 3mg Tues and Fri and 2.5mg AOD.  " Next INR 1/4/21.  12/22/20 INR 2.1  Take 3mg Tues and Fri and 2.5mg AOD.  Next INR 12/28 12/18/20 INR 2.5 2.5mg daily. Next INR 12/22.  12/16/20 INR 3.3 Hold x2 Next INR 12/18 12/14/20 INR 3.2 Hold tonight, then resume 3mg W & Sat, 2.5mg AOD. Next         REVIEW OF SYSTEMS:    Currently, no fever, chills, or rigors. Does not have any visual or hearing problems. Denies any chest pain, headaches, palpitations, lightheadedness, dizziness, shortness of breath, or cough. Appetite is good. Denies any GERD symptoms. Denies any difficulty with swallowing, nausea, or vomiting.  Denies any abdominal pain, diarrhea or constipation. Denies any urinary symptoms, yeager in place. No insomnia. No active bleeding. No rash.       PHYSICAL EXAMINATION:  Vitals:    01/21/21 1017   BP: 129/60   Pulse: (!) 58   Resp: 18   Temp: 97.4  F (36.3  C)   SpO2: 98%   Weight: (!) 224 lb 1.6 oz (101.7 kg)   Height: 5' 1\" (1.549 m)         GENERAL: Awake, Alert, oriented x3, not in any form of acute distress, answers questions appropriately, follows simple commands, conversant with flat affect.  Patient did have PFT  HEENT: Head is normocephalic with normal hair distribution. No evidence of trauma. Ears: No acute purulent discharge. Eyes: Sclera anicteric.  LUNG: Clear to auscultation with good chest expansion.   BACK: ROM normal, no CVA tenderness.  CVS: There is good S1  S2,  rhythm is regular.  ABDOMEN: Globular and ROTUND.  Nontender with palpation. chronic abdominal wall wounds bilaterally that are healing well.  EXTREMITIES: UE Full ROM. Right BKA, wound covered, healing.  SKIN: Warm and dry, no erythema noted, open area on abdomen.    NEUROLOGICAL: The patient is oriented to person, place and time. Strength and sensation are grossly intact. Face is symmetric.    LABS:    Lab Results   Component Value Date    WBC 7.9 08/21/2020    HGB 9.2 (L) 08/21/2020    HCT 28.6 (L) 08/21/2020    MCV 89 08/21/2020     08/21/2020       Results " "for orders placed or performed during the hospital encounter of 08/15/20   Basic Metabolic Panel   Result Value Ref Range    Sodium 137 136 - 145 mmol/L    Potassium 4.1 3.5 - 5.0 mmol/L    Chloride 103 98 - 107 mmol/L    CO2 23 22 - 31 mmol/L    Anion Gap, Calculation 11 5 - 18 mmol/L    Glucose 178 (H) 70 - 125 mg/dL    Calcium 11.0 (H) 8.5 - 10.5 mg/dL    BUN 37 (H) 8 - 22 mg/dL    Creatinine 2.05 (H) 0.60 - 1.10 mg/dL    GFR MDRD Af Amer 29 (L) >60 mL/min/1.73m2    GFR MDRD Non Af Amer 24 (L) >60 mL/min/1.73m2           Lab Results   Component Value Date    HGBA1C 10.3 (H) 06/17/2020     Vitamin D, Total (25-Hydroxy)   Date Value Ref Range Status   04/28/2016 29.8 (L) 30.0 - 80.0 ng/mL Final     Lab Results   Component Value Date    DRAWOHGG91 285 01/07/2011       ASSESSMENT/PLAN:  1. Paroxysmal atrial fibrillation (H)    2. Primary osteoarthritis of both knees         COVID-19: positive test identified 12/5 via PCR. Resolved.  Although unknown if recent loose stools are related to this.     Loose stool  C. difficile positive: Have been getting varying reports regarding loose stools.  Reports today that they are semiformed.  C. difficile positive after multiple attempts send expanded lab mycin.  Reporting improvement in stools and no systemic symptoms.    Osteoarthritis:   Procedure Note     Procedure:  Left knee and right knee injection      Diagnosis: Osteoarthritis of bilateral knees.     Anesthesia/Sedation: Lidocaine 1%, local anesthetic     Injectable Lot #'s:  Triamcinilone acetonide x 2 bottles: [id number] ; 10/21  Lidocaine 1%:  [id number]; 09/2022     Procedure Details: The intended procedure, risks (infection, skin atrophy, ineffective pain management), and alternatives (PT/OT, oral pain management) were discussed with the patient and informed consent was obtained. \"Pause for the cause\" was utilized to identify correct patient and intended procedure. The joint was cleansed with betadine and allowed to dry. " 1 cc of Lidocaine mixed with 40 mg of triamcinolone was injected into the right knee using a lanie-medial approach and 1 cc of lidocaine mixed with 40 mg of triamcinolone was injected into the left knee using the anterior medial injection technique.  The patient tolerated the procedure well.      Pain:   Pre procedure: 8/10 when moving, 7/10 when sitting  Post procedure: 5/10     Successful joint injections.     Anticoagulated:  -Omeprazole 20 mg daily.     Left abdominal wall open area: Followed by the wound team.  The wound is drastically improved and almost closed with no redness or drainage.  This is a huge improvement and the patient is very happy about this.  -Continue orders per wound care team and change dressing as needed for increased drainage.    Chronic conditions, reviewed:     Insulin-dependent diabetes: A1c 10.3--> 8.4.  Now on glargine 15 units nightly and sliding scale NovoLog 3 times daily with meals.  Blood sugars average 130-200. A few outliers to 280.      Essential hypertension: Satisfactory. amlodipine d.cd.   -metoprolol    Acute on chronic blood loss anemia-likely from anemia chronic disease, iron deficiency, chronic kidney disease, surgery. On oral iron .    CKD 4: Followed by Dr. Iqbal.  Calcium improved at 9.1.  -Sodium bicarb.     Coronary artery disease:  -also needs outpt CRISTINA eval   -Continue metoprolol and aspirin.       A. Fib on Coumadin: INR therapeutic.      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 Improved. Unable to assess stump, in  and brace.   -Continues on Dilaudid.    Unintended weight loss: Patient has had greater than 20 pound weight loss since July.  She says the food is just bad here and it is always cold.    -Weight improved now at 236.  This is her baseline now.    Communicated concerns with patient, nursing.     Electronically signed by:  Zoey Leung, CNP  This progress note was completed using Dragon software and there may be grammatical errors.

## 2021-06-14 NOTE — TELEPHONE ENCOUNTER
[facility] Nurse Triage Telephone Note      Provider: BARRY Smith  Facility: [facility]    Facility Type: [facility]    Caller: Narda  Call Back Number:  [phone]  Trace unit    Allergies: Hydralazine, Latex, Naprosyn [naproxen], Nitrofurantoin, Sulfa (sulfonamide antibiotics), and Vicks vaporub [camphor-eucalyptus oil-menthol]    Reason for call: BG's running elevated since steroid joint injections yesterday. Yesterday supper -had scheduled Novolog 7u. Yesterday  received Lantus 15u & oncall ordered Novolog 7u. 12MN .  This 8am  received Novolog 7u.12n today  received Novolog 7u. Rechecked 1:30 was 534 was given Novolog 14u @2pm, to recheck 3pm. 3pm -order to give Novolog another 10u now & natividad at 5pm suppertime.   5PM .    Verbal Order/Direction given by Provider: Tonight Change HS Lantus to 20u X 1 only, back to Lantus 15uHS tomorrow. Start Novolog sliding scale at suppertime tonight & give four times a day with scheduled Novolog. Stop the Novolog sliding scale after Mon 1/25 7am BG. Novolog Scale: 150-199=2u, 200-249=4u, 250-299=6u, 300-350=8u, 351-399=10u, 400-450=12u, >450=14u.    Provider giving order: BARRY Smith    Verbal order given to: Luci Laboy RN

## 2021-06-14 NOTE — PROGRESS NOTES
"Assessment: Jazmin is here today for an office visit to discuss her current diabetes management. She arrived today unaccompanied and had her log book with her. Jazmin stated she has not been checking her blood sugars regularly and data in log book was only for last 10 days. Jazmin dis report that she takes both diabetes medications daily and recently had increased Lantus dose to 49 units QD.   Blood sugars were reviewed and discussed - First off I commended and congratulated her on the progress she has made in the last year - however I informed Jazmin that I had concerns that her current regimen may be getting a little \"basal heavy\" and suggested that an addition of another medication or increase in current dose of Glipizide might help to offset this - specifically a DPP4 or possible GLP1  During today's conversation Jazmin stated more than once that \"she knew what she had to do but felt she couldn't .\"  When asked to elaborate more on this she said the colder weather was making her more hungry, it was too hard because there were so many foods she could not eat, it was too hard because the people around her could eat what ever they wanted....  I explained to her that I understood diabetes is and can be a very difficult disease -but she had to own it and make the decision for herself to do the work to manage it - I informed her that I was here to help her be successful in any way I could but ultimately she needed to make the decision to want to make the lifestyle modifications / changes.  Suggested providing her with snack options, recommended she keep a food journal, even planning meals on a weekly basis - she declined all     BG Summary: 11/22 - 11/13 reviewed - all numbers are am fasting - most recent first  163, 187, this is at 49 units  165, 165, 176, 207, 185, 202, 208, 202  These are at 47 units    Ultimately Jazmin said she \"is not ready\"  Whereas I explained that after all the progress she has made we want to " continue working on the improvements to prevent her A1c from ending up 13 % again. She said she has fears of going too low and I reassured her by pointing out close monitoring of blood sugars would help prevent this.    Plan: Jazmin was asked to check blood sugars at least twice daily - informing her that we needed more information on how her BG patterns were during the day. I strongly encouraged her to work on making the necessary changes in her diet to help manage her diabetes - Encouraged her to keep as active as she is physically able  For now continue Glipizide as prescribed, Lantus could be increased 2-4 units but feel it would be more beneficial to add a DDP4 or GLP1 ( if her sister does not have HX of medullary thyroid cancer)    She was advised to call with any questions / concerns - scheduled to F?U with PCP 12/4    Subjective and Objective:      Jazmin Bauman is referred by Dr Flores for Diabetes Education.     Lab Results   Component Value Date    HGBA1C 8.3 (H) 10/03/2017         Current diabetes medications:  Glipizide 10 mg BID, Lantus - patient taking 49 units QD    Goals       Medication            1. Take diabetes medications as prescribed  Lantus - beginning 35 units subq QD  Glipizide 10 mg PO BID  7/12/2017   Lantus diose is now increased to 41 units   MET 8/30/2017   Patient currently taking 49 units of Lantus  11/22/2017              Monitor            1. Check blood sugars 3-4  times each day  remember to bring meter and log book to all appointments  7/12/2017  SOMETIMES MET  8/30/2017   NOT MET  11/22/2017            Nutrition            1. Eat 3 balanced meals each day - Monitor carb intake and limit to 3-4 choices (45-60 grams) per meal    Do not wait longer than 4-5 hours to eat something  7/12/2017  SOMETIMES MET  8/30/2017                Follow up:   Primary care visit  12/4/17      Education:     Monitoring   Meter (per above goals): Assessed and Discussed  Monitoring: Assessed,  Discussed and Needs instruction/review at follow-up  BG goals: Assessed and Discussed    Nutrition Management  Nutrition Management: Assessed, Discussed and Needs instruction/review at follow-up  Weight: Assessed and Discussed  Portions/Balance: Assessed, Discussed and Needs instruction/review at follow-up  Carb ID/Count: Discussed  Label Reading: Discussed  Heart Healthy Fats: Discussed  Menu Planning: Assessed and Discussed  Dining Out: Discussed  Physical Activity: Assessed and Discussed  Medications: Assessed and Discussed  Orals: Assessed and Discussed  Injected Medications: Assessed and Discussed   Storage/Exp:Discussed   Site Rotation: Discussed       Diabetes Disease Process: Assessed, Discussed and Needs instruction/review at follow-up    Acute Complications: Prevent, Detect, Treat:  Hypoglycemia: Discussed  Hyperglycemia: Assessed and Discussed  Sick Days: Discussed  Driving: Not addressed    Chronic Complications  Foot Care:Discussed  Skin Care: Discussed  Eye: Not addressed  ABC: Assessed and Discussed  Teeth:Discussed  Goal Setting and Problem Solving: Assessed and Discussed  Barriers: Assessed and Discussed  Psychosocial Adjustments: Assessed and Discussed      Time spent with the patient: 60 minutes for diabetes education and counseling.   Previous Education: yes  Visit Type:DSMT        Marilu Yoder RN CDE  Diabetes education  11/22/2017

## 2021-06-14 NOTE — TELEPHONE ENCOUNTER
[facility] Nurse Triage Anticoagulation Note      Provider: BARRY Smith  Facility: [facility]    Facility Type: [facility]    Caller: Carolyn  Call Back Number:  [phone] Trace    Reason for call: INR    Today s INR: 2.1  Previous INR: 1/5/21 INR 2.3 3mg T & F, 2.5mg AOD.    Diagnosis/Goal: AFIB  Heparin/Lovenox: No   Currently on ABX: Yes  Vanco 1/14 X 10 days  Other interacting Medications: None  Missed or refused doses: No    Verbal Order/Direction given by Provider: Cont 3mg Tu & Fri, 2.5mg AOD. NExt INR 1/26.    Provider giving order: BARRY Smith    Verbal order given to: Carolyn Shepard RN

## 2021-06-14 NOTE — PROGRESS NOTES
[facility]    Facility:  [facility] [[id number]]    Code Status:  FULL CODE    Chief Complaint   Patient presents with     Review Of Multiple Medical Conditions     Regulatroy    : Patient is seen for federally mandated regulatory visit                  Patient Active Problem List   Diagnosis     Carpal Tunnel Syndrome     Urinary Tract Infection     Endometrial Hyperplasia     Cholelithiasis     Leukocytosis     Urine Tests Nonspecific Abnormal Findings     Obesity     Essential hypertension     Pernicious Anemia     Immunology Studies Raised Immunoglobulin Level     Vaginal bleeding     Type 2 diabetes mellitus with other specified complication, with long-term current use of insulin (H)     Atrial fibrillation with RVR (H)     Acute kidney injury superimposed on CKD (H)     Non-traumatic rhabdomyolysis     Metabolic acidosis     Troponin level elevated     Bacteremia     Acute respiratory failure with hypoxia (H)     Acute encephalopathy     Acute pulmonary edema (H)     Wheezing     Moderate protein malnutrition (H)     Abnormal cytological findings in specimens from other female genital organs     Chronic kidney disease, stage III (moderate)     Hyperkalemia     Osteoarthritis     Acute kidney injury (H)     Sepsis (H)     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)     Sepsis, due to unspecified organism, unspecified whether acute organ dysfunction present (H)     Cellulitis of right foot     CKD (chronic kidney disease) stage 4, GFR 15-29 ml/min (H)     Chronic wound infection of abdomen, subsequent encounter     Acute post-operative pain     Phantom limb pain (H)     Paroxysmal atrial fibrillation (H)     Urinary retention     Coronary artery disease without angina pectoris     Hx of right BKA (H)     Weakness     Slow transit constipation     Wound infection     Fever and chills     Acute pain of left shoulder     Physical deconditioning      Gram-positive bacteremia     Chronic pain syndrome     MRSA bacteremia     Hypercalcemia     Paronychia of great toe, left     Morbid obesity (H)     Below-knee amputation (H)     Other depression     Unintended weight loss     2019 novel coronavirus disease (COVID-19)     Frequent loose stools     Anticoagulated     Primary osteoarthritis of both knees       History:Jazmin Bauman  is a 65 year old female with history of right BKA June 23, 2020 with subsequent stump infection, bilateral chronic abdominal wounds, CKD 4, morbid obesity, insulin-dependent type 2 diabetes, chronic atrial fibrillation, peripheral neuropathy, GERD, peripheral edema, obesity, anemia of chronic disease with iron deficiency, MRSA bacteremia with possible endocarditis August 2020, recent C. difficile colitis      Most recent Hospital Course: Patient was hospitalized August 15 through August 22 after developing fever and chills at her TCU facility.  Patient was felt to have secondary infection of her chronic abdominal wound at outset and was started on vancomycin plus Zosyn.    August 15 culture came back positive for MRSA.  MRSA persisted in August 16 cultures but have been negative since then.  Her MSSA bacteremia was of unknown source but there were several possibilities with right BKA stump drainage which was new abdominal wounds.  Patient also had acute left shoulder pain concerning for seeding of the joint, as well as abnormal TTE on August 17 of the mobile atrial valve vegetation concerning for endocarditis although the vegetation was not seen but if she had her KEMI on August 19.  ID recommends IV Vanco through August 30.    Chronic abdominal pannus ulcerations were seen by wound care with updated wound instructions and all agree steady improvement.    Right BKA stump ulceration was seen by Dr. Corcoran with debridement, no evidence for cellulitis or abscess.    Left shoulder pain evaluated including with CT no evidence of septic  joint, degenerative changes noted.  Chronic pain management ongoing.    Patient had sodium bicarbonate added to her her program due to CKD 4/low bicarb    Diabetes was managed with changes in insulin dosage.    Paroxysmal atrial fibrillation treated with ongoing warfarin    Lung nodules were noted although patient is pretty sure that the right lower lobe 1.3 cm nodule has been seen in the past.  Other noncalcified nodules which are small noted.  Questionably infectious?  Radiology recommended short-term follow-up.  Patient had no respiratory symptoms.    CRISTINA was again suspected with outpatient sleep study recommended, patient continues to decline that.    Chronic pain with acute flare evaluated by pain care team.  Patient now on renally dosed Neurontin p.o. Dilaudid 2 mg every 3 hours and prior to dressing changes along with scheduled Tylenol lidocaine ointment.  She was on morphine gel with dressing changes although that has not been continued here at the TCU.      Subjective/ROS:    -augmented by discussion with facility staff involved in direct care    -Patient is just completing 10 days of vancomycin for C. difficile colitis.  She has been dealing with loose stools off and on for a month.  She is frustrated because many of her stool samples were rejected because they were partially formed or formed yet she knew she had mostly loose stools.  She has not been on antibiotics in many weeks.  She says things have finally improved over the last 48 hours.  She has not had any bowel movement today and perhaps just 1 small one yesterday.  She says she is feeling better and able to eat again.    -Patient has lost further weight down to 224 pounds for example she is 248 pounds July 24.    -Blood sugars markedly elevated over the last 48 hours.  I have trouble understanding the situation but Ms. Leung, CNP informs me that patient's left knee was injected with cortisone at that time which explains it.    -To talk about  "patient developed Covid infection + 12/1/2020.  Fortunately she had very little symptoms.  She recalls being more tired and some headache.  She had her first Covid vaccine and has her second 1 next Tuesday.    -Patient reports frustration with the facility in many ways and is hoping to try to move out as soon as possible    -Among her frustrations is that her lidocaine ointment is expensive and she feels like she is getting charged for it based on letters that are arriving.  He did know on the Lantus.  She wonders if cheaper options are available.    -Patient finds the Voltaren \"worthless\" she was using it topically for her knees.    -Despite recent low vitamin D level at 26, nephrology has recommended no vitamin D and she is also had discontinuation of patiromer  because of hypercalcemia which has improved with those changes    -He also has elevated uric acid 10.5, nephrology is aware.  Patient had painful foot presumed gout recently managed by Ms. Leung.  Renally dosed allopurinol recommended due to persistent hyperuricemia.    She did see nephrology on January 12 with no changes in her antihypertensive no changes in her anemia of chronic disease management discontinuation of her patirmer.    -Patient wants Pap/gynecologic exam with her primary care gynecologist.  She says the facility so that she can have that.  Not sure that is true.  Will need to look into it..  Suspect she is probably due for mammogram, possible DEXA etc.  -Patient has come to accept her sodium bicarb treatment  -Patient remains on warfarin    -I did not discuss suspected CRISTINA again, see my previous note, patient doubts she would accept treatment even if she had a.  Furthermore she is lost a lot of weight which may be making a difference.    -12/4/2020 CT shows that pulmonary nodule resolved.      No falls injuries fever sweats chills vomiting cough shortness of breath   remainder negative    Past Medical History:   Diagnosis Date     Anemia  "    pernicious     Diabetes mellitus (H)     borderline     Endometrial hyperplasia      Fibromyalgia      History of recurrent UTI (urinary tract infection)      History of transfusion      Hypertension      Thrombocytopenia (H)      Vaginal bleeding 1/2015     Past Surgical History:   Procedure Laterality Date     BELOW KNEE LEG AMPUTATION Right 6/18/2020    Procedure: AMPUTATION, BELOW KNEE;  Surgeon: Epi Corcoran MD;  Location: [facility];  Service: General     BELOW KNEE LEG AMPUTATION Right 6/23/2020    Procedure: REVISION AMPUTATION, BELOW KNEE;  Surgeon: Epi Corcoran MD;  Location: [facility] OR;  Service: General     bilateral carpal tunnel release  2009     CHOLECYSTECTOMY       COLONOSCOPY N/A 9/11/2017    Procedure: COLONOSCOPY;  Surgeon: Tyler Bhakta MD;  Location: [facility];  Service:      COMBINED HYSTEROSCOPY DIAGNOSTIC / D&C N/A 1/28/2015    Procedure: DILATION AND CURETTAGE WITH HYSTEROSCOPY;  Surgeon: Grant Beal MD;  Location: [facility];  Service:      DILATION AND CURETTAGE OF UTERUS       IR NON TUNNELED CATHETER >5 YEARS  1/21/2020     [facility]  1/20/2020          [facility]  8/21/2020          TN HYSTEROSCOPY,W/ENDO BX N/A 9/5/2019    Procedure: HYSTEROSCOPY, DILATION AND CURETTAGE;  Surgeon: Grant Beal MD;  Location: [facility];  Service: Gynecology     TN HYSTEROSCOPY,W/ENDO BX N/A 12/9/2019    Procedure: HYSTEROSCOPY, DILATION AND CURETTAGE;  Surgeon: Grant Beal MD;  Location: [facility];  Service: Gynecology          Family History   Problem Relation Age of Onset     Colon cancer Father    :       Social History     Socioeconomic History     Marital status: Single     Spouse name: Not on file     Number of children: Not on file     Years of education: Not on file     Highest education level: Not on file   Occupational History     Not on file   Social Needs     Financial resource strain: Not on file     Food insecurity      "Worry: Not on file     Inability: Not on file     Transportation needs     Medical: Not on file     Non-medical: Not on file   Tobacco Use     Smoking status: Never Smoker     Smokeless tobacco: Never Used   Substance and Sexual Activity     Alcohol use: Not Currently     Comment: rare     Drug use: No     Sexual activity: Not on file   Lifestyle     Physical activity     Days per week: Not on file     Minutes per session: Not on file     Stress: Not on file   Relationships     Social connections     Talks on phone: Not on file     Gets together: Not on file     Attends Sabianist service: Not on file     Active member of club or organization: Not on file     Attends meetings of clubs or organizations: Not on file     Relationship status: Not on file     Intimate partner violence     Fear of current or ex partner: Not on file     Emotionally abused: Not on file     Physically abused: Not on file     Forced sexual activity: Not on file   Other Topics Concern     Not on file   Social History Narrative     Not on file   :        Current Outpatient Medications on File Prior to Visit   Medication Sig Dispense Refill     acetaminophen (TYLENOL) 500 MG tablet Take 2 tablets (1,000 mg total) by mouth 3 (three) times a day.  0     aspirin 81 MG EC tablet Take 81 mg by mouth daily.       BD ULTRA-FINE MICRO PEN NEEDLE 32 gauge x 1/4\" Ndle USE AS DIRECTED 4 TIMES DAILY. 360 each 3     bisacodyL (DULCOLAX) 10 mg suppository Insert 10 mg into the rectum daily as needed. No stool greater than 72 hours       docusate sodium (COLACE) 100 MG capsule Take 100 mg by mouth daily as needed.        ferrous sulfate 325 (65 FE) MG tablet Take 1 tablet by mouth 2 (two) times a day with meals.       gabapentin (NEURONTIN) 100 MG capsule Take 200 mg by mouth 2 (two) times a day.       HYDROmorphone (DILAUDID) 2 MG tablet (Take 2mg daily and 2mg Q 3 hours PRN) 180 tablet 0     lancets (ACCU-CHEK MULTICLIX LANCET) Misc TEST 6 TIMES A  " each 11     LANTUS SOLOSTAR U-100 INSULIN 100 unit/mL (3 mL) pen Inject 10 Units under the skin at bedtime. 11.65 Type 2 with hyperglycemia  Contact provider if insulin prescribed is not the preferred insulin per insurance. (Patient taking differently: Inject 17 Units under the skin at bedtime. 11.65 Type 2 with hyperglycemia  Contact provider if insulin prescribed is not the preferred insulin per insurance.) 50 mL 0     lidocaine (XYLOCAINE) 5 % ointment Apply topically 4 (four) times a day. Apply to left shoulder or around wound (not inside wound) 35.44 g 0     metoprolol tartrate (LOPRESSOR) 25 MG tablet Take 1 tablet (25 mg total) by mouth 2 (two) times a day. 180 tablet 3     miconazole (MICOTIN) 2 % powder Apply topically 2 (two) times a day. Apply to b/l groin/underneath breasts/underneath pannus 100 g 0     NOVOLOG U-100 INSULIN ASPART 100 unit/mL injection Check blood sugar four (4) times daily.  11.65 Type 2 with hyperglycemia  OneTouch Verio Flex  Meter  OneTouch Verio strips 2 boxes of 50  Delica Lancets box of 100  BD Ultra-fine Therese Pen Needles - NDC [address]-71 - dispense 1 case, refill PRN for 1 year (Patient taking differently: Inject under the skin see administration instructions. 7 B  10 L  10 S) 10 mL PRN     omeprazole (PRILOSEC) 20 MG capsule Take 20 mg by mouth 2 (two) times a day before meals.       polyethylene glycol (MIRALAX) 17 gram packet Take 1 packet (17 g total) by mouth daily as needed.  0     Saccharomyces boulardii (FLORASTOR) 250 mg capsule Take 250 mg by mouth daily.       senna (SENOKOT) 8.6 mg tablet Take 1 tablet by mouth 2 (two) times a day. (Patient taking differently: Take 1 tablet by mouth 2 (two) times a day as needed. )  0     sodium bicarbonate 650 MG tablet Take 1 tablet (650 mg total) by mouth 2 (two) times a day. OTC product  0     warfarin sodium (WARFARIN ORAL) Take by mouth. 1/19/21  INR 2.1 Cont 3mg T & Fr, 2.5mg AOD. Next INR 1/26 1/5/21 INR 2.3  Cont 3mg  Tues and Fri and 2.5mg AOD.  Next INR 1/19/21.    1/4/21 INR missed.  Take 2.5mg on 1/4.  Next INR 1/5/21.    12/28/20 INR 2.9  Cont 3mg Tues and Fri and 2.5mg AOD.  Next INR 1/4/21.  12/22/20 INR 2.1  Take 3mg Tues and Fri and 2.5mg AOD.  Next INR 12/28 12/18/20 INR 2.5 2.5mg daily. Next INR 12/22.  12/16/20 INR 3.3 Hold x2 Next INR 12/18 12/14/20 INR 3.2 Hold tonight, then resume 3mg W & Sat, 2.5mg AOD. Next       [DISCONTINUED] cholecalciferol, vitamin D3, (VITAMIN D3) 1,000 unit capsule Take 1,000 Units by mouth daily.       [DISCONTINUED] collagenase ointment Apply daily per WOC  0     [DISCONTINUED] vancomycin (VANCOCIN) 125 MG capsule Take 125 mg by mouth 4 (four) times a day.       No current facility-administered medications on file prior to visit.    :      ALLERGIES:  Hydralazine, Latex, Naprosyn [naproxen], Nitrofurantoin, Sulfa (sulfonamide antibiotics), and Vicks vaporub [camphor-eucalyptus oil-menthol]    Vitals:  129/60 respirations 18 temperature 96.8 heart rate 64 O2 sats 97% on room air weight is 224 pounds  Blood sugars 153-573    Physical exam:    Patient is alert she is oriented x3 she is normally conversant normocephalic gaze is conjugate sclera appear clear purposeful movement of all 4 extremities noted.  We do remove the sleeve from her stump, well-healed without any wound.  No edema in the left lower extremity.  Patient's abdominal wounds are basically healed to the naked eye although she still has a little seepage from the one in the left belly so there may be subclinical opening there I suspect.  The leg is also in the same shape, nearly fully epithelialized    Due to the 2020 Covid 19 pandemic, except as noted above, the patient was visually observed at a 6 foot plus distance.  An observational exam was performed in an effort to keep patient safe from Covid 19 and other communicable diseases.   Labs:  Lab Results   Component Value Date    WBC 7.9 08/21/2020    HGB 9.2 (L) 08/21/2020     HCT 28.6 (L) 08/21/2020    MCV 89 08/21/2020     08/21/2020     Results for orders placed or performed during the hospital encounter of 08/15/20   Basic Metabolic Panel   Result Value Ref Range    Sodium 137 136 - 145 mmol/L    Potassium 4.1 3.5 - 5.0 mmol/L    Chloride 103 98 - 107 mmol/L    CO2 23 22 - 31 mmol/L    Anion Gap, Calculation 11 5 - 18 mmol/L    Glucose 178 (H) 70 - 125 mg/dL    Calcium 11.0 (H) 8.5 - 10.5 mg/dL    BUN 37 (H) 8 - 22 mg/dL    Creatinine 2.05 (H) 0.60 - 1.10 mg/dL    GFR MDRD Af Amer 29 (L) >60 mL/min/1.73m2    GFR MDRD Non Af Amer 24 (L) >60 mL/min/1.73m2         Lab Results   Component Value Date    TSH 1.83 06/17/2020     Lab Results   Component Value Date    HGBA1C 10.3 (H) 06/17/2020     [unfilled]  Lab Results   Component Value Date    GUMFMGDX88 285 01/07/2011     Lab Results   Component Value Date     (H) 01/20/2020       Assessment/Plan:      ICD-10-CM    1. Paroxysmal atrial fibrillation (H)  I48.0    2. Type 2 diabetes mellitus with other specified complication, with long-term current use of insulin (H)  E11.69     Z79.4    3. Essential hypertension  I10    4. CKD (chronic kidney disease) stage 4, GFR 15-29 ml/min (H)  N18.4        Multijoint arthritis   Knee injection a couple of days ago has led to hypoglycemia.  Patient's not sure if it is helping yet or not.  -Patient also complains of bilateral wrist arthritis.  She wonders about having those injected as well.  I offered to send her to hand surgeon for evaluation though she does not choose appetite right now.  I would recommend against cortisone injection in the near future considering her response to this 1 with hyperglycemia.  Patient also has shoulder arthritis.  -She requests discontinuation of alternatives done  -She requests a cheaper option for the 5% lidocaine ointment which she likes and feels like it does do something.  I wrote an order for the pharmacy to check into other options such as 3%  cream etc. that may be cheaper     DM 2   Control has been pretty good but has deteriorated after the cortisone injection.  Ms. Leung just increased her insulin and I am going to do it again today.  -Lantus up to 17 units  -Mealtime insulin 7 in the morning 10 at noon and 10 in the evening with supper  -Continue to monitor sugars frequently  -She may need backing off the insulin once her cortisone is metabolized       MRSA bacteremia  Possible endocarditis   Resolved.  The source was never certain.  She had several open wounds however.  The endocarditis was never definitively diagnosed either.  She completed vancomycin treatment months ago.    left shoulder pain   Some acute pain with her bacteremia a few months ago.  Now more of a chronic pain.  Questionable osteoarthritis?    Abdominal wounds   Drastically improved since I last saw him.  -No change in plan    Right stump wound   Resolved        Hypertension   Blood pressure has improved concurrently with weight loss not sure if it is a cause-and-effect.  She is no longer on amlodipine.  She still takes metoprolol 25 twice daily with good blood pressures overall.    Paroxysmal atrial fibrillation   Continue warfarin with INR monitoring.  Continue metoprolol 25 mg twice daily.      CKD 4  Metabolic acidosis  Hypercalcemia   Sodium bicarbonate 650 twice daily added during the hospital stay.  Follow-up with nephrology completed a few days ago.  No major changes in her treatment however her patiromer and vitamin D were discontinued by nephrology.  -I asked staff to send the vitamin D level to Dr. Frost for review since she does have multiple risk factors for osteoporosis and is deficient at 26    History of peripheral edema and Lasix use   Continues to do well without edema off of Lasix now for several months    Constipation   No recurrence she has her bowel program worked out now with some confidence.    BKA June 23 2020   Now with full healing.    Lung  nodules   Follow-up [place] [address] reassuring.  No further follow-up anticipated    Acute on chronic pain with current opiate use.   She remains on hydromorphone gabapentin in addition to her topicals.  She also takes acetaminophen.  -I ordered pain consult last time I saw her.  -I continue to argue in favor of taper to 0 but will leave that to her pain specialist.    Probable gout    Acute attack resolved.  Managed by Ms. Leung.  Recommend consideration of chronic allopurinol therapy renally dosed due to uric acid 10.5      Case discussed with:    Facility staff           Harry Blackwood MD

## 2021-06-14 NOTE — TELEPHONE ENCOUNTER
[facility] Nurse Triage Anticoagulation Note      Provider: BARRY Smith  Facility: [facility]    Facility Type: [facility]    Caller: Carolyn  Call Back Number:  [phone]    Reason for call: INR    Today s INR: 2.0  Previous INR: 1/19 2.1(3mg Tues and Fri and 2.5mg AOD), 1/5 2.3(3mg Tues and Fri and 2.5mg AOD), 12/28 2.9(3mg Tues and Fri and 2.5mg AOD).      Diagnosis/Goal: AFIB  Heparin/Lovenox: No   Currently on ABX: No; ended oral Vanco on 1/24/21.    Other interacting Medications: None  Missed or refused doses: No    Verbal Order/Direction given by Provider: Continue Warfarin 3mg Tues and Fri and 2.5mg all other days.  Next INR 2/9/21.      Provider giving order: BARRY Smith    Verbal order given to: Carolyn Jacobsen RN

## 2021-06-14 NOTE — TELEPHONE ENCOUNTER
[facility] Nurse Triage Telephone Note      Provider: BARRY Smith  Facility: [facility]    Facility Type: [facility]    Caller: Mary  Call Back Number:  [phone]    Allergies: Hydralazine, Latex, Naprosyn [naproxen], Nitrofurantoin, Sulfa (sulfonamide antibiotics), and Vicks vaporub [camphor-eucalyptus oil-menthol]    Reason for call: Order for Lidocaine oint, not covered by insurance & to expensive, Will cover Lidocaine 4% patch.    Verbal Order/Direction given by Provider: Okay to change to patch for L shoulder.    Provider giving order: BARRY Smith    Verbal order given to: Mary Shepard RN

## 2021-06-14 NOTE — TELEPHONE ENCOUNTER
[facility] Nurse Triage Telephone Note      Provider: BARRY Smith  Facility: [facility]    Facility Type: [facility]    Caller: Carolyn   Call Back Number:  [phone]  [facility]    Allergies: Hydralazine, Latex, Naprosyn [naproxen], Nitrofurantoin, Sulfa (sulfonamide antibiotics), and Vicks vaporub [camphor-eucalyptus oil-menthol]    Reason for call: Stool + C. Diff. Is on Florastor     Verbal Order/Direction given by Provider: Vanco 125mg q 6 hr X 10days.    Provider giving order: BARRY Smith    Verbal order given to: Mary Shepard RN

## 2021-06-14 NOTE — PROGRESS NOTES
Office Visit - Follow up    Jazmin Bauman   62 y.o. female    Date of Visit: 12/4/2017    Chief Complaint   Patient presents with     Diabetes     fasting     Hypertension       Subjective: Diabetes mellitus type 2 with hypertension and hyperlipidemia and super morbid obesity BMI is 55.  Last A1c was 8.3 on October 3, 2017.  The patient denies any new complaints of chest pain shortness of breath she feels well otherwise no blood in stool or urine.  Mammogram allCLEAR March 16, 2017 and no cancer seen in colonoscopy dated March 27, 2012 there is a family history of colon cancer there were 2 polyps seen on that last colonoscopy one was a tubular adenomatous polyp one was hyperplastic.    No blood in stool or urine no chest pain shortness of breath.    Medication list reviewed well-tolerated there are allergies multiple to drugs including hydralazine naproxen sodium nitrofurantoin and sulfa takes VapoRub plus latex.    ROS: A comprehensive review of systems was performed and was otherwise negative    Medications:  Prior to Admission medications    Medication Sig Start Date End Date Taking? Authorizing Provider   ACCU-CHEK SAM PLUS TEST STRP strips TEST 6 TIMES A DAY 9/18/17  Yes Lester Flores MD   acetaminophen (TYLENOL) 325 MG tablet Take 325-650 mg by mouth every 8 (eight) hours as needed.    Yes PROVIDER, HISTORICAL   allopurinol (ZYLOPRIM) 100 MG tablet Take 1 tablet (100 mg total) by mouth daily. 6/29/16  Yes Lester Flores MD   amLODIPine (NORVASC) 10 MG tablet Take 10 mg by mouth daily.   Yes PROVIDER, HISTORICAL   aspirin 81 MG EC tablet Take 81 mg by mouth daily.   Yes PROVIDER, HISTORICAL   cholecalciferol, vitamin D3, (VITAMIN D3) 1,000 unit capsule Take 1,000 Units by mouth daily.   Yes PROVIDER, HISTORICAL   docusate sodium (COLACE) 50 MG capsule Take by mouth once daily.   Yes PROVIDER, HISTORICAL   ferrous sulfate 65 mg elemental iron (IRON) Take 1 tablet by mouth 2 (two) times a day.    "Yes PROVIDER, HISTORICAL   glipiZIDE (GLUCOTROL) 10 MG tablet Take 10 mg by mouth 2 (two) times a day.  1/31/16  Yes PROVIDER, HISTORICAL   lancets (ACCU-CHEK MULTICLIX LANCET) Misc TEST 6 TIMES A DAY 9/28/15  Yes Lester Flores MD   LANTUS SOLOSTAR 100 unit/mL (3 mL) pen inject 35 units under the skin every morning. do not mix with any other insulin.  Patient taking differently: inject 49 units under the skin every morning. do not mix with any other insulin. 11/27/17  Yes Max Perez MD   magnesium 250 mg Tab Take 500 mg by mouth 2 (two) times a day.    Yes PROVIDER, HISTORICAL   pen needle, diabetic (NOVOFINE 32) 32 gauge x 1/4\" Ndle Use 1 Device As Directed daily. 7/12/17  Yes Sydni Garland NP   colchicine (MITIGARE) 0.6 mg cap Take 0.6 mg by mouth 2 (two) times a day. 12/21/16   Lester Flores MD   azithromycin (ZITHROMAX) 250 MG tablet Take 2 tablets (500 mg) by mouth on  Day 1,  followed by 1 tablet (250 mg) once daily on Days 2 through 5. 11/2/17 12/4/17  Lester Flores MD       Allergies:   Allergies   Allergen Reactions     Hydralazine      Latex Itching     Skin Burns     Naprosyn [Naproxen] Swelling     throat     Nitrofurantoin Hives     Sulfa (Sulfonamide Antibiotics) Rash     Vicks Vaporub [Camphor-Eucalyptus Oil-Menthol] Rash       Immunizations:   Immunization History   Administered Date(s) Administered     Influenza [id number], 12/18/2009     Influenza, inj, historic,unspecified 12/18/2009, 10/14/2011     Influenza, seasonal,quad inj 36+ mos 09/28/2015     Influenza, seasonal,quad inj 6-35 mos 10/30/2012, 09/30/2013, 11/21/2014     Influenza,seasonal quad, PF, 36+MOS 09/29/2016, 10/03/2017     Td,adult,historic,unspecified 03/02/2006     Tdap 05/15/2015       Exam Chest clear to auscultation and percussion.  Heart tones regular rhythm without murmur rub or gallop.  Abdomen soft nontender no organomegaly.  No peritoneal signs.  Extremities free of edema cyanosis or clubbing.  " Neck veins nondistended no thyromegaly or scleral icterus noted, carotids full.  Skin warm and dry easily conversant good spirited.  Normal intelligence.  Neurologically intact no gross localizing findings.    Assessment and Plan  Diabetes mellitus type 2 check A1c blood sugar urine for microalbumin and lipid panel today.    Super morbid obesity see below.    Colon polyps tubular adenomatous in type with family history of colon cancer.  See colonoscopy report March 27, 2012.    Multiple drug allergies see above list.    Pernicious anemia vitamin B12 1000 mcg IM given today.    Hypertension controlled.    Number cytopenia with history of leukocytosis status post multiple hematology and bone marrow consultations in this regard without evidence for definite definitive diagnosis from a bone marrow hematologic standpoint.    Time: total time spent with the patient was 25 minutes of which >50% was spent in counseling and coordination of care    The following high BMI interventions were performed this visit: encouragement to exercise    Lester Flores MD    Patient Active Problem List   Diagnosis     Thrombocytopenia     Fibromyalgia     Carpal Tunnel Syndrome     Urinary Tract Infection     Endometrial Hyperplasia     Cholelithiasis     Leukocytosis     Urine Tests Nonspecific Abnormal Findings     Obesity     Essential Hypertension     Pernicious Anemia     Immunology Studies Raised Immunoglobulin Level     Vaginal bleeding     Type 2 diabetes mellitus

## 2021-06-14 NOTE — TELEPHONE ENCOUNTER
[facility] Nurse Triage Telephone Note      Provider: BARRY Smith  Facility: [facility]    Facility Type: [facility]    Caller: Carolyn  Call Back Number:  [phone]    Allergies: Hydralazine, Latex, Naprosyn [naproxen], Nitrofurantoin, Sulfa (sulfonamide antibiotics), and Vicks vaporub [camphor-eucalyptus oil-menthol]    Reason for call: Nurse reporting that patient's INR was missed.  Last INR was on 12/28/20 and it was 2.9 with orders for Warfarin 3mg Tues and Fri and 2.5mg all other days.  Patient is on Warfarin for A-fib and also takes EC ASA 81mg daily.       Verbal Order/Direction given by Provider: Take Warfarin 2.5mg today.  Check INR 1/5/21.      Provider giving order: BARRY Smith    Verbal order given to: Carolyn Jacobsen RN

## 2021-06-14 NOTE — TELEPHONE ENCOUNTER
[facility] Nurse Triage Telephone Note      Provider: BARRY Smith  Facility: [facility]    Facility Type: [facility]    Caller: nurse  Call Back Number:  [phone]    Allergies: Hydralazine, Latex, Naprosyn [naproxen], Nitrofurantoin, Sulfa (sulfonamide antibiotics), and Vicks vaporub [camphor-eucalyptus oil-menthol]    Reason for call: INR results not available yet 5:45pm, but need to order Coumadin dose inorder to get from pharmacy to give tonight dose. Last INR 12/22 2.1 on 3mg T&F, 2.5AOD. Not on antibiotic.     Verbal Order/Direction given by Provider: Give 2.5mg tonight, call with result in am to update & get further orders.    Provider giving order: BARRY Smith    Verbal order given to: nurse      Helga Shepard RN

## 2021-06-14 NOTE — TELEPHONE ENCOUNTER
[facility] Nurse Triage Anticoagulation Note      Provider: BARRY Smith  Facility: [facility]    Facility Type: [facility]    Caller: Carolyn  Call Back Number:  [phone]    Reason for call: INR    Today s INR: 2.1  Previous INR: 12/18 2.5(2.5mg daily), 12/16 3.3(hold x 2), 12/14 3.2(hold x 1, then 2.5mg).      Diagnosis/Goal: AFIB  Heparin/Lovenox: No   Currently on ABX: No  Other interacting Medications: Other: EC ASA 81mg daily  Missed or refused doses: No    Nurse also reporting Hgb result of 11.0 today.  Previous Hgb was 12.0 on 12/16/20.      Verbal Order/Direction given by Provider: Warfarin 3mg Tues and Fri and 2.5mg all other days.  Next INR and Heme 1 on 12/28/20.      Provider giving order: BARRY Smith    Verbal order given to: Carolyn Jacobsen RN

## 2021-06-14 NOTE — TELEPHONE ENCOUNTER
[facility] Nurse Triage Anticoagulation Note      Provider: BARRY Smith  Facility: [facility]    Facility Type: [facility]    Caller: Carolyn  Call Back Number:  [phone]    Reason for call: INR    Today s INR: 2.3  Previous INR: 12/28 2.9(3mg Tues and Fri and 2.5mg AOD), 12/22 2.1(3mg Tues and Fri and 2.5mg AOD).      Diagnosis/Goal: AFIB  Heparin/Lovenox: No   Currently on ABX: No  Other interacting Medications: Other: EC ASA 81mg daily  Missed or refused doses: No    Verbal Order/Direction given by Provider: Continue Warfarin 3mg Tues and Fri and 2.5mg all other days.  Next INR 1/19/21.      Provider giving order: BARRY Smith    Verbal order given to: Carolyn Jacobsen RN

## 2021-06-15 PROBLEM — I10 ESSENTIAL HYPERTENSION: Status: ACTIVE | Noted: 2019-11-26

## 2021-06-15 PROBLEM — E66.9 OBESITY: Status: ACTIVE | Noted: 2019-11-26

## 2021-06-15 NOTE — TELEPHONE ENCOUNTER
[facility] Nurse Triage Telephone Note      Provider: BARRY Smith  Facility: [facility]    Facility Type: [facility]    Caller: Narda  Call Back Number:  [phone]    Allergies: Hydralazine, Latex, Naprosyn [naproxen], Nitrofurantoin, Sulfa (sulfonamide antibiotics), and Vicks vaporub [camphor-eucalyptus oil-menthol]    Reason for call: Nurse calling on behalf of patient's request to switch insulin from Aspart to Lispro due to cost.  Patient would also like to switch from Warfarin to Eliquis.       Verbal Order/Direction given by Provider: Ok to switch from Aspart insulin to Lispro when current supply runs out.  Hold Warfarin x 3 days.  Check INR on 2/8/21.  If INR is less than 2, start Eliquis 5mg two times a day.       Provider giving order: BARRY Smith    Verbal order given to: Narda Jacobsen RN

## 2021-06-15 NOTE — TELEPHONE ENCOUNTER
[facility] Nurse Triage Anticoagulation Note      Provider: BARRY Smith  Facility: [facility]    Facility Type: [facility]    Caller: Luci  Call Back Number:  [phone]    Reason for call: INR    Today s INR: 1.2  Previous INR: 1/26 2.0(3mg Tues and Fri and 2.5mg all other days----Warfarin held since 2/5 for possible switch to Eliquis)    Diagnosis/Goal: AFIB  Heparin/Lovenox: No   Currently on ABX: No  Other interacting Medications: None  Missed or refused doses: No    Verbal Order/Direction given by Provider: Discontinue Warfarin and INR checks.  Start Eliquis 5mg two times a day.      Provider giving order: BARRY Smith    Verbal order given to: Luci Jacobsen RN

## 2021-06-15 NOTE — PROGRESS NOTES
"  [facility]      NAME:  Jazmin Bauman             :  1955    MRN: [id number]    CODE STATUS:  FULL CODE    FACILITY: [facility] [[id number]]         CHIEF COMPLAIN/REASON FOR VISIT:  Chief Complaint   Patient presents with     Problem Visit     Medication air, C. difficile       HISTORY OF PRESENT ILLNESS: Jazmin Bauman is a 65 y.o. female. Pt was at [facility] from  to  and underwent a RBKA r/t ongoing DM ulcer with osteomyelitis. Per her EMR dc summary \" with HTN,morbid obesity, DM, A fib, CKD 4 presented for evaluation of R foot swelling, difficulty ambulation, pain found to have osteomyelitis now s/p amputation. She has now been to the OR twice this stay, last was on 20.   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   Patient had a chronic diabetic foot ulcer on her R heel, presented for increased swelling, difficulty ambulating, and pain. MRI showed extensive soft tissue necrosis and some osteomyelitis along with cellulitis  Met sepsis criteria on admission with leukocytosis and elevated CRP  Podiatry saw and the foot was not felt to be salvageable and BKA was recommended\"     August 15-: Hospitalized for sepsis with MRSA bacteremia, she had possible endocarditis and TTE revealing vegetation of the aortic valve. She had a CT of the abdomen and pelvis that showed left lower abdominal pannus ulceration but without abscess.  Her right BKA stump ulceration was negative for osteomyelitis or cellulitis.  She was treated with IV Vanco for 14 days which will be completed on .  Her left lower abdominal ulceration has never been biopsied.  It was not biopsied in the hospital and there was no surgical intervention.  Her right BKA stump was negative for osteomyelitis.  CKD stage IV with baseline creatinine 2.5-3.  She was at 1.82 at the end of hospitalization.  She is discharged on sodium bicarb twice " "daily.  Her insulin was adjusted and she is now on sliding scale insulin with 10 units of Lantus at bedtime.  Blood sugars have been less than 200 on average.    For her hypertension, Norvasc has been discontinued.  Blood pressures remain in the 130s over 70s on average.  She was incidentally found to have a lung nodule on CT scan; follow up CT was on December 1 and revealed resolution of the nodule.    Today: Seen today at nursing request for possible overdose of vancomycin.  She has been on vancomycin for second round of C. difficile infection.  Orders were 125 mg 4 times daily x10 days, then twice daily x7 days, then once daily x7 days, then every other day x7 days.  Pharmacy sent 7500 mg bottle which has been used in 5 days, unknown if patient has received all of this in the 5 days or if some of it was spilled.  Per calculations this is roughly equivalent to 1500 mg/day.  Reviewed literature and less than 2 g/day orally has minimal systemic absorption.  Jazmin is aware of the error and is feeling well and reporting improvement in loose stools.  Nurse manager reviewed dosing with RNs in charge of her care and they did say they have only been giving the 125 mg as instructed.    Allergies   Allergen Reactions     Hydralazine      Paralysis of legs     Latex Itching     Skin Burns     Naprosyn [Naproxen] Swelling     throat     Nitrofurantoin Hives     Sulfa (Sulfonamide Antibiotics) Rash     Vicks Vaporub [Camphor-Eucalyptus Oil-Menthol] Rash   :     Current Outpatient Medications   Medication Sig     acetaminophen (TYLENOL) 500 MG tablet Take 2 tablets (1,000 mg total) by mouth 3 (three) times a day.     apixaban ANTICOAGULANT (ELIQUIS) 5 mg Tab tablet Take 5 mg by mouth 2 (two) times a day.     aspirin 81 MG EC tablet Take 81 mg by mouth daily.     BD ULTRA-FINE MICRO PEN NEEDLE 32 gauge x 1/4\" Ndle USE AS DIRECTED 4 TIMES DAILY.     bisacodyL (DULCOLAX) 10 mg suppository Insert 10 mg into the rectum daily as " needed. No stool greater than 72 hours     docusate sodium (COLACE) 100 MG capsule Take 100 mg by mouth daily as needed.      ferrous sulfate 325 (65 FE) MG tablet Take 1 tablet by mouth 2 (two) times a day with meals.     gabapentin (NEURONTIN) 100 MG capsule Take 200 mg by mouth 2 (two) times a day.     HYDROmorphone (DILAUDID) 2 MG tablet (Take 2mg daily and 2mg Q 3 hours PRN)     lancets (ACCU-CHEK MULTICLIX LANCET) Misc TEST 6 TIMES A DAY     LANTUS SOLOSTAR U-100 INSULIN 100 unit/mL (3 mL) pen Inject 10 Units under the skin at bedtime. 11.65 Type 2 with hyperglycemia  Contact provider if insulin prescribed is not the preferred insulin per insurance. (Patient taking differently: Inject 17 Units under the skin at bedtime. 11.65 Type 2 with hyperglycemia  Contact provider if insulin prescribed is not the preferred insulin per insurance.)     lidocaine (LIDODERM) 5 % Place 1 patch on the skin daily. Remove & Discard patch within 12 hours or as directed by MD     metoprolol tartrate (LOPRESSOR) 25 MG tablet Take 1 tablet (25 mg total) by mouth 2 (two) times a day.     miconazole (MICOTIN) 2 % powder Apply topically 2 (two) times a day. Apply to b/l groin/underneath breasts/underneath pannus     NOVOLOG U-100 INSULIN ASPART 100 unit/mL injection Check blood sugar four (4) times daily.  11.65 Type 2 with hyperglycemia  OneTouch Verio Flex  Meter  OneTouch Verio strips 2 boxes of 50  Delica Lancets box of 100  BD Ultra-fine Therese Pen Needles - NDC [address]-26 - dispense 1 case, refill PRN for 1 year (Patient taking differently: Inject under the skin see administration instructions. 7 B  10 L  10 S)     omeprazole (PRILOSEC) 20 MG capsule Take 20 mg by mouth 2 (two) times a day before meals.     polyethylene glycol (MIRALAX) 17 gram packet Take 1 packet (17 g total) by mouth daily as needed.     Saccharomyces boulardii (FLORASTOR) 250 mg capsule Take 250 mg by mouth daily.     senna (SENOKOT) 8.6 mg tablet Take 1  "tablet by mouth 2 (two) times a day. (Patient taking differently: Take 1 tablet by mouth 2 (two) times a day as needed. )     sodium bicarbonate 650 MG tablet Take 1 tablet (650 mg total) by mouth 2 (two) times a day. OTC product         REVIEW OF SYSTEMS:    Currently, no fever, chills, or rigors. Does not have any visual or hearing problems. Denies any chest pain, headaches, palpitations, lightheadedness, dizziness, shortness of breath, or cough. Appetite is good. Denies any GERD symptoms. Denies any difficulty with swallowing, nausea, or vomiting.  Denies any abdominal pain, diarrhea or constipation. Denies any urinary symptoms, yeager in place. No insomnia. No active bleeding. No rash.       PHYSICAL EXAMINATION:  Vitals:    02/25/21 1154   BP: 147/87   Pulse: 62   Resp: 18   Temp: 97.3  F (36.3  C)   SpO2: 97%   Weight: (!) 242 lb 3.2 oz (109.9 kg)   Height: 5' 1\" (1.549 m)         GENERAL: Awake, Alert, oriented x3, not in any form of acute distress, answers questions appropriately, follows simple commands, conversant with flat affect.  Patient did have PFT  HEENT: Head is normocephalic with normal hair distribution. No evidence of trauma. Ears: No acute purulent discharge. Eyes: Sclera anicteric.  LUNG: Clear to auscultation with good chest expansion.   BACK: ROM normal, no CVA tenderness.  CVS: There is good S1  S2,  rhythm is regular.  ABDOMEN: Globular and ROTUND.  Nontender with palpation. chronic abdominal wall wounds bilaterally that are healing well.  EXTREMITIES: UE Full ROM. Right BKA; now with prosthesis on.  SKIN: Warm and dry, no erythema noted, open area on abdomen.    NEUROLOGICAL: The patient is oriented to person, place and time. Strength and sensation are grossly intact. Face is symmetric.    LABS:    Lab Results   Component Value Date    WBC 7.9 08/21/2020    HGB 9.2 (L) 08/21/2020    HCT 28.6 (L) 08/21/2020    MCV 89 08/21/2020     08/21/2020       Results for orders placed or " performed during the hospital encounter of 08/15/20   Basic Metabolic Panel   Result Value Ref Range    Sodium 137 136 - 145 mmol/L    Potassium 4.1 3.5 - 5.0 mmol/L    Chloride 103 98 - 107 mmol/L    CO2 23 22 - 31 mmol/L    Anion Gap, Calculation 11 5 - 18 mmol/L    Glucose 178 (H) 70 - 125 mg/dL    Calcium 11.0 (H) 8.5 - 10.5 mg/dL    BUN 37 (H) 8 - 22 mg/dL    Creatinine 2.05 (H) 0.60 - 1.10 mg/dL    GFR MDRD Af Amer 29 (L) >60 mL/min/1.73m2    GFR MDRD Non Af Amer 24 (L) >60 mL/min/1.73m2           Lab Results   Component Value Date    HGBA1C 10.3 (H) 06/17/2020     Vitamin D, Total (25-Hydroxy)   Date Value Ref Range Status   04/28/2016 29.8 (L) 30.0 - 80.0 ng/mL Final     Lab Results   Component Value Date    [id number] 285 01/07/2011       ASSESSMENT/PLAN:  1. Clostridium difficile infection    2. Morbid obesity (H)         Loose stool  C. difficile positive, recurrent: Patient received questionable amounts of vancomycin orally, per review of the literature, oral absorption is generally only minimally systemic if less than 2 g/day.  Patient doing well. She is reporting improvement in loose stools.  -Check BMP tomorrow.  -Starting tonight, resume vancomycin twice daily x7 days, then daily x7 days then every other day for 7 days.     Chronic conditions, not reviewed today:     Left abdominal wall open area: Followed by the wound team.  The wound is drastically improved and almost closed with no redness or drainage.  This is a huge improvement and the patient is very happy about this.  -Continue orders per wound care team and change dressing as needed for increased drainage.    Osteoarthritis: Follow-up knee injections completed on 1/21.  She has had mild improvement in pain.  Plans to start working with therapies.  No redness, swelling or injections site pain.    Anticoagulated:  -Omeprazole 20 mg daily.     Insulin-dependent diabetes: A1c 10.3--> 8.4.  Now on glargine 15 units nightly and sliding scale  NovoLog 3 times daily with meals.  Blood sugars average 130-200. A few outliers to 280.      Essential hypertension: Satisfactory. amlodipine d.cd.   -metoprolol    Acute on chronic blood loss anemia-likely from anemia chronic disease, iron deficiency, chronic kidney disease, surgery. On oral iron .    CKD 4: Followed by Dr. Iqbal.  Calcium improved at 9.1.  -Sodium bicarb.     Coronary artery disease:  -also needs outpt CRISTINA eval   -Continue metoprolol and aspirin.       A. Fib on Coumadin: INR therapeutic.      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 Improved. Unable to assess stump, in  and brace.   -Continues on Dilaudid.    Unintended weight loss: Patient has had greater than 20 pound weight loss since July.  She says the food is just bad here and it is always cold.    -Weight improved now at 236.  This is her baseline now.    Communicated concerns with patient, nursing.     Electronically signed by:  Zoey Leung CNP  This progress note was completed using Dragon software and there may be grammatical errors.

## 2021-06-15 NOTE — TELEPHONE ENCOUNTER
Pt is calling to speak to Dr. Flores.    Declined offer to schedule telephone encounter.    Pt is calling about pneumonia vaccine.  At the facility she is at, they want to vaccinate for pneumonia, she remembers at one time you advised against it.  She states that the vaccine did not have enouogh variants in it, so he told me not to get it.    She is wondering what Dr. Flores's recommendation is.    She wants to know how many variants should be in the vaccine she should take.    What is the frequency is it one vaccine for a life time or will it need to be repeated?      [phone] is best number to speak to patient directly.

## 2021-06-15 NOTE — PROGRESS NOTES
"  [facility]      NAME:  Jazmin Bauman             :  1955    MRN: [id number]    CODE STATUS:  FULL CODE    FACILITY: [facility] [[id number]]         CHIEF COMPLAIN/REASON FOR VISIT:  Chief Complaint   Patient presents with     Follow-up     knee injection f/u, Preventative management        HISTORY OF PRESENT ILLNESS: Jazmin Bauman is a 65 y.o. female. Pt was at [facility] from  to  and underwent a RBKA r/t ongoing DM ulcer with osteomyelitis. Per her EMR dc summary \" with HTN,morbid obesity, DM, A fib, CKD 4 presented for evaluation of R foot swelling, difficulty ambulation, pain found to have osteomyelitis now s/p amputation. She has now been to the OR twice this stay, last was on 20.   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   Patient had a chronic diabetic foot ulcer on her R heel, presented for increased swelling, difficulty ambulating, and pain. MRI showed extensive soft tissue necrosis and some osteomyelitis along with cellulitis  Met sepsis criteria on admission with leukocytosis and elevated CRP  Podiatry saw and the foot was not felt to be salvageable and BKA was recommended\"     August 15-: Hospitalized for sepsis with MRSA bacteremia, she had possible endocarditis and TTE revealing vegetation of the aortic valve. She had a CT of the abdomen and pelvis that showed left lower abdominal pannus ulceration but without abscess.  Her right BKA stump ulceration was negative for osteomyelitis or cellulitis.  She was treated with IV Vanco for 14 days which will be completed on .  Her left lower abdominal ulceration has never been biopsied.  It was not biopsied in the hospital and there was no surgical intervention.  Her right BKA stump was negative for osteomyelitis.  CKD stage IV with baseline creatinine 2.5-3.  She was at 1.82 at the end of hospitalization.  She is discharged on sodium " bicarb twice daily.  Her insulin was adjusted and she is now on sliding scale insulin with 10 units of Lantus at bedtime.  Blood sugars have been less than 200 on average.    For her hypertension, Norvasc has been discontinued.  Blood pressures remain in the 130s over 70s on average.  She was incidentally found to have a lung nodule on CT scan; follow up CT was on December 1 and revealed resolution of the nodule.    Today: Seen today to follow-up to recent knee injections that were completed last week on 1/21.  She tolerated the procedure well and has since had some mild improvement in her knee pain with no worsening of symptoms, no redness, swelling or injection site pain.  She is no longer able to use lidocaine patches due to insurance.  She is also reporting improvement in her C. difficile symptoms and stools are now semiformed.  She completed vancomycin on Sunday.   Also seen to discuss wellness and preventative care.  She is due for mammogram and DEXA scan both of which she like to defer at this time due to her frequent hospitalizations.  She is open to discussing these again a couple months.  She had previously asked about seeing her gynecologist due to being told she needed a biopsy in the future but today she wants to defer this as well.  She has had no vaginal bleeding or pain.  No other concerning symptoms and has had recent CT scan of the chest that revealed resolution of previous pulmonary nodule.  She is otherwise feeling well and denying concerns.      Allergies   Allergen Reactions     Hydralazine      Paralysis of legs     Latex Itching     Skin Burns     Naprosyn [Naproxen] Swelling     throat     Nitrofurantoin Hives     Sulfa (Sulfonamide Antibiotics) Rash     Vicks Vaporub [Camphor-Eucalyptus Oil-Menthol] Rash   :     Current Outpatient Medications   Medication Sig     acetaminophen (TYLENOL) 500 MG tablet Take 2 tablets (1,000 mg total) by mouth 3 (three) times a day.     aspirin 81 MG EC tablet  "Take 81 mg by mouth daily.     BD ULTRA-FINE MICRO PEN NEEDLE 32 gauge x 1/4\" Ndle USE AS DIRECTED 4 TIMES DAILY.     bisacodyL (DULCOLAX) 10 mg suppository Insert 10 mg into the rectum daily as needed. No stool greater than 72 hours     docusate sodium (COLACE) 100 MG capsule Take 100 mg by mouth daily as needed.      ferrous sulfate 325 (65 FE) MG tablet Take 1 tablet by mouth 2 (two) times a day with meals.     gabapentin (NEURONTIN) 100 MG capsule Take 200 mg by mouth 2 (two) times a day.     HYDROmorphone (DILAUDID) 2 MG tablet (Take 2mg daily and 2mg Q 3 hours PRN)     lancets (ACCU-CHEK MULTICLIX LANCET) Misc TEST 6 TIMES A DAY     LANTUS SOLOSTAR U-100 INSULIN 100 unit/mL (3 mL) pen Inject 10 Units under the skin at bedtime. 11.65 Type 2 with hyperglycemia  Contact provider if insulin prescribed is not the preferred insulin per insurance. (Patient taking differently: Inject 17 Units under the skin at bedtime. 11.65 Type 2 with hyperglycemia  Contact provider if insulin prescribed is not the preferred insulin per insurance.)     lidocaine (LIDODERM) 5 % Place 1 patch on the skin daily. Remove & Discard patch within 12 hours or as directed by MD     metoprolol tartrate (LOPRESSOR) 25 MG tablet Take 1 tablet (25 mg total) by mouth 2 (two) times a day.     miconazole (MICOTIN) 2 % powder Apply topically 2 (two) times a day. Apply to b/l groin/underneath breasts/underneath pannus     NOVOLOG U-100 INSULIN ASPART 100 unit/mL injection Check blood sugar four (4) times daily.  11.65 Type 2 with hyperglycemia  OneTouch Verio Flex  Meter  OneTouch Verio strips 2 boxes of 50  Delica Lancets box of 100  BD Ultra-fine Therese Pen Needles - NDC [address]-88 - dispense 1 case, refill PRN for 1 year (Patient taking differently: Inject under the skin see administration instructions. 7 B  10 L  10 S)     omeprazole (PRILOSEC) 20 MG capsule Take 20 mg by mouth 2 (two) times a day before meals.     polyethylene glycol (MIRALAX) " "17 gram packet Take 1 packet (17 g total) by mouth daily as needed.     Saccharomyces boulardii (FLORASTOR) 250 mg capsule Take 250 mg by mouth daily.     senna (SENOKOT) 8.6 mg tablet Take 1 tablet by mouth 2 (two) times a day. (Patient taking differently: Take 1 tablet by mouth 2 (two) times a day as needed. )     sodium bicarbonate 650 MG tablet Take 1 tablet (650 mg total) by mouth 2 (two) times a day. OTC product     warfarin sodium (WARFARIN ORAL) Take by mouth. 2/5/21  Hold Warfarin x 3 days.  Next INR 2/8/21(possibly switching to Eliquis depending on the INR result)  1/26/21 INR 2.0  Cont 3mg Tues and Fri and 2.5mg AOD.  Next INR 2/9/21.    1/19/21  INR 2.1 Cont 3mg T & Fr, 2.5mg AOD. Next INR 1/26 1/5/21 INR 2.3  Cont 3mg Tues and Fri and 2.5mg AOD.  Next INR 1/19/21.    1/4/21 INR missed.  Take 2.5mg on 1/4.  Next INR 1/5/21.    12/28/20 INR 2.9  Cont 3mg Tues and Fri and 2.5mg AOD.  Next INR 1/4/21.  12/22/20 INR 2.1  Take 3mg Tues and Fri and 2.5mg AOD.  Next INR 12/28 12/18/20 INR 2.5 2.5mg daily. Next INR 12/22.  12/16/20 INR 3.3 Hold x2 Next INR 12/18 12/14/20 INR 3.2 Hold tonight, then resume 3mg W & Sat, 2.5mg AOD. Next         REVIEW OF SYSTEMS:    Currently, no fever, chills, or rigors. Does not have any visual or hearing problems. Denies any chest pain, headaches, palpitations, lightheadedness, dizziness, shortness of breath, or cough. Appetite is good. Denies any GERD symptoms. Denies any difficulty with swallowing, nausea, or vomiting.  Denies any abdominal pain, diarrhea or constipation. Denies any urinary symptoms, yeager in place. No insomnia. No active bleeding. No rash.       PHYSICAL EXAMINATION:  Vitals:    01/27/21 1210   BP: 157/71   Pulse: 72   Resp: 18   Temp: 97.1  F (36.2  C)   SpO2: 97%   Weight: (!) 224 lb 1.6 oz (101.7 kg)   Height: 5' 1\" (1.549 m)         GENERAL: Awake, Alert, oriented x3, not in any form of acute distress, answers questions appropriately, follows simple " commands, conversant with flat affect.  Patient did have PFT  HEENT: Head is normocephalic with normal hair distribution. No evidence of trauma. Ears: No acute purulent discharge. Eyes: Sclera anicteric.  LUNG: Clear to auscultation with good chest expansion.   BACK: ROM normal, no CVA tenderness.  CVS: There is good S1  S2,  rhythm is regular.  ABDOMEN: Globular and ROTUND.  Nontender with palpation. chronic abdominal wall wounds bilaterally that are healing well.  EXTREMITIES: UE Full ROM. Right BKA, wound covered, healing.  SKIN: Warm and dry, no erythema noted, open area on abdomen.    NEUROLOGICAL: The patient is oriented to person, place and time. Strength and sensation are grossly intact. Face is symmetric.    LABS:    Lab Results   Component Value Date    WBC 7.9 08/21/2020    HGB 9.2 (L) 08/21/2020    HCT 28.6 (L) 08/21/2020    MCV 89 08/21/2020     08/21/2020       Results for orders placed or performed during the hospital encounter of 08/15/20   Basic Metabolic Panel   Result Value Ref Range    Sodium 137 136 - 145 mmol/L    Potassium 4.1 3.5 - 5.0 mmol/L    Chloride 103 98 - 107 mmol/L    CO2 23 22 - 31 mmol/L    Anion Gap, Calculation 11 5 - 18 mmol/L    Glucose 178 (H) 70 - 125 mg/dL    Calcium 11.0 (H) 8.5 - 10.5 mg/dL    BUN 37 (H) 8 - 22 mg/dL    Creatinine 2.05 (H) 0.60 - 1.10 mg/dL    GFR MDRD Af Amer 29 (L) >60 mL/min/1.73m2    GFR MDRD Non Af Amer 24 (L) >60 mL/min/1.73m2           Lab Results   Component Value Date    HGBA1C 10.3 (H) 06/17/2020     Vitamin D, Total (25-Hydroxy)   Date Value Ref Range Status   04/28/2016 29.8 (L) 30.0 - 80.0 ng/mL Final     Lab Results   Component Value Date    [id number] 285 01/07/2011       ASSESSMENT/PLAN:  1. Primary osteoarthritis of both knees    2. Wellness examination         COVID-19: positive test identified 12/5 via PCR. Resolved.      Wellness and primary care management: In discussion today regarding mammogram and DEXA scan due, patient is  "aware of this but says she wants to delay this due to her busy year with frequent hospitalizations and doctors appointments.  She would like some \"time away from hospital \"and is frustrated that they do not just do all the stuff while she is in the hospital.  She is open to discussing this again at her next regulatory and wellness visit.  She denies any new symptoms.  She is nonambulatory but given her chronic kidney disease should have a DEXA scan as she is unable to tolerate calcium.  She would also like to visit her gynecologist as last time she was and she was told she would need an endometrial biopsy.    Loose stool  C. difficile positive: Have been getting varying reports regarding loose stools.  Reports today that they are semiformed.  C. difficile positive after multiple attempts to send lab specimen.  Completed vancomycin 1/24.     Osteoarthritis: Follow-up knee injections completed on 1/21.  She has had mild improvement in pain.  Plans to start working with therapies.  No redness, swelling or injections site pain.    Anticoagulated:  -Omeprazole 20 mg daily.     Left abdominal wall open area: Followed by the wound team.  The wound is drastically improved and almost closed with no redness or drainage.  This is a huge improvement and the patient is very happy about this.  -Continue orders per wound care team and change dressing as needed for increased drainage.    Chronic conditions, reviewed:     Insulin-dependent diabetes: A1c 10.3--> 8.4.  Now on glargine 15 units nightly and sliding scale NovoLog 3 times daily with meals.  Blood sugars average 130-200. A few outliers to 280.      Essential hypertension: Satisfactory. amlodipine d.cd.   -metoprolol    Acute on chronic blood loss anemia-likely from anemia chronic disease, iron deficiency, chronic kidney disease, surgery. On oral iron .    CKD 4: Followed by Dr. Iqbal.  Calcium improved at 9.1.  -Sodium bicarb.     Coronary artery disease:  -also needs " outpt CRISTINA eval   -Continue metoprolol and aspirin.       A. Fib on Coumadin: INR therapeutic.      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 Improved. Unable to assess stump, in  and brace.   -Continues on Dilaudid.    Unintended weight loss: Patient has had greater than 20 pound weight loss since July.  She says the food is just bad here and it is always cold.    -Weight improved now at 236.  This is her baseline now.    Communicated concerns with patient, nursing.     Electronically signed by:  Zoey Leung CNP  This progress note was completed using Dragon software and there may be grammatical errors.

## 2021-06-15 NOTE — TELEPHONE ENCOUNTER
"[facility] Nurse Triage Telephone Note      Provider: BARRY Smith  Facility: [facility]    Facility Type: LT    Caller: Luci  Call Back Number:  [phone]    Allergies: Hydralazine, Latex, Naprosyn [naproxen], Nitrofurantoin, Sulfa (sulfonamide antibiotics), and Vicks vaporub [camphor-eucalyptus oil-menthol]    Reason for call: Nurse reporting that patient's weight today at around noon today was 242.2#.  Nurse states patient had a \"light breakfast\", but hasn't eaten lunch yet.  Weight on 2/17 was 234.5# and on 2/10 wt was 233.4#.  No LE edema, cough, shortness of breath were noted.  VSS.  Lung sounds are clear.  Patient is on Metoprolol 25mg two times a day.       Verbal Order/Direction given by Provider: Re-weigh patient in the AM prior to breakfast and update NP when she's there in the facility tomorrow.      Provider giving order: BARRY Smith    Verbal order given to: Ludwin Jacobsen RN      "

## 2021-06-15 NOTE — TELEPHONE ENCOUNTER
These call patient for me.  Okay for pneumonia vaccine.  There are 2 variants 13 and 23.  Would recommend getting the Pneumovax 23 first then in 1 year get the Pneumovax 13.  Please call patient and advise her that she should get the vaccine for pneumonia now.

## 2021-06-15 NOTE — PROGRESS NOTES
"  [facility]      NAME:  Jazmin Bauman             :  1955    MRN: [id number]    CODE STATUS:  FULL CODE    FACILITY: [facility] [[id number]]         CHIEF COMPLAIN/REASON FOR VISIT:  Chief Complaint   Patient presents with     Follow-up     c-diff       HISTORY OF PRESENT ILLNESS: Jazmin Bauman is a 65 y.o. female. Pt was at [facility] from  to  and underwent a RBKA r/t ongoing DM ulcer with osteomyelitis. Per her EMR dc summary \" with HTN,morbid obesity, DM, A fib, CKD 4 presented for evaluation of R foot swelling, difficulty ambulation, pain found to have osteomyelitis now s/p amputation. She has now been to the OR twice this stay, last was on 20.   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   Patient had a chronic diabetic foot ulcer on her R heel, presented for increased swelling, difficulty ambulating, and pain. MRI showed extensive soft tissue necrosis and some osteomyelitis along with cellulitis  Met sepsis criteria on admission with leukocytosis and elevated CRP  Podiatry saw and the foot was not felt to be salvageable and BKA was recommended\"     August 15-: Hospitalized for sepsis with MRSA bacteremia, she had possible endocarditis and TTE revealing vegetation of the aortic valve. She had a CT of the abdomen and pelvis that showed left lower abdominal pannus ulceration but without abscess.  Her right BKA stump ulceration was negative for osteomyelitis or cellulitis.  She was treated with IV Vanco for 14 days which will be completed on .  Her left lower abdominal ulceration has never been biopsied.  It was not biopsied in the hospital and there was no surgical intervention.  Her right BKA stump was negative for osteomyelitis.  CKD stage IV with baseline creatinine 2.5-3.  She was at 1.82 at the end of hospitalization.  She is discharged on sodium bicarb twice daily.  Her insulin was " "adjusted and she is now on sliding scale insulin with 10 units of Lantus at bedtime.  Blood sugars have been less than 200 on average.    For her hypertension, Norvasc has been discontinued.  Blood pressures remain in the 130s over 70s on average.  She was incidentally found to have a lung nodule on CT scan; follow up CT was on December 1 and revealed resolution of the nodule.    Today: Seen today to follow-up for C. difficile if complaints per nursing of continued diarrhea and loose stool symptoms.  Nursing reported that she been asking for repeat C. difficile test due to these loose stools, I informed her that we would simply just base treatment on her symptoms versus retesting as she may have positive stool for up to 6 weeks post treatment.  She is reporting loose, watery stools 3-4 times per day.  Nurse manager also agrees.      Allergies   Allergen Reactions     Hydralazine      Paralysis of legs     Latex Itching     Skin Burns     Naprosyn [Naproxen] Swelling     throat     Nitrofurantoin Hives     Sulfa (Sulfonamide Antibiotics) Rash     Vicks Vaporub [Camphor-Eucalyptus Oil-Menthol] Rash   :     Current Outpatient Medications   Medication Sig     acetaminophen (TYLENOL) 500 MG tablet Take 2 tablets (1,000 mg total) by mouth 3 (three) times a day.     apixaban ANTICOAGULANT (ELIQUIS) 5 mg Tab tablet Take 5 mg by mouth 2 (two) times a day.     aspirin 81 MG EC tablet Take 81 mg by mouth daily.     BD ULTRA-FINE MICRO PEN NEEDLE 32 gauge x 1/4\" Ndle USE AS DIRECTED 4 TIMES DAILY.     bisacodyL (DULCOLAX) 10 mg suppository Insert 10 mg into the rectum daily as needed. No stool greater than 72 hours     docusate sodium (COLACE) 100 MG capsule Take 100 mg by mouth daily as needed.      ferrous sulfate 325 (65 FE) MG tablet Take 1 tablet by mouth 2 (two) times a day with meals.     gabapentin (NEURONTIN) 100 MG capsule Take 200 mg by mouth 2 (two) times a day.     HYDROmorphone (DILAUDID) 2 MG tablet (Take 2mg " daily and 2mg Q 3 hours PRN)     lancets (ACCU-CHEK MULTICLIX LANCET) Misc TEST 6 TIMES A DAY     LANTUS SOLOSTAR U-100 INSULIN 100 unit/mL (3 mL) pen Inject 10 Units under the skin at bedtime. 11.65 Type 2 with hyperglycemia  Contact provider if insulin prescribed is not the preferred insulin per insurance. (Patient taking differently: Inject 17 Units under the skin at bedtime. 11.65 Type 2 with hyperglycemia  Contact provider if insulin prescribed is not the preferred insulin per insurance.)     lidocaine (LIDODERM) 5 % Place 1 patch on the skin daily. Remove & Discard patch within 12 hours or as directed by MD     metoprolol tartrate (LOPRESSOR) 25 MG tablet Take 1 tablet (25 mg total) by mouth 2 (two) times a day.     miconazole (MICOTIN) 2 % powder Apply topically 2 (two) times a day. Apply to b/l groin/underneath breasts/underneath pannus     NOVOLOG U-100 INSULIN ASPART 100 unit/mL injection Check blood sugar four (4) times daily.  11.65 Type 2 with hyperglycemia  OneTouch Verio Flex  Meter  OneTouch Verio strips 2 boxes of 50  Delica Lancets box of 100  BD Ultra-fine Therese Pen Needles - NDC [address]-62 - dispense 1 case, refill PRN for 1 year (Patient taking differently: Inject under the skin see administration instructions. 7 B  10 L  10 S)     omeprazole (PRILOSEC) 20 MG capsule Take 20 mg by mouth 2 (two) times a day before meals.     polyethylene glycol (MIRALAX) 17 gram packet Take 1 packet (17 g total) by mouth daily as needed.     Saccharomyces boulardii (FLORASTOR) 250 mg capsule Take 250 mg by mouth daily.     senna (SENOKOT) 8.6 mg tablet Take 1 tablet by mouth 2 (two) times a day. (Patient taking differently: Take 1 tablet by mouth 2 (two) times a day as needed. )     sodium bicarbonate 650 MG tablet Take 1 tablet (650 mg total) by mouth 2 (two) times a day. OTC product         REVIEW OF SYSTEMS:    Currently, no fever, chills, or rigors. Does not have any visual or hearing problems. Denies any  "chest pain, headaches, palpitations, lightheadedness, dizziness, shortness of breath, or cough. Appetite is good. Denies any GERD symptoms. Denies any difficulty with swallowing, nausea, or vomiting.  Denies any abdominal pain, diarrhea or constipation. Denies any urinary symptoms, yeager in place. No insomnia. No active bleeding. No rash.       PHYSICAL EXAMINATION:  Vitals:    02/11/21 1147   BP: 149/73   Pulse: (!) 53   Resp: 18   Temp: 97.3  F (36.3  C)   SpO2: 98%   Weight: (!) 233 lb 6.4 oz (105.9 kg)   Height: 5' 1\" (1.549 m)         GENERAL: Awake, Alert, oriented x3, not in any form of acute distress, answers questions appropriately, follows simple commands, conversant with flat affect.  Patient did have PFT  HEENT: Head is normocephalic with normal hair distribution. No evidence of trauma. Ears: No acute purulent discharge. Eyes: Sclera anicteric.  LUNG: Clear to auscultation with good chest expansion.   BACK: ROM normal, no CVA tenderness.  CVS: There is good S1  S2,  rhythm is regular.  ABDOMEN: Globular and ROTUND.  Nontender with palpation. chronic abdominal wall wounds bilaterally that are healing well.  EXTREMITIES: UE Full ROM. Right BKA, wound covered, healing.  SKIN: Warm and dry, no erythema noted, open area on abdomen.    NEUROLOGICAL: The patient is oriented to person, place and time. Strength and sensation are grossly intact. Face is symmetric.    LABS:    Lab Results   Component Value Date    WBC 7.9 08/21/2020    HGB 9.2 (L) 08/21/2020    HCT 28.6 (L) 08/21/2020    MCV 89 08/21/2020     08/21/2020       Results for orders placed or performed during the hospital encounter of 08/15/20   Basic Metabolic Panel   Result Value Ref Range    Sodium 137 136 - 145 mmol/L    Potassium 4.1 3.5 - 5.0 mmol/L    Chloride 103 98 - 107 mmol/L    CO2 23 22 - 31 mmol/L    Anion Gap, Calculation 11 5 - 18 mmol/L    Glucose 178 (H) 70 - 125 mg/dL    Calcium 11.0 (H) 8.5 - 10.5 mg/dL    BUN 37 (H) 8 - 22 " mg/dL    Creatinine 2.05 (H) 0.60 - 1.10 mg/dL    GFR MDRD Af Amer 29 (L) >60 mL/min/1.73m2    GFR MDRD Non Af Amer 24 (L) >60 mL/min/1.73m2           Lab Results   Component Value Date    HGBA1C 10.3 (H) 06/17/2020     Vitamin D, Total (25-Hydroxy)   Date Value Ref Range Status   04/28/2016 29.8 (L) 30.0 - 80.0 ng/mL Final     Lab Results   Component Value Date    [id number] 285 01/07/2011       ASSESSMENT/PLAN:  1. Diarrhea, unspecified type    2. Clostridium difficile infection         Loose stool  C. difficile positive, recurrent: Recently finished vancomycin, complaining of continued loose stools, 3-4 times per day, so the same consistency as it was prior to her treatment.  -Vancomycin 125 mg p.o. 4 times daily x10 days, then twice daily x7 days, then daily x7 days then every other day for 7 days.  -Check BMP on Monday.      Left abdominal wall open area: Followed by the wound team.  The wound is drastically improved and almost closed with no redness or drainage.  This is a huge improvement and the patient is very happy about this.  -Continue orders per wound care team and change dressing as needed for increased drainage.    Chronic conditions, not reviewed today:     Osteoarthritis: Follow-up knee injections completed on 1/21.  She has had mild improvement in pain.  Plans to start working with therapies.  No redness, swelling or injections site pain.    Anticoagulated:  -Omeprazole 20 mg daily.     Insulin-dependent diabetes: A1c 10.3--> 8.4.  Now on glargine 15 units nightly and sliding scale NovoLog 3 times daily with meals.  Blood sugars average 130-200. A few outliers to 280.      Essential hypertension: Satisfactory. amlodipine d.cd.   -metoprolol    Acute on chronic blood loss anemia-likely from anemia chronic disease, iron deficiency, chronic kidney disease, surgery. On oral iron .    CKD 4: Followed by Dr. Iqbal.  Calcium improved at 9.1.  -Sodium bicarb.     Coronary artery disease:  -also needs outpt  CRISTINA eval   -Continue metoprolol and aspirin.       A. Fib on Coumadin: INR therapeutic.      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 Improved. Unable to assess stump, in  and brace.   -Continues on Dilaudid.    Unintended weight loss: Patient has had greater than 20 pound weight loss since July.  She says the food is just bad here and it is always cold.    -Weight improved now at 236.  This is her baseline now.    Communicated concerns with patient, nursing.     Electronically signed by:  Zoey Leung CNP  This progress note was completed using Dragon software and there may be grammatical errors.

## 2021-06-16 PROBLEM — J45.909 ASTHMA: Status: ACTIVE | Noted: 2021-02-06

## 2021-06-16 PROBLEM — A41.9 SEPSIS, DUE TO UNSPECIFIED ORGANISM, UNSPECIFIED WHETHER ACUTE ORGAN DYSFUNCTION PRESENT (H): Status: ACTIVE | Noted: 2020-06-16

## 2021-06-16 PROBLEM — R33.9 URINARY RETENTION: Status: ACTIVE | Noted: 2020-06-28

## 2021-06-16 PROBLEM — Z79.4 TYPE 2 DIABETES MELLITUS WITH OTHER SPECIFIED COMPLICATION, WITH LONG-TERM CURRENT USE OF INSULIN (H): Status: ACTIVE | Noted: 2017-09-21

## 2021-06-16 PROBLEM — R87.69 ABNORMAL CYTOLOGICAL FINDINGS IN SPECIMENS FROM OTHER FEMALE GENITAL ORGANS: Status: ACTIVE | Noted: 2019-11-26

## 2021-06-16 PROBLEM — R53.81 PHYSICAL DECONDITIONING: Status: ACTIVE | Noted: 2020-08-16

## 2021-06-16 PROBLEM — R19.7 FREQUENT LOOSE STOOLS: Status: ACTIVE | Noted: 2020-12-19

## 2021-06-16 PROBLEM — R63.4 UNINTENDED WEIGHT LOSS: Status: ACTIVE | Noted: 2020-09-30

## 2021-06-16 PROBLEM — U07.1 2019 NOVEL CORONAVIRUS DISEASE (COVID-19): Status: ACTIVE | Noted: 2020-12-13

## 2021-06-16 PROBLEM — Z79.01 ANTICOAGULATED: Status: ACTIVE | Noted: 2020-12-19

## 2021-06-16 PROBLEM — T14.8XXA WOUND INFECTION: Status: ACTIVE | Noted: 2020-08-15

## 2021-06-16 PROBLEM — S88.119A: Status: ACTIVE | Noted: 2020-09-24

## 2021-06-16 PROBLEM — E83.52 HYPERCALCEMIA: Status: ACTIVE | Noted: 2020-09-16

## 2021-06-16 PROBLEM — L08.9 WOUND INFECTION: Status: ACTIVE | Noted: 2020-08-15

## 2021-06-16 PROBLEM — E87.5 HYPERKALEMIA: Status: ACTIVE | Noted: 2019-11-26

## 2021-06-16 PROBLEM — A49.8 CLOSTRIDIUM DIFFICILE INFECTION: Status: ACTIVE | Noted: 2021-02-17

## 2021-06-16 PROBLEM — Z00.00 WELLNESS EXAMINATION: Status: ACTIVE | Noted: 2021-02-06

## 2021-06-16 PROBLEM — R79.89 TROPONIN LEVEL ELEVATED: Status: ACTIVE | Noted: 2020-01-21

## 2021-06-16 PROBLEM — E11.69 TYPE 2 DIABETES MELLITUS WITH OTHER SPECIFIED COMPLICATION, WITH LONG-TERM CURRENT USE OF INSULIN (H): Status: ACTIVE | Noted: 2017-09-21

## 2021-06-16 PROBLEM — M17.0 PRIMARY OSTEOARTHRITIS OF BOTH KNEES: Status: ACTIVE | Noted: 2021-01-11

## 2021-06-16 PROBLEM — L03.032 PARONYCHIA OF GREAT TOE, LEFT: Status: ACTIVE | Noted: 2020-09-16

## 2021-06-16 PROBLEM — B95.62 MRSA BACTEREMIA: Status: ACTIVE | Noted: 2020-09-04

## 2021-06-16 PROBLEM — F32.89 OTHER DEPRESSION: Status: ACTIVE | Noted: 2020-09-24

## 2021-06-16 PROBLEM — M19.90 OSTEOARTHRITIS: Status: ACTIVE | Noted: 2019-11-26

## 2021-06-16 PROBLEM — A41.9 SEPSIS (H): Status: ACTIVE | Noted: 2020-06-16

## 2021-06-16 PROBLEM — N18.30 CHRONIC KIDNEY DISEASE, STAGE III (MODERATE) (H): Status: ACTIVE | Noted: 2019-11-26

## 2021-06-16 PROBLEM — L03.115 CELLULITIS OF RIGHT FOOT: Status: ACTIVE | Noted: 2020-06-16

## 2021-06-16 PROBLEM — I48.0 PAROXYSMAL ATRIAL FIBRILLATION (H): Status: ACTIVE | Noted: 2020-06-28

## 2021-06-16 PROBLEM — E66.01 MORBID OBESITY (H): Status: ACTIVE | Noted: 2021-03-01

## 2021-06-16 PROBLEM — I48.91 ATRIAL FIBRILLATION WITH RVR (H): Status: ACTIVE | Noted: 2020-01-20

## 2021-06-16 PROBLEM — R78.81 MRSA BACTEREMIA: Status: ACTIVE | Noted: 2020-09-04

## 2021-06-16 PROBLEM — R50.9 FEVER AND CHILLS: Status: ACTIVE | Noted: 2020-08-15

## 2021-06-16 PROBLEM — R53.1 WEAKNESS: Status: ACTIVE | Noted: 2020-07-22

## 2021-06-16 PROBLEM — I25.10 CORONARY ARTERY DISEASE WITHOUT ANGINA PECTORIS: Status: ACTIVE | Noted: 2020-06-28

## 2021-06-16 PROBLEM — K59.01 SLOW TRANSIT CONSTIPATION: Status: ACTIVE | Noted: 2020-08-05

## 2021-06-16 PROBLEM — E44.0 MODERATE PROTEIN MALNUTRITION (H): Status: ACTIVE | Noted: 2020-01-25

## 2021-06-16 PROBLEM — Z89.511 HX OF RIGHT BKA (H): Status: ACTIVE | Noted: 2020-06-30

## 2021-06-16 NOTE — TELEPHONE ENCOUNTER
[facility] Nurse Triage Telephone Note      Provider: BARRY Smith  Facility: [facility]    Facility Type: [facility]    Caller: Carolyn  Call Back Number:  [phone]    Allergies: Hydralazine, Latex, Naprosyn [naproxen], Nitrofurantoin, Sulfa (sulfonamide antibiotics), and Vicks vaporub [camphor-eucalyptus oil-menthol]    Reason for call: Nurse calling to report that patient is positive for C-diff.       Verbal Order/Direction given by Provider: Vancomycin 125mg four times a day x 10 days, then 125mg two times a day x 7 days, then 125mg daily x 7 days, then 125mg every other day x 14 days.    Provider giving order: BARRY Smith    Verbal order given to: Carolyn Jacobsen RN

## 2021-06-16 NOTE — TELEPHONE ENCOUNTER
[facility] Nurse Triage Telephone Note      Provider: BARRY Smith  Facility: [facility]    Facility Type: [facility]    Caller: Abby   Call Back Number:  [phone]    Allergies: Hydralazine, Latex, Naprosyn [naproxen], Nitrofurantoin, Sulfa (sulfonamide antibiotics), and Vicks vaporub [camphor-eucalyptus oil-menthol]    Reason for call: The pt was to have 10 units of Novolog at lunch time, her BS was 216. The nurse had an emergency and was not able to give the insulin, nurse was calling at 3pm wondering what he should do. The pt is currently down in therapy at this time.     Verbal Order/Direction given by Provider: Hold the noon insulin and continue with the scheduled as usual.     Provider giving order: BARRY Smith    Verbal order given to: Abby Cartagena RN

## 2021-06-16 NOTE — PROGRESS NOTES
"  [facility]      NAME:  Jazmin Bauman             :  1955    MRN: [id number]    CODE STATUS:  FULL CODE    FACILITY: [facility] [[id number]]         CHIEF COMPLAIN/REASON FOR VISIT:  Chief Complaint   Patient presents with     Problem Visit     pain management        HISTORY OF PRESENT ILLNESS: Jazmin Bauman is a 65 y.o. female. Pt was at [facility] from  to  and underwent a RBKA r/t ongoing DM ulcer with osteomyelitis. Per her EMR dc summary \" with HTN,morbid obesity, DM, A fib, CKD 4 presented for evaluation of R foot swelling, difficulty ambulation, pain found to have osteomyelitis now s/p amputation. She has now been to the OR twice this stay, last was on 20.   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   Patient had a chronic diabetic foot ulcer on her R heel, presented for increased swelling, difficulty ambulating, and pain. MRI showed extensive soft tissue necrosis and some osteomyelitis along with cellulitis  Met sepsis criteria on admission with leukocytosis and elevated CRP  Podiatry saw and the foot was not felt to be salvageable and BKA was recommended\"     August 15-: Hospitalized for sepsis with MRSA bacteremia, she had possible endocarditis and TTE revealing vegetation of the aortic valve. She had a CT of the abdomen and pelvis that showed left lower abdominal pannus ulceration but without abscess.  Her right BKA stump ulceration was negative for osteomyelitis or cellulitis.  She was treated with IV Vanco for 14 days which will be completed on .  Her left lower abdominal ulceration has never been biopsied.  It was not biopsied in the hospital and there was no surgical intervention.  Her right BKA stump was negative for osteomyelitis.    CKD stage IV with baseline creatinine 1.9-2.0 now.   Her insulin was adjusted and she is now on sliding scale insulin with 17 units of Lantus at " "bedtime.     For her hypertension, Norvasc has been discontinued.  Blood pressures remain in the 130s over 70s on average.  She was incidentally found to have a lung nodule on CT scan; follow up CT was on December 1 and revealed resolution of the nodule.    Today: Seen today to review pain management, patient has been on every 3 hours as needed hydromorphone that she is not using since her surgery last summer.  I will decrease this to twice daily as needed.  She is okay with that, we discussed discontinuing it altogether however she would like to have some available for acute pain needs.  She did mention as well as OT who was also in the room, that she is continuing to have loose stools.  She thinks it is just the food here in the facility and when she would like some Imodium, however given her recent C. difficile infection I had like to test for cure before giving Imodium.  Denying nausea, weight loss, abdominal pain or tenderness.    Allergies   Allergen Reactions     Hydralazine      Paralysis of legs     Latex Itching     Skin Burns     Naprosyn [Naproxen] Swelling     throat     Nitrofurantoin Hives     Sulfa (Sulfonamide Antibiotics) Rash     Vicks Vaporub [Camphor-Eucalyptus Oil-Menthol] Rash   :     Current Outpatient Medications   Medication Sig     acetaminophen (TYLENOL) 500 MG tablet Take 2 tablets (1,000 mg total) by mouth 3 (three) times a day.     apixaban ANTICOAGULANT (ELIQUIS) 5 mg Tab tablet Take 5 mg by mouth 2 (two) times a day.     aspirin 81 MG EC tablet Take 81 mg by mouth daily.     BD ULTRA-FINE MICRO PEN NEEDLE 32 gauge x 1/4\" Ndle USE AS DIRECTED 4 TIMES DAILY.     bisacodyL (DULCOLAX) 10 mg suppository Insert 10 mg into the rectum daily as needed. No stool greater than 72 hours     docusate sodium (COLACE) 100 MG capsule Take 100 mg by mouth daily as needed.      ferrous sulfate 325 (65 FE) MG tablet Take 1 tablet by mouth 2 (two) times a day with meals.     gabapentin (NEURONTIN) 100 MG " capsule Take 200 mg by mouth 2 (two) times a day.     HYDROmorphone (DILAUDID) 2 MG tablet (Take 2mg daily and 2mg Q 3 hours PRN)     lancets (ACCU-CHEK MULTICLIX LANCET) Misc TEST 6 TIMES A DAY     LANTUS SOLOSTAR U-100 INSULIN 100 unit/mL (3 mL) pen Inject 10 Units under the skin at bedtime. 11.65 Type 2 with hyperglycemia  Contact provider if insulin prescribed is not the preferred insulin per insurance. (Patient taking differently: Inject 17 Units under the skin at bedtime. 11.65 Type 2 with hyperglycemia  Contact provider if insulin prescribed is not the preferred insulin per insurance.)     lidocaine (LIDODERM) 5 % Place 1 patch on the skin daily. Remove & Discard patch within 12 hours or as directed by MD     metoprolol tartrate (LOPRESSOR) 25 MG tablet Take 1 tablet (25 mg total) by mouth 2 (two) times a day.     miconazole (MICOTIN) 2 % powder Apply topically 2 (two) times a day. Apply to b/l groin/underneath breasts/underneath pannus     NOVOLOG U-100 INSULIN ASPART 100 unit/mL injection Check blood sugar four (4) times daily.  11.65 Type 2 with hyperglycemia  OneTouch Verio Flex  Meter  OneTouch Verio strips 2 boxes of 50  Delica Lancets box of 100  BD Ultra-fine Therese Pen Needles - NDC [address]-20 - dispense 1 case, refill PRN for 1 year (Patient taking differently: Inject under the skin see administration instructions. 7 B  10 L  10 S)     omeprazole (PRILOSEC) 20 MG capsule Take 20 mg by mouth 2 (two) times a day before meals.     polyethylene glycol (MIRALAX) 17 gram packet Take 1 packet (17 g total) by mouth daily as needed.     Saccharomyces boulardii (FLORASTOR) 250 mg capsule Take 250 mg by mouth daily.     senna (SENOKOT) 8.6 mg tablet Take 1 tablet by mouth 2 (two) times a day. (Patient taking differently: Take 1 tablet by mouth 2 (two) times a day as needed. )     sodium bicarbonate 650 MG tablet Take 1 tablet (650 mg total) by mouth 2 (two) times a day. OTC product         REVIEW OF  "SYSTEMS:    Currently, no fever, chills, or rigors. Does not have any visual or hearing problems. Denies any chest pain, headaches, palpitations, lightheadedness, dizziness, shortness of breath, or cough. Appetite is good. Denies any GERD symptoms. Denies any difficulty with swallowing, nausea, or vomiting.  Denies any abdominal pain, diarrhea or constipation. Denies any urinary symptoms, yeager in place. No insomnia. No active bleeding. No rash.       PHYSICAL EXAMINATION:  Vitals:    04/01/21 1251   BP: 148/64   Pulse: (!) 56   Resp: 18   Temp: 96.8  F (36  C)   SpO2: 98%   Weight: (!) 241 lb 9.6 oz (109.6 kg)   Height: 5' 1\" (1.549 m)         GENERAL: Awake, Alert, oriented x3, not in any form of acute distress, answers questions appropriately, follows simple commands, conversant with flat affect.  Patient did have PFT  HEENT: Head is normocephalic with normal hair distribution. No evidence of trauma. Ears: No acute purulent discharge. Eyes: Sclera anicteric.  LUNG: Clear to auscultation with good chest expansion.   BACK: ROM normal, no CVA tenderness.  CVS: There is good S1  S2,  rhythm is regular.  ABDOMEN: Globular and ROTUND.  Nontender with palpation. chronic abdominal wall wounds bilaterally that are healing well.  EXTREMITIES: UE Full ROM. Right BKA; now with prosthesis on.  SKIN: Warm and dry, no erythema noted, open area on abdomen.    NEUROLOGICAL: The patient is oriented to person, place and time. Strength and sensation are grossly intact. Face is symmetric.    LABS:    Lab Results   Component Value Date    WBC 7.9 08/21/2020    HGB 9.2 (L) 08/21/2020    HCT 28.6 (L) 08/21/2020    MCV 89 08/21/2020     08/21/2020       Results for orders placed or performed during the hospital encounter of 08/15/20   Basic Metabolic Panel   Result Value Ref Range    Sodium 137 136 - 145 mmol/L    Potassium 4.1 3.5 - 5.0 mmol/L    Chloride 103 98 - 107 mmol/L    CO2 23 22 - 31 mmol/L    Anion Gap, Calculation 11 5 " - 18 mmol/L    Glucose 178 (H) 70 - 125 mg/dL    Calcium 11.0 (H) 8.5 - 10.5 mg/dL    BUN 37 (H) 8 - 22 mg/dL    Creatinine 2.05 (H) 0.60 - 1.10 mg/dL    GFR MDRD Af Amer 29 (L) >60 mL/min/1.73m2    GFR MDRD Non Af Amer 24 (L) >60 mL/min/1.73m2           Lab Results   Component Value Date    HGBA1C 10.3 (H) 06/17/2020     Vitamin D, Total (25-Hydroxy)   Date Value Ref Range Status   04/28/2016 29.8 (L) 30.0 - 80.0 ng/mL Final     Lab Results   Component Value Date    [id number] 285 01/07/2011       ASSESSMENT/PLAN:  1. Diarrhea, unspecified type    2. Primary osteoarthritis of both knees      Chronic pain: Improved. Not using PRN but would like some available.  -Decrease hydromorphone 2 mg p.o. twice daily as needed.    Loose stool   C. difficile positive, recurrent:   Completed vancomycin mid-March.  Now complaining of loose stools again.  She thinks most of her loose stools are from the food and is requesting Imodium.  Although there is a possibility of false positive after recent treatment, I had like to test to ensure that the C. difficile has cleared before giving Imodium.   -Repeat C. difficile culture.    Chronic conditions,:     Left abdominal wall open area: Followed by the wound team.  The wound is drastically improved and almost closed with no redness or drainage.  This is a huge improvement and the patient is very happy about this.  -Continue orders per wound care team and change dressing as needed for increased drainage.    Osteoarthritis: Knee injections completed on 1/21.  She has had mild improvement in pain.   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 Improved. Unable to assess stump, in  and brace.   -Continues on Dilaudid; using infrequently.    Anticoagulated:  -Omeprazole 20 mg daily.   -Hemoglobin on Monday.    Insulin-dependent diabetes: A1c 10.3--> 8.4.  Now on glargine 17 units nightly and sliding scale NovoLog 7 units 3 times daily with meals.  Blood sugars average 130-200. A few outliers  to 280.      Essential hypertension: Satisfactory. amlodipine d.cd.   -metoprolol    Acute on chronic blood loss anemia- likely from anemia chronic disease, iron deficiency, chronic kidney disease, surgery. On oral iron.  Sees nephrologist next week, has labs ordered for Monday.    CKD 4: Followed by Dr. Iqbal.  Calcium improved at 9.3.   -Sodium bicarb.     Coronary artery disease:  -also needs outpt CRISTINA eval   -Continue metoprolol and aspirin.       A. Fib: Reports a nosebleed that occurred this morning and that was distressing to her she is concerned that it is due to her anticoagulation.  Nosebleed did resolve in a timely manner, without any more intervention than nursing and the patient applying pressure.  She does not want to check her hemoglobin until next week when her nephrologist has labs ordered anyway.  We discussed risks and benefits of anticoagulation secondary to her A. fib, CAD, PAD, and diabetes.  We will continue monitoring for further bleeding.  -On Eliquis 5 mg twice daily .      Unintended weight loss: Resolved.         Electronically signed by:  Zoey Leung CNP  This progress note was completed using Dragon software and there may be grammatical errors.

## 2021-06-16 NOTE — PROGRESS NOTES
"  [facility]      NAME:  Jazmin Bauman             :  1955    MRN: [id number]    CODE STATUS:  FULL CODE    FACILITY: [facility] [[id number]]         CHIEF COMPLAIN/REASON FOR VISIT:  Chief Complaint   Patient presents with     Review Of Multiple Medical Conditions     Regulatory        HISTORY OF PRESENT ILLNESS: Jazmin Bauman is a 65 y.o. female. Pt was at [facility] from  to  and underwent a RBKA r/t ongoing DM ulcer with osteomyelitis. Per her EMR dc summary \" with HTN,morbid obesity, DM, A fib, CKD 4 presented for evaluation of R foot swelling, difficulty ambulation, pain found to have osteomyelitis now s/p amputation. She has now been to the OR twice this stay, last was on 20.   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   Patient had a chronic diabetic foot ulcer on her R heel, presented for increased swelling, difficulty ambulating, and pain. MRI showed extensive soft tissue necrosis and some osteomyelitis along with cellulitis  Met sepsis criteria on admission with leukocytosis and elevated CRP  Podiatry saw and the foot was not felt to be salvageable and BKA was recommended\"     August 15-: Hospitalized for sepsis with MRSA bacteremia, she had possible endocarditis and TTE revealing vegetation of the aortic valve. She had a CT of the abdomen and pelvis that showed left lower abdominal pannus ulceration but without abscess.  Her right BKA stump ulceration was negative for osteomyelitis or cellulitis.  She was treated with IV Vanco for 14 days which will be completed on .  Her left lower abdominal ulceration has never been biopsied.  It was not biopsied in the hospital and there was no surgical intervention.  Her right BKA stump was negative for osteomyelitis.    CKD stage IV with baseline creatinine 1.9-2.0 now.   Her insulin was adjusted and she is now on sliding scale insulin with 17 " "units of Lantus at bedtime.     For her hypertension, Norvasc has been discontinued.  Blood pressures remain in the 130s over 70s on average.  She was incidentally found to have a lung nodule on CT scan; follow up CT was on December 1 and revealed resolution of the nodule.    Today: Seen today for regulatory visit for [employer].  She had recent C. difficile infection with recurrence and now reports resolution of loose stools.  We switched her from Coumadin to Eliquis about a month ago.  She had been doing well, and I did not notice any excessive bruising however she had a bloody nose this morning that she was concerned about.  It did resolve on its own with pressure.  She is concerned that she should be back on Coumadin.  We did have a risks and benefits discussion of anticoagulation and discussed continued monitoring.  I did suggest getting a hemoglobin for tomorrow however she has labs due on Monday from Dr. Frost, her nephrologist.  I reviewed her blood sugars which had initially been less than 200 however recently ranging between 203 100 for the last 2 weeks.  Lantus and NovoLog have recently been adjusted.  She is infrequently using Dilaudid.  Will discuss tapering.     Allergies   Allergen Reactions     Hydralazine      Paralysis of legs     Latex Itching     Skin Burns     Naprosyn [Naproxen] Swelling     throat     Nitrofurantoin Hives     Sulfa (Sulfonamide Antibiotics) Rash     Vicks Vaporub [Camphor-Eucalyptus Oil-Menthol] Rash   :     Current Outpatient Medications   Medication Sig     acetaminophen (TYLENOL) 500 MG tablet Take 2 tablets (1,000 mg total) by mouth 3 (three) times a day.     apixaban ANTICOAGULANT (ELIQUIS) 5 mg Tab tablet Take 5 mg by mouth 2 (two) times a day.     aspirin 81 MG EC tablet Take 81 mg by mouth daily.     BD ULTRA-FINE MICRO PEN NEEDLE 32 gauge x 1/4\" Ndle USE AS DIRECTED 4 TIMES DAILY.     bisacodyL (DULCOLAX) 10 mg suppository Insert 10 mg into the rectum daily as " needed. No stool greater than 72 hours     docusate sodium (COLACE) 100 MG capsule Take 100 mg by mouth daily as needed.      ferrous sulfate 325 (65 FE) MG tablet Take 1 tablet by mouth 2 (two) times a day with meals.     gabapentin (NEURONTIN) 100 MG capsule Take 200 mg by mouth 2 (two) times a day.     HYDROmorphone (DILAUDID) 2 MG tablet (Take 2mg daily and 2mg Q 3 hours PRN)     lancets (ACCU-CHEK MULTICLIX LANCET) Misc TEST 6 TIMES A DAY     LANTUS SOLOSTAR U-100 INSULIN 100 unit/mL (3 mL) pen Inject 10 Units under the skin at bedtime. 11.65 Type 2 with hyperglycemia  Contact provider if insulin prescribed is not the preferred insulin per insurance. (Patient taking differently: Inject 17 Units under the skin at bedtime. 11.65 Type 2 with hyperglycemia  Contact provider if insulin prescribed is not the preferred insulin per insurance.)     lidocaine (LIDODERM) 5 % Place 1 patch on the skin daily. Remove & Discard patch within 12 hours or as directed by MD     metoprolol tartrate (LOPRESSOR) 25 MG tablet Take 1 tablet (25 mg total) by mouth 2 (two) times a day.     miconazole (MICOTIN) 2 % powder Apply topically 2 (two) times a day. Apply to b/l groin/underneath breasts/underneath pannus     NOVOLOG U-100 INSULIN ASPART 100 unit/mL injection Check blood sugar four (4) times daily.  11.65 Type 2 with hyperglycemia  OneTouch Verio Flex  Meter  OneTouch Verio strips 2 boxes of 50  Delica Lancets box of 100  BD Ultra-fine Therese Pen Needles - NDC [address]-51 - dispense 1 case, refill PRN for 1 year (Patient taking differently: Inject under the skin see administration instructions. 7 B  10 L  10 S)     omeprazole (PRILOSEC) 20 MG capsule Take 20 mg by mouth 2 (two) times a day before meals.     polyethylene glycol (MIRALAX) 17 gram packet Take 1 packet (17 g total) by mouth daily as needed.     Saccharomyces boulardii (FLORASTOR) 250 mg capsule Take 250 mg by mouth daily.     senna (SENOKOT) 8.6 mg tablet Take 1  "tablet by mouth 2 (two) times a day. (Patient taking differently: Take 1 tablet by mouth 2 (two) times a day as needed. )     sodium bicarbonate 650 MG tablet Take 1 tablet (650 mg total) by mouth 2 (two) times a day. OTC product         REVIEW OF SYSTEMS:    Currently, no fever, chills, or rigors. Does not have any visual or hearing problems. Denies any chest pain, headaches, palpitations, lightheadedness, dizziness, shortness of breath, or cough. Appetite is good. Denies any GERD symptoms. Denies any difficulty with swallowing, nausea, or vomiting.  Denies any abdominal pain, diarrhea or constipation. Denies any urinary symptoms, yeager in place. No insomnia. No active bleeding. No rash.       PHYSICAL EXAMINATION:  Vitals:    03/25/21 1356   BP: 118/63   Pulse: 68   Resp: 18   Temp: 97.9  F (36.6  C)   SpO2: 94%   Weight: (!) 242 lb 12.8 oz (110.1 kg)   Height: 5' 1\" (1.549 m)         GENERAL: Awake, Alert, oriented x3, not in any form of acute distress, answers questions appropriately, follows simple commands, conversant with flat affect.  Patient did have PFT  HEENT: Head is normocephalic with normal hair distribution. No evidence of trauma. Ears: No acute purulent discharge. Eyes: Sclera anicteric.  LUNG: Clear to auscultation with good chest expansion.   BACK: ROM normal, no CVA tenderness.  CVS: There is good S1  S2,  rhythm is regular.  ABDOMEN: Globular and ROTUND.  Nontender with palpation. chronic abdominal wall wounds bilaterally that are healing well.  EXTREMITIES: UE Full ROM. Right BKA; now with prosthesis on.  SKIN: Warm and dry, no erythema noted, open area on abdomen.    NEUROLOGICAL: The patient is oriented to person, place and time. Strength and sensation are grossly intact. Face is symmetric.    LABS:    Lab Results   Component Value Date    WBC 7.9 08/21/2020    HGB 9.2 (L) 08/21/2020    HCT 28.6 (L) 08/21/2020    MCV 89 08/21/2020     08/21/2020       Results for orders placed or " performed during the hospital encounter of 08/15/20   Basic Metabolic Panel   Result Value Ref Range    Sodium 137 136 - 145 mmol/L    Potassium 4.1 3.5 - 5.0 mmol/L    Chloride 103 98 - 107 mmol/L    CO2 23 22 - 31 mmol/L    Anion Gap, Calculation 11 5 - 18 mmol/L    Glucose 178 (H) 70 - 125 mg/dL    Calcium 11.0 (H) 8.5 - 10.5 mg/dL    BUN 37 (H) 8 - 22 mg/dL    Creatinine 2.05 (H) 0.60 - 1.10 mg/dL    GFR MDRD Af Amer 29 (L) >60 mL/min/1.73m2    GFR MDRD Non Af Amer 24 (L) >60 mL/min/1.73m2           Lab Results   Component Value Date    HGBA1C 10.3 (H) 06/17/2020     Vitamin D, Total (25-Hydroxy)   Date Value Ref Range Status   04/28/2016 29.8 (L) 30.0 - 80.0 ng/mL Final     Lab Results   Component Value Date    [id number] 285 01/07/2011       ASSESSMENT/PLAN:  1. CKD (chronic kidney disease) stage 4, GFR 15-29 ml/min (H)    2. Hx of right BKA (H)    3. Primary osteoarthritis of both knees    4. Clostridium difficile infection         Loose stool  C. difficile positive, recurrent:   Completed vancomycin.  Reporting formed soft stools.    Chronic conditions,:     Left abdominal wall open area: Followed by the wound team.  The wound is drastically improved and almost closed with no redness or drainage.  This is a huge improvement and the patient is very happy about this.  -Continue orders per wound care team and change dressing as needed for increased drainage.    Osteoarthritis: Knee injections completed on 1/21.  She has had mild improvement in pain.   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 Improved. Unable to assess stump, in  and brace.   -Continues on Dilaudid; using infrequently.    Anticoagulated:  -Omeprazole 20 mg daily.   -Hemoglobin on Monday.    Insulin-dependent diabetes: A1c 10.3--> 8.4.  Now on glargine 17 units nightly and sliding scale NovoLog 7 units 3 times daily with meals.  Blood sugars average 130-200. A few outliers to 280.      Essential hypertension: Satisfactory. amlodipine d.cd.    -metoprolol    Acute on chronic blood loss anemia- likely from anemia chronic disease, iron deficiency, chronic kidney disease, surgery. On oral iron.  Sees nephrologist next week, has labs ordered for Monday.    CKD 4: Followed by Dr. Iqbal.  Calcium improved at 9.3.   -Sodium bicarb.     Coronary artery disease:  -also needs outpt CRISTINA eval   -Continue metoprolol and aspirin.       A. Fib: Reports a nosebleed that occurred this morning and that was distressing to her she is concerned that it is due to her anticoagulation.  Nosebleed did resolve in a timely manner, without any more intervention than nursing and the patient applying pressure.  She does not want to check her hemoglobin until next week when her nephrologist has labs ordered anyway.  We discussed risks and benefits of anticoagulation secondary to her A. fib, CAD, PAD, and diabetes.  We will continue monitoring for further bleeding.  -On Eliquis 5 mg twice daily .      Unintended weight loss: Resolved.    Communicated concerns with patient, nursing.  Case Management:  I have reviewed the facility/SNF care plan/MDS which was done Quarterly, including the falls risk, nutrition and pain screening. I also reviewed the current immunizations, and preventive care.. Future cancer screening indicated is Mammogram, colonoscopy and provider and pt will formulate a POC.   Patient's desire to return to the community is present, but is not able due to care needs .       Electronically signed by:  Zoey Leung CNP  This progress note was completed using Dragon software and there may be grammatical errors.

## 2021-06-17 NOTE — TELEPHONE ENCOUNTER
Telephone Encounter by Juliana Cartagena RN at 9/14/2020  2:59 PM     Author: Juliana Cartagena RN Service: -- Author Type: Registered Nurse    Filed: 9/14/2020  3:01 PM Encounter Date: 9/14/2020 Status: Signed    : Juliana Cartagena RN (Registered Nurse)       [facility] Nurse Triage Telephone Note      Provider: BARRY Smith  Facility: [facility]    Facility Type: TCU    Caller: Faxed from facility  Call Back Number:      Allergies: Hydralazine; Latex; Naprosyn [naproxen]; Nitrofurantoin; Sulfa (sulfonamide antibiotics); and Vicks vaporub [camphor-eucalyptus oil-menthol]    Reason for call: UC results- pt is currently on Keflex and doxycycline at this time.          Verbal Order/Direction given by Provider: NNO    Provider giving order: BARRY Smith    Verbal order given to: TCU am floor nurse      Juliana Cartagena RN

## 2021-06-17 NOTE — TELEPHONE ENCOUNTER
Telephone Encounter by Juliana Cartagena RN at 11/10/2020  9:34 AM     Author: Juliana Cartagena RN Service: -- Author Type: Registered Nurse    Filed: 11/10/2020  9:38 AM Encounter Date: 11/9/2020 Status: Signed    : Juliana Cartagena RN (Registered Nurse)       [facility] Nurse Triage Telephone Note      Provider: BARRY Smith  Facility: [facility]    Facility Type: C    Caller: Mary/ and pm nurse  Call Back Number:  [phone]    Allergies: Hydralazine, Latex, Naprosyn [naproxen], Nitrofurantoin, Sulfa (sulfonamide antibiotics), and Vicks vaporub [camphor-eucalyptus oil-menthol]    Reason for call: Nursing was also calling to ask to see if there needed to be an Abx time out form her keflex and doxy since her symptoms were improving.        Verbal Order/Direction given by Provider: No change with the Abx,leave the labs on Dawna- desk for review tomorrow.     Provider giving order: BARRY Smith    Verbal order given to: Mary- for abx time out and pm nurse for the labs      Juliana Cartagena RN

## 2021-06-17 NOTE — TELEPHONE ENCOUNTER
Telephone Encounter by Nehemias Jacobsen RN at 2/15/2021  7:18 PM     Author: Nehemias Jacobsen RN Service: -- Author Type: Registered Nurse    Filed: 2/15/2021  7:20 PM Encounter Date: 2/15/2021 Status: Signed    : Nehemias Jacobsen RN (Registered Nurse)       [facility] Nurse Triage Telephone Note      Provider: BARRY Smith  Facility: [facility]    Facility Type: [facility]    Caller: Narda  Call Back Number:  [phone]    Allergies: Hydralazine, Latex, Naprosyn [naproxen], Nitrofurantoin, Sulfa (sulfonamide antibiotics), and Vicks vaporub [camphor-eucalyptus oil-menthol]    Reason for call: Nurse calling to report BMP results.  Notable meds:  Gabapentin 300mg Q AM and 200mg Q PM, sodium bicarbonate 650mg two times a day, Eliquis 5mg two times a day, oral Vanco taper, Metoprolol 25mg two times a day.  Patient does follow with Dr. Frost(nephrology).  See BMP results below:           Verbal Order/Direction given by Provider: Fax BMP results to Dr. Frost(nephrology)    Provider giving order: BARRY Smith    Verbal order given to: Carolyn Jacobsen RN

## 2021-06-17 NOTE — TELEPHONE ENCOUNTER
Telephone Encounter by Juliana Cartagena RN at 12/9/2020  3:04 PM     Author: Juliana Cartagena RN Service: -- Author Type: Registered Nurse    Filed: 12/9/2020  3:26 PM Encounter Date: 12/9/2020 Status: Signed    : Juliana Cartagena RN (Registered Nurse)       [facility] Nurse Triage Telephone Note      Provider: BARRY Smith  Facility: [facility]    Facility Type: [facility]    Caller: Luci   Call Back Number:  [phone]    Allergies: Hydralazine, Latex, Naprosyn [naproxen], Nitrofurantoin, Sulfa (sulfonamide antibiotics), and Vicks vaporub [camphor-eucalyptus oil-menthol]    Reason for call: CRP- 8.6,Covid + pt. On sodium bicarb 650mg two times a day,no concerns at this time.             Verbal Order/Direction given by Provider: NNO    Provider giving order: BARRY Smith    Verbal order given to: Luci Cartagena RN

## 2021-06-17 NOTE — TELEPHONE ENCOUNTER
Telephone Encounter by Vida Davey at 5/1/2020  3:43 PM     Author: Vida Davey Service: -- Author Type: --    Filed: 5/1/2020  3:43 PM Encounter Date: 5/1/2020 Status: Signed    : Vida Davey APPROVED:    Approval start date: 5/1/2020  Approval end date:  5/1/2021    Pharmacy has been notified of approval and will contact patient when medication is ready for pickup.

## 2021-06-17 NOTE — PROGRESS NOTES
"[facility]    Facility:   [facility] [place] [facility] [[id number]]   Code Status: FULL CODE    CHIEF COMPLAINT/REASON FOR VISIT:  Chief Complaint   Patient presents with     Follow-up       HISTORY:      HPI: Jazmin is a 65 y.o. female     Pruritis. Per staff report, request patient be seen due to itching around her right stump. Requesting Hydrocortisone cream. Today, patient is seen wheeling out of her room. States \"oh no\" upon introductions. Says she needs to get going. When asked about her itchiness, reports there are no open areas, but her prosthetic can get too tight sometimes. Wants something to help with the itchiness. Requests  Right stump not be examined.    Past Medical History:   Diagnosis Date     Anemia     pernicious     Diabetes mellitus (H)     borderline     Endometrial hyperplasia      Fibromyalgia      History of recurrent UTI (urinary tract infection)      History of transfusion      Hypertension      Thrombocytopenia (H)      Vaginal bleeding 1/2015             Family History   Problem Relation Age of Onset     Colon cancer Father      Social History     Socioeconomic History     Marital status: Single     Spouse name: Not on file     Number of children: Not on file     Years of education: Not on file     Highest education level: Not on file   Occupational History     Not on file   Social Needs     Financial resource strain: Not on file     Food insecurity     Worry: Not on file     Inability: Not on file     Transportation needs     Medical: Not on file     Non-medical: Not on file   Tobacco Use     Smoking status: Never Smoker     Smokeless tobacco: Never Used   Substance and Sexual Activity     Alcohol use: Not Currently     Comment: rare     Drug use: No     Sexual activity: Not on file   Lifestyle     Physical activity     Days per week: Not on file     Minutes per session: Not on file     Stress: Not on file   Relationships     Social connections     Talks on phone: Not " "on file     Gets together: Not on file     Attends Congregational service: Not on file     Active member of club or organization: Not on file     Attends meetings of clubs or organizations: Not on file     Relationship status: Not on file     Intimate partner violence     Fear of current or ex partner: Not on file     Emotionally abused: Not on file     Physically abused: Not on file     Forced sexual activity: Not on file   Other Topics Concern     Not on file   Social History Narrative     Not on file         Review of Systems    Vitals:    05/11/21 1043   BP: (!) 186/55   Pulse: (!) 57   Resp: 18   Temp: 97.3  F (36.3  C)   SpO2: 97%   Weight: (!) 245 lb 6.4 oz (111.3 kg)   Height: 5' 1\" (1.549 m)     Physical Exam  HENT:      Head: Normocephalic.   Eyes:      Extraocular Movements: EOM normal.   Neurological:      Mental Status: She is alert.        Physical Exam   Constitutional: She is well-developed, well-nourished, and in no distress.   HENT:   Head: Normocephalic.   Eyes: EOM are normal.   Neurological: She is alert.       LABS:   See Results in CareEverywhere    Information reviewed:  Medications, vital signs, orders, and nursing notes.    ASSESSMENT/PLAN:    Pruritis. Of right stump. Unable to visualize skin due to patient refusal. Will order Hydrocortisone 1% two times a day PRN. Instructed patient to ask staff if she feels she needs it.     Electronically signed by: Krystina Li CNP  "

## 2021-06-17 NOTE — PROGRESS NOTES
Office Visit - Follow up    Jazmin Bauman   62 y.o. female    Date of Visit: 4/20/2018    Chief Complaint   Patient presents with     Hypertension     Diabetes       Subjective: Diabetes mellitus type 2 with hypertension and super morbid obesity BMI today 56.  Encouraged bariatric surgical intervention patient declined.  Last laboratory tests done December 2017 allCLEAR.    Multiple drug allergies noted in the chart including hydralazine latex Naprosyn nitrofurantoin plus sulfa and Vicks VapoRub.  No blood in stool or urine denies chest pain or shortness of breath.    Medication list reviewed generally well tolerated.    Mammogram allCLEAR March 16, 2017.  Tubular adenomatous colon polyp seen on colonoscopy dated March 27, 2012 Dr. India Gtz presiding.  Otherwise feels quite well offers no other new complaints.    ROS: A comprehensive review of systems was performed and was otherwise negative    Medications:  Prior to Admission medications    Medication Sig Start Date End Date Taking? Authorizing Provider   acetaminophen (TYLENOL) 325 MG tablet Take 325-650 mg by mouth every 8 (eight) hours as needed.    Yes PROVIDER, HISTORICAL   allopurinol (ZYLOPRIM) 100 MG tablet Take 1 tablet (100 mg total) by mouth daily. 6/29/16  Yes Lester Flores MD   amLODIPine (NORVASC) 10 MG tablet Take 10 mg by mouth daily.   Yes PROVIDER, HISTORICAL   aspirin 81 MG EC tablet Take 81 mg by mouth daily.   Yes PROVIDER, HISTORICAL   cholecalciferol, vitamin D3, (VITAMIN D3) 1,000 unit capsule Take 1,000 Units by mouth daily.   Yes PROVIDER, HISTORICAL   docusate sodium (COLACE) 50 MG capsule Take by mouth once daily.   Yes PROVIDER, HISTORICAL   ferrous sulfate 65 mg elemental iron (IRON) Take 1 tablet by mouth 2 (two) times a day.   Yes PROVIDER, HISTORICAL   glipiZIDE (GLUCOTROL) 10 MG tablet Take 10 mg by mouth 2 (two) times a day.  1/31/16  Yes PROVIDER, HISTORICAL   insulin glargine (LANTUS SOLOSTAR) 100 unit/mL (3 mL)  "pen inject 49 units under the skin every morning. do not mix with any other insulin. 12/8/17  Yes Lester Flores MD   lancets (ACCU-CHEK MULTICLIX LANCET) Misc TEST 6 TIMES A DAY 9/28/15  Yes Letser Flores MD   magnesium 250 mg Tab Take 500 mg by mouth 2 (two) times a day.    Yes PROVIDER, HISTORICAL   ACCU-CHEK SAM PLUS TEST STRP strips TEST 6 TIMES A DAY 9/18/17   Lester Flores MD   blood glucose test strips Use 1 each As Directed 6 (six) times a day. Dispense brand per patient's insurance at pharmacy discretion. 4/17/18   Lester Flores MD   colchicine (MITIGARE) 0.6 mg cap Take 0.6 mg by mouth 2 (two) times a day. 12/21/16   Lester Flores MD   pen needle, diabetic (NOVOFINE 32) 32 gauge x 1/4\" Ndle Use 1 Device As Directed daily. 7/12/17   Sydni Garland NP       Allergies:   Allergies   Allergen Reactions     Hydralazine      Latex Itching     Skin Burns     Naprosyn [Naproxen] Swelling     throat     Nitrofurantoin Hives     Sulfa (Sulfonamide Antibiotics) Rash     Vicks Vaporub [Camphor-Eucalyptus Oil-Menthol] Rash       Immunizations:   Immunization History   Administered Date(s) Administered     Influenza [id number], 12/18/2009     Influenza, inj, historic,unspecified 12/18/2009, 10/14/2011     Influenza, seasonal,quad inj 36+ mos 09/28/2015     Influenza, seasonal,quad inj 6-35 mos 10/30/2012, 09/30/2013, 11/21/2014     Influenza,seasonal quad, PF, 36+MOS 09/29/2016, 10/03/2017     Td,adult,historic,unspecified 03/02/2006     Tdap 05/15/2015       Exam Chest clear to auscultation and percussion.  Heart tones regular rhythm without murmur rub or gallop.  Abdomen soft nontender no organomegaly.  No peritoneal signs.  Extremities free of edema cyanosis or clubbing.  Neck veins nondistended no thyromegaly or scleral icterus noted, carotids full.  Skin warm and dry easily conversant good spirited.  Normal intelligence.  Neurologically intact no gross localizing " findings.      Assessment and Plan  Diabetes mellitus type 2 check A1c blood sugar and urine for microalbumin and lipid panel today.    Hypertension control.    Tubular adenomatous colon polyp see colonoscopy report March 27, 2012.    Super morbid obesity with BMI 56.  Discussed offered bariatric surgical consultation patient declined.    Multiple drug allergies see above list reviewed with patient.  O2 sats to the 96%.    Gout by history currently asymptomatic.  RTC 3 months time.    Time: total time spent with the patient was 25 minutes of which >50% was spent in counseling and coordination of care    The following high BMI interventions were performed this visit: encouragement to exercise    Lester Flores MD    Patient Active Problem List   Diagnosis     Thrombocytopenia     Fibromyalgia     Carpal Tunnel Syndrome     Urinary Tract Infection     Endometrial Hyperplasia     Cholelithiasis     Leukocytosis     Urine Tests Nonspecific Abnormal Findings     Obesity     Essential Hypertension     Pernicious Anemia     Immunology Studies Raised Immunoglobulin Level     Vaginal bleeding     Type 2 diabetes mellitus

## 2021-06-17 NOTE — TELEPHONE ENCOUNTER
Telephone Encounter by Juliana Cartagena RN at 12/16/2020  3:40 PM     Author: Juliana Cartagena RN Service: -- Author Type: Registered Nurse    Filed: 12/16/2020  4:06 PM Encounter Date: 12/16/2020 Status: Signed    : Juliana Cartagena RN (Registered Nurse)       [facility] Nurse Triage Anticoagulation Note      Provider: BARRY Smith  Facility: [facility]    Facility Type: [facility]    Caller: Narda   Call Back Number:  [phone]    Reason for call: INR    Todays INR: 3.3   Previous INR: 12/14 3.2 held x1 then 2.5mg on 15/15    Diagnosis/Goal: AFIB and A Flutter  Heparin/Lovenox: No   Currently on ABX: No  Other interacting Medications: None  Missed or refused doses: No      CRP- 5.3  Pro calcitonin 0.07       Verbal Order/Direction given by Provider: Hold Warfarin x2, Next INR 12/18      Provider giving order: BARRY Smith    Verbal order given to: Narda Cartagena RN

## 2021-06-18 NOTE — PROGRESS NOTES
Progress Note    Reason for Visit:  Chief Complaint     Diabetes Mellitus          Progress Note:    HPI:    This patient is here for follow-up because of type 2 diabetes she has been diabetic for 16 years.    Component      Latest Ref Rng & Units 3/13/2018 4/20/2018 4/20/2018            1:30 PM  1:30 PM   Cholesterol      <=199 mg/dL  139    Triglycerides      <=149 mg/dL  126    HDL Cholesterol      >=50 mg/dL  41 (L)    LDL Calculated      <=129 mg/dL  73    Patient Fasting > 8hrs?        Yes Yes   Microalbumin, Random Urine      0.00 - 1.99 mg/dL 3.75 (H) 8.71 (H)    Creatinine, Urine      mg/dL 22.9 52.9    Microalbumin/Creatinine Ratio Random Urine      <=19.9 mg/g 163.8 (H) 164.7 (H)    Creatinine      0.60 - 1.10 mg/dL 1.12 (H)     GFR MDRD Af Amer      >60 mL/min/1.73m2 60 (L)     GFR MDRD Non Af Amer      >60 mL/min/1.73m2 49 (L)     Glucose      70 - 125 mg/dL   280 (H)   Hemoglobin A1c      3.5 - 6.0 % 10.6 (H) 12.3 (H)    TSH      0.30 - 5.00 uIU/mL      Potassium      3.5 - 5.0 mmol/L 4.3             Review of Systems:    Nervous System: No headache, dizziness, fainting or memory loss. No tingling sensation of hand or feet.  Ears: No hearing loss or ringing in the ears  Eyes: No blurring of vision, redness, itching or dryness.  Nose: No nosebleed or loss of smell  Mouth: No mouth sores or loss of taste  Throat: No hoarseness or difficulty swallowing  Neck: No enlarged thyroid or lymph nodes.  Heart: No chest pain, palpitation or irregular heartbeat. No swelling of hands or feet  Lungs: No shortness of breath, cough, night sweats, wheezing or hemoptysis.  Gastrointestinal: No nausea or vomiting, constipation or diarrhea.  No acid reflux, abdominal pain or blood in stools.  Kidney/Bladdr: No polyuria, polydipsia, nocturia or hematuria.  Genital/Sexual: No loss of libido  Skin: No rash, hair loss or hirsutism.  No abnormal striae  Muscles/Joints/Bones: No morning stiffness, muscle aches and pain or loss  "of height.    Current Medications:  Current Outpatient Prescriptions   Medication Sig     ACCU-CHEK SAM PLUS TEST STRP strips TEST 6 TIMES A DAY     acetaminophen (TYLENOL) 325 MG tablet Take 325-650 mg by mouth every 8 (eight) hours as needed.      allopurinol (ZYLOPRIM) 100 MG tablet Take 1 tablet (100 mg total) by mouth daily.     amLODIPine (NORVASC) 10 MG tablet Take 10 mg by mouth daily.     aspirin 81 MG EC tablet Take 81 mg by mouth daily.     BASAGLAR KWIKPEN U-100 INSULIN 100 unit/mL (3 mL) pen inject 49 units under the skin every morning. do not mix with any other insulin.     blood glucose test strips Use 1 each As Directed 6 (six) times a day. Dispense brand per patient's insurance at pharmacy discretion.     cholecalciferol, vitamin D3, (VITAMIN D3) 1,000 unit capsule Take 1,000 Units by mouth daily.     colchicine (MITIGARE) 0.6 mg cap Take 0.6 mg by mouth 2 (two) times a day.     docusate sodium (COLACE) 50 MG capsule Take by mouth once daily.     ferrous sulfate 65 mg elemental iron (IRON) Take 1 tablet by mouth 2 (two) times a day.     glipiZIDE (GLUCOTROL) 10 MG tablet Take 10 mg by mouth 2 (two) times a day.      lancets (ACCU-CHEK MULTICLIX LANCET) Misc TEST 6 TIMES A DAY     magnesium 250 mg Tab Take 500 mg by mouth 2 (two) times a day.      pen needle, diabetic (NOVOFINE 32) 32 gauge x 1/4\" Ndle Use 1 Device As Directed daily.       Patients Active Problems:  Patient Active Problem List   Diagnosis     Thrombocytopenia     Fibromyalgia     Carpal Tunnel Syndrome     Urinary Tract Infection     Endometrial Hyperplasia     Cholelithiasis     Leukocytosis     Urine Tests Nonspecific Abnormal Findings     Obesity     Essential Hypertension     Pernicious Anemia     Immunology Studies Raised Immunoglobulin Level     Vaginal bleeding     Type 2 diabetes mellitus (H)       History:   reports that she has never smoked. She has never used smokeless tobacco. She reports that she does not drink alcohol " "or use illicit drugs.   reports that she has never smoked. She has never used smokeless tobacco. She reports that she does not drink alcohol or use illicit drugs.  History   Smoking Status     Never Smoker   Smokeless Tobacco     Never Used      reports that she has never smoked. She has never used smokeless tobacco. She reports that she does not drink alcohol or use illicit drugs.  History   Sexual Activity     Sexual activity: Not on file     Past Medical History:   Diagnosis Date     Anemia     pernicious     B12 deficiency      Carpal tunnel syndrome     had surgery     Cholelithiasis     had surgery     Diabetes mellitus (H)     borderline     Endometrial hyperplasia      Fibromyalgia      Heart murmur     pt states her mitral valve leaks     History of recurrent UTI (urinary tract infection)      Hypertension      Obesity      Thrombocytopenia (H)      Vaginal bleeding 1/2015     No family history on file.  Past Medical History:   Diagnosis Date     Anemia     pernicious     B12 deficiency      Carpal tunnel syndrome     had surgery     Cholelithiasis     had surgery     Diabetes mellitus (H)     borderline     Endometrial hyperplasia      Fibromyalgia      Heart murmur     pt states her mitral valve leaks     History of recurrent UTI (urinary tract infection)      Hypertension      Obesity      Thrombocytopenia (H)      Vaginal bleeding 1/2015     Past Surgical History:   Procedure Laterality Date     bilateral carpal tunnel release  2009     CHOLECYSTECTOMY       COLONOSCOPY N/A 9/11/2017    Procedure: COLONOSCOPY;  Surgeon: Tyler Bhakta MD;  Location: [facility] GI;  Service:      COMBINED HYSTEROSCOPY DIAGNOSTIC / D&C N/A 1/28/2015    Procedure: DILATION AND CURETTAGE WITH HYSTEROSCOPY;  Surgeon: Grant Beal MD;  Location: [facility] OR;  Service:      DILATION AND CURETTAGE OF UTERUS         Vitals   height is 5' 1\" (1.549 m) and weight is 293 lb 3.2 oz (133 kg) (abnormal). Her blood " pressure is 146/70.         Exam  General appearance: The patient looked well, not in acute distress.  Eyes: no evidence of thyroid eye disease.   Retinal exam: No evidence of diabetic retinopathy.  Mouth and Throat: Normal  Neck: No evidence of thyromegaly, enlarged lymph node or tenderness  Chest: Trachea is central. Chest is clear to auscultation and percussion. Breat sounds are normal.  Cardiovascular exam: JVP is not raised. Heart sounds are normal, no murmurs or rub  Peripheral pulses are palpable.   Abdomen: No masses or tenderness.    Back: No vertebral tenderness or kyphosis.  Extremities: No evidence of leg edema.   Skin: Normal to touch.  No abnormal striae  Neurologic exam:  Visual fields are intact by confrontation, grossly intact. No evidence of peripheral neuropathy.  Detailed foot exam normal.        Diagnosis:  No diagnosis found.    Orders:   No orders of the defined types were placed in this encounter.        Assessment and Plan:    Type 2 diabetes.  The patient is currently on basaglar 49 units in the morning.  She also takes glipizide 10 mg twice a day.    Her A1c went up from 10.6-12.3.  I had a very lengthy discussion with the patient as I did last time and I did advise her to start NovoLog 10 units correction 8 units before each meal the patient is very reluctant but I advised her to do that.    She has hypertension on Norvasc 10 mg once a day she was on lisinopril 30 mg twice a day we stopped that his creatinine is 1.12 I did advise to take losartan 25 mg once a day she has microalbuminuria.    She takes allopurinol 100 mg daily    She checks her blood sugar before breakfast is between 200 300.    She is not compliant.                      I have reviewed and ordered clinical lab test    I have reviewed and ordered radiology tests.    I have reviewed and ordered her medication as required.    I have reviewed her test results and advised with the performing physician.    I have reviewed the  patient's old records.    I have reviewed and summarized the patient old records.    I did spend 40 minutes with the patient more than 50% was spent on counseling and managing her care.

## 2021-06-19 NOTE — LETTER
Letter by Carly Arora NP at      Author: Carly Arora NP Service: -- Author Type: --    Filed:  Encounter Date: 2019 Status: (Other)       2019    [facility]  Fax: [phone] Wound Dressing Rx and Order Form  Customer Service: [phone] Order Status: New Order   Verbal: Martha             Patient Info:  Name: Jazmin Bauman  : 1955  Address:   [address] [place] [address]  Phone: [phone]      Insurance Info:  Primary: Payor: [employer] / Plan: [employer] EMPLOYEE / Product Type: HMO /    Secondary: N/A N/A  [id number] - ([facility])      Physician Info:   Name:  Carly Arora NP   Dept Address/Phones:   [address] [place] [address], [address]  [facility] [address] [phone]  Fax: [phone]    Lymphedema circumferential measurements (in cm):    Wound info:  Encounter Diagnoses   Name Primary?   ? Open wound of abdominal wall, initial encounter Yes   ? Type 2 diabetes mellitus with other skin ulcer, with long-term current use of insulin (H)    ? Morbid obesity with BMI of 50.0-59.9, adult (H)      VASC Wound 19 right pannus (Active)   Pre Size Length 5.3 2019  8:00 AM   Pre Size Width 4 2019  8:00 AM   Pre Size Depth 0.1 2019  8:00 AM   Pre Total Sq cm 21.2 2019  8:00 AM         Drainage: Moderate  Thickness:  Full  Duration of Need: 30  Days Supply: 30  Start Date: 19  Starter Kit: Ancillary Kit (saline, gloves, gauze)  Qualifying wound/Debridement Yes      Dressing Type Brand Size Number of pieces Frequency of change   Primary Manuka honey HD supra lite   4''x5'' 15 Every two days    Secondary  Allevyn border adhesive   5''x5'' Max allowed  every two days    Square gauze   4''x4'' 2 loafs  every two days    Saline bullets   5ml individual use  1 box  every two days    3M cavilion skin prep    every two days     Note: If total out of pocket is more than $50.00  please contact the patient before processing order.     OK to forward to covered supplier.     Electronically Signed Physician: Carly Arora NP Date: 9/18/2019

## 2021-06-19 NOTE — LETTER
Letter by Lester Flores MD at      Author: Lester Flores MD Service: -- Author Type: --    Filed:  Encounter Date: 11/25/2019 Status: Signed         Jazmin Bauman  [address]  [place] [address]             November 25, 2019         Dear Ms. Bauman,    Below are the results from your recent visit:    Resulted Orders   HM2(CBC w/o Differential)   Result Value Ref Range    WBC 13.4 (H) 4.0 - 11.0 thou/uL    RBC 4.62 3.80 - 5.40 mill/uL    Hemoglobin 13.3 12.0 - 16.0 g/dL    Hematocrit 40.1 35.0 - 47.0 %    MCV 87 80 - 100 fL    MCH 28.9 27.0 - 34.0 pg    MCHC 33.3 32.0 - 36.0 g/dL    RDW 12.2 11.0 - 14.5 %    Platelets 343 140 - 440 thou/uL    MPV 8.1 7.0 - 10.0 fL   Comprehensive Metabolic Panel   Result Value Ref Range    Sodium 137 136 - 145 mmol/L    Potassium 4.4 3.5 - 5.0 mmol/L    Chloride 102 98 - 107 mmol/L    CO2 22 22 - 31 mmol/L    Anion Gap, Calculation 13 5 - 18 mmol/L    Glucose 230 (H) 70 - 125 mg/dL    BUN 24 (H) 8 - 22 mg/dL    Creatinine 1.42 (H) 0.60 - 1.10 mg/dL    GFR MDRD Af Amer 45 (L) >60 mL/min/1.73m2    GFR MDRD Non Af Amer 37 (L) >60 mL/min/1.73m2    Bilirubin, Total 0.4 0.0 - 1.0 mg/dL    Calcium 9.5 8.5 - 10.5 mg/dL    Protein, Total 7.1 6.0 - 8.0 g/dL    Albumin 3.2 (L) 3.5 - 5.0 g/dL    Alkaline Phosphatase 127 (H) 45 - 120 U/L    AST 13 0 - 40 U/L    ALT 14 0 - 45 U/L    Narrative    Fasting Glucose reference range is 70-99 mg/dL per  American Diabetes Association (ADA) guidelines.       Diabetes mellitus w/ less than optimal blood sugar control and Chronic kidney disease as well w/ slight rise in serum creatinine. Other blood work good    Please call with questions or contact us using [employer].    Sincerely,        Electronically signed by Lester Flores MD

## 2021-06-19 NOTE — LETTER
"Letter by Carly Arora NP at      Author: Carly Arora NP Service: -- Author Type: --    Filed:  Encounter Date: 10/30/2019 Status: Signed       10/30/2019    [facility]  Fax: [phone] Wound Dressing Rx and Order Form  Customer Service: [phone] Order Status: New Order   Verbal: Martha      Patient Info:  Name: Jazmin Bauman  : 1955  Address:   [address] [place] [address]  Phone: [phone]      Insurance Info:  Primary: Payor: [employer] / Plan: [employer] EMPLOYEE / Product Type: HMO /    Secondary: N/A N/A  [id number] - ([facility])      Physician Info:   Name:  Carly Arora NP   Dept Address/Phones:   [address] [place] [address], [address]  [facility] [address] [phone]  Fax: [phone]    Lymphedema circumferential measurements (in cm):  Wound info:  Encounter Diagnoses   Name Primary?   ? Open wound of abdominal wall, subsequent encounter Yes   ? Type 2 diabetes mellitus with other skin ulcer, with long-term current use of insulin (H)    ? Morbid obesity with BMI of 50.0-59.9, adult (H)      VASC Wound 19 right pannus (Active)   Pre Size Length 1.9 10/30/2019  9:00 AM   Pre Size Width 1.4 10/30/2019  9:00 AM   Pre Size Depth 0.1 10/30/2019  9:00 AM   Pre Total Sq cm 2.66 10/30/2019  9:00 AM         Drainage: Moderate  Thickness:  Full  Duration of Need: 30  Days Supply: 30  Start Date: 19  Starter Kit: Ancillary Kit (saline, gloves, gauze)  Qualifying wound/Debridement Yes      Dressing Type Brand Size Number of pieces Frequency of change     Primary Manuka honey HD supra lite   4\"x5\" 10 Every other day     Allevyn border adhesive   5\"x5\" Max allowed Every other day     Square gauze   4\"x4\" 2 loaf Every other day     Saline bullets   5ml individual use 1 box Every other day     3m cavilion skin prep     1 box Every other day        Note: If total out of pocket is more than $50.00 " please contact the patient before processing order.     OK to forward to covered supplier.     Electronically Signed Physician: Carly Arora NP Date: 11/26/2019

## 2021-06-19 NOTE — LETTER
"Letter by Carly Arora NP at      Author: Carly Arora NP Service: -- Author Type: --    Filed:  Encounter Date: 10/30/2019 Status: Signed       10/30/2019    [facility]  Fax: [phone] Wound Dressing Rx and Order Form  Customer Service: [phone] Order Status: Reorder   Verbal: Olga            Patient Info:  Name: Jazmin Bauman  : 1955  Address:   [address] [place] [address]  Phone: [phone]      Insurance Info:  Primary: Payor: [employer] / Plan: [employer] EMPLOYEE / Product Type: HMO /    Secondary: N/A N/A  [id number] - ([facility])      Physician Info:   Name:  Carly Arora NP   Dept Address/Phones:   [address] [place] [address], [address]  [facility] [address] [phone]  Fax: [phone]    Lymphedema circumferential measurements (in cm):  No data recorded  No data recorded    No data recorded  No data recorded  No data recorded  No data recorded    Wound info:  Encounter Diagnoses   Name Primary?   ? Open wound of abdominal wall, subsequent encounter Yes   ? Type 2 diabetes mellitus with other skin ulcer, with long-term current use of insulin (H)    ? Morbid obesity with BMI of 50.0-59.9, adult (H)      VASC Wound 19 right pannus (Active)   Pre Size Length 1.9 10/30/2019  9:00 AM   Pre Size Width 1.4 10/30/2019  9:00 AM   Pre Size Depth 0.1 10/30/2019  9:00 AM   Pre Total Sq cm 2.66 10/30/2019  9:00 AM       Wound 19 Intertrigo (open area in skin fold) Abdomen Right (Active)       Incision 19 Surgical Perineum (Active)     Drainage: Moderate  Thickness:  Full  Duration of Need: 30  Days Supply: 30  Start Date: 10/30/19  Starter Kit: Ancillary Kit (saline, gloves, gauze)  Qualifying wound/Debridement Yes      Dressing Type Brand Size Number of pieces Frequency of change   Primary Manuka honey HD supra lite  4\"x5\" 10 Every other day    Allevyn border adhesive  5\"x5\" Max " "allowed Every other day    Square gauze  4\"x4\" 2 loaf Every other day    Saline bullets  5ml individual use 1 box Every other day    3m cavilion skin prep   1 box Every other day     Note: If total out of pocket is more than $50.00 please contact the patient before processing order.     OK to forward to covered supplier.     Electronically Signed Physician: Carly Arora NP Date: 10/30/2019       "

## 2021-06-19 NOTE — LETTER
Letter by Lester Flores MD at      Author: Lester Flores MD Service: -- Author Type: --    Filed:  Encounter Date: 10/28/2019 Status: Signed         Jazmin Bauman  [address]  [place] [address]             October 28, 2019         Dear Ms. Bauman,    Below are the results from your recent visit:    Resulted Orders   HM2(CBC w/o Differential)   Result Value Ref Range    WBC 11.0 4.0 - 11.0 thou/uL    RBC 4.79 3.80 - 5.40 mill/uL    Hemoglobin 14.0 12.0 - 16.0 g/dL    Hematocrit 42.0 35.0 - 47.0 %    MCV 88 80 - 100 fL    MCH 29.2 27.0 - 34.0 pg    MCHC 33.4 32.0 - 36.0 g/dL    RDW 12.2 11.0 - 14.5 %    Platelets 341 140 - 440 thou/uL    MPV 8.2 7.0 - 10.0 fL   Comprehensive Metabolic Panel   Result Value Ref Range    Sodium 134 (L) 136 - 145 mmol/L    Potassium 4.4 3.5 - 5.0 mmol/L    Chloride 101 98 - 107 mmol/L    CO2 23 22 - 31 mmol/L    Anion Gap, Calculation 10 5 - 18 mmol/L    Glucose 256 (H) 70 - 125 mg/dL    BUN 20 8 - 22 mg/dL    Creatinine 1.39 (H) 0.60 - 1.10 mg/dL    GFR MDRD Af Amer 46 (L) >60 mL/min/1.73m2    GFR MDRD Non Af Amer 38 (L) >60 mL/min/1.73m2    Bilirubin, Total 0.4 0.0 - 1.0 mg/dL    Calcium 9.4 8.5 - 10.5 mg/dL    Protein, Total 7.3 6.0 - 8.0 g/dL    Albumin 3.3 (L) 3.5 - 5.0 g/dL    Alkaline Phosphatase 132 (H) 45 - 120 U/L    AST 14 0 - 40 U/L    ALT 16 0 - 45 U/L    Narrative    Fasting Glucose reference range is 70-99 mg/dL per  American Diabetes Association (ADA) guidelines.   Lipid Cascade   Result Value Ref Range    Cholesterol 142 <=199 mg/dL    Triglycerides 136 <=149 mg/dL    HDL Cholesterol 41 (L) >=50 mg/dL    LDL Calculated 74 <=129 mg/dL    Patient Fasting > 8hrs? Yes    Glycosylated Hemoglobin A1c   Result Value Ref Range    Hemoglobin A1c 10.9 (H) 3.5 - 6.0 %   Thyroid Stimulating Hormone (TSH)   Result Value Ref Range    TSH 2.43 0.30 - 5.00 uIU/mL   Urinalysis-UC if Indicated   Result Value Ref Range    Color, UA Yellow Colorless, Yellow,  Straw, Light Yellow    Clarity, UA Cloudy (!) Clear    Glucose,  mg/dL (!) Negative    Bilirubin, UA Negative Negative    Ketones, UA Negative Negative    Specific Gravity, UA 1.020 1.005 - 1.030    Blood, UA Moderate (!) Negative    pH, UA 6.0 5.0 - 8.0    Protein,  mg/dL (!) Negative mg/dL    Urobilinogen, UA 0.2 E.U./dL 0.2 E.U./dL, 1.0 E.U./dL    Nitrite, UA Positive (!) Negative    Leukocytes, UA Trace (!) Negative    Bacteria, UA Moderate (!) None Seen hpf    RBC, UA 5-10 (!) None Seen, 0-2 hpf    WBC, UA 0-5 None Seen, 0-5 hpf    Squam Epithel, UA 5-10 (!) None Seen, 0-5 lpf    Mucus, UA Moderate (!) None Seen lpf    Narrative    Urine Culture ordered based on [facility] criteria   Microalbumin, Random Urine   Result Value Ref Range    Microalbumin, Random Urine 77.61 (H) 0.00 - 1.99 mg/dL    Creatinine, Urine 50.0 mg/dL    Microalbumin/Creatinine Ratio Random Urine 1,552.2 (H) <=19.9 mg/g    Narrative    Microalbumin, Random Urine  <2.0 mg/dL . . . . . . . . Normal  3.0-30.0 mg/dL . . . . . . Microalbuminuria  >30.0 mg/dL . . . . . .  . Clinical Proteinuria    Microalbumin/Creatinine Ratio, Random Urine  <20 mg/g . . . . .. . . . Normal   mg/g . . . . . . . Microalbuminuria  >300 mg/g . . . . . . . . Clinical Proteinuria       Culture, Urine   Result Value Ref Range    Culture >100,000 col/ml Klebsiella pneumoniae (!)        Needs ciprofloxacin 250 mg tablets number 14 tablets take 1 tablet twice daily.  Please call patient and pharmacy.  And give 1 refill.    Await sensitivities    Chronic kidney disease again noted with slight rise in serum creatinine level.  From previous examination now 1.39 compared to 1.30.    All other labs are quite good.      Please call with questions or contact us using [employer].    Sincerely,        Electronically signed by Lester Flores MD

## 2021-06-19 NOTE — LETTER
Letter by Lester Flores MD at      Author: Lester Flores MD Service: -- Author Type: --    Filed:  Encounter Date: 4/19/2019 Status: (Other)         Jazmin Bauman  [address]  [place] [address]             April 19, 2019         Dear Ms. Bauman,    Below are the results from your recent visit:    Resulted Orders   Glycosylated Hemoglobin A1c   Result Value Ref Range    Hemoglobin A1c 10.0 (H) 3.5 - 6.0 %       All ok and good  ------      A1C is Too high and prefer less than 8.  Needs diabetic education.    Please call with questions or contact us using [employer].    Sincerely,        Electronically signed by Lester Flores MD

## 2021-06-19 NOTE — PROGRESS NOTES
"Office Visit - Physical    Jazmin Bauman   63 y.o. female    Date of Visit: 8/13/2018    Chief Complaint   Patient presents with     Annual Exam     Physical Exam    fasting     Medication Questions     Losartan       Subjective: Physical exam.    63-year-old female with history of diabetes mellitus type 2 and history of recent endocrine consultation.  Losartan was advised.  Patient is concerned.  The patient was concerns were allayed and the patient was told to follow the advice of her endocrinologist Dr. Perez and take the losartan.    Mammogram allCLEAR March 16, 2017.  Colonoscopy dated March 27, 2012 with Dr. India Gtz showed tubular adenomatous colon polyps.  Last year a repeat colonoscopy was done on September 11, 2017 no sign of cancer.    The patient has annual breast pelvic and rectal exam with Pap smear with Dr. Grant Beal of [facility].  Allergies are multiple including latex and Naprosyn plus hydralazine and nitrofurantoin plus allergic to sulfa and Vicks VapoRub.    ROS: A comprehensive review of systems was performed and was otherwise negative    Medications:   Prior to Admission medications    Medication Sig Start Date End Date Taking? Authorizing Provider   ACCU-CHEK SAM PLUS TEST STRP strips TEST 6 TIMES A DAY 9/18/17  Yes Lester Flores MD   acetaminophen (TYLENOL) 325 MG tablet Take 325-650 mg by mouth every 8 (eight) hours as needed.    Yes PROVIDER, HISTORICAL   allopurinol (ZYLOPRIM) 100 MG tablet Take 1 tablet (100 mg total) by mouth daily. 6/29/16  Yes Lester Flores MD   amLODIPine (NORVASC) 10 MG tablet Take 10 mg by mouth daily.   Yes PROVIDER, HISTORICAL   aspirin 81 MG EC tablet Take 81 mg by mouth daily.   Yes PROVIDER, HISTORICAL   BASAGLAR KWIKPEN U-100 INSULIN 100 unit/mL (3 mL) pen inject 49 units under the skin every morning. do not mix with any other insulin. 5/8/18  Yes Valdo Manzo MD   BD ULTRA-FINE MICRO PEN NEEDLE 32 gauge x 1/4\" Ndle USE AS " DIRECTED DAILY. 7/16/18  Yes Max Perez MD   blood glucose test strips Use 1 each As Directed 6 (six) times a day. Dispense brand per patient's insurance at pharmacy discretion. 4/17/18  Yes Lester Flores MD   cholecalciferol, vitamin D3, (VITAMIN D3) 1,000 unit capsule Take 1,000 Units by mouth daily.   Yes PROVIDER, HISTORICAL   docusate sodium (COLACE) 50 MG capsule Take by mouth once daily.   Yes PROVIDER, HISTORICAL   ferrous sulfate 65 mg elemental iron (IRON) Take 1 tablet by mouth 2 (two) times a day.   Yes PROVIDER, HISTORICAL   glipiZIDE (GLUCOTROL) 10 MG tablet Take 10 mg by mouth 2 (two) times a day.  1/31/16  Yes PROVIDER, HISTORICAL   insulin aspart U-100 (NOVOLOG FLEXPEN U-100 INSULIN) 100 unit/mL injection pen Inject 8 Units under the skin 3 (three) times a day before meals. 6/18/18  Yes Max Perez MD   lancets (ACCU-CHEK MULTICLIX LANCET) Misc TEST 6 TIMES A DAY 9/28/15  Yes Lester Flores MD   magnesium 250 mg Tab Take 500 mg by mouth 2 (two) times a day.    Yes PROVIDER, HISTORICAL   colchicine (MITIGARE) 0.6 mg cap Take 0.6 mg by mouth 2 (two) times a day. 12/21/16   Lester Flores MD   losartan (COZAAR) 25 MG tablet Take 1 tablet (25 mg total) by mouth daily. 6/18/18   Max Perez MD       Allergies:  Allergies   Allergen Reactions     Hydralazine      Latex Itching     Skin Burns     Naprosyn [Naproxen] Swelling     throat     Nitrofurantoin Hives     Sulfa (Sulfonamide Antibiotics) Rash     Vicks Vaporub [Camphor-Eucalyptus Oil-Menthol] Rash       Immunizations:   Immunization History   Administered Date(s) Administered     Influenza D8e2-16, 12/18/2009     Influenza, inj, historic,unspecified 12/18/2009, 10/14/2011     Influenza, seasonal,quad inj 36+ mos 09/28/2015     Influenza, seasonal,quad inj 6-35 mos 10/30/2012, 09/30/2013, 11/21/2014     Influenza,seasonal quad, PF, 36+MOS 09/29/2016, 10/03/2017     Td,adult,historic,unspecified 03/02/2006      Tdap 05/15/2015       Health Maintenance: Immunizations reviewed and up-to-date.    Past Medical History: Repeated hysteroscopies done for abnormal uterine bleeding but no sign of cancer.    Prior history of leukocytosis unexplained with mild thrombocytopenia.  History of multiple hematology consultations and multiple bone marrow biopsies and aspirates have not been done no sign of malignancy in the marrow.    Diabetes mellitus type 2 and history of hypertension and pernicious anemia.    Renal mass and pulmonary nodules followed by Dr. Feroz Sims of [facility] urology.  No surgical intervention has been advised.    Past Surgical History: Bilateral carpal tunnel release and cholecystectomy and hysteroscopy done twice for abnormal uterine bleeding no cancer found.  Dr. Grant Beal presiding.    Family History: Mother living in her early 90s.    Father  in his 80s of colon cancer.    The patient is single never  no children.    Social History: Retired from [employer] for super morbid obesity with BMI at 57.    Exam Chest clear to auscultation and percussion.  Heart tones regular rhythm without murmur rub or gallop.  Abdomen soft nontender no organomegaly.  No peritoneal signs.  Extremities free of edema cyanosis or clubbing.  Neck veins nondistended no thyromegaly or scleral icterus noted, carotids full.  Skin warm and dry easily conversant good spirited.  Normal intelligence.  Neurologically intact no gross localizing findings.  As of exam negative in its entirety including negative head eyes ears nose throat skin lymph neuropsych back straight no severe spine tenderness good pulses noted in all 4 extremities no carotid bruits.    Assessment and Plan  General medical examination at healthcare facility in super morbidly obese female with insulin resistance and diabetes mellitus type 2 check A1c plus hemogram conference of metabolic profile urinalysis lipid panel and TSH.  Discussed importance of  weight loss caloric caloric restriction regular exercise.    Family history of colon cancer and negative colonoscopy in 2017 on September 11 with Dr. Livia [facility].    Multiple drug allergies and sensitivities.  Unexplained leukocytosis with multiple bone marrow biopsies and aspirates having been done and repeated hysteroscopy is done for abnormal uterine bleeding no malignancy noted.  Dr. Grant Beal presiding.  Who does annual breast pelvic and rectal examinations with Pap smear not repeated by this examiner today.    The following high BMI interventions were performed this visit: encouragement to exercise    Lester Flores MD    Patient Active Problem List   Diagnosis     Thrombocytopenia     Fibromyalgia     Carpal Tunnel Syndrome     Urinary Tract Infection     Endometrial Hyperplasia     Cholelithiasis     Leukocytosis     Urine Tests Nonspecific Abnormal Findings     Obesity     Essential Hypertension     Pernicious Anemia     Immunology Studies Raised Immunoglobulin Level     Vaginal bleeding     Type 2 diabetes mellitus (H)

## 2021-06-19 NOTE — LETTER
Letter by Lester Flores MD at      Author: Lester Flores MD Service: -- Author Type: --    Filed:  Encounter Date: 8/30/2019 Status: (Other)         Jazmin Bauman  [address]  [place] [address]             August 30, 2019         Dear Ms. Bauman,    Below are the results from your recent visit:    Resulted Orders   HM2(CBC w/o Differential)   Result Value Ref Range    WBC 11.4 (H) 4.0 - 11.0 thou/uL    RBC 4.85 3.80 - 5.40 mill/uL    Hemoglobin 14.0 12.0 - 16.0 g/dL    Hematocrit 42.2 35.0 - 47.0 %    MCV 87 80 - 100 fL    MCH 29.0 27.0 - 34.0 pg    MCHC 33.3 32.0 - 36.0 g/dL    RDW 12.4 11.0 - 14.5 %    Platelets 353 140 - 440 thou/uL    MPV 8.5 7.0 - 10.0 fL   Comprehensive Metabolic Panel   Result Value Ref Range    Sodium 136 136 - 145 mmol/L    Potassium 4.9 3.5 - 5.0 mmol/L    Chloride 102 98 - 107 mmol/L    CO2 23 22 - 31 mmol/L    Anion Gap, Calculation 11 5 - 18 mmol/L    Glucose 253 (H) 70 - 125 mg/dL    BUN 20 8 - 22 mg/dL    Creatinine 1.30 (H) 0.60 - 1.10 mg/dL    GFR MDRD Af Amer 50 (L) >60 mL/min/1.73m2    GFR MDRD Non Af Amer 41 (L) >60 mL/min/1.73m2    Bilirubin, Total 0.4 0.0 - 1.0 mg/dL    Calcium 9.6 8.5 - 10.5 mg/dL    Protein, Total 7.0 6.0 - 8.0 g/dL    Albumin 3.2 (L) 3.5 - 5.0 g/dL    Alkaline Phosphatase 122 (H) 45 - 120 U/L    AST 14 0 - 40 U/L    ALT 15 0 - 45 U/L    Narrative    Fasting Glucose reference range is 70-99 mg/dL per  American Diabetes Association (ADA) guidelines.   Lipid Cascade   Result Value Ref Range    Cholesterol 129 <=199 mg/dL    Triglycerides 128 <=149 mg/dL    HDL Cholesterol 35 (L) >=50 mg/dL    LDL Calculated 68 <=129 mg/dL    Patient Fasting > 8hrs? Yes    Glycosylated Hemoglobin A1c   Result Value Ref Range    Hemoglobin A1c 11.1 (H) 3.5 - 6.0 %       Blood sugar a bit high and may wish to increase her long-acting insulin to her Basalgar by 4 additional units each day.    Mild chronic kidney disease as demonstrated previously with  serum creatinine 1.344 months prior and 1.211-year ago.  Improved glucose control and blood pressure control with good hydration may improve kidney function------  All very good results; preop    Please call with questions or contact us using [employer].    Sincerely,        Electronically signed by Lester Flores MD

## 2021-06-20 NOTE — LETTER
Letter by Harry Blackwood MD at      Author: Harry Blackwood MD Service: -- Author Type: --    Filed:  Encounter Date: 7/31/2020 Status: (Other)         Patient: Jazmin Bauman   MR Number: [id number]   YOB: 1955   Date of Visit: 7/31/2020      [facility]    Facility:  [facility] [[id number]]    Code Status:  FULL CODE    Chief Complaint   Patient presents with   ? Review Of Multiple Medical Conditions   ? Problem Visit   :                    Patient Active Problem List   Diagnosis   ? Carpal Tunnel Syndrome   ? Urinary Tract Infection   ? Endometrial Hyperplasia   ? Cholelithiasis   ? Leukocytosis   ? Urine Tests Nonspecific Abnormal Findings   ? Obesity   ? Essential hypertension   ? Pernicious Anemia   ? Immunology Studies Raised Immunoglobulin Level   ? Vaginal bleeding   ? Type 2 diabetes mellitus (H)   ? Atrial fibrillation with RVR (H)   ? Acute kidney injury superimposed on CKD (H)   ? Non-traumatic rhabdomyolysis   ? Metabolic acidosis   ? Troponin level elevated   ? Bacteremia   ? Acute respiratory failure with hypoxia (H)   ? Acute encephalopathy   ? Acute pulmonary edema (H)   ? Wheezing   ? Moderate protein malnutrition (H)   ? Abnormal cytological findings in specimens from other female genital organs   ? Chronic kidney disease, stage III (moderate) (H)   ? Hyperkalemia   ? Osteoarthritis   ? Acute kidney injury (H)   ? Sepsis (H)   ? 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 Sepsis, due to unspecified organism, unspecified whether acute organ dysfunction present (H)   ? Cellulitis of right foot   ? CKD (chronic kidney disease) stage 4, GFR 15-29 ml/min (H)   ? Chronic wound infection of abdomen, subsequent encounter   ? Acute post-operative pain   ? Phantom limb pain (H)   ? Paroxysmal atrial fibrillation (H)   ? Urinary retention   ? Coronary artery disease without angina pectoris   ? Hx of right  BKA (H)   ? Weakness       History:  Jazmin Bauman  is a 65 year old female with history of CKD 4, morbid obesity, insulin-dependent type 2 diabetes, chronic atrial fibrillation, peripheral neuropathy, GERD, peripheral edema, obesity, anemia of chronic disease with iron deficiency seen for follow-up of multiple medical issues as well as new acute kidney injury on 7/31/2020    Hospital Course: Patient was admitted between June 16 and June 28.    Patient has history of chronic nonhealing right heel ulcer which became infected without her knowledge.  Patient's housemate summoned help and patient was transported to the emergency room.  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  Patient was septic and treated with Vanco/Zosyn.  Blood culture was positive for Peptostreptococcus and Streptococcus.  As patient improved she was transitioned to Augmentin.     Guillotine amputation converted to BKA on June 23.    Hospitalization was complicated for bilateral abdominal wall issues.  On the right side patient was felt to have chronic wounds/rash.  On the left she had acute abscess and infection resulting in I&D in the operating room.  Calciphyxis considered possible though not confirmed.  Patient left with wound ulcer on the left abdomen requiring wound care treatment which is ongoing.  Please bilateral infectious issues occur in the skin and subcutaneous tissues in the dependent pannus of the lower flanks.  I do not see that any wound cultures were obtained from these areas.  Patient describes many months of work with wound care and soft tissue surgery/debridements to clear up the infection of the right abdominal wall in the past.    Hospitalization also remarkable for hypokalemia at outset improved with Kayexalate.  Patient has paroxysmal atrial fibrillation, her warfarin needed to be held due to the multiple surgeries but she is back  on it.  There is also concern over coronary artery disease with positive troponin January 2020 for which she was lost to follow-up with stress test/cardiology never did so.  Finally she had retention resulted in Canchola catheterization which is ongoing.    Subjective/ROS:    -augmented by discussion with facility staff involved in direct care    -Patient's creatinine remains elevated 2.23 on July 28, similar to other recent readings.  Baseline at time of recent discharge was 1.78.  Has continued to lose weight down to 244.5 pounds, she had a peak of 273 on July 2.  This ongoing weight loss is despite being off Lasix which we discontinued a couple of weeks ago.  She has follow-up with nephrology on August 21.  She is not on nephrotoxic medications at the moment.    -We reviewed her stress test results which are reassuring.  Questions were answered.  She has follow-up with Dr. Zepeda in 1 month    -Patient was to to have her stress test today.  She says she canceled that this morning as she did not feel well.  She had some sort of vague abdominal complaint which was transient.  She rescheduled that for next Thursday the 23rd.    - I was present for wound treatment today, large deep ulcers noted with partial granulation and little slough.  There is some scabbing on the left side on the skin adjacent.  I do not know if there is some adhesive issue there or not.    -Blood sugar control has improved though she still has readings above 200 at times.    -We discussed her urinary retention.  Canchola has been back in for a week due to PVRs as high as 900.  She has follow-up with urology on Monday, August 3.  Thus we decided to leave the catheter in until that follow-up.  Voiding trial/urodynamics may be necessary?    -Patient complains of constipation despite being on senna +1 twice daily.  She was formally on Colace and wonders what happened to that.    -INR therapeutic, new orders left  - Patient has been seeing wound care,  she has some concern over the right flank wound which is been slow to heal but wound care notes are encouraging.      ===================================================        Past Medical History:   Diagnosis Date   ? Anemia     pernicious   ? Diabetes mellitus (H)     borderline   ? Endometrial hyperplasia    ? Fibromyalgia    ? History of recurrent UTI (urinary tract infection)    ? History of transfusion    ? Hypertension    ? Thrombocytopenia (H)    ? Vaginal bleeding 1/2015     Past Surgical History:   Procedure Laterality Date   ? BELOW KNEE LEG AMPUTATION Right 6/18/2020    Procedure: AMPUTATION, BELOW KNEE;  Surgeon: Epi Corcoran MD;  Location: [facility] OR;  Service: General   ? BELOW KNEE LEG AMPUTATION Right 6/23/2020    Procedure: REVISION AMPUTATION, BELOW KNEE;  Surgeon: Epi Corcoran MD;  Location: [facility];  Service: General   ? bilateral carpal tunnel release  2009   ? CHOLECYSTECTOMY     ? COLONOSCOPY N/A 9/11/2017    Procedure: COLONOSCOPY;  Surgeon: Tyler Bhakta MD;  Location: [facility] GI;  Service:    ? COMBINED HYSTEROSCOPY DIAGNOSTIC / D&C N/A 1/28/2015    Procedure: DILATION AND CURETTAGE WITH HYSTEROSCOPY;  Surgeon: Grant Beal MD;  Location: [facility];  Service:    ? DILATION AND CURETTAGE OF UTERUS     ? IR NON TUNNELED CATHETER >5 YEARS  1/21/2020   ? PICC  1/20/2020        ? WA HYSTEROSCOPY,W/ENDO BX N/A 9/5/2019    Procedure: HYSTEROSCOPY, DILATION AND CURETTAGE;  Surgeon: Grant Beal MD;  Location: [facility];  Service: Gynecology   ? WA HYSTEROSCOPY,W/ENDO BX N/A 12/9/2019    Procedure: HYSTEROSCOPY, DILATION AND CURETTAGE;  Surgeon: Grant Beal MD;  Location: [facility];  Service: Gynecology          Family History   Problem Relation Age of Onset   ? Colon cancer Father    :       Social History     Socioeconomic History   ? Marital status: Single     Spouse name: Not on file   ? Number of children: Not on  file   ? Years of education: Not on file   ? Highest education level: Not on file   Occupational History   ? Not on file   Social Needs   ? Financial resource strain: Not on file   ? Food insecurity     Worry: Not on file     Inability: Not on file   ? Transportation needs     Medical: Not on file     Non-medical: Not on file   Tobacco Use   ? Smoking status: Never Smoker   ? Smokeless tobacco: Never Used   Substance and Sexual Activity   ? Alcohol use: Not Currently     Comment: rare   ? Drug use: No   ? Sexual activity: Not on file   Lifestyle   ? Physical activity     Days per week: Not on file     Minutes per session: Not on file   ? Stress: Not on file   Relationships   ? Social connections     Talks on phone: Not on file     Gets together: Not on file     Attends [employer] service: Not on file     Active member of club or organization: Not on file     Attends meetings of clubs or organizations: Not on file     Relationship status: Not on file   ? Intimate partner violence     Fear of current or ex partner: Not on file     Emotionally abused: Not on file     Physically abused: Not on file     Forced sexual activity: Not on file   Other Topics Concern   ? Not on file   Social History Narrative   ? Not on file   :    Currently living at a friend's house.  Normally she would live at her own place    Current Outpatient Medications on File Prior to Visit   Medication Sig Dispense Refill   ? bisacodyL (DULCOLAX) 10 mg suppository Insert 10 mg into the rectum daily as needed. No stool greater than 72 hours     ? docusate sodium (COLACE) 100 MG capsule Take 100 mg by mouth daily.     ? polyethylene glycol (MIRALAX) 17 gram packet Take 17 g by mouth daily. Hold for loose stool     ? senna (SENOKOT) 8.6 mg tablet Take 2 tablets by mouth 2 (two) times a day. Hold for loose stool     ? acetaminophen (TYLENOL) 500 MG tablet Take 2 tablets (1,000 mg total) by mouth every 6 (six) hours as needed for pain or fever.  0   ?  "amLODIPine (NORVASC) 10 MG tablet Take 1 tablet (10 mg total) by mouth daily. (Patient taking differently: Take 10 mg by mouth daily. Hold for systolic blood pressure less than 105)  0   ? aspirin 81 MG EC tablet Take 81 mg by mouth daily.     ? BD ULTRA-FINE MICRO PEN NEEDLE 32 gauge x 1/4\" Ndle USE AS DIRECTED 4 TIMES DAILY. 360 each 3   ? blood glucose test (ACCU-CHEK SAM PLUS TEST STRP) strips TEST 1 TIME A  strip 10   ? blood glucose test (ACCU-CHEK SAM PLUS TEST STRP) strips test blood sugar level 6 times daily 600 strip 4   ? calcium acetate,phosphat bind, (PHOSLO) 667 mg capsule Take 1 capsule (667 mg total) by mouth 3 (three) times a day with meals. 270 capsule 3   ? cholecalciferol, vitamin D3, (VITAMIN D3) 1,000 unit capsule Take 1,000 Units by mouth daily.     ? collagenase ointment Apply daily per WOC  0   ? ferrous sulfate 325 (65 FE) MG tablet Take 1 tablet by mouth 2 (two) times a day with meals.     ? furosemide (LASIX) 20 MG tablet Take 1 tablet (20 mg total) by mouth daily.  0   ? gabapentin (NEURONTIN) 400 MG capsule Take 1 capsule (400 mg total) by mouth 3 (three) times a day.  0   ? HYDROmorphone (DILAUDID) 2 MG tablet Take 1 tablet (2 mg total) by mouth every 4 (four) hours as needed. 30 tablet 0   ? insulin glargine (BASAGLAR KWIKPEN) 100 unit/mL (3 mL) pen Inject 10 Units under the skin at bedtime. (Patient taking differently: Inject 20 Units under the skin at bedtime. )  0   ? lancets (ACCU-CHEK MULTICLIX LANCET) Misc TEST 6 TIMES A  each 11   ? magnesium 250 mg Tab Take 500 mg by mouth 2 (two) times a day.      ? metoprolol tartrate (LOPRESSOR) 25 MG tablet Take 1 tablet (25 mg total) by mouth 2 (two) times a day. (Patient taking differently: Take 25 mg by mouth 2 (two) times a day. Hold for systolic blood pressure less than 105) 180 tablet 3   ? NOVOLOG FLEXPEN U-100 INSULIN 100 unit/mL (3 mL) injection pen Check blood sugar four (4) times daily.  11. (Patient taking " differently: Inject 10 Units under the skin 3 (three) times a day before meals. 10 unit three times a day with meals     PLUS:      if 141 - 180 = 1 unit;   181 - 220 = 2 units;  Plus sliding scale   221 - 260 = 3 units;   261 - 300 = 4 units;   301 - 340 = 5 units;   341 - 400 = 6 units;   401 - 999 = 7 units,)  0   ? NOVOLOG FLEXPEN U-100 INSULIN 100 unit/mL (3 mL) injection pen Check blood sugar four (4) times daily.  {  0   ? omeprazole (PRILOSEC) 20 MG capsule Take 20 mg by mouth.     ? sodium bicarbonate 650 MG tablet Take 1 tablet (650 mg total) by mouth 2 (two) times a day. OTC product  0   ? warfarin sodium (WARFARIN ORAL) Take by mouth. 7/24/20 INR 2.4 Take 4mg M, W,Fand 3mg AOD. Next INR 7/28 7/21/20 INR 3.1  Take 3mg daily.  Next INR 7/24/20.(currently on Cipro x 3 days)    7/14/20 INR 2.3  Cont 5mg daily.  Next INR 7/21/20.  7/13/20 INR 2.3  Take 5mg.  Next INR 7/14/20.  7/7/20 INR 2.5  Cont 5mg daily.  Next INR 7/10/20.     ? [DISCONTINUED] senna-docusate (PERICOLACE) 8.6-50 mg tablet Take 1 tablet by mouth 2 (two) times a day.  0     No current facility-administered medications on file prior to visit.    :      ALLERGIES:  Hydralazine; Latex; Naprosyn [naproxen]; Nitrofurantoin; Sulfa (sulfonamide antibiotics); and Vicks vaporub [camphor-eucalyptus oil-menthol]    Vitals:        Current Vitals   BP: 118/58 mmHg  7/31/2020 23:00    Temp:96.6  F  7/31/2020 23:00  Pulse: 73 bpm  7/31/2020 23:00    Weight: 244.5 Lbs  7/26/2020 13:04  Resp: 18 Breaths/min  7/31/2020 23:00  BS: 154 mg/dL  7/31/2020 22:01  O2: 95 %  7/31/2020 23:00  Pain: 0  7/31/2020 22:59    Physical exam:    General:  Alert  oriented x3 appears comfortable    Patient lying in bed.  She is alert oriented x3.  Normally conversant with many questions and concerns as noted above.  Speech is fluent and clear.  She answers questions appropriately.  She demonstrates purposeful movement of her extremities including the BKA leg    Due to the 2020  Covid 19 pandemic, except as noted above, the patient was visually observed at a 6 foot plus distance.  An observational exam was performed in an effort to keep patient safe from Covid 19 and other communicable diseases.   Labs:  Lab Results   Component Value Date    WBC 9.5 06/27/2020    HGB 7.9 (L) 06/27/2020    HCT 25.5 (L) 06/27/2020    MCV 95 06/27/2020     06/27/2020     Results for orders placed or performed during the hospital encounter of 06/16/20   Basic Metabolic Panel   Result Value Ref Range    Sodium 141 136 - 145 mmol/L    Potassium 4.2 3.5 - 5.0 mmol/L    Chloride 107 98 - 107 mmol/L    CO2 27 22 - 31 mmol/L    Anion Gap, Calculation 7 5 - 18 mmol/L    Glucose 116 70 - 125 mg/dL    Calcium 8.8 8.5 - 10.5 mg/dL    BUN 30 (H) 8 - 22 mg/dL    Creatinine 1.57 (H) 0.60 - 1.10 mg/dL    GFR MDRD Af Amer 40 (L) >60 mL/min/1.73m2    GFR MDRD Non Af Amer 33 (L) >60 mL/min/1.73m2         Lab Results   Component Value Date    TSH 1.83 06/17/2020     Lab Results   Component Value Date    HGBA1C 10.3 (H) 06/17/2020     [unfilled]  Lab Results   Component Value Date    YVGEBEYL93 285 01/07/2011     Lab Results   Component Value Date     (H) 01/20/2020     [unfilled]  Most Recent EKG     Units 06/16/20  1820   VENTRATE BPM 76   ATRIALRATE BPM 76   QRSDURATION ms 94   QTINTERVAL ms 428   QTCCALC ms 481   P Axis degrees 55   RAXIS degrees -43   TAXIS degrees 12   MUSEDX  Normal sinus rhythm  Left axis deviation  Inferior infarct (cited on or before 13-MAR-2020)  Abnormal ECG  When compared with ECG of 14-MAR-2020 13:04,  No significant change was found  Confirmed by MARYANA COLLINS MD LOC:[facility] ([id number]) on 6/17/2020 1:41:23 PM       728 shows sodium 136 potassium 3.8 creatinine 2.23 with a BUN of 56      Assessment/Plan:    Elevated creatinine/acute kidney injury, recurrent  Suspected volume depletion  CKD 4  -I do not know why her renal function has deteriorated.  We stopped the Lasix and she has continued  to lose weight but does not look hypovolemic at this point.  We we were worried about post obstructive failure/VUR but have placed a catheter and creatinine has not improved.  -Urology evaluation Karen 3 for bladder assessment  -Canchola remains in place as of now though I expect voiding trial either at the urologist office or upon return  -Sees her nephrologist on August 21  -Anticipate weekly BMP  -Continue off Lasix  -Hold parameters continue on her beta-blocker and amlodipine  - Continue recently adjusted renal dosing of her gabapentin, decreased it to 200 mg twice daily   - Has had her catheter in for a week, we discussed options and will evening over the weekend since she sees urologist Monday and will likely have voiding trial there.    -Urine culture showed mixed loren, she did receive renally dosed fluoroquinolone for 3 days.  She continues to complain of dysuria  Sees urologist on Monday will wait for their opinion.  Might need urodynamic studies?    Electrolyte disturbance   Potassium normalized.  We had to discontinue magnesium potassium replacement last week but were better shape this week.  -Continue off potassium magnesium replacement at this time  -Continue to hold Lasix  -Recheck labs weekly    Acute urinary retention  Urethral bleed  Dysuria   See discussion above.    -Canchola catheter remains in place  -Urology follow-up/possible urodynamic studies August 3  -Polymicrobial urine culture result of dubious significance.  Nonetheless she did receive fluoroquinolone for 3 days.    Type 2 diabetes, insulin-dependent, uncontrolled   Improved with our changes of last week.  She is up to 20 units of Lantus although her baseline dose at home was 25 units.  We increased her mealtime aspart to 10 units 3 times daily although she takes 15 units at home.  She also has sliding scale  - We should continue to adjust her doses as necessary in the weeks to come, I anticipate increases to her home/baseline dosing over  time  -However, recall that her most recent A1c 10.3 so she could need higher than her baseline doses eventually.        Chronic right foot ulcer now status post BKA  Right foot/leg cellulitis now status post BKA  Guillotine amputation June 30  Stump revision/BKA June 23   Patient working with PT.  She has questions about her compression sleeves which I am unable to answer and I redirect her to the appropriate source for getting those answers.    -Gabapentin was recently decreased due to her renal function as noted above.  She has not noticed any difference in her neuropathy.    Constipation   Patient says that she has had recurrence.  -Discontinue senna plus  - Colace 100 mg just once daily (too much Colace can lead to the all mush no push phenomenon)  - Senna 8.6 tablets 2 p.o. twice daily hold for loose stool  -MiraLAX 17 g daily, hold for loose stool    Abdominal wounds  Calciphylaxis considered possible here, though far from a certainty.  There was lack of agreement among her specialists.. Patient working with wound care specialist, appreciate their help here.   - Continue collagenase ointment and other wound care as outlined by wound care specialist          Paroxysmal atrial fibrillation   Continue to monitor INR and adjust warfarin appropriately      Chronic anemia   Likely anemia of chronic disease.  Her nephrologist notes an iron deficiency component and she is on iron infusion therapy last hemoglobin 7.9 which she is tolerating well.  She is generally more in the 9 range.  Hemoglobin was stable.  Do not anticipate further monitoring/treatment in the next couple of weeks anyway.      Obesity   Complicates her movement, limits mobility and increase his difficulty of therapy.  Associated with other increased risk of morbidity as well.      Hypertension   No new thoughts here.  Blood pressures acceptable  - continue amlodipine with hold parameters.   -Continue metoprolol with hold parameters   -Lasix is on  hold.     Social stressors and factors   Social service involved.        Case discussed with:    Facility staff             Harry Blakcwood MD

## 2021-06-20 NOTE — LETTER
"Letter by Zoey Leung CNP at      Author: Zoey Leung CNP Service: -- Author Type: --    Filed:  Encounter Date: 2020 Status: (Other)         Patient: Jazmin Bauman   MR Number: [id number]   YOB: 1955   Date of Visit: 2020       [facility]      NAME:  Jazmin Bauman             :  1955    MRN: [id number]    CODE STATUS:  FULL CODE    FACILITY: [facility] [[id number]]         CHIEF COMPLAIN/REASON FOR VISIT:  Chief Complaint   Patient presents with   ? Problem Visit     Pain management, constipation.       HISTORY OF PRESENT ILLNESS: Jazmin Bauman is a 65 y.o. female. Pt was at [facility] from  to  and underwent a RBKA r/t ongoing DM ulcer with osteomyelitis. Per her EMR dc summary \" with HTN,morbid obesity, DM, A fib, CKD 4 presented for evaluation of R foot swelling, difficulty ambulation, pain found to have osteomyelitis now s/p amputation. She has now been to the OR twice this stay, last was on 20.   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   Patient had a chronic diabetic foot ulcer on her R heel, presented for increased swelling, difficulty ambulating, and pain. MRI showed extensive soft tissue necrosis and some osteomyelitis along with cellulitis  Met sepsis criteria on admission with leukocytosis and elevated CRP  Podiatry saw and the foot was not felt to be salvageable and BKA was recommended\"     Acute on chronic blood loss anemia-likely from anemia chronic disease, iron deficiency, chronic kidney disease, surgery.  She did receive 1 unit while hospitalized, she has been stable in the TCU.  On oral iron.     Left abdominal wall cellulitis and right side: She did receive a course of antibiotics and a surgical debridement.  There is no abscess on ultrasound.  She is followed by wound care in the TCU.     Insulin-dependent diabetes: A1c 10.3.  Now on " glargine 20 units nightly and 10 units of NovoLog 3 times daily with meals.  Blood sugars actually look pretty good here in the TCU, all less than 220.     Essential hypertension: Satisfactory in TCU.  -metoprolol  -furosemide  -amlodipine      CKD 4: Followed by Dr. Iqbal.  -Continue PhosLo.     Coronary artery disease:  -also needs outpt CRISTINA eval  -Continue metoprolol and aspirin.     Urinary retention: Continues to have Canchola in place, she follows with [facility] for urinary retention.    A. Fib on Coumadin: INR due tomorrow, recent variable INRs.    TCU/PT report: Using the easy stand for transfer at this time due to left leg weakness.    Today: She is seen for pain management and constipation discussion.  She has been on Dilaudid for pain management secondary to postop pain secondary to right BKA.  She also has some phantom limb pain.  The Dilaudid is helping and she uses it frequently throughout the day.  We will continue her current dosing.  Additionally she is complaining of some constipation and is not on any current scheduled laxatives or stool softeners.  We will add those today as well.  I did view her left sided and right-sided abdominal wall cellulitis with the left being worse.  She is followed by wound care at the nursing home and there is no obvious signs of her symptoms of secondary infection.  Wound bed is beefy red with moderate amount of sanguinous drainage.    Allergies   Allergen Reactions   ? Hydralazine      Paralysis of legs   ? Latex Itching     Skin Burns   ? Naprosyn [Naproxen] Swelling     throat   ? Nitrofurantoin Hives   ? Sulfa (Sulfonamide Antibiotics) Rash   ? Vicks Vaporub [Camphor-Eucalyptus Oil-Menthol] Rash   :     Current Outpatient Medications   Medication Sig   ? acetaminophen (TYLENOL) 500 MG tablet Take 2 tablets (1,000 mg total) by mouth every 6 (six) hours as needed for pain or fever.   ? amLODIPine (NORVASC) 10 MG tablet Take 1 tablet (10 mg total) by mouth daily.  "(Patient taking differently: Take 10 mg by mouth daily. Hold for systolic blood pressure less than 105)   ? aspirin 81 MG EC tablet Take 81 mg by mouth daily.   ? BD ULTRA-FINE MICRO PEN NEEDLE 32 gauge x 1/4\" Ndle USE AS DIRECTED 4 TIMES DAILY.   ? bisacodyL (DULCOLAX) 10 mg suppository Insert 10 mg into the rectum daily as needed. No stool greater than 72 hours   ? blood glucose test (ACCU-CHEK SAM PLUS TEST STRP) strips TEST 1 TIME A DAY   ? blood glucose test (ACCU-CHEK SAM PLUS TEST STRP) strips test blood sugar level 6 times daily   ? calcium acetate,phosphat bind, (PHOSLO) 667 mg capsule Take 1 capsule (667 mg total) by mouth 3 (three) times a day with meals.   ? cholecalciferol, vitamin D3, (VITAMIN D3) 1,000 unit capsule Take 1,000 Units by mouth daily.   ? collagenase ointment Apply daily per WOC   ? docusate sodium (COLACE) 100 MG capsule Take 100 mg by mouth daily.   ? ferrous sulfate 325 (65 FE) MG tablet Take 1 tablet by mouth 2 (two) times a day with meals.   ? furosemide (LASIX) 20 MG tablet Take 1 tablet (20 mg total) by mouth daily.   ? gabapentin (NEURONTIN) 400 MG capsule Take 1 capsule (400 mg total) by mouth 3 (three) times a day.   ? HYDROmorphone (DILAUDID) 2 MG tablet Take 1 tablet (2 mg total) by mouth every 4 (four) hours as needed.   ? insulin glargine (BASAGLAR KWIKPEN) 100 unit/mL (3 mL) pen Inject 10 Units under the skin at bedtime. (Patient taking differently: Inject 20 Units under the skin at bedtime. )   ? lancets (ACCU-CHEK MULTICLIX LANCET) Misc TEST 6 TIMES A DAY   ? magnesium 250 mg Tab Take 500 mg by mouth 2 (two) times a day.    ? metoprolol tartrate (LOPRESSOR) 25 MG tablet Take 1 tablet (25 mg total) by mouth 2 (two) times a day. (Patient taking differently: Take 25 mg by mouth 2 (two) times a day. Hold for systolic blood pressure less than 105)   ? NOVOLOG FLEXPEN U-100 INSULIN 100 unit/mL (3 mL) injection pen Check blood sugar four (4) times daily.  11. (Patient taking " differently: Inject 10 Units under the skin 3 (three) times a day before meals. 10 unit three times a day with meals     PLUS:      if 141 - 180 = 1 unit;   181 - 220 = 2 units;  Plus sliding scale   221 - 260 = 3 units;   261 - 300 = 4 units;   301 - 340 = 5 units;   341 - 400 = 6 units;   401 - 999 = 7 units,)   ? NOVOLOG FLEXPEN U-100 INSULIN 100 unit/mL (3 mL) injection pen Check blood sugar four (4) times daily.  {   ? omeprazole (PRILOSEC) 20 MG capsule Take 20 mg by mouth.   ? polyethylene glycol (MIRALAX) 17 gram packet Take 17 g by mouth daily. Hold for loose stool   ? senna (SENOKOT) 8.6 mg tablet Take 2 tablets by mouth 2 (two) times a day. Hold for loose stool   ? sodium bicarbonate 650 MG tablet Take 1 tablet (650 mg total) by mouth 2 (two) times a day. OTC product   ? warfarin sodium (WARFARIN ORAL) Take by mouth. 7/24/20 INR 2.4 Take 4mg M, W,Fand 3mg AOD. Next INR 7/28 7/21/20 INR 3.1  Take 3mg daily.  Next INR 7/24/20.(currently on Cipro x 3 days)    7/14/20 INR 2.3  Cont 5mg daily.  Next INR 7/21/20.  7/13/20 INR 2.3  Take 5mg.  Next INR 7/14/20.  7/7/20 INR 2.5  Cont 5mg daily.  Next INR 7/10/20.         REVIEW OF SYSTEMS:    Currently, no fever, chills, or rigors. Does not have any visual or hearing problems. Denies any chest pain, headaches, palpitations, lightheadedness, dizziness, shortness of breath, or cough. Appetite is good. Denies any GERD symptoms. Denies any difficulty with swallowing, nausea, or vomiting.  Denies any abdominal pain, diarrhea or constipation. Denies any urinary symptoms, yeager in place. No insomnia. No active bleeding. No rash.       PHYSICAL EXAMINATION:  Vitals:    08/05/20 1003   BP: 127/73   Pulse: 67   Temp: (!) 96.4  F (35.8  C)   SpO2: 99%   Weight: (!) 236 lb (107 kg)         GENERAL: Awake, Alert, oriented x3, not in any form of acute distress, answers questions appropriately, follows simple commands, conversant with flat affect  HEENT: Head is normocephalic  with normal hair distribution. No evidence of trauma. Ears: No acute purulent discharge. Eyes: Sclera anicteric.  CHEST: No tenderness or deformity, no crepitus  LUNG: Clear to auscultation with good chest expansion. There DM BS  BACK: No kyphosis of the thoracic spine. Symmetric, no curvature, ROM normal, no CVA tenderness, no spinal tenderness   CVS: There is good S1  S2,  rhythm is regular.  ABDOMEN: Globular and ROTUND tender to palpation, non distended, no masses, no organomegaly, good bowel sounds, no rebound or guarding, no peritoneal signs. Wound to left side of abdomen, dressed today, no open area to left hip as well. Unable to visualize as they are dressed and she is fully clothed. Followed by wound   EXTREMITIES: UE Full ROM. Right BKA, IN a  with protected brace  SKIN: Warm and dry, no erythema noted, open area on left pannus with dressing on abdomen  NEUROLOGICAL: The patient is oriented to person, place and time. Strength and sensation are grossly intact. Face is symmetric.          LABS:    Lab Results   Component Value Date    WBC 9.5 06/27/2020    HGB 7.9 (L) 06/27/2020    HCT 25.5 (L) 06/27/2020    MCV 95 06/27/2020     06/27/2020       Results for orders placed or performed during the hospital encounter of 06/16/20   Basic Metabolic Panel   Result Value Ref Range    Sodium 141 136 - 145 mmol/L    Potassium 4.2 3.5 - 5.0 mmol/L    Chloride 107 98 - 107 mmol/L    CO2 27 22 - 31 mmol/L    Anion Gap, Calculation 7 5 - 18 mmol/L    Glucose 116 70 - 125 mg/dL    Calcium 8.8 8.5 - 10.5 mg/dL    BUN 30 (H) 8 - 22 mg/dL    Creatinine 1.57 (H) 0.60 - 1.10 mg/dL    GFR MDRD Af Amer 40 (L) >60 mL/min/1.73m2    GFR MDRD Non Af Amer 33 (L) >60 mL/min/1.73m2           Lab Results   Component Value Date    HGBA1C 10.3 (H) 06/17/2020     Vitamin D, Total (25-Hydroxy)   Date Value Ref Range Status   04/28/2016 29.8 (L) 30.0 - 80.0 ng/mL Final     Lab Results   Component Value Date    [id number] 285  01/07/2011       ASSESSMENT/PLAN:  1. Hx of right BKA (H)    2. Phantom limb pain (H)    3. Acute post-operative pain    4. Chronic wound infection of abdomen, subsequent encounter    5. Slow transit constipation      1.  Right BKA: Also with postoperative pain and phantom limb pain. Unable to assess stump, in  and brace.  -Renew Dilaudid at current dosing.  -Continue PT and OT as directed by them.    2. Urinary retenton: Canchola has been reinserted.  Urine culture returned and currently on antibiotic.    3. Constipation:   -senna S1 tab p.o. twice daily.    -Start MiraLAX 17 g p.o. daily.    4.  Left pannus wound: Viewed today, currently has wound care orders that we will continue the same.  Does not appear to have any secondary infection at this time.    35 minutes with 21 minutes face-to-face with patient in room discussing wound care, constipation, pain management discussion and education regarding risks versus benefits as well as discussing long-term plan and disposition.    Electronically signed by:  Zoey Leung CNP  This progress note was completed using Dragon software and there may be grammatical errors.      .

## 2021-06-20 NOTE — LETTER
Letter by Bettina Gaspar RN at      Author: Bettina Gaspar RN Service: -- Author Type: --    Filed:  Encounter Date: 8/5/2020 Status: (Other)         Jazmin Bauman  [address] [place] [address]             August 5, 2020         Dear Ms. Bauman,    Below are the results from your recent visit:    Resulted Orders   NM MPI with Lexiscan   Result Value Ref Range    Pharmacologic Protocol  Lexiscan     Test Type Pharmacological     Baseline HR 61     Baseline Systolic      Baseline Diastolic BP 58     Last Stress HR 76     Last Stress Systolic      Last Stress Diastolic BP 52     Target      PERCENT HR 85%     ST Deviation Elevation III 0.2mm     Deviation Time I -0.2mm     ST Elevation Amount V3 0.8mm     ST Deviation Amount he aVR -0.6mm     Final Resting /54     Final Resting HR 69     Max Treadmill Speed 0.0     Max Treadmill Grade 0.0     Peak Systolic /53     Peak Diastolic /54     Max HR 79     Stress Phase Resting     Stress Resting Pt Position SUPINE     Current HR 61     Current /58     Stress Phase Resting     Stress Resting Pt Position MANUAL EVENT     Current HR 74     Current /52     Stress Phase Stress     Stage Minute EXE 00:00     Exercise Stage STAGE 2     Current HR 61     Current /58     Stress Phase Stress     Stage Minute EXE 01:00     Exercise Stage STAGE 3     Current HR 61     Current /58     Stress Phase Stress     Stage Minute EXE 02:00     Exercise Stage STAGE 4     Current HR 77     Current /58     Stress Phase Stress     Stage Minute EXE 02:30     Exercise Stage STAGE 4     Current HR 77     Current /53     Stress Phase Stress     Stage Minute EXE 03:00     Exercise Stage STAGE 5     Current HR 78     Current /53     Stress Phase Stress     Stage Minute EXE 03:39     Exercise Stage STAGE 5     Current HR 76     Current /52     Stress Phase Stress     Stage Minute EXE 04:00      Exercise Stage STAGE 6     Current HR 77     Current /52     Stress Phase Stress     Stage Minute EXE 04:00     Exercise Stage STAGE 6     Current HR 76     Current /52     Stress Phase Recovery     Stage Minute REC 00:59     Exercise Stage Recovery     Current HR 74     Current /52     Stress Phase Recovery     Stage Minute REC 01:16     Exercise Stage Recovery     Current HR 74     Current /52     Stress Phase Recovery     Stage Minute REC 01:27     Exercise Stage Recovery     Current HR 74     Current /53     Stress Phase Recovery     Stage Minute REC 01:59     Exercise Stage Recovery     Current HR 74     Current /53     Stress Phase Recovery     Stage Minute REC 02:59     Exercise Stage Recovery     Current HR 71     Current /53     Stress Phase Recovery     Stage Minute REC 03:30     Exercise Stage Recovery     Current HR 70     Current /54     Stress Phase Recovery     Stage Minute REC 03:59     Exercise Stage Recovery     Current HR 70     Current /54     Stress Phase Recovery     Stage Minute REC 04:03     Exercise Stage Recovery     Current HR 69     Current /54     Calculated Percent HR 51 %    Rate Pressure Product 8,611.0     Left Ventricular EF 66 %    Narrative    ?  The nuclear stress test is probably negative for inducible myocardial   ischemia or infarction.  ?  The patient is at a low risk of future cardiac ischemic events.  ?  The left ventricular ejection fraction at stress is 66%.  ?  The  images demonstrate breast attenuation as well as   subdiaphragmatic RAMP artifacts.  ?  There is no prior study for comparison.       The test results show that your stress test was normal. We were unsuccessful at reaching you by phone. Dr. Patel will discuss with you at your follow up appointment.     Rosa Zepeda MD [place], Bettina MILLER RN               Please inform this lady that stress test is normal, no significant coronary  artery disease, no need for cardiac follow-up.  Any questions let me know.   Luisito Zepeda        Please call with questions or contact us using [employer].    Sincerely,    Bettina SPICER

## 2021-06-20 NOTE — LETTER
"Letter by Zoey Leung CNP at      Author: Zoey Leung CNP Service: -- Author Type: --    Filed:  Encounter Date: 2020 Status: (Other)         Patient: Jazmin Bauman   MR Number: [id number]   YOB: 1955   Date of Visit: 2020       [facility]      NAME:  Jazmin Bauman             :  1955    MRN: [id number]    CODE STATUS:  FULL CODE    FACILITY: [facility] [[id number]]         CHIEF COMPLAIN/REASON FOR VISIT:  Chief Complaint   Patient presents with   ? Review Of Multiple Medical Conditions       HISTORY OF PRESENT ILLNESS: Jazmin Bauman is a 65 y.o. female. Pt was at [facility] from  to  and underwent a RBKA r/t ongoing DM ulcer with osteomyelitis. Per her EMR dc summary \" with HTN,morbid obesity, DM, A fib, CKD 4 presented for evaluation of R foot swelling, difficulty ambulation, pain found to have osteomyelitis now s/p amputation. She has now been to the OR twice this stay, last was on 20.   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   Patient had a chronic diabetic foot ulcer on her R heel, presented for increased swelling, difficulty ambulating, and pain. MRI showed extensive soft tissue necrosis and some osteomyelitis along with cellulitis  Met sepsis criteria on admission with leukocytosis and elevated CRP  Podiatry saw and the foot was not felt to be salvageable and BKA was recommended\"       August 15-: Hospitalized for sepsis with MRSA bacteremia, she had possible endocarditis and TTE revealing vegetation of the aortic valve. She had a CT of the abdomen and pelvis that showed left lower abdominal pannus ulceration but without abscess.  Her right BKA stump ulceration was negative for osteomyelitis or cellulitis.  She was treated with IV Vanco for 14 days which will be completed on .  Her left lower abdominal ulceration has never been biopsied. " " It was not biopsied in the hospital and there was no surgical intervention.  Her right BKA stump was negative for osteomyelitis.  CKD stage IV with baseline creatinine 2.5-3.  She was at 1.82 at the end of hospitalization.  She is discharged on sodium bicarb twice daily.  Her insulin was adjusted and she is now on sliding scale insulin with 10 units of Lantus at bedtime.  Blood sugars have been less than 200 on average.    For her hypertension, Norvasc has been discontinued.  Blood pressures remain in the 130s over 70s on average.  She was incidentally found to have a lung nodule on CT scan, will need to follow-up with this.          Allergies   Allergen Reactions   ? Hydralazine      Paralysis of legs   ? Latex Itching     Skin Burns   ? Naprosyn [Naproxen] Swelling     throat   ? Nitrofurantoin Hives   ? Sulfa (Sulfonamide Antibiotics) Rash   ? Vicks Vaporub [Camphor-Eucalyptus Oil-Menthol] Rash   :     Current Outpatient Medications   Medication Sig   ? acetaminophen (TYLENOL) 500 MG tablet Take 2 tablets (1,000 mg total) by mouth 3 (three) times a day.   ? aspirin 81 MG EC tablet Take 81 mg by mouth daily.   ? BD ULTRA-FINE MICRO PEN NEEDLE 32 gauge x 1/4\" Ndle USE AS DIRECTED 4 TIMES DAILY.   ? bisacodyL (DULCOLAX) 10 mg suppository Insert 10 mg into the rectum daily as needed. No stool greater than 72 hours   ? cholecalciferol, vitamin D3, (VITAMIN D3) 1,000 unit capsule Take 1,000 Units by mouth daily.   ? collagenase ointment Apply daily per WOC   ? docusate sodium (COLACE) 100 MG capsule Take 100 mg by mouth daily.   ? ferrous sulfate 325 (65 FE) MG tablet Take 1 tablet by mouth 2 (two) times a day with meals.   ? gabapentin (NEURONTIN) 100 MG capsule Take 200 mg by mouth 2 (two) times a day.   ? HYDROmorphone (DILAUDID) 2 MG tablet Take 1 tablet (2 mg total) by mouth every 3 (three) hours as needed.   ? HYDROmorphone (DILAUDID) 2 MG tablet Take 1 tablet (2 mg total) by mouth daily.   ? lancets (ACCU-CHEK " MULTICLIX LANCET) Misc TEST 6 TIMES A DAY   ? LANTUS SOLOSTAR U-100 INSULIN 100 unit/mL (3 mL) pen Inject 10 Units under the skin at bedtime. 11.65 Type 2 with hyperglycemia  Contact provider if insulin prescribed is not the preferred insulin per insurance.   ? lidocaine (XYLOCAINE) 5 % ointment Apply topically 4 (four) times a day. Apply to left shoulder or around wound (not inside wound)   ? metoprolol tartrate (LOPRESSOR) 25 MG tablet Take 1 tablet (25 mg total) by mouth 2 (two) times a day.   ? miconazole (MICOTIN) 2 % powder Apply topically 2 (two) times a day. Apply to b/l groin/underneath breasts/underneath pannus   ? morphine 0.1% Gel Apply 2 click (1 g total) topically 3 (three) times a day. Apply to LLQ a pea-sized amount   ? NOVOLOG FLEXPEN U-100 INSULIN 100 unit/mL (3 mL) injection pen Check blood sugar four (4) times daily.  11.65 Type 2 with hyperglycemia  BD Ultra-fine Therese Pen Needles - NDC [address]-66 - dispense 1 case,  refill PRN for 1 year  OneTouch Verio Flex  Meter  OneTouch Verio strips 2 boxes of 50  Delica Lancets box of 100   ? NOVOLOG U-100 INSULIN ASPART 100 unit/mL injection Check blood sugar four (4) times daily.  11.65 Type 2 with hyperglycemia  OneTouch Verio Flex  Meter  OneTouch Verio strips 2 boxes of 50  Delica Lancets box of 100  BD Ultra-fine Therese Pen Needles - NDC [address]-11 - dispense 1 case, refill PRN for 1 year   ? polyethylene glycol (MIRALAX) 17 gram packet Take 1 packet (17 g total) by mouth daily as needed.   ? senna (SENOKOT) 8.6 mg tablet Take 1 tablet by mouth 2 (two) times a day.   ? sodium bicarbonate 650 MG tablet Take 1 tablet (650 mg total) by mouth 2 (two) times a day. OTC product   ? warfarin sodium (WARFARIN ORAL) Take by mouth. 8/28/20 INR 2.6  Cont 3mg daily.  Next INR 9/1/20.    8/25/20 INR 2.2 Cont 3mg daily. Next INR 8/28.  8/24/20 INR 2.3  Take 3mg daily.  Next INR 8/27/20.         REVIEW OF SYSTEMS:    Currently, no fever, chills, or rigors. Does  not have any visual or hearing problems. Denies any chest pain, headaches, palpitations, lightheadedness, dizziness, shortness of breath, or cough. Appetite is good. Denies any GERD symptoms. Denies any difficulty with swallowing, nausea, or vomiting.  Denies any abdominal pain, diarrhea or constipation. Denies any urinary symptoms, yeager in place. No insomnia. No active bleeding. No rash.       PHYSICAL EXAMINATION:  Vitals:    09/02/20 0917   BP: 115/50   Pulse: (!) 54   Temp: 97  F (36.1  C)   SpO2: 98%   Weight: (!) 241 lb (109.3 kg)         GENERAL: Awake, Alert, oriented x3, not in any form of acute distress, answers questions appropriately, follows simple commands, conversant with flat affect  HEENT: Head is normocephalic with normal hair distribution. No evidence of trauma. Ears: No acute purulent discharge. Eyes: Sclera anicteric.  CHEST: No tenderness or deformity, no crepitus  LUNG: Clear to auscultation with good chest expansion. There DM BS  BACK: No kyphosis of the thoracic spine. Symmetric, no curvature, ROM normal, no CVA tenderness, no spinal tenderness   CVS: There is good S1  S2,  rhythm is regular.  ABDOMEN: Globular and ROTUND tender to palpation, non distended, no masses, no organomegaly, good bowel sounds, no rebound or guarding, no peritoneal signs. Wound to left side of abdomen, dressed today, no open area to left hip as well. Unable to visualize as they are dressed and she is fully clothed. Followed by wound   EXTREMITIES: UE Full ROM. Right BKA, IN a  with protected brace  SKIN: Warm and dry, no erythema noted, open area on left pannus with dressing on abdomen  NEUROLOGICAL: The patient is oriented to person, place and time. Strength and sensation are grossly intact. Face is symmetric.          LABS:    Lab Results   Component Value Date    WBC 7.9 08/21/2020    HGB 9.2 (L) 08/21/2020    HCT 28.6 (L) 08/21/2020    MCV 89 08/21/2020     08/21/2020       Results for orders  placed or performed during the hospital encounter of 08/15/20   Basic Metabolic Panel   Result Value Ref Range    Sodium 137 136 - 145 mmol/L    Potassium 4.1 3.5 - 5.0 mmol/L    Chloride 103 98 - 107 mmol/L    CO2 23 22 - 31 mmol/L    Anion Gap, Calculation 11 5 - 18 mmol/L    Glucose 178 (H) 70 - 125 mg/dL    Calcium 11.0 (H) 8.5 - 10.5 mg/dL    BUN 37 (H) 8 - 22 mg/dL    Creatinine 2.05 (H) 0.60 - 1.10 mg/dL    GFR MDRD Af Amer 29 (L) >60 mL/min/1.73m2    GFR MDRD Non Af Amer 24 (L) >60 mL/min/1.73m2           Lab Results   Component Value Date    HGBA1C 10.3 (H) 06/17/2020     Vitamin D, Total (25-Hydroxy)   Date Value Ref Range Status   04/28/2016 29.8 (L) 30.0 - 80.0 ng/mL Final     Lab Results   Component Value Date    CTLDNFEW84 285 01/07/2011       ASSESSMENT/PLAN:  1. Chronic wound infection of abdomen, subsequent encounter    2. Hx of right BKA (H)    3. Chronic kidney disease, stage III (moderate) (H)    4. Chronic pain syndrome      Right BKA: Also with postoperative pain and phantom limb pain. Unable to assess stump, in  and brace.  -Continues on Dilaudid.  -Continue PT and OT as directed by them.    Acute on chronic blood loss anemia-likely from anemia chronic disease, iron deficiency, chronic kidney disease, surgery. On oral iron.     Left abdominal wall cellulitis and right side: She will remain on vanco x 14 days. There is no abscess on ultrasound.  She is followed by wound care in the TCU.     Insulin-dependent diabetes: A1c 10.3.  Now on glargine 10 units nightly and sliding scale NovoLog 3 times daily with meals.  Blood sugars actually look pretty good here in the TCU, all less than 220.      Essential hypertension: Satisfactory in TCU thus far, amlodipine d.cd.   -metoprolol    CKD 4: Followed by Dr. Iqbal.  Creatinine today 1.57.  -Continue PhosLo.   -Sodium bicarb.     Coronary artery disease:  -also needs outpt CRISTINA eval  -Continue metoprolol and aspirin.     Urinary retention:  Continues to have [place] in place, she follows with [facility] for urinary retention.    A. Fib on Coumadin: INR due tomorrow, recent variable INRs.    TCU/PT report: Using the easy stand for transfer at this time due to left leg weakness.    35 minutes with >50% face-to-face with patient in room discussing wound, vanco treatment plan, pain management.     Electronically signed by:  Zoey Leung CNP  This progress note was completed using Dragon software and there may be grammatical errors.      .

## 2021-06-20 NOTE — LETTER
Letter by Harry Blackwood MD at      Author: Harry Blackwood MD Service: -- Author Type: --    Filed:  Encounter Date: 7/10/2020 Status: (Other)         Patient: Jazmin Bauman   MR Number: [id number]   YOB: 1955   Date of Visit: 7/10/2020      [facility]    Facility:  [facility] [[id number]]    Code Status:  FULL CODE    Chief Complaint   Patient presents with   ? Review Of Multiple Medical Conditions   ? Problem Visit   :                    Patient Active Problem List   Diagnosis   ? Carpal Tunnel Syndrome   ? Urinary Tract Infection   ? Endometrial Hyperplasia   ? Cholelithiasis   ? Leukocytosis   ? Urine Tests Nonspecific Abnormal Findings   ? Obesity   ? Essential hypertension   ? Pernicious Anemia   ? Immunology Studies Raised Immunoglobulin Level   ? Vaginal bleeding   ? Type 2 diabetes mellitus (H)   ? Atrial fibrillation with RVR (H)   ? Acute kidney injury superimposed on CKD (H)   ? Non-traumatic rhabdomyolysis   ? Metabolic acidosis   ? Troponin level elevated   ? Bacteremia   ? Acute respiratory failure with hypoxia (H)   ? Acute encephalopathy   ? Acute pulmonary edema (H)   ? Wheezing   ? Moderate protein malnutrition (H)   ? Abnormal cytological findings in specimens from other female genital organs   ? Chronic kidney disease, stage III (moderate) (H)   ? Hyperkalemia   ? Osteoarthritis   ? Acute kidney injury (H)   ? Sepsis (H)   ? 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 Sepsis, due to unspecified organism, unspecified whether acute organ dysfunction present (H)   ? Cellulitis of right foot   ? CKD (chronic kidney disease) stage 4, GFR 15-29 ml/min (H)   ? Chronic wound infection of abdomen, subsequent encounter   ? Acute post-operative pain   ? Phantom limb pain (H)   ? Paroxysmal atrial fibrillation (H)   ? Urinary retention   ? Coronary artery disease without angina pectoris   ? Hx of right  BKA (H)       History:  Jazmin Bauman  is a 65 year old female with history of CKD 4, morbid obesity, insulin-dependent type 2 diabetes, chronic atrial fibrillation, peripheral neuropathy, GERD, peripheral edema, obesity, anemia of chronic disease with iron deficiency seen for admission to TCU on 7/10/2020    Hospital Course: Patient was admitted between June 16 and June 28.    Patient has history of chronic nonhealing right heel ulcer which became infected without her knowledge.  Patient's housemate summoned help and patient was transported to the emergency room.  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  Patient was septic and treated with Vanco/Zosyn.  Blood culture was positive for Peptostreptococcus and Streptococcus.  As patient improved she was transitioned to Augmentin.     Guillotine amputation converted to BKA on June 23.    Hospitalization was complicated for bilateral abdominal wall issues.  On the right side patient was felt to have chronic wounds/rash.  On the left she had acute abscess and infection resulting in I&D in the operating room.  Calciphyxis considered possible though not confirmed.  Patient left with wound ulcer on the left abdomen requiring wound care treatment which is ongoing.  Please bilateral infectious issues occur in the skin and subcutaneous tissues in the dependent pannus of the lower flanks.  I do not see that any wound cultures were obtained from these areas.  Patient describes many months of work with wound care and soft tissue surgery/debridements to clear up the infection of the right abdominal wall in the past.    Hospitalization also remarkable for hypokalemia at outset improved with Kayexalate.  Patient has paroxysmal atrial fibrillation, her warfarin needed to be held due to the multiple surgeries but she is back on it.  There is also concern over coronary artery disease with  positive troponin January 2020 for which she was lost to follow-up with stress test/cardiology never did so.  Finally she had retention resulted in Canchola catheterization which is ongoing.    Subjective/ROS:    -augmented by discussion with facility staff involved in direct care      Patient is upset with me today.  She says my comment last week about her medical situation being in pretty good shape and that she was here to focus on therapy made her feel uncomfortable.  She says she has lots of medical problems and they are not in good shape.  We talked that through and I explained I was simply trying to be positive and encouraging her to work with the therapy teams with the hope that her medical issues will remain stable.  Unfortunately that has not been the case.    Patient's blood work comes back today showing that her creatinine is up to 2.21 and a potassium is up to 5.2.  Sodium 137 chloride 100 CO2 27 anion gap 10 BUN 38.  Her INR was also supratherapeutic at 3.5.    She reports that she has not been feeling well but that she never feels well.  She feels generally weak.  She notes that her blood sugars have been consistently high.  Her appetite is been okay.  She is not had diarrhea.  She is very frustrated with her inability to get to the bathroom.  Nurse practitioner HUI had talked about getting a commode at bedside that has not happened.  It appears that she has not been deemed safe for transfers and is having trouble using the sliding board.    Patient's weight is down to 265 pounds, 10 pounds below recent weight.  She continues to take the Lasix.  She has a history of recurrent acute kidney injury and hyperkalemia in the past.  She also has a recent history of urinary retention and needed Canchola catheter.    She has had some intertrigo although it is been treated and getting better.    She had her Canchola catheter out earlier this week, and feels like she is voiding fine.  I do not see her post void  results    Blood pressures have generally been okay, improved though that may be at the expense of her volume status?      Blood sugars have worsened.  She is above 300 this morning.  She has been usually above 200.  She has had no low blood sugars and only rare below 150.  She points out that she is used to taking 25 of Lantus +15 units of aspart with meals and is well below that.  We discussed the reasoning for that at the hospital but now that she is getting back to her usual appetite will need to make insulin changes.  She is in favor of it.    Coronary artery disease work-up has been put on hold due to the pandemic, but she requires follow-up when appropriate.    Her INR was subtherapeutic until today when it is not supratherapeutic, perhaps because she is not feeling as well.  Is up to 3.5.  There is been no bleeding issues.    Obstructive sleep apnea suspected, sleep study recommended from the hospital.      Patient continues to deny fever sweats chills.  She is eating well.  Her bowels are working fine.  She is having no dysuria.  No back or flank pain.  No abdominal pain other than the superficial wound pain with dressing changes which is somewhat better.  She denies falls or injuries.  She is not having headaches.  She does not feel shortness of breath or chest pain.  Remainder 13 system ROS negative    Past Medical History:   Diagnosis Date   ? Anemia     pernicious   ? Diabetes mellitus (H)     borderline   ? Endometrial hyperplasia    ? Fibromyalgia    ? History of recurrent UTI (urinary tract infection)    ? History of transfusion    ? Hypertension    ? Thrombocytopenia (H)    ? Vaginal bleeding 1/2015     Past Surgical History:   Procedure Laterality Date   ? BELOW KNEE LEG AMPUTATION Right 6/18/2020    Procedure: AMPUTATION, BELOW KNEE;  Surgeon: Epi Corcoran MD;  Location: [facility] [place];  Service: General   ? BELOW KNEE LEG AMPUTATION Right 6/23/2020    Procedure: REVISION AMPUTATION,  BELOW KNEE;  Surgeon: Epi Corcoran MD;  Location: [facility] OR;  Service: General   ? bilateral carpal tunnel release  2009   ? CHOLECYSTECTOMY     ? COLONOSCOPY N/A 9/11/2017    Procedure: COLONOSCOPY;  Surgeon: Tyler Bhakta MD;  Location: [facility] GI;  Service:    ? COMBINED HYSTEROSCOPY DIAGNOSTIC / D&C N/A 1/28/2015    Procedure: DILATION AND CURETTAGE WITH HYSTEROSCOPY;  Surgeon: Grant Beal MD;  Location: [facility] OR;  Service:    ? DILATION AND CURETTAGE OF UTERUS     ? IR NON TUNNELED CATHETER >5 YEARS  1/21/2020   ? PICC  1/20/2020        ? NJ HYSTEROSCOPY,W/ENDO BX N/A 9/5/2019    Procedure: HYSTEROSCOPY, DILATION AND CURETTAGE;  Surgeon: Grant Beal MD;  Location: [facility] OR;  Service: Gynecology   ? NJ HYSTEROSCOPY,W/ENDO BX N/A 12/9/2019    Procedure: HYSTEROSCOPY, DILATION AND CURETTAGE;  Surgeon: Grant Beal MD;  Location: [facility] OR;  Service: Gynecology          Family History   Problem Relation Age of Onset   ? Colon cancer Father    :       Social History     Socioeconomic History   ? Marital status: Single     Spouse name: Not on file   ? Number of children: Not on file   ? Years of education: Not on file   ? Highest education level: Not on file   Occupational History   ? Not on file   Social Needs   ? Financial resource strain: Not on file   ? Food insecurity     Worry: Not on file     Inability: Not on file   ? Transportation needs     Medical: Not on file     Non-medical: Not on file   Tobacco Use   ? Smoking status: Never Smoker   ? Smokeless tobacco: Never Used   Substance and Sexual Activity   ? Alcohol use: Not Currently     Comment: rare   ? Drug use: No   ? Sexual activity: Not on file   Lifestyle   ? Physical activity     Days per week: Not on file     Minutes per session: Not on file   ? Stress: Not on file   Relationships   ? Social connections     Talks on phone: Not on file     Gets together: Not on file     Attends  Confucianism service: Not on file     Active member of club or organization: Not on file     Attends meetings of clubs or organizations: Not on file     Relationship status: Not on file   ? Intimate partner violence     Fear of current or ex partner: Not on file     Emotionally abused: Not on file     Physically abused: Not on file     Forced sexual activity: Not on file   Other Topics Concern   ? Not on file   Social History Narrative   ? Not on file   :    Currently living at a friend's house.  Normally she would live at her most.    Current Outpatient Medications on File Prior to Visit   Medication Sig Dispense Refill   ? acetaminophen (TYLENOL) 500 MG tablet Take 2 tablets (1,000 mg total) by mouth every 6 (six) hours as needed for pain or fever.  0   ? amLODIPine (NORVASC) 10 MG tablet Take 1 tablet (10 mg total) by mouth daily. (Patient taking differently: Take 10 mg by mouth daily. Hold for systolic blood pressure less than 105)  0   ? aspirin 81 MG EC tablet Take 81 mg by mouth daily.     ? calcium acetate,phosphat bind, (PHOSLO) 667 mg capsule Take 1 capsule (667 mg total) by mouth 3 (three) times a day with meals. 270 capsule 3   ? cholecalciferol, vitamin D3, (VITAMIN D3) 1,000 unit capsule Take 1,000 Units by mouth daily.     ? collagenase ointment Apply daily per WOC  0   ? ferrous sulfate 325 (65 FE) MG tablet Take 1 tablet by mouth 2 (two) times a day with meals.     ? gabapentin (NEURONTIN) 400 MG capsule Take 1 capsule (400 mg total) by mouth 3 (three) times a day.  0   ? HYDROmorphone (DILAUDID) 2 MG tablet Take 1 tablet (2 mg total) by mouth every 4 (four) hours as needed. 30 tablet 0   ? insulin glargine (BASAGLAR KWIKPEN) 100 unit/mL (3 mL) pen Inject 10 Units under the skin at bedtime. (Patient taking differently: Inject 15 Units under the skin at bedtime. )  0   ? magnesium 250 mg Tab Take 500 mg by mouth 2 (two) times a day.      ? metoprolol tartrate (LOPRESSOR) 25 MG tablet Take 1 tablet (25  "mg total) by mouth 2 (two) times a day. (Patient taking differently: Take 25 mg by mouth 2 (two) times a day. Hold for systolic blood pressure less than 105) 180 tablet 3   ? NOVOLOG FLEXPEN U-100 INSULIN 100 unit/mL (3 mL) injection pen Check blood sugar four (4) times daily.  11. (Patient taking differently: Inject 7 Units under the skin 3 (three) times a day before meals. if 141 - 180 = 1 unit;   181 - 220 = 2 units;  Plus sliding scale   221 - 260 = 3 units;   261 - 300 = 4 units;   301 - 340 = 5 units;   341 - 400 = 6 units;   401 - 999 = 7 units,)  0   ? NOVOLOG FLEXPEN U-100 INSULIN 100 unit/mL (3 mL) injection pen Check blood sugar four (4) times daily.  {  0   ? omeprazole (PRILOSEC) 20 MG capsule Take 20 mg by mouth.     ? senna-docusate (PERICOLACE) 8.6-50 mg tablet Take 1 tablet by mouth 2 (two) times a day.  0   ? sodium bicarbonate 650 MG tablet Take 1 tablet (650 mg total) by mouth 2 (two) times a day. OTC product  0   ? warfarin sodium (WARFARIN ORAL) Take by mouth. 7/7/20 INR 2.5  Cont 5mg daily.  Next INR 7/10/20.     ? BD ULTRA-FINE MICRO PEN NEEDLE 32 gauge x 1/4\" Ndle USE AS DIRECTED 4 TIMES DAILY. 360 each 3   ? blood glucose test (ACCU-CHEK SAM PLUS TEST STRP) strips TEST 1 TIME A  strip 10   ? blood glucose test (ACCU-CHEK SAM PLUS TEST STRP) strips test blood sugar level 6 times daily 600 strip 4   ? furosemide (LASIX) 20 MG tablet Take 1 tablet (20 mg total) by mouth daily.  0   ? lancets (ACCU-CHEK MULTICLIX LANCET) Misc TEST 6 TIMES A  each 11     No current facility-administered medications on file prior to visit.    :      ALLERGIES:  Hydralazine; Latex; Naprosyn [naproxen]; Nitrofurantoin; Sulfa (sulfonamide antibiotics); and Vicks vaporub [camphor-eucalyptus oil-menthol]    Vitals:    Current Vitals   BP: 125/30 mmHg  7/10/2020 18:07    Temp:60  F  7/10/2020 18:07  Pulse: 60 bpm  7/10/2020 20:38    Weight: 264.6 Lbs  7/8/2020 12:48  Resp: 18 Breaths/min  7/10/2020 " 10:24  BS: 297 mg/dL  7/10/2020 20:46  O2: 99 %  7/10/2020 18:07  Pain: 1  7/10/2020 09:32  Physical exam:    General:  Alert  oriented x3 appears comfortable    Patient is up in her chair.  She is brushing her hair.  She is just getting ready to eat lunch.  She is alert oriented x3 and normally conversant.  She is breathing comfortably without tachypnea or accessory muscle use.  She has her orthotic on the BKA stump.  She moves her other 3 extremities purposefully.  She demonstrates good range of motion of her neck with rotation of her head.  Speech is strong and fluent she answers questions appropriately      Due to the 2020 Covid 19 pandemic, except as noted above, the patient was visually observed at a 6 foot plus distance.  An observational exam was performed in an effort to keep patient safe from Covid 19 and other communicable diseases.   Labs:  Lab Results   Component Value Date    WBC 9.5 06/27/2020    HGB 7.9 (L) 06/27/2020    HCT 25.5 (L) 06/27/2020    MCV 95 06/27/2020     06/27/2020     Results for orders placed or performed during the hospital encounter of 06/16/20   Basic Metabolic Panel   Result Value Ref Range    Sodium 141 136 - 145 mmol/L    Potassium 4.2 3.5 - 5.0 mmol/L    Chloride 107 98 - 107 mmol/L    CO2 27 22 - 31 mmol/L    Anion Gap, Calculation 7 5 - 18 mmol/L    Glucose 116 70 - 125 mg/dL    Calcium 8.8 8.5 - 10.5 mg/dL    BUN 30 (H) 8 - 22 mg/dL    Creatinine 1.57 (H) 0.60 - 1.10 mg/dL    GFR MDRD Af Amer 40 (L) >60 mL/min/1.73m2    GFR MDRD Non Af Amer 33 (L) >60 mL/min/1.73m2         Lab Results   Component Value Date    TSH 1.83 06/17/2020     Lab Results   Component Value Date    HGBA1C 10.3 (H) 06/17/2020     [unfilled]  Lab Results   Component Value Date    BLWYXHGV46 285 01/07/2011     Lab Results   Component Value Date     (H) 01/20/2020     [unfilled]  Most Recent EKG     Units 06/16/20  1820   VENTRATE BPM 76   ATRIALRATE BPM 76   QRSDURATION ms 94    QTINTERVAL ms 428   QTCCALC ms 481   P Axis degrees 55   RAXIS degrees -43   TAXIS degrees 12   MUSEDX  Normal sinus rhythm  Left axis deviation  Inferior infarct (cited on or before 13-MAR-2020)  Abnormal ECG  When compared with ECG of 14-MAR-2020 13:04,  No significant change was found  Confirmed by DENNIS THORPE, [facility] LOC: ([id number]) on 6/17/2020 1:41:23 PM       Potassium 5.2, creatinine 2.21, see above  3.5 today  Assessment/Plan:      ICD-10-CM    1. CKD (chronic kidney disease) stage 4, GFR 15-29 ml/min (H)  N18.4    2. Hx of right BKA (H)  Z89.511    3. Chronic kidney disease, stage III (moderate) (H)  N18.3    4. Hyperkalemia  E87.5    5. Acute kidney injury (H)  N17.9    6. Metabolic acidosis  E87.2    7. Acute kidney injury superimposed on CKD (H)  N17.9     N18.9    8. Wheezing  R06.2    9. Essential hypertension  I10    10. Type 2 diabetes mellitus with other specified complication, with long-term current use of insulin (H)  E11.69     Z79.4    Acute kidney injury, recurrent  Suspected volume depletion  CKD 4  -Discussed with patient.  She is disappointed.  Her weight is down to 265 pounds.  I suspect volume depletion.  Postobstructive renal failure is another consideration considering her recent acute urinary retention and removal of her Canchola earlier this week  -Discontinue Lasix  -Push fluids  -Hold parameters on her beta-blocker and amlodipine  - Renal dosing of her gabapentin, decreased it to 200 mg twice daily (200-700 single daily dose recommended considering her estimated creatinine clearance)  - Check PVRs, if she has greater than 300 cc given regarding the need for Canchola and urology follow-up  - Follow-up lab work necessary on Monday, to make sure we are reversing her failure.  We will also need to decide when to reinstitute Lasix.    Electrolyte disturbance   Potassium is high again at 5.2 after being low last week and getting some replacement doses.  This is related to her fibrillation/renal  failure  -She is not on any replacement at this time  -Push fluids  -Hold Lasix  -Recheck on Monday    Acute urinary retention   The thought was it had resolved.  Her Canchola was removed several days ago and she was supposed to have post void residual checks although I do not find them on the record.  Patient is on aware if she had them or not.  We will restart postvoid residual checks now, replace Canchola if she is retaining as postobstructive renal failure is always a possibility here, though as above I favor volume depletion more likely    Type 2 diabetes, insulin-dependent, uncontrolled   Patient's blood sugar control has worsened in the last week.  This is unfortunate as she is trying to heal her BKA stump.  She did see her surgery team this week who did not like the look of things well enough to remove her staples.  They can recheck things next week.  Meanwhile we will try to bring her sugars under better control.  She is well below her insulin doses at baseline.  She usually takes 25 of Lantus and 15 units 3 times daily of aspartame roughly.  She was doing some carbohydrate counting at home.  Recall that her most recent A1c 10.3  -Increase Lantus to 15 units  -Increase mealtime insulin to 7 units 3 times daily  -Continues on sliding scale in addition  - We should continue to adjust her doses as necessary in the weeks to come    Chronic right foot ulcer now status post BKA  Right foot/leg cellulitis now status post BKA  Guillotine amputation June 30  Stump revision/BKA June 23   Patient completed her surgical follow-up.  They did not remove all the staples.  She says they took 1 did not like the look of them so they decided to leave the rest in.  She is going to follow-up again next week.  Despite her concerns about transport things went quite well she says.   Patient is no longer on antibiotics and doing well.  PT, OT,  all involved.  Pain control is reviewed with patient, things are improving.   She does not feel ready to be off the hydromorphone but is trying to not take it as much.  She is not showing any sign of excessive sedation  -Gabapentin needs to be decreased due to her renal function as noted above.      Constipation   Bowel programs in place with senna docusate 1 tablet twice daily.  She reports good results    Abdominal wounds  Calciphylaxis considered possible here, though far from a certainty.  There was lack of agreement among her specialists..  However she appears to be responding to wound care.  She had left-sided abscess drained during her recent hospitalization.  Continue collagenase ointment and other wound care as outlined by wound care specialist  -Follow-up with wound care team here at the facility and/or at the wound care clinic.  -New Lasix discontinued today as noted above      Paroxysmal atrial fibrillation   Back on warfarin but now supratherapeutic.  Unfortunately we are headed into the weekend.  Thus I am going to hold her warfarin tonight and tomorrow give her 3 mg on Sunday and recheck in on Monday to make further decisions.  Hopefully she will be feeling better and may be back to her usual metabolism.       Chronic anemia   Likely anemia of chronic disease.  Her nephrologist notes an iron deficiency component and she is on iron infusion therapy last hemoglobin 7.9 which she is tolerating well.  She is generally more in the 9 range.  Hemoglobin was stable.  Do not anticipate further monitoring/treatment in the next couple of weeks anyway.      Obesity   Complicates her movement, limits mobility and increase his difficulty of therapy.  Associated with other increased risk of morbidity as well.      Hypertension   Blood pressures are better this week, but perhaps at the expense of volume status?  Continue metoprolol.  She was also newly started on amlodipine and Lasix.  However her Lasix is discontinued today for above reasons.  I also put hold parameters on her metoprolol and  amlodipine.    Social stressors and factors   Social service consultation        Case discussed with:    Facility staff       Time: 44 minutes with > 50% in counseling and coordination of care as noted above         Harry Blackwood MD

## 2021-06-20 NOTE — LETTER
Letter by Harry Blackwood MD at      Author: Harry Blackwood MD Service: -- Author Type: --    Filed:  Encounter Date: 4/3/2020 Status: (Other)         Patient: Jazmin Bauman   MR Number: [id number]   YOB: 1955   Date of Visit: 4/3/2020      [facility]    Facility:  [facility] [[id number]]    Code Status:  FULL CODE    Chief Complaint   Patient presents with   ? Problem Visit   : Abnormal urine culture, disability of ambulation, CKD 4, warfarin therapy                  Patient Active Problem List   Diagnosis   ? Carpal Tunnel Syndrome   ? Urinary Tract Infection   ? Endometrial Hyperplasia   ? Cholelithiasis   ? Leukocytosis   ? Urine Tests Nonspecific Abnormal Findings   ? Obesity   ? Essential hypertension   ? Pernicious Anemia   ? Immunology Studies Raised Immunoglobulin Level   ? Vaginal bleeding   ? Type 2 diabetes mellitus (H)   ? Atrial fibrillation with RVR (H)   ? Acute kidney injury superimposed on CKD (H)   ? Non-traumatic rhabdomyolysis   ? Metabolic acidosis   ? Troponin level elevated   ? Bacteremia due to Gram-negative bacteria   ? Acute respiratory failure with hypoxia (H)   ? Acute encephalopathy   ? Acute pulmonary edema (H)   ? Wheezing   ? Moderate protein malnutrition (H)   ? Abnormal cytological findings in specimens from other female genital organs   ? Chronic kidney disease, stage III (moderate) (H)   ? Hyperkalemia   ? Osteoarthritis   ? UTI (urinary tract infection)   ? Acute kidney injury (H)       History:   Jazmin Bauman  is a 64 year old female with history of CKD4, morbid obesity, insulin-dependent type 2 diabetes, chronic atrial fibrillation, peripheral neuropathy, GERD, peripheral edema, seen for admission to TCU on 3/28/2020     Hospital Course: Patient was admitted between March 3 and March 17 most recently.  However she was admitted from the TCU following complicated hospital stay between January  "20 and January 31 where she was treated for acute encephalopathy (stroke ruled out), sepsis due to UTI (E. coli), acute kidney injury/rhabdomyolysis (did not require dialysis this time but has required it transiently once in the past), new onset atrial fibrillation with RVR, acute urinary retention with failed voiding trial leading to catheterization, acute respiratory failure hypoxia treated with BiPAP and right leg weakness without evidence of CVA, questionable myelopathy secondary to lumbar canal stenosis.     This admission was remarkable for acute kidney injury which was noted in the TCU but could not be reversed under those conditions.  Urine culture revealed Enterococcus faecalis treated with amoxicillin for 7 days.  Nephrology felt that the patient was \"prerenal\" and indeed her renal function improved with IV fluids.  Patient also had hypokalemia which has been a recurrent problem for her and has difficulty tolerating Kayexalate.  She is newly started on patiromer ca sorbitex.         Subjective/ROS:    -augmented by discussion with facility staff involved in direct care      Several issues today:  - Asked to evaluate patient's abnormal urine culture.  Apparently this was ordered by nephrology?  I cannot see the actual order in the chart nor can the staff track the origin.  Nurse practitioner told the staff to call it to the nephrologist office, no one can confirm that there has been any communication between this facility and that office regarding treatment.    Major issue is whether this represents true infection or not.  It is difficult to tell.  Patient reports that her urine does not feel normal.  She says she does have some burning.  However it is quite vague and somewhat inconsistent.  She does not have suprapubic pain or pressure.  She does not have back pain or flank pain or generalized belly pain.  There is been no fever sweats or chills.    - INR April 1 2.7.  She has been having elevations of her " "INR with need for holding of the warfarin at times.  She is due for INR on Monday.  She has not had any bleeding bruising hematuria    - Therapist talks to me today about the need for 4 wheeled walker.  They already have the order for the wheelchair.  Please see their documentation for additional details.    -I did obtain additional history regarding her chronic leukocytosis.  She reports that she was followed by hematologist who is now retired.  She reports that she had at least 3 bone marrow biopsies \"to be sure I did not have leukemia\".  She says as far she knows no certain diagnosis was made.  The situation has been lost to follow-up.    She reports she is otherwise doing okay.  Her bowels are working well.  She is eating well.  She has no nausea or vomiting.  She is very concerned about her nephrology follow-up scheduled for early next week.    Past Medical History:   Diagnosis Date   ? Anemia     pernicious   ? Diabetes mellitus (H)     borderline   ? Endometrial hyperplasia    ? Fibromyalgia    ? Heart murmur     pt states her mitral valve leaks   ? History of recurrent UTI (urinary tract infection)    ? Hypertension    ? Thrombocytopenia (H)    ? Vaginal bleeding 1/2015     Past Surgical History:   Procedure Laterality Date   ? bilateral carpal tunnel release  2009   ? CHOLECYSTECTOMY     ? COLONOSCOPY N/A 9/11/2017    Procedure: COLONOSCOPY;  Surgeon: Tyler Bhakta MD;  Location: [facility];  Service:    ? COMBINED HYSTEROSCOPY DIAGNOSTIC / D&C N/A 1/28/2015    Procedure: DILATION AND CURETTAGE WITH HYSTEROSCOPY;  Surgeon: Grant Beal MD;  Location: [facility];  Service:    ? DILATION AND CURETTAGE OF UTERUS     ? IR NON TUNNELED CATHETER >5 YEARS  1/21/2020   ? PICC  1/20/2020        ? AL HYSTEROSCOPY,W/ENDO BX N/A 9/5/2019    Procedure: HYSTEROSCOPY, DILATION AND CURETTAGE;  Surgeon: Grant Beal MD;  [place] [facility] [place]  Service: Gynecology   ? AL HYSTEROSCOPY,W/ENDO " BX N/A 12/9/2019    Procedure: HYSTEROSCOPY, DILATION AND CURETTAGE;  Surgeon: Grant Beal MD;  Location: [facility] OR;  Service: Gynecology          Family History   Problem Relation Age of Onset   ? Colon cancer Father    :       Social History     Socioeconomic History   ? Marital status: Single     Spouse name: Not on file   ? Number of children: Not on file   ? Years of education: Not on file   ? Highest education level: Not on file   Occupational History   ? Not on file   Social Needs   ? Financial resource strain: Not on file   ? Food insecurity     Worry: Not on file     Inability: Not on file   ? Transportation needs     Medical: Not on file     Non-medical: Not on file   Tobacco Use   ? Smoking status: Never Smoker   ? Smokeless tobacco: Never Used   Substance and Sexual Activity   ? Alcohol use: Not Currently     Comment: rare   ? Drug use: No   ? Sexual activity: Not on file   Lifestyle   ? Physical activity     Days per week: Not on file     Minutes per session: Not on file   ? Stress: Not on file   Relationships   ? Social connections     Talks on phone: Not on file     Gets together: Not on file     Attends Druze service: Not on file     Active member of club or organization: Not on file     Attends meetings of clubs or organizations: Not on file     Relationship status: Not on file   ? Intimate partner violence     Fear of current or ex partner: Not on file     Emotionally abused: Not on file     Physically abused: Not on file     Forced sexual activity: Not on file   Other Topics Concern   ? Not on file   Social History Narrative   ? Not on file   :        Current Outpatient Medications on File Prior to Visit   Medication Sig Dispense Refill   ? amoxicillin (AMOXIL) 250 MG capsule Take 250 mg by mouth 2 (two) times a day.     ? ACCU-CHEK SAM PLUS TEST STRP strips TEST 6 TIMES A DAY (Patient taking differently: TEST 1 TIME A DAY) 100 strip 10   ? ACCU-CHEK SAM PLUS TEST STRP  "strips test blood sugar level 6 times daily 600 strip 4   ? acetaminophen (TYLENOL) 325 MG tablet Take 2 tablets (650 mg total) by mouth 3 (three) times a day. (Patient taking differently: Take 650 mg by mouth 2 (two) times a day. )  0   ? aspirin 81 MG EC tablet Take 81 mg by mouth daily.     ? BD ULTRA-FINE MICRO PEN NEEDLE 32 gauge x 1/4\" Ndle USE AS DIRECTED 4 TIMES DAILY. 360 each 3   ? calcium acetate (PHOSLO) 667 mg capsule Take 1 capsule (667 mg total) by mouth 3 (three) times a day with meals.  0   ? cholecalciferol, vitamin D3, (VITAMIN D3) 1,000 unit capsule Take 1,000 Units by mouth daily.     ? docusate sodium (COLACE) 50 MG capsule Take by mouth daily.      ? ferrous sulfate 325 (65 FE) MG tablet Take 1 tablet by mouth 2 (two) times a day with meals.     ? gabapentin (NEURONTIN) 300 MG capsule Take 300 mg by mouth 2 (two) times a day.     ? HYDROcodone-acetaminophen 5-325 mg per tablet Take 1 tablet by mouth every 4 (four) hours as needed for pain. 8 tablet 0   ? insulin glargine (BASAGLAR KWIKPEN) 100 unit/mL (3 mL) pen Inject 25 Units under the skin 2 (two) times a day.  0   ? insulin lispro (ADMELOG SOLOSTAR U-100 INSULIN) 100 unit/mL pen Inject 2-14 Units under the skin 3 (three) times a day with meals. 0-120 0 units  121-200 2 units  201-250 4 units  251-300 6 units  301-350 8 units  351-400 10 units  401-450 12 units  451-500 14 units  501+ call TCP     ? insulin lispro (HUMALOG) 100 unit/mL injection Inject 2 Units under the skin 3 (three) times a day before meals.     ? lancets (ACCU-CHEK MULTICLIX LANCET) Misc TEST 6 TIMES A DAY (Patient taking differently: TEST 1 TIME A DAY) 200 each 11   ? magnesium 250 mg Tab Take 500 mg by mouth 2 (two) times a day.      ? metoprolol tartrate (LOPRESSOR) 25 MG tablet Take 1 tablet (25 mg total) by mouth 2 (two) times a day.  0   ? omeprazole (PRILOSEC) 20 MG capsule Take 1 capsule (20 mg total) by mouth at bedtime.  0   ? patiromer calcium sorbitex " (VELTESSA) 8.4 gram PwPk powder packet Take 8.4 g by mouth daily.     ? senna-docusate (SENNOSIDES-DOCUSATE SODIUM) 8.6-50 mg tablet Take 1 tablet by mouth daily.     ? warfarin sodium (WARFARIN ORAL) Take by mouth. 4/1/20 INR 2.7 4mg Tu-Th-Sat, 3.5mg AOD. Next INR 4/8.  3/30/20 INR 3.9  Hold x 2 days.  Next INR 4/1/20.    3/23/20 INR 1.9  Cont 4mg daily.  Next INR 3/30/20.  3/19/20 INR 1.6  Take 4mg daily.  Next INR 3/23/20.       Current Facility-Administered Medications on File Prior to Visit   Medication Dose Route Frequency Provider Last Rate Last Dose   ? cyanocobalamin injection 1,000 mcg  1,000 mcg Intramuscular Q30 Days Lester Flores MD   1,000 mcg at 11/22/19 1422   ? lidocaine 2 % jelly (XYLOCAINE)   Topical PRN Carly Arora NP   1 application at 09/18/19 0848   :      ALLERGIES:  Hydralazine; Latex; Naprosyn [naproxen]; Nitrofurantoin; Sulfa (sulfonamide antibiotics); and Vicks vaporub [camphor-eucalyptus oil-menthol]    Vitals:  There were no vitals taken for this visit. except as noted below    Vital signs: Reviewed per facility EMR vitals including as follows:              Blood pressure 167/50 though most of her systolics have been in the 130s to 150s.  Temperature 96.6 heart rate 68 respirations 19 blood glucose 119 most of her sugars have been below 200 although not all of them.  O2 sats 100% on room air  There is no height or weight on file to calculate BMI.    Physical exam:    General:  Alert  oriented x3 appears well, in no apparent distress.  She demonstrates good motion of her head.  Her gaze is conjugate with clear sclera.  She moves all 4 extremities without pain      Due to the 2020 Covid 19 pandemic, except as noted above, the patient was visually observed at a 6 foot plus distance.  An observational exam was performed in an effort to keep patient safe from Covid 19 and other communicable diseases.   Labs:  Lab Results   Component Value Date    WBC 13.8 (H) 03/17/2020    HGB  9.2 (L) 03/17/2020    HCT 30.5 (L) 03/17/2020    MCV 96 03/17/2020     03/17/2020     Results for orders placed or performed during the hospital encounter of 03/13/20   Basic Metabolic Panel   Result Value Ref Range    Sodium 141 136 - 145 mmol/L    Potassium 4.6 3.5 - 5.0 mmol/L    Chloride 101 98 - 107 mmol/L    CO2 29 22 - 31 mmol/L    Anion Gap, Calculation 11 5 - 18 mmol/L    Glucose 111 70 - 125 mg/dL    Calcium 10.2 8.5 - 10.5 mg/dL    BUN 37 (H) 8 - 22 mg/dL    Creatinine 2.54 (H) 0.60 - 1.10 mg/dL    GFR MDRD Af Amer 23 (L) >60 mL/min/1.73m2    GFR MDRD Non Af Amer 19 (L) >60 mL/min/1.73m2         Lab Results   Component Value Date    TSH 2.20 01/20/2020     Lab Results   Component Value Date    HGBA1C 7.2 (H) 02/27/2020     [unfilled]  Lab Results   Component Value Date    ZMWVQHOQ15 285 01/07/2011     Lab Results   Component Value Date     (H) 01/20/2020     [unfilled]  Most Recent EKG     Units 03/14/20  1304   VENTRATE BPM 71   ATRIALRATE BPM 71   QRSDURATION ms 90   QTINTERVAL ms 416   QTCCALC ms 452   P [place] degrees 30   RAXIS degrees -37   TAXIS degrees 49   MUSEDX  Normal sinus rhythm with sinus arrhythmia  Left axis deviation  Inferior infarct (cited on or before 13-MAR-2020)  Abnormal ECG  When compared with ECG of 13-MAR-2020 21:26,  No significant change was found  Confirmed by ИРИНА THORPE, TONY LOC:SJ ([id number]) on 3/14/2020 2:38:51 PM           Assessment/Plan:      ICD-10-CM    1. Essential hypertension  I10    2. Type 2 diabetes mellitus with other specified complication, with long-term current use of insulin (H)  E11.69     Z79.4    3. Atrial fibrillation with RVR (H)  I48.91    4. Acute cystitis without hematuria  N30.00        Abnormal urine culture  Enterococcus faecalis  K  Mild intermittent dysuria   Patient was treated for the same organism during her hospital stay recently.  It is unclear whether this might represent colonization.  This  was a catheterized  specimen and she does have mild dysuria so we elected to treat in this case  - Amoxicillin 250 mg (renal dosed) twice daily for 7 days  -We will attempt to add BMP to her blood work which was drawn earlier today, if unable we will add to the blood work on Monday when she is due for her next INR.    CKD 4   Creatinine on March 17 2.54 GFR estimated 19.  Renal dose of amoxicillin as above.  Anticipate nephrology input next week    Hyperkalemia   No new information.  4.6 on March 17.  She continues on patiromer     Generalized weakness  Peripheral neuropathy  Morbid obesity   Appreciate assistance of therapy.  Patient qualifies for four-wheel walker, bariatric type, order placed today    Leukocytosis  Thrombocytosis  Anemia of chronic kidney disease   I was unaware patient's history of hematology/bone marrow biopsies  -I asked staff to try to track down bone marrow biopsies and hematology notes for review.  That may prove difficult as patient is shaky on the dates and locations and apparently her physician has retired.  -Recommend reestablishing with hematology following discharge from TCU    Multiple other medical issues as noted in my prior note are reviewed without change in treatment today                                                          Case discussed with:  Facility staff    .  Harry Blackwood  4/3/2020      The patient has mobility limitation significantly impairing his/her ability to participate in mobility-related activities of daily living, such as toileting, feeding, dressing, grooming, and bathing in customary locations in the home. The mobility limitation can at times, in certain situations, be sufficiently and safely resolved by use of an appropriately fitted four-wheel walker walker.  The patient's home provides adequate space/access between the rooms, and maneuvering space within the rooms/counters for use of the walker the patient has not expressed an unwillingness to use the walker  that is provided in his home..   Four-wheel bariatric walker is appropriate, see therapy for additional details      Harry Blackwood  4/3/2020      Harry Blackwood MD

## 2021-06-20 NOTE — LETTER
"Letter by Chelsy Joshi CNP at      Author: Chelsy Joshi CNP Service: -- Author Type: --    Filed:  Encounter Date: 8/12/2020 Status: (Other)         Patient: Jazmin Bauman   MR Number: [id number]   YOB: 1955   Date of Visit: 8/12/2020     [facility]    Facility:   [facility] [[id number]]   Code Status: FULL CODE and POLST AVAILABLE      CHIEF COMPLAINT/REASON FOR VISIT:  Chief Complaint   Patient presents with   ? Review Of Multiple Medical Conditions     physical deconditioning, right BKA, phantom limb pain       HISTORY:      HPI: Jazmin is a 65 y.o. female who  has a past medical history of Anemia, Diabetes mellitus (H), Endometrial hyperplasia, Fibromyalgia, History of recurrent UTI (urinary tract infection), History of transfusion, Hypertension, Thrombocytopenia (H), and Vaginal bleeding (1/2015). She also has no past medical history of Family history of malignant hyperthermia, Hard to intubate, History of anesthesia complications, Malignant hyperthermia due to anesthesia, PONV (postoperative nausea and vomiting), or Sleep apnea. Per previous TCU provider: \"Patient was admitted between June 16 and June 28.    Patient has history of chronic nonhealing right heel ulcer which became infected without her knowledge.  Patient's housemate summoned help and patient was transported to the emergency room.  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  Patient was septic and treated with Vanco/Zosyn.  Blood culture was positive for Peptostreptococcus and Streptococcus.  As patient improved she was transitioned to Augmentin.     Guillotine amputation converted to BKA on June 23.    Hospitalization was complicated for bilateral abdominal wall issues.  On the right side patient was felt to have chronic wounds/rash.  On the left she had acute abscess and infection resulting in " "I&D in the operating room.  Calciphyxis considered possible though not confirmed.  Patient left with wound ulcer on the left abdomen requiring wound care treatment which is ongoing.  Please bilateral infectious issues occur in the skin and subcutaneous tissues in the dependent pannus of the lower flanks.  I do not see that any wound cultures were obtained from these areas.  Patient describes many months of work with wound care and soft tissue surgery/debridements to clear up the infection of the right abdominal wall in the past.    Hospitalization also remarkable for hypokalemia at outset improved with Kayexalate.  Patient has paroxysmal atrial fibrillation, her warfarin needed to be held due to the multiple surgeries but she is back on it.  There is also concern over coronary artery disease with positive troponin January 2020 for which she was lost to follow-up with stress test/cardiology never did so.  Finally she had retention resulted in [place] catheterization which is ongoing.\"    Today Jazmin is being evaluated for a routine review of multiple medical problems while in the TCU. Jazmin states she feels things are going fine. She does not have any acute issues she would like to discuss. She is working with therapy during our visit, she believes therapy is going well. Physical therapist did not have anything out of the ordinary to report. She has been eating, drinking and eliminating well. No issues with sleep. Pain is well controlled. Jazmin denies any other concerns including fevers/chills, cough or cold symptoms, headaches, vision changes, chest pain/pressure, difficulty breathing, SOB, abdominal pain, nausea, vomiting, diarrhea, dysuria, increasing weakness, increasing pain.     Past Medical History:   Diagnosis Date   ? Anemia     pernicious   ? Diabetes mellitus (H)     borderline   ? Endometrial hyperplasia    ? Fibromyalgia    ? History of recurrent UTI (urinary tract infection)    ? History of transfusion  "   ? Hypertension    ? Thrombocytopenia (H)    ? Vaginal bleeding 1/2015             Family History   Problem Relation Age of Onset   ? Colon cancer Father      Social History     Socioeconomic History   ? Marital status: Single     Spouse name: Not on file   ? Number of children: Not on file   ? Years of education: Not on file   ? Highest education level: Not on file   Occupational History   ? Not on file   Social Needs   ? Financial resource strain: Not on file   ? Food insecurity     Worry: Not on file     Inability: Not on file   ? Transportation needs     Medical: Not on file     Non-medical: Not on file   Tobacco Use   ? Smoking status: Never Smoker   ? Smokeless tobacco: Never Used   Substance and Sexual Activity   ? Alcohol use: Not Currently     Comment: rare   ? Drug use: No   ? Sexual activity: Not on file   Lifestyle   ? Physical activity     Days per week: Not on file     Minutes per session: Not on file   ? Stress: Not on file   Relationships   ? Social connections     Talks on phone: Not on file     Gets together: Not on file     Attends Rastafari service: Not on file     Active member of club or organization: Not on file     Attends meetings of clubs or organizations: Not on file     Relationship status: Not on file   ? Intimate partner violence     Fear of current or ex partner: Not on file     Emotionally abused: Not on file     Physically abused: Not on file     Forced sexual activity: Not on file   Other Topics Concern   ? Not on file   Social History Narrative   ? Not on file       REVIEW OF SYSTEM:  Per HPI    PHYSICAL EXAM:   /60   Pulse 70   Temp 98.6  F (37  C)   Resp 18   Wt (!) 242 lb 12.8 oz (110.1 kg)   SpO2 98%   BMI 45.88 kg/m      A limited exam was performed due to recommendations for care during COVID-19 pandemic. Due to the 2020 COVID-19 pandemic, except as noted above, the patient was visually observed at a 6 foot plus distance.  An observational exam was performed in  an effort to keep patient safe from COVID-19 and other communicable diseases.     General appearance: alert, appears stated age and cooperative  HEENT: Head is normocephalic with normal hair distribution. No evidence of trauma. Ears: Without lesions or deformity. No acute purulent discharge. Eyes: Conjunctivae pink with no scleral icterus or erythema. Nose: Normal. Oropharnyx: mmm.  Lungs: respirations without effort.  Extremities: extremities normal, atraumatic, no cyanosis.  Skin: Skin color, texture normal. No rashes or lesions on exposed skin.   Neurologic: Grossly normal   Psych: interacts well with caregivers, exhibits logical thought processes and connections, pleasant.      LABS:   None today.     ASSESSMENT:      ICD-10-CM    1. Phantom limb pain (H)  G54.6    2. Physical deconditioning  R53.81    3. Hx of right BKA (H)  Z89.511        PLAN:    Physical Deconditioning  -Continue PT/OT and other therapies as per care plan.  -Encouraged good nutrition and movement habits.   -Discussed care plan and expected course of stay.   -Continue to follow-up per routine schedule or sooner if needed.     Right BKA, Phantom Limb Pain  -Tylenol 1 gram by mouth three times a day as needed for pain.   -ASA 81 mg by mouth daily.   -Dilaudid 2 mg by mouth every 4 hours as needed.     Otherwise continue current care plan for all other chronic medical conditions, as they are stable. Encouraged patient to engage in healthy lifestyle behaviors such as engaging in social activities, exercising (PT/OT), eating well, and following care plan. Follow up for routine check-up, or sooner if needed. Will continue to monitor patient and work with nursing staff collaboratively to work toward positive patient outcomes.    Electronically signed by: Chelsy Joshi CNP

## 2021-06-20 NOTE — LETTER
"Letter by Azalea Kevin CNP at      Author: Azalea Kevin CNP Service: -- Author Type: --    Filed:  Encounter Date: 2020 Status: (Other)         Patient: Jazmin Bauman   MR Number: [id number]   YOB: 1955   Date of Visit: 2020       [facility]      NAME:  Jazmin Bauman             :  1955    MRN: [id number]    CODE STATUS:  FULL CODE    FACILITY: [facility] [[id number]]         CHIEF COMPLAIN/REASON FOR VISIT:  Chief Complaint   Patient presents with   ? Problem Visit     loose   stools       HISTORY OF PRESENT ILLNESS: Jazmin Bauman is a 65 y.o. female being seen er therapist request after prothetic apt for her BKA. Eval reveals she must be able to stand 2/3 minutes to recieve new  Leg.Therapy reports standing 2/3 minutes.  Seen in her room today and also reports loose stools. We will change her senna and her colace to prn. . On TCU after a stay at [facility] from 8/15 to 2020. Per EMR \"  with history significant for morbid obesity, hypertension, DM-II, CKD-III, paroxysmal atrial fibrillation, PAD, s/p right BKA\". Pt did not want wound to abdomen checked as she was having lunch with a friend and fully dressed. On coumadin for Afib, no excessive bleeding or bruising  INR scheduled for .    Allergies   Allergen Reactions   ? Hydralazine      Paralysis of legs   ? Latex Itching     Skin Burns   ? Naprosyn [Naproxen] Swelling     throat   ? Nitrofurantoin Hives   ? Sulfa (Sulfonamide Antibiotics) Rash   ? Vicks Vaporub [Camphor-Eucalyptus Oil-Menthol] Rash   :     Current Outpatient Medications   Medication Sig   ? acetaminophen (TYLENOL) 500 MG tablet Take 2 tablets (1,000 mg total) by mouth 3 (three) times a day.   ? aspirin 81 MG EC tablet Take 81 mg by mouth daily.   ? BD ULTRA-FINE MICRO PEN NEEDLE 32 gauge x \" Ndle USE AS DIRECTED 4 TIMES DAILY.   ? bisacodyL (DULCOLAX) 10 mg suppository Insert 10 mg into the rectum " daily as needed. No stool greater than 72 hours   ? cholecalciferol, vitamin D3, (VITAMIN D3) 1,000 unit capsule Take 1,000 Units by mouth daily.   ? collagenase ointment Apply daily per WOC   ? docusate sodium (COLACE) 100 MG capsule Take 100 mg by mouth daily as needed.    ? ferrous sulfate 325 (65 FE) MG tablet Take 1 tablet by mouth 2 (two) times a day with meals.   ? gabapentin (NEURONTIN) 100 MG capsule Take 200 mg by mouth 2 (two) times a day.   ? HYDROmorphone (DILAUDID) 2 MG tablet Take 1 tablet (2 mg total) by mouth daily. May also take 1 tablet (2 mg total) every 3 (three) hours as needed for pain.   ? lancets (ACCU-CHEK MULTICLIX LANCET) Misc TEST 6 TIMES A DAY   ? LANTUS SOLOSTAR U-100 INSULIN 100 unit/mL (3 mL) pen Inject 10 Units under the skin at bedtime. 11.65 Type 2 with hyperglycemia  Contact provider if insulin prescribed is not the preferred insulin per insurance. (Patient taking differently: Inject 15 Units under the skin at bedtime. 11.65 Type 2 with hyperglycemia  Contact provider if insulin prescribed is not the preferred insulin per insurance.)   ? lidocaine (XYLOCAINE) 5 % ointment Apply topically 4 (four) times a day. Apply to left shoulder or around wound (not inside wound)   ? metoprolol tartrate (LOPRESSOR) 25 MG tablet Take 1 tablet (25 mg total) by mouth 2 (two) times a day.   ? miconazole (MICOTIN) 2 % powder Apply topically 2 (two) times a day. Apply to b/l groin/underneath breasts/underneath pannus   ? NOVOLOG U-100 INSULIN ASPART 100 unit/mL injection Check blood sugar four (4) times daily.  11.65 Type 2 with hyperglycemia  OneTouch Verio Flex  Meter  OneTouch Verio strips 2 boxes of 50  Delica Lancets box of 100  BD Ultra-fine Therese Pen Needles - NDC [address]-25 - dispense 1 case, refill PRN for 1 year (Patient taking differently: Inject 7 Units under the skin 3 (three) times a day before meals. )   ? polyethylene glycol (MIRALAX) 17 gram packet Take 1 packet (17 g total) by  mouth daily as needed.   ? senna (SENOKOT) 8.6 mg tablet Take 1 tablet by mouth 2 (two) times a day. (Patient taking differently: Take 1 tablet by mouth 2 (two) times a day as needed. )   ? sodium bicarbonate 650 MG tablet Take 1 tablet (650 mg total) by mouth 2 (two) times a day. OTC product   ? warfarin sodium (WARFARIN ORAL) Take by mouth. 9/22/20 INR 2.1 Take 3mg daily Next INR 9/29 9/18/20 INR 1.6 5mg tonight, then 3mg daily. Next INR 9/22.  9/14/20 INR 1.7 Cont 2.5,g daily. Next INR 9/17 9/11/20 INR 1.9 Take 2.5mg daily Next INR 9/14 9/8/20 INR 1.6  Take 2.5mg daily.  Next INR 9/11/20.    9/3/20 INR 3.4  Hold x 2 days, then take 2mg daily.  Next INR 9/8/20.  9/2/20 INR 3.4 Hold 9/2 recheck INR 9/3  8/28/20 INR 2.6  Cont 3mg daily.  Next INR 9/1/20.    8/25/20 INR 2.2 Cont 3mg daily. Next INR 8/28.  8/24/20 INR 2.3  Take 3mg daily.  Next INR 8/27/20.         REVIEW OF SYSTEMS:    Currently, no fever, chills, or rigors. Does not have any visual or hearing problems. Denies any chest pain, headaches, palpitations, lightheadedness, dizziness, shortness of breath, or cough. Appetite is good. Denies any GERD symptoms. Denies any difficulty with swallowing, nausea, or vomiting.  Denies any abdominal pain, diarrhea or constipation. Denies any urinary symptoms. No insomnia. No active bleeding. No rash.       PHYSICAL EXAMINATION:  Vitals:    09/24/20 0550   BP: 171/70   Pulse: 70   Temp: 96.9  F (36.1  C)   Weight: (!) 229 lb (103.9 kg)         GENERAL: Awake, Alert, oriented x3, not in any form of acute distress, answers questions appropriately, follows simple commands, conversant with flat affect  HEENT: Head is normocephalic with normal hair distribution. No evidence of trauma. Ears: No acute purulent discharge. Eyes: Conjunctivae pink with no scleral jaundice. Nose: Normal mucosa and septum. NECK: Supple with no cervical or supraclavicular lymphadenopathy. Trachea is midline.   CHEST: No tenderness or deformity,  no crepitus  LUNG: Clear to auscultation with good chest expansion. There DM BS  BACK: No kyphosis of the thoracic spine. Symmetric, no curvature, ROM normal, no CVA tenderness, no spinal tenderness   CVS: There is good S1  S2,  rhythm is regular.  ABDOMEN: Globular and ROTUND tender to palpation, non distended, no masses, no organomegaly, good bowel sounds, no rebound or guarding, no peritoneal signs. Wound to left side of abdomen, dressed today, no open area to left hip as well. Unable to visualize as they are dressed and she is fully clothed. Followed by wound   EXTREMITIES: UE Full ROM. Right BKA, IN a  with protected brace  SKIN: Warm and dry, no erythema noted, open area with dressing on abdomen  NEUROLOGICAL: The patient is oriented to person, place and time. Strength and sensation are grossly intact. Face is symmetric.    Social distancing with PPE practiced during assessment.      LABS:    Lab Results   Component Value Date    WBC 7.9 08/21/2020    HGB 9.2 (L) 08/21/2020    HCT 28.6 (L) 08/21/2020    MCV 89 08/21/2020     08/21/2020       Results for orders placed or performed during the hospital encounter of 08/15/20   Basic Metabolic Panel   Result Value Ref Range    Sodium 137 136 - 145 mmol/L    Potassium 4.1 3.5 - 5.0 mmol/L    Chloride 103 98 - 107 mmol/L    CO2 23 22 - 31 mmol/L    Anion Gap, Calculation 11 5 - 18 mmol/L    Glucose 178 (H) 70 - 125 mg/dL    Calcium 11.0 (H) 8.5 - 10.5 mg/dL    BUN 37 (H) 8 - 22 mg/dL    Creatinine 2.05 (H) 0.60 - 1.10 mg/dL    GFR MDRD Af Amer 29 (L) >60 mL/min/1.73m2    GFR MDRD Non Af Amer 24 (L) >60 mL/min/1.73m2           Lab Results   Component Value Date    HGBA1C 10.3 (H) 06/17/2020     Vitamin D, Total (25-Hydroxy)   Date Value Ref Range Status   04/28/2016 29.8 (L) 30.0 - 80.0 ng/mL Final     Lab Results   Component Value Date    FTIEOSWQ71 285 01/07/2011       ASSESSMENT/PLAN:  1. Other depression    2. Below-knee amputation (H)        1.  Depression: Refuses antidepressant, did write ACP referral. She had a greed to see these services in past. Discussed with SW    2.. RBKA: Reports pain stable during visit. Unable to assess actual stump, in  and brace. Attend therapies as needed PT/OT. SN for chronic medical conditions management. Pt goal is to walk, working on transfers out of bed at this time, using mechanical lift.. Therapist reports prothesis eval went poorly, needs more standing time,only standing 30 seconds now, was at 1 minute earlier in week. .     Electronically signed by:  Azalea Kevin CNP  This progress note was completed using Dragon software and there may be grammatical errors.      .

## 2021-06-20 NOTE — LETTER
Letter by Harry Blackwood MD at      Author: Harry Blackwood MD Service: -- Author Type: --    Filed:  Encounter Date: 9/4/2020 Status: (Other)         Patient: Jazmin Bauman   MR Number: [id number]   YOB: 1955   Date of Visit: 9/4/2020      [facility]    Facility:  [facility] [[id number]]    Code Status:  FULL CODE    Chief Complaint   Patient presents with   ? H & P   :                    Patient Active Problem List   Diagnosis   ? Carpal Tunnel Syndrome   ? Urinary Tract Infection   ? Endometrial Hyperplasia   ? Cholelithiasis   ? Leukocytosis   ? Urine Tests Nonspecific Abnormal Findings   ? Obesity   ? Essential hypertension   ? Pernicious Anemia   ? Immunology Studies Raised Immunoglobulin Level   ? Vaginal bleeding   ? Type 2 diabetes mellitus (H)   ? Atrial fibrillation with RVR (H)   ? Acute kidney injury superimposed on CKD (H)   ? Non-traumatic rhabdomyolysis   ? Metabolic acidosis   ? Troponin level elevated   ? Bacteremia   ? Acute respiratory failure with hypoxia (H)   ? Acute encephalopathy   ? Acute pulmonary edema (H)   ? Wheezing   ? Moderate protein malnutrition (H)   ? Abnormal cytological findings in specimens from other female genital organs   ? Chronic kidney disease, stage III (moderate) (H)   ? Hyperkalemia   ? Osteoarthritis   ? Acute kidney injury (H)   ? Sepsis (H)   ? 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 Sepsis, due to unspecified organism, unspecified whether acute organ dysfunction present (H)   ? Cellulitis of right foot   ? CKD (chronic kidney disease) stage 4, GFR 15-29 ml/min (H)   ? Chronic wound infection of abdomen, subsequent encounter   ? Acute post-operative pain   ? Phantom limb pain (H)   ? Paroxysmal atrial fibrillation (H)   ? Urinary retention   ? Coronary artery disease without angina pectoris   ? Hx of right BKA (H)   ? Weakness   ? Slow transit constipation   ?  Wound infection   ? Fever and chills   ? Acute pain of left shoulder   ? Physical deconditioning   ? Gram-positive bacteremia   ? Chronic pain syndrome   ? MRSA bacteremia       History:Jazmin Bauman  is a 65 year old female with history of BKA June 23, 2020, bilateral chronic abdominal wounds, CKD 4, morbid obesity, insulin-dependent type 2 diabetes, chronic atrial fibrillation, peripheral neuropathy, GERD, peripheral edema, obesity, anemia of chronic disease with iron deficiencyseen for admission to TCU on 9/4/2020    Hospital Course: Patient was hospitalized August 15 through August 22 after developing fever and chills at her TCU facility.  Patient was felt to have secondary infection of her chronic abdominal wound at outset and was started on vancomycin plus Zosyn.    August 15 culture came back positive for MRSA.  MRSA persisted in August 16 cultures but have been negative since then.  Her MSSA bacteremia was of unknown source but there were several possibilities with right BKA stump drainage which was new abdominal wounds.  Patient also had acute left shoulder pain concerning for seeding of the joint, as well as abnormal TTE on August 17 of the mobile atrial valve vegetation concerning for endocarditis although the vegetation was not seen but if she had her KEMI on August 19.  ID recommends IV Vanco through August 30.    Chronic abdominal pannus ulcerations were seen by wound care with updated wound instructions and all agree steady improvement.    Right BKA stump ulceration was seen by Dr. Corcoran with debridement, no evidence for cellulitis or abscess.    Left shoulder pain evaluated including with CT no evidence of septic joint, degenerative changes noted.  Chronic pain management ongoing.    Patient had sodium bicarbonate added to her her program due to CKD 4/low bicarb    Diabetes was managed with changes in insulin dosage.    Paroxysmal atrial fibrillation treated with ongoing warfarin    Lung  nodules were noted although patient is pretty sure that the right lower lobe 1.3 cm nodule has been seen in the past.  Other noncalcified nodules which are small noted.  Questionably infectious?  Radiology recommended short-term follow-up.  Patient had no respiratory symptoms.    CRISTINA was again suspected with outpatient sleep study recommended, patient continues to decline that.    Chronic pain with acute flare evaluated by pain care team.  Patient now on renally dosed Neurontin p.o. Dilaudid 2 mg every 3 hours and prior to dressing changes along with scheduled Tylenol lidocaine ointment.  She was on morphine gel with dressing changes although that has not been continued here at the TCU.      Subjective/ROS:    -augmented by discussion with facility staff involved in direct care      Patient reports that she is actually in pretty well.  She is somewhat apprehensive about moving.  The staff is planning on moving her to a different unit likely back to the 300 unit.  Her abdominal wounds which were worse on the left than the right are better and she has decreased pain on the left which she takes is a very good sign.  She is not having fever sweats or chills.  Is been no rashes.  She notices no symptoms while she has vancomycin infusion.  She is disappointed with the debridement on the stump she thought it was almost healed and now she feels like it is a setback.    Regarding her diabetes at home she was taking Lantus 25 units and aspart 20 units 3 times daily with meals.  Prior to her most recent admission we had her on Lantus 20 units and aspart 10 units 3 times daily.  She is currently on 10 units of Lantus with 0 units of scheduled mealtime aspart.  Her blood sugars are high but not as high as I might have expected them to be with this dramatic decrease insulin dose.    Patient is concerned or disappointed about her sodium bicarbonate treatment.  She does not like being on a low-sodium diet and says that getting  sodium from a pill instead of from food just makes matters worse.  I explained the bicarbonate recommendation with her chronic kidney disease/acidosis.  I encouraged her to discuss it with  at their appointment which was rescheduled to September 28.    We discussed the probable CRISTINA, patient says that she would not use a CPAP machine in any event and does not want sleep study.    We discussed the lung nodules again she is sure that she is had the right lower lobe nodule but is amenable to a follow-up of all nodules in 3 months time as recommended by radiology.    Patient planes of ingrown toenail on the left great toe which is bothering her and draining a bit.    Patient has normal appetite she says she is sleeping pretty well.  She denies headaches change in vision speaking swallowing hearing.  She denies chest pain cough shortness of breath orthopnea PND.  She is not edematous or if so very minimally.  No falls injuries fever sweats chills remainder negative    Past Medical History:   Diagnosis Date   ? Anemia     pernicious   ? Diabetes mellitus (H)     borderline   ? Endometrial hyperplasia    ? Fibromyalgia    ? History of recurrent UTI (urinary tract infection)    ? History of transfusion    ? Hypertension    ? Thrombocytopenia (H)    ? Vaginal bleeding 1/2015     Past Surgical History:   Procedure Laterality Date   ? BELOW KNEE LEG AMPUTATION Right 6/18/2020    Procedure: AMPUTATION, BELOW KNEE;  Surgeon: Epi Corcoran MD;  Location: [facility] [place];  Service: General   ? BELOW KNEE LEG AMPUTATION Right 6/23/2020    Procedure: REVISION AMPUTATION, BELOW KNEE;  Surgeon: Epi Corcoran MD;  Location: [facility] OR;  Service: General   ? bilateral carpal tunnel release  2009   ? CHOLECYSTECTOMY     ? COLONOSCOPY N/A 9/11/2017    Procedure: COLONOSCOPY;  Surgeon: Tyler Bhakta MD;  Location: [facility] GI;  Service:    ? COMBINED HYSTEROSCOPY DIAGNOSTIC / D&C N/A 1/28/2015     Procedure: DILATION AND CURETTAGE WITH HYSTEROSCOPY;  Surgeon: Grant Beal MD;  Location: [facility] OR;  Service:    ? DILATION AND CURETTAGE OF UTERUS     ? IR NON TUNNELED CATHETER >5 YEARS  1/21/2020   ? PICC  1/20/2020        ? PICC  8/21/2020        ? [place] HYSTEROSCOPY,W/ENDO BX N/A 9/5/2019    Procedure: HYSTEROSCOPY, DILATION AND CURETTAGE;  Surgeon: Grant Beal MD;  Location: [facility] OR;  Service: Gynecology   ? [place] HYSTEROSCOPY,W/ENDO BX N/A 12/9/2019    Procedure: HYSTEROSCOPY, DILATION AND CURETTAGE;  Surgeon: Grant Beal MD;  Location: [facility] [place];  Service: Gynecology          Family History   Problem Relation Age of Onset   ? Colon cancer Father    :       Social History     Socioeconomic History   ? Marital status: Single     Spouse name: Not on file   ? Number of children: Not on file   ? Years of education: Not on file   ? Highest education level: Not on file   Occupational History   ? Not on file   Social Needs   ? Financial resource strain: Not on file   ? Food insecurity     Worry: Not on file     Inability: Not on file   ? Transportation needs     Medical: Not on file     Non-medical: Not on file   Tobacco Use   ? Smoking status: Never Smoker   ? Smokeless tobacco: Never Used   Substance and Sexual Activity   ? Alcohol use: Not Currently     Comment: rare   ? Drug use: No   ? Sexual activity: Not on file   Lifestyle   ? Physical activity     Days per week: Not on file     Minutes per session: Not on file   ? Stress: Not on file   Relationships   ? Social connections     Talks on phone: Not on file     Gets together: Not on file     Attends Cheondo[employer] service: Not on file     Active member of club or organization: Not on file     Attends meetings of clubs or organizations: Not on file     Relationship status: Not on file   ? Intimate partner violence     Fear of current or ex partner: Not on file     Emotionally abused: Not on file     Physically abused: Not on  file     Forced sexual activity: Not on file   Other Topics Concern   ? Not on file   Social History Narrative   ? Not on file   :        Current Outpatient Medications on File Prior to Visit   Medication Sig Dispense Refill   ? acetaminophen (TYLENOL) 500 MG tablet Take 2 tablets (1,000 mg total) by mouth 3 (three) times a day.  0   ? aspirin 81 MG EC tablet Take 81 mg by mouth daily.     ? bisacodyL (DULCOLAX) 10 mg suppository Insert 10 mg into the rectum daily as needed. No stool greater than 72 hours     ? ferrous sulfate 325 (65 FE) MG tablet Take 1 tablet by mouth 2 (two) times a day with meals.     ? gabapentin (NEURONTIN) 100 MG capsule Take 200 mg by mouth 2 (two) times a day.     ? HYDROmorphone (DILAUDID) 2 MG tablet Take 1 tablet (2 mg total) by mouth every 3 (three) hours as needed. 12 tablet 0   ? HYDROmorphone (DILAUDID) 2 MG tablet Take 1 tablet (2 mg total) by mouth daily. 7 tablet 0   ? lancets (ACCU-CHEK MULTICLIX LANCET) Misc TEST 6 TIMES A  each 11   ? LANTUS SOLOSTAR U-100 INSULIN 100 unit/mL (3 mL) pen Inject 10 Units under the skin at bedtime. 11.65 Type 2 with hyperglycemia  Contact provider if insulin prescribed is not the preferred insulin per insurance. (Patient taking differently: Inject 15 Units under the skin at bedtime. 11.65 Type 2 with hyperglycemia  Contact provider if insulin prescribed is not the preferred insulin per insurance.) 50 mL 0   ? lidocaine (XYLOCAINE) 5 % ointment Apply topically 4 (four) times a day. Apply to left shoulder or around wound (not inside wound) 35.44 g 0   ? metoprolol tartrate (LOPRESSOR) 25 MG tablet Take 1 tablet (25 mg total) by mouth 2 (two) times a day. 180 tablet 3   ? miconazole (MICOTIN) 2 % powder Apply topically 2 (two) times a day. Apply to b/l groin/underneath breasts/underneath pannus 100 g 0   ? NOVOLOG U-100 INSULIN ASPART 100 unit/mL injection Check blood sugar four (4) times daily.  11.65 Type 2 with hyperglycemia  OneTouch  "Verio Flex  Meter  OneTouch Verio strips 2 boxes of 50  Delica Lancets box of 100  BD Ultra-fine Therese Pen Needles - NDC [address]-71 - dispense 1 case, refill PRN for 1 year (Patient taking differently: Inject 7 Units under the skin 3 (three) times a day before meals. ) 10 mL PRN   ? polyethylene glycol (MIRALAX) 17 gram packet Take 1 packet (17 g total) by mouth daily as needed.  0   ? senna (SENOKOT) 8.6 mg tablet Take 1 tablet by mouth 2 (two) times a day.  0   ? sodium bicarbonate 650 MG tablet Take 1 tablet (650 mg total) by mouth 2 (two) times a day. OTC product  0   ? warfarin sodium (WARFARIN ORAL) Take by mouth. 9/2/20 INR 3.4 Hold 9/2 recheck INR 9/3  8/28/20 INR 2.6  Cont 3mg daily.  Next INR 9/1/20.    8/25/20 INR 2.2 Cont 3mg daily. Next INR 8/28.  8/24/20 INR 2.3  Take 3mg daily.  Next INR 8/27/20.     ? BD ULTRA-FINE MICRO PEN NEEDLE 32 gauge x 1/4\" Ndle USE AS DIRECTED 4 TIMES DAILY. 360 each 3   ? cholecalciferol, vitamin D3, (VITAMIN D3) 1,000 unit capsule Take 1,000 Units by mouth daily.     ? collagenase ointment Apply daily per WOC  0   ? docusate sodium (COLACE) 100 MG capsule Take 100 mg by mouth daily.     ? [DISCONTINUED] morphine 0.1% Gel Apply 2 click (1 g total) topically 3 (three) times a day. Apply to LLQ a pea-sized amount 100 g 0   ? [DISCONTINUED] NOVOLOG FLEXPEN U-100 INSULIN 100 unit/mL (3 mL) injection pen Check blood sugar four (4) times daily.  11.65 Type 2 with hyperglycemia  BD Ultra-fine Therese Pen Needles - NDC [address]-92 - dispense 1 case,  refill PRN for 1 year  OneTouch Verio Flex  Meter  OneTouch Verio strips 2 boxes of 50  Delica Lancets box of 100 50 mL 0     No current facility-administered medications on file prior to visit.    :      ALLERGIES:  Hydralazine; Latex; Naprosyn [naproxen]; Nitrofurantoin; Sulfa (sulfonamide antibiotics); and Vicks vaporub [camphor-eucalyptus oil-menthol]    Vitals:  Vital signs: Reviewed per facility EMR vitals including as follows:   "            Weight is 231 pounds blood pressure 107/65 temperature 97.5 heart rate 73 respirations 18 O2 sats 96%    Physical exam:    Patient is alert oriented x3 pleasant normally conversant up eating her lunch.  Normocephalic/atraumatic sclera clear gaze is conjugate swallowing without difficulty breathing comfortably without accessory muscle use or tachypnea abdominal wounds are dressed without any drainage right stump with minimal drainage on the dressing trace edema in the left ankle her left great toe shows ingrowing of a excessively curved left great nail it is ingrowing on the medial side/tibial side.  Minimal redness and clear drainage from the area.    Due to the 2020 Covid 19 pandemic, except as noted above, the patient was visually observed at a 6 foot plus distance.  An observational exam was performed in an effort to keep patient safe from Covid 19 and other communicable diseases.   Labs:  Lab Results   Component Value Date    WBC 7.9 08/21/2020    HGB 9.2 (L) 08/21/2020    HCT 28.6 (L) 08/21/2020    MCV 89 08/21/2020     08/21/2020     Results for orders placed or performed during the hospital encounter of 08/15/20   Basic Metabolic Panel   Result Value Ref Range    Sodium 137 136 - 145 mmol/L    Potassium 4.1 3.5 - 5.0 mmol/L    Chloride 103 98 - 107 mmol/L    CO2 23 22 - 31 mmol/L    Anion Gap, Calculation 11 5 - 18 mmol/L    Glucose 178 (H) 70 - 125 mg/dL    Calcium 11.0 (H) 8.5 - 10.5 mg/dL    BUN 37 (H) 8 - 22 mg/dL    Creatinine 2.05 (H) 0.60 - 1.10 mg/dL    GFR MDRD Af Amer 29 (L) >60 mL/min/1.73m2    GFR MDRD Non Af Amer 24 (L) >60 mL/min/1.73m2         Lab Results   Component Value Date    TSH 1.83 06/17/2020     Lab Results   Component Value Date    HGBA1C 10.3 (H) 06/17/2020     [unfilled]  Lab Results   Component Value Date    XIXTHPXK13 285 01/07/2011     Lab Results   Component Value Date     (H) 01/20/2020     [unfilled]  Most Recent EKG     Units 08/17/20  1110    VENTRATE BPM 68   ATRIALRATE BPM 68   QRSDURATION ms 94   QTINTERVAL ms 398   QTCCALC ms 423   P Plato degrees 26   RAXIS degrees -45   TAXIS degrees 19   MUSEDX  Sinus rhythm  Left axis deviation  Inferior infarct (cited on or before 20 Jan 2020  Possible Anterior infarct (cited on or before 20 Jan 2020  Abnormal ECG  When compared with ECG of 16-JUN-2020 18:20,  QT has shortened  Confirmed by CHAYITO DEMARCO MD LOC:[facility] ([id number]) on 8/17/2020 3:58:22 PM            Ct Chest Abdomen Pelvis Without Oral Without Iv Contrast     Result Date: 8/15/2020  EXAM: CT CHEST ABDOMEN PELVIS WO ORAL WO IV CONTRAST LOCATION: [facility] DATE/TIME: 8/15/2020 5:14 PM INDICATION: Fever, large left abdominal wall wound, small right abdominal wall wound. COMPARISON: 06/04/2018 unenhanced CT TECHNIQUE: CT scan of the chest, abdomen, and pelvis was performed without IV contrast. Multiplanar reformats were obtained. Dose reduction techniques were used. CONTRAST: None. FINDINGS: LUNGS AND PLEURA: Motion artifact. Due to the motion, there is a poorly characterized 1.3 cm nodule or nodular infiltrate within the subpleural right lower lobe. Follow-up examination is recommended to assure resolution and to better characterize this region. In addition, there are a few additional small uncalcified nodules in both lungs, the largest measuring 6 mm also indeterminate. MEDIASTINUM/AXILLAE: Stable 1.6 cm hypodense left thyroid nodule. HEPATOBILIARY: Fatty liver. Motion. Cholecystectomy. PANCREAS: Normal. SPLEEN: Normal. ADRENAL GLANDS: Stable right adrenal nodule compatible with adenoma. KIDNEYS/BLADDER: Multiple simple and complex cysts in the kidneys with the largest cluster in the lower right kidney containing regions of high attenuation compatible with proteinaceous or hemorrhagic debris. This is stable. Motion artifact. BOWEL: No appendicitis. LYMPH NODES: Normal. VASCULATURE: Unremarkable. PELVIC ORGANS: There is an open defect in the  lower left abdominal pannus with some ill-defined soft tissue stranding but no abscess. MUSCULOSKELETAL: Normal.      1.  Motion artifact. There is a new poorly characterized 1.3 cm nodule in the right lower lobe with a few additional small new bilateral uncalcified pulmonary nodules. While these may be infectious, short-term follow-up is recommended to assure resolution. 2.  Open wound defect in the lower left abdominal pannus with underlying soft tissue haziness and stranding but no abscess. 3.  No appendicitis. 4.  Cholecystectomy. 5.  Multiple complex and simple renal cysts unchanged but poorly characterized due to motion. 6.  Hepatomegaly with fatty infiltration of the liver. 7.  Stable right adrenal adenoma.     Xr Humerus Left 2 Vws     Result Date: 8/15/2020  EXAM: XR HUMERUS LEFT 2 VWS LOCATION: [facility] DATE/TIME: 8/15/2020 5:27 PM INDICATION: Left humerus pain COMPARISON: Forearm same date      Within normal limits. No fracture.     Ct Head Without Contrast     Result Date: 8/17/2020  EXAM: CT HEAD WO CONTRAST LOCATION: [facility] DATE/TIME: 8/17/2020 4:31 PM INDICATION: Headache, acute, normal neuro exam possible infective endocarditis, on coumadin, r/o any bleed COMPARISON: CT head 01/21/2020 TECHNIQUE: Routine without IV contrast. Multiplanar reformats. Dose reduction techniques were used. FINDINGS: INTRACRANIAL CONTENTS: No intracranial hemorrhage, extraaxial collection, or mass effect.  No CT evidence of acute infarct. Unchanged mild diffuse parenchymal volume loss with ex vacuo ventricular dilatation. Unchanged periventricular low-attenuation most in keeping with sequela of chronic microvascular ischemic change. VISUALIZED ORBITS/SINUSES/MASTOIDS: No intraorbital abnormality. No paranasal sinus mucosal disease. No middle ear or mastoid effusion. BONES/SOFT TISSUES: No acute abnormality.      1.  No acute intracranial abnormality.     Xr Forearm Left     Result Date:  8/15/2020  EXAM: XR FOREARM LEFT LOCATION: [facility] DATE/TIME: 8/15/2020 5:28 PM INDICATION: Left forearm pain COMPARISON: None      Degenerative disease. No acute fracture or dislocation.     Ct Shoulder Without Contrast Left     Result Date: 8/15/2020  EXAM: CT SHOULDER WO CONTRAST LEFT LOCATION: [facility] DATE/TIME: 8/15/2020 8:47 PM INDICATION: Pain, inability to move. COMPARISON: 08/15/2020 radiographs. TECHNIQUE: Noncontrast. Axial, sagittal and coronal thin-section reconstruction. Dose reduction techniques were used. CONTRAST: None. FINDINGS: No evidence of a fracture. Mild degenerative changes at the glenohumeral joint. Mild AC joint arthrosis. No muscle atrophy. Visualized left ribs normal. No soft tissue edema.      1.  No evidence of a fracture. 2.  Mild degenerative changes at the glenohumeral and acromioclavicular joints.      Mr Knee Without Contrast Right     Result Date: 8/17/2020  EXAM: MR KNEE WO CONTRAST RIGHT LOCATION: [facility] DATE/TIME: 8/17/2020 3:40 PM INDICATION: Knee pain, persistent, > 6wks of conservative treatment bacteremia, right BKA stump ulceration, r/o abscess/osteo. COMPARISON: None. TECHNIQUE: Unenhanced. FINDINGS: Postoperative changes of a below-knee amputation at the level of the midportion of the tibial and fibular diaphyses. No evidence of osteomyelitis. There is a tiny fluid collection along the medial margin of the tibial amputation site as seen on series 7 image 17. This collection measures about 7 mm in diameter and maybe seroma or a tiny abscess. Marked global atrophy of the proximal calf musculature with some intramuscular T2 signal likely secondary to denervation change. The neurovascular structures are intact. No evidence of a fracture.      1.  Postop changes of a below-knee amputation at the level of the midportion of the tibial and fibular diaphyses. 2.  No evidence of osteomyelitis. 3.  Tiny fluid collection along the medial margin  "of the tibial amputation site may represent seroma or tiny abscess.     Nm Mpi With Lexiscan     Result Date: 7/23/2020  ?  The nuclear stress test is probably negative for inducible myocardial ischemia or infarction. ?  The patient is at a low risk of future cardiac ischemic events. ?  The left ventricular ejection fraction at stress is 66%. ?  The  images demonstrate breast attenuation as well as subdiaphragmatic RAMP artifacts. ?  There is no prior study for comparison.      Poc Us 3cg Picc Placement Guidance     Result Date: 8/20/2020  Exam was performed as guidance for PICC line insertion.  Click \"PACS images\" hyperlink below to view any stored images.  For specific procedure details, view procedure note authored by PICC/Vascular Access Nurse.        Please review labs in patient's chart  Indications     Troponin level elevated    Paroxysmal atrial fibrillation (H)    Abnormal electrocardiogram    Conclusion        ? The nuclear stress test is probably negative for inducible myocardial ischemia or infarction.  ?  The patient is at a low risk of future cardiac ischemic events.  ?  The left ventricular ejection fraction at stress is 66%.  ?  The  images demonstrate breast attenuation as well as subdiaphragmatic RAMP artifacts.  ?  There is no prior study for comparison.      ECG Summary     ECG  Baseline electrocardiogram demonstrates sinus rhythm.   The stress electrocardiogram is negative for inducible ischemic EKG changes. There were no arrhythmias during stress. Arrhythmias during recovery: Occasional PACs.        Assessment/Plan:      ICD-10-CM    1. MRSA bacteremia  R78.81     B95.62        MRSA bacteremia   Source unclear as she had many possibilities.  - Vancomycin through August 30 with ID follow-up as they request.  They are managing the IV antibiotic therapy.    Acute left shoulder pain   Has quieted down nicely.  Etiology was not.  There was concern for seeding of " bacteria with her bacteremia.  She was discovered to have degenerative changes.  -Chronic pain management as current    Abdominal wounds   Improved, left may have been a source of infection as it was irritated and has been worse than the right.  -Continue wound cares as per wound care team  -Sees wound care team here on Tuesdays    Right stump wound   Management per wound care physician/team    Ingrown toenail left   Facility is aware and discussed it with the wound care doctor who sounds like they would be willing to debrided/remove ingrown portion here next week.  -Soak in warm soapy water 5 minutes twice daily.  Dry thoroughly.  Bactroban ointment to the area twice daily.  Monitor clinically for any evidence of ascending cellulitis or other complication    DM 2   See above for recent insulin dosages and changes.  - Increase Lantus to 15 units  -Add mealtime insulin at 7 units 3 times daily  -Anticipate need for increased insulin as she continues to recover with improving appetite etc.  She was recently on 20 to 25 units of Lantus and 10-15 3 times daily aspart with meals.  We will see how it goes.  -Continue diabetic diet  -Patient has done a good job of weight loss despite her inability backslash.    Hypertension   Amlodipine was held in the hospital and has not been restarted here.  Blood pressures are acceptable, even good overall here.    Paroxysmal atrial fibrillation   Continue warfarin with INR monitoring.  Continue metoprolol 25 mg twice daily.  Monitor for any worsening of bradycardia    CKD 4  Metabolic acidosis   Sodium bicarbonate 650 twice daily added during the hospital stay.  Follow-up with nephrology.  BMP next week on Tuesday.  Avoid nephrotoxins as able.  Patient is on vancomycin currently managed by infectious disease    History of peripheral edema and Lasix use   Lasix has been discontinued, no significant edema, continue to monitor clinically.    Constipation   No recurrence she has her bowel  program worked out now with some confidence.    BKA June 23   Some debridement of wound this hospital stay.  She is disappointed as she sees that as a step back to prosthetic device walking etc.    Lung nodules   No respiratory symptoms.  Radiology recommends short-term follow-up CT without contrast in 3 months.  Patient says she is not worried because she has been through this before.    Acute on chronic pain with current opiate use.   See new pain consult recommendation.  I hope we can taper off the Dilaudid in the foreseeable future.  I did not make any changes in her dosages today however.  Gabapentin acetaminophen topical lidocaine are all continued.      Case discussed with:    Facility staff           Harry Blackwood MD

## 2021-06-20 NOTE — LETTER
Letter by Harry Blackwood MD at      Author: Harry Blackwood MD Service: -- Author Type: --    Filed:  Encounter Date: 7/17/2020 Status: (Other)         Patient: Jazmin Bauman   MR Number: [id number]   YOB: 1955   Date of Visit: 7/17/2020      [facility]    Facility:  [facility] [[id number]]    Code Status:  FULL CODE    Chief Complaint   Patient presents with   ? Review Of Multiple Medical Conditions   :                    Patient Active Problem List   Diagnosis   ? Carpal Tunnel Syndrome   ? Urinary Tract Infection   ? Endometrial Hyperplasia   ? Cholelithiasis   ? Leukocytosis   ? Urine Tests Nonspecific Abnormal Findings   ? Obesity   ? Essential hypertension   ? Pernicious Anemia   ? Immunology Studies Raised Immunoglobulin Level   ? Vaginal bleeding   ? Type 2 diabetes mellitus (H)   ? Atrial fibrillation with RVR (H)   ? Acute kidney injury superimposed on CKD (H)   ? Non-traumatic rhabdomyolysis   ? Metabolic acidosis   ? Troponin level elevated   ? Bacteremia   ? Acute respiratory failure with hypoxia (H)   ? Acute encephalopathy   ? Acute pulmonary edema (H)   ? Wheezing   ? Moderate protein malnutrition (H)   ? Abnormal cytological findings in specimens from other female genital organs   ? Chronic kidney disease, stage III (moderate) (H)   ? Hyperkalemia   ? Osteoarthritis   ? Acute kidney injury (H)   ? Sepsis (H)   ? 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 Sepsis, due to unspecified organism, unspecified whether acute organ dysfunction present (H)   ? Cellulitis of right foot   ? CKD (chronic kidney disease) stage 4, GFR 15-29 ml/min (H)   ? Chronic wound infection of abdomen, subsequent encounter   ? Acute post-operative pain   ? Phantom limb pain (H)   ? Paroxysmal atrial fibrillation (H)   ? Urinary retention   ? Coronary artery disease without angina pectoris   ? Hx of right BKA (H)        History:  Jazmin Bauman  is a 65 year old female with history of CKD 4, morbid obesity, insulin-dependent type 2 diabetes, chronic atrial fibrillation, peripheral neuropathy, GERD, peripheral edema, obesity, anemia of chronic disease with iron deficiency seen for follow-up of multiple medical issues as well as new acute kidney injury on 7/17/2020    Hospital Course: Patient was admitted between June 16 and June 28.    Patient has history of chronic nonhealing right heel ulcer which became infected without her knowledge.  Patient's housemate summoned help and patient was transported to the emergency room.  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  Patient was septic and treated with Vanco/Zosyn.  Blood culture was positive for Peptostreptococcus and Streptococcus.  As patient improved she was transitioned to Augmentin.     Guillotine amputation converted to BKA on June 23.    Hospitalization was complicated for bilateral abdominal wall issues.  On the right side patient was felt to have chronic wounds/rash.  On the left she had acute abscess and infection resulting in I&D in the operating room.  Calciphyxis considered possible though not confirmed.  Patient left with wound ulcer on the left abdomen requiring wound care treatment which is ongoing.  Please bilateral infectious issues occur in the skin and subcutaneous tissues in the dependent pannus of the lower flanks.  I do not see that any wound cultures were obtained from these areas.  Patient describes many months of work with wound care and soft tissue surgery/debridements to clear up the infection of the right abdominal wall in the past.    Hospitalization also remarkable for hypokalemia at outset improved with Kayexalate.  Patient has paroxysmal atrial fibrillation, her warfarin needed to be held due to the multiple surgeries but she is back on it.  There is  also concern over coronary artery disease with positive troponin January 2020 for which she was lost to follow-up with stress test/cardiology never did so.  Finally she had retention resulted in Canchola catheterization which is ongoing.    Subjective/ROS:    -augmented by discussion with facility staff involved in direct care    - Patient's creatinine was worse this past Monday 2.36.  It is been 2.21 last week so only mildly so.  Nonetheless this result was not called to our office.  No one has been notified of it.  Baseline after discharge was 1.78.  We discontinued her Lasix but despite that she is lost several more pounds down to 260.    -Patient was to to have her stress test today.  She says she canceled that this morning as she did not feel well.  She had some sort of vague abdominal complaint which was transient.  She rescheduled that for next Thursday the 23rd.    - I was present for wound treatment today, large deep ulcers noted with partial granulation and little slough.  There is some scabbing on the left side on the skin adjacent.  I do not know if there is some adhesive issue there or not.    -Patient's blood sugars remain inadequately controlled although she remains below her baseline insulin delivery as well.  She has not had any hypoglycemia.    -Staff is worried about blood clot found in her underwear/depends garment apparently from the urethral area.  She has needed catheterization several times this week for intermittent PVRs greater than 300.  Indeed once it was 900 cc.  At other times she feels like she empties her bladder completely.  Part of it may be dependent on trying to void in bed from awkward position and bedpan etc.  Nonetheless I am concerned about some obstructive phenomenon contributing to her worsening renal function and I am recommending leaving the Canchola in this time and rescheduling with urologist.    -Her renal failure is a concern.  Lasix has been on hold.  We will recheck her  blood work on Monday morning.  Dr. Kay knows her and may be able to help out.    -Magnesium level was also elevated at 2.9.  Again this was not called to our office and has been unknown by anyone that I can see for the last 4 days.  I did discuss this with facility staff so that they can run an incident review and see where the problem lies.    INR therapeutic 2.3    Otherwise patient is feeling a bit more comfortable here in the facility.  She is moved to room 310.  She is eating well without nausea vomiting.  She says her sleep is improved.  She has no constipation or diarrhea remainder negative  ===================================================        Past Medical History:   Diagnosis Date   ? Anemia     pernicious   ? Diabetes mellitus (H)     borderline   ? Endometrial hyperplasia    ? Fibromyalgia    ? History of recurrent UTI (urinary tract infection)    ? History of transfusion    ? Hypertension    ? Thrombocytopenia (H)    ? Vaginal bleeding 1/2015     Past Surgical History:   Procedure Laterality Date   ? BELOW KNEE LEG AMPUTATION Right 6/18/2020    Procedure: AMPUTATION, BELOW KNEE;  Surgeon: Epi Corcoran MD;  Location: [facility] [place];  Service: General   ? BELOW KNEE LEG AMPUTATION Right 6/23/2020    Procedure: REVISION AMPUTATION, BELOW KNEE;  Surgeon: Epi Corcoran MD;  Location: [facility] OR;  Service: General   ? bilateral carpal tunnel release  2009   ? CHOLECYSTECTOMY     ? COLONOSCOPY N/A 9/11/2017    Procedure: COLONOSCOPY;  Surgeon: Tyler Bhakta MD;  Location: [facility] GI;  Service:    ? COMBINED HYSTEROSCOPY DIAGNOSTIC / D&C N/A 1/28/2015    Procedure: DILATION AND CURETTAGE WITH HYSTEROSCOPY;  Surgeon: Grant Beal MD;  Location: [facility] OR;  Service:    ? DILATION AND CURETTAGE OF UTERUS     ? IR NON TUNNELED CATHETER >5 YEARS  1/21/2020   ? PICC  1/20/2020        ? [place] HYSTEROSCOPY,W/ENDO BX N/A 9/5/2019    Procedure: HYSTEROSCOPY, DILATION AND  CURETTAGE;  Surgeon: Grant Beal MD;  [place] [facility] [place]  Service: Gynecology   ? [place] HYSTEROSCOPY,W/ENDO BX N/A 12/9/2019    Procedure: HYSTEROSCOPY, DILATION AND CURETTAGE;  Surgeon: Grant Beal MD;  [place] [facility] [place]  Service: Gynecology          Family History   Problem Relation Age of Onset   ? Colon cancer Father    :       Social History     Socioeconomic History   ? Marital status: Single     Spouse name: Not on file   ? Number of children: Not on file   ? Years of education: Not on file   ? Highest education level: Not on file   Occupational History   ? Not on file   Social Needs   ? Financial resource strain: Not on file   ? Food insecurity     Worry: Not on file     Inability: Not on file   ? Transportation needs     Medical: Not on file     Non-medical: Not on file   Tobacco Use   ? Smoking status: Never Smoker   ? Smokeless tobacco: Never Used   Substance and Sexual Activity   ? Alcohol use: Not Currently     Comment: rare   ? Drug use: No   ? Sexual activity: Not on file   Lifestyle   ? Physical activity     Days per week: Not on file     Minutes per session: Not on file   ? Stress: Not on file   Relationships   ? Social connections     Talks on phone: Not on file     Gets together: Not on file     Attends Mu-ism service: Not on file     Active member of club or organization: Not on file     Attends meetings of clubs or organizations: Not on file     Relationship status: Not on file   ? Intimate partner violence     Fear of current or ex partner: Not on file     Emotionally abused: Not on file     Physically abused: Not on file     Forced sexual activity: Not on file   Other Topics Concern   ? Not on file   Social History Narrative   ? Not on file   :    Currently living at a friend's house.  Normally she would live at her most.    Current Outpatient Medications on File Prior to Visit   Medication Sig Dispense Refill   ? acetaminophen (TYLENOL) 500 MG tablet Take 2  "tablets (1,000 mg total) by mouth every 6 (six) hours as needed for pain or fever.  0   ? calcium acetate,phosphat bind, (PHOSLO) 667 mg capsule Take 1 capsule (667 mg total) by mouth 3 (three) times a day with meals. 270 capsule 3   ? collagenase ointment Apply daily per WOC  0   ? ferrous sulfate 325 (65 FE) MG tablet Take 1 tablet by mouth 2 (two) times a day with meals.     ? insulin glargine (BASAGLAR KWIKPEN) 100 unit/mL (3 mL) pen Inject 10 Units under the skin at bedtime. (Patient taking differently: Inject 20 Units under the skin at bedtime. )  0   ? NOVOLOG FLEXPEN U-100 INSULIN 100 unit/mL (3 mL) injection pen Check blood sugar four (4) times daily.  11. (Patient taking differently: Inject 10 Units under the skin 3 (three) times a day before meals. 10 unit three times a day with meals     PLUS:      if 141 - 180 = 1 unit;   181 - 220 = 2 units;  Plus sliding scale   221 - 260 = 3 units;   261 - 300 = 4 units;   301 - 340 = 5 units;   341 - 400 = 6 units;   401 - 999 = 7 units,)  0   ? sodium bicarbonate 650 MG tablet Take 1 tablet (650 mg total) by mouth 2 (two) times a day. OTC product  0   ? amLODIPine (NORVASC) 10 MG tablet Take 1 tablet (10 mg total) by mouth daily. (Patient taking differently: Take 10 mg by mouth daily. Hold for systolic blood pressure less than 105)  0   ? aspirin 81 MG EC tablet Take 81 mg by mouth daily.     ? BD ULTRA-FINE MICRO PEN NEEDLE 32 gauge x 1/4\" Ndle USE AS DIRECTED 4 TIMES DAILY. 360 each 3   ? blood glucose test (ACCU-CHEK SAM PLUS TEST STRP) strips TEST 1 TIME A  strip 10   ? blood glucose test (ACCU-CHEK SAM PLUS TEST STRP) strips test blood sugar level 6 times daily 600 strip 4   ? cholecalciferol, vitamin D3, (VITAMIN D3) 1,000 unit capsule Take 1,000 Units by mouth daily.     ? furosemide (LASIX) 20 MG tablet Take 1 tablet (20 mg total) by mouth daily.  0   ? gabapentin (NEURONTIN) 400 MG capsule Take 1 capsule (400 mg total) by mouth 3 (three) times a " day.  0   ? HYDROmorphone (DILAUDID) 2 MG tablet Take 1 tablet (2 mg total) by mouth every 4 (four) hours as needed. 30 tablet 0   ? lancets (ACCU-CHEK MULTICLIX LANCET) Misc TEST 6 TIMES A  each 11   ? magnesium 250 mg Tab Take 500 mg by mouth 2 (two) times a day.      ? metoprolol tartrate (LOPRESSOR) 25 MG tablet Take 1 tablet (25 mg total) by mouth 2 (two) times a day. (Patient taking differently: Take 25 mg by mouth 2 (two) times a day. Hold for systolic blood pressure less than 105) 180 tablet 3   ? NOVOLOG FLEXPEN U-100 INSULIN 100 unit/mL (3 mL) injection pen Check blood sugar four (4) times daily.  {  0   ? omeprazole (PRILOSEC) 20 MG capsule Take 20 mg by mouth.     ? senna-docusate (PERICOLACE) 8.6-50 mg tablet Take 1 tablet by mouth 2 (two) times a day.  0   ? warfarin sodium (WARFARIN ORAL) Take by mouth. 7/7/20 INR 2.5  Cont 5mg daily.  Next INR 7/10/20.       No current facility-administered medications on file prior to visit.    :      ALLERGIES:  Hydralazine; Latex; Naprosyn [naproxen]; Nitrofurantoin; Sulfa (sulfonamide antibiotics); and Vicks vaporub [camphor-eucalyptus oil-menthol]    Vitals:      Current Vitals   BP: 130/64 mmHg  7/17/2020 14:41    Temp:96.9  F  7/17/2020 14:41  Pulse: 64 bpm  7/17/2020 14:41    Weight: 249 Lbs  7/17/2020 15:45  Resp: 18 Breaths/min  7/17/2020 14:41  BS: 219 mg/dL  7/17/2020 17:06  O2: 98 %  7/17/2020 14:41  Pain: 9  7/17/2020 15:0  Physical exam:    General:  Alert  oriented x3 appears comfortable    Patient is lying in bed.  She is having her wounds dressed by the nurse.  She tolerates that very well and says she has minimal pain in those areas.  She is alert oriented x3 normally conversant she is breathing comfortably without tachypnea or accessory muscle use.  Speech is fluent she answers questions appropriately.  She has the brace on her right BKA.  Left leg shows some wrinkling in the pretibial skin and ankle with very little edema and good  perfusion.    Wounds are large ovoid and deep with partial granulation and very little slough.  Some skin scabbing adjacent to the one on the left questionable adhesive injury?      Due to the 2020 Covid 19 pandemic, except as noted above, the patient was visually observed at a 6 foot plus distance.  An observational exam was performed in an effort to keep patient safe from Covid 19 and other communicable diseases.   Labs:  Lab Results   Component Value Date    WBC 9.5 06/27/2020    HGB 7.9 (L) 06/27/2020    HCT 25.5 (L) 06/27/2020    MCV 95 06/27/2020     06/27/2020     Results for orders placed or performed during the hospital encounter of 06/16/20   Basic Metabolic Panel   Result Value Ref Range    Sodium 141 136 - 145 mmol/L    Potassium 4.2 3.5 - 5.0 mmol/L    Chloride 107 98 - 107 mmol/L    CO2 27 22 - 31 mmol/L    Anion Gap, Calculation 7 5 - 18 mmol/L    Glucose 116 70 - 125 mg/dL    Calcium 8.8 8.5 - 10.5 mg/dL    BUN 30 (H) 8 - 22 mg/dL    Creatinine 1.57 (H) 0.60 - 1.10 mg/dL    GFR MDRD Af Amer 40 (L) >60 mL/min/1.73m2    GFR MDRD Non Af Amer 33 (L) >60 mL/min/1.73m2         Lab Results   Component Value Date    TSH 1.83 06/17/2020     Lab Results   Component Value Date    HGBA1C 10.3 (H) 06/17/2020     [unfilled]  Lab Results   Component Value Date    QUKIXFUT20 285 01/07/2011     Lab Results   Component Value Date     (H) 01/20/2020     [unfilled]  Most Recent EKG     Units 06/16/20  1820   VENTRATE BPM 76   ATRIALRATE BPM 76   QRSDURATION ms 94   QTINTERVAL ms 428   QTCCALC ms 481   P Axis degrees 55   RAXIS degrees -43   TAXIS degrees 12   MUSEDX  Normal sinus rhythm  Left axis deviation  Inferior infarct (cited on or before 13-MAR-2020)  Abnormal ECG  When compared with ECG of 14-MAR-2020 13:04,  No significant change was found  Confirmed by DENNIS THORPE, MARYANA LOC:MAK ([id number]) on 6/17/2020 1:41:23 PM       From Monday sodium 137 potassium down to 4.5 (had been 5.2) chloride 27 CO2 29 BUN  49 currently 2.36 up from 2.21 last week and 1.78 on July 7.  INR is 2.3 on July 13.  Magnesium was elevated at 2.9.  Assessment/Plan:      ICD-10-CM    1. Hx of right BKA (H)  Z89.511    2. CKD (chronic kidney disease) stage 4, GFR 15-29 ml/min (H)  N18.4    3. Type 2 diabetes mellitus with other specified complication, with long-term current use of insulin (H)  E11.69     Z79.4    Acute kidney injury, recurrent  Suspected volume depletion  CKD 4  -We stopped Lasix last week due to the bump in the creatinine.  Unfortunately her creatinine did not improve over the weekend.  She is down several more pounds without any diuretic.  Unclear if obstructive phenomena may be contributing as she does need to be catheterized several times during the week and even when not needing to be cathed might still have PVRs significantly elevated just less than 300.  -Continue off Lasix  -Continue to push fluids orally  -Hold parameters continue on her beta-blocker and amlodipine  - Continue renal dosing of her gabapentin, decreased it to 200 mg twice daily (200-700 single daily dose recommended considering her estimated creatinine clearance)  - Discontinue PVRs in favor of Canchola catheterization at this point  -Canchola catheter  -UA UC as patient is having mild dysuria although that might be from repetitive cath situation.  No antibiotics were started but she does not have signs of systemic infection.  -Urology consultation ordered.  Might need urodynamic studies?  - Follow-up lab work necessary on Monday, to make sure we are reversing her failure.  -Incident investigation requested due to failure to call critical labs this week.    Electrolyte disturbance   Potassium improved off Lasix.  However her creatinine did worsen somewhat.  She now also has hypomagnesemia  -Discontinue magnesium replacement  -Continue off potassium replacement  -Push fluids  -Continue to hold Lasix  -Recheck on Monday    Acute urinary retention  Urethral  bleed   See discussion above.  Intermittent elevations above 300 all the way up to 900 requiring intermittent catheterization.  It looks like she had some urethral bleeding that nurses report that there has been some blood-tinged urine.  -At this point it makes sense to put in a Canchola catheter to avoid further trauma as it looks like she is going to continue to retain urine at least at times.  -Urology follow-up/possible urodynamic studies requested and ordered.  -UA UC to rule out infection    Type 2 diabetes, insulin-dependent, uncontrolled   See my previous notes.  Inadequately controlled.  Increase her Lantus to 20 units, recall that her baseline dose at home is 25 units.  We are going to increase her mealtime aspart insulin to 10 units, recall that her baseline at home is 15 units with each meal.  She also has sliding scale.  If she has no hypoglycemia we will likely increase to her baseline doses next week.    .  Recall that her most recent A1c 10.3 so she could need higher than her baseline doses eventually.    - We should continue to adjust her doses as necessary in the weeks to come    Chronic right foot ulcer now status post BKA  Right foot/leg cellulitis now status post BKA  Guillotine amputation June 30  Stump revision/BKA June 23   Patient has been back to the surgeon.  They remove the rest of the sutures.  They have okayed her for her compression sleeves.  She remains concerned that it is too soon.  Encouraged her to continue to work with her specialist.  Pain is improved.    -Gabapentin needs to be decreased due to her renal function as noted above.      Constipation   Resolved.  Bowel programs in place with senna docusate 1 tablet twice daily.     Abdominal wounds  Calciphylaxis considered possible here, though far from a certainty.  There was lack of agreement among her specialists.. Left side is worse but is showing some granulation with very little slough.  Continue collagenase ointment and other  wound care as outlined by wound care specialist  -Follow-up with wound care team here at the facility and/or at the wound care clinic.        Paroxysmal atrial fibrillation   INR has fallen down into the therapeutic range again this week.  Recheck INR Monday      Chronic anemia   Likely anemia of chronic disease.  Her nephrologist notes an iron deficiency component and she is on iron infusion therapy last hemoglobin 7.9 which she is tolerating well.  She is generally more in the 9 range.  Hemoglobin was stable.  Do not anticipate further monitoring/treatment in the next couple of weeks anyway.      Obesity   Complicates her movement, limits mobility and increase his difficulty of therapy.  Associated with other increased risk of morbidity as well.      Hypertension   No new thoughts here.  Blood pressures are better than when she first got here, but perhaps at the expense of volume status?  Continue metoprolol with hold parameters.  Continue amlodipine with hold parameters.  Lasix is on hold.     Social stressors and factors   Social service involved.        Case discussed with:    Facility staff             Harry Blackwood MD

## 2021-06-20 NOTE — LETTER
"Letter by Zoey Leung CNP at      Author: Zoey Leung CNP Service: -- Author Type: --    Filed:  Encounter Date: 9/3/2020 Status: (Other)         Patient: Jazmin Bauman   MR Number: [id number]   YOB: 1955   Date of Visit: 9/3/2020       [facility]      NAME:  Jazmin Bauman             :  1955    MRN: [id number]    CODE STATUS:  FULL CODE    FACILITY: [facility] [[id number]]         CHIEF COMPLAIN/REASON FOR VISIT:  Chief Complaint   Patient presents with   ? Review Of Multiple Medical Conditions     INR, abdominal wo0und        HISTORY OF PRESENT ILLNESS: Jazmin Bauman is a 65 y.o. female. Pt was at [facility] from  to  and underwent a RBKA r/t ongoing DM ulcer with osteomyelitis. Per her EMR dc summary \" with HTN,morbid obesity, DM, A fib, CKD 4 presented for evaluation of R foot swelling, difficulty ambulation, pain found to have osteomyelitis now s/p amputation. She has now been to the OR twice this stay, last was on 20.   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   Patient had a chronic diabetic foot ulcer on her R heel, presented for increased swelling, difficulty ambulating, and pain. MRI showed extensive soft tissue necrosis and some osteomyelitis along with cellulitis  Met sepsis criteria on admission with leukocytosis and elevated CRP  Podiatry saw and the foot was not felt to be salvageable and BKA was recommended\"       August 15-: Hospitalized for sepsis with MRSA bacteremia, she had possible endocarditis and TTE revealing vegetation of the aortic valve. She had a CT of the abdomen and pelvis that showed left lower abdominal pannus ulceration but without abscess.  Her right BKA stump ulceration was negative for osteomyelitis or cellulitis.  She was treated with IV Vanco for 14 days which will be completed on .  Her left lower abdominal ulceration " "has never been biopsied.  It was not biopsied in the hospital and there was no surgical intervention.  Her right BKA stump was negative for osteomyelitis.  CKD stage IV with baseline creatinine 2.5-3.  She was at 1.82 at the end of hospitalization.  She is discharged on sodium bicarb twice daily.  Her insulin was adjusted and she is now on sliding scale insulin with 10 units of Lantus at bedtime.  Blood sugars have been less than 200 on average.    For her hypertension, Norvasc has been discontinued.  Blood pressures remain in the 130s over 70s on average.  She was incidentally found to have a lung nodule on CT scan, will need to follow-up with this.    Today: Seen for INR today and general discussion.  Today her INR was 3.4 which is unchanged from previous INR.  Will hold for 48 hours and start a lower dose at 2 mg daily and then recheck on Tuesday.  As the weekend is coming up.  She is feeling well, there is no excessive bruising, hematuria or melena.  Her pain management is okay on current orders.  She is visiting often with her previous roommate and friend come down the [place].  She seems content in this setting for now.   She has nephrology appt at the end of the month. Will check bmp in the next week.         Allergies   Allergen Reactions   ? Hydralazine      Paralysis of legs   ? Latex Itching     Skin Burns   ? Naprosyn [Naproxen] Swelling     throat   ? Nitrofurantoin Hives   ? Sulfa (Sulfonamide Antibiotics) Rash   ? Vicks Vaporub [Camphor-Eucalyptus Oil-Menthol] Rash   :     Current Outpatient Medications   Medication Sig   ? acetaminophen (TYLENOL) 500 MG tablet Take 2 tablets (1,000 mg total) by mouth 3 (three) times a day.   ? aspirin 81 MG EC tablet Take 81 mg by mouth daily.   ? BD ULTRA-FINE MICRO PEN NEEDLE 32 gauge x 1/4\" Ndle USE AS DIRECTED 4 TIMES DAILY.   ? bisacodyL (DULCOLAX) 10 mg suppository Insert 10 mg into the rectum daily as needed. No stool greater than 72 hours   ? cholecalciferol, " vitamin D3, (VITAMIN D3) 1,000 unit capsule Take 1,000 Units by mouth daily.   ? collagenase ointment Apply daily per WOC   ? docusate sodium (COLACE) 100 MG capsule Take 100 mg by mouth daily.   ? ferrous sulfate 325 (65 FE) MG tablet Take 1 tablet by mouth 2 (two) times a day with meals.   ? gabapentin (NEURONTIN) 100 MG capsule Take 200 mg by mouth 2 (two) times a day.   ? HYDROmorphone (DILAUDID) 2 MG tablet Take 1 tablet (2 mg total) by mouth every 3 (three) hours as needed.   ? HYDROmorphone (DILAUDID) 2 MG tablet Take 1 tablet (2 mg total) by mouth daily.   ? lancets (ACCU-CHEK MULTICLIX LANCET) Misc TEST 6 TIMES A DAY   ? LANTUS SOLOSTAR U-100 INSULIN 100 unit/mL (3 mL) pen Inject 10 Units under the skin at bedtime. 11.65 Type 2 with hyperglycemia  Contact provider if insulin prescribed is not the preferred insulin per insurance. (Patient taking differently: Inject 15 Units under the skin at bedtime. 11.65 Type 2 with hyperglycemia  Contact provider if insulin prescribed is not the preferred insulin per insurance.)   ? lidocaine (XYLOCAINE) 5 % ointment Apply topically 4 (four) times a day. Apply to left shoulder or around wound (not inside wound)   ? metoprolol tartrate (LOPRESSOR) 25 MG tablet Take 1 tablet (25 mg total) by mouth 2 (two) times a day.   ? miconazole (MICOTIN) 2 % powder Apply topically 2 (two) times a day. Apply to b/l groin/underneath breasts/underneath pannus   ? NOVOLOG U-100 INSULIN ASPART 100 unit/mL injection Check blood sugar four (4) times daily.  11.65 Type 2 with hyperglycemia  OneTouch Verio Flex  Meter  OneTouch Verio strips 2 boxes of 50  Delica Lancets box of 100  BD Ultra-fine Therese Pen Needles - NDC [address]-29 - dispense 1 case, refill PRN for 1 year (Patient taking differently: Inject 7 Units under the skin 3 (three) times a day before meals. )   ? polyethylene glycol (MIRALAX) 17 gram packet Take 1 packet (17 g total) by mouth daily as needed.   ? senna (SENOKOT) 8.6 mg  tablet Take 1 tablet by mouth 2 (two) times a day.   ? sodium bicarbonate 650 MG tablet Take 1 tablet (650 mg total) by mouth 2 (two) times a day. OTC product   ? warfarin sodium (WARFARIN ORAL) Take by mouth. 9/8/20 INR 1.6  Take 2.5mg daily.  Next INR 9/11/20.    9/3/20 INR 3.4  Hold x 2 days, then take 2mg daily.  Next INR 9/8/20.  9/2/20 INR 3.4 Hold 9/2 recheck INR 9/3  8/28/20 INR 2.6  Cont 3mg daily.  Next INR 9/1/20.    8/25/20 INR 2.2 Cont 3mg daily. Next INR 8/28.  8/24/20 INR 2.3  Take 3mg daily.  Next INR 8/27/20.         REVIEW OF SYSTEMS:    Currently, no fever, chills, or rigors. Does not have any visual or hearing problems. Denies any chest pain, headaches, palpitations, lightheadedness, dizziness, shortness of breath, or cough. Appetite is good. Denies any GERD symptoms. Denies any difficulty with swallowing, nausea, or vomiting.  Denies any abdominal pain, diarrhea or constipation. Denies any urinary symptoms, yeager in place. No insomnia. No active bleeding. No rash.       PHYSICAL EXAMINATION:  Vitals:    09/09/20 1145   BP: 140/65   Pulse: 65   Temp: 97  F (36.1  C)   SpO2: 97%   Weight: (!) 231 lb (104.8 kg)         GENERAL: Awake, Alert, oriented x3, not in any form of acute distress, answers questions appropriately, follows simple commands, conversant with flat affect  HEENT: Head is normocephalic with normal hair distribution. No evidence of trauma. Ears: No acute purulent discharge. Eyes: Sclera anicteric.  CHEST: No tenderness or deformity, no crepitus  LUNG: Clear to auscultation with good chest expansion. There DM BS  BACK: No kyphosis of the thoracic spine. Symmetric, no curvature, ROM normal, no CVA tenderness, no spinal tenderness   CVS: There is good S1  S2,  rhythm is regular.  ABDOMEN: Globular and ROTUND tender to palpation, non distended, no masses, no organomegaly, good bowel sounds, no rebound or guarding, no peritoneal signs. Wound to left side of abdomen, dressed today, no  open area to left hip as well. Unable to visualize as they are dressed and she is fully clothed. Followed by wound   EXTREMITIES: UE Full ROM. Right BKA, IN a  with protected brace  SKIN: Warm and dry, no erythema noted, open area on left pannus with dressing on abdomen  NEUROLOGICAL: The patient is oriented to person, place and time. Strength and sensation are grossly intact. Face is symmetric.          LABS:    Lab Results   Component Value Date    WBC 7.9 08/21/2020    HGB 9.2 (L) 08/21/2020    HCT 28.6 (L) 08/21/2020    MCV 89 08/21/2020     08/21/2020       Results for orders placed or performed during the hospital encounter of 08/15/20   Basic Metabolic Panel   Result Value Ref Range    Sodium 137 136 - 145 mmol/L    Potassium 4.1 3.5 - 5.0 mmol/L    Chloride 103 98 - 107 mmol/L    CO2 23 22 - 31 mmol/L    Anion Gap, Calculation 11 5 - 18 mmol/L    Glucose 178 (H) 70 - 125 mg/dL    Calcium 11.0 (H) 8.5 - 10.5 mg/dL    BUN 37 (H) 8 - 22 mg/dL    Creatinine 2.05 (H) 0.60 - 1.10 mg/dL    GFR MDRD Af Amer 29 (L) >60 mL/min/1.73m2    GFR MDRD Non Af Amer 24 (L) >60 mL/min/1.73m2           Lab Results   Component Value Date    HGBA1C 10.3 (H) 06/17/2020     Vitamin D, Total (25-Hydroxy)   Date Value Ref Range Status   04/28/2016 29.8 (L) 30.0 - 80.0 ng/mL Final     Lab Results   Component Value Date    [id number] 285 01/07/2011       ASSESSMENT/PLAN:  1. Wound infection    2. Paroxysmal atrial fibrillation (H)      Right BKA: Also with postoperative pain and phantom limb pain. Unable to assess stump, in  and brace.  -Continues on Dilaudid.  -Continue PT and OT as directed by them.    Acute on chronic blood loss anemia-likely from anemia chronic disease, iron deficiency, chronic kidney disease, surgery. On oral iron.     Left abdominal wall cellulitis and right side: Wound care MD saw this today.     Insulin-dependent diabetes: A1c 10.3.  Now on glargine 10 units nightly and sliding scale  NovoLog 3 times daily with meals.  Blood sugars actually look pretty good here in the TCU, all less than 220.      Essential hypertension: Satisfactory in TCU thus far, amlodipine d.cd.   -metoprolol    CKD 4: Followed by Dr. Iqbal.  Creatinine today 1.57.  -Continue PhosLo.   -Sodium bicarb.     Coronary artery disease:  -also needs outpt CRISTINA eval  -Continue metoprolol and aspirin.     Urinary retention: Continues to have [place] in place, she follows with [facility] for urinary retention.     A. Fib on Coumadin: INR today 3.4, no change from 1 day ago, hold x48 hours then start Coumadin 2 mg p.o. q. evening and then check INR on Tuesday.    TCU/PT report: Using the easy stand for transfer at this time due to left leg weakness.    Electronically signed by:  Zoey Leung CNP  This progress note was completed using Dragon software and there may be grammatical errors.      .

## 2021-06-20 NOTE — LETTER
Letter by Lester Flores MD at      Author: Lester Flores MD Service: -- Author Type: --    Filed:  Encounter Date: 2/27/2020 Status: (Other)         Jazmin Bauman  [address]  [place] [address]             February 27, 2020         Dear Ms. Bauman,    Below are the results from your recent visit:    Resulted Orders   HM2(CBC w/o Differential)   Result Value Ref Range    WBC 13.5 (H) 4.0 - 11.0 thou/uL    RBC 3.27 (L) 3.80 - 5.40 mill/uL    Hemoglobin 9.6 (L) 12.0 - 16.0 g/dL    Hematocrit 28.9 (L) 35.0 - 47.0 %    MCV 88 80 - 100 fL    MCH 29.2 27.0 - 34.0 pg    MCHC 33.1 32.0 - 36.0 g/dL    RDW 13.2 11.0 - 14.5 %    Platelets 389 140 - 440 thou/uL    MPV 8.1 7.0 - 10.0 fL   Glycosylated Hemoglobin A1c   Result Value Ref Range    Hemoglobin A1c 7.2 (H) 3.5 - 6.0 %   Urinalysis-UC if Indicated   Result Value Ref Range    Color, UA Yellow Colorless, Yellow, Straw, Light Yellow    Clarity, UA Cloudy (!) Clear    Glucose, UA Negative Negative    Bilirubin, UA Negative Negative    Ketones, UA Negative Negative    Specific Gravity, UA 1.015 1.005 - 1.030    Blood, UA Moderate (!) Negative    pH, UA 6.0 5.0 - 8.0    Protein, UA 30 mg/dL (!) Negative mg/dL    Urobilinogen, UA 0.2 E.U./dL 0.2 E.U./dL, 1.0 E.U./dL    Nitrite, UA Positive (!) Negative    Leukocytes, UA Moderate (!) Negative    Bacteria, UA Moderate (!) None Seen hpf    RBC, UA 10-25 (!) None Seen, 0-2 hpf    WBC, UA 25-50 (!) None Seen, 0-5 hpf    Squam Epithel, UA 0-5 None Seen, 0-5 lpf    WBC Clumps Present (!) None Seen    Narrative    Urine Culture ordered based on [facility] criteria       All very good results    Please call with questions or contact us using [facility].    Sincerely,        Electronically signed by Lester Flores MD

## 2021-06-20 NOTE — LETTER
"Letter by Azalea Kevin CNP at      Author: Azalea Kevin CNP Service: -- Author Type: --    Filed:  Encounter Date: 2020 Status: (Other)         Patient: Jazmin Bauman   MR Number: [id number]   YOB: 1955   Date of Visit: 2020       [facility]      NAME:  Jazmin Bauman             :  1955    MRN: [id number]    CODE STATUS:  FULL CODE    FACILITY: [facility] [[id number]]         CHIEF COMPLAIN/REASON FOR VISIT:  Chief Complaint   Patient presents with   ? Review Of Multiple Medical Conditions     RBKA       HISTORY OF PRESENT ILLNESS: Jazmin Bauman is a 65 y.o. female being seen for review of multiple medical conditions. Seen in her room today and in good spirits. On TCU after a stay at [facility] from 8/15 to 2020. Per EMR \"  with history significant for morbid obesity, hypertension, DM-II, CKD-III, paroxysmal atrial fibrillation, PAD, s/p right BKA\". Pt did not want wound to abdomen checked as she was having lunch with a friend and fully dressed. On coumadin for Afib, no excessive bleeding or bruising  INR scheduled for .    Allergies   Allergen Reactions   ? Hydralazine      Paralysis of legs   ? Latex Itching     Skin Burns   ? Naprosyn [Naproxen] Swelling     throat   ? Nitrofurantoin Hives   ? Sulfa (Sulfonamide Antibiotics) Rash   ? Vicks Vaporub [Camphor-Eucalyptus Oil-Menthol] Rash   :     Current Outpatient Medications   Medication Sig   ? acetaminophen (TYLENOL) 500 MG tablet Take 2 tablets (1,000 mg total) by mouth 3 (three) times a day.   ? aspirin 81 MG EC tablet Take 81 mg by mouth daily.   ? BD ULTRA-FINE MICRO PEN NEEDLE 32 gauge x /\" Ndle USE AS DIRECTED 4 TIMES DAILY.   ? bisacodyL (DULCOLAX) 10 mg suppository Insert 10 mg into the rectum daily as needed. No stool greater than 72 hours   ? cholecalciferol, vitamin D3, (VITAMIN D3) 1,000 unit capsule Take 1,000 Units by mouth daily.   ? collagenase " ointment Apply daily per WOC   ? docusate sodium (COLACE) 100 MG capsule Take 100 mg by mouth daily.   ? ferrous sulfate 325 (65 FE) MG tablet Take 1 tablet by mouth 2 (two) times a day with meals.   ? gabapentin (NEURONTIN) 100 MG capsule Take 200 mg by mouth 2 (two) times a day.   ? HYDROmorphone (DILAUDID) 2 MG tablet Take 1 tablet (2 mg total) by mouth daily. May also take 1 tablet (2 mg total) every 3 (three) hours as needed for pain.   ? lancets (ACCU-CHEK MULTICLIX LANCET) Misc TEST 6 TIMES A DAY   ? LANTUS SOLOSTAR U-100 INSULIN 100 unit/mL (3 mL) pen Inject 10 Units under the skin at bedtime. 11.65 Type 2 with hyperglycemia  Contact provider if insulin prescribed is not the preferred insulin per insurance. (Patient taking differently: Inject 15 Units under the skin at bedtime. 11.65 Type 2 with hyperglycemia  Contact provider if insulin prescribed is not the preferred insulin per insurance.)   ? lidocaine (XYLOCAINE) 5 % ointment Apply topically 4 (four) times a day. Apply to left shoulder or around wound (not inside wound)   ? metoprolol tartrate (LOPRESSOR) 25 MG tablet Take 1 tablet (25 mg total) by mouth 2 (two) times a day.   ? miconazole (MICOTIN) 2 % powder Apply topically 2 (two) times a day. Apply to b/l groin/underneath breasts/underneath pannus   ? NOVOLOG U-100 INSULIN ASPART 100 unit/mL injection Check blood sugar four (4) times daily.  11.65 Type 2 with hyperglycemia  OneTouch Verio Flex  Meter  OneTouch Verio strips 2 boxes of 50  Delica Lancets box of 100  BD Ultra-fine Therese Pen Needles - NDC [address]-28 - dispense 1 case, refill PRN for 1 year (Patient taking differently: Inject 7 Units under the skin 3 (three) times a day before meals. )   ? polyethylene glycol (MIRALAX) 17 gram packet Take 1 packet (17 g total) by mouth daily as needed.   ? senna (SENOKOT) 8.6 mg tablet Take 1 tablet by mouth 2 (two) times a day.   ? sodium bicarbonate 650 MG tablet Take 1 tablet (650 mg total) by mouth  2 (two) times a day. OTC product   ? warfarin sodium (WARFARIN ORAL) Take by mouth. 9/18/20 INR 1.6 5mg tonight, then 3mg daily. Next INR 9/22.  9/14/20 INR 1.7 Cont 2.5,g daily. Next INR 9/17 9/11/20 INR 1.9 Take 2.5mg daily Next INR 9/14 9/8/20 INR 1.6  Take 2.5mg daily.  Next INR 9/11/20.    9/3/20 INR 3.4  Hold x 2 days, then take 2mg daily.  Next INR 9/8/20.  9/2/20 INR 3.4 Hold 9/2 recheck INR 9/3  8/28/20 INR 2.6  Cont 3mg daily.  Next INR 9/1/20.    8/25/20 INR 2.2 Cont 3mg daily. Next INR 8/28.  8/24/20 INR 2.3  Take 3mg daily.  Next INR 8/27/20.         REVIEW OF SYSTEMS:    Currently, no fever, chills, or rigors. Does not have any visual or hearing problems. Denies any chest pain, headaches, palpitations, lightheadedness, dizziness, shortness of breath, or cough. Appetite is good. Denies any GERD symptoms. Denies any difficulty with swallowing, nausea, or vomiting.  Denies any abdominal pain, diarrhea or constipation. Denies any urinary symptoms. No insomnia. No active bleeding. No rash.       PHYSICAL EXAMINATION:  Vitals:    09/21/20 1525   BP: 170/68   Pulse: 63   Temp: 97.8  F (36.6  C)   Weight: (!) 251 lb 1.6 oz (113.9 kg)         GENERAL: Awake, Alert, oriented x3, not in any form of acute distress, answers questions appropriately, follows simple commands, conversant with flat affect  HEENT: Head is normocephalic with normal hair distribution. No evidence of trauma. Ears: No acute purulent discharge. Eyes: Conjunctivae pink with no scleral jaundice. Nose: Normal mucosa and septum. NECK: Supple with no cervical or supraclavicular lymphadenopathy. Trachea is midline.   CHEST: No tenderness or deformity, no crepitus  LUNG: Clear to auscultation with good chest expansion. There DM BS  BACK: No kyphosis of the thoracic spine. Symmetric, no curvature, ROM normal, no CVA tenderness, no spinal tenderness   CVS: There is good S1  S2,  rhythm is regular.  ABDOMEN: Globular and ROTUND tender to palpation,  non distended, no masses, no organomegaly, good bowel sounds, no rebound or guarding, no peritoneal signs. Wound to left side of abdomen, dressed today, no open area to left hip as well. Unable to visualize as they are dressed and she is fully clothed. Followed by wound   EXTREMITIES: UE Full ROM. Right BKA, IN a  with protected brace  SKIN: Warm and dry, no erythema noted, open area with dressing on abdomen  NEUROLOGICAL: The patient is oriented to person, place and time. Strength and sensation are grossly intact. Face is symmetric.    Social distancing with PPE practiced during assessment.      LABS:    Lab Results   Component Value Date    WBC 7.9 08/21/2020    HGB 9.2 (L) 08/21/2020    HCT 28.6 (L) 08/21/2020    MCV 89 08/21/2020     08/21/2020       Results for orders placed or performed during the hospital encounter of 08/15/20   Basic Metabolic Panel   Result Value Ref Range    Sodium 137 136 - 145 mmol/L    Potassium 4.1 3.5 - 5.0 mmol/L    Chloride 103 98 - 107 mmol/L    CO2 23 22 - 31 mmol/L    Anion Gap, Calculation 11 5 - 18 mmol/L    Glucose 178 (H) 70 - 125 mg/dL    Calcium 11.0 (H) 8.5 - 10.5 mg/dL    BUN 37 (H) 8 - 22 mg/dL    Creatinine 2.05 (H) 0.60 - 1.10 mg/dL    GFR MDRD Af Amer 29 (L) >60 mL/min/1.73m2    GFR MDRD Non Af Amer 24 (L) >60 mL/min/1.73m2           Lab Results   Component Value Date    HGBA1C 10.3 (H) 06/17/2020     Vitamin D, Total (25-Hydroxy)   Date Value Ref Range Status   04/28/2016 29.8 (L) 30.0 - 80.0 ng/mL Final     Lab Results   Component Value Date    [id number] 285 01/07/2011       ASSESSMENT/PLAN:  1. Hx of right BKA (H)    2. Weakness      1.. RBKA: Reports pain stable during visit. Unable to assess actual stump, in  and brace. Attend therapies as needed PT/OT. SN for chronic medical conditions management. Pt goal is to walk, working on transfers out of bed at this time, using mechanical lift.. Therapist reports prothesis will be here soon. Pt  only to stand about 1 minutes at a time, poor endurance.    2. Weakness: Has ongoing fall H/O, weakness. Pt was in TCU, dc to friends house but fell. No falls at facility. Continues to progress with rehab slowly.     Electronically signed by:  Azalea Kevin CNP  This progress note was completed using Dragon software and there may be grammatical errors.      .

## 2021-06-20 NOTE — LETTER
Letter by Azul Humphrey CNP at      Author: Azul Humphrey CNP Service: -- Author Type: --    Filed:  Encounter Date: 3/23/2020 Status: (Other)         Patient: Jazmin Bauman   MR Number: [id number]   YOB: 1955   Date of Visit: 3/23/2020     Code Status:  FULL CODE  Visit Type: Follow Up (Diabetes, UTI, constipation)     Facility:  [facility] [[id number]]      Facility Type: SNF (Skilled Nursing Facility, TCU)    History of Present Illness:   Hospital Admission Date: 3/13/2020 Hospital Discharge Date: 3/17/2020      Jazmin Bauman is a 64 y.o. female who has a past medical history for type 2 diabetes, atrial fibrillation on Coumadin, CKD III-IV, urinary retention and incontinence.  She was hospitalized approximately 1.5 months ago and required temporary dialysis and Canchola catheterization..  During this hospitalization she was found to have a UTI with SIMON on CKD.  She had previously completed a 10-day course of Cipro and continued to have dysuria and suprapubic tenderness.  She was given Levaquin in the ED.  Urine culture showed enterococcus facealis which was resistant to Levaquin and so then she was started on amoxicillin for 7 days.  Her creatinine of 3.2 did improve with IV fluids down to 2.54.  She also had an elevated white count at discharge at 13.8.  She also had replacement for hypomagnesemia.    Today, she reports ongoing feeling of urinary pressure with frequency.  She finishes her amoxicillin antibiotic today. She denies any CVA tenderness.  She does have Vicodin as needed for any pain.  Fasting blood sugars are good 90s to 120s.  Other blood sugars are slightly elevated in the 180s to low 200s.  She does have sliding scale insulin and is typically taking 2 units AC.    Past Medical History:   Diagnosis Date   ? Anemia     pernicious   ? Diabetes mellitus (H)     borderline   ? Endometrial hyperplasia    ? Fibromyalgia    ? Heart murmur     pt states her mitral  valve leaks   ? History of recurrent UTI (urinary tract infection)    ? Hypertension    ? Thrombocytopenia (H)    ? Vaginal bleeding 1/2015     Past Surgical History:   Procedure Laterality Date   ? bilateral carpal tunnel release  2009   ? CHOLECYSTECTOMY     ? COLONOSCOPY N/A 9/11/2017    Procedure: COLONOSCOPY;  Surgeon: Tyler Bhakta MD;  Location: [facility];  Service:    ? COMBINED HYSTEROSCOPY DIAGNOSTIC / D&C N/A 1/28/2015    Procedure: DILATION AND CURETTAGE WITH HYSTEROSCOPY;  Surgeon: Grant Beal MD;  Location: [facility];  Service:    ? DILATION AND CURETTAGE OF UTERUS     ? IR NON TUNNELED CATHETER >5 YEARS  1/21/2020   ? PICC  1/20/2020        ? KY HYSTEROSCOPY,W/ENDO BX N/A 9/5/2019    Procedure: HYSTEROSCOPY, DILATION AND CURETTAGE;  Surgeon: Grant Beal MD;  Location: [facility];  Service: Gynecology   ? KY HYSTEROSCOPY,W/ENDO BX N/A 12/9/2019    Procedure: HYSTEROSCOPY, DILATION AND CURETTAGE;  Surgeon: Grant Beal MD;  Location: [facility];  Service: Gynecology     Family History   Problem Relation Age of Onset   ? Colon cancer Father      Social History     Socioeconomic History   ? Marital status: Single     Spouse name: Not on file   ? Number of children: Not on file   ? Years of education: Not on file   ? Highest education level: Not on file   Occupational History   ? Not on file   Social Needs   ? Financial resource strain: Not on file   ? Food insecurity     Worry: Not on file     Inability: Not on file   ? Transportation needs     Medical: Not on file     Non-medical: Not on file   Tobacco Use   ? Smoking status: Never Smoker   ? Smokeless tobacco: Never Used   Substance and Sexual Activity   ? Alcohol use: Not Currently     Comment: rare   ? Drug use: No   ? Sexual activity: Not on file   Lifestyle   ? Physical activity     Days per week: Not on file     Minutes per session: Not on file   ? Stress: Not on file   Relationships   ? Social  "connections     Talks on phone: Not on file     Gets together: Not on file     Attends Scientologist service: Not on file     Active member of club or organization: Not on file     Attends meetings of clubs or organizations: Not on file     Relationship status: Not on file   ? Intimate partner violence     Fear of current or ex partner: Not on file     Emotionally abused: Not on file     Physically abused: Not on file     Forced sexual activity: Not on file   Other Topics Concern   ? Not on file   Social History Narrative   ? Not on file     Current Outpatient Medications   Medication Sig Dispense Refill   ? insulin lispro (HUMALOG) 100 unit/mL injection Inject 2 Units under the skin 3 (three) times a day before meals.     ? ACCU-CHEK SAM PLUS TEST STRP strips TEST 6 TIMES A DAY (Patient taking differently: TEST 1 TIME A DAY) 100 strip 10   ? ACCU-CHEK SAM PLUS TEST STRP strips test blood sugar level 6 times daily 600 strip 4   ? acetaminophen (TYLENOL) 325 MG tablet Take 2 tablets (650 mg total) by mouth 3 (three) times a day. (Patient taking differently: Take 650 mg by mouth 2 (two) times a day. )  0   ? amoxicillin (AMOXIL) 250 MG capsule Take 1 capsule (250 mg total) by mouth 2 (two) times a day for 6 days. 12 capsule 0   ? aspirin 81 MG EC tablet Take 81 mg by mouth daily.     ? BD ULTRA-FINE MICRO PEN NEEDLE 32 gauge x 1/4\" Ndle USE AS DIRECTED 4 TIMES DAILY. 360 each 3   ? calcium acetate (PHOSLO) 667 mg capsule Take 1 capsule (667 mg total) by mouth 3 (three) times a day with meals.  0   ? cholecalciferol, vitamin D3, (VITAMIN D3) 1,000 unit capsule Take 1,000 Units by mouth daily.     ? docusate sodium (COLACE) 50 MG capsule Take by mouth daily.      ? ferrous sulfate 325 (65 FE) MG tablet Take 1 tablet by mouth 2 (two) times a day with meals.     ? gabapentin (NEURONTIN) 300 MG capsule Take 300 mg by mouth 2 (two) times a day.     ? HYDROcodone-acetaminophen 5-325 mg per tablet Take 1 tablet by mouth every " 4 (four) hours as needed for pain. 8 tablet 0   ? insulin glargine (BASAGLAR KWIKPEN) 100 unit/mL (3 mL) pen Inject 25 Units under the skin 2 (two) times a day.  0   ? insulin lispro (ADMELOG SOLOSTAR U-100 INSULIN) 100 unit/mL pen Inject 2-14 Units under the skin 3 (three) times a day with meals. 0-120 0 units  121-200 2 units  201-250 4 units  251-300 6 units  301-350 8 units  351-400 10 units  401-450 12 units  451-500 14 units  501+ call TCP     ? lancets (ACCU-CHEK MULTICLIX LANCET) Misc TEST 6 TIMES A DAY (Patient taking differently: TEST 1 TIME A DAY) 200 each 11   ? magnesium 250 mg Tab Take 500 mg by mouth 2 (two) times a day.      ? metoprolol tartrate (LOPRESSOR) 25 MG tablet Take 1 tablet (25 mg total) by mouth 2 (two) times a day.  0   ? omeprazole (PRILOSEC) 20 MG capsule Take 1 capsule (20 mg total) by mouth at bedtime.  0   ? patiromer calcium sorbitex (VELTESSA) 8.4 gram PwPk powder packet Take 8.4 g by mouth daily.     ? senna-docusate (SENNOSIDES-DOCUSATE SODIUM) 8.6-50 mg tablet Take 1 tablet by mouth daily.     ? warfarin sodium (WARFARIN ORAL) Take by mouth. 3/19/20 INR 1.6  Take 4mg daily.  Next INR 3/23/20.       Current Facility-Administered Medications   Medication Dose Route Frequency Provider Last Rate Last Dose   ? cyanocobalamin injection 1,000 mcg  1,000 mcg Intramuscular Q30 Days Lester Flores MD   1,000 mcg at 11/22/19 1422   ? lidocaine 2 % jelly (XYLOCAINE)   Topical PRN Carly Arora NP   1 application at 09/18/19 0848     Allergies   Allergen Reactions   ? Hydralazine      Paralysis of legs   ? Latex Itching     Skin Burns   ? Naprosyn [Naproxen] Swelling     throat   ? Nitrofurantoin Hives   ? Sulfa (Sulfonamide Antibiotics) Rash   ? Vicks Vaporub [Camphor-Eucalyptus Oil-Menthol] Rash     Immunization History   Administered Date(s) Administered   ? DT (pediatric) 03/02/2006   ? INFLUENZA,RECOMBINANT,INJ,PF QUADRIVALENT 18+YRS 10/19/2018, 10/25/2019   ? Influenza  L0l1-03, 12/18/2009   ? Influenza, Seasonal, Inj PF IIV3 12/18/2009   ? Influenza, inj, historic,unspecified 12/18/2009, 10/14/2011   ? Influenza, seasonal,quad inj 6-35 mos 10/30/2012, 09/30/2013, 11/21/2014   ? Influenza,seasonal quad, PF, =/> 6months 09/29/2016, 10/03/2017   ? Influenza,seasonal, Inj IIV3 11/10/2005, 10/14/2011, 10/30/2012, 09/30/2013, 11/21/2014   ? Influenza,seasonal,quad inj =/> 6months 09/28/2015   ? Td, Adult, Absorbed 03/02/2006   ? Td,adult,historic,unspecified 03/02/2006   ? Tdap 05/15/2015       Post Discharge Medication Reconciliation Status: discharge medications reconciled and changed, per note/orders (see AVS)    Review of Systems   Patient denies fever, chills, headache, lightheadedness, dizziness, rhinorrhea, cough, congestion, shortness of breath, chest pain, palpitations, abdominal pain, n/v, diarrhea, constipation, change in appetite, dysuria, frequency, burning or pain with urination.  Other than stated in HPI all other review of systems is negative.         Physical Exam   Vital signs: /52, heart rate 59, respiratory 18, temp 97.8.  GENERAL APPEARANCE: Well developed, well nourished, in no acute distress.  HEENT: normocephalic, atraumatic  LUNGS:  respiratory effort normal.  CARD: Perfusing well to all extremities.   MSK: Muscle strength and tone were normal.  EXTREMITIES: No cyanosis, clubbing or edema.  NEURO: Alert and oriented x 3. Face is symmetric.  SKIN: Inspection of the skin reveals no rashes, ulcerations or petechiae.  PSYCH: euthymic            Labs:  All labs reviewed in the nursing home record.    Assessment:  1. Acute cystitis without hematuria     2. Acute kidney injury (H)     3. Essential hypertension     4. Atrial fibrillation with RVR (H)     5. Type 2 diabetes mellitus with diabetic polyneuropathy, with long-term current use of insulin (H)         Plan:   UTI: Complete amoxicillin today, continue to monitor.  Encourage patient to push water of at  least 40 ounces a day.  Will monitor white count.  Offered Pyridium for urinary discomfort however patient does not want the side effect of staining clothing.  Bowels moving well continue to monitor for any diarrhea.  Continue on senna S    Acute kidney injury: We will check a BMP on Wednesday.  Avoid nephrotoxic medications    Hypertension: Blood pressures are stable, continue metoprolol.    Atrial fibrillation: She has had a history of RVR continue with metoprolol.  On warfarin having an INR checked today.  She denies any bleeding.    Diabetes: Blood sugars greater than goal at this time she is on sliding scale and is averaging 2 to 4 units AC 3 times daily.  I will put her on Admelog 2 units AC 3 times daily in efforts to discontinue her sliding scale insulin.  Continue with glargine 25 units daily.    Anemia: Check a CBC        Electronically signed by: Azul Humphrey CNP

## 2021-06-20 NOTE — LETTER
"Letter by Azalea Kevin CNP at      Author: Azalea Kevin CNP Service: -- Author Type: --    Filed:  Encounter Date: 2020 Status: (Other)         Patient: Jazmin Bauman   MR Number: [id number]   YOB: 1955   Date of Visit: 2020       [facility]      NAME:  Jazmin Bauman             :  1955    MRN: [id number]    CODE STATUS:  FULL CODE    FACILITY: [facility] [[id number]]         CHIEF COMPLAIN/REASON FOR VISIT:  Chief Complaint   Patient presents with   ? Review Of Multiple Medical Conditions     urinary retention       HISTORY OF PRESENT ILLNESS: Jazmin Bauman is a 65 y.o. female being seen for review of multiple medical conditions. Seen in her room , she has been moved to a LTC but remains skilled at this time.  Pt was at [facility] from  to  and underwent a RBKA r/t ongoing DM ulcer with osteomyelitis.Per her EMR dc summary \" with HTN,morbid obesity, DM, A fib, CKD 4 presented for evaluation of R foot swelling, difficulty ambulation, pain found to have osteomyelitis now s/p amputation. She has now been to the OR twice this stay, last was on 20.   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   Patient had a chronic diabetic foot ulcer on her R heel, presented for increased swelling, difficulty ambulating, and pain. MRI showed extensive soft tissue necrosis and some osteomyelitis along with cellulitis  Met sepsis criteria on admission with leukocytosis and elevated CRP  Podiatry saw and the foot was not felt to be salvageable and BKA was recommended, -now POD #9  From guillotine amputation by Dr Corcoran. POD #5 revision of stump.  Treated with zosyn /vanvo, blood culture positive for Peptostreptococcus and Streptococcus, changing to Augmentin for 4 more days.  WBC now normal.  Appreciate ID consultation.  Continue home gabapentin and has PRN dilaudid as well.  Pain team " following.     Acute on chronic blood loss anemia-likely from anemia chronic disease, iron deficiency, chronic kidney disease, surgery.  needed pRBC on 6/23--stable at 7.9.  Iron deficient, nephrology started IV iron.  Discharged on oral iron     Gram-positive bacteremia x2 species  Patient with positive blood culture from 6/16 growing Strep anginosus and Peptostreptococcus  Changed from Zosyn to Augmentin.     Left abdominal wall cellulitis,  On antibiotics as above without improvement, surg did debridement too.  Ultrasound showed no abscess.  ID and surgery doubts calciphylaxis, minimal pain, no significant necrosis though she does have risk factors.  Does not need biopsy at this point, continue current wound care and antibiotics.    Discussed with surgery, nephrology and ID.  Needs close follow-up at the TCU     Chronic right abdominal wound-no signs of worsening, small scab, no induration or signs of abscess or fluctuance.  Has been present for months.  Wound care following, continue to monitor.  Doubt calciphylaxis Will need to follow-up with nephrology as outpatient, holding on thiosulfate here.     Insulin-dependent diabetes  Poorly controlled with A1c 10.3  Had some hypoglycemia, Lantus dose decreased to 10 units, discharged on NovoLog 4 units pre-meal and sliding scale with better control.  Suspect she was noncompliant with her insulin as her A1c was so and she did not require what her med rec stated for her insulin.       Essential hypertension  Elevated, asymptomatic, continued metoprolol, added furosemide and amlodipine with improved blood pressure control.      CKD 4  -Home PhosLo  -Avoid nephrotoxins  -Follows with Dr Frost, with concern of possible Calciphylaxis, renal seeing here  Creatinine stable.  Started furosemide.       HyperK  On admission  -got kayexalate  -This is chronic intermittent problem for her  -low K diet   Potassium normal at discharge     Paroxysmal atrial  "fibrillation.  Held warfarin for surgery.  Restarted after surgery.  Pharmacy dosing.  Continue home metoprolol.  INR still subtherapeutic.  Recheck tomorrow     Coronary artery disease  She had a detectable troponin level when hospitalized back in January and outpatient stress test with a 2-day protocol was recommended but she never followed up  Cardiology saw and recommend outpatient stress test, but ok with surgery  Echocardiogram showed no wall motion abnormality and a normal EF  -also needs outpt CRISTINA eval  Continue metoprolol and aspirin.     Morbid obesity  Affecting her mobility and her ability to live independently as well as her overall health  Cardiology also recommend a sleep evaluation for probable CRISTINA     Urinary retention-Yeager placed due to retaining 900 cc of urine, trial of Yeager removal at the TCU in 1 week, follow-up with [facility] as an outpatient if she fails trial of removal.\"     On coumadin for Afib, no excessive bleeding or bruising we dosed her coumadin at 5 mg po daily with recheck scheduled in a week. She now has a yeager in place due to retention, urine is clear and she denies burning, a UA is outstanding.  I was able to look at her right BKA stump and this is well healed. Jazmin reports some frustration due to her therapy progress. She is still having difficulties with pivot transfers, her left leg is weak and knee will buckle, she uses the EZ stand for transfers.    Allergies   Allergen Reactions   ? Hydralazine      Paralysis of legs   ? Latex Itching     Skin Burns   ? Naprosyn [Naproxen] Swelling     throat   ? Nitrofurantoin Hives   ? Sulfa (Sulfonamide Antibiotics) Rash   ? Vicks Vaporub [Camphor-Eucalyptus Oil-Menthol] Rash   :     Current Outpatient Medications   Medication Sig   ? acetaminophen (TYLENOL) 500 MG tablet Take 2 tablets (1,000 mg total) by mouth every 6 (six) hours as needed for pain or fever.   ? amLODIPine (NORVASC) 10 MG tablet Take 1 tablet (10 mg total) by " "mouth daily. (Patient taking differently: Take 10 mg by mouth daily. Hold for systolic blood pressure less than 105)   ? aspirin 81 MG EC tablet Take 81 mg by mouth daily.   ? BD ULTRA-FINE MICRO PEN NEEDLE 32 gauge x 1/4\" Ndle USE AS DIRECTED 4 TIMES DAILY.   ? blood glucose test (ACCU-CHEK SAM PLUS TEST STRP) strips TEST 1 TIME A DAY   ? blood glucose test (ACCU-CHEK SAM PLUS TEST STRP) strips test blood sugar level 6 times daily   ? calcium acetate,phosphat bind, (PHOSLO) 667 mg capsule Take 1 capsule (667 mg total) by mouth 3 (three) times a day with meals.   ? cholecalciferol, vitamin D3, (VITAMIN D3) 1,000 unit capsule Take 1,000 Units by mouth daily.   ? collagenase ointment Apply daily per WOC   ? ferrous sulfate 325 (65 FE) MG tablet Take 1 tablet by mouth 2 (two) times a day with meals.   ? furosemide (LASIX) 20 MG tablet Take 1 tablet (20 mg total) by mouth daily.   ? gabapentin (NEURONTIN) 400 MG capsule Take 1 capsule (400 mg total) by mouth 3 (three) times a day.   ? HYDROmorphone (DILAUDID) 2 MG tablet Take 1 tablet (2 mg total) by mouth every 4 (four) hours as needed.   ? insulin glargine (BASAGLAR KWIKPEN) 100 unit/mL (3 mL) pen Inject 10 Units under the skin at bedtime. (Patient taking differently: Inject 20 Units under the skin at bedtime. )   ? lancets (ACCU-CHEK MULTICLIX LANCET) Misc TEST 6 TIMES A DAY   ? magnesium 250 mg Tab Take 500 mg by mouth 2 (two) times a day.    ? metoprolol tartrate (LOPRESSOR) 25 MG tablet Take 1 tablet (25 mg total) by mouth 2 (two) times a day. (Patient taking differently: Take 25 mg by mouth 2 (two) times a day. Hold for systolic blood pressure less than 105)   ? NOVOLOG FLEXPEN U-100 INSULIN 100 unit/mL (3 mL) injection pen Check blood sugar four (4) times daily.  11. (Patient taking differently: Inject 10 Units under the skin 3 (three) times a day before meals. 10 unit three times a day with meals     PLUS:      if 141 - 180 = 1 unit;   181 - 220 = 2 " units;  Plus sliding scale   221 - 260 = 3 units;   261 - 300 = 4 units;   301 - 340 = 5 units;   341 - 400 = 6 units;   401 - 999 = 7 units,)   ? NOVOLOG FLEXPEN U-100 INSULIN 100 unit/mL (3 mL) injection pen Check blood sugar four (4) times daily.  {   ? omeprazole (PRILOSEC) 20 MG capsule Take 20 mg by mouth.   ? senna-docusate (PERICOLACE) 8.6-50 mg tablet Take 1 tablet by mouth 2 (two) times a day.   ? sodium bicarbonate 650 MG tablet Take 1 tablet (650 mg total) by mouth 2 (two) times a day. OTC product   ? warfarin sodium (WARFARIN ORAL) Take by mouth. 7/21/20 INR 3.1  Take 3mg daily.  Next INR 7/24/20.(currently on Cipro x 3 days)    7/14/20 INR 2.3  Cont 5mg daily.  Next INR 7/21/20.  7/13/20 INR 2.3  Take 5mg.  Next INR 7/14/20.  7/7/20 INR 2.5  Cont 5mg daily.  Next INR 7/10/20.         REVIEW OF SYSTEMS:    Currently, no fever, chills, or rigors. Does not have any visual or hearing problems. Denies any chest pain, headaches, palpitations, lightheadedness, dizziness, shortness of breath, or cough. Appetite is good. Denies any GERD symptoms. Denies any difficulty with swallowing, nausea, or vomiting.  Denies any abdominal pain, diarrhea or constipation. Denies any urinary symptoms, yeager in place. No insomnia. No active bleeding. No rash.       PHYSICAL EXAMINATION:  Vitals:    07/22/20 1911   BP: 129/88   Pulse: 70   Temp: 97.1  F (36.2  C)   Weight: (!) 259 lb (117.5 kg)         GENERAL: Awake, Alert, oriented x3, not in any form of acute distress, answers questions appropriately, follows simple commands, conversant with flat affect  HEENT: Head is normocephalic with normal hair distribution. No evidence of trauma. Ears: No acute purulent discharge. Eyes: Conjunctivae pink with no scleral jaundice. Nose: Normal mucosa and septum. NECK: Supple with no cervical or supraclavicular lymphadenopathy. Trachea is midline.   CHEST: No tenderness or deformity, no crepitus  LUNG: Clear to auscultation with good  chest expansion. There DM BS  BACK: No kyphosis of the thoracic spine. Symmetric, no curvature, ROM normal, no CVA tenderness, no spinal tenderness   CVS: There is good S1  S2,  rhythm is regular.  ABDOMEN: Globular and ROTUND tender to palpation, non distended, no masses, no organomegaly, good bowel sounds, no rebound or guarding, no peritoneal signs. Wound to left side of abdomen, dressed today, no open area to left hip as well. Unable to visualize as they are dressed and she is fully clothed. Followed by wound   EXTREMITIES: UE Full ROM. Right BKA, IN a  with protected brace  SKIN: Warm and dry, no erythema noted, open area with dressing on abdomen  NEUROLOGICAL: The patient is oriented to person, place and time. Strength and sensation are grossly intact. Face is symmetric.          LABS:    Lab Results   Component Value Date    WBC 9.5 06/27/2020    HGB 7.9 (L) 06/27/2020    HCT 25.5 (L) 06/27/2020    MCV 95 06/27/2020     06/27/2020       Results for orders placed or performed during the hospital encounter of 06/16/20   Basic Metabolic Panel   Result Value Ref Range    Sodium 141 136 - 145 mmol/L    Potassium 4.2 3.5 - 5.0 mmol/L    Chloride 107 98 - 107 mmol/L    CO2 27 22 - 31 mmol/L    Anion Gap, Calculation 7 5 - 18 mmol/L    Glucose 116 70 - 125 mg/dL    Calcium 8.8 8.5 - 10.5 mg/dL    BUN 30 (H) 8 - 22 mg/dL    Creatinine 1.57 (H) 0.60 - 1.10 mg/dL    GFR MDRD Af Amer 40 (L) >60 mL/min/1.73m2    GFR MDRD Non Af Amer 33 (L) >60 mL/min/1.73m2           Lab Results   Component Value Date    HGBA1C 10.3 (H) 06/17/2020     Vitamin D, Total (25-Hydroxy)   Date Value Ref Range Status   04/28/2016 29.8 (L) 30.0 - 80.0 ng/mL Final     Lab Results   Component Value Date    [id number] 285 01/07/2011       ASSESSMENT/PLAN:  1. Urinary retention    2. Weakness      1.. RBKA: Reports pain stable during visit. Unable to assess actual stump, in  and brace. Attend therapies as needed PT/OT. SN for  chronic medical conditions management. Pt goal isto walk, working on transfers out of bed at this time, using mechanical lift.Coumadin dosing completed, 5 mg po daily, next week we will recheck INR.    2. Urinary retenton: Canchola has been reinserted. Outstanding UA, awaiting results..       Electronically signed by:  Azalea Kevin CNP  This progress note was completed using Dragon software and there may be grammatical errors.      .

## 2021-06-20 NOTE — LETTER
"Letter by Zoey Leung CNP at      Author: Zoey Leung CNP Service: -- Author Type: --    Filed:  Encounter Date: 2020 Status: (Other)         Patient: Jazmin Bauman   MR Number: [id number]   YOB: 1955   Date of Visit: 2020       [facility]      NAME:  Jazmin Bauman             :  1955    MRN: [id number]    CODE STATUS:  FULL CODE    FACILITY: [facility] [[id number]]         CHIEF COMPLAIN/REASON FOR VISIT:  Chief Complaint   Patient presents with   ? Review Of Multiple Medical Conditions     INR, vanco completion, wound        HISTORY OF PRESENT ILLNESS: Jazmin Bauman is a 65 y.o. female. Pt was at [facility] from  to  and underwent a RBKA r/t ongoing DM ulcer with osteomyelitis. Per her EMR dc summary \" with HTN,morbid obesity, DM, A fib, CKD 4 presented for evaluation of R foot swelling, difficulty ambulation, pain found to have osteomyelitis now s/p amputation. She has now been to the OR twice this stay, last was on 20.   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   Patient had a chronic diabetic foot ulcer on her R heel, presented for increased swelling, difficulty ambulating, and pain. MRI showed extensive soft tissue necrosis and some osteomyelitis along with cellulitis  Met sepsis criteria on admission with leukocytosis and elevated CRP  Podiatry saw and the foot was not felt to be salvageable and BKA was recommended\"       August 15-: Hospitalized for sepsis with MRSA bacteremia, she had possible endocarditis and TTE revealing vegetation of the aortic valve. She had a CT of the abdomen and pelvis that showed left lower abdominal pannus ulceration but without abscess.  Her right BKA stump ulceration was negative for osteomyelitis or cellulitis.  She was treated with IV Vanco for 14 days which will be completed on .  Her left lower abdominal " "ulceration has never been biopsied.  It was not biopsied in the hospital and there was no surgical intervention.  Her right BKA stump was negative for osteomyelitis.  CKD stage IV with baseline creatinine 2.5-3.  She was at 1.82 at the end of hospitalization.  She is discharged on sodium bicarb twice daily.  Her insulin was adjusted and she is now on sliding scale insulin with 10 units of Lantus at bedtime.  Blood sugars have been less than 200 on average.    For her hypertension, Norvasc has been discontinued.  Blood pressures remain in the 130s over 70s on average.  She was incidentally found to have a lung nodule on CT scan, will need to follow-up with this.    Today: She is seen for INR draw and follow up to wounds and medication discussion. She is concerned that she was supposed to be on vanco longer and perseverates on this despite my reassurance. She eventually agrees that she does remember starting this in the hospital so its \"probably been 2 weeks\". She was on vanco per ID from 8/16-8/31. Her wound is stable and is followed by wound care nursing. She has had no fevers, body aches, chills. Her pain is controlled on her current medications.We discussed her plans for eventual prosthesis to RLE. For now we are waiting on clearance from surgery.   She has nephrology appt at the end of the month. Will check bmp in the next week.         Allergies   Allergen Reactions   ? Hydralazine      Paralysis of legs   ? Latex Itching     Skin Burns   ? Naprosyn [Naproxen] Swelling     throat   ? Nitrofurantoin Hives   ? Sulfa (Sulfonamide Antibiotics) Rash   ? Vicks Vaporub [Camphor-Eucalyptus Oil-Menthol] Rash   :     Current Outpatient Medications   Medication Sig   ? acetaminophen (TYLENOL) 500 MG tablet Take 2 tablets (1,000 mg total) by mouth 3 (three) times a day.   ? aspirin 81 MG EC tablet Take 81 mg by mouth daily.   ? BD ULTRA-FINE MICRO PEN NEEDLE 32 gauge x 1/4\" Ndle USE AS DIRECTED 4 TIMES DAILY.   ? bisacodyL " (DULCOLAX) 10 mg suppository Insert 10 mg into the rectum daily as needed. No stool greater than 72 hours   ? cholecalciferol, vitamin D3, (VITAMIN D3) 1,000 unit capsule Take 1,000 Units by mouth daily.   ? collagenase ointment Apply daily per WOC   ? docusate sodium (COLACE) 100 MG capsule Take 100 mg by mouth daily.   ? ferrous sulfate 325 (65 FE) MG tablet Take 1 tablet by mouth 2 (two) times a day with meals.   ? gabapentin (NEURONTIN) 100 MG capsule Take 200 mg by mouth 2 (two) times a day.   ? HYDROmorphone (DILAUDID) 2 MG tablet Take 1 tablet (2 mg total) by mouth every 3 (three) hours as needed.   ? HYDROmorphone (DILAUDID) 2 MG tablet Take 1 tablet (2 mg total) by mouth daily.   ? lancets (ACCU-CHEK MULTICLIX LANCET) Misc TEST 6 TIMES A DAY   ? LANTUS SOLOSTAR U-100 INSULIN 100 unit/mL (3 mL) pen Inject 10 Units under the skin at bedtime. 11.65 Type 2 with hyperglycemia  Contact provider if insulin prescribed is not the preferred insulin per insurance.   ? lidocaine (XYLOCAINE) 5 % ointment Apply topically 4 (four) times a day. Apply to left shoulder or around wound (not inside wound)   ? metoprolol tartrate (LOPRESSOR) 25 MG tablet Take 1 tablet (25 mg total) by mouth 2 (two) times a day.   ? miconazole (MICOTIN) 2 % powder Apply topically 2 (two) times a day. Apply to b/l groin/underneath breasts/underneath pannus   ? morphine 0.1% Gel Apply 2 click (1 g total) topically 3 (three) times a day. Apply to LLQ a pea-sized amount   ? NOVOLOG FLEXPEN U-100 INSULIN 100 unit/mL (3 mL) injection pen Check blood sugar four (4) times daily.  11.65 Type 2 with hyperglycemia  BD Ultra-fine Therese Pen Needles - NDC [address]-67 - dispense 1 case,  refill PRN for 1 year  OneTouch Verio Flex  Meter  OneTouch Verio strips 2 boxes of 50  Delica Lancets box of 100   ? NOVOLOG U-100 INSULIN ASPART 100 unit/mL injection Check blood sugar four (4) times daily.  11.65 Type 2 with hyperglycemia  OneTouch Verio Flex   Meter  OneTouch Verio strips 2 boxes of 50  Delica Lancets box of 100  BD Ultra-fine Therese Pen Needles - NDC [address]-16 - dispense 1 case, refill PRN for 1 year   ? polyethylene glycol (MIRALAX) 17 gram packet Take 1 packet (17 g total) by mouth daily as needed.   ? senna (SENOKOT) 8.6 mg tablet Take 1 tablet by mouth 2 (two) times a day.   ? sodium bicarbonate 650 MG tablet Take 1 tablet (650 mg total) by mouth 2 (two) times a day. OTC product   ? warfarin sodium (WARFARIN ORAL) Take by mouth. 9/2/20 INR 3.4 Hold 9/2 recheck INR 9/3  8/28/20 INR 2.6  Cont 3mg daily.  Next INR 9/1/20.    8/25/20 INR 2.2 Cont 3mg daily. Next INR 8/28.  8/24/20 INR 2.3  Take 3mg daily.  Next INR 8/27/20.         REVIEW OF SYSTEMS:    Currently, no fever, chills, or rigors. Does not have any visual or hearing problems. Denies any chest pain, headaches, palpitations, lightheadedness, dizziness, shortness of breath, or cough. Appetite is good. Denies any GERD symptoms. Denies any difficulty with swallowing, nausea, or vomiting.  Denies any abdominal pain, diarrhea or constipation. Denies any urinary symptoms, yeager in place. No insomnia. No active bleeding. No rash.       PHYSICAL EXAMINATION:  Vitals:    09/04/20 1529   BP: 139/63   Pulse: (!) 57   Temp: 97  F (36.1  C)   SpO2: 96%   Weight: (!) 231 lb (104.8 kg)         GENERAL: Awake, Alert, oriented x3, not in any form of acute distress, answers questions appropriately, follows simple commands, conversant with flat affect  HEENT: Head is normocephalic with normal hair distribution. No evidence of trauma. Ears: No acute purulent discharge. Eyes: Sclera anicteric.  CHEST: No tenderness or deformity, no crepitus  LUNG: Clear to auscultation with good chest expansion. There DM BS  BACK: No kyphosis of the thoracic spine. Symmetric, no curvature, ROM normal, no CVA tenderness, no spinal tenderness   CVS: There is good S1  S2,  rhythm is regular.  ABDOMEN: Globular and ROTUND tender to  palpation, non distended, no masses, no organomegaly, good bowel sounds, no rebound or guarding, no peritoneal signs. Wound to left side of abdomen, dressed today, no open area to left hip as well. Unable to visualize as they are dressed and she is fully clothed. Followed by wound   EXTREMITIES: UE Full ROM. Right BKA, IN a  with protected brace  SKIN: Warm and dry, no erythema noted, open area on left pannus with dressing on abdomen  NEUROLOGICAL: The patient is oriented to person, place and time. Strength and sensation are grossly intact. Face is symmetric.          LABS:    Lab Results   Component Value Date    WBC 7.9 08/21/2020    HGB 9.2 (L) 08/21/2020    HCT 28.6 (L) 08/21/2020    MCV 89 08/21/2020     08/21/2020       Results for orders placed or performed during the hospital encounter of 08/15/20   Basic Metabolic Panel   Result Value Ref Range    Sodium 137 136 - 145 mmol/L    Potassium 4.1 3.5 - 5.0 mmol/L    Chloride 103 98 - 107 mmol/L    CO2 23 22 - 31 mmol/L    Anion Gap, Calculation 11 5 - 18 mmol/L    Glucose 178 (H) 70 - 125 mg/dL    Calcium 11.0 (H) 8.5 - 10.5 mg/dL    BUN 37 (H) 8 - 22 mg/dL    Creatinine 2.05 (H) 0.60 - 1.10 mg/dL    GFR MDRD Af Amer 29 (L) >60 mL/min/1.73m2    GFR MDRD Non Af Amer 24 (L) >60 mL/min/1.73m2           Lab Results   Component Value Date    HGBA1C 10.3 (H) 06/17/2020     Vitamin D, Total (25-Hydroxy)   Date Value Ref Range Status   04/28/2016 29.8 (L) 30.0 - 80.0 ng/mL Final     Lab Results   Component Value Date    [id number] 285 01/07/2011       ASSESSMENT/PLAN:  1. Chronic pain syndrome    2. Wound infection    3. Hx of right BKA (H)    4. CKD (chronic kidney disease) stage 4, GFR 15-29 ml/min (H)    5. Atrial fibrillation with RVR (H)      Right BKA: Also with postoperative pain and phantom limb pain. Unable to assess stump, in  and brace.  -Continues on Dilaudid.  -Continue PT and OT as directed by them.    Acute on chronic blood loss  anemia-likely from anemia chronic disease, iron deficiency, chronic kidney disease, surgery. On oral iron.     Left abdominal wall cellulitis and right side: There is no abscess on ultrasound.  She is followed by wound care in the TCU. Vanco complete; discussed with patient that this is appropriate as she started her vanco on August 16.      Insulin-dependent diabetes: A1c 10.3.  Now on glargine 10 units nightly and sliding scale NovoLog 3 times daily with meals.  Blood sugars actually look pretty good here in the TCU, all less than 220.      Essential hypertension: Satisfactory in TCU thus far, amlodipine d.cd.   -metoprolol    CKD 4: Followed by Dr. Iqbal.  Creatinine today 1.57.  -Continue PhosLo.   -Sodium bicarb.     Coronary artery disease:  -also needs outpt CRISTINA eval  -Continue metoprolol and aspirin.     Urinary retention: Continues to have [place] in place, she follows with [facility] for urinary retention.     A. Fib on Coumadin: INR today 3.4 Hold tonight and redraw tomorrow.     TCU/PT report: Using the easy stand for transfer at this time due to left leg weakness.    Electronically signed by:  Zoey Leung CNP  This progress note was completed using Dragon software and there may be grammatical errors.      .

## 2021-06-20 NOTE — LETTER
Letter by Harry Blackwood MD at      Author: Harry Blackwood MD Service: -- Author Type: --    Filed:  Encounter Date: 8/14/2020 Status: (Other)         Patient: Jazmin Bauman   MR Number: [id number]   YOB: 1955   Date of Visit: 8/14/2020      [facility]    Facility:  [facility] [[id number]]    Code Status:  FULL CODE    Chief Complaint   Patient presents with   ? Problem Visit   :   Check bleeding                 Patient Active Problem List   Diagnosis   ? Carpal Tunnel Syndrome   ? Urinary Tract Infection   ? Endometrial Hyperplasia   ? Cholelithiasis   ? Leukocytosis   ? Urine Tests Nonspecific Abnormal Findings   ? Obesity   ? Essential hypertension   ? Pernicious Anemia   ? Immunology Studies Raised Immunoglobulin Level   ? Vaginal bleeding   ? Type 2 diabetes mellitus (H)   ? Atrial fibrillation with RVR (H)   ? Acute kidney injury superimposed on CKD (H)   ? Non-traumatic rhabdomyolysis   ? Metabolic acidosis   ? Troponin level elevated   ? Bacteremia   ? Acute respiratory failure with hypoxia (H)   ? Acute encephalopathy   ? Acute pulmonary edema (H)   ? Wheezing   ? Moderate protein malnutrition (H)   ? Abnormal cytological findings in specimens from other female genital organs   ? Chronic kidney disease, stage III (moderate) (H)   ? Hyperkalemia   ? Osteoarthritis   ? Acute kidney injury (H)   ? Sepsis (H)   ? 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 Sepsis, due to unspecified organism, unspecified whether acute organ dysfunction present (H)   ? Cellulitis of right foot   ? CKD (chronic kidney disease) stage 4, GFR 15-29 ml/min (H)   ? Chronic wound infection of abdomen, subsequent encounter   ? Acute post-operative pain   ? Phantom limb pain (H)   ? Paroxysmal atrial fibrillation (H)   ? Urinary retention   ? Coronary artery disease without angina pectoris   ? Hx of right BKA (H)   ? Weakness   ? Slow  transit constipation   ? Wound infection   ? Fever and chills   ? Acute pain of left shoulder       History:  Jazmin Bauman  is a 65 year old female with history of CKD 4, morbid obesity, insulin-dependent type 2 diabetes, chronic atrial fibrillation, peripheral neuropathy, GERD, peripheral edema, obesity, anemia of chronic disease with iron deficiency seen at urgent request of facility staff on 8/16/2020    Hospital Course: Patient was admitted between June 16 and June 28.    Patient has history of chronic nonhealing right heel ulcer which became infected without her knowledge.  Patient's housemate summoned help and patient was transported to the emergency room.  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  Patient was septic and treated with Vanco/Zosyn.  Blood culture was positive for Peptostreptococcus and Streptococcus.  As patient improved she was transitioned to Augmentin.     Guillotine amputation converted to BKA on June 23.    Hospitalization was complicated for bilateral abdominal wall issues.  On the right side patient was felt to have chronic wounds/rash.  On the left she had acute abscess and infection resulting in I&D in the operating room.  Calciphyxis considered possible though not confirmed.  Patient left with wound ulcer on the left abdomen requiring wound care treatment which is ongoing.  Please bilateral infectious issues occur in the skin and subcutaneous tissues in the dependent pannus of the lower flanks.  I do not see that any wound cultures were obtained from these areas.  Patient describes many months of work with wound care and soft tissue surgery/debridements to clear up the infection of the right abdominal wall in the past.    Hospitalization also remarkable for hypokalemia at outset improved with Kayexalate.  Patient has paroxysmal atrial fibrillation, her warfarin needed to be held due to the  multiple surgeries but she is back on it.  There is also concern over coronary artery disease with positive troponin January 2020 for which she was lost to follow-up with stress test/cardiology never did so.  Finally she had retention resulted in [place] catheterization which is ongoing.    Subjective/ROS:    -augmented by discussion with facility staff involved in direct care    -Facility staff come and find me in another area of the facility to come evaluate patient's bleeding.  They stated they were just talking to her about other issues when they found blood on her bedding.  They did not see active bleeding but wanted me to check her out.  Patient did have a blood draw with antecubital area this morning.    Patient says she was unaware of any bleeding.  And that she feels fine, her usual self.  I look at the bedding and it is near her right flank area and there is a red substance that has leaked into the bedding.  I am not sure his blood.  It is certainly reddish/cherry colored but there is no substance to it there is no clotted material.  I check over her entire body including the area that would need to this area of the bedding and there is no skin breakdown rash lesions.  I also checked antecubital areas and there is no evidence of blood.  There is no blood or dried blood on the skin.  Patient's INR comes back therapeutic today.    Meanwhile patient's phosphorus is 2.8 ionized calcium 1.47.  Her calcium acetate was discontinued last week.  She has a follow-up with her nephrologist next week.  Her weight is stable 243 pounds.  This is despite being off the furosemide.  She denies symptoms of hypercalcemia    Patient has seen urology they requested urine culture and her Proteus infection was treated with ciprofloxacin on or about August 8.  No dysuria.    No other complaints today.    ===================================================        Past Medical History:   Diagnosis Date   ? Anemia     pernicious   ?  Diabetes mellitus (H)     borderline   ? Endometrial hyperplasia    ? Fibromyalgia    ? History of recurrent UTI (urinary tract infection)    ? History of transfusion    ? Hypertension    ? Thrombocytopenia (H)    ? Vaginal bleeding 1/2015     Past Surgical History:   Procedure Laterality Date   ? BELOW KNEE LEG AMPUTATION Right 6/18/2020    Procedure: AMPUTATION, BELOW KNEE;  Surgeon: Epi Corcoran MD;  Location: [facility];  Service: General   ? BELOW KNEE LEG AMPUTATION Right 6/23/2020    Procedure: REVISION AMPUTATION, BELOW KNEE;  Surgeon: Epi Corcoran MD;  Location: [facility] OR;  Service: General   ? bilateral carpal tunnel release  2009   ? CHOLECYSTECTOMY     ? COLONOSCOPY N/A 9/11/2017    Procedure: COLONOSCOPY;  Surgeon: Tyler Bhakta MD;  Location: [facility];  Service:    ? COMBINED HYSTEROSCOPY DIAGNOSTIC / D&C N/A 1/28/2015    Procedure: DILATION AND CURETTAGE WITH HYSTEROSCOPY;  Surgeon: Grant Beal MD;  Location: [facility];  Service:    ? DILATION AND CURETTAGE OF UTERUS     ? IR NON TUNNELED CATHETER >5 YEARS  1/21/2020   ? PICC  1/20/2020        ? TX HYSTEROSCOPY,W/ENDO BX N/A 9/5/2019    Procedure: HYSTEROSCOPY, DILATION AND CURETTAGE;  Surgeon: Grant Beal MD;  Location: [facility];  Service: Gynecology   ? TX HYSTEROSCOPY,W/ENDO BX N/A 12/9/2019    Procedure: HYSTEROSCOPY, DILATION AND CURETTAGE;  Surgeon: Grant Beal MD;  Location: [facility];  Service: Gynecology          Family History   Problem Relation Age of Onset   ? Colon cancer Father    :       Social History     Socioeconomic History   ? Marital status: Single     Spouse name: Not on file   ? Number of children: Not on file   ? Years of education: Not on file   ? Highest education level: Not on file   Occupational History   ? Not on file   Social Needs   ? Financial resource strain: Not on file   ? Food insecurity     Worry: Not on file     Inability: Not on  "file   ? Transportation needs     Medical: Not on file     Non-medical: Not on file   Tobacco Use   ? Smoking status: Never Smoker   ? Smokeless tobacco: Never Used   Substance and Sexual Activity   ? Alcohol use: Not Currently     Comment: rare   ? Drug use: No   ? Sexual activity: Not on file   Lifestyle   ? Physical activity     Days per week: Not on file     Minutes per session: Not on file   ? Stress: Not on file   Relationships   ? Social connections     Talks on phone: Not on file     Gets together: Not on file     Attends Yazidism service: Not on file     Active member of club or organization: Not on file     Attends meetings of clubs or organizations: Not on file     Relationship status: Not on file   ? Intimate partner violence     Fear of current or ex partner: Not on file     Emotionally abused: Not on file     Physically abused: Not on file     Forced sexual activity: Not on file   Other Topics Concern   ? Not on file   Social History Narrative   ? Not on file   :    Currently living at a friend's house.  Normally she would live at her own place    No current facility-administered medications on file prior to visit.      Current Outpatient Medications on File Prior to Visit   Medication Sig Dispense Refill   ? acetaminophen (TYLENOL) 500 MG tablet Take 2 tablets (1,000 mg total) by mouth every 6 (six) hours as needed for pain or fever.  0   ? amLODIPine (NORVASC) 10 MG tablet Take 1 tablet (10 mg total) by mouth daily. (Patient taking differently: Take 10 mg by mouth daily. Hold for systolic blood pressure less than 105)  0   ? aspirin 81 MG EC tablet Take 81 mg by mouth daily.     ? BD ULTRA-FINE MICRO PEN NEEDLE 32 gauge x 1/4\" Ndle USE AS DIRECTED 4 TIMES DAILY. 360 each 3   ? bisacodyL (DULCOLAX) 10 mg suppository Insert 10 mg into the rectum daily as needed. No stool greater than 72 hours     ? blood glucose test (ACCU-CHEK SAM PLUS TEST STRP) strips TEST 1 TIME A  strip 10   ? blood " glucose test (ACCU-CHEK SAM PLUS TEST STRP) strips test blood sugar level 6 times daily 600 strip 4   ? cholecalciferol, vitamin D3, (VITAMIN D3) 1,000 unit capsule Take 1,000 Units by mouth daily.     ? collagenase ointment Apply daily per WOC  0   ? docusate sodium (COLACE) 100 MG capsule Take 100 mg by mouth daily.     ? ferrous sulfate 325 (65 FE) MG tablet Take 1 tablet by mouth 2 (two) times a day with meals.     ? gabapentin (NEURONTIN) 100 MG capsule Take 200 mg by mouth 2 (two) times a day.     ? HYDROmorphone (DILAUDID) 2 MG tablet Take 1 tablet (2 mg total) by mouth every 4 (four) hours as needed. 30 tablet 0   ? lancets (ACCU-CHEK MULTICLIX LANCET) Misc TEST 6 TIMES A  each 11   ? lidocaine (XYLOCAINE) 2 % jelly Apply 1 application topically daily.      ? metoprolol tartrate (LOPRESSOR) 25 MG tablet Take 1 tablet (25 mg total) by mouth 2 (two) times a day. (Patient taking differently: Take 25 mg by mouth 2 (two) times a day. Hold for systolic blood pressure less than 105) 180 tablet 3   ? NOVOLOG FLEXPEN U-100 INSULIN 100 unit/mL (3 mL) injection pen Check blood sugar four (4) times daily.  11. (Patient taking differently: Inject 10 Units under the skin 3 (three) times a day before meals. 10 unit three times a day with meals     PLUS:      if 141 - 180 = 1 unit;   181 - 220 = 2 units;  Plus sliding scale   221 - 260 = 3 units;   261 - 300 = 4 units;   301 - 340 = 5 units;   341 - 400 = 6 units;   401 - 999 = 7 units,)  0   ? polyethylene glycol (MIRALAX) 17 gram packet Take 17 g by mouth daily. Hold for loose stool     ? senna (SENOKOT) 8.6 mg tablet Take 1 tablet by mouth 2 (two) times a day. Hold for loose stool      ? sodium bicarbonate 650 MG tablet Take 1 tablet (650 mg total) by mouth 2 (two) times a day. OTC product  0   ? warfarin sodium (WARFARIN ORAL) Take by mouth. 8/14/20 INR 2.8  Cont 4mg daily.  Next INR 8/25/20.    8/7/20 INR 2.8 Take 4mg daily Next INR 8/14 7/24/20 INR 2.4 Take  4mg M, W,Fand 3mg AOD. Next INR 7/28 7/21/20 INR 3.1  Take 3mg daily.  Next INR 7/24/20.(currently on Cipro x 3 days)    7/14/20 INR 2.3  Cont 5mg daily.  Next INR 7/21/20.  7/13/20 INR 2.3  Take 5mg.  Next INR 7/14/20.  7/7/20 INR 2.5  Cont 5mg daily.  Next INR 7/10/20.     :      ALLERGIES:  Hydralazine; Latex; Naprosyn [naproxen]; Nitrofurantoin; Sulfa (sulfonamide antibiotics); and Vicks vaporub [camphor-eucalyptus oil-menthol]    Vitals:      Blood pressure 152/70 respiration 20 temperature 95 glucose 244 heart rate 71 O2 sats 94% weight is 243 pounds she peaked out at 264 on July 8 and was at at 239 recently.    Physical exam:    General:  Alert  oriented x3 appears comfortable    Patient lying in bed.  She is alert oriented x3.  Normally conversant     Exam noted above.  No bleeding source is found.  No blood dry or wet on the mated area of her skin to the area where the redness is on the bed.  I am not sure this is blood.    Due to the 2020 Covid 19 pandemic, except as noted above, the patient was visually observed at a 6 foot plus distance.  An observational exam was performed in an effort to keep patient safe from Covid 19 and other communicable diseases.   Labs:  Lab Results   Component Value Date    WBC 14.4 (H) 08/16/2020    HGB 10.4 (L) 08/16/2020    HCT 32.8 (L) 08/16/2020    MCV 89 08/16/2020     08/16/2020     Results for orders placed or performed during the hospital encounter of 06/16/20   Basic Metabolic Panel   Result Value Ref Range    Sodium 141 136 - 145 mmol/L    Potassium 4.2 3.5 - 5.0 mmol/L    Chloride 107 98 - 107 mmol/L    CO2 27 22 - 31 mmol/L    Anion Gap, Calculation 7 5 - 18 mmol/L    Glucose 116 70 - 125 mg/dL    Calcium 8.8 8.5 - 10.5 mg/dL    BUN 30 (H) 8 - 22 mg/dL    Creatinine 1.57 (H) 0.60 - 1.10 mg/dL    GFR MDRD Af Amer 40 (L) >60 mL/min/1.73m2    GFR MDRD Non Af Amer 33 (L) >60 mL/min/1.73m2         Lab Results   Component Value Date    TSH 1.83 06/17/2020     Lab  Results   Component Value Date    HGBA1C 10.3 (H) 06/17/2020     [unfilled]  Lab Results   Component Value Date    EIUPAQNX59 285 01/07/2011     Lab Results   Component Value Date     (H) 01/20/2020     [unfilled]  Most Recent EKG     Units 06/16/20  1820   VENTRATE BPM 76   ATRIALRATE BPM 76   QRSDURATION ms 94   QTINTERVAL ms 428   QTCCALC ms 481   P Axis degrees 55   RAXIS degrees -43   TAXIS degrees 12   MUSEDX  Normal sinus rhythm  Left axis deviation  Inferior infarct (cited on or before 13-MAR-2020)  Abnormal ECG  When compared with ECG of 14-MAR-2020 13:04,  No significant change was found  Confirmed by DENNIS THORPE, [facility] LOC:[id number] ([id number]) on 6/17/2020 1:41:23 PM       728 shows sodium 136 potassium 3.8 creatinine 2.23 with a BUN of 56      Assessment/Plan:    Possible bleeding of unknown source      I am suspicious this is not blood.  Patient has no dried blood on her she has no wound in the area.  We checked her over every place else and there is nothing bleeding.  I do not find any dried caked blood.  Bilateral flank wounds are dressed there is no blood on the dressing and no evidence of any recent bleeding there.  I asked the staff to check the dietary menu.  Could this be melted Jell-O?  Spilled red juice of some type?  INR is therapeutic.  - No change in plan, monitor for any evidence of bleeding.  Patient and staff state understanding.            elevated creatinine/acute kidney injury, recurrent  Suspected volume depletion  CKD 4  Hypercalcemia  -I do not know why her renal function has deteriorated.  Despite the fact that we stopped her Lasix several weeks ago she continued to lose weight and is now stabilized near the low 240s.  We were worried about post obstructive phenomenon but catheterization did not lead to improvement.  She is since done well without the catheter with low PVRs.  She has since seen urology and been treated for Proteus UTI.  Creatinine most recently around 2.  Meanwhile she  has had hypercalcemia leading to discontinuation of calcium acetate.  Check of ionized calcium reveals that she is truly elevated this week 1.47.    Fortunately she has nephrology appointment next week and I hope they can answer my questions about why her creatinine has not returned to previous baseline, has she had permanent injury?  Does she need further work-up for her calcium or we just need to give it more time off the calcium acetate?      -Sees her nephrologist on August 21  -Send all labs to nephrology.  -Continue off Lasix  -Blood pressure is no longer low for the most part, normal to mild elevations at times.  I had previously written hold parameters on her beta-blocker and amlodipine and they remain in place.  - Continue recently adjusted renal dosing of her gabapentin, decreased it to 200 mg twice daily     Electrolyte disturbance   Potassium normalized.  We had to discontinue magnesium potassium replacement last week but were better shape this week.  See calcium discussion above.  -Continue off potassium magnesium replacement at this time  -Continue to hold Lasix  -Recheck labs weekly    Acute urinary retention  Urethral bleed  Dysuria   Patient has seen urology    Type 2 diabetes, insulin-dependent, uncontrolled   Improved with our changes of last week.  She is up to 20 units of Lantus although her baseline dose at home was 25 units.  We increased her mealtime aspart to 10 units 3 times daily although she takes 15 units at home.  She also has sliding scale  - We should continue to adjust her doses as necessary in the weeks to come, I anticipate increases to her home/baseline dosing over time  -However, recall that her most recent A1c 10.3 so she could need higher than her baseline doses eventually.        Chronic right foot ulcer now status post BKA  Right foot/leg cellulitis now status post BKA  Guillotine amputation June 30  Stump revision/BKA June 23   Patient working with PT.  She has questions  about her compression sleeves which I am unable to answer and I redirect her to the appropriate source for getting those answers.    -Gabapentin was recently decreased due to her renal function as noted above.  She has not noticed any difference in her neuropathy.    Constipation   Patient says that she has had recurrence.  -Discontinue senna plus  - Colace 100 mg just once daily (too much Colace can lead to the all mush no push phenomenon)  - Senna 8.6 tablets 2 p.o. 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  Patient has been improving and following with wound care specialist.  No evidence of bleeding from the wounds today.  There is no erythema swelling or drainage  -Per wound care specialist.          Paroxysmal atrial fibrillation   Continue to monitor INR and adjust warfarin appropriately INR is therapeutic.      Chronic anemia   Likely anemia of chronic disease.  Her nephrologist notes an iron deficiency component and she is on iron infusion therapy last hemoglobin 7.9 which she is tolerating well.  She is generally more in the 9 range.  Hemoglobin was stable.  Do not anticipate further monitoring/treatment in the next couple of weeks anyway.  Has nephrology appointment on August 21.      Obesity   Complicates her movement, limits mobility and increase his difficulty of therapy.  Associated with other increased risk of morbidity as well.      Hypertension   No new thoughts here.  Blood pressures acceptable  - continue amlodipine with hold parameters.   -Continue metoprolol with hold parameters   -Lasix is on hold.     Social stressors and factors   Social service involved.        Case discussed with:    Facility staff             Harry Blackwood MD

## 2021-06-20 NOTE — LETTER
"Letter by Zoey Leung CNP at      Author: Zoey Leung CNP Service: -- Author Type: --    Filed:  Encounter Date: 10/6/2020 Status: (Other)         Patient: Jazmin Bauman   MR Number: [id number]   YOB: 1955   Date of Visit: 10/6/2020       [facility]      NAME:  Jazmin Bauman             :  1955    MRN: [id number]    CODE STATUS:  FULL CODE    FACILITY: [facility] [[id number]]         CHIEF COMPLAIN/REASON FOR VISIT:  Chief Complaint   Patient presents with   ? Review Of Multiple Medical Conditions     f/u b12        HISTORY OF PRESENT ILLNESS: Jazmin Bauman is a 65 y.o. female. Pt was at [facility] from  to  and underwent a RBKA r/t ongoing DM ulcer with osteomyelitis. Per her EMR dc summary \" with HTN,morbid obesity, DM, A fib, CKD 4 presented for evaluation of R foot swelling, difficulty ambulation, pain found to have osteomyelitis now s/p amputation. She has now been to the OR twice this stay, last was on 20.   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   Patient had a chronic diabetic foot ulcer on her R heel, presented for increased swelling, difficulty ambulating, and pain. MRI showed extensive soft tissue necrosis and some osteomyelitis along with cellulitis  Met sepsis criteria on admission with leukocytosis and elevated CRP  Podiatry saw and the foot was not felt to be salvageable and BKA was recommended\"       August 15-: Hospitalized for sepsis with MRSA bacteremia, she had possible endocarditis and TTE revealing vegetation of the aortic valve. She had a CT of the abdomen and pelvis that showed left lower abdominal pannus ulceration but without abscess.  Her right BKA stump ulceration was negative for osteomyelitis or cellulitis.  She was treated with IV Vanco for 14 days which will be completed on .  Her left lower abdominal ulceration has never " "been biopsied.  It was not biopsied in the hospital and there was no surgical intervention.  Her right BKA stump was negative for osteomyelitis.  CKD stage IV with baseline creatinine 2.5-3.  She was at 1.82 at the end of hospitalization.  She is discharged on sodium bicarb twice daily.  Her insulin was adjusted and she is now on sliding scale insulin with 10 units of Lantus at bedtime.  Blood sugars have been less than 200 on average.    For her hypertension, Norvasc has been discontinued.  Blood pressures remain in the 130s over 70s on average.  She was incidentally found to have a lung nodule on CT scan, will need to follow-up with this.  Please see imaging report from August hospitalization.  Multiple nodules and enlarged lymph nodes.    Today: Seen today for follow-up to B12 levels and recent complaints of osteoarthritis pain.  B12 level was 1400 so no need for injections at this time.  She is also not complaining of any knee pain or postop stump pain and we have already discontinued her topical morphine.  She has steady weight loss at 226 pounds.  Which we discussed at length, she reports drinking and eating okay.  She recently saw her nephrologist who is not concerned with her mild hypercalcemia.  Did have an elevated uric acid but no gout symptoms at this time so we will not treat until or unless symptoms.  Reviewed the chart between 130 and 200 with a few a outliers to 280.      Allergies   Allergen Reactions   ? Hydralazine      Paralysis of legs   ? Latex Itching     Skin Burns   ? Naprosyn [Naproxen] Swelling     throat   ? Nitrofurantoin Hives   ? Sulfa (Sulfonamide Antibiotics) Rash   ? Vicks Vaporub [Camphor-Eucalyptus Oil-Menthol] Rash   :     Current Outpatient Medications   Medication Sig   ? acetaminophen (TYLENOL) 500 MG tablet Take 2 tablets (1,000 mg total) by mouth 3 (three) times a day.   ? aspirin 81 MG EC tablet Take 81 mg by mouth daily.   ? BD ULTRA-FINE MICRO PEN NEEDLE 32 gauge x 1/4\" " Ndle USE AS DIRECTED 4 TIMES DAILY.   ? bisacodyL (DULCOLAX) 10 mg suppository Insert 10 mg into the rectum daily as needed. No stool greater than 72 hours   ? cholecalciferol, vitamin D3, (VITAMIN D3) 1,000 unit capsule Take 1,000 Units by mouth daily.   ? collagenase ointment Apply daily per WOC   ? docusate sodium (COLACE) 100 MG capsule Take 100 mg by mouth daily as needed.    ? ferrous sulfate 325 (65 FE) MG tablet Take 1 tablet by mouth 2 (two) times a day with meals.   ? gabapentin (NEURONTIN) 100 MG capsule Take 200 mg by mouth 2 (two) times a day.   ? HYDROmorphone (DILAUDID) 2 MG tablet (Take 2mg daily and 2mg Q 3 hours PRN)   ? lancets (ACCU-CHEK MULTICLIX LANCET) Misc TEST 6 TIMES A DAY   ? LANTUS SOLOSTAR U-100 INSULIN 100 unit/mL (3 mL) pen Inject 10 Units under the skin at bedtime. 11.65 Type 2 with hyperglycemia  Contact provider if insulin prescribed is not the preferred insulin per insurance. (Patient taking differently: Inject 15 Units under the skin at bedtime. 11.65 Type 2 with hyperglycemia  Contact provider if insulin prescribed is not the preferred insulin per insurance.)   ? lidocaine (XYLOCAINE) 5 % ointment Apply topically 4 (four) times a day. Apply to left shoulder or around wound (not inside wound)   ? metoprolol tartrate (LOPRESSOR) 25 MG tablet Take 1 tablet (25 mg total) by mouth 2 (two) times a day.   ? miconazole (MICOTIN) 2 % powder Apply topically 2 (two) times a day. Apply to b/l groin/underneath breasts/underneath pannus   ? NOVOLOG U-100 INSULIN ASPART 100 unit/mL injection Check blood sugar four (4) times daily.  11.65 Type 2 with hyperglycemia  OneTouch Verio Flex  Meter  OneTouch Verio strips 2 boxes of 50  Delica Lancets box of 100  BD Ultra-fine Therese Pen Needles - NDC [address]-55 - dispense 1 case, refill PRN for 1 year (Patient taking differently: Inject 7 Units under the skin 3 (three) times a day before meals. )   ? polyethylene glycol (MIRALAX) 17 gram packet Take 1  packet (17 g total) by mouth daily as needed.   ? senna (SENOKOT) 8.6 mg tablet Take 1 tablet by mouth 2 (two) times a day. (Patient taking differently: Take 1 tablet by mouth 2 (two) times a day as needed. )   ? sodium bicarbonate 650 MG tablet Take 1 tablet (650 mg total) by mouth 2 (two) times a day. OTC product   ? warfarin sodium (WARFARIN ORAL) Take by mouth. 10/6/20 INR 2.3  Take 3mg Wed and Sat and 2.5mg AOD.  Next INR 10/15/20.    9/29/20 INR 3.1 Take 2.5mg daily. Next INR 10/6.  9/22/20 INR 2.1 Take 3mg daily Next INR 9/29  (LD Antib 7/20)  9/18/20 INR 1.6 5mg tonight, then 3mg daily. Next INR 9/22.  9/14/20 INR 1.7 Cont 2.5,g daily. Next INR 9/17 9/11/20 INR 1.9 Take 2.5mg daily Next INR 9/14 9/8/20 INR 1.6  Take 2.5mg daily.  Next INR 9/11/20.    9/3/20 INR 3.4  Hold x 2 days, then take 2mg daily.  Next INR 9/8/20.  9/2/20 INR 3.4 Hold 9/2 recheck INR 9/3  8/28/20 INR 2.6  Cont 3mg daily.  Next INR 9/1/20.    8/25/20 INR 2.2 Cont 3mg daily. Next INR 8/28.  8/24/20 INR 2.3  Take 3mg daily.  Next INR 8/27/20.         REVIEW OF SYSTEMS:    Currently, no fever, chills, or rigors. Does not have any visual or hearing problems. Denies any chest pain, headaches, palpitations, lightheadedness, dizziness, shortness of breath, or cough. Appetite is good. Denies any GERD symptoms. Denies any difficulty with swallowing, nausea, or vomiting.  Denies any abdominal pain, diarrhea or constipation. Denies any urinary symptoms, yeager in place. No insomnia. No active bleeding. No rash.       PHYSICAL EXAMINATION:  Vitals:    10/12/20 0932   BP: 130/82   Pulse: 76   Temp: 97.4  F (36.3  C)   SpO2: 98%   Weight: (!) 226 lb (102.5 kg)         GENERAL: Awake, Alert, oriented x3, not in any form of acute distress, answers questions appropriately, follows simple commands, conversant with flat affect  HEENT: Head is normocephalic with normal hair distribution. No evidence of trauma. Ears: No acute purulent discharge. Eyes:  Sclera anicteric.  CHEST: No tenderness or deformity, no crepitus  LUNG: Clear to auscultation with good chest expansion. There DM BS  BACK: No kyphosis of the thoracic spine. Symmetric, no curvature, ROM normal, no CVA tenderness, no spinal tenderness   CVS: There is good S1  S2,  rhythm is regular.  ABDOMEN: Globular and ROTUND tender to palpation, non distended, no masses, no organomegaly, good bowel sounds, no rebound or guarding, no peritoneal signs. Wound to left side of abdomen, dressed today, no open area to left hip as well. Unable to visualize as they are dressed and she is fully clothed. Followed by wound   EXTREMITIES: UE Full ROM. Right BKA, IN a  with protected brace  SKIN: Warm and dry, no erythema noted, open area on left pannus with dressing on abdomen  NEUROLOGICAL: The patient is oriented to person, place and time. Strength and sensation are grossly intact. Face is symmetric.          LABS:    Lab Results   Component Value Date    WBC 7.9 08/21/2020    HGB 9.2 (L) 08/21/2020    HCT 28.6 (L) 08/21/2020    MCV 89 08/21/2020     08/21/2020       Results for orders placed or performed during the hospital encounter of 08/15/20   Basic Metabolic Panel   Result Value Ref Range    Sodium 137 136 - 145 mmol/L    Potassium 4.1 3.5 - 5.0 mmol/L    Chloride 103 98 - 107 mmol/L    CO2 23 22 - 31 mmol/L    Anion Gap, Calculation 11 5 - 18 mmol/L    Glucose 178 (H) 70 - 125 mg/dL    Calcium 11.0 (H) 8.5 - 10.5 mg/dL    BUN 37 (H) 8 - 22 mg/dL    Creatinine 2.05 (H) 0.60 - 1.10 mg/dL    GFR MDRD Af Amer 29 (L) >60 mL/min/1.73m2    GFR MDRD Non Af Amer 24 (L) >60 mL/min/1.73m2           Lab Results   Component Value Date    HGBA1C 10.3 (H) 06/17/2020     Vitamin D, Total (25-Hydroxy)   Date Value Ref Range Status   04/28/2016 29.8 (L) 30.0 - 80.0 ng/mL Final     Lab Results   Component Value Date    [id number] 285 01/07/2011       ASSESSMENT/PLAN:  1. Unintended weight loss    2. Below-knee  amputation (H)    3. Morbid obesity (H)    4. Chronic pain syndrome      Right BKA: Hx of phantom limb pain. Improved. Unable to assess stump, in  and brace.   -Continues on Dilaudid.  -Continue PT and OT as directed by them.    Unintended weight loss: Patient has had greater than 20 pound weight loss since July.  She says the food is just bad here and it is always cold.  Monitoring.  We will put her on a protein supplement for wound healing.   Weight today 226.     Acute on chronic blood loss anemia-likely from anemia chronic disease, iron deficiency, chronic kidney disease, surgery. On oral iron .    History of B12 deficiency: Had inquired about having her B12 resumed, we obtained a level which was 1400 so no need for injections at this time.  She is okay with this.     Left abdominal wall cellulitis and right side: Not viewed today, however no complaints and she is followed by wound doctor at the facility.     Insulin-dependent diabetes: A1c 10.3.  Now on glargine 10 units nightly and sliding scale NovoLog 3 times daily with meals.  Blood sugars average 130-200. A few outliers to 280.      Essential hypertension: Satisfactory in TCU thus far, amlodipine d.cd.   -metoprolol    CKD 4: Followed by Dr. Iqbal whom she saw yesterday.  He ordered repeat BMP in a month.  Is not concerned about calcium that has stayed stable at 11.3.  Of note she did have elevated uric acid level at 11. Will not treat unless symptomatic.   -Sodium bicarb.     Coronary artery disease:  -also needs outpt CRISTINA eval  -Continue metoprolol and aspirin.     Urinary retention: recent UTI, treated.      A. Fib on Coumadin: INR therapeutic.     TCU/PT report: Using the easy stand for transfer at this time due to left leg weakness.    Communicated concerns with patient, nursing.    Electronically signed by:  Zoey Leung, CNP  This progress note was completed using Dragon software and there may be grammatical errors.

## 2021-06-20 NOTE — LETTER
Letter by Manolo Castillo CNP at      Author: Manolo Castillo CNP Service: -- Author Type: --    Filed:  Encounter Date: 2020 Status: (Other)         Patient: Jazmin Bauman   MR Number: [id number]   YOB: 1955   Date of Visit: 2020          [facility]    NAME:  Jazmin Bauman             :  1955  MRN: [id number]  CODE STATUS:  FULL CODE and POLST AVAILABLE    FACILITY:  [facility] [[id number]]         Chief Complaint   Patient presents with   ? Problem Visit     UTI and Acute on CKD 4     HISTORY OF PRESENT ILLNESS: Jazmin Bauman is a 64 y.o. female morbidly obese  with IDDMII, afib on coumadin/CKDIII-IV required temporary hemodialysis last  Admission 1.5months ago, urinary retention/incontinence requiring yeager catheter currently not on yeager who was brought in by TCU for worsening renal fxn/mild hyperkalemia/burning on urination admitted with UTI/worsening renal function.     1. SIMON on CKDIV:   -Cr of 3.20. She required temporary HD last admission 1.5 months ago. +urinary retention/incontinence requiring yeager and urology consult last admission. Recently finished 10 day course of cipro. She c/o some dysuria and suprapubic tenderness.   -Was given 500mg of levaquin in ED.   -U/C showed enterococcus fecalis resistant to levaquin. Started PO Amoxicillin x total 7 days  -Renal function is improving. Recieved IVF per nephrology     2. Leukocytosis:   -Likely d/t UTI. Finished 10 day course of cipro last dose 3/12/2020. Was given one time does of 500mg levaquin IV in ED.  -Urine culture with enterococcus fecalis. Started PO amoxicillin  -Trend leucocytosis     3. Recurrent hyperkalemia:   -K of 5.6 on admission. Came down with D50/insulin, now up to 6.1 again.  -Pt states she had bad reaction with kayexalate in the past  -Treat per protocol. Consulted nephrology     4. IDDMII:   -Resumed home insulin  regimen     5. Afib:   -Rate  Controlled. EKG NSR. Warfarin dosing per pharmacy     6. Hypomagnesemia:  -Replaced  Patient was stabilized and transferred to TCU for continued rehabilitation.    Today, patient is seen at the bedside.  She reports chronic generalized pain.  Currently on Gabapentin, Tylenol, Norco, and ASA. Today at 1:20pm, patient had a Virtual visit with Dr. Frost at [facility] except follow up UA/UC with f/u sometime in June.  Date is dependent on the COVID19.     Past Medical History:   Diagnosis Date   ? Anemia     pernicious   ? Diabetes mellitus (H)     borderline   ? Endometrial hyperplasia    ? Fibromyalgia    ? Heart murmur     pt states her mitral valve leaks   ? History of recurrent UTI (urinary tract infection)    ? Hypertension    ? Thrombocytopenia (H)    ? Vaginal bleeding 1/2015     Past Surgical History:   Procedure Laterality Date   ? bilateral carpal tunnel release  2009   ? CHOLECYSTECTOMY     ? COLONOSCOPY N/A 9/11/2017    Procedure: COLONOSCOPY;  Surgeon: Tyler Bhakta MD;  Location: [facility];  Service:    ? COMBINED HYSTEROSCOPY DIAGNOSTIC / D&C N/A 1/28/2015    Procedure: DILATION AND CURETTAGE WITH HYSTEROSCOPY;  Surgeon: Grant Beal MD;  Location: [facility];  Service:    ? DILATION AND CURETTAGE OF UTERUS     ? IR NON TUNNELED CATHETER >5 YEARS  1/21/2020   ? PICC  1/20/2020        ? DE HYSTEROSCOPY,W/ENDO BX N/A 9/5/2019    Procedure: HYSTEROSCOPY, DILATION AND CURETTAGE;  Surgeon: Grant Beal MD;  Location: [facility] OR;  Service: Gynecology   ? DE HYSTEROSCOPY,W/ENDO BX N/A 12/9/2019    Procedure: HYSTEROSCOPY, DILATION AND CURETTAGE;  Surgeon: Grant Beal MD;  Location: [facility] OR;  Service: Gynecology     Family History   Problem Relation Age of Onset   ? Colon cancer Father      Social History     Socioeconomic History   ? Marital status: Single     Spouse name: Not on file   ? Number of  children: Not on file   ? Years of education: Not on file   ? Highest education level: Not on file   Occupational History   ? Not on file   Social Needs   ? Financial resource strain: Not on file   ? Food insecurity     Worry: Not on file     Inability: Not on file   ? Transportation needs     Medical: Not on file     Non-medical: Not on file   Tobacco Use   ? Smoking status: Never Smoker   ? Smokeless tobacco: Never Used   Substance and Sexual Activity   ? Alcohol use: Not Currently     Comment: rare   ? Drug use: No   ? Sexual activity: Not on file   Lifestyle   ? Physical activity     Days per week: Not on file     Minutes per session: Not on file   ? Stress: Not on file   Relationships   ? Social connections     Talks on phone: Not on file     Gets together: Not on file     Attends Tenriism service: Not on file     Active member of club or organization: Not on file     Attends meetings of clubs or organizations: Not on file     Relationship status: Not on file   ? Intimate partner violence     Fear of current or ex partner: Not on file     Emotionally abused: Not on file     Physically abused: Not on file     Forced sexual activity: Not on file   Other Topics Concern   ? Not on file   Social History Narrative   ? Not on file     Allergies   Allergen Reactions   ? Hydralazine      Paralysis of legs   ? Latex Itching     Skin Burns   ? Naprosyn [Naproxen] Swelling     throat   ? Nitrofurantoin Hives   ? Sulfa (Sulfonamide Antibiotics) Rash   ? Vicks Vaporub [Camphor-Eucalyptus Oil-Menthol] Rash     Current Outpatient Medications   Medication Sig Dispense Refill   ? ACCU-CHEK SAM PLUS TEST STRP strips TEST 6 TIMES A DAY (Patient taking differently: TEST 1 TIME A DAY) 100 strip 10   ? ACCU-CHEK SAM PLUS TEST STRP strips test blood sugar level 6 times daily 600 strip 4   ? acetaminophen (TYLENOL) 325 MG tablet Take 2 tablets (650 mg total) by mouth 3 (three) times a day. (Patient taking differently: Take 650 mg  "by mouth 2 (two) times a day. )  0   ? aspirin 81 MG EC tablet Take 81 mg by mouth daily.     ? BD ULTRA-FINE MICRO PEN NEEDLE 32 gauge x 1/4\" Ndle USE AS DIRECTED 4 TIMES DAILY. 360 each 3   ? calcium acetate (PHOSLO) 667 mg capsule Take 1 capsule (667 mg total) by mouth 3 (three) times a day with meals.  0   ? cholecalciferol, vitamin D3, (VITAMIN D3) 1,000 unit capsule Take 1,000 Units by mouth daily.     ? docusate sodium (COLACE) 50 MG capsule Take by mouth daily.      ? ferrous sulfate 325 (65 FE) MG tablet Take 1 tablet by mouth 2 (two) times a day with meals.     ? gabapentin (NEURONTIN) 300 MG capsule Take 300 mg by mouth 2 (two) times a day.     ? HYDROcodone-acetaminophen 5-325 mg per tablet Take 1 tablet by mouth every 4 (four) hours as needed for pain. 8 tablet 0   ? insulin glargine (BASAGLAR KWIKPEN) 100 unit/mL (3 mL) pen Inject 25 Units under the skin 2 (two) times a day.  0   ? insulin lispro (ADMELOG SOLOSTAR U-100 INSULIN) 100 unit/mL pen Inject 2-14 Units under the skin 3 (three) times a day with meals. 0-120 0 units  121-200 2 units  201-250 4 units  251-300 6 units  301-350 8 units  351-400 10 units  401-450 12 units  451-500 14 units  501+ call TCP     ? insulin lispro (HUMALOG) 100 unit/mL injection Inject 2 Units under the skin 3 (three) times a day before meals.     ? lancets (ACCU-CHEK MULTICLIX LANCET) Misc TEST 6 TIMES A DAY (Patient taking differently: TEST 1 TIME A DAY) 200 each 11   ? magnesium 250 mg Tab Take 500 mg by mouth 2 (two) times a day.      ? metoprolol tartrate (LOPRESSOR) 25 MG tablet Take 1 tablet (25 mg total) by mouth 2 (two) times a day.  0   ? omeprazole (PRILOSEC) 20 MG capsule Take 1 capsule (20 mg total) by mouth at bedtime.  0   ? patiromer calcium sorbitex (VELTESSA) 8.4 gram PwPk powder packet Take 8.4 g by mouth daily.     ? senna-docusate (SENNOSIDES-DOCUSATE SODIUM) 8.6-50 mg tablet Take 1 tablet by mouth daily.     ? warfarin sodium (WARFARIN ORAL) Take by " mouth. 4/8/20 INR 2.6  Cont 4mg Tues-Thurs-Sat and 3.5mg AOD.  Next INR 4/13/20.    4/1/20 INR 2.7 4mg Tu-Th-Sat, 3.5mg AOD. Next INR 4/8.  3/30/20 INR 3.9  Hold x 2 days.  Next INR 4/1/20.    3/23/20 INR 1.9  Cont 4mg daily.  Next INR 3/30/20.  3/19/20 INR 1.6  Take 4mg daily.  Next INR 3/23/20.       Current Facility-Administered Medications   Medication Dose Route Frequency Provider Last Rate Last Dose   ? cyanocobalamin injection 1,000 mcg  1,000 mcg Intramuscular Q30 Days Lester Flores MD   1,000 mcg at 11/22/19 1422   ? lidocaine 2 % jelly (XYLOCAINE)   Topical PRN Carly Arora NP   1 application at 09/18/19 0848       REVIEW OF SYSTEMS:    Currently, no fever, chills, or rigors. Does not have any visual or hearing problems. Denies any chest pain, headaches, palpitations, lightheadedness, dizziness, shortness of breath, or cough. Appetite is good. Denies any GERD symptoms. Denies any difficulty with swallowing, nausea, or vomiting.  Denies any abdominal pain, diarrhea or constipation. Denies any urinary symptoms. No insomnia. No active bleeding. No rash.       PHYSICAL EXAMINATION:  Vitals:    04/12/20 1736   BP: 144/78   Pulse: 60   Resp: 18   Temp: 97  F (36.1  C)   SpO2: 94%   Weight: (!) 285 lb 9.6 oz (129.5 kg)       GENERAL: Awake, Alert, oriented x3, not in any form of acute distress, answers questions appropriately, follows simple commands, conversant  HEENT: Head is normocephalic with normal hair distribution. No evidence of trauma. Ears: No acute purulent discharge. Eyes: Conjunctivae pink with no scleral jaundice. Nose: Normal mucosa and septum. NECK: Supple with no cervical or supraclavicular lymphadenopathy. Trachea is midline.   CHEST: No tenderness or deformity, no crepitus  LUNG: Clear to auscultation with good chest expansion. There are no crackles or wheezes, normal AP diameter.  BACK: No kyphosis of the thoracic spine. Symmetric, no curvature, ROM normal, no CVA tenderness, no  spinal tenderness   CVS: There is good S1  S2, there are no murmurs, rubs, gallops, or heaves, rhythm is regular,  2+ pulses symmetric in all extremities.  ABDOMEN: Globular and soft, nontender to palpation, non distended, no masses, no organomegaly, good bowel sounds, no rebound or guarding, no peritoneal signs.   EXTREMITIES:  Full range of motion on both upper and lower extremities, there is no tenderness to palpation, no pedal edema, no cyanosis or clubbing, no calf tenderness.  Pulses equal in all extremities, normal cap refill, no joint swelling.  SKIN: Warm and dry, no erythema noted.  Skin color, texture, no rashes or lesions.  NEUROLOGICAL: The patient is oriented to person, place and time. Strength and sensation are grossly intact. Face is symmetric.    LABS:      Lab Results   Component Value Date    WBC 13.8 (H) 03/17/2020    HGB 9.2 (L) 03/17/2020    HCT 30.5 (L) 03/17/2020    MCV 96 03/17/2020     03/17/2020     Results for orders placed or performed during the hospital encounter of 03/13/20   Basic Metabolic Panel   Result Value Ref Range    Sodium 141 136 - 145 mmol/L    Potassium 4.6 3.5 - 5.0 mmol/L    Chloride 101 98 - 107 mmol/L    CO2 29 22 - 31 mmol/L    Anion Gap, Calculation 11 5 - 18 mmol/L    Glucose 111 70 - 125 mg/dL    Calcium 10.2 8.5 - 10.5 mg/dL    BUN 37 (H) 8 - 22 mg/dL    Creatinine 2.54 (H) 0.60 - 1.10 mg/dL    GFR MDRD Af Amer 23 (L) >60 mL/min/1.73m2    GFR MDRD Non Af Amer 19 (L) >60 mL/min/1.73m2     Lab Results   Component Value Date    HGBA1C 7.2 (H) 02/27/2020     Vitamin D, Total (25-Hydroxy)   Date Value Ref Range Status   04/28/2016 29.8 (L) 30.0 - 80.0 ng/mL Final     Lab Results   Component Value Date    [id number] 285 01/07/2011       ASSESSMENT/PLAN:    1. Acute cystitis without hematuria  - Finished 10 day course of Cipro last dose 3/12/2020. Was given one time does of 500mg Levaquin IV in ED. Urine culture with enterococcus fecalis. Started PO amoxicillin.  Will obtain a follow up UA/UC Margot elkins at [facility].    2. Essential hypertension - Blood pressures are within target range, will continue Metoprolol   3. Type 2 diabetes mellitus with other specified complication, with long-term current use of insulin (H) - Continue current insulin regimen   4. Atrial fibrillation with RVR (H) - Rate controlled with Coumadin   5. Anemia due to stage 3 chronic kidney disease (H) -  Continue Ferrous sulfate daily, will monitor closely         Electronically signed by:  Manolo Castillo CNP

## 2021-06-20 NOTE — LETTER
"Letter by Zoey Leung CNP at      Author: Zoey Leung CNP Service: -- Author Type: --    Filed:  Encounter Date: 2020 Status: (Other)         Patient: Jazmin Bauman   MR Number: [id number]   YOB: 1955   Date of Visit: 2020       [facility]      NAME:  Jazmin Bauman             :  1955    MRN: [id number]    CODE STATUS:  FULL CODE    FACILITY: [facility] [[id number]]         CHIEF COMPLAIN/REASON FOR VISIT:  Chief Complaint   Patient presents with   ? Review Of Multiple Medical Conditions     B12 levels, knee pain.       HISTORY OF PRESENT ILLNESS: Jazmin Bauman is a 65 y.o. female. Pt was at [facility] from  to  and underwent a RBKA r/t ongoing DM ulcer with osteomyelitis. Per her EMR dc summary \" with HTN,morbid obesity, DM, A fib, CKD 4 presented for evaluation of R foot swelling, difficulty ambulation, pain found to have osteomyelitis now s/p amputation. She has now been to the OR twice this stay, last was on 20.   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   Patient had a chronic diabetic foot ulcer on her R heel, presented for increased swelling, difficulty ambulating, and pain. MRI showed extensive soft tissue necrosis and some osteomyelitis along with cellulitis  Met sepsis criteria on admission with leukocytosis and elevated CRP  Podiatry saw and the foot was not felt to be salvageable and BKA was recommended\"       August 15-: Hospitalized for sepsis with MRSA bacteremia, she had possible endocarditis and TTE revealing vegetation of the aortic valve. She had a CT of the abdomen and pelvis that showed left lower abdominal pannus ulceration but without abscess.  Her right BKA stump ulceration was negative for osteomyelitis or cellulitis.  She was treated with IV Vanco for 14 days which will be completed on .  Her left lower abdominal " ulceration has never been biopsied.  It was not biopsied in the hospital and there was no surgical intervention.  Her right BKA stump was negative for osteomyelitis.  CKD stage IV with baseline creatinine 2.5-3.  She was at 1.82 at the end of hospitalization.  She is discharged on sodium bicarb twice daily.  Her insulin was adjusted and she is now on sliding scale insulin with 10 units of Lantus at bedtime.  Blood sugars have been less than 200 on average.    For her hypertension, Norvasc has been discontinued.  Blood pressures remain in the 130s over 70s on average.  She was incidentally found to have a lung nodule on CT scan, will need to follow-up with this.  Please see imaging report from August hospitalization.  Multiple nodules and enlarged lymph nodes.    Today: Seen today per nursing request to discuss B12 levels at the patient's request.  She reportedly was telling therapy that she will not do any therapies until her B12 is checked because she used to be on injections until December and has not had them since then.  She does have a mild anemia that is likely related to her CKD/anemia of chronic disease.  She is also on iron.  I will go ahead and check B12 levels to happen to catch lab in the room with her.  We discussed that we will get the level before dosing B12 again.  Again she also wanted to talk about her osteoarthritis and knee pain.  She has had knee injections in the right knee in the past that would like them done in both knees so that she can enjoy physical therapy again.  Physical therapy had planned on discharging her at the end of the week due to limited participation.  She did see her nephrologist earlier this week and she was noted to have hyperuricemia to 11.  She does not have any inflammation, redness or swelling on exam so doubt this is correlated to her knee or joint pains but certainly could be.  Will continue monitoring and discuss whether injections are appropriate.  She has not  "needed extra pain pills and has been stable on her current Dilaudid.  We will discontinue her topical morphine today.      Allergies   Allergen Reactions   ? Hydralazine      Paralysis of legs   ? Latex Itching     Skin Burns   ? Naprosyn [Naproxen] Swelling     throat   ? Nitrofurantoin Hives   ? Sulfa (Sulfonamide Antibiotics) Rash   ? Vicks Vaporub [Camphor-Eucalyptus Oil-Menthol] Rash   :     Current Outpatient Medications   Medication Sig   ? acetaminophen (TYLENOL) 500 MG tablet Take 2 tablets (1,000 mg total) by mouth 3 (three) times a day.   ? aspirin 81 MG EC tablet Take 81 mg by mouth daily.   ? BD ULTRA-FINE MICRO PEN NEEDLE 32 gauge x 1/4\" Ndle USE AS DIRECTED 4 TIMES DAILY.   ? bisacodyL (DULCOLAX) 10 mg suppository Insert 10 mg into the rectum daily as needed. No stool greater than 72 hours   ? cholecalciferol, vitamin D3, (VITAMIN D3) 1,000 unit capsule Take 1,000 Units by mouth daily.   ? collagenase ointment Apply daily per WOC   ? docusate sodium (COLACE) 100 MG capsule Take 100 mg by mouth daily as needed.    ? ferrous sulfate 325 (65 FE) MG tablet Take 1 tablet by mouth 2 (two) times a day with meals.   ? gabapentin (NEURONTIN) 100 MG capsule Take 200 mg by mouth 2 (two) times a day.   ? HYDROmorphone (DILAUDID) 2 MG tablet Take 1 tablet (2 mg total) by mouth daily. May also take 1 tablet (2 mg total) every 3 (three) hours as needed for pain.   ? lancets (ACCU-CHEK MULTICLIX LANCET) Misc TEST 6 TIMES A DAY   ? LANDEEPIKA SOLOSTAR U-100 INSULIN 100 unit/mL (3 mL) pen Inject 10 Units under the skin at bedtime. 11.65 Type 2 with hyperglycemia  Contact provider if insulin prescribed is not the preferred insulin per insurance. (Patient taking differently: Inject 15 Units under the skin at bedtime. 11.65 Type 2 with hyperglycemia  Contact provider if insulin prescribed is not the preferred insulin per insurance.)   ? lidocaine (XYLOCAINE) 5 % ointment Apply topically 4 (four) times a day. Apply to left " shoulder or around wound (not inside wound)   ? metoprolol tartrate (LOPRESSOR) 25 MG tablet Take 1 tablet (25 mg total) by mouth 2 (two) times a day.   ? miconazole (MICOTIN) 2 % powder Apply topically 2 (two) times a day. Apply to b/l groin/underneath breasts/underneath pannus   ? NOVOLOG U-100 INSULIN ASPART 100 unit/mL injection Check blood sugar four (4) times daily.  11.65 Type 2 with hyperglycemia  OneTouch Verio Flex  Meter  OneTouch Verio strips 2 boxes of 50  Delica Lancets box of 100  BD Ultra-fine Therese Pen Needles - NDC [address]-20 - dispense 1 case, refill PRN for 1 year (Patient taking differently: Inject 7 Units under the skin 3 (three) times a day before meals. )   ? polyethylene glycol (MIRALAX) 17 gram packet Take 1 packet (17 g total) by mouth daily as needed.   ? senna (SENOKOT) 8.6 mg tablet Take 1 tablet by mouth 2 (two) times a day. (Patient taking differently: Take 1 tablet by mouth 2 (two) times a day as needed. )   ? sodium bicarbonate 650 MG tablet Take 1 tablet (650 mg total) by mouth 2 (two) times a day. OTC product   ? warfarin sodium (WARFARIN ORAL) Take by mouth. 9/29/20 INR 3.1 Take 2.5mg daily. Next INR 10/6.  9/22/20 INR 2.1 Take 3mg daily Next INR 9/29  (LD Antib 7/20)  9/18/20 INR 1.6 5mg tonight, then 3mg daily. Next INR 9/22.  9/14/20 INR 1.7 Cont 2.5,g daily. Next INR 9/17 9/11/20 INR 1.9 Take 2.5mg daily Next INR 9/14 9/8/20 INR 1.6  Take 2.5mg daily.  Next INR 9/11/20.    9/3/20 INR 3.4  Hold x 2 days, then take 2mg daily.  Next INR 9/8/20.  9/2/20 INR 3.4 Hold 9/2 recheck INR 9/3  8/28/20 INR 2.6  Cont 3mg daily.  Next INR 9/1/20.    8/25/20 INR 2.2 Cont 3mg daily. Next INR 8/28.  8/24/20 INR 2.3  Take 3mg daily.  Next INR 8/27/20.         REVIEW OF SYSTEMS:    Currently, no fever, chills, or rigors. Does not have any visual or hearing problems. Denies any chest pain, headaches, palpitations, lightheadedness, dizziness, shortness of breath, or cough. Appetite is good.  Denies any GERD symptoms. Denies any difficulty with swallowing, nausea, or vomiting.  Denies any abdominal pain, diarrhea or constipation. Denies any urinary symptoms, yeager in place. No insomnia. No active bleeding. No rash.       PHYSICAL EXAMINATION:  Vitals:    09/30/20 1015   BP: 126/57   Pulse: 64   Temp: 97.5  F (36.4  C)   SpO2: 96%   Weight: (!) 225 lb (102.1 kg)         GENERAL: Awake, Alert, oriented x3, not in any form of acute distress, answers questions appropriately, follows simple commands, conversant with flat affect  HEENT: Head is normocephalic with normal hair distribution. No evidence of trauma. Ears: No acute purulent discharge. Eyes: Sclera anicteric.  CHEST: No tenderness or deformity, no crepitus  LUNG: Clear to auscultation with good chest expansion. There DM BS  BACK: No kyphosis of the thoracic spine. Symmetric, no curvature, ROM normal, no CVA tenderness, no spinal tenderness   CVS: There is good S1  S2,  rhythm is regular.  ABDOMEN: Globular and ROTUND tender to palpation, non distended, no masses, no organomegaly, good bowel sounds, no rebound or guarding, no peritoneal signs. Wound to left side of abdomen, dressed today, no open area to left hip as well. Unable to visualize as they are dressed and she is fully clothed. Followed by wound   EXTREMITIES: UE Full ROM. Right BKA, IN a  with protected brace  SKIN: Warm and dry, no erythema noted, open area on left pannus with dressing on abdomen  NEUROLOGICAL: The patient is oriented to person, place and time. Strength and sensation are grossly intact. Face is symmetric.          LABS:    Lab Results   Component Value Date    WBC 7.9 08/21/2020    HGB 9.2 (L) 08/21/2020    HCT 28.6 (L) 08/21/2020    MCV 89 08/21/2020     08/21/2020       Results for orders placed or performed during the hospital encounter of 08/15/20   Basic Metabolic Panel   Result Value Ref Range    Sodium 137 136 - 145 mmol/L    Potassium 4.1 3.5 - 5.0  mmol/L    Chloride 103 98 - 107 mmol/L    CO2 23 22 - 31 mmol/L    Anion Gap, Calculation 11 5 - 18 mmol/L    Glucose 178 (H) 70 - 125 mg/dL    Calcium 11.0 (H) 8.5 - 10.5 mg/dL    BUN 37 (H) 8 - 22 mg/dL    Creatinine 2.05 (H) 0.60 - 1.10 mg/dL    GFR MDRD Af Amer 29 (L) >60 mL/min/1.73m2    GFR MDRD Non Af Amer 24 (L) >60 mL/min/1.73m2           Lab Results   Component Value Date    HGBA1C 10.3 (H) 06/17/2020     Vitamin D, Total (25-Hydroxy)   Date Value Ref Range Status   04/28/2016 29.8 (L) 30.0 - 80.0 ng/mL Final     Lab Results   Component Value Date    IDWQRRWT32 285 01/07/2011       ASSESSMENT/PLAN:  1. Pernicious anemia    2. Primary osteoarthritis involving multiple joints    3. Unintended weight loss      Right BKA: Also with postoperative pain and phantom limb pain. Unable to assess stump, in  and brace.  -Continues on Dilaudid.  -Continue PT and OT as directed by them.  -Discontinue topical morphine for nonuse.    Unintended weight loss: Patient has had greater than 20 pound weight loss since July.  She says the food is just bad here and it is always cold.  Monitoring.  We will put her on a protein supplement for wound healing.     Osteoarthritis: Complaining of knee pain and has had injections in the past.  She is interested in getting injections again so we will review her past history with these and move forward if knee pain continues to bother her.  Physical therapy says they will be discharging her at the end of the week mainly because she does not participate.    Acute on chronic blood loss anemia-likely from anemia chronic disease, iron deficiency, chronic kidney disease, surgery. On oral iron.  She says she used to be on B12 injections every month until December when these were stopped.  She is concerned that she is no longer getting her B12 so we will check a level today and go from there.  -Check B12 level.     Left abdominal wall cellulitis and right side: Not viewed today, however  no complaints and she is followed by wound doctor at the facility.     Insulin-dependent diabetes: A1c 10.3.  Now on glargine 10 units nightly and sliding scale NovoLog 3 times daily with meals.  Blood sugars are okay.     Essential hypertension: Satisfactory in TCU thus far, amlodipine d.cd.   -metoprolol    CKD 4: Followed by Dr. Iqbal whom she saw yesterday.  He ordered repeat BMP in a month.  Is not concerned about calcium that has stayed stable at 11.3.  Of note she did have elevated uric acid level at 11.  She is complaining of knee pain but no other redness, swelling or sources of gout.  Her toe pain improved with an biotics.  However might be worth it to treat with renally dosed allopurinol.  -Sodium bicarb.     Coronary artery disease:  -also needs outpt CRISTINA eval  -Continue metoprolol and aspirin.     Urinary retention: recent UTI, treated.     Paronychia: Left great toe. Has history of MRSA.  Improved.  Initiated biotics.     A. Fib on Coumadin: INR today 3.1.Coumadin 2.5 mg and recheck 1 week.    TCU/PT report: Using the easy stand for transfer at this time due to left leg weakness.    Communicated concerns with patient, nursing.    Electronically signed by:  Zoey Leung CNP  This progress note was completed using Dragon software and there may be grammatical errors.      .

## 2021-06-20 NOTE — LETTER
Letter by Lester Flores MD at      Author: Lester Flores MD Service: -- Author Type: --    Filed:  Encounter Date: 3/2/2020 Status: (Other)         Jazmin Bauman  [address]  [place] [address]             March 2, 2020         Dear Ms. Bauman,    Below are the results from your recent visit:    Resulted Orders   HM2(CBC w/o Differential)   Result Value Ref Range    WBC 13.5 (H) 4.0 - 11.0 thou/uL    RBC 3.27 (L) 3.80 - 5.40 mill/uL    Hemoglobin 9.6 (L) 12.0 - 16.0 g/dL    Hematocrit 28.9 (L) 35.0 - 47.0 %    MCV 88 80 - 100 fL    MCH 29.2 27.0 - 34.0 pg    MCHC 33.1 32.0 - 36.0 g/dL    RDW 13.2 11.0 - 14.5 %    Platelets 389 140 - 440 thou/uL    MPV 8.1 7.0 - 10.0 fL   Comprehensive Metabolic Panel   Result Value Ref Range    Sodium 138 136 - 145 mmol/L    Potassium 4.9 3.5 - 5.0 mmol/L    Chloride 103 98 - 107 mmol/L    CO2 23 22 - 31 mmol/L    Anion Gap, Calculation 12 5 - 18 mmol/L    Glucose 135 (H) 70 - 125 mg/dL    BUN 47 (H) 8 - 22 mg/dL    Creatinine 2.55 (H) 0.60 - 1.10 mg/dL    GFR MDRD Af Amer 23 (L) >60 mL/min/1.73m2    GFR MDRD Non Af Amer 19 (L) >60 mL/min/1.73m2    Bilirubin, Total 0.4 0.0 - 1.0 mg/dL    Calcium 10.6 (H) 8.5 - 10.5 mg/dL    Protein, Total 6.8 6.0 - 8.0 g/dL    Albumin 2.8 (L) 3.5 - 5.0 g/dL    Alkaline Phosphatase 188 (H) 45 - 120 U/L    AST 11 0 - 40 U/L    ALT 15 0 - 45 U/L    Narrative    Fasting Glucose reference range is 70-99 mg/dL per  American Diabetes Association (ADA) guidelines.   Lipid Cascade   Result Value Ref Range    Cholesterol 137 <=199 mg/dL    Triglycerides 204 (H) <=149 mg/dL    HDL Cholesterol 30 (L) >=50 mg/dL    LDL Calculated 66 <=129 mg/dL    Patient Fasting > 8hrs? Unknown    Glycosylated Hemoglobin A1c   Result Value Ref Range    Hemoglobin A1c 7.2 (H) 3.5 - 6.0 %   Urinalysis-UC if Indicated   Result Value Ref Range    Color, UA Yellow Colorless, Yellow, Straw, Light Yellow    Clarity, UA Cloudy (!) Clear    Glucose, UA  Negative Negative    Bilirubin, UA Negative Negative    Ketones, UA Negative Negative    Specific Gravity, UA 1.015 1.005 - 1.030    Blood, UA Moderate (!) Negative    pH, UA 6.0 5.0 - 8.0    Protein, UA 30 mg/dL (!) Negative mg/dL    Urobilinogen, UA 0.2 E.U./dL 0.2 E.U./dL, 1.0 E.U./dL    Nitrite, UA Positive (!) Negative    Leukocytes, UA Moderate (!) Negative    Bacteria, UA Moderate (!) None Seen hpf    RBC, UA 10-25 (!) None Seen, 0-2 hpf    WBC, UA 25-50 (!) None Seen, 0-5 hpf    Squam Epithel, UA 0-5 None Seen, 0-5 lpf    WBC Clumps Present (!) None Seen    Narrative    Urine Culture ordered based on [facility] criteria   Culture, Urine   Result Value Ref Range    Culture >100,000 col/ml Enterobacter cloacae (!)        Needs cipro 500mg no 20 one two times a day and one rf for this UTI and please call pt/pharmacy    Please call with questions or contact us using [employer].    Sincerely,        Electronically signed by Lester Flores MD

## 2021-06-20 NOTE — LETTER
Letter by Harry Blackwood MD at      Author: Harry Blackwood MD Service: -- Author Type: --    Filed:  Encounter Date: 3/27/2020 Status: (Other)         Patient: Jazmin Bauman   MR Number: [id number]   YOB: 1955   Date of Visit: 3/27/2020      [facility]    Facility:  [facility] [[id number]]    Code Status:  FULL CODE    Chief Complaint   Patient presents with   ? H & P   :                    Patient Active Problem List   Diagnosis   ? Carpal Tunnel Syndrome   ? Urinary Tract Infection   ? Endometrial Hyperplasia   ? Cholelithiasis   ? Leukocytosis   ? Urine Tests Nonspecific Abnormal Findings   ? Obesity   ? Essential hypertension   ? Pernicious Anemia   ? Immunology Studies Raised Immunoglobulin Level   ? Vaginal bleeding   ? Type 2 diabetes mellitus (H)   ? Atrial fibrillation with RVR (H)   ? Acute kidney injury superimposed on CKD (H)   ? Non-traumatic rhabdomyolysis   ? Metabolic acidosis   ? Troponin level elevated   ? Bacteremia due to Gram-negative bacteria   ? Acute respiratory failure with hypoxia (H)   ? Acute encephalopathy   ? Acute pulmonary edema (H)   ? Wheezing   ? Moderate protein malnutrition (H)   ? Abnormal cytological findings in specimens from other female genital organs   ? Chronic kidney disease, stage III (moderate) (H)   ? Hyperkalemia   ? Osteoarthritis   ? UTI (urinary tract infection)   ? Acute kidney injury (H)       History:  Jazmin Bauman  is a 64 year old female with history of CKD4, morbid obesity, insulin-dependent type 2 diabetes, chronic atrial fibrillation, peripheral neuropathy, GERD, peripheral edema, seen for admission to TCU on 3/28/2020    Hospital Course: Patient was admitted between March 3 and March 17 most recently.  However she was admitted from the TCU following complicated hospital stay between January 20 and January 31 where she was treated for acute encephalopathy (stroke ruled  "out), sepsis due to UTI (E. coli), acute kidney injury/rhabdomyolysis (did not require dialysis this time but has required it transiently once in the past), new onset atrial fibrillation with RVR, acute urinary retention with failed voiding trial leading to catheterization, acute respiratory failure hypoxia treated with BiPAP and right leg weakness without evidence of CVA, questionable myelopathy secondary to lumbar canal stenosis.    This admission was remarkable for acute kidney injury which was noted in the TCU but could not be reversed under those conditions.  Urine culture revealed Enterococcus faecalis treated with amoxicillin for 7 days.  Nephrology felt that the patient was \"prerenal\" and indeed her renal function improved with IV fluids.  Patient also had hypokalemia which has been a recurrent problem for her and has difficulty tolerating Kayexalate.  She is newly started on patiromer ca sorbitex.    Subjective/ROS:    -augmented by discussion with facility staff involved in direct care      Patient reports dissatisfaction with her care not only here but at the last TCU.  She says things go better in the hospital where she gets more attention for her needs.  History is difficult as patient tends to perseverate on past injustices she has suffered at the hands of the medical system.  When I asked her her current complaints and if there is anything I can do to help her, she says she has been asking for copies of her lab work and not getting them.  I reviewed those carefully with her today.  She is also very upset that she is not going to be able to see Dr hughes personally due to the pandemic and change in protocols associated with it.    Otherwise it turns out that the patient does not have any active concerns.  She reports that her peripheral neuropathy is \"the same\" and that her pain is adequately treated with gabapentin and PRN acetaminophen.  She reports satisfaction with her blood sugars which we " reviewed.  She does get a little low for my liking at times but she has not had any true hypoglycemia and says she can still sense that and wants to continue her current glargine dosage of 25 twice daily.  She is not feeling heartburn and takes omeprazole.  She has many questions sign request prognostication regarding her renal failure.  She denies fever sweats chills change in vision speaking swallowing falls injuries skin rashes lymphadenopathy mood changes orthopnea PND chest pain.  She denies peripheral edema though it is clearly present even on the distance exam performed today.  Remainder 13 system ROS negative    Past Medical History:   Diagnosis Date   ? Anemia     pernicious   ? Diabetes mellitus (H)     borderline   ? Endometrial hyperplasia    ? Fibromyalgia    ? Heart murmur     pt states her mitral valve leaks   ? History of recurrent UTI (urinary tract infection)    ? Hypertension    ? Thrombocytopenia (H)    ? Vaginal bleeding 1/2015     Past Surgical History:   Procedure Laterality Date   ? bilateral carpal tunnel release  2009   ? CHOLECYSTECTOMY     ? COLONOSCOPY N/A 9/11/2017    Procedure: COLONOSCOPY;  Surgeon: Tyler Bhakta MD;  Location: [facility];  Service:    ? COMBINED HYSTEROSCOPY DIAGNOSTIC / D&C N/A 1/28/2015    Procedure: DILATION AND CURETTAGE WITH HYSTEROSCOPY;  Surgeon: Grant Beal MD;  Location: [facility];  Service:    ? DILATION AND CURETTAGE OF UTERUS     ? IR NON TUNNELED CATHETER >5 YEARS  1/21/2020   ? PICC  1/20/2020        ? TN HYSTEROSCOPY,W/ENDO BX N/A 9/5/2019    Procedure: HYSTEROSCOPY, DILATION AND CURETTAGE;  Surgeon: Grant Beal MD;  Location: [facility] OR;  Service: Gynecology   ? TN HYSTEROSCOPY,W/ENDO BX N/A 12/9/2019    Procedure: HYSTEROSCOPY, DILATION AND CURETTAGE;  Surgeon: Grant Beal MD;  Location: [facility] OR;  Service: Gynecology          Family History   Problem Relation Age of Onset   ? Colon cancer Father     :       Social History     Socioeconomic History   ? Marital status: Single     Spouse name: Not on file   ? Number of children: Not on file   ? Years of education: Not on file   ? Highest education level: Not on file   Occupational History   ? Not on file   Social Needs   ? Financial resource strain: Not on file   ? Food insecurity     Worry: Not on file     Inability: Not on file   ? Transportation needs     Medical: Not on file     Non-medical: Not on file   Tobacco Use   ? Smoking status: Never Smoker   ? Smokeless tobacco: Never Used   Substance and Sexual Activity   ? Alcohol use: Not Currently     Comment: rare   ? Drug use: No   ? Sexual activity: Not on file   Lifestyle   ? Physical activity     Days per week: Not on file     Minutes per session: Not on file   ? Stress: Not on file   Relationships   ? Social connections     Talks on phone: Not on file     Gets together: Not on file     Attends Jewish service: Not on file     Active member of club or organization: Not on file     Attends meetings of clubs or organizations: Not on file     Relationship status: Not on file   ? Intimate partner violence     Fear of current or ex partner: Not on file     Emotionally abused: Not on file     Physically abused: Not on file     Forced sexual activity: Not on file   Other Topics Concern   ? Not on file   Social History Narrative   ? Not on file   :    Patient reports that she lives with her mother nearby this facility.  She says she has stairs to get in but only 3    Current Outpatient Medications on File Prior to Visit   Medication Sig Dispense Refill   ? ACCU-CHEK SAM PLUS TEST STRP strips TEST 6 TIMES A DAY (Patient taking differently: TEST 1 TIME A DAY) 100 strip 10   ? ACCU-CHEK SAM PLUS TEST STRP strips test blood sugar level 6 times daily 600 strip 4   ? acetaminophen (TYLENOL) 325 MG tablet Take 2 tablets (650 mg total) by mouth 3 (three) times a day. (Patient taking differently: Take 650 mg by mouth  "2 (two) times a day. )  0   ? aspirin 81 MG EC tablet Take 81 mg by mouth daily.     ? BD ULTRA-FINE MICRO PEN NEEDLE 32 gauge x 1/4\" Ndle USE AS DIRECTED 4 TIMES DAILY. 360 each 3   ? calcium acetate (PHOSLO) 667 mg capsule Take 1 capsule (667 mg total) by mouth 3 (three) times a day with meals.  0   ? cholecalciferol, vitamin D3, (VITAMIN D3) 1,000 unit capsule Take 1,000 Units by mouth daily.     ? docusate sodium (COLACE) 50 MG capsule Take by mouth daily.      ? ferrous sulfate 325 (65 FE) MG tablet Take 1 tablet by mouth 2 (two) times a day with meals.     ? gabapentin (NEURONTIN) 300 MG capsule Take 300 mg by mouth 2 (two) times a day.     ? HYDROcodone-acetaminophen 5-325 mg per tablet Take 1 tablet by mouth every 4 (four) hours as needed for pain. 8 tablet 0   ? insulin glargine (BASAGLAR KWIKPEN) 100 unit/mL (3 mL) pen Inject 25 Units under the skin 2 (two) times a day.  0   ? insulin lispro (ADMELOG SOLOSTAR U-100 INSULIN) 100 unit/mL pen Inject 2-14 Units under the skin 3 (three) times a day with meals. 0-120 0 units  121-200 2 units  201-250 4 units  251-300 6 units  301-350 8 units  351-400 10 units  401-450 12 units  451-500 14 units  501+ call TCP     ? insulin lispro (HUMALOG) 100 unit/mL injection Inject 2 Units under the skin 3 (three) times a day before meals.     ? lancets (ACCU-CHEK MULTICLIX LANCET) Misc TEST 6 TIMES A DAY (Patient taking differently: TEST 1 TIME A DAY) 200 each 11   ? magnesium 250 mg Tab Take 500 mg by mouth 2 (two) times a day.      ? metoprolol tartrate (LOPRESSOR) 25 MG tablet Take 1 tablet (25 mg total) by mouth 2 (two) times a day.  0   ? omeprazole (PRILOSEC) 20 MG capsule Take 1 capsule (20 mg total) by mouth at bedtime.  0   ? patiromer calcium sorbitex (VELTESSA) 8.4 gram PwPk powder packet Take 8.4 g by mouth daily.     ? senna-docusate (SENNOSIDES-DOCUSATE SODIUM) 8.6-50 mg tablet Take 1 tablet by mouth daily.     ? warfarin sodium (WARFARIN ORAL) Take by mouth. " 3/19/20 INR 1.6  Take 4mg daily.  Next INR 3/23/20.       Current Facility-Administered Medications on File Prior to Visit   Medication Dose Route Frequency Provider Last Rate Last Dose   ? cyanocobalamin injection 1,000 mcg  1,000 mcg Intramuscular Q30 Days Lester Flores MD   1,000 mcg at 11/22/19 1422   ? lidocaine 2 % jelly (XYLOCAINE)   Topical PRN Carly Arora NP   1 application at 09/18/19 0848   :      ALLERGIES:  Hydralazine; Latex; Naprosyn [naproxen]; Nitrofurantoin; Sulfa (sulfonamide antibiotics); and Vicks vaporub [camphor-eucalyptus oil-menthol]    Vitals:  There were no vitals taken for this visit.      Vital signs from the facility record:              Blood pressure 165/68 which is the highest she has had.  She is generally running in the normal to mildly elevated range with her systolic pressures on review.  Her blood sugars are generally between 90 and 170.  Temperature is 98 respirations 20 heart rate 56 generally running between 50 and 70.  O2 sats 98% weight is 286 pounds  There is no height or weight on file to calculate BMI.    Physical exam:    General:  Alert  oriented x3 appears comfortable and normally conversant    Head appears normal I think would see clear sclera gaze is conjugate with EOMI she demonstrates good range of motion of her neck.  She can raise both arms above her head.  She can lift both knees and straighten both lower legs.  She has fairly deep sock lines in her ankles indicating symmetric edema.  Skin is clear without venous stasis dermatitis however      Due to the 2020 Covid 19 pandemic, the patient was visually observed at a 6 foot plus distance.  An observational exam was performed in an effort to keep patient safe from Covid 19 and other communicable diseases.   Labs:  Lab Results   Component Value Date    WBC 13.8 (H) 03/17/2020    HGB 9.2 (L) 03/17/2020    HCT 30.5 (L) 03/17/2020    MCV 96 03/17/2020     03/17/2020     Results for orders placed  or performed during the hospital encounter of 03/13/20   Basic Metabolic Panel   Result Value Ref Range    Sodium 141 136 - 145 mmol/L    Potassium 4.6 3.5 - 5.0 mmol/L    Chloride 101 98 - 107 mmol/L    CO2 29 22 - 31 mmol/L    Anion Gap, Calculation 11 5 - 18 mmol/L    Glucose 111 70 - 125 mg/dL    Calcium 10.2 8.5 - 10.5 mg/dL    BUN 37 (H) 8 - 22 mg/dL    Creatinine 2.54 (H) 0.60 - 1.10 mg/dL    GFR MDRD Af Amer 23 (L) >60 mL/min/1.73m2    GFR MDRD Non Af Amer 19 (L) >60 mL/min/1.73m2         Lab Results   Component Value Date    TSH 2.20 01/20/2020     @LASTA!C@  [unfilled]  Lab Results   Component Value Date    GAMILQGT19 285 01/07/2011     Lab Results   Component Value Date     (H) 01/20/2020     [unfilled]  Most Recent EKG     Units 03/14/20  1304   VENTRATE BPM 71   ATRIALRATE BPM 71   QRSDURATION ms 90   QTINTERVAL ms 416   QTCCALC ms 452   P Kane degrees 30   RAXIS degrees -37   TAXIS degrees 49   MUSEDX  Normal sinus rhythm with sinus arrhythmia  Left axis deviation  Inferior infarct (cited on or before 13-MAR-2020)  Abnormal ECG  When compared with ECG of 13-MAR-2020 21:26,  No significant change was found  Confirmed by ИРИНА THORPE, TONY LOC: ([id number]) on 3/14/2020 2:38:51 PM           Assessment/Plan:      ICD-10-CM    1. Essential hypertension  I10    2. Type 2 diabetes mellitus with other specified complication, with long-term current use of insulin (H)  E11.69     Z79.4    3. Atrial fibrillation with RVR (H)  I48.91    4. Acute cystitis without hematuria  N30.00    5. Class 3 severe obesity due to excess calories with body mass index (BMI) of 50.0 to 59.9 in adult, unspecified whether serious comorbidity present (H)  E66.01     Z68.43    6. Acute kidney injury (H)  N17.9    7. Acute kidney injury superimposed on CKD (H)  N17.9     N18.9        Acute kidney injury on CKD 4   Patient with worrisome history of prior transient dialysis need, and some progression of her CKD now stage IV.   However her creatinine has continued to improve since discharge down to 2.03 on March 25.  -Avoid nephrotoxins  -Has follow-up with nephrology    Hyperkalemia   newly taking patiromer.  Potassium 4.7.  No changes made    Peripheral edema   Encourage elevation of legs above heart level when she is resting rather than dangling down in his wheelchair all day    Generalized weakness   PT, OT,  involved, patient anticipates discharge back to her home    Insulin-dependent type 2 diabetes   Continue glargine 25 twice daily as current.  Consider lowering of at bedtime dose of she has any true hypoglycemia.    Atrial fibrillation   Relatively new diagnosis as noted above.  She is on warfarin and metoprolol.  Rate control appears adequate.  INR is subtherapeutic at 1.76 on March 17.  She has another INR scheduled for March 30    Peripheral neuropathy   Continue gabapentin    GERD   Continue omeprazole    Leukocytosis   Persistent.  Has been noted in previous hospitalizations and attributed to stress demargination.  If patient is able to establish stable health for a few weeks and if the white blood count is rechecked and remains elevated at that point, I would recommend peripheral smear, probable hematology consultation.    Morbid obesity   With attendant risks.  Mobilize as able.  Continue physical therapy.    Enterococcus faecalis UTI   Patient reports she is asymptomatic at this point has completed 7 days amoxicillin        Case discussed with:    Facility staff               Harry Blackwood MD

## 2021-06-20 NOTE — LETTER
Letter by Harry Blackwood MD at      Author: Harry Blackwood MD Service: -- Author Type: --    Filed:  Encounter Date: 7/3/2020 Status: (Other)         Patient: Jazmin Bauman   MR Number: [id number]   YOB: 1955   Date of Visit: 7/3/2020      [facility]    Facility:  [facility] [[id number]]    Code Status:  FULL CODE    Chief Complaint   Patient presents with   ? H & P   :                    Patient Active Problem List   Diagnosis   ? Carpal Tunnel Syndrome   ? Urinary Tract Infection   ? Endometrial Hyperplasia   ? Cholelithiasis   ? Leukocytosis   ? Urine Tests Nonspecific Abnormal Findings   ? Obesity   ? Essential hypertension   ? Pernicious Anemia   ? Immunology Studies Raised Immunoglobulin Level   ? Vaginal bleeding   ? Type 2 diabetes mellitus (H)   ? Atrial fibrillation with RVR (H)   ? Acute kidney injury superimposed on CKD (H)   ? Non-traumatic rhabdomyolysis   ? Metabolic acidosis   ? Troponin level elevated   ? Bacteremia   ? Acute respiratory failure with hypoxia (H)   ? Acute encephalopathy   ? Acute pulmonary edema (H)   ? Wheezing   ? Moderate protein malnutrition (H)   ? Abnormal cytological findings in specimens from other female genital organs   ? Chronic kidney disease, stage III (moderate) (H)   ? Hyperkalemia   ? Osteoarthritis   ? Acute kidney injury (H)   ? Sepsis (H)   ? 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 Sepsis, due to unspecified organism, unspecified whether acute organ dysfunction present (H)   ? Cellulitis of right foot   ? CKD (chronic kidney disease) stage 4, GFR 15-29 ml/min (H)   ? Chronic wound infection of abdomen, subsequent encounter   ? Acute post-operative pain   ? Phantom limb pain (H)   ? Paroxysmal atrial fibrillation (H)   ? Urinary retention   ? Coronary artery disease without angina pectoris   ? Hx of right BKA (H)       History:  Jazmin Bauman  is a 65  year old female with history of CKD 4, morbid obesity, insulin-dependent type 2 diabetes, chronic atrial fibrillation, peripheral neuropathy, GERD, peripheral edema, obesity, anemia of chronic disease with iron deficiency seen for admission to TCU on 7/3/2020    Hospital Course: Patient was admitted between June 16 and June 28.    Patient has history of chronic nonhealing right heel ulcer which became infected without her knowledge.  Patient's housemate summoned help and patient was transported to the emergency room.  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  Patient was septic and treated with Vanco/Zosyn.  Blood culture was positive for Peptostreptococcus and Streptococcus.  As patient improved she was transitioned to Augmentin.     Guillotine amputation converted to BKA on June 23.    Hospitalization was complicated for bilateral abdominal wall issues.  On the right side patient was felt to have chronic wounds/rash.  On the left she had acute abscess and infection resulting in I&D in the operating room.  Calciphyxis considered possible though not confirmed.  Patient left with wound ulcer on the left abdomen requiring wound care treatment which is ongoing.  Please bilateral infectious issues occur in the skin and subcutaneous tissues in the dependent pannus of the lower flanks.  I do not see that any wound cultures were obtained from these areas.  Patient describes many months of work with wound care and soft tissue surgery/debridements to clear up the infection of the right abdominal wall in the past.    Hospitalization also remarkable for hypokalemia at outset improved with Kayexalate.  Patient has paroxysmal atrial fibrillation, her warfarin needed to be held due to the multiple surgeries but she is back on it.  There is also concern over coronary artery disease with positive troponin January 2020 for which she was lost  to follow-up with stress test/cardiology never did so.  Finally she had retention resulted in Canchola catheterization which is ongoing.    Subjective/ROS:    -augmented by discussion with facility staff involved in direct care      Patient is quite discouraged today.  She discusses her medical and social problems and does not see a way out.  She normally would be living with her mother but has been out of that house living with her friend since her last TCU discharge as the friend's house has no stairs.  Meanwhile she says her sister has cleaned out her materials out of her mother's house and that she is lost important documents including her certificates.  She also says her sister is trying to move her mother out of the house and sell the house which would leave her homeless.  She has had a hard time keeping up with her bills from a distance.  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    She feels that she cannot wait months for her prosthesis that she needs to be up and around now to take care of business.    On the right side she feels well physically.  She describes difficulty with physical therapy so far.  She could not handle the sliding board.  She is Neftali lift dependent at the moment.  She had phantom leg pains at the hospital which are better.  However she still using Dilaudid.  She is on gabapentin as well.    Her blood pressure was elevated at the hospital she had addition of amlodipine and Lasix and has been continued on metoprolol.  She is tolerating these changes well and her blood pressures generally been okay here.    Blood sugars have been acceptable here generally less than 200 with some exceptions.    She is tolerating the Canchola catheter well.  She is to have a voiding trial about today.  However it is a holiday with reduced staff and I do not think it is the best day to do it.  She wants to keep the catheter in but we discussed the ramifications of that.   We agreed to suspend the voiding trial until early next week when we have got a full staff and can check PVRs closely etc.    Coronary artery disease she says that everything got put on hold due to pandemic but that she is willing to follow-up when available.    Her INR remains subtherapeutic 1.7 on June 30.  She has an INR early next week.    Obstructive sleep apnea suspected, sleep study recommended from the hospital.      Patient denies fever sweats chills.  She is eating well.  Her bowels are working fine.  She is having no dysuria.  No back or flank pain.  No abdominal pain other than the superficial wound pain.  She denies falls or injuries.  She is not having headaches.  She does not feel shortness of breath or chest pain.  Remainder 13 system ROS negative    Past Medical History:   Diagnosis Date   ? Anemia     pernicious   ? Diabetes mellitus (H)     borderline   ? Endometrial hyperplasia    ? Fibromyalgia    ? History of recurrent UTI (urinary tract infection)    ? History of transfusion    ? Hypertension    ? Thrombocytopenia (H)    ? Vaginal bleeding 1/2015     Past Surgical History:   Procedure Laterality Date   ? BELOW KNEE LEG AMPUTATION Right 6/18/2020    Procedure: AMPUTATION, BELOW KNEE;  Surgeon: Epi Corcoran MD;  Location: [facility];  Service: General   ? BELOW KNEE LEG AMPUTATION Right 6/23/2020    Procedure: REVISION AMPUTATION, BELOW KNEE;  Surgeon: Epi Corcoran MD;  Location: [facility] OR;  Service: General   ? bilateral carpal tunnel release  2009   ? CHOLECYSTECTOMY     ? COLONOSCOPY N/A 9/11/2017    Procedure: COLONOSCOPY;  Surgeon: Tyler Bhakta MD;  Location: [facility];  Service:    ? COMBINED HYSTEROSCOPY DIAGNOSTIC / D&C N/A 1/28/2015    Procedure: DILATION AND CURETTAGE WITH HYSTEROSCOPY;  Surgeon: Grant Beal MD;  Location: [facility] OR;  Service:    ? DILATION AND CURETTAGE OF UTERUS     ? IR NON TUNNELED CATHETER >5 YEARS  1/21/2020    ? [facility]  1/20/2020        ? SC HYSTEROSCOPY,W/ENDO BX N/A 9/5/2019    Procedure: HYSTEROSCOPY, DILATION AND CURETTAGE;  Surgeon: Grant Beal MD;  Location: [facility];  Service: Gynecology   ? SC HYSTEROSCOPY,W/ENDO BX N/A 12/9/2019    Procedure: HYSTEROSCOPY, DILATION AND CURETTAGE;  Surgeon: Grant Beal MD;  Location: [facility];  Service: Gynecology          Family History   Problem Relation Age of Onset   ? Colon cancer Father    :       Social History     Socioeconomic History   ? Marital status: Single     Spouse name: Not on file   ? Number of children: Not on file   ? Years of education: Not on file   ? Highest education level: Not on file   Occupational History   ? Not on file   Social Needs   ? Financial resource strain: Not on file   ? Food insecurity     Worry: Not on file     Inability: Not on file   ? Transportation needs     Medical: Not on file     Non-medical: Not on file   Tobacco Use   ? Smoking status: Never Smoker   ? Smokeless tobacco: Never Used   Substance and Sexual Activity   ? Alcohol use: Not Currently     Comment: rare   ? Drug use: No   ? Sexual activity: Not on file   Lifestyle   ? Physical activity     Days per week: Not on file     Minutes per session: Not on file   ? Stress: Not on file   Relationships   ? Social connections     Talks on phone: Not on file     Gets together: Not on file     Attends Hindu service: Not on file     Active member of club or organization: Not on file     Attends meetings of clubs or organizations: Not on file     Relationship status: Not on file   ? Intimate partner violence     Fear of current or ex partner: Not on file     Emotionally abused: Not on file     Physically abused: Not on file     Forced sexual activity: Not on file   Other Topics Concern   ? Not on file   Social History Narrative   ? Not on file   :    Currently living at a friend's house.  Normally she would live at her most.    Current Outpatient  "Medications on File Prior to Visit   Medication Sig Dispense Refill   ? acetaminophen (TYLENOL) 500 MG tablet Take 2 tablets (1,000 mg total) by mouth every 6 (six) hours as needed for pain or fever.  0   ? amLODIPine (NORVASC) 10 MG tablet Take 1 tablet (10 mg total) by mouth daily.  0   ? aspirin 81 MG EC tablet Take 81 mg by mouth daily.     ? calcium acetate,phosphat bind, (PHOSLO) 667 mg capsule Take 1 capsule (667 mg total) by mouth 3 (three) times a day with meals. 270 capsule 3   ? cholecalciferol, vitamin D3, (VITAMIN D3) 1,000 unit capsule Take 1,000 Units by mouth daily.     ? collagenase ointment Apply daily per WOC  0   ? ferrous sulfate 325 (65 FE) MG tablet Take 1 tablet by mouth 2 (two) times a day with meals.     ? furosemide (LASIX) 20 MG tablet Take 1 tablet (20 mg total) by mouth daily.  0   ? gabapentin (NEURONTIN) 400 MG capsule Take 1 capsule (400 mg total) by mouth 3 (three) times a day.  0   ? HYDROmorphone (DILAUDID) 2 MG tablet Take 1 tablet (2 mg total) by mouth every 4 (four) hours as needed. 30 tablet 0   ? insulin glargine (BASAGLAR KWIKPEN) 100 unit/mL (3 mL) pen Inject 10 Units under the skin at bedtime.  0   ? magnesium 250 mg Tab Take 500 mg by mouth 2 (two) times a day.      ? metoprolol tartrate (LOPRESSOR) 25 MG tablet Take 1 tablet (25 mg total) by mouth 2 (two) times a day. 180 tablet 3   ? NOVOLOG FLEXPEN U-100 INSULIN 100 unit/mL (3 mL) injection pen Check blood sugar four (4) times daily.  11. (Patient taking differently: Inject 4 Units under the skin 3 (three) times a day before meals. )  0   ? senna-docusate (PERICOLACE) 8.6-50 mg tablet Take 1 tablet by mouth 2 (two) times a day.  0   ? sodium bicarbonate 650 MG tablet Take 1 tablet (650 mg total) by mouth 2 (two) times a day. OTC product  0   ? warfarin ANTICOAGULANT (COUMADIN/JANTOVEN) 1 MG tablet Take 5 mg on 6/28/20, redose warfarin based on INR on 6/29  0   ? BD ULTRA-FINE MICRO PEN NEEDLE 32 gauge x 1/4\" Ndle USE " AS DIRECTED 4 TIMES DAILY. 360 each 3   ? blood glucose test (ACCU-CHEK SAM PLUS TEST STRP) strips TEST 1 TIME A  strip 10   ? blood glucose test (ACCU-CHEK SAM PLUS TEST STRP) strips test blood sugar level 6 times daily 600 strip 4   ? lancets (ACCU-CHEK MULTICLIX LANCET) Misc TEST 6 TIMES A  each 11   ? NOVOLOG FLEXPEN U-100 INSULIN 100 unit/mL (3 mL) injection pen Check blood sugar four (4) times daily.  {  0     No current facility-administered medications on file prior to visit.    :      ALLERGIES:  Hydralazine; Latex; Naprosyn [naproxen]; Nitrofurantoin; Sulfa (sulfonamide antibiotics); and Vicks vaporub [camphor-eucalyptus oil-menthol]    Vitals:  There were no vitals taken for this visit. except as noted below    Vital signs: Reviewed per facility EMR vitals including as follows:                Current Vitals   BP: 130/43 mmHg  7/3/2020 15:52  Temp:97.8  F  7/3/2020 15:52  Pulse: 62 bpm  7/3/2020 15:52  Weight: 273.4 Lbs  7/2/2020 10:45  Resp: 18 Breaths/min  7/3/2020 15:52  BS: 252 mg/dL  7/3/2020 15:41  O2: 90 %  7/3/2020 15:52  Pain: 6  7/3/2020 12:  There is no height or weight on file to calculate BMI.    Physical exam:    General:  Alert  oriented x3 appears comfortable    Normocephalic atraumatic sclera clear EOMI gaze is conjugate demonstrates good range of motion of neck with rotation of head in all directions.  Demonstrates purposeful movement of all extremities.  Patient has BKA right with rigid plastic splint/supporting device in place.  Left foot shows excellent perfusion with strong pulses and no edema.  Left abdominal wound is approximately an inch and a half in diameter estimated 4 mm depth with minimal slough on the base.  Surrounding purplish erythema and slight induration which is mildly tender.  On the right she has chronic hyperpigmentation no drainage with small ulcer    Abdomen shows that she has redundant soft adipose tissues and it is the dependent area of these in  the flanks that have the ulcerations and chronic edematous changes.    Patient is normally conversant with strong speech breathing comfortably without tachypnea or accessory muscle use    Canchola catheter shows clear yellow urine      Due to the 2020 Covid 19 pandemic, except as noted above, the patient was visually observed at a 6 foot plus distance.  An observational exam was performed in an effort to keep patient safe from Covid 19 and other communicable diseases.   Labs:  Lab Results   Component Value Date    WBC 9.5 06/27/2020    HGB 7.9 (L) 06/27/2020    HCT 25.5 (L) 06/27/2020    MCV 95 06/27/2020     06/27/2020     Results for orders placed or performed during the hospital encounter of 06/16/20   Basic Metabolic Panel   Result Value Ref Range    Sodium 141 136 - 145 mmol/L    Potassium 4.2 3.5 - 5.0 mmol/L    Chloride 107 98 - 107 mmol/L    CO2 27 22 - 31 mmol/L    Anion Gap, Calculation 7 5 - 18 mmol/L    Glucose 116 70 - 125 mg/dL    Calcium 8.8 8.5 - 10.5 mg/dL    BUN 30 (H) 8 - 22 mg/dL    Creatinine 1.57 (H) 0.60 - 1.10 mg/dL    GFR MDRD Af Amer 40 (L) >60 mL/min/1.73m2    GFR MDRD Non Af Amer 33 (L) >60 mL/min/1.73m2         Lab Results   Component Value Date    TSH 1.83 06/17/2020     Lab Results   Component Value Date    HGBA1C 10.3 (H) 06/17/2020     [unfilled]  Lab Results   Component Value Date    JUZDVSWU95 285 01/07/2011     Lab Results   Component Value Date     (H) 01/20/2020     [unfilled]  Most Recent EKG     Units 06/16/20  1820   VENTRATE BPM 76   ATRIALRATE BPM 76   QRSDURATION ms 94   QTINTERVAL ms 428   QTCCALC ms 481   P Axis degrees 55   RAXIS degrees -43   TAXIS degrees 12   MUSEDX  Normal sinus rhythm  Left axis deviation  Inferior infarct (cited on or before 13-MAR-2020)  Abnormal ECG  When compared with ECG of 14-MAR-2020 13:04,  No significant change was found  Confirmed by MARYANA COLLINS MD LOC:[facility] ([id number]) on 6/17/2020 1:41:23 PM         INR 1.7 on June  30  Assessment/Plan:      ICD-10-CM    1. CKD (chronic kidney disease) stage 4, GFR 15-29 ml/min (H)  N18.4    2. Phantom limb pain (H)  G54.6    3. Hx of right BKA (H)  Z89.511    4. Paroxysmal atrial fibrillation (H)  I48.0    5. Type 2 diabetes mellitus with other specified complication, with long-term current use of insulin (H)  E11.69     Z79.4    6. Essential hypertension  I10        Chronic right foot ulcer now status post BKA  Right foot/leg cellulitis now status post BKA  Guillotine amputation June 30  Stump revision/BKA June 23   Patient has surgical follow-up on the ninth.  She does not want to pay to go there.  I discussed this with staff will take it up with her.  She needs appropriate surgical follow-up to assure adequate healing and plans for prosthesis etc.  Patient is no longer on antibiotics and doing well.  PT, OT,  all involved.  Pain control has been adequate.  Would hope to taper off the hydromorphone over the next week or 2 and we set some goals in that regard.  She is also on gabapentin and has acetaminophen.    Constipation   Bowel programs in place with senna docusate 1 tablet twice daily.  She reports good results    Abdominal wounds  Calciphylaxis considered possible here, though far from a certainty.  There was some doubt from some of her specialist..  However she appears to be responding to wound care.  She had left-sided abscess drained.  Continue collagenase ointment and other wound care as outlined by wound care specialist  -Follow-up with wound care team here at the facility and/or at the wound care clinic.  -New Lasix might help the edematous issue that she is having in the dependent flank areas.    Hypokalemia   Resolved    Paroxysmal atrial fibrillation   Back on warfarin but subtherapeutic.  Her warfarin has been adjusted and she will have an INR early next week.  Rate is controlled on metoprolol.    CKD 4   I added BMP and CBC to her next INR draw early next  week    Chronic anemia   Likely anemia of chronic disease.  Her nephrologist notes an iron deficiency component and she is on iron infusion therapy last hemoglobin 7.9 which she is tolerating well.  She is generally more in the 9 range.  Recheck CBC next week as noted.  She takes oral iron as well unclear that she is observing it.    Acute urinary retention   Patient reports it was really hard to urinate in bed on a bedpan etc.  Hopefully she will be more mobile in the next few days.  -Anticipate voiding trial early next week, Canchola was left in place until then    Obesity   Complicates her movement, limits mobility and increase his difficulty of therapy.  Associated with other increased risk of morbidity as well.    Insulin-dependent type 2 diabetes.  Uncontrolled   A1c 10.3.  Her sugars are better than that here likely due to carbohydrate controlled diet and structured setting.  She is on aspartame 4 units with each meal.  She has on 10 units glargine at at bedtime.  I make no changes today based on recent results.  However I suspect she is somewhat underdosed we may need to increase over the next days.    Hypertension   Continue metoprolol.  New Lasix and amlodipine are in order.    Social stressors and factors   Social service consultation        Case discussed with:    Facility staff       Time: 55 minutes with > 50% in counseling and coordination of care as noted above         Harry Blackwood MD

## 2021-06-20 NOTE — LETTER
"Letter by Azalea Kevin CNP at      Author: Azalea Kevin CNP Service: -- Author Type: --    Filed:  Encounter Date: 2020 Status: (Other)         Patient: Jazmin Bauman   MR Number: [id number]   YOB: 1955   Date of Visit: 2020       [facility]      NAME:  Jazmin Bauman             :  1955    MRN: [id number]    CODE STATUS:  FULL CODE    FACILITY: [facility] [[id number]]         CHIEF COMPLAIN/REASON FOR VISIT:  Chief Complaint   Patient presents with   ? Review Of Multiple Medical Conditions     wound check       HISTORY OF PRESENT ILLNESS: Jazmin Bauman is a 65 y.o. female being seen for review of multiple medical conditions. Seen in her room as lab unable to get phlebotomy draw. INR due today as well as other labs as ordered per hospital. Approved to draw from left ft this am. On coumadin for Afib, no excessive bleeding or bruising observed. Pt was at [facility] from  to  and underwent a RBKA r/t ongoing DM ulcer with osteomyelitis.Per her EMR dc summary \" with HTN,morbid obesity, DM, A fib, CKD 4 presented for evaluation of R foot swelling, difficulty ambulation, pain found to have osteomyelitis now s/p amputation. She has now been to the OR twice this stay, last was on 20. 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   Patient had a chronic diabetic foot ulcer on her R heel, presented for increased swelling, difficulty ambulating, and pain. MRI showed extensive soft tissue necrosis and some osteomyelitis along with cellulitis  Met sepsis criteria on admission with leukocytosis and elevated CRP  Podiatry saw and the foot was not felt to be salvageable and BKA was recommended, -now POD #9  From guillotine amputation by Dr Corcoran. POD #5 revision of stump.  Treated with zosyn /vanvo, blood culture positive for Peptostreptococcus and " Streptococcus, changing to Augmentin for 4 more days.  WBC now normal.  Appreciate ID consultation.  Continue home gabapentin and has PRN dilaudid as well.  Pain team following.     Acute on chronic blood loss anemia-likely from anemia chronic disease, iron deficiency, chronic kidney disease, surgery.  needed pRBC on 6/23--stable at 7.9.  Iron deficient, nephrology started IV iron.  Discharged on oral iron     Gram-positive bacteremia x2 species  Patient with positive blood culture from 6/16 growing Strep anginosus and Peptostreptococcus  Changed from Zosyn to Augmentin.     Left abdominal wall cellulitis,  On antibiotics as above without improvement, surg did debridement too.  Ultrasound showed no abscess.  ID and surgery doubts calciphylaxis, minimal pain, no significant necrosis though she does have risk factors.  Does not need biopsy at this point, continue current wound care and antibiotics.    Discussed with surgery, nephrology and ID.  Needs close follow-up at the TCU     Chronic right abdominal wound-no signs of worsening, small scab, no induration or signs of abscess or fluctuance.  Has been present for months.  Wound care following, continue to monitor.  Doubt calciphylaxis Will need to follow-up with nephrology as outpatient, holding on thiosulfate here.     Insulin-dependent diabetes  Poorly controlled with A1c 10.3  Had some hypoglycemia, Lantus dose decreased to 10 units, discharged on NovoLog 4 units pre-meal and sliding scale with better control.  Suspect she was noncompliant with her insulin as her A1c was so and she did not require what her med rec stated for her insulin.       Essential hypertension  Elevated, asymptomatic, continued metoprolol, added furosemide and amlodipine with improved blood pressure control.      CKD 4  -Home PhosLo  -Avoid nephrotoxins  -Follows with Dr Frost, with concern of possible Calciphylaxis, renal seeing here  Creatinine stable.  Started furosemide.  "      HyperK  On admission  -got kayexalate  -This is chronic intermittent problem for her  -low K diet   Potassium normal at discharge     Paroxysmal atrial fibrillation.  Held warfarin for surgery.  Restarted after surgery.  Pharmacy dosing.  Continue home metoprolol.  INR still subtherapeutic.  Recheck tomorrow     Coronary artery disease  She had a detectable troponin level when hospitalized back in January and outpatient stress test with a 2-day protocol was recommended but she never followed up  Cardiology saw and recommend outpatient stress test, but ok with surgery  Echocardiogram showed no wall motion abnormality and a normal EF  -also needs outpt CRISTINA eval  Continue metoprolol and aspirin.     Morbid obesity  Affecting her mobility and her ability to live independently as well as her overall health  Cardiology also recommend a sleep evaluation for probable CRISTINA     Urinary retention-Canchola placed due to retaining 900 cc of urine, trial of Canchola removal at the TCU in 1 week, follow-up with [facility] as an outpatient if she fails trial of removal.\"  We will attempt vding trial next week. She is seen in room and has flat affect and has toldme that God has talked to her twice about family matters. She would like to go home, states she cares for her elderly 96 yo mother. We discussed her ongoing care needs including therapies and med management today.    Allergies   Allergen Reactions   ? Hydralazine      Paralysis of legs   ? Latex Itching     Skin Burns   ? Naprosyn [Naproxen] Swelling     throat   ? Nitrofurantoin Hives   ? Sulfa (Sulfonamide Antibiotics) Rash   ? Vicks Vaporub [Camphor-Eucalyptus Oil-Menthol] Rash   :     Current Outpatient Medications   Medication Sig   ? acetaminophen (TYLENOL) 500 MG tablet Take 2 tablets (1,000 mg total) by mouth every 6 (six) hours as needed for pain or fever.   ? amLODIPine (NORVASC) 10 MG tablet Take 1 tablet (10 mg total) by mouth daily.   ? amoxicillin-clavulanate " "(AUGMENTIN) 875-125 mg per tablet Take 1 tablet by mouth 2 (two) times a day for 2 days.   ? aspirin 81 MG EC tablet Take 81 mg by mouth daily.   ? BD ULTRA-FINE MICRO PEN NEEDLE 32 gauge x 1/4\" Ndle USE AS DIRECTED 4 TIMES DAILY.   ? blood glucose test (ACCU-CHEK SAM PLUS TEST STRP) strips TEST 1 TIME A DAY   ? blood glucose test (ACCU-CHEK SAM PLUS TEST STRP) strips test blood sugar level 6 times daily   ? calcium acetate,phosphat bind, (PHOSLO) 667 mg capsule Take 1 capsule (667 mg total) by mouth 3 (three) times a day with meals.   ? cholecalciferol, vitamin D3, (VITAMIN D3) 1,000 unit capsule Take 1,000 Units by mouth daily.   ? collagenase ointment Apply daily per WOC   ? ferrous sulfate 325 (65 FE) MG tablet Take 1 tablet by mouth 2 (two) times a day with meals.   ? furosemide (LASIX) 20 MG tablet Take 1 tablet (20 mg total) by mouth daily.   ? gabapentin (NEURONTIN) 400 MG capsule Take 1 capsule (400 mg total) by mouth 3 (three) times a day.   ? HYDROmorphone (DILAUDID) 2 MG tablet Take 1 tablet (2 mg total) by mouth every 4 (four) hours as needed.   ? insulin glargine (BASAGLAR KWIKPEN) 100 unit/mL (3 mL) pen Inject 10 Units under the skin at bedtime.   ? lancets (ACCU-CHEK MULTICLIX LANCET) Misc TEST 6 TIMES A DAY   ? magnesium 250 mg Tab Take 500 mg by mouth 2 (two) times a day.    ? metoprolol tartrate (LOPRESSOR) 25 MG tablet Take 1 tablet (25 mg total) by mouth 2 (two) times a day.   ? NOVOLOG FLEXPEN U-100 INSULIN 100 unit/mL (3 mL) injection pen Check blood sugar four (4) times daily.  11.   ? NOVOLOG FLEXPEN U-100 INSULIN 100 unit/mL (3 mL) injection pen Check blood sugar four (4) times daily.  {   ? senna-docusate (PERICOLACE) 8.6-50 mg tablet Take 1 tablet by mouth 2 (two) times a day.   ? sodium bicarbonate 650 MG tablet Take 1 tablet (650 mg total) by mouth 2 (two) times a day. OTC product   ? warfarin ANTICOAGULANT (COUMADIN/JANTOVEN) 1 MG tablet Take 5 mg on 6/28/20, redose warfarin based " on INR on 6/29         REVIEW OF SYSTEMS:    Currently, no fever, chills, or rigors. Does not have any visual or hearing problems. Denies any chest pain, headaches, palpitations, lightheadedness, dizziness, shortness of breath, or cough. Appetite is good. Denies any GERD symptoms. Denies any difficulty with swallowing, nausea, or vomiting.  Denies any abdominal pain, diarrhea or constipation. Denies any urinary symptoms. No insomnia. No active bleeding. No rash.       PHYSICAL EXAMINATION:  Vitals:    06/30/20 1704   BP: 146/67   Pulse: 77   Temp: 97.7  F (36.5  C)   Weight: (!) 284 lb 8 oz (129 kg)         GENERAL: Awake, Alert, oriented x3, not in any form of acute distress, answers questions appropriately, follows simple commands, conversant with flat affect  HEENT: Head is normocephalic with normal hair distribution. No evidence of trauma. Ears: No acute purulent discharge. Eyes: Conjunctivae pink with no scleral jaundice. Nose: Normal mucosa and septum. NECK: Supple with no cervical or supraclavicular lymphadenopathy. Trachea is midline.   CHEST: No tenderness or deformity, no crepitus  LUNG: Clear to auscultation with good chest expansion. There DM BS  BACK: No kyphosis of the thoracic spine. Symmetric, no curvature, ROM normal, no CVA tenderness, no spinal tenderness   CVS: There is good S1  S2,  rhythm is regular.  ABDOMEN: Globular and ROTUND tender to palpation, non distended, no masses, no organomegaly, good bowel sounds, no rebound or guarding, no peritoneal signs. Wound to left side of abdomen, upper 2 o'clock with tunneling, yellow slough to bed. Followed by wound dr at facility.  EXTREMITIES: UE Full ROM. Right BKA, IN a  with protected brace  SKIN: Warm and dry, no erythema noted, open area with dressing on abdomen  NEUROLOGICAL: The patient is oriented to person, place and time. Strength and sensation are grossly intact. Face is symmetric.          LABS:    Lab Results   Component Value Date     WBC 9.5 06/27/2020    HGB 7.9 (L) 06/27/2020    HCT 25.5 (L) 06/27/2020    MCV 95 06/27/2020     06/27/2020       Results for orders placed or performed during the hospital encounter of 06/16/20   Basic Metabolic Panel   Result Value Ref Range    Sodium 141 136 - 145 mmol/L    Potassium 4.2 3.5 - 5.0 mmol/L    Chloride 107 98 - 107 mmol/L    CO2 27 22 - 31 mmol/L    Anion Gap, Calculation 7 5 - 18 mmol/L    Glucose 116 70 - 125 mg/dL    Calcium 8.8 8.5 - 10.5 mg/dL    BUN 30 (H) 8 - 22 mg/dL    Creatinine 1.57 (H) 0.60 - 1.10 mg/dL    GFR MDRD Af Amer 40 (L) >60 mL/min/1.73m2    GFR MDRD Non Af Amer 33 (L) >60 mL/min/1.73m2           Lab Results   Component Value Date    HGBA1C 10.3 (H) 06/17/2020     Vitamin D, Total (25-Hydroxy)   Date Value Ref Range Status   04/28/2016 29.8 (L) 30.0 - 80.0 ng/mL Final     Lab Results   Component Value Date    [id number] 285 01/07/2011       ASSESSMENT/PLAN:  1. Type 2 diabetes mellitus with other specified complication, with long-term current use of insulin (H)    2. Hx of right BKA (H)    3. Chronic wound infection of abdomen, subsequent encounter      1. DM: 10 units Glaragine at night with 4 units novolog with meals. , APPEARS SHE IS RUNNING HIGH 100 TO , WE WILL MONITOR.    2. RBKA: Reports pain stable during visit. Unable to assess actual stump, in  and brace. Attend therapies as needed PT/OT. SN for chronic medical conditions management    3. Chronic abdomen wound, left side, daily dressing per nursing staff, a bit red today with slough, I will defer to wound rounds today as wound rounds will take place, tender to touch.    Pt did seem flat as well as some comments mad related to auditory hallucinations. She told me God has talked to her now twice on the TCU. I recommend ACP services at this time, referral made.      Electronically signed by:  Azalea Kevin CNP  This progress note was completed using Dragon software and there may be  grammatical errors.

## 2021-06-20 NOTE — LETTER
"Letter by Zoey Leung CNP at      Author: Zoey Leung CNP Service: -- Author Type: --    Filed:  Encounter Date: 9/15/2020 Status: (Other)         Patient: Jazmin Bauman   MR Number: [id number]   YOB: 1955   Date of Visit: 9/15/2020       [facility]      NAME:  Jazmin Bauman             :  1955    MRN: [id number]    CODE STATUS:  FULL CODE    FACILITY: [facility] [[id number]]         CHIEF COMPLAIN/REASON FOR VISIT:  Chief Complaint   Patient presents with   ? Review Of Multiple Medical Conditions     Constipation, hypercalcemia, paronychia.       HISTORY OF PRESENT ILLNESS: Jazmin Bauman is a 65 y.o. female. Pt was at [facility] from  to  and underwent a RBKA r/t ongoing DM ulcer with osteomyelitis. Per her EMR dc summary \" with HTN,morbid obesity, DM, A fib, CKD 4 presented for evaluation of R foot swelling, difficulty ambulation, pain found to have osteomyelitis now s/p amputation. She has now been to the OR twice this stay, last was on 20.   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   Patient had a chronic diabetic foot ulcer on her R heel, presented for increased swelling, difficulty ambulating, and pain. MRI showed extensive soft tissue necrosis and some osteomyelitis along with cellulitis  Met sepsis criteria on admission with leukocytosis and elevated CRP  Podiatry saw and the foot was not felt to be salvageable and BKA was recommended\"       August 15-: Hospitalized for sepsis with MRSA bacteremia, she had possible endocarditis and TTE revealing vegetation of the aortic valve. She had a CT of the abdomen and pelvis that showed left lower abdominal pannus ulceration but without abscess.  Her right BKA stump ulceration was negative for osteomyelitis or cellulitis.  She was treated with IV Vanco for 14 days which will be completed on .  Her left lower " abdominal ulceration has never been biopsied.  It was not biopsied in the hospital and there was no surgical intervention.  Her right BKA stump was negative for osteomyelitis.  CKD stage IV with baseline creatinine 2.5-3.  She was at 1.82 at the end of hospitalization.  She is discharged on sodium bicarb twice daily.  Her insulin was adjusted and she is now on sliding scale insulin with 10 units of Lantus at bedtime.  Blood sugars have been less than 200 on average.    For her hypertension, Norvasc has been discontinued.  Blood pressures remain in the 130s over 70s on average.  She was incidentally found to have a lung nodule on CT scan, will need to follow-up with this.  Please see imaging report from August hospitalization.  Multiple nodules and enlarged lymph nodes.    Today: Seen again to follow-up on recent labs as well as paronychia of the left toe.  Left toe looks good today, it is pink with some drainage.  Tenderness is improved.  I am able to palpate this without much discomfort.  She was unable to get the full thousand cc bag of fluids on Friday due to infiltrating IV.  She did continue to drink fluids over the weekend and her creatinine came down to 1.8.  Her calcium is still elevated at 11.2.  We will add on an ionized calcium and PTH and vitamin D.  Her PhosLo was discontinued.  I did review labs from prior hospitalizations and she had some elevated calcium at her August hospitalization which did slightly improved with fluids however not sure this was corrected for her albumin which is low at 2.5.  Seems that she has been trending up since June.  She does have a nephrology appointment at the end of the month so I will start labs and continue to push fluids until she can be seen by nephrology on the 28th.  She is otherwise stable without any nausea, muscle weakness, confusion or nausea.  She did say that she drinks quite a bit of milk so I told her to cut back on that as well and stick to clear  "fluids.      Allergies   Allergen Reactions   ? Hydralazine      Paralysis of legs   ? Latex Itching     Skin Burns   ? Naprosyn [Naproxen] Swelling     throat   ? Nitrofurantoin Hives   ? Sulfa (Sulfonamide Antibiotics) Rash   ? Vicks Vaporub [Camphor-Eucalyptus Oil-Menthol] Rash   :     Current Outpatient Medications   Medication Sig   ? acetaminophen (TYLENOL) 500 MG tablet Take 2 tablets (1,000 mg total) by mouth 3 (three) times a day.   ? aspirin 81 MG EC tablet Take 81 mg by mouth daily.   ? BD ULTRA-FINE MICRO PEN NEEDLE 32 gauge x 1/4\" Ndle USE AS DIRECTED 4 TIMES DAILY.   ? bisacodyL (DULCOLAX) 10 mg suppository Insert 10 mg into the rectum daily as needed. No stool greater than 72 hours   ? cholecalciferol, vitamin D3, (VITAMIN D3) 1,000 unit capsule Take 1,000 Units by mouth daily.   ? collagenase ointment Apply daily per WOC   ? docusate sodium (COLACE) 100 MG capsule Take 100 mg by mouth daily.   ? ferrous sulfate 325 (65 FE) MG tablet Take 1 tablet by mouth 2 (two) times a day with meals.   ? gabapentin (NEURONTIN) 100 MG capsule Take 200 mg by mouth 2 (two) times a day.   ? HYDROmorphone (DILAUDID) 2 MG tablet Take 1 tablet (2 mg total) by mouth daily. May also take 1 tablet (2 mg total) every 3 (three) hours as needed for pain.   ? lancets (ACCU-CHEK MULTICLIX LANCET) Misc TEST 6 TIMES A DAY   ? KARTIK YBARRAAR U-100 INSULIN 100 unit/mL (3 mL) pen Inject 10 Units under the skin at bedtime. 11.65 Type 2 with hyperglycemia  Contact provider if insulin prescribed is not the preferred insulin per insurance. (Patient taking differently: Inject 15 Units under the skin at bedtime. 11.65 Type 2 with hyperglycemia  Contact provider if insulin prescribed is not the preferred insulin per insurance.)   ? lidocaine (XYLOCAINE) 5 % ointment Apply topically 4 (four) times a day. Apply to left shoulder or around wound (not inside wound)   ? metoprolol tartrate (LOPRESSOR) 25 MG tablet Take 1 tablet (25 mg total) by " mouth 2 (two) times a day.   ? miconazole (MICOTIN) 2 % powder Apply topically 2 (two) times a day. Apply to b/l groin/underneath breasts/underneath pannus   ? NOVOLOG U-100 INSULIN ASPART 100 unit/mL injection Check blood sugar four (4) times daily.  11.65 Type 2 with hyperglycemia  OneTouch Verio Flex  Meter  OneTouch Verio strips 2 boxes of 50  Delica Lancets box of 100  BD Ultra-fine Therese Pen Needles - NDC [address]-00 - dispense 1 case, refill PRN for 1 year (Patient taking differently: Inject 7 Units under the skin 3 (three) times a day before meals. )   ? polyethylene glycol (MIRALAX) 17 gram packet Take 1 packet (17 g total) by mouth daily as needed.   ? senna (SENOKOT) 8.6 mg tablet Take 1 tablet by mouth 2 (two) times a day.   ? sodium bicarbonate 650 MG tablet Take 1 tablet (650 mg total) by mouth 2 (two) times a day. OTC product   ? warfarin sodium (WARFARIN ORAL) Take by mouth. 9/18/20 INR 1.6 5mg tonight, then 3mg daily. Next INR 9/22.  9/14/20 INR 1.7 Cont 2.5,g daily. Next INR 9/17 9/11/20 INR 1.9 Take 2.5mg daily Next INR 9/14 9/8/20 INR 1.6  Take 2.5mg daily.  Next INR 9/11/20.    9/3/20 INR 3.4  Hold x 2 days, then take 2mg daily.  Next INR 9/8/20.  9/2/20 INR 3.4 Hold 9/2 recheck INR 9/3  8/28/20 INR 2.6  Cont 3mg daily.  Next INR 9/1/20.    8/25/20 INR 2.2 Cont 3mg daily. Next INR 8/28.  8/24/20 INR 2.3  Take 3mg daily.  Next INR 8/27/20.         REVIEW OF SYSTEMS:    Currently, no fever, chills, or rigors. Does not have any visual or hearing problems. Denies any chest pain, headaches, palpitations, lightheadedness, dizziness, shortness of breath, or cough. Appetite is good. Denies any GERD symptoms. Denies any difficulty with swallowing, nausea, or vomiting.  Denies any abdominal pain, diarrhea or constipation. Denies any urinary symptoms, yeager in place. No insomnia. No active bleeding. No rash.       PHYSICAL EXAMINATION:  Vitals:    09/22/20 1203   BP: 148/68   Pulse: 61   Temp: 97.4  F  (36.3  C)   SpO2: 97%   Weight: (!) 231 lb (104.8 kg)         GENERAL: Awake, Alert, oriented x3, not in any form of acute distress, answers questions appropriately, follows simple commands, conversant with flat affect  HEENT: Head is normocephalic with normal hair distribution. No evidence of trauma. Ears: No acute purulent discharge. Eyes: Sclera anicteric.  CHEST: No tenderness or deformity, no crepitus  LUNG: Clear to auscultation with good chest expansion. There DM BS  BACK: No kyphosis of the thoracic spine. Symmetric, no curvature, ROM normal, no CVA tenderness, no spinal tenderness   CVS: There is good S1  S2,  rhythm is regular.  ABDOMEN: Globular and ROTUND tender to palpation, non distended, no masses, no organomegaly, good bowel sounds, no rebound or guarding, no peritoneal signs. Wound to left side of abdomen, dressed today, no open area to left hip as well. Unable to visualize as they are dressed and she is fully clothed. Followed by wound   EXTREMITIES: UE Full ROM. Right BKA, IN a  with protected brace  SKIN: Warm and dry, no erythema noted, open area on left pannus with dressing on abdomen  NEUROLOGICAL: The patient is oriented to person, place and time. Strength and sensation are grossly intact. Face is symmetric.          LABS:    Lab Results   Component Value Date    WBC 7.9 08/21/2020    HGB 9.2 (L) 08/21/2020    HCT 28.6 (L) 08/21/2020    MCV 89 08/21/2020     08/21/2020       Results for orders placed or performed during the hospital encounter of 08/15/20   Basic Metabolic Panel   Result Value Ref Range    Sodium 137 136 - 145 mmol/L    Potassium 4.1 3.5 - 5.0 mmol/L    Chloride 103 98 - 107 mmol/L    CO2 23 22 - 31 mmol/L    Anion Gap, Calculation 11 5 - 18 mmol/L    Glucose 178 (H) 70 - 125 mg/dL    Calcium 11.0 (H) 8.5 - 10.5 mg/dL    BUN 37 (H) 8 - 22 mg/dL    Creatinine 2.05 (H) 0.60 - 1.10 mg/dL    GFR MDRD Af Amer 29 (L) >60 mL/min/1.73m2    GFR MDRD Non Af Amer 24 (L)  >60 mL/min/1.73m2           Lab Results   Component Value Date    HGBA1C 10.3 (H) 06/17/2020     Vitamin D, Total (25-Hydroxy)   Date Value Ref Range Status   04/28/2016 29.8 (L) 30.0 - 80.0 ng/mL Final     Lab Results   Component Value Date    [id number] 285 01/07/2011       ASSESSMENT/PLAN:  1. Paronychia of great toe, left    2. Hypercalcemia    3. MRSA bacteremia    4. Hx of right BKA (H)      Right BKA: Also with postoperative pain and phantom limb pain. Unable to assess stump, in  and brace.  -Continues on Dilaudid.  -Continue PT and OT as directed by them.    Acute on chronic blood loss anemia-likely from anemia chronic disease, iron deficiency, chronic kidney disease, surgery. On oral iron.     Left abdominal wall cellulitis and right side: Not viewed today, however no complaints and she is followed by wound doctor at the facility.     Insulin-dependent diabetes: A1c 10.3.  Now on glargine 10 units nightly and sliding scale NovoLog 3 times daily with meals.  Blood sugars are okay.     Essential hypertension: Satisfactory in TCU thus far, amlodipine d.cd.   -metoprolol    CKD 4: Followed by Dr. Iqbal.  BMP with creatinine improved now at 1.8, continues with hypercalcemia at 11.2.  -Continue to encourage fluids.  Patient is fully capable of managing and drinking fluids independently.  Nephrology appointment on September 28.   -Check vitamin D, PTH and ionized calcium on Thursday with repeat BMP.  -Sodium bicarb.     Coronary artery disease:  -also needs outpt CRISTINA eval  -Continue metoprolol and aspirin.     Urinary retention: Obtain UA UC.    Paronychia: Left great toe. Has history of MRSA.  Improved, tenderness and redness drastically improved.  Mild drainage noted at the border of the nailbed.  This is good.  -Start Keflex 500 mg p.o. 3 times daily x10 days.  -Start doxycycline 100 mg p.o twice daily. x10 days.      A. Fib on Coumadin: INR today 1.7.Coumadin 3mg and recheck Friday.    TCU/PT report:  Using the easy stand for transfer at this time due to left leg weakness.    Communicated concerns with patient and house MD, Dr. Blackwood.    Electronically signed by:  Zoey Leung CNP  This progress note was completed using Dragon software and there may be grammatical errors.      .

## 2021-06-20 NOTE — LETTER
"Letter by Azalea Kevin CNP at      Author: Azalea Kevin CNP Service: -- Author Type: --    Filed:  Encounter Date: 2020 Status: (Other)         Patient: Jazmin Bauman   MR Number: [id number]   YOB: 1955   Date of Visit: 2020       [facility]      NAME:  Jazmin Bauman             :  1955    MRN: [id number]    CODE STATUS:  FULL CODE    FACILITY: [facility] [[id number]]         CHIEF COMPLAIN/REASON FOR VISIT:  Chief Complaint   Patient presents with   ? Review Of Multiple Medical Conditions     REHAB PROGRESS       HISTORY OF PRESENT ILLNESS: Jazmin Bauman is a 65 y.o. female being seen for review of multiple medical conditions. Seen in her room , she has been moved to a LTC but remains skilled at this time. Spoke to SW about room change, and the IDT felt she would have slow progress and need LTC. Pt is hoping to go home to care for her elderly mother, however that is a challenge she is unable to meet at this time.  Pt was at [facility] from  to  and underwent a RBKA r/t ongoing DM ulcer with osteomyelitis.Per her EMR dc summary \" with HTN,morbid obesity, DM, A fib, CKD 4 presented for evaluation of R foot swelling, difficulty ambulation, pain found to have osteomyelitis now s/p amputation. She has now been to the OR twice this stay, last was on 20.   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   Patient had a chronic diabetic foot ulcer on her R heel, presented for increased swelling, difficulty ambulating, and pain. MRI showed extensive soft tissue necrosis and some osteomyelitis along with cellulitis  Met sepsis criteria on admission with leukocytosis and elevated CRP  Podiatry saw and the foot was not felt to be salvageable and BKA was recommended, -now POD #9  From guillotine amputation by Dr Corcoran. POD #5 revision of stump.  Treated with zosyn /vanvo, blood " culture positive for Peptostreptococcus and Streptococcus, changing to Augmentin for 4 more days.  WBC now normal.  Appreciate ID consultation.  Continue home gabapentin and has PRN dilaudid as well.  Pain team following.     Acute on chronic blood loss anemia-likely from anemia chronic disease, iron deficiency, chronic kidney disease, surgery.  needed pRBC on 6/23--stable at 7.9.  Iron deficient, nephrology started IV iron.  Discharged on oral iron     Gram-positive bacteremia x2 species  Patient with positive blood culture from 6/16 growing Strep anginosus and Peptostreptococcus  Changed from Zosyn to Augmentin.     Left abdominal wall cellulitis,  On antibiotics as above without improvement, surg did debridement too.  Ultrasound showed no abscess.  ID and surgery doubts calciphylaxis, minimal pain, no significant necrosis though she does have risk factors.  Does not need biopsy at this point, continue current wound care and antibiotics.    Discussed with surgery, nephrology and ID.  Needs close follow-up at the TCU     Chronic right abdominal wound-no signs of worsening, small scab, no induration or signs of abscess or fluctuance.  Has been present for months.  Wound care following, continue to monitor.  Doubt calciphylaxis Will need to follow-up with nephrology as outpatient, holding on thiosulfate here.     Insulin-dependent diabetes  Poorly controlled with A1c 10.3  Had some hypoglycemia, Lantus dose decreased to 10 units, discharged on NovoLog 4 units pre-meal and sliding scale with better control.  Suspect she was noncompliant with her insulin as her A1c was so and she did not require what her med rec stated for her insulin.       Essential hypertension  Elevated, asymptomatic, continued metoprolol, added furosemide and amlodipine with improved blood pressure control.      CKD 4  -Home PhosLo  -Avoid nephrotoxins  -Follows with Dr Frost, with concern of possible Calciphylaxis, renal seeing here  Creatinine  "stable.  Started furosemide.       HyperK  On admission  -got kayexalate  -This is chronic intermittent problem for her  -low K diet   Potassium normal at discharge     Paroxysmal atrial fibrillation.  Held warfarin for surgery.  Restarted after surgery.  Pharmacy dosing.  Continue home metoprolol.  INR still subtherapeutic.  Recheck tomorrow     Coronary artery disease  She had a detectable troponin level when hospitalized back in January and outpatient stress test with a 2-day protocol was recommended but she never followed up  Cardiology saw and recommend outpatient stress test, but ok with surgery  Echocardiogram showed no wall motion abnormality and a normal EF  -also needs outpt CRISTINA eval  Continue metoprolol and aspirin.     Morbid obesity  Affecting her mobility and her ability to live independently as well as her overall health  Cardiology also recommend a sleep evaluation for probable CRISTINA     Urinary retention-Yeager placed due to retaining 900 cc of urine, trial of Yeager removal at the TCU in 1 week, follow-up with [facility] as an outpatient if she fails trial of removal.\"   On coumadin for Afib, no excessive bleeding or bruising we dosed her coumadin at 5 mg po daily with recheck scheduled in a week. She continues w/o yeager. She wishes she had it as difficult to transfer to toilet. Explained risk benefits of having indwelling yeager remain. Unable to see any residuals but per nursing staff she is voiding QS.     Allergies   Allergen Reactions   ? Hydralazine      Paralysis of legs   ? Latex Itching     Skin Burns   ? Naprosyn [Naproxen] Swelling     throat   ? Nitrofurantoin Hives   ? Sulfa (Sulfonamide Antibiotics) Rash   ? Vicks Vaporub [Camphor-Eucalyptus Oil-Menthol] Rash   :     Current Outpatient Medications   Medication Sig   ? acetaminophen (TYLENOL) 500 MG tablet Take 2 tablets (1,000 mg total) by mouth every 6 (six) hours as needed for pain or fever.   ? amLODIPine (NORVASC) 10 MG tablet Take " "1 tablet (10 mg total) by mouth daily. (Patient taking differently: Take 10 mg by mouth daily. Hold for systolic blood pressure less than 105)   ? aspirin 81 MG EC tablet Take 81 mg by mouth daily.   ? BD ULTRA-FINE MICRO PEN NEEDLE 32 gauge x 1/4\" Ndle USE AS DIRECTED 4 TIMES DAILY.   ? blood glucose test (ACCU-CHEK SAM PLUS TEST STRP) strips TEST 1 TIME A DAY   ? blood glucose test (ACCU-CHEK SAM PLUS TEST STRP) strips test blood sugar level 6 times daily   ? calcium acetate,phosphat bind, (PHOSLO) 667 mg capsule Take 1 capsule (667 mg total) by mouth 3 (three) times a day with meals.   ? cholecalciferol, vitamin D3, (VITAMIN D3) 1,000 unit capsule Take 1,000 Units by mouth daily.   ? collagenase ointment Apply daily per WOC   ? ferrous sulfate 325 (65 FE) MG tablet Take 1 tablet by mouth 2 (two) times a day with meals.   ? furosemide (LASIX) 20 MG tablet Take 1 tablet (20 mg total) by mouth daily.   ? gabapentin (NEURONTIN) 400 MG capsule Take 1 capsule (400 mg total) by mouth 3 (three) times a day.   ? HYDROmorphone (DILAUDID) 2 MG tablet Take 1 tablet (2 mg total) by mouth every 4 (four) hours as needed.   ? insulin glargine (BASAGLAR KWIKPEN) 100 unit/mL (3 mL) pen Inject 10 Units under the skin at bedtime. (Patient taking differently: Inject 15 Units under the skin at bedtime. )   ? lancets (ACCU-CHEK MULTICLIX LANCET) Misc TEST 6 TIMES A DAY   ? magnesium 250 mg Tab Take 500 mg by mouth 2 (two) times a day.    ? metoprolol tartrate (LOPRESSOR) 25 MG tablet Take 1 tablet (25 mg total) by mouth 2 (two) times a day. (Patient taking differently: Take 25 mg by mouth 2 (two) times a day. Hold for systolic blood pressure less than 105)   ? NOVOLOG FLEXPEN U-100 INSULIN 100 unit/mL (3 mL) injection pen Check blood sugar four (4) times daily.  11. (Patient taking differently: Inject 7 Units under the skin 3 (three) times a day before meals. if 141 - 180 = 1 unit;   181 - 220 = 2 units;  Plus sliding scale   221 - " 260 = 3 units;   261 - 300 = 4 units;   301 - 340 = 5 units;   341 - 400 = 6 units;   401 - 999 = 7 units,)   ? NOVOLOG FLEXPEN U-100 INSULIN 100 unit/mL (3 mL) injection pen Check blood sugar four (4) times daily.  {   ? omeprazole (PRILOSEC) 20 MG capsule Take 20 mg by mouth.   ? senna-docusate (PERICOLACE) 8.6-50 mg tablet Take 1 tablet by mouth 2 (two) times a day.   ? sodium bicarbonate 650 MG tablet Take 1 tablet (650 mg total) by mouth 2 (two) times a day. OTC product   ? warfarin sodium (WARFARIN ORAL) Take by mouth. 7/7/20 INR 2.5  Cont 5mg daily.  Next INR 7/10/20.         REVIEW OF SYSTEMS:    Currently, no fever, chills, or rigors. Does not have any visual or hearing problems. Denies any chest pain, headaches, palpitations, lightheadedness, dizziness, shortness of breath, or cough. Appetite is good. Denies any GERD symptoms. Denies any difficulty with swallowing, nausea, or vomiting.  Denies any abdominal pain, diarrhea or constipation. Denies any urinary symptoms. No insomnia. No active bleeding. No rash.       PHYSICAL EXAMINATION:  Vitals:    07/15/20 1515   BP: 134/86   Pulse: 60   Temp: 96.9  F (36.1  C)   Weight: (!) 259 lb 9.6 oz (117.8 kg)         GENERAL: Awake, Alert, oriented x3, not in any form of acute distress, answers questions appropriately, follows simple commands, conversant with flat affect  HEENT: Head is normocephalic with normal hair distribution. No evidence of trauma. Ears: No acute purulent discharge. Eyes: Conjunctivae pink with no scleral jaundice. Nose: Normal mucosa and septum. NECK: Supple with no cervical or supraclavicular lymphadenopathy. Trachea is midline.   CHEST: No tenderness or deformity, no crepitus  LUNG: Clear to auscultation with good chest expansion. There DM BS  BACK: No kyphosis of the thoracic spine. Symmetric, no curvature, ROM normal, no CVA tenderness, no spinal tenderness   CVS: There is good S1  S2,  rhythm is regular.  ABDOMEN: Globular and ROTUND  tender to palpation, non distended, no masses, no organomegaly, good bowel sounds, no rebound or guarding, no peritoneal signs. Wound to left side of abdomen, upper 2 o'clock with tunneling, yellow slough to bed. Followed by wound dr at facility.  EXTREMITIES: UE Full ROM. Right BKA, IN a  with protected brace  SKIN: Warm and dry, no erythema noted, open area with dressing on abdomen  NEUROLOGICAL: The patient is oriented to person, place and time. Strength and sensation are grossly intact. Face is symmetric.          LABS:    Lab Results   Component Value Date    WBC 9.5 06/27/2020    HGB 7.9 (L) 06/27/2020    HCT 25.5 (L) 06/27/2020    MCV 95 06/27/2020     06/27/2020       Results for orders placed or performed during the hospital encounter of 06/16/20   Basic Metabolic Panel   Result Value Ref Range    Sodium 141 136 - 145 mmol/L    Potassium 4.2 3.5 - 5.0 mmol/L    Chloride 107 98 - 107 mmol/L    CO2 27 22 - 31 mmol/L    Anion Gap, Calculation 7 5 - 18 mmol/L    Glucose 116 70 - 125 mg/dL    Calcium 8.8 8.5 - 10.5 mg/dL    BUN 30 (H) 8 - 22 mg/dL    Creatinine 1.57 (H) 0.60 - 1.10 mg/dL    GFR MDRD Af Amer 40 (L) >60 mL/min/1.73m2    GFR MDRD Non Af Amer 33 (L) >60 mL/min/1.73m2           Lab Results   Component Value Date    HGBA1C 10.3 (H) 06/17/2020     Vitamin D, Total (25-Hydroxy)   Date Value Ref Range Status   04/28/2016 29.8 (L) 30.0 - 80.0 ng/mL Final     Lab Results   Component Value Date    ERIWWIWP16 285 01/07/2011       ASSESSMENT/PLAN:  1. Hx of right BKA (H)    2. Urinary retention      1.. RBKA: Reports pain stable during visit. Unable to assess actual stump, in  and brace. Attend therapies as needed PT/OT. SN for chronic medical conditions management. Pt goal isto walk, working on transfers out of bed at this time, using mechanical lift.Coumadin dosing completed, 5 mg po daily, next week we will recheck INR.    2. Urinary retenton: No PVR to review,, catheter is out and  pt reporting vding,nurses reports of QS voiding. Educated provided to pt on leaving yeager out vs convenience of keeping one in place.     Electronically signed by:  Azalea Kevin CNP  This progress note was completed using Dragon software and there may be grammatical errors.

## 2021-06-20 NOTE — LETTER
"Letter by Harry Blackwood MD at      Author: Harry Blackwood MD Service: -- Author Type: --    Filed:  Encounter Date: 4/16/2020 Status: (Other)         Patient: Jazmin Bauman   MR Number: [id number]   YOB: 1955   Date of Visit: 4/16/2020     [facility]    Facility:   [facility] [[id number]]   Code Status: FULL CODE  PCP: Lester Flores MD   Phone: [phone]   Fax: [phone]      CHIEF COMPLAINT/REASON FOR VISIT:  Chief Complaint   Patient presents with   ? Discharge Summary       HISTORY COURSE:  Jazmin Bauman  is a 64 year old female with history of CKD4, morbid obesity, insulin-dependent type 2 diabetes, chronic atrial fibrillation, peripheral neuropathy, GERD, peripheral edema, seen for admission to TCU on 3/28/2020     Hospital Course: Patient was admitted between March 3 and March 17 most recently.  However she was admitted from the TCU following complicated hospital stay between January 20 and January 31 where she was treated for acute encephalopathy (stroke ruled out), sepsis due to UTI (E. coli), acute kidney injury/rhabdomyolysis (did not require dialysis this time but has required it transiently once in the past), new onset atrial fibrillation with RVR, acute urinary retention with failed voiding trial leading to catheterization, acute respiratory failure hypoxia treated with BiPAP and right leg weakness without evidence of CVA, questionable myelopathy secondary to lumbar canal stenosis.     This admission was remarkable for acute kidney injury which was noted in the TCU but could not be reversed under those conditions.  Urine culture revealed Enterococcus faecalis treated with amoxicillin for 7 days.  Nephrology felt that the patient was \"prerenal\" and indeed her renal function improved with IV fluids.  Patient also had hypokalemia which has been a recurrent problem for her and has difficulty tolerating " Kayexalate.  She is newly started on patiromer ca sorbitex.      Facility course:  -Patient complained of recurrent dysuria.  Repeat urine culture showed  K Enterococcus faecalis, this was a catheterized specimen.  We elected to treat again with amoxicillin 250 twice daily for 7 days (renal dosing) with good response.  -CKD 4 was monitored with reassuring laboratory work on April 8 with creatinine 1.73 and normal electrolytes.  -Insulin-dependent type 2 diabetes with adequate control of her sugars are on home insulin regimen.  -History of atrial fibrillation though patient currently in sinus rhythm.  Warfarin was monitored with therapeutic INRs most recently 2.9 on April 13  -No recurrence of hyperkalemia    -I became aware of patient's history of multiple bone marrow biopsies in work-up for chronic leukocytosis/thrombocytosis/anemia of chronic disease.  Hematologist is now retired.  I asked for old records which have been difficult to obtain directly from that office according to staff.  It is possible that Dr. Flores is aware of that work-up but I asked the patient to follow-up with him regarding this issue which is ongoing.  Patient told me that no diagnosis could be reached but that she was generally reassured by the now retired physician and has not thought too much about it in the years since.    -Generalized weakness complicated by morbid obesity and peripheral neuropathy were managed by PT, OT.  Patient will need a four-wheel walker bariatric type which was ordered.  She plans to discharge to a friend's house with support there.  She needs PT, OT, RN, home health aide in addition to the walker.  See therapy notes for additional details.  -Patient developed loose stools April 15/April 16 with improvement over that 48-hour time period, following discontinuation of her laxatives.  As of this dictation, C. difficile will not be tested as long as her loose stools resolved, however if they continue after  "discontinuation of laxatives C. difficile will be sent and will need to update the record.    -Hypomagnesemia was replaced    Review of Systems    Current Vitals   BP: 142/64 mmHg  4/16/2020 15:39  Temp:96.8  F  4/16/2020 15:39  Pulse: 61 bpm  4/16/2020 15:39  Weight: 277.2 Lbs  4/15/2020 07:57  Resp: 17 Breaths/min  4/16/2020 15:39  BS: 154 mg/dL  4/16/2020 19:21  O2: 97 %  4/16/2020 15:39  Pain: 0  4/16/2020 14:06  Physical Exam    MEDICATION LIST:  Current Outpatient Medications   Medication Sig   ? ACCU-CHEK SAM PLUS TEST STRP strips TEST 6 TIMES A DAY (Patient taking differently: TEST 1 TIME A DAY)   ? ACCU-CHEK SAM PLUS TEST STRP strips test blood sugar level 6 times daily   ? acetaminophen (TYLENOL) 325 MG tablet Take 2 tablets (650 mg total) by mouth 3 (three) times a day. (Patient taking differently: Take 650 mg by mouth 2 (two) times a day. )   ? aspirin 81 MG EC tablet Take 81 mg by mouth daily.   ? BD ULTRA-FINE MICRO PEN NEEDLE 32 gauge x 1/4\" Ndle USE AS DIRECTED 4 TIMES DAILY.   ? calcium acetate (PHOSLO) 667 mg capsule Take 1 capsule (667 mg total) by mouth 3 (three) times a day with meals.   ? cholecalciferol, vitamin D3, (VITAMIN D3) 1,000 unit capsule Take 1,000 Units by mouth daily.   ? docusate sodium (COLACE) 50 MG capsule Take by mouth daily.    ? ferrous sulfate 325 (65 FE) MG tablet Take 1 tablet by mouth 2 (two) times a day with meals.   ? gabapentin (NEURONTIN) 300 MG capsule Take 300 mg by mouth 2 (two) times a day.   ? HYDROcodone-acetaminophen 5-325 mg per tablet Take 1 tablet by mouth every 4 (four) hours as needed for pain.   ? insulin glargine (BASAGLAR KWIKPEN) 100 unit/mL (3 mL) pen Inject 25 Units under the skin 2 (two) times a day.   ? insulin lispro (ADMELOG SOLOSTAR U-100 INSULIN) 100 unit/mL pen Inject 2-14 Units under the skin 3 (three) times a day with meals. 0-120 0 units  121-200 2 units  201-250 4 units  251-300 6 units  301-350 8 units  351-400 10 units  401-450 12 " units  451-500 14 units  501+ call TCP   ? insulin lispro (HUMALOG) 100 unit/mL injection Inject 2 Units under the skin 3 (three) times a day before meals.   ? lancets (ACCU-CHEK MULTICLIX LANCET) Misc TEST 6 TIMES A DAY (Patient taking differently: TEST 1 TIME A DAY)   ? magnesium 250 mg Tab Take 500 mg by mouth 2 (two) times a day.    ? metoprolol tartrate (LOPRESSOR) 25 MG tablet Take 1 tablet (25 mg total) by mouth 2 (two) times a day.   ? omeprazole (PRILOSEC) 20 MG capsule Take 1 capsule (20 mg total) by mouth at bedtime.   ? patiromer calcium sorbitex (VELTESSA) 8.4 gram PwPk powder packet Take 8.4 g by mouth daily.   ? senna-docusate (SENNOSIDES-DOCUSATE SODIUM) 8.6-50 mg tablet Take 1 tablet by mouth daily.   ? warfarin sodium (WARFARIN ORAL) Take by mouth. 4/13/20 INR 2.9 Cont 4mg T-Th-Sat, 3.5mg AOD. Next INR 4/16.  4/8/20 INR 2.6  Cont 4mg Tues-Thurs-Sat and 3.5mg AOD.  Next INR 4/13/20.    4/1/20 INR 2.7 4mg Tu-Th-Sat, 3.5mg AOD. Next INR 4/8.  3/30/20 INR 3.9  Hold x 2 days.  Next INR 4/1/20.    3/23/20 INR 1.9  Cont 4mg daily.  Next INR 3/30/20.  3/19/20 INR 1.6  Take 4mg daily.  Next INR 3/23/20.       DISCHARGE DIAGNOSIS:    ICD-10-CM    1. Essential hypertension  I10    Acute kidney injury on CKD 4             Improved during her course here as noted above  -Avoid nephrotoxins  -Has follow-up with nephrology     Hyperkalemia              Resolved, newly taking patiromer.       Peripheral edema             Much improved with lymphedema treatment here     Generalized weakness              PT, OT, RN, home health aide involved, discharge anticipated to friend's house for additional support next week    Insulin-dependent type 2 diabetes              Continue glargine 25 twice daily as current.  Consider lowering of at bedtime dose of she has any true hypoglycemia.     Atrial fibrillation              Relatively new diagnosis as noted above.  She is on warfarin and metoprolol.    Most recent EKG with  sinus rhythm.   INR has remained therapeutic.  See orders for next INR check     Peripheral neuropathy              Continue gabapentin     GERD              Continue omeprazole     Leukocytosis              See above discussion     Morbid obesity              With attendant risks.  Mobilize as able.  Continue physical therapy at home.     Enterococcus faecalis UTI              Improvement after second course of amoxicillin here, the first was in the hospital    MEDICAL EQUIPMENT NEEDS:  Four-wheel walker please see previous documentation/face-to-face    DISCHARGE PLAN/FACE TO FACE:  I certify that services are/were furnished while this patient was under the care of a physician and that a physician or an allowed non-physician practitioner (NPP), had a face-to-face encounter that meets the physician face-to-face encounter requirements. The encounter was in whole, or in part, related to the primary reason for home health. The patient is confined to his/her home and needs intermittent skilled nursing, physical therapy,  continued need for occupational therapy, home health aide. A plan of care has been established by a physician and is periodically reviewed by a physician.  Date of Face-to-Face Encounter: 4/16/2020      I certify that, based on my findings, the following services are medically necessary home health services:     My clinical findings support the need for the above skilled services because: Mobility impairment due to peripheral neuropathy associated with diabetes and multiple comorbidities including obesity.    This patient is homebound because: She is unable to leave the home without assist of another person and with assistive devices    The patient is, or has been, under my care and I have initiated the establishment of the plan of care. This patient will be followed by a physician who will periodically review the plan of care.    Schedule follow up visit with primary care provider within 7 days to  reestablish care.      40 minutes    Electronically signed by: Harry Blackwood MD

## 2021-06-20 NOTE — LETTER
"Letter by Zoey Leung CNP at      Author: Zoey Leung CNP Service: -- Author Type: --    Filed:  Encounter Date: 2020 Status: (Other)         Patient: Jazmin Bauman   MR Number: [id number]   YOB: 1955   Date of Visit: 2020       [facility]      NAME:  Jazmin Bauman             :  1955    MRN: [id number]    CODE STATUS:  FULL CODE    FACILITY: [facility] [[id number]]         CHIEF COMPLAIN/REASON FOR VISIT:  Chief Complaint   Patient presents with   ? Problem Visit     foot pain.        HISTORY OF PRESENT ILLNESS: Jazmin Bauman is a 65 y.o. female. Pt was at [facility] from  to  and underwent a RBKA r/t ongoing DM ulcer with osteomyelitis. Per her EMR dc summary \" with HTN,morbid obesity, DM, A fib, CKD 4 presented for evaluation of R foot swelling, difficulty ambulation, pain found to have osteomyelitis now s/p amputation. She has now been to the OR twice this stay, last was on 20.   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   Patient had a chronic diabetic foot ulcer on her R heel, presented for increased swelling, difficulty ambulating, and pain. MRI showed extensive soft tissue necrosis and some osteomyelitis along with cellulitis  Met sepsis criteria on admission with leukocytosis and elevated CRP  Podiatry saw and the foot was not felt to be salvageable and BKA was recommended\"       August 15-: Hospitalized for sepsis with MRSA bacteremia, she had possible endocarditis and TTE revealing vegetation of the aortic valve. She had a CT of the abdomen and pelvis that showed left lower abdominal pannus ulceration but without abscess.  Her right BKA stump ulceration was negative for osteomyelitis or cellulitis.  She was treated with IV Vanco for 14 days which will be completed on .  Her left lower abdominal ulceration has never been biopsied.  It was " "not biopsied in the hospital and there was no surgical intervention.  Her right BKA stump was negative for osteomyelitis.  CKD stage IV with baseline creatinine 2.5-3.  She was at 1.82 at the end of hospitalization.  She is discharged on sodium bicarb twice daily.  Her insulin was adjusted and she is now on sliding scale insulin with 10 units of Lantus at bedtime.  Blood sugars have been less than 200 on average.    For her hypertension, Norvasc has been discontinued.  Blood pressures remain in the 130s over 70s on average.  She was incidentally found to have a lung nodule on CT scan, will need to follow-up with this.    Today: Seen for INR today and general discussion.  Today her INR was 1.6; she is feeling well was seen by the wound care team.  She does have a BMP and CBC due today.  She is complaining of pain in her toe that was seen by Dr. Blackwood on Friday.  No new antibiotic orders and no open area drainage is noted.  It is pain so we will try soaking it for now but may need antibiotics.  Will recheck on Thursday.  Otherwise denying any constipation, diarrhea, urinary burning or frequency, shortness of breath or chest pain.        Allergies   Allergen Reactions   ? Hydralazine      Paralysis of legs   ? Latex Itching     Skin Burns   ? Naprosyn [Naproxen] Swelling     throat   ? Nitrofurantoin Hives   ? Sulfa (Sulfonamide Antibiotics) Rash   ? Vicks Vaporub [Camphor-Eucalyptus Oil-Menthol] Rash   :     Current Outpatient Medications   Medication Sig   ? acetaminophen (TYLENOL) 500 MG tablet Take 2 tablets (1,000 mg total) by mouth 3 (three) times a day.   ? aspirin 81 MG EC tablet Take 81 mg by mouth daily.   ? BD ULTRA-FINE MICRO PEN NEEDLE 32 gauge x 1/4\" Ndle USE AS DIRECTED 4 TIMES DAILY.   ? bisacodyL (DULCOLAX) 10 mg suppository Insert 10 mg into the rectum daily as needed. No stool greater than 72 hours   ? cholecalciferol, vitamin D3, (VITAMIN D3) 1,000 unit capsule Take 1,000 Units by mouth daily. "   ? collagenase ointment Apply daily per WOC   ? docusate sodium (COLACE) 100 MG capsule Take 100 mg by mouth daily.   ? ferrous sulfate 325 (65 FE) MG tablet Take 1 tablet by mouth 2 (two) times a day with meals.   ? gabapentin (NEURONTIN) 100 MG capsule Take 200 mg by mouth 2 (two) times a day.   ? HYDROmorphone (DILAUDID) 2 MG tablet Take 1 tablet (2 mg total) by mouth every 3 (three) hours as needed.   ? HYDROmorphone (DILAUDID) 2 MG tablet Take 1 tablet (2 mg total) by mouth daily.   ? lancets (ACCU-CHEK MULTICLIX LANCET) Misc TEST 6 TIMES A DAY   ? LANTUS SOLOSTAR U-100 INSULIN 100 unit/mL (3 mL) pen Inject 10 Units under the skin at bedtime. 11.65 Type 2 with hyperglycemia  Contact provider if insulin prescribed is not the preferred insulin per insurance. (Patient taking differently: Inject 15 Units under the skin at bedtime. 11.65 Type 2 with hyperglycemia  Contact provider if insulin prescribed is not the preferred insulin per insurance.)   ? lidocaine (XYLOCAINE) 5 % ointment Apply topically 4 (four) times a day. Apply to left shoulder or around wound (not inside wound)   ? metoprolol tartrate (LOPRESSOR) 25 MG tablet Take 1 tablet (25 mg total) by mouth 2 (two) times a day.   ? miconazole (MICOTIN) 2 % powder Apply topically 2 (two) times a day. Apply to b/l groin/underneath breasts/underneath pannus   ? NOVOLOG U-100 INSULIN ASPART 100 unit/mL injection Check blood sugar four (4) times daily.  11.65 Type 2 with hyperglycemia  OneTouch Verio Flex  Meter  OneTouch Verio strips 2 boxes of 50  Delica Lancets box of 100  BD Ultra-fine Therese Pen Needles - NDC [address]-78 - dispense 1 case, refill PRN for 1 year (Patient taking differently: Inject 7 Units under the skin 3 (three) times a day before meals. )   ? polyethylene glycol (MIRALAX) 17 gram packet Take 1 packet (17 g total) by mouth daily as needed.   ? senna (SENOKOT) 8.6 mg tablet Take 1 tablet by mouth 2 (two) times a day.   ? sodium bicarbonate 650  MG tablet Take 1 tablet (650 mg total) by mouth 2 (two) times a day. OTC product   ? warfarin sodium (WARFARIN ORAL) Take by mouth. 9/11/20 INR 1.9 Take 2.5mg daily Next INR 9/14 9/8/20 INR 1.6  Take 2.5mg daily.  Next INR 9/11/20.    9/3/20 INR 3.4  Hold x 2 days, then take 2mg daily.  Next INR 9/8/20.  9/2/20 INR 3.4 Hold 9/2 recheck INR 9/3  8/28/20 INR 2.6  Cont 3mg daily.  Next INR 9/1/20.    8/25/20 INR 2.2 Cont 3mg daily. Next INR 8/28.  8/24/20 INR 2.3  Take 3mg daily.  Next INR 8/27/20.         REVIEW OF SYSTEMS:    Currently, no fever, chills, or rigors. Does not have any visual or hearing problems. Denies any chest pain, headaches, palpitations, lightheadedness, dizziness, shortness of breath, or cough. Appetite is good. Denies any GERD symptoms. Denies any difficulty with swallowing, nausea, or vomiting.  Denies any abdominal pain, diarrhea or constipation. Denies any urinary symptoms, yeager in place. No insomnia. No active bleeding. No rash.       PHYSICAL EXAMINATION:  Vitals:    09/13/20 1436   BP: 165/66   Pulse: 60   Temp: 97  F (36.1  C)   SpO2: 98%   Weight: (!) 231 lb (104.8 kg)         GENERAL: Awake, Alert, oriented x3, not in any form of acute distress, answers questions appropriately, follows simple commands, conversant with flat affect  HEENT: Head is normocephalic with normal hair distribution. No evidence of trauma. Ears: No acute purulent discharge. Eyes: Sclera anicteric.  CHEST: No tenderness or deformity, no crepitus  LUNG: Clear to auscultation with good chest expansion. There DM BS  BACK: No kyphosis of the thoracic spine. Symmetric, no curvature, ROM normal, no CVA tenderness, no spinal tenderness   CVS: There is good S1  S2,  rhythm is regular.  ABDOMEN: Globular and ROTUND tender to palpation, non distended, no masses, no organomegaly, good bowel sounds, no rebound or guarding, no peritoneal signs. Wound to left side of abdomen, dressed today, no open area to left hip as well.  Unable to visualize as they are dressed and she is fully clothed. Followed by wound   EXTREMITIES: UE Full ROM. Right BKA, IN a  with protected brace  SKIN: Warm and dry, no erythema noted, open area on left pannus with dressing on abdomen  NEUROLOGICAL: The patient is oriented to person, place and time. Strength and sensation are grossly intact. Face is symmetric.          LABS:    Lab Results   Component Value Date    WBC 7.9 08/21/2020    HGB 9.2 (L) 08/21/2020    HCT 28.6 (L) 08/21/2020    MCV 89 08/21/2020     08/21/2020       Results for orders placed or performed during the hospital encounter of 08/15/20   Basic Metabolic Panel   Result Value Ref Range    Sodium 137 136 - 145 mmol/L    Potassium 4.1 3.5 - 5.0 mmol/L    Chloride 103 98 - 107 mmol/L    CO2 23 22 - 31 mmol/L    Anion Gap, Calculation 11 5 - 18 mmol/L    Glucose 178 (H) 70 - 125 mg/dL    Calcium 11.0 (H) 8.5 - 10.5 mg/dL    BUN 37 (H) 8 - 22 mg/dL    Creatinine 2.05 (H) 0.60 - 1.10 mg/dL    GFR MDRD Af Amer 29 (L) >60 mL/min/1.73m2    GFR MDRD Non Af Amer 24 (L) >60 mL/min/1.73m2           Lab Results   Component Value Date    HGBA1C 10.3 (H) 06/17/2020     Vitamin D, Total (25-Hydroxy)   Date Value Ref Range Status   04/28/2016 29.8 (L) 30.0 - 80.0 ng/mL Final     Lab Results   Component Value Date    PVFUKAHO44 285 01/07/2011       ASSESSMENT/PLAN:  1. Chronic pain syndrome    2. Wound infection    3. Hx of right BKA (H)      Right BKA: Also with postoperative pain and phantom limb pain. Unable to assess stump, in  and brace.  -Continues on Dilaudid.  -Continue PT and OT as directed by them.    Acute on chronic blood loss anemia-likely from anemia chronic disease, iron deficiency, chronic kidney disease, surgery. On oral iron.     Left abdominal wall cellulitis and right side: Wound care MD saw this today.     Insulin-dependent diabetes: A1c 10.3.  Now on glargine 10 units nightly and sliding scale NovoLog 3 times daily  with meals.  Blood sugars actually look pretty good here in the TCU, all less than 220.      Essential hypertension: Satisfactory in TCU thus far, amlodipine d.cd.   -metoprolol    CKD 4: Followed by Dr. Iqbal.  Recent Creat 1.57; recheck due today; waiting on labs.   -Continue PhosLo.   -Sodium bicarb.     Coronary artery disease:  -also needs outpt CRISTINA eval  -Continue metoprolol and aspirin.     Urinary retention: Continues to have [place] in place, she follows with [facility] for urinary retention.     A. Fib on Coumadin: INR today 1.6.Coumadin 2.5, recheck Friday.     TCU/PT report: Using the easy stand for transfer at this time due to left leg weakness.    Electronically signed by:  Zoey Leung CNP  This progress note was completed using Dragon software and there may be grammatical errors.      .

## 2021-06-20 NOTE — LETTER
"Letter by Zoey Leung CNP at      Author: Zoey Leung CNP Service: -- Author Type: --    Filed:  Encounter Date: 9/10/2020 Status: (Other)         Patient: Jazmin Bauman   MR Number: [id number]   YOB: 1955   Date of Visit: 9/10/2020       [facility]      NAME:  Jazmin Bauman             :  1955    MRN: [id number]    CODE STATUS:  FULL CODE    FACILITY: [facility] [[id number]]         CHIEF COMPLAIN/REASON FOR VISIT:  Chief Complaint   Patient presents with   ? Review Of Multiple Medical Conditions     Hypercalcemia, paronychia, UA UC.       HISTORY OF PRESENT ILLNESS: Jazmin Bauman is a 65 y.o. female. Pt was at [facility] from  to  and underwent a RBKA r/t ongoing DM ulcer with osteomyelitis. Per her EMR dc summary \" with HTN,morbid obesity, DM, A fib, CKD 4 presented for evaluation of R foot swelling, difficulty ambulation, pain found to have osteomyelitis now s/p amputation. She has now been to the OR twice this stay, last was on 20.   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   Patient had a chronic diabetic foot ulcer on her R heel, presented for increased swelling, difficulty ambulating, and pain. MRI showed extensive soft tissue necrosis and some osteomyelitis along with cellulitis  Met sepsis criteria on admission with leukocytosis and elevated CRP  Podiatry saw and the foot was not felt to be salvageable and BKA was recommended\"       August 15-: Hospitalized for sepsis with MRSA bacteremia, she had possible endocarditis and TTE revealing vegetation of the aortic valve. She had a CT of the abdomen and pelvis that showed left lower abdominal pannus ulceration but without abscess.  Her right BKA stump ulceration was negative for osteomyelitis or cellulitis.  She was treated with IV Vanco for 14 days which will be completed on .  Her left lower " abdominal ulceration has never been biopsied.  It was not biopsied in the hospital and there was no surgical intervention.  Her right BKA stump was negative for osteomyelitis.  CKD stage IV with baseline creatinine 2.5-3.  She was at 1.82 at the end of hospitalization.  She is discharged on sodium bicarb twice daily.  Her insulin was adjusted and she is now on sliding scale insulin with 10 units of Lantus at bedtime.  Blood sugars have been less than 200 on average.    For her hypertension, Norvasc has been discontinued.  Blood pressures remain in the 130s over 70s on average.  She was incidentally found to have a lung nodule on CT scan, will need to follow-up with this.    Today: Seen today to follow-up on recent labs.  On Tuesday her creatinine came back elevated at 2.4 with hypercalcemia to 11.7.  Repeat after 1 day with pushing fluids was similar with no real improvement in the hypercalcemia.  She is not on calcium or calcitonin.  No history of parathyroid issues.  She is agreeable to starting fluids and says that she is trying to drink and eat normally.  She has had a significant weight loss since her admission (~30lbs).  Of note she did have a lung nodule found on her CT scan that will be followed up within the next 2 months.  We will start fluids, treat for possible infection and obtain a UA UC, and recheck her BMP tomorrow.  If fluids do not help to correct calcium, I will obtain a PTH and ionized calcium.  Will be following up with her nephrologist at the end of September however lung nodule is also of concern in this setting.      Allergies   Allergen Reactions   ? Hydralazine      Paralysis of legs   ? Latex Itching     Skin Burns   ? Naprosyn [Naproxen] Swelling     throat   ? Nitrofurantoin Hives   ? Sulfa (Sulfonamide Antibiotics) Rash   ? Vicks Vaporub [Camphor-Eucalyptus Oil-Menthol] Rash   :     Current Outpatient Medications   Medication Sig   ? acetaminophen (TYLENOL) 500 MG tablet Take 2 tablets  "(1,000 mg total) by mouth 3 (three) times a day.   ? aspirin 81 MG EC tablet Take 81 mg by mouth daily.   ? BD ULTRA-FINE MICRO PEN NEEDLE 32 gauge x 1/4\" Ndle USE AS DIRECTED 4 TIMES DAILY.   ? bisacodyL (DULCOLAX) 10 mg suppository Insert 10 mg into the rectum daily as needed. No stool greater than 72 hours   ? cholecalciferol, vitamin D3, (VITAMIN D3) 1,000 unit capsule Take 1,000 Units by mouth daily.   ? collagenase ointment Apply daily per WOC   ? docusate sodium (COLACE) 100 MG capsule Take 100 mg by mouth daily.   ? ferrous sulfate 325 (65 FE) MG tablet Take 1 tablet by mouth 2 (two) times a day with meals.   ? gabapentin (NEURONTIN) 100 MG capsule Take 200 mg by mouth 2 (two) times a day.   ? HYDROmorphone (DILAUDID) 2 MG tablet Take 1 tablet (2 mg total) by mouth every 3 (three) hours as needed.   ? HYDROmorphone (DILAUDID) 2 MG tablet Take 1 tablet (2 mg total) by mouth daily.   ? lancets (ACCU-CHEK MULTICLIX LANCET) Misc TEST 6 TIMES A DAY   ? LANTUS SOLOSTAR U-100 INSULIN 100 unit/mL (3 mL) pen Inject 10 Units under the skin at bedtime. 11.65 Type 2 with hyperglycemia  Contact provider if insulin prescribed is not the preferred insulin per insurance. (Patient taking differently: Inject 15 Units under the skin at bedtime. 11.65 Type 2 with hyperglycemia  Contact provider if insulin prescribed is not the preferred insulin per insurance.)   ? lidocaine (XYLOCAINE) 5 % ointment Apply topically 4 (four) times a day. Apply to left shoulder or around wound (not inside wound)   ? metoprolol tartrate (LOPRESSOR) 25 MG tablet Take 1 tablet (25 mg total) by mouth 2 (two) times a day.   ? miconazole (MICOTIN) 2 % powder Apply topically 2 (two) times a day. Apply to b/l groin/underneath breasts/underneath pannus   ? NOVOLOG U-100 INSULIN ASPART 100 unit/mL injection Check blood sugar four (4) times daily.  11.65 Type 2 with hyperglycemia  OneTouch Verio Flex  Meter  OneTouch Verio strips 2 boxes of 50  Delica " Lancets box of 100  BD Ultra-fine Therese Pen Needles - NDC [address]-32 - dispense 1 case, refill PRN for 1 year (Patient taking differently: Inject 7 Units under the skin 3 (three) times a day before meals. )   ? polyethylene glycol (MIRALAX) 17 gram packet Take 1 packet (17 g total) by mouth daily as needed.   ? senna (SENOKOT) 8.6 mg tablet Take 1 tablet by mouth 2 (two) times a day.   ? sodium bicarbonate 650 MG tablet Take 1 tablet (650 mg total) by mouth 2 (two) times a day. OTC product   ? warfarin sodium (WARFARIN ORAL) Take by mouth. 9/14/20 INR 1.7 Cont 2.5,g daily. Next INR 9/17 9/11/20 INR 1.9 Take 2.5mg daily Next INR 9/14 9/8/20 INR 1.6  Take 2.5mg daily.  Next INR 9/11/20.    9/3/20 INR 3.4  Hold x 2 days, then take 2mg daily.  Next INR 9/8/20.  9/2/20 INR 3.4 Hold 9/2 recheck INR 9/3  8/28/20 INR 2.6  Cont 3mg daily.  Next INR 9/1/20.    8/25/20 INR 2.2 Cont 3mg daily. Next INR 8/28.  8/24/20 INR 2.3  Take 3mg daily.  Next INR 8/27/20.         REVIEW OF SYSTEMS:    Currently, no fever, chills, or rigors. Does not have any visual or hearing problems. Denies any chest pain, headaches, palpitations, lightheadedness, dizziness, shortness of breath, or cough. Appetite is good. Denies any GERD symptoms. Denies any difficulty with swallowing, nausea, or vomiting.  Denies any abdominal pain, diarrhea or constipation. Denies any urinary symptoms, yeager in place. No insomnia. No active bleeding. No rash.       PHYSICAL EXAMINATION:  Vitals:    09/16/20 0953   BP: 141/62   Pulse: (!) 56   Temp: 96.6  F (35.9  C)   SpO2: 99%   Weight: (!) 230 lb (104.3 kg)         GENERAL: Awake, Alert, oriented x3, not in any form of acute distress, answers questions appropriately, follows simple commands, conversant with flat affect  HEENT: Head is normocephalic with normal hair distribution. No evidence of trauma. Ears: No acute purulent discharge. Eyes: Sclera anicteric.  CHEST: No tenderness or deformity, no  crepitus  LUNG: Clear to auscultation with good chest expansion. There DM BS  BACK: No kyphosis of the thoracic spine. Symmetric, no curvature, ROM normal, no CVA tenderness, no spinal tenderness   CVS: There is good S1  S2,  rhythm is regular.  ABDOMEN: Globular and ROTUND tender to palpation, non distended, no masses, no organomegaly, good bowel sounds, no rebound or guarding, no peritoneal signs. Wound to left side of abdomen, dressed today, no open area to left hip as well. Unable to visualize as they are dressed and she is fully clothed. Followed by wound   EXTREMITIES: UE Full ROM. Right BKA, IN a  with protected brace  SKIN: Warm and dry, no erythema noted, open area on left pannus with dressing on abdomen  NEUROLOGICAL: The patient is oriented to person, place and time. Strength and sensation are grossly intact. Face is symmetric.          LABS:    Lab Results   Component Value Date    WBC 7.9 08/21/2020    HGB 9.2 (L) 08/21/2020    HCT 28.6 (L) 08/21/2020    MCV 89 08/21/2020     08/21/2020       Results for orders placed or performed during the hospital encounter of 08/15/20   Basic Metabolic Panel   Result Value Ref Range    Sodium 137 136 - 145 mmol/L    Potassium 4.1 3.5 - 5.0 mmol/L    Chloride 103 98 - 107 mmol/L    CO2 23 22 - 31 mmol/L    Anion Gap, Calculation 11 5 - 18 mmol/L    Glucose 178 (H) 70 - 125 mg/dL    Calcium 11.0 (H) 8.5 - 10.5 mg/dL    BUN 37 (H) 8 - 22 mg/dL    Creatinine 2.05 (H) 0.60 - 1.10 mg/dL    GFR MDRD Af Amer 29 (L) >60 mL/min/1.73m2    GFR MDRD Non Af Amer 24 (L) >60 mL/min/1.73m2           Lab Results   Component Value Date    HGBA1C 10.3 (H) 06/17/2020     Vitamin D, Total (25-Hydroxy)   Date Value Ref Range Status   04/28/2016 29.8 (L) 30.0 - 80.0 ng/mL Final     Lab Results   Component Value Date    [id number] 285 01/07/2011       ASSESSMENT/PLAN:  1. CKD (chronic kidney disease) stage 4, GFR 15-29 ml/min (H)    2. Chronic wound infection of abdomen,  subsequent encounter    3. Paroxysmal atrial fibrillation (H)    4. Hypercalcemia    5. Paronychia of great toe, left      Right BKA: Also with postoperative pain and phantom limb pain. Unable to assess stump, in  and brace.  -Continues on Dilaudid.  -Continue PT and OT as directed by them.    Acute on chronic blood loss anemia-likely from anemia chronic disease, iron deficiency, chronic kidney disease, surgery. On oral iron.     Left abdominal wall cellulitis and right side: Viewed wound today, wound bed is beefy red, no purulent drainage, the surrounding tissue is pink, minimal tenderness.     Insulin-dependent diabetes: A1c 10.3.  Now on glargine 10 units nightly and sliding scale NovoLog 3 times daily with meals.  Blood sugars actually look pretty good here in the TCU, all less than 220.      Essential hypertension: Satisfactory in TCU thus far, amlodipine d.cd.   -metoprolol    CKD 4: Followed by Dr. Iqbal.  BMP with elevated creatinine to 2.4 and hypercalcemia at 11.7.  Repeat today with same results.  The rest of her electrolytes are stable.  She is agreeable to IV fluids, will also check a UA UC and treat her toe infection.  -Start normal saline 1000 cc at 100 cc/h.   Nephrology appointment on September 28.   -Continue PhosLo.   -Sodium bicarb.     Coronary artery disease:  -also needs outpt CRISTINA eval  -Continue metoprolol and aspirin.     Urinary retention: Obtain UA UC.    Paronychia: Left great toe.  Bright red with tenderness.  Has history of MRSA.  -Start Keflex 500 mg p.o. 3 times daily x10 days.  -Start doxycycline 100 mg p.o twice daily. x10 days.      A. Fib on Coumadin: INR today 1.6.Coumadin 2.5, recheck Friday.     TCU/PT report: Using the easy stand for transfer at this time due to left leg weakness.    Electronically signed by:  Zoey Leung CNP  This progress note was completed using Dragon software and there may be grammatical errors.      .

## 2021-06-20 NOTE — LETTER
"Letter by Azalea Kevin CNP at      Author: Azalea Kevin CNP Service: -- Author Type: --    Filed:  Encounter Date: 2020 Status: (Other)         Patient: Jazmin Bauman   MR Number: [id number]   YOB: 1955   Date of Visit: 2020       [facility]      NAME:  Jazmin Bauman             :  1955    MRN: [id number]    CODE STATUS:  FULL CODE    FACILITY: [facility] [[id number]]         CHIEF COMPLAIN/REASON FOR VISIT:  Chief Complaint   Patient presents with   ? Review Of Multiple Medical Conditions     weakness       HISTORY OF PRESENT ILLNESS: Jazmin Bauman is a 65 y.o. female being seen for review of multiple medical conditions. Seen in her room after breakfast this am.  Pt was at [facility] from  to  and underwent a RBKA r/t ongoing DM ulcer with osteomyelitis.Per her EMR dc summary \" with HTN,morbid obesity, DM, A fib, CKD 4 presented for evaluation of R foot swelling, difficulty ambulation, pain found to have osteomyelitis now s/p amputation. She has now been to the OR twice this stay, last was on 20.   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   Patient had a chronic diabetic foot ulcer on her R heel, presented for increased swelling, difficulty ambulating, and pain. MRI showed extensive soft tissue necrosis and some osteomyelitis along with cellulitis  Met sepsis criteria on admission with leukocytosis and elevated CRP  Podiatry saw and the foot was not felt to be salvageable and BKA was recommended, -now POD #9  From guillotine amputation by Dr Corcoran. POD #5 revision of stump.  Treated with zosyn /vanvo, blood culture positive for Peptostreptococcus and Streptococcus, changing to Augmentin for 4 more days.  WBC now normal.  Appreciate ID consultation.  Continue home gabapentin and has PRN dilaudid as well.  Pain team following.     Acute on chronic blood loss " anemia-likely from anemia chronic disease, iron deficiency, chronic kidney disease, surgery.  needed pRBC on 6/23--stable at 7.9.  Iron deficient, nephrology started IV iron.  Discharged on oral iron     Gram-positive bacteremia x2 species  Patient with positive blood culture from 6/16 growing Strep anginosus and Peptostreptococcus  Changed from Zosyn to Augmentin.     Left abdominal wall cellulitis,  On antibiotics as above without improvement, surg did debridement too.  Ultrasound showed no abscess.  ID and surgery doubts calciphylaxis, minimal pain, no significant necrosis though she does have risk factors.  Does not need biopsy at this point, continue current wound care and antibiotics.    Discussed with surgery, nephrology and ID.  Needs close follow-up at the TCU     Chronic right abdominal wound-no signs of worsening, small scab, no induration or signs of abscess or fluctuance.  Has been present for months.  Wound care following, continue to monitor.  Doubt calciphylaxis Will need to follow-up with nephrology as outpatient, holding on thiosulfate here.     Insulin-dependent diabetes  Poorly controlled with A1c 10.3  Had some hypoglycemia, Lantus dose decreased to 10 units, discharged on NovoLog 4 units pre-meal and sliding scale with better control.  Suspect she was noncompliant with her insulin as her A1c was so and she did not require what her med rec stated for her insulin.       Essential hypertension  Elevated, asymptomatic, continued metoprolol, added furosemide and amlodipine with improved blood pressure control.      CKD 4  -Home PhosLo  -Avoid nephrotoxins  -Follows with Dr Frost, with concern of possible Calciphylaxis, renal seeing here  Creatinine stable.  Started furosemide.       HyperK  On admission  -got kayexalate  -This is chronic intermittent problem for her  -low K diet   Potassium normal at discharge     Paroxysmal atrial fibrillation.  Held warfarin for surgery.  Restarted after surgery.  " Pharmacy dosing.  Continue home metoprolol.  INR still subtherapeutic.  Recheck tomorrow     Coronary artery disease  She had a detectable troponin level when hospitalized back in January and outpatient stress test with a 2-day protocol was recommended but she never followed up  Cardiology saw and recommend outpatient stress test, but ok with surgery  Echocardiogram showed no wall motion abnormality and a normal EF  -also needs outpt CRISTINA eval  Continue metoprolol and aspirin.     Morbid obesity  Affecting her mobility and her ability to live independently as well as her overall health  Cardiology also recommend a sleep evaluation for probable CRISTINA     Urinary retention-Canchola placed due to retaining 900 cc of urine, trial of Canchola removal at the TCU in 1 week, follow-up with [facility] as an outpatient if she fails trial of removal.\"   On coumadin for Afib, no excessive bleeding or bruising observed. INR draw today, no excessive bleeding or bruising is observed.     Allergies   Allergen Reactions   ? Hydralazine      Paralysis of legs   ? Latex Itching     Skin Burns   ? Naprosyn [Naproxen] Swelling     throat   ? Nitrofurantoin Hives   ? Sulfa (Sulfonamide Antibiotics) Rash   ? Vicks Vaporub [Camphor-Eucalyptus Oil-Menthol] Rash   :     Current Outpatient Medications   Medication Sig   ? acetaminophen (TYLENOL) 500 MG tablet Take 2 tablets (1,000 mg total) by mouth every 6 (six) hours as needed for pain or fever.   ? amLODIPine (NORVASC) 10 MG tablet Take 1 tablet (10 mg total) by mouth daily.   ? aspirin 81 MG EC tablet Take 81 mg by mouth daily.   ? BD ULTRA-FINE MICRO PEN NEEDLE 32 gauge x 1/4\" Ndle USE AS DIRECTED 4 TIMES DAILY.   ? blood glucose test (ACCU-CHEK SAM PLUS TEST STRP) strips TEST 1 TIME A DAY   ? blood glucose test (ACCU-CHEK SAM PLUS TEST STRP) strips test blood sugar level 6 times daily   ? calcium acetate,phosphat bind, (PHOSLO) 667 mg capsule Take 1 capsule (667 mg total) by mouth 3 " (three) times a day with meals.   ? cholecalciferol, vitamin D3, (VITAMIN D3) 1,000 unit capsule Take 1,000 Units by mouth daily.   ? collagenase ointment Apply daily per WOC   ? ferrous sulfate 325 (65 FE) MG tablet Take 1 tablet by mouth 2 (two) times a day with meals.   ? furosemide (LASIX) 20 MG tablet Take 1 tablet (20 mg total) by mouth daily.   ? gabapentin (NEURONTIN) 400 MG capsule Take 1 capsule (400 mg total) by mouth 3 (three) times a day.   ? HYDROmorphone (DILAUDID) 2 MG tablet Take 1 tablet (2 mg total) by mouth every 4 (four) hours as needed.   ? insulin glargine (BASAGLAR KWIKPEN) 100 unit/mL (3 mL) pen Inject 10 Units under the skin at bedtime.   ? lancets (ACCU-CHEK MULTICLIX LANCET) Misc TEST 6 TIMES A DAY   ? magnesium 250 mg Tab Take 500 mg by mouth 2 (two) times a day.    ? metoprolol tartrate (LOPRESSOR) 25 MG tablet Take 1 tablet (25 mg total) by mouth 2 (two) times a day.   ? NOVOLOG FLEXPEN U-100 INSULIN 100 unit/mL (3 mL) injection pen Check blood sugar four (4) times daily.  11. (Patient taking differently: Inject 4 Units under the skin 3 (three) times a day before meals. )   ? NOVOLOG FLEXPEN U-100 INSULIN 100 unit/mL (3 mL) injection pen Check blood sugar four (4) times daily.  {   ? senna-docusate (PERICOLACE) 8.6-50 mg tablet Take 1 tablet by mouth 2 (two) times a day.   ? sodium bicarbonate 650 MG tablet Take 1 tablet (650 mg total) by mouth 2 (two) times a day. OTC product   ? warfarin sodium (WARFARIN ORAL) Take by mouth. 7/7/20 INR 2.5  Cont 5mg daily.  Next INR 7/10/20.         REVIEW OF SYSTEMS:    Currently, no fever, chills, or rigors. Does not have any visual or hearing problems. Denies any chest pain, headaches, palpitations, lightheadedness, dizziness, shortness of breath, or cough. Appetite is good. Denies any GERD symptoms. Denies any difficulty with swallowing, nausea, or vomiting.  Denies any abdominal pain, diarrhea or constipation. Denies any urinary symptoms. No  insomnia. No active bleeding. No rash.       PHYSICAL EXAMINATION:  Vitals:    07/08/20 0800   BP: 135/60   Pulse: 63   Temp: 98.2  F (36.8  C)   Weight: (!) 273 lb 6.4 oz (124 kg)         GENERAL: Awake, Alert, oriented x3, not in any form of acute distress, answers questions appropriately, follows simple commands, conversant with flat affect  HEENT: Head is normocephalic with normal hair distribution. No evidence of trauma. Ears: No acute purulent discharge. Eyes: Conjunctivae pink with no scleral jaundice. Nose: Normal mucosa and septum. NECK: Supple with no cervical or supraclavicular lymphadenopathy. Trachea is midline.   CHEST: No tenderness or deformity, no crepitus  LUNG: Clear to auscultation with good chest expansion. There DM BS  BACK: No kyphosis of the thoracic spine. Symmetric, no curvature, ROM normal, no CVA tenderness, no spinal tenderness   CVS: There is good S1  S2,  rhythm is regular.  ABDOMEN: Globular and ROTUND tender to palpation, non distended, no masses, no organomegaly, good bowel sounds, no rebound or guarding, no peritoneal signs. Wound to left side of abdomen, upper 2 o'clock with tunneling, yellow slough to bed. Followed by wound dr at facility.  EXTREMITIES: UE Full ROM. Right BKA, IN a  with protected brace  SKIN: Warm and dry, no erythema noted, open area with dressing on abdomen  NEUROLOGICAL: The patient is oriented to person, place and time. Strength and sensation are grossly intact. Face is symmetric.          LABS:    Lab Results   Component Value Date    WBC 9.5 06/27/2020    HGB 7.9 (L) 06/27/2020    HCT 25.5 (L) 06/27/2020    MCV 95 06/27/2020     06/27/2020       Results for orders placed or performed during the hospital encounter of 06/16/20   Basic Metabolic Panel   Result Value Ref Range    Sodium 141 136 - 145 mmol/L    Potassium 4.2 3.5 - 5.0 mmol/L    Chloride 107 98 - 107 mmol/L    CO2 27 22 - 31 mmol/L    Anion Gap, Calculation 7 5 - 18 mmol/L     Glucose 116 70 - 125 mg/dL    Calcium 8.8 8.5 - 10.5 mg/dL    BUN 30 (H) 8 - 22 mg/dL    Creatinine 1.57 (H) 0.60 - 1.10 mg/dL    GFR MDRD Af Amer 40 (L) >60 mL/min/1.73m2    GFR MDRD Non Af Amer 33 (L) >60 mL/min/1.73m2           Lab Results   Component Value Date    HGBA1C 10.3 (H) 06/17/2020     Vitamin D, Total (25-Hydroxy)   Date Value Ref Range Status   04/28/2016 29.8 (L) 30.0 - 80.0 ng/mL Final     Lab Results   Component Value Date    [id number] 285 01/07/2011       ASSESSMENT/PLAN:  1. Hx of right BKA (H)    2. Urinary retention      1.. RBKA: Reports pain stable during visit. Unable to assess actual stump, in  and brace. Attend therapies as needed PT/OT. SN for chronic medical conditions management. Pt goal isto walk, working on transfers out of bed at this time, using mechanical lift.    2. Urinary retenton: No PVR to review,, catheter is out and pt reporting vding, however using bedpan at night due to transfer difficulties at night, upset this is not going well. Spoke to LN on duty with pt to see if pt could get up on commode to vd today and check PVR.        Electronically signed by:  Azalea Kevin CNP  This progress note was completed using Dragon software and there may be grammatical errors.

## 2021-06-21 NOTE — LETTER
"Letter by Zoey Leung CNP at      Author: Zoey Leung CNP Service: -- Author Type: --    Filed:  Encounter Date: 2021 Status: (Other)         [facility] - [address] [address]                                  2021    Patient: Jazmin Bauman   MR Number: [id number]   YOB: 1955   Date of Visit: 2021     Dear  Home:    Thank you for referring Jazmin Bauman to me for evaluation. Below are the relevant portions of my assessment and plan of care.    If you have questions, please do not hesitate to call me. I look forward to following Jazmin along with you.    Sincerely,        Zoey Leung CNP          CC  No Recipients  Zoey Leung CNP  2021  9:55 PM  Sign when Signing Visit    [facility]      NAME:  Jazmin Bauman             :  1955    MRN: [id number]    CODE STATUS:  FULL CODE    FACILITY: [facility] [[id number]]         CHIEF COMPLAIN/REASON FOR VISIT:  Chief Complaint   Patient presents with   ? Problem Visit     Loose stools        HISTORY OF PRESENT ILLNESS: Jazmin Bauman is a 65 y.o. female. Pt was at [facility] from  to  and underwent a RBKA r/t ongoing DM ulcer with osteomyelitis. Per her EMR dc summary \" with HTN,morbid obesity, DM, A fib, CKD 4 presented for evaluation of R foot swelling, difficulty ambulation, pain found to have osteomyelitis now s/p amputation. She has now been to the OR twice this stay, last was on 20.   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   Patient had a chronic diabetic foot ulcer on her R heel, presented for increased swelling, difficulty ambulating, and pain. MRI showed extensive soft tissue necrosis and some osteomyelitis along with cellulitis  Met sepsis criteria on admission with leukocytosis and elevated CRP  Podiatry saw and the foot was not " "felt to be salvageable and BKA was recommended\"     August 15-21: Hospitalized for sepsis with MRSA bacteremia, she had possible endocarditis and TTE revealing vegetation of the aortic valve. She had a CT of the abdomen and pelvis that showed left lower abdominal pannus ulceration but without abscess.  Her right BKA stump ulceration was negative for osteomyelitis or cellulitis.  She was treated with IV Vanco for 14 days which will be completed on August 30.  Her left lower abdominal ulceration has never been biopsied.  It was not biopsied in the hospital and there was no surgical intervention.  Her right BKA stump was negative for osteomyelitis.  CKD stage IV with baseline creatinine 2.5-3.  She was at 1.82 at the end of hospitalization.  She is discharged on sodium bicarb twice daily.  Her insulin was adjusted and she is now on sliding scale insulin with 10 units of Lantus at bedtime.  Blood sugars have been less than 200 on average.    For her hypertension, Norvasc has been discontinued.  Blood pressures remain in the 130s over 70s on average.  She was incidentally found to have a lung nodule on CT scan; follow up CT was on December 1 and revealed resolution of the nodule.    Today: Jazmin was seen for continued concerns of loose stools however lab has rejected any C. difficile culture because they are not liquid or diarrhea.  She is having semi-formed stools but is somewhat incontinent which frustrates her.  She denies nausea today.  Weights have remained stable in the 230s.  She reports frustration with nursing and that she will be resuming therapies again.  She would like to reconsider knee injections again now that she is starting therapy again and she would like to eventually walk with a prosthesis.  Her wound on her abdomen is completely closed now.  She is feeling frustrated with her financial situation and feels like she is always looking for money to pay in the nursing home.  I did relay this these " "concerns to nurse manager Mary and asked that maybe she or social work touch base with the patient.      Allergies   Allergen Reactions   ? Hydralazine      Paralysis of legs   ? Latex Itching     Skin Burns   ? Naprosyn [Naproxen] Swelling     throat   ? Nitrofurantoin Hives   ? Sulfa (Sulfonamide Antibiotics) Rash   ? Vicks Vaporub [Camphor-Eucalyptus Oil-Menthol] Rash   :     Current Outpatient Medications   Medication Sig   ? acetaminophen (TYLENOL) 500 MG tablet Take 2 tablets (1,000 mg total) by mouth 3 (three) times a day.   ? aspirin 81 MG EC tablet Take 81 mg by mouth daily.   ? BD ULTRA-FINE MICRO PEN NEEDLE 32 gauge x 1/4\" Ndle USE AS DIRECTED 4 TIMES DAILY.   ? bisacodyL (DULCOLAX) 10 mg suppository Insert 10 mg into the rectum daily as needed. No stool greater than 72 hours   ? cholecalciferol, vitamin D3, (VITAMIN D3) 1,000 unit capsule Take 1,000 Units by mouth daily.   ? collagenase ointment Apply daily per WOC   ? docusate sodium (COLACE) 100 MG capsule Take 100 mg by mouth daily as needed.    ? ferrous sulfate 325 (65 FE) MG tablet Take 1 tablet by mouth 2 (two) times a day with meals.   ? gabapentin (NEURONTIN) 100 MG capsule Take 200 mg by mouth 2 (two) times a day.   ? HYDROmorphone (DILAUDID) 2 MG tablet (Take 2mg daily and 2mg Q 3 hours PRN)   ? lancets (ACCU-CHEK MULTICLIX LANCET) Misc TEST 6 TIMES A DAY   ? LANDEEPIKA SOLOSTAR U-100 INSULIN 100 unit/mL (3 mL) pen Inject 10 Units under the skin at bedtime. 11.65 Type 2 with hyperglycemia  Contact provider if insulin prescribed is not the preferred insulin per insurance. (Patient taking differently: Inject 15 Units under the skin at bedtime. 11.65 Type 2 with hyperglycemia  Contact provider if insulin prescribed is not the preferred insulin per insurance.)   ? lidocaine (XYLOCAINE) 5 % ointment Apply topically 4 (four) times a day. Apply to left shoulder or around wound (not inside wound)   ? metoprolol tartrate (LOPRESSOR) 25 MG tablet Take 1 " tablet (25 mg total) by mouth 2 (two) times a day.   ? miconazole (MICOTIN) 2 % powder Apply topically 2 (two) times a day. Apply to b/l groin/underneath breasts/underneath pannus   ? NOVOLOG U-100 INSULIN ASPART 100 unit/mL injection Check blood sugar four (4) times daily.  11.65 Type 2 with hyperglycemia  OneTouch Verio Flex  Meter  OneTouch Verio strips 2 boxes of 50  Delica Lancets box of 100  BD Ultra-fine Therese Pen Needles - NDC [address]-86 - dispense 1 case, refill PRN for 1 year (Patient taking differently: Inject 7 Units under the skin 3 (three) times a day before meals. )   ? polyethylene glycol (MIRALAX) 17 gram packet Take 1 packet (17 g total) by mouth daily as needed.   ? Saccharomyces boulardii (FLORASTOR) 250 mg capsule Take 250 mg by mouth daily.   ? senna (SENOKOT) 8.6 mg tablet Take 1 tablet by mouth 2 (two) times a day. (Patient taking differently: Take 1 tablet by mouth 2 (two) times a day as needed. )   ? sodium bicarbonate 650 MG tablet Take 1 tablet (650 mg total) by mouth 2 (two) times a day. OTC product   ? warfarin sodium (WARFARIN ORAL) Take by mouth. 1/5/21 INR 2.3  Cont 3mg Tues and Fri and 2.5mg AOD.  Next INR 1/19/21.    1/4/21 INR missed.  Take 2.5mg on 1/4.  Next INR 1/5/21.    12/28/20 INR 2.9  Cont 3mg Tues and Fri and 2.5mg AOD.  Next INR 1/4/21.  12/22/20 INR 2.1  Take 3mg Tues and Fri and 2.5mg AOD.  Next INR 12/28/20.    12/18/20 INR 2.5 2.5mg daily. Next INR 12/22.  12/16/20 INR 3.3 Hold x2 Next INR 12/18 12/14/20 INR 3.2 Hold tonight, then resume 3mg W & Sat, 2.5mg AOD. Next INR 12/22.  12/11/20 missed INR cont same, next INR 12/15.  11/19/20 INR 2.4 3mg W & Sat, 2.5mg AOD. Next INR 12/10.  11/5/20 INR 2.2 Cont 3mg W & Sat, 2.5mg AOD. Next INR 11/19.  10/29/20 INR 2.2 Cont 3mg W & Sat, 2.5mg AOD. Next INR 11/19  10/15/20 INR 2.3 3mg W, Sat and 2.5mg AOD. Next INR 10/29  10/6/20 INR 2.3  Take 3mg Wed and Sat and 2.5mg AOD.  Next INR 10/15/20.    9/29/20 INR 3.1 Take 2.5mg  "daily. Next INR 10/6.  9/22/20 INR 2.1 Take 3mg daily Next INR 9/29  (LD Antib 7/20)         REVIEW OF SYSTEMS:    Currently, no fever, chills, or rigors. Does not have any visual or hearing problems. Denies any chest pain, headaches, palpitations, lightheadedness, dizziness, shortness of breath, or cough. Appetite is good. Denies any GERD symptoms. Denies any difficulty with swallowing, nausea, or vomiting.  Denies any abdominal pain, diarrhea or constipation. Denies any urinary symptoms, yeager in place. No insomnia. No active bleeding. No rash.       PHYSICAL EXAMINATION:  Vitals:    01/07/21 1125   BP: 160/69   Pulse: 76   Resp: 18   Temp: 97.1  F (36.2  C)   SpO2: 97%   Weight: (!) 223 lb 9.6 oz (101.4 kg)   Height: 5' 1\" (1.549 m)         GENERAL: Awake, Alert, oriented x3, not in any form of acute distress, answers questions appropriately, follows simple commands, conversant with flat affect.  Patient did have PFT  HEENT: Head is normocephalic with normal hair distribution. No evidence of trauma. Ears: No acute purulent discharge. Eyes: Sclera anicteric.  LUNG: Clear to auscultation with good chest expansion.   BACK: ROM normal, no CVA tenderness.  CVS: There is good S1  S2,  rhythm is regular.  ABDOMEN: Globular and ROTUND.  Nontender with palpation. chronic abdominal wall wounds bilaterally that are healing well.  EXTREMITIES: UE Full ROM. Right BKA, wound covered, healing.  SKIN: Warm and dry, no erythema noted, open area on abdomen.    NEUROLOGICAL: The patient is oriented to person, place and time. Strength and sensation are grossly intact. Face is symmetric.    LABS:    Lab Results   Component Value Date    WBC 7.9 08/21/2020    HGB 9.2 (L) 08/21/2020    HCT 28.6 (L) 08/21/2020    MCV 89 08/21/2020     08/21/2020       Results for orders placed or performed during the hospital encounter of 08/15/20   Basic Metabolic Panel   Result Value Ref Range    Sodium 137 136 - 145 mmol/L    Potassium 4.1 3.5 " - 5.0 mmol/L    Chloride 103 98 - 107 mmol/L    CO2 23 22 - 31 mmol/L    Anion Gap, Calculation 11 5 - 18 mmol/L    Glucose 178 (H) 70 - 125 mg/dL    Calcium 11.0 (H) 8.5 - 10.5 mg/dL    BUN 37 (H) 8 - 22 mg/dL    Creatinine 2.05 (H) 0.60 - 1.10 mg/dL    GFR MDRD Af Amer 29 (L) >60 mL/min/1.73m2    GFR MDRD Non Af Amer 24 (L) >60 mL/min/1.73m2           Lab Results   Component Value Date    HGBA1C 10.3 (H) 06/17/2020     Vitamin D, Total (25-Hydroxy)   Date Value Ref Range Status   04/28/2016 29.8 (L) 30.0 - 80.0 ng/mL Final     Lab Results   Component Value Date    [id number] 285 01/07/2011       ASSESSMENT/PLAN:  1. Diarrhea, unspecified type    2. Hx of right BKA (H)    3. Moderate protein malnutrition (H)    4. Type 2 diabetes mellitus with other specified complication, with long-term current use of insulin (H)    5. CKD (chronic kidney disease) stage 4, GFR 15-29 ml/min (H)    6. Primary osteoarthritis of both knees         COVID-19: positive test identified 12/5 via PCR. Resolved.  Although unknown if recent loose stools are related to this.     Loose stool: Have been getting varying reports regarding loose stools.  Reports today that they are semiformed.  Has been rejected by lab for C. Difficile.  The frustration is more in her incontinence.   -Start Florastor 1 capsule p.o. daily.  -If continues will consider Metamucil for stool bulking.  -Most recent colonoscopy in 2017.  Some evidence of diverticulitis otherwise clear.    Osteoarthritis: Discussed knee injections, will order triamcinolone and complete knee injections next week.  Will begin working with therapies again.  -Triamcinolone 40 mg per vial x2 vials.  -Lidocaine 1%, 5 cc bottle.  -Betadine solution.  -25 gauge syringe.    Anticoagulated:  -Omeprazole 20 mg p.o. twice daily x30 days then once daily.     Left abdominal wall open area: Followed by the wound team.  The wound is drastically improved and almost closed with no redness or drainage.  This  is a huge improvement and the patient is very happy about this.  -Continue orders per wound care team and change dressing as needed for increased drainage.    Chronic conditions, reviewed:     Insulin-dependent diabetes: A1c 10.3--> 8.4.  Now on glargine 15 units nightly and sliding scale NovoLog 3 times daily with meals.  Blood sugars average 130-200. A few outliers to 280.      Essential hypertension: Satisfactory. amlodipine d.cd.   -metoprolol    Acute on chronic blood loss anemia-likely from anemia chronic disease, iron deficiency, chronic kidney disease, surgery. On oral iron .    CKD 4: Followed by Dr. Iqbal.  Calcium improved at 9.1.  -Sodium bicarb.     Coronary artery disease:  -also needs outpt CRISTINA eval   -Continue metoprolol and aspirin.       A. Fib on Coumadin: INR therapeutic.      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 Improved. Unable to assess stump, in  and brace.   -Continues on Dilaudid.    Unintended weight loss: Patient has had greater than 20 pound weight loss since July.  She says the food is just bad here and it is always cold.    -Weight improved now at 236.  This is her baseline now.    Communicated concerns with patient, nursing.     Electronically signed by:  Zoey Leung, CNP  This progress note was completed using Dragon software and there may be grammatical errors.

## 2021-06-21 NOTE — PROGRESS NOTES
Progress Note    Reason for Visit:  Chief Complaint     Diabetes Mellitus          Progress Note:    HPI:   This patient is here for follow-up because of type 2 diabetes.    Component      Latest Ref Rng & Units 4/20/2018 8/13/2018   BUN      8 - 22 mg/dL  26 (H)   Creatinine      0.60 - 1.10 mg/dL  1.21 (H)   GFR MDRD Af Amer      >60 mL/min/1.73m2  54 (L)   GFR MDRD Non Af Amer      >60 mL/min/1.73m2  45 (L)   Bilirubin, Total      0.0 - 1.0 mg/dL  0.5   Calcium      8.5 - 10.5 mg/dL  9.7   Protein, Total      6.0 - 8.0 g/dL  7.0   ALBUMIN      3.5 - 5.0 g/dL  3.3 (L)   Alkaline Phosphatase      45 - 120 U/L  134 (H)   AST      0 - 40 U/L  21   ALT      0 - 45 U/L  31   Cholesterol      <=199 mg/dL 139 134   Triglycerides      <=149 mg/dL 126 148   HDL Cholesterol      >=50 mg/dL 41 (L) 36 (L)   LDL Calculated      <=129 mg/dL 73 68   Patient Fasting > 8hrs?       Yes Yes   Microalbumin, Random Urine      0.00 - 1.99 mg/dL 8.71 (H)    Creatinine, Urine      mg/dL 52.9    Microalbumin/Creatinine Ratio Random Urine      <=19.9 mg/g 164.7 (H)    Hemoglobin A1c      3.5 - 6.0 % 12.3 (H) 10.9 (H)   TSH      0.30 - 5.00 uIU/mL  1.94       Review of Systems:    Nervous System: No headache, dizziness, fainting or memory loss. No tingling sensation of hand or feet.  Ears: No hearing loss or ringing in the ears  Eyes: No blurring of vision, redness, itching or dryness.  Nose: No nosebleed or loss of smell  Mouth: No mouth sores or loss of taste  Throat: No hoarseness or difficulty swallowing  Neck: No enlarged thyroid or lymph nodes.  Heart: No chest pain, palpitation or irregular heartbeat. No swelling of hands or feet  Lungs: No shortness of breath, cough, night sweats, wheezing or hemoptysis.  Gastrointestinal: No nausea or vomiting, constipation or diarrhea.  No acid reflux, abdominal pain or blood in stools.  Kidney/Bladdr: No polyuria, polydipsia, nocturia or hematuria.  Genital/Sexual: No loss of libido  Skin: No  "rash, hair loss or hirsutism.  No abnormal striae  Muscles/Joints/Bones: No morning stiffness, muscle aches and pain or loss of height.    Current Medications:  Current Outpatient Prescriptions   Medication Sig     ACCU-CHEK SAM PLUS TEST STRP strips TEST 6 TIMES A DAY (Patient taking differently: TEST 1 TIME A DAY)     acetaminophen (TYLENOL) 325 MG tablet Take 325-650 mg by mouth every 8 (eight) hours as needed.      allopurinol (ZYLOPRIM) 100 MG tablet Take 1 tablet (100 mg total) by mouth daily.     amLODIPine (NORVASC) 10 MG tablet Take 10 mg by mouth daily.     aspirin 81 MG EC tablet Take 81 mg by mouth daily.     BASAGLAR KWIKPEN U-100 INSULIN 100 unit/mL (3 mL) pen inject 49 units under the skin every morning. do not mix with any other insulin.     BD ULTRA-FINE MICRO PEN NEEDLE 32 gauge x 1/4\" Ndle USE AS DIRECTED DAILY.     blood glucose test strips Use 1 each As Directed 6 (six) times a day. Dispense brand per patient's insurance at pharmacy discretion.     cholecalciferol, vitamin D3, (VITAMIN D3) 1,000 unit capsule Take 1,000 Units by mouth daily.     colchicine (MITIGARE) 0.6 mg cap Take 0.6 mg by mouth 2 (two) times a day.     docusate sodium (COLACE) 50 MG capsule Take by mouth once daily.     ferrous sulfate 65 mg elemental iron (IRON) Take 1 tablet by mouth 2 (two) times a day.     glipiZIDE (GLUCOTROL) 10 MG tablet Take 10 mg by mouth 2 (two) times a day.      insulin aspart U-100 (NOVOLOG FLEXPEN U-100 INSULIN) 100 unit/mL injection pen Inject 8 Units under the skin 3 (three) times a day before meals.     lancets (ACCU-CHEK MULTICLIX LANCET) Misc TEST 6 TIMES A DAY (Patient taking differently: TEST 1 TIME A DAY)     losartan (COZAAR) 25 MG tablet Take 1 tablet (25 mg total) by mouth daily.     magnesium 250 mg Tab Take 500 mg by mouth 2 (two) times a day.        Patients Active Problems:  Patient Active Problem List   Diagnosis     Thrombocytopenia     Fibromyalgia     Carpal Tunnel Syndrome "     Urinary Tract Infection     Endometrial Hyperplasia     Cholelithiasis     Leukocytosis     Urine Tests Nonspecific Abnormal Findings     Obesity     Essential Hypertension     Pernicious Anemia     Immunology Studies Raised Immunoglobulin Level     Vaginal bleeding     Type 2 diabetes mellitus (H)       History:   reports that she has never smoked. She has never used smokeless tobacco. She reports that she does not drink alcohol or use illicit drugs.   reports that she has never smoked. She has never used smokeless tobacco. She reports that she does not drink alcohol or use illicit drugs.  History   Smoking Status     Never Smoker   Smokeless Tobacco     Never Used      reports that she has never smoked. She has never used smokeless tobacco. She reports that she does not drink alcohol or use illicit drugs.  History   Sexual Activity     Sexual activity: Not on file     Past Medical History:   Diagnosis Date     Anemia     pernicious     B12 deficiency      Carpal tunnel syndrome     had surgery     Cholelithiasis     had surgery     Diabetes mellitus (H)     borderline     Endometrial hyperplasia      Fibromyalgia      Heart murmur     pt states her mitral valve leaks     History of recurrent UTI (urinary tract infection)      Hypertension      Obesity      Thrombocytopenia (H)      Vaginal bleeding 1/2015     No family history on file.  Past Medical History:   Diagnosis Date     Anemia     pernicious     B12 deficiency      Carpal tunnel syndrome     had surgery     Cholelithiasis     had surgery     Diabetes mellitus (H)     borderline     Endometrial hyperplasia      Fibromyalgia      Heart murmur     pt states her mitral valve leaks     History of recurrent UTI (urinary tract infection)      Hypertension      Obesity      Thrombocytopenia (H)      Vaginal bleeding 1/2015     Past Surgical History:   Procedure Laterality Date     bilateral carpal tunnel release  2009     CHOLECYSTECTOMY       COLONOSCOPY N/A  "9/11/2017    Procedure: COLONOSCOPY;  Surgeon: Tyler Bhakta MD;  Location: [facility];  Service:      COMBINED HYSTEROSCOPY DIAGNOSTIC / D&C N/A 1/28/2015    Procedure: DILATION AND CURETTAGE WITH HYSTEROSCOPY;  Surgeon: Grant Beal MD;  Location: [facility];  Service:      DILATION AND CURETTAGE OF UTERUS         Vitals   height is 5' 1\" (1.549 m) and weight is 309 lb (140.2 kg) (abnormal). Her blood pressure is 142/52.         Exam  General appearance: The patient looked well, not in acute distress.  Eyes: no evidence of thyroid eye disease.   Retinal exam: No evidence of diabetic retinopathy.  Mouth and Throat: Normal  Neck: No evidence of thyromegaly, enlarged lymph node or tenderness  Chest: Trachea is central. Chest is clear to auscultation and percussion. Breat sounds are normal.  Cardiovascular exam: JVP is not raised. Heart sounds are normal, no murmurs or rub  Peripheral pulses are palpable.   Abdomen: No masses or tenderness.    Back: No vertebral tenderness or kyphosis.  Extremities: No evidence of leg edema.   Skin: Normal to touch.  No abnormal striae  Neurologic exam:  Visual fields are intact by confrontation, grossly intact. No evidence of peripheral neuropathy.  Detailed foot exam normal.        Diagnosis:  No diagnosis found.    Orders:   No orders of the defined types were placed in this encounter.        Assessment and Plan: Type 2 diabetes.  The patient is morbidly obese.  She is currently on basaglar 45 units in the morning and glipizide 10 mg twice a day.    NovoLog 8 units before each meal.    Her A1c dropped from 12.3-10.9.    To check her blood sugar once a day and it ranges between 1  with average of 258.    I tried to put the patient on Victoza but she declined that.    I did advise her to increase basaglar 2, 55 units in the morning and NovoLog 15 units before each meal.    I did advise her to increase by 2 units every 3 days until blood sugar is 160 or " less.    She has hypertension on amlodipine 10 mg losartan 25 mg blood pressure 142/52 we will continue monitoring that.    I did advise her to check her blood pressure at home.    She takes vitamin D 1000 units a day aspirin 12 mg daily allopurinol 100 mg 1.2.  We may add metoprolol 25 mg daily blood pressure continued to be up.    Patient return to clinic in 6 months.    I have reviewed and ordered clinical lab test    I have reviewed and ordered radiology tests.    I have reviewed and ordered her medication as required.    I have reviewed her test results and advised with the performing physician.    I have reviewed the patient's old records.    I have reviewed and summarized the patient old records.    I did spend 40 minutes with the patient more than 50% was spent on counseling and managing her care.

## 2021-06-21 NOTE — LETTER
"Letter by Zoey Leung CNP at      Author: Zoey Leung CNP Service: -- Author Type: --    Filed:  Encounter Date: 2021 Status: (Other)         [facility] - [address] [address]                                  2021    Patient: Jazmin Bauman   MR Number: [id number]   YOB: 1955   Date of Visit: 2021     Dear  Home:    Thank you for referring Jazmin Bauman to me for evaluation. Below are the relevant portions of my assessment and plan of care.    If you have questions, please do not hesitate to call me. I look forward to following Jazmin along with you.    Sincerely,        Zoey Leung CNP          CC  No Recipients  Zoey Leung CNP  3/1/2021  3:41 PM  Sign when Signing Visit    [facility]      NAME:  Jazmin Bauman             :  1955    MRN: [id number]    CODE STATUS:  FULL CODE    FACILITY: [facility] [[id number]]         CHIEF COMPLAIN/REASON FOR VISIT:  Chief Complaint   Patient presents with   ? Problem Visit     Medication air, C. difficile       HISTORY OF PRESENT ILLNESS: Jazmin Bauman is a 65 y.o. female. Pt was at [facility] from  to  and underwent a RBKA r/t ongoing DM ulcer with osteomyelitis. Per her EMR dc summary \" with HTN,morbid obesity, DM, A fib, CKD 4 presented for evaluation of R foot swelling, difficulty ambulation, pain found to have osteomyelitis now s/p amputation. She has now been to the OR twice this stay, last was on 20.   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   Patient had a chronic diabetic foot ulcer on her R heel, presented for increased swelling, difficulty ambulating, and pain. MRI showed extensive soft tissue necrosis and some osteomyelitis along with cellulitis  Met sepsis criteria on admission with leukocytosis and elevated CRP  Podiatry saw and the " "foot was not felt to be salvageable and BKA was recommended\"     August 15-21: Hospitalized for sepsis with MRSA bacteremia, she had possible endocarditis and TTE revealing vegetation of the aortic valve. She had a CT of the abdomen and pelvis that showed left lower abdominal pannus ulceration but without abscess.  Her right BKA stump ulceration was negative for osteomyelitis or cellulitis.  She was treated with IV Vanco for 14 days which will be completed on August 30.  Her left lower abdominal ulceration has never been biopsied.  It was not biopsied in the hospital and there was no surgical intervention.  Her right BKA stump was negative for osteomyelitis.  CKD stage IV with baseline creatinine 2.5-3.  She was at 1.82 at the end of hospitalization.  She is discharged on sodium bicarb twice daily.  Her insulin was adjusted and she is now on sliding scale insulin with 10 units of Lantus at bedtime.  Blood sugars have been less than 200 on average.    For her hypertension, Norvasc has been discontinued.  Blood pressures remain in the 130s over 70s on average.  She was incidentally found to have a lung nodule on CT scan; follow up CT was on December 1 and revealed resolution of the nodule.    Today: Seen today at nursing request for possible overdose of vancomycin.  She has been on vancomycin for second round of C. difficile infection.  Orders were 125 mg 4 times daily x10 days, then twice daily x7 days, then once daily x7 days, then every other day x7 days.  Pharmacy sent 7500 mg bottle which has been used in 5 days, unknown if patient has received all of this in the 5 days or if some of it was spilled.  Per calculations this is roughly equivalent to 1500 mg/day.  Reviewed literature and less than 2 g/day orally has minimal systemic absorption.  Jazmin is aware of the error and is feeling well and reporting improvement in loose stools.  Nurse manager reviewed dosing with RNs in charge of her care and they did say they " "have only been giving the 125 mg as instructed.    Allergies   Allergen Reactions   ? Hydralazine      Paralysis of legs   ? Latex Itching     Skin Burns   ? Naprosyn [Naproxen] Swelling     throat   ? Nitrofurantoin Hives   ? Sulfa (Sulfonamide Antibiotics) Rash   ? Vicks Vaporub [Camphor-Eucalyptus Oil-Menthol] Rash   :     Current Outpatient Medications   Medication Sig   ? acetaminophen (TYLENOL) 500 MG tablet Take 2 tablets (1,000 mg total) by mouth 3 (three) times a day.   ? apixaban ANTICOAGULANT (ELIQUIS) 5 mg Tab tablet Take 5 mg by mouth 2 (two) times a day.   ? aspirin 81 MG EC tablet Take 81 mg by mouth daily.   ? BD ULTRA-FINE MICRO PEN NEEDLE 32 gauge x 1/4\" Ndle USE AS DIRECTED 4 TIMES DAILY.   ? bisacodyL (DULCOLAX) 10 mg suppository Insert 10 mg into the rectum daily as needed. No stool greater than 72 hours   ? docusate sodium (COLACE) 100 MG capsule Take 100 mg by mouth daily as needed.    ? ferrous sulfate 325 (65 FE) MG tablet Take 1 tablet by mouth 2 (two) times a day with meals.   ? gabapentin (NEURONTIN) 100 MG capsule Take 200 mg by mouth 2 (two) times a day.   ? HYDROmorphone (DILAUDID) 2 MG tablet (Take 2mg daily and 2mg Q 3 hours PRN)   ? lancets (ACCU-CHEK MULTICLIX LANCET) Misc TEST 6 TIMES A DAY   ? LANTUS SOLOSTAR U-100 INSULIN 100 unit/mL (3 mL) pen Inject 10 Units under the skin at bedtime. 11.65 Type 2 with hyperglycemia  Contact provider if insulin prescribed is not the preferred insulin per insurance. (Patient taking differently: Inject 17 Units under the skin at bedtime. 11.65 Type 2 with hyperglycemia  Contact provider if insulin prescribed is not the preferred insulin per insurance.)   ? lidocaine (LIDODERM) 5 % Place 1 patch on the skin daily. Remove & Discard patch within 12 hours or as directed by MD   ? metoprolol tartrate (LOPRESSOR) 25 MG tablet Take 1 tablet (25 mg total) by mouth 2 (two) times a day.   ? miconazole (MICOTIN) 2 % powder Apply topically 2 (two) times a " "day. Apply to b/l groin/underneath breasts/underneath pannus   ? NOVOLOG U-100 INSULIN ASPART 100 unit/mL injection Check blood sugar four (4) times daily.  11.65 Type 2 with hyperglycemia  OneTouch Verio Flex  Meter  OneTouch Verio strips 2 boxes of 50  Delica Lancets box of 100  BD Ultra-fine Therese Pen Needles - NDC [address]-00 - dispense 1 case, refill PRN for 1 year (Patient taking differently: Inject under the skin see administration instructions. 7 B  10 L  10 S)   ? omeprazole (PRILOSEC) 20 MG capsule Take 20 mg by mouth 2 (two) times a day before meals.   ? polyethylene glycol (MIRALAX) 17 gram packet Take 1 packet (17 g total) by mouth daily as needed.   ? Saccharomyces boulardii (FLORASTOR) 250 mg capsule Take 250 mg by mouth daily.   ? senna (SENOKOT) 8.6 mg tablet Take 1 tablet by mouth 2 (two) times a day. (Patient taking differently: Take 1 tablet by mouth 2 (two) times a day as needed. )   ? sodium bicarbonate 650 MG tablet Take 1 tablet (650 mg total) by mouth 2 (two) times a day. OTC product         REVIEW OF SYSTEMS:    Currently, no fever, chills, or rigors. Does not have any visual or hearing problems. Denies any chest pain, headaches, palpitations, lightheadedness, dizziness, shortness of breath, or cough. Appetite is good. Denies any GERD symptoms. Denies any difficulty with swallowing, nausea, or vomiting.  Denies any abdominal pain, diarrhea or constipation. Denies any urinary symptoms, yeager in place. No insomnia. No active bleeding. No rash.       PHYSICAL EXAMINATION:  Vitals:    02/25/21 1154   BP: 147/87   Pulse: 62   Resp: 18   Temp: 97.3  F (36.3  C)   SpO2: 97%   Weight: (!) 242 lb 3.2 oz (109.9 kg)   Height: 5' 1\" (1.549 m)         GENERAL: Awake, Alert, oriented x3, not in any form of acute distress, answers questions appropriately, follows simple commands, conversant with flat affect.  Patient did have PFT  HEENT: Head is normocephalic with normal hair distribution. No evidence of " trauma. Ears: No acute purulent discharge. Eyes: Sclera anicteric.  LUNG: Clear to auscultation with good chest expansion.   BACK: ROM normal, no CVA tenderness.  CVS: There is good S1  S2,  rhythm is regular.  ABDOMEN: Globular and ROTUND.  Nontender with palpation. chronic abdominal wall wounds bilaterally that are healing well.  EXTREMITIES: UE Full ROM. Right BKA; now with prosthesis on.  SKIN: Warm and dry, no erythema noted, open area on abdomen.    NEUROLOGICAL: The patient is oriented to person, place and time. Strength and sensation are grossly intact. Face is symmetric.    LABS:    Lab Results   Component Value Date    WBC 7.9 08/21/2020    HGB 9.2 (L) 08/21/2020    HCT 28.6 (L) 08/21/2020    MCV 89 08/21/2020     08/21/2020       Results for orders placed or performed during the hospital encounter of 08/15/20   Basic Metabolic Panel   Result Value Ref Range    Sodium 137 136 - 145 mmol/L    Potassium 4.1 3.5 - 5.0 mmol/L    Chloride 103 98 - 107 mmol/L    CO2 23 22 - 31 mmol/L    Anion Gap, Calculation 11 5 - 18 mmol/L    Glucose 178 (H) 70 - 125 mg/dL    Calcium 11.0 (H) 8.5 - 10.5 mg/dL    BUN 37 (H) 8 - 22 mg/dL    Creatinine 2.05 (H) 0.60 - 1.10 mg/dL    GFR MDRD Af Amer 29 (L) >60 mL/min/1.73m2    GFR MDRD Non Af Amer 24 (L) >60 mL/min/1.73m2           Lab Results   Component Value Date    HGBA1C 10.3 (H) 06/17/2020     Vitamin D, Total (25-Hydroxy)   Date Value Ref Range Status   04/28/2016 29.8 (L) 30.0 - 80.0 ng/mL Final     Lab Results   Component Value Date    YGRYWPEQ35 285 01/07/2011       ASSESSMENT/PLAN:  1. Clostridium difficile infection    2. Morbid obesity (H)         Loose stool  C. difficile positive, recurrent: Patient received questionable amounts of vancomycin orally, per review of the literature, oral absorption is generally only minimally systemic if less than 2 g/day.  Patient doing well. She is reporting improvement in loose stools.  -Check BMP tomorrow.  -Starting  tonight, resume vancomycin twice daily x7 days, then daily x7 days then every other day for 7 days.     Chronic conditions, not reviewed today:     Left abdominal wall open area: Followed by the wound team.  The wound is drastically improved and almost closed with no redness or drainage.  This is a huge improvement and the patient is very happy about this.  -Continue orders per wound care team and change dressing as needed for increased drainage.    Osteoarthritis: Follow-up knee injections completed on 1/21.  She has had mild improvement in pain.  Plans to start working with therapies.  No redness, swelling or injections site pain.    Anticoagulated:  -Omeprazole 20 mg daily.     Insulin-dependent diabetes: A1c 10.3--> 8.4.  Now on glargine 15 units nightly and sliding scale NovoLog 3 times daily with meals.  Blood sugars average 130-200. A few outliers to 280.      Essential hypertension: Satisfactory. amlodipine d.cd.   -metoprolol    Acute on chronic blood loss anemia-likely from anemia chronic disease, iron deficiency, chronic kidney disease, surgery. On oral iron .    CKD 4: Followed by Dr. Iqbal.  Calcium improved at 9.1.  -Sodium bicarb.     Coronary artery disease:  -also needs outpt CRISTINA eval   -Continue metoprolol and aspirin.       A. Fib on Coumadin: INR therapeutic.      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 Improved. Unable to assess stump, in  and brace.   -Continues on Dilaudid.    Unintended weight loss: Patient has had greater than 20 pound weight loss since July.  She says the food is just bad here and it is always cold.    -Weight improved now at 236.  This is her baseline now.    Communicated concerns with patient, nursing.     Electronically signed by:  Zoey Leung, CNP  This progress note was completed using Dragon software and there may be grammatical errors.

## 2021-06-21 NOTE — LETTER
"Letter by Zoey Leung CNP at      Author: Zoey Leung CNP Service: -- Author Type: --    Filed:  Encounter Date: 2021 Status: (Other)         [facility] - [address] [address]                                  2021    Patient: Jazmin Bauman   MR Number: [id number]   YOB: 1955   Date of Visit: 2021     Dear  Home:    Thank you for referring Jzamin Bauman to me for evaluation. Below are the relevant portions of my assessment and plan of care.    If you have questions, please do not hesitate to call me. I look forward to following Jazmin along with you.    Sincerely,        Zoey Leung CNP          CC  No Recipients  Zoey Leung CNP  2021  7:50 AM  Sign when Signing Visit    [facility]      NAME:  Jazmin Bauman             :  1955    MRN: [id number]    CODE STATUS:  FULL CODE    FACILITY: [facility] [[id number]]         CHIEF COMPLAIN/REASON FOR VISIT:  Chief Complaint   Patient presents with   ? Follow-up     c-diff       HISTORY OF PRESENT ILLNESS: Jazmin Bauman is a 65 y.o. female. Pt was at [facility] from  to  and underwent a RBKA r/t ongoing DM ulcer with osteomyelitis. Per her EMR dc summary \" with HTN,morbid obesity, DM, A fib, CKD 4 presented for evaluation of R foot swelling, difficulty ambulation, pain found to have osteomyelitis now s/p amputation. She has now been to the OR twice this stay, last was on 20.   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   Patient had a chronic diabetic foot ulcer on her R heel, presented for increased swelling, difficulty ambulating, and pain. MRI showed extensive soft tissue necrosis and some osteomyelitis along with cellulitis  Met sepsis criteria on admission with leukocytosis and elevated CRP  Podiatry saw and the foot was not felt to " "be salvageable and BKA was recommended\"     August 15-21: Hospitalized for sepsis with MRSA bacteremia, she had possible endocarditis and TTE revealing vegetation of the aortic valve. She had a CT of the abdomen and pelvis that showed left lower abdominal pannus ulceration but without abscess.  Her right BKA stump ulceration was negative for osteomyelitis or cellulitis.  She was treated with IV Vanco for 14 days which will be completed on August 30.  Her left lower abdominal ulceration has never been biopsied.  It was not biopsied in the hospital and there was no surgical intervention.  Her right BKA stump was negative for osteomyelitis.  CKD stage IV with baseline creatinine 2.5-3.  She was at 1.82 at the end of hospitalization.  She is discharged on sodium bicarb twice daily.  Her insulin was adjusted and she is now on sliding scale insulin with 10 units of Lantus at bedtime.  Blood sugars have been less than 200 on average.    For her hypertension, Norvasc has been discontinued.  Blood pressures remain in the 130s over 70s on average.  She was incidentally found to have a lung nodule on CT scan; follow up CT was on December 1 and revealed resolution of the nodule.    Today: Seen today to follow-up for C. difficile if complaints per nursing of continued diarrhea and loose stool symptoms.  Nursing reported that she been asking for repeat C. difficile test due to these loose stools, I informed her that we would simply just base treatment on her symptoms versus retesting as she may have positive stool for up to 6 weeks post treatment.  She is reporting loose, watery stools 3-4 times per day.  Nurse manager also agrees.      Allergies   Allergen Reactions   ? Hydralazine      Paralysis of legs   ? Latex Itching     Skin Burns   ? Naprosyn [Naproxen] Swelling     throat   ? Nitrofurantoin Hives   ? Sulfa (Sulfonamide Antibiotics) Rash   ? Vicks Vaporub [Camphor-Eucalyptus Oil-Menthol] Rash   :     Current Outpatient " "Medications   Medication Sig   ? acetaminophen (TYLENOL) 500 MG tablet Take 2 tablets (1,000 mg total) by mouth 3 (three) times a day.   ? apixaban ANTICOAGULANT (ELIQUIS) 5 mg Tab tablet Take 5 mg by mouth 2 (two) times a day.   ? aspirin 81 MG EC tablet Take 81 mg by mouth daily.   ? BD ULTRA-FINE MICRO PEN NEEDLE 32 gauge x 1/4\" Ndle USE AS DIRECTED 4 TIMES DAILY.   ? bisacodyL (DULCOLAX) 10 mg suppository Insert 10 mg into the rectum daily as needed. No stool greater than 72 hours   ? docusate sodium (COLACE) 100 MG capsule Take 100 mg by mouth daily as needed.    ? ferrous sulfate 325 (65 FE) MG tablet Take 1 tablet by mouth 2 (two) times a day with meals.   ? gabapentin (NEURONTIN) 100 MG capsule Take 200 mg by mouth 2 (two) times a day.   ? HYDROmorphone (DILAUDID) 2 MG tablet (Take 2mg daily and 2mg Q 3 hours PRN)   ? lancets (ACCU-CHEK MULTICLIX LANCET) Misc TEST 6 TIMES A DAY   ? LANTUS SOLOSTAR U-100 INSULIN 100 unit/mL (3 mL) pen Inject 10 Units under the skin at bedtime. 11.65 Type 2 with hyperglycemia  Contact provider if insulin prescribed is not the preferred insulin per insurance. (Patient taking differently: Inject 17 Units under the skin at bedtime. 11.65 Type 2 with hyperglycemia  Contact provider if insulin prescribed is not the preferred insulin per insurance.)   ? lidocaine (LIDODERM) 5 % Place 1 patch on the skin daily. Remove & Discard patch within 12 hours or as directed by MD   ? metoprolol tartrate (LOPRESSOR) 25 MG tablet Take 1 tablet (25 mg total) by mouth 2 (two) times a day.   ? miconazole (MICOTIN) 2 % powder Apply topically 2 (two) times a day. Apply to b/l groin/underneath breasts/underneath pannus   ? NOVOLOG U-100 INSULIN ASPART 100 unit/mL injection Check blood sugar four (4) times daily.  11.65 Type 2 with hyperglycemia  OneTouch Verio Flex  Meter  OneTouch Verio strips 2 boxes of 50  Delica Lancets box of 100  BD Ultra-fine Therese Pen Needles - NDC [address]-64 - dispense 1 " "case, refill PRN for 1 year (Patient taking differently: Inject under the skin see administration instructions. 7 B  10 L  10 S)   ? omeprazole (PRILOSEC) 20 MG capsule Take 20 mg by mouth 2 (two) times a day before meals.   ? polyethylene glycol (MIRALAX) 17 gram packet Take 1 packet (17 g total) by mouth daily as needed.   ? Saccharomyces boulardii (FLORASTOR) 250 mg capsule Take 250 mg by mouth daily.   ? senna (SENOKOT) 8.6 mg tablet Take 1 tablet by mouth 2 (two) times a day. (Patient taking differently: Take 1 tablet by mouth 2 (two) times a day as needed. )   ? sodium bicarbonate 650 MG tablet Take 1 tablet (650 mg total) by mouth 2 (two) times a day. OTC product         REVIEW OF SYSTEMS:    Currently, no fever, chills, or rigors. Does not have any visual or hearing problems. Denies any chest pain, headaches, palpitations, lightheadedness, dizziness, shortness of breath, or cough. Appetite is good. Denies any GERD symptoms. Denies any difficulty with swallowing, nausea, or vomiting.  Denies any abdominal pain, diarrhea or constipation. Denies any urinary symptoms, yeager in place. No insomnia. No active bleeding. No rash.       PHYSICAL EXAMINATION:  Vitals:    02/11/21 1147   BP: 149/73   Pulse: (!) 53   Resp: 18   Temp: 97.3  F (36.3  C)   SpO2: 98%   Weight: (!) 233 lb 6.4 oz (105.9 kg)   Height: 5' 1\" (1.549 m)         GENERAL: Awake, Alert, oriented x3, not in any form of acute distress, answers questions appropriately, follows simple commands, conversant with flat affect.  Patient did have PFT  HEENT: Head is normocephalic with normal hair distribution. No evidence of trauma. Ears: No acute purulent discharge. Eyes: Sclera anicteric.  LUNG: Clear to auscultation with good chest expansion.   BACK: ROM normal, no CVA tenderness.  CVS: There is good S1  S2,  rhythm is regular.  ABDOMEN: Globular and ROTUND.  Nontender with palpation. chronic abdominal wall wounds bilaterally that are healing " well.  EXTREMITIES: UE Full ROM. Right BKA, wound covered, healing.  SKIN: Warm and dry, no erythema noted, open area on abdomen.    NEUROLOGICAL: The patient is oriented to person, place and time. Strength and sensation are grossly intact. Face is symmetric.    LABS:    Lab Results   Component Value Date    WBC 7.9 08/21/2020    HGB 9.2 (L) 08/21/2020    HCT 28.6 (L) 08/21/2020    MCV 89 08/21/2020     08/21/2020       Results for orders placed or performed during the hospital encounter of 08/15/20   Basic Metabolic Panel   Result Value Ref Range    Sodium 137 136 - 145 mmol/L    Potassium 4.1 3.5 - 5.0 mmol/L    Chloride 103 98 - 107 mmol/L    CO2 23 22 - 31 mmol/L    Anion Gap, Calculation 11 5 - 18 mmol/L    Glucose 178 (H) 70 - 125 mg/dL    Calcium 11.0 (H) 8.5 - 10.5 mg/dL    BUN 37 (H) 8 - 22 mg/dL    Creatinine 2.05 (H) 0.60 - 1.10 mg/dL    GFR MDRD Af Amer 29 (L) >60 mL/min/1.73m2    GFR MDRD Non Af Amer 24 (L) >60 mL/min/1.73m2           Lab Results   Component Value Date    HGBA1C 10.3 (H) 06/17/2020     Vitamin D, Total (25-Hydroxy)   Date Value Ref Range Status   04/28/2016 29.8 (L) 30.0 - 80.0 ng/mL Final     Lab Results   Component Value Date    [id number] 285 01/07/2011       ASSESSMENT/PLAN:  1. Diarrhea, unspecified type    2. Clostridium difficile infection         Loose stool  C. difficile positive, recurrent: Recently finished vancomycin, complaining of continued loose stools, 3-4 times per day, so the same consistency as it was prior to her treatment.  -Vancomycin 125 mg p.o. 4 times daily x10 days, then twice daily x7 days, then daily x7 days then every other day for 7 days.  -Check BMP on Monday.      Left abdominal wall open area: Followed by the wound team.  The wound is drastically improved and almost closed with no redness or drainage.  This is a huge improvement and the patient is very happy about this.  -Continue orders per wound care team and change dressing as needed for  increased drainage.    Chronic conditions, not reviewed today:     Osteoarthritis: Follow-up knee injections completed on 1/21.  She has had mild improvement in pain.  Plans to start working with therapies.  No redness, swelling or injections site pain.    Anticoagulated:  -Omeprazole 20 mg daily.     Insulin-dependent diabetes: A1c 10.3--> 8.4.  Now on glargine 15 units nightly and sliding scale NovoLog 3 times daily with meals.  Blood sugars average 130-200. A few outliers to 280.      Essential hypertension: Satisfactory. amlodipine d.cd.   -metoprolol    Acute on chronic blood loss anemia-likely from anemia chronic disease, iron deficiency, chronic kidney disease, surgery. On oral iron .    CKD 4: Followed by Dr. Iqbal.  Calcium improved at 9.1.  -Sodium bicarb.     Coronary artery disease:  -also needs outpt CRISTINA eval   -Continue metoprolol and aspirin.       A. Fib on Coumadin: INR therapeutic.      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 Improved. Unable to assess stump, in  and brace.   -Continues on Dilaudid.    Unintended weight loss: Patient has had greater than 20 pound weight loss since July.  She says the food is just bad here and it is always cold.    -Weight improved now at 236.  This is her baseline now.    Communicated concerns with patient, nursing.     Electronically signed by:  Zoey Leung CNP  This progress note was completed using Dragon software and there may be grammatical errors.

## 2021-06-21 NOTE — LETTER
"Letter by Zoey Leung CNP at      Author: Zoey Leung CNP Service: -- Author Type: --    Filed:  Encounter Date: 12/10/2020 Status: (Other)         [facility] - [address] [address]                                  2020    Patient: Jazmin Bauman   MR Number: [id number]   YOB: 1955   Date of Visit: 12/10/2020     Dear  Home:    Thank you for referring Jazmin Bauman to me for evaluation. Below are the relevant portions of my assessment and plan of care.    If you have questions, please do not hesitate to call me. I look forward to following Jazmin along with you.    Sincerely,        Zoey Leung CNP          CC  No Recipients  Zoey Leung CNP  2020 11:10 PM  Sign when Signing Visit    [facility]      NAME:  Jazmin Bauman             :  1955    MRN: [id number]    CODE STATUS:  FULL CODE    FACILITY: [facility] [[id number]]         CHIEF COMPLAIN/REASON FOR VISIT:  Chief Complaint   Patient presents with   ? Follow-up     Covid, tired, on day 10       HISTORY OF PRESENT ILLNESS: Jazmin Bauman is a 65 y.o. female. Pt was at [facility] from  to  and underwent a RBKA r/t ongoing DM ulcer with osteomyelitis. Per her EMR dc summary \" with HTN,morbid obesity, DM, A fib, CKD 4 presented for evaluation of R foot swelling, difficulty ambulation, pain found to have osteomyelitis now s/p amputation. She has now been to the OR twice this stay, last was on 20.   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   Patient had a chronic diabetic foot ulcer on her R heel, presented for increased swelling, difficulty ambulating, and pain. MRI showed extensive soft tissue necrosis and some osteomyelitis along with cellulitis  Met sepsis criteria on admission with leukocytosis and elevated CRP  Podiatry saw and the " "foot was not felt to be salvageable and BKA was recommended\"       August 15-21: Hospitalized for sepsis with MRSA bacteremia, she had possible endocarditis and TTE revealing vegetation of the aortic valve. She had a CT of the abdomen and pelvis that showed left lower abdominal pannus ulceration but without abscess.  Her right BKA stump ulceration was negative for osteomyelitis or cellulitis.  She was treated with IV Vanco for 14 days which will be completed on August 30.  Her left lower abdominal ulceration has never been biopsied.  It was not biopsied in the hospital and there was no surgical intervention.  Her right BKA stump was negative for osteomyelitis.  CKD stage IV with baseline creatinine 2.5-3.  She was at 1.82 at the end of hospitalization.  She is discharged on sodium bicarb twice daily.  Her insulin was adjusted and she is now on sliding scale insulin with 10 units of Lantus at bedtime.  Blood sugars have been less than 200 on average.    For her hypertension, Norvasc has been discontinued.  Blood pressures remain in the 130s over 70s on average.  She was incidentally found to have a lung nodule on CT scan; follow up CT was on December 1 and revealed resolution of the nodule.    Today: Seen today follow-up for COVID-19.  She is on day 10 and per nursing staff she will be moving off the unit.  She remains fatigued but is otherwise eating and drinking okay, no cough, body aches, sore throat or otherwise notable symptoms for COVID-19.  She is looking forward to moving off the unit.  Her wounds are looking good and she is followed by the wound care team.  Received labs the other day and mildly elevated CRP at 5.5 however she does have the abdominal wound and right BKA wound.  She has been afebrile.  Her weights are stable and vital signs are stable.      Allergies   Allergen Reactions   ? Hydralazine      Paralysis of legs   ? Latex Itching     Skin Burns   ? Naprosyn [Naproxen] Swelling     throat   ? " "Nitrofurantoin Hives   ? Sulfa (Sulfonamide Antibiotics) Rash   ? Vicks Vaporub [Camphor-Eucalyptus Oil-Menthol] Rash   :     Current Outpatient Medications   Medication Sig   ? acetaminophen (TYLENOL) 500 MG tablet Take 2 tablets (1,000 mg total) by mouth 3 (three) times a day.   ? aspirin 81 MG EC tablet Take 81 mg by mouth daily.   ? BD ULTRA-FINE MICRO PEN NEEDLE 32 gauge x 1/4\" Ndle USE AS DIRECTED 4 TIMES DAILY.   ? bisacodyL (DULCOLAX) 10 mg suppository Insert 10 mg into the rectum daily as needed. No stool greater than 72 hours   ? cholecalciferol, vitamin D3, (VITAMIN D3) 1,000 unit capsule Take 1,000 Units by mouth daily.   ? collagenase ointment Apply daily per WOC   ? docusate sodium (COLACE) 100 MG capsule Take 100 mg by mouth daily as needed.    ? ferrous sulfate 325 (65 FE) MG tablet Take 1 tablet by mouth 2 (two) times a day with meals.   ? gabapentin (NEURONTIN) 100 MG capsule Take 200 mg by mouth 2 (two) times a day.   ? HYDROmorphone (DILAUDID) 2 MG tablet (Take 2mg daily and 2mg Q 3 hours PRN)   ? lancets (ACCU-CHEK MULTICLIX LANCET) Misc TEST 6 TIMES A DAY   ? LANTUS SOLOSTAR U-100 INSULIN 100 unit/mL (3 mL) pen Inject 10 Units under the skin at bedtime. 11.65 Type 2 with hyperglycemia  Contact provider if insulin prescribed is not the preferred insulin per insurance. (Patient taking differently: Inject 15 Units under the skin at bedtime. 11.65 Type 2 with hyperglycemia  Contact provider if insulin prescribed is not the preferred insulin per insurance.)   ? lidocaine (XYLOCAINE) 5 % ointment Apply topically 4 (four) times a day. Apply to left shoulder or around wound (not inside wound)   ? metoprolol tartrate (LOPRESSOR) 25 MG tablet Take 1 tablet (25 mg total) by mouth 2 (two) times a day.   ? miconazole (MICOTIN) 2 % powder Apply topically 2 (two) times a day. Apply to b/l groin/underneath breasts/underneath pannus   ? NOVOLOG U-100 INSULIN ASPART 100 unit/mL injection Check blood sugar four " (4) times daily.  11.65 Type 2 with hyperglycemia  OneTouch Verio Flex  Meter  OneTouch Verio strips 2 boxes of 50  Delica Lancets box of 100  BD Ultra-fine Therese Pen Needles - NDC [address]-91 - dispense 1 case, refill PRN for 1 year (Patient taking differently: Inject 7 Units under the skin 3 (three) times a day before meals. )   ? polyethylene glycol (MIRALAX) 17 gram packet Take 1 packet (17 g total) by mouth daily as needed.   ? Saccharomyces boulardii (FLORASTOR) 250 mg capsule Take 250 mg by mouth daily.   ? senna (SENOKOT) 8.6 mg tablet Take 1 tablet by mouth 2 (two) times a day. (Patient taking differently: Take 1 tablet by mouth 2 (two) times a day as needed. )   ? sodium bicarbonate 650 MG tablet Take 1 tablet (650 mg total) by mouth 2 (two) times a day. OTC product   ? warfarin sodium (WARFARIN ORAL) Take by mouth. 12/16/20 INR 3.3 Hold x2 Next INR 12/18 12/14/20 INR 3.2 Hold tonight, then resume 3mg W & Sat, 2.5mg AOD. Next INR 12/22.  12/11/20 missed INR cont same, next INR 12/15.  11/19/20 INR 2.4 3mg W & Sat, 2.5mg AOD. Next INR 12/10.  11/5/20 INR 2.2 Cont 3mg W & Sat, 2.5mg AOD. Next INR 11/19.  10/29/20 INR 2.2 Cont 3mg W & Sat, 2.5mg AOD. Next INR 11/19  10/15/20 INR 2.3 3mg W, Sat and 2.5mg AOD. Next INR 10/29  10/6/20 INR 2.3  Take 3mg Wed and Sat and 2.5mg AOD.  Next INR 10/15/20.    9/29/20 INR 3.1 Take 2.5mg daily. Next INR 10/6.  9/22/20 INR 2.1 Take 3mg daily Next INR 9/29  (LD Antib 7/20)         REVIEW OF SYSTEMS:    Currently, no fever, chills, or rigors. Does not have any visual or hearing problems. Denies any chest pain, headaches, palpitations, lightheadedness, dizziness, shortness of breath, or cough. Appetite is good. Denies any GERD symptoms. Denies any difficulty with swallowing, nausea, or vomiting.  Denies any abdominal pain, diarrhea or constipation. Denies any urinary symptoms, yeager in place. No insomnia. No active bleeding. No rash.       PHYSICAL EXAMINATION:  Vitals:     "12/10/20 1356   BP: 107/63   Pulse: 65   Resp: 16   Temp: 97.7  F (36.5  C)   SpO2: 95%   Weight: (!) 236 lb 3.2 oz (107.1 kg)   Height: 5' 1\" (1.549 m)         GENERAL: Awake, Alert, oriented x3, not in any form of acute distress, answers questions appropriately, follows simple commands, conversant with flat affect  HEENT: Head is normocephalic with normal hair distribution. No evidence of trauma. Ears: No acute purulent discharge. Eyes: Sclera anicteric.  CHEST: No tenderness or deformity, no crepitus  LUNG: Clear to auscultation with good chest expansion. There DM BS  BACK: ROM normal, no CVA tenderness, no spinal tenderness   CVS: There is good S1  S2,  rhythm is regular.  ABDOMEN: Globular and ROTUND.  Chronic abdominal wall wounds bilaterally, left side with moderate drainage I did not see the wound bases it was covered with calcium alginate by wound team earlier this morning.   EXTREMITIES: UE Full ROM. Right BKA, wound with moderate sanguinous drainage on bandage.  SKIN: Warm and dry, no erythema noted, open area on abdomen.    NEUROLOGICAL: The patient is oriented to person, place and time. Strength and sensation are grossly intact. Face is symmetric.    LABS:    Lab Results   Component Value Date    WBC 7.9 08/21/2020    HGB 9.2 (L) 08/21/2020    HCT 28.6 (L) 08/21/2020    MCV 89 08/21/2020     08/21/2020       Results for orders placed or performed during the hospital encounter of 08/15/20   Basic Metabolic Panel   Result Value Ref Range    Sodium 137 136 - 145 mmol/L    Potassium 4.1 3.5 - 5.0 mmol/L    Chloride 103 98 - 107 mmol/L    CO2 23 22 - 31 mmol/L    Anion Gap, Calculation 11 5 - 18 mmol/L    Glucose 178 (H) 70 - 125 mg/dL    Calcium 11.0 (H) 8.5 - 10.5 mg/dL    BUN 37 (H) 8 - 22 mg/dL    Creatinine 2.05 (H) 0.60 - 1.10 mg/dL    GFR MDRD Af Amer 29 (L) >60 mL/min/1.73m2    GFR MDRD Non Af Amer 24 (L) >60 mL/min/1.73m2           Lab Results   Component Value Date    HGBA1C 10.3 (H) " 06/17/2020     Vitamin D, Total (25-Hydroxy)   Date Value Ref Range Status   04/28/2016 29.8 (L) 30.0 - 80.0 ng/mL Final     Lab Results   Component Value Date    [id number] 285 01/07/2011       ASSESSMENT/PLAN:  1. 2019 novel coronavirus disease (COVID-19)    2. CKD (chronic kidney disease) stage 4, GFR 15-29 ml/min (H)         COVID-19: positive test identified 12/5 via PCR obtained a few days ago; now resides on covid unit. She is fatigued but otherwise eating and drinking okay, and denies cough or body aches.  Sats have been in the mid 90s on room air.  -droplet and airborne precuations.  -CRP, CBC and BMP done yesterday; crp mildly elevated 5.5; cbc and bmp baseline.   -O2 1-4 L via NC to keep sats >88%.   -Continue supportive treatment with guaifenesin, albuterol inhalers, encourage p.o. intake.  -Monitor for saturations less than 90%, or requirement of O2 via nasal cannula and update provider so that oral corticosteroids can be initiated.       Left abdominal wall open area: Followed by the wound team.  The wound is drastically improved and almost closed with no redness or drainage.  This is a huge improvement and the patient is very happy about this.  -Continue orders per wound care team and change dressing as needed for increased drainage.    Right knee AKA wound: Dressing changes by wound team.    Chronic conditions, reviewed:     Insulin-dependent diabetes: A1c 10.3.  Now on glargine 15 units nightly and sliding scale NovoLog 3 times daily with meals.  Blood sugars average 130-200. A few outliers to 280.      Essential hypertension: Satisfactory in TCU thus far, amlodipine d.cd.   -metoprolol    Acute on chronic blood loss anemia-likely from anemia chronic disease, iron deficiency, chronic kidney disease, surgery. On oral iron .    CKD 4: Followed by Dr. Iqbal.  Calcium improved at 9.1.  -Sodium bicarb.     Coronary artery disease:  -also needs outpt CRISTINA eval   -Continue metoprolol and  aspirin.     Urinary retention: recent UTI, treated.       A. Fib on Coumadin: INR therapeutic.      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 Improved. Unable to assess stump, in  and brace.   -Continues on Dilaudid.    Unintended weight loss: Patient has had greater than 20 pound weight loss since July.  She says the food is just bad here and it is always cold.    -Weight improved now at 236.  This is her baseline.    Communicated concerns with patient, nursing.    Electronically signed by:  Zoey Leung CNP  This progress note was completed using Dragon software and there may be grammatical errors.

## 2021-06-21 NOTE — LETTER
"Letter by Zoey Leung CNP at      Author: Zoey Leung CNP Service: -- Author Type: --    Filed:  Encounter Date: 11/3/2020 Status: (Other)         [facility] [address] [address]                                  2020    Patient: Jazmin Bauman   MR Number: [id number]   YOB: 1955   Date of Visit: 11/3/2020     Dear Dr. Briones:    Thank you for referring Jazmin Bauman to me for evaluation. Below are the relevant portions of my assessment and plan of care.    If you have questions, please do not hesitate to call me. I look forward to following Jazmin along with you.    Sincerely,        Zoey Leung CNP          CC  No Recipients  Zoey Leung CNP  2020 11:21 AM  Sign when Signing Visit    [facility]      NAME:  Jazmin Bauman             :  1955    MRN: [id number]    CODE STATUS:  FULL CODE    FACILITY: [facility] [[id number]]         CHIEF COMPLAIN/REASON FOR VISIT:  Chief Complaint   Patient presents with   ? Follow-up     Wound follow up        HISTORY OF PRESENT ILLNESS: Jazmin Bauman is a 65 y.o. female. Pt was at [facility] from  to  and underwent a RBKA r/t ongoing DM ulcer with osteomyelitis. Per her EMR dc summary \" with HTN,morbid obesity, DM, A fib, CKD 4 presented for evaluation of R foot swelling, difficulty ambulation, pain found to have osteomyelitis now s/p amputation. She has now been to the OR twice this stay, last was on 20.   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   Patient had a chronic diabetic foot ulcer on her R heel, presented for increased swelling, difficulty ambulating, and pain. MRI showed extensive soft tissue necrosis and some osteomyelitis along with cellulitis  Met sepsis criteria on admission with leukocytosis and elevated CRP  Podiatry saw and the foot was not felt to " "be salvageable and BKA was recommended\"       August 15-21: Hospitalized for sepsis with MRSA bacteremia, she had possible endocarditis and TTE revealing vegetation of the aortic valve. She had a CT of the abdomen and pelvis that showed left lower abdominal pannus ulceration but without abscess.  Her right BKA stump ulceration was negative for osteomyelitis or cellulitis.  She was treated with IV Vanco for 14 days which will be completed on August 30.  Her left lower abdominal ulceration has never been biopsied.  It was not biopsied in the hospital and there was no surgical intervention.  Her right BKA stump was negative for osteomyelitis.  CKD stage IV with baseline creatinine 2.5-3.  She was at 1.82 at the end of hospitalization.  She is discharged on sodium bicarb twice daily.  Her insulin was adjusted and she is now on sliding scale insulin with 10 units of Lantus at bedtime.  Blood sugars have been less than 200 on average.    For her hypertension, Norvasc has been discontinued.  Blood pressures remain in the 130s over 70s on average.  She was incidentally found to have a lung nodule on CT scan, will need to follow-up with this.  Please see imaging report from August hospitalization.  Multiple nodules and enlarged lymph nodes.    Today: Patient is being seen at request of nursing for increased bloody drainage from her chronic wounds.  She has chronic abdominal wounds and a small wound to the right AKA stump.  AKA stump had begun bleeding a day ago and she showed me pictures that she took with excessive amount of bleeding in the bandage.  Her abdominal wound is also increased drainage and tenderness.  The surrounding edges look good with no signs or symptoms of cellulitis.  She is followed by the wound team who is here today and dressed the wounds.  There was still increased drainage so we will check her INR which has recently been stable.  She is actually been very stable with her INR over the last few months.  " "We will also check a BMP as we have not done so in a month and we will check a CBC to ensure there is no infection given her history.      Allergies   Allergen Reactions   ? Hydralazine      Paralysis of legs   ? Latex Itching     Skin Burns   ? Naprosyn [Naproxen] Swelling     throat   ? Nitrofurantoin Hives   ? Sulfa (Sulfonamide Antibiotics) Rash   ? Vicks Vaporub [Camphor-Eucalyptus Oil-Menthol] Rash   :     Current Outpatient Medications   Medication Sig   ? acetaminophen (TYLENOL) 500 MG tablet Take 2 tablets (1,000 mg total) by mouth 3 (three) times a day.   ? aspirin 81 MG EC tablet Take 81 mg by mouth daily.   ? BD ULTRA-FINE MICRO PEN NEEDLE 32 gauge x 1/4\" Ndle USE AS DIRECTED 4 TIMES DAILY.   ? bisacodyL (DULCOLAX) 10 mg suppository Insert 10 mg into the rectum daily as needed. No stool greater than 72 hours   ? cholecalciferol, vitamin D3, (VITAMIN D3) 1,000 unit capsule Take 1,000 Units by mouth daily.   ? collagenase ointment Apply daily per WOC   ? docusate sodium (COLACE) 100 MG capsule Take 100 mg by mouth daily as needed.    ? ferrous sulfate 325 (65 FE) MG tablet Take 1 tablet by mouth 2 (two) times a day with meals.   ? gabapentin (NEURONTIN) 100 MG capsule Take 200 mg by mouth 2 (two) times a day.   ? HYDROmorphone (DILAUDID) 2 MG tablet (Take 2mg daily and 2mg Q 3 hours PRN)   ? lancets (ACCU-CHEK MULTICLIX LANCET) Misc TEST 6 TIMES A DAY   ? LANTUS SOLOSTAR U-100 INSULIN 100 unit/mL (3 mL) pen Inject 10 Units under the skin at bedtime. 11.65 Type 2 with hyperglycemia  Contact provider if insulin prescribed is not the preferred insulin per insurance. (Patient taking differently: Inject 15 Units under the skin at bedtime. 11.65 Type 2 with hyperglycemia  Contact provider if insulin prescribed is not the preferred insulin per insurance.)   ? lidocaine (XYLOCAINE) 5 % ointment Apply topically 4 (four) times a day. Apply to left shoulder or around wound (not inside wound)   ? metoprolol tartrate " (LOPRESSOR) 25 MG tablet Take 1 tablet (25 mg total) by mouth 2 (two) times a day.   ? miconazole (MICOTIN) 2 % powder Apply topically 2 (two) times a day. Apply to b/l groin/underneath breasts/underneath pannus   ? NOVOLOG U-100 INSULIN ASPART 100 unit/mL injection Check blood sugar four (4) times daily.  11.65 Type 2 with hyperglycemia  OneTouch Verio Flex  Meter  OneTouch Verio strips 2 boxes of 50  Delica Lancets box of 100  BD Ultra-fine Therese Pen Needles - NDC [address]-00 - dispense 1 case, refill PRN for 1 year (Patient taking differently: Inject 7 Units under the skin 3 (three) times a day before meals. )   ? polyethylene glycol (MIRALAX) 17 gram packet Take 1 packet (17 g total) by mouth daily as needed.   ? Saccharomyces boulardii (FLORASTOR) 250 mg capsule Take 250 mg by mouth daily.   ? senna (SENOKOT) 8.6 mg tablet Take 1 tablet by mouth 2 (two) times a day. (Patient taking differently: Take 1 tablet by mouth 2 (two) times a day as needed. )   ? sodium bicarbonate 650 MG tablet Take 1 tablet (650 mg total) by mouth 2 (two) times a day. OTC product   ? warfarin sodium (WARFARIN ORAL) Take by mouth. 11/5/20 INR 2.2 Cont 3mg W & Sat, 2.5mg AOD. Next INR 11/19.  10/29/20 INR 2.2 Cont 3mg W & Sat, 2.5mg AOD. Next INR 11/19  10/15/20 INR 2.3 3mg W, Sat and 2.5mg AOD. Next INR 10/29  10/6/20 INR 2.3  Take 3mg Wed and Sat and 2.5mg AOD.  Next INR 10/15/20.    9/29/20 INR 3.1 Take 2.5mg daily. Next INR 10/6.  9/22/20 INR 2.1 Take 3mg daily Next INR 9/29  (LD Antib 7/20)  9/18/20 INR 1.6 5mg tonight, then 3mg daily. Next INR 9/22.  9/14/20 INR 1.7 Cont 2.5,g daily. Next INR 9/17 9/11/20 INR 1.9 Take 2.5mg daily Next INR 9/14 9/8/20 INR 1.6  Take 2.5mg daily.  Next INR 9/11/20.         REVIEW OF SYSTEMS:    Currently, no fever, chills, or rigors. Does not have any visual or hearing problems. Denies any chest pain, headaches, palpitations, lightheadedness, dizziness, shortness of breath, or cough. Appetite is  "good. Denies any GERD symptoms. Denies any difficulty with swallowing, nausea, or vomiting.  Denies any abdominal pain, diarrhea or constipation. Denies any urinary symptoms, yeager in place. No insomnia. No active bleeding. No rash.       PHYSICAL EXAMINATION:  Vitals:    11/03/20 1454   BP: 134/77   Pulse: 75   Resp: 18   Temp: 97.1  F (36.2  C)   SpO2: 96%   Weight: (!) 227 lb 9.6 oz (103.2 kg)   Height: 5' 1\" (1.549 m)         GENERAL: Awake, Alert, oriented x3, not in any form of acute distress, answers questions appropriately, follows simple commands, conversant with flat affect  HEENT: Head is normocephalic with normal hair distribution. No evidence of trauma. Ears: No acute purulent discharge. Eyes: Sclera anicteric.  CHEST: No tenderness or deformity, no crepitus  LUNG: Clear to auscultation with good chest expansion. There DM BS  BACK: ROM normal, no CVA tenderness, no spinal tenderness   CVS: There is good S1  S2,  rhythm is regular.  ABDOMEN: Globular and ROTUND.  Chronic abdominal wall wounds bilaterally, the left side has more drainage today, the edges are more macerated than last week.  I did not see the wound bases it was covered with calcium alginate by wound team earlier this morning.  The wound edges look good without any signs or symptoms of cellulitis.  EXTREMITIES: UE Full ROM. Right BKA, wound with moderate sanguinous drainage on bandage.  SKIN: Warm and dry, no erythema noted, open area on abdomen viewed. Good granulation tissue and reduced circumference and depth. Scant drainage.    NEUROLOGICAL: The patient is oriented to person, place and time. Strength and sensation are grossly intact. Face is symmetric.    LABS:    Lab Results   Component Value Date    WBC 7.9 08/21/2020    HGB 9.2 (L) 08/21/2020    HCT 28.6 (L) 08/21/2020    MCV 89 08/21/2020     08/21/2020       Results for orders placed or performed during the hospital encounter of 08/15/20   Basic Metabolic Panel   Result Value " Ref Range    Sodium 137 136 - 145 mmol/L    Potassium 4.1 3.5 - 5.0 mmol/L    Chloride 103 98 - 107 mmol/L    CO2 23 22 - 31 mmol/L    Anion Gap, Calculation 11 5 - 18 mmol/L    Glucose 178 (H) 70 - 125 mg/dL    Calcium 11.0 (H) 8.5 - 10.5 mg/dL    BUN 37 (H) 8 - 22 mg/dL    Creatinine 2.05 (H) 0.60 - 1.10 mg/dL    GFR MDRD Af Amer 29 (L) >60 mL/min/1.73m2    GFR MDRD Non Af Amer 24 (L) >60 mL/min/1.73m2           Lab Results   Component Value Date    HGBA1C 10.3 (H) 06/17/2020     Vitamin D, Total (25-Hydroxy)   Date Value Ref Range Status   04/28/2016 29.8 (L) 30.0 - 80.0 ng/mL Final     Lab Results   Component Value Date    WIUHXMXI87 285 01/07/2011       ASSESSMENT/PLAN:  1. Visit for wound check    2. Paroxysmal atrial fibrillation (H)    3. Class 3 severe obesity due to excess calories with serious comorbidity and body mass index (BMI) of 50.0 to 59.9 in adult (H)    4. Chronic wound infection of abdomen, subsequent encounter      Right BKA: Hx of phantom limb pain. Improved. Unable to assess stump, in  and brace.   -Continues on Dilaudid.  -Continue PT and OT as directed by them.    Unintended weight loss: Patient has had greater than 20 pound weight loss since July.  She says the food is just bad here and it is always cold.  Monitoring.  We will put her on a protein supplement for wound healing.   Weight today 226. Stable now x 3 weeks. Reports decent po intake.     Acute on chronic blood loss anemia-likely from anemia chronic disease, iron deficiency, chronic kidney disease, surgery. On oral iron .     Left abdominal wall cellulitis and right side: This was her main focus today at the request of nursing.  Wound increased moisture, bleeding.  It was dressed today by wound team decided not completely undressed that but enough to see the edges which are slightly macerated and open, no redness or swelling i indicative of cellulitis at this time.  There is moderate purulent drainage.  -Check INR, CBC  with differential, BMP.  -Continue orders per wound care team and change dressing as needed for increased drainage.    Also reviewed right knee AKA wound: This 1 also is having increased drainage and bleeding.  Her dressing was changed this morning by wound team and is already a moderate amount of sanguinous drainage on the dressing.  Wound bed is bright red purulence.     Insulin-dependent diabetes: A1c 10.3.  Now on glargine 15 units nightly and sliding scale NovoLog 3 times daily with meals.  Blood sugars average 130-200. A few outliers to 280.      Essential hypertension: Satisfactory in TCU thus far, amlodipine d.cd.   -metoprolol    CKD 4: Followed by Dr. Iqbal. He ordered repeat BMP in a month.  Is not concerned about calcium that has stayed stable at 11.3.  Of note she did have elevated uric acid level at 11. Will not treat unless symptomatic.   -Sodium bicarb.     Coronary artery disease:  -also needs outpt CRISTINA eval   -Continue metoprolol and aspirin.     Urinary retention: recent UTI, treated.       A. Fib on Coumadin: INR therapeutic.  Was not due for recheck until November 19 however will recheck this week due to increased bleeding.    TCU/PT report: Using the easy stand for transfer at this time due to left leg weakness.    Communicated concerns with patient, nursing.    Electronically signed by:  Zoey Leung CNP  This progress note was completed using Dragon software and there may be grammatical errors.

## 2021-06-21 NOTE — PROGRESS NOTES
Office Visit - Follow up    Jazmin Bauman   63 y.o. female    Date of Visit: 10/19/2018    Chief Complaint   Patient presents with     Hypertension     Diabetes       Subjective: Diabetes mellitus type 2 with hypertension and hyperlipidemia and super morbid obesity BMI 58.    Discussed bariatric surgical intervention patient declined.    Blood sugar this morning 179 she does note polydipsia no weight loss.  No signs or symptoms of peripheral neuropathy in upper or lower extremities.    No signs or symptoms of autonomic neuropathy.  Diabetic foot examination allCLEAR.  Last laboratory test done August 13, 2018 where A1c 10.3.    Seen by Dr. Perez from endocrine yesterday.    Flu vaccine given today plus vitamin B12 1000 mcg given also left deltoid.  Pernicious anemia.    Mammogram allCLEAR March 16, 2017 and colonoscopy of March 27, 2012 with Dr. India Gtz showed 1 tubular adenomatous colon polyp there is a family history of colon cancer father dying of same.  It would have been her father's 100th birthday today.    ROS: A comprehensive review of systems was performed and was otherwise negative    Medications:  Prior to Admission medications    Medication Sig Start Date End Date Taking? Authorizing Provider   acetaminophen (TYLENOL) 325 MG tablet Take 325-650 mg by mouth every 8 (eight) hours as needed.    Yes PROVIDER, HISTORICAL   allopurinol (ZYLOPRIM) 100 MG tablet Take 1 tablet (100 mg total) by mouth daily. 6/29/16  Yes Lester Flores MD   amLODIPine (NORVASC) 10 MG tablet Take 10 mg by mouth daily.   Yes PROVIDER, HISTORICAL   aspirin 81 MG EC tablet Take 81 mg by mouth daily.   Yes PROVIDER, HISTORICAL   BASAGLAR KWIKPEN U-100 INSULIN 100 unit/mL (3 mL) pen inject 49 units under the skin every morning. do not mix with any other insulin.  Patient taking differently: inject 55  units under the skin every morning. do not mix with any other insulin. 9/8/18  Yes Valdo Manzo MD   blood glucose  "test strips Use 1 each As Directed 6 (six) times a day. Dispense brand per patient's insurance at pharmacy discretion. 4/17/18  Yes Lester Flores MD   cholecalciferol, vitamin D3, (VITAMIN D3) 1,000 unit capsule Take 1,000 Units by mouth daily.   Yes PROVIDER, HISTORICAL   docusate sodium (COLACE) 50 MG capsule Take by mouth once daily.   Yes PROVIDER, HISTORICAL   ferrous sulfate 65 mg elemental iron (IRON) Take 1 tablet by mouth 2 (two) times a day.   Yes PROVIDER, HISTORICAL   glipiZIDE (GLUCOTROL) 10 MG tablet Take 10 mg by mouth 2 (two) times a day.  1/31/16  Yes PROVIDER, HISTORICAL   insulin aspart U-100 (NOVOLOG FLEXPEN U-100 INSULIN) 100 unit/mL injection pen Inject 8 Units under the skin 3 (three) times a day before meals.  Patient taking differently: Inject 15 Units under the skin 3 (three) times a day before meals.  6/18/18  Yes Max Perez MD   lancets (ACCU-CHEK MULTICLIX LANCET) Misc TEST 6 TIMES A DAY  Patient taking differently: TEST 1 TIME A DAY 9/28/15  Yes Lester Flores MD   losartan (COZAAR) 25 MG tablet Take 1 tablet (25 mg total) by mouth daily. 6/18/18  Yes Max Perez MD   magnesium 250 mg Tab Take 500 mg by mouth 2 (two) times a day.    Yes PROVIDER, HISTORICAL   ACCU-CHEK SAM PLUS TEST STRP strips TEST 6 TIMES A DAY  Patient taking differently: TEST 1 TIME A DAY 9/18/17   Lester Flores MD   BD ULTRA-FINE MICRO PEN NEEDLE 32 gauge x 1/4\" Ndle USE AS DIRECTED DAILY. 10/2/18   Max Perez MD   colchicine (MITIGARE) 0.6 mg cap Take 0.6 mg by mouth 2 (two) times a day. 12/21/16   Lester Floers MD       Allergies:   Allergies   Allergen Reactions     Hydralazine      Latex Itching     Skin Burns     Naprosyn [Naproxen] Swelling     throat     Nitrofurantoin Hives     Sulfa (Sulfonamide Antibiotics) Rash     Vicks Vaporub [Camphor-Eucalyptus Oil-Menthol] Rash       Immunizations:   Immunization History   Administered Date(s) Administered     " INFLUENZA,RECOMBINANT,INJ,PF QUADRIVALENT 18+YRS 10/19/2018     Influenza F2s2-38, 12/18/2009     Influenza, inj, historic,unspecified 12/18/2009, 10/14/2011     Influenza, seasonal,quad inj 36+ mos 09/28/2015     Influenza, seasonal,quad inj 6-35 mos 10/30/2012, 09/30/2013, 11/21/2014     Influenza,seasonal quad, PF, 36+MOS 09/29/2016, 10/03/2017     Td,adult,historic,unspecified 03/02/2006     Tdap 05/15/2015       Exam Chest clear to auscultation and percussion.  Heart tones regular rhythm without murmur rub or gallop.  Abdomen soft nontender no organomegaly.  No peritoneal signs.  Extremities free of edema cyanosis or clubbing.  Neck veins nondistended no thyromegaly or scleral icterus noted, carotids full.  Skin warm and dry easily conversant good spirited.  Normal intelligence.  Neurologically intact no gross localizing findings.  Uses 2 canes for ambulation.  Super morbid obesity with degenerative changes in back knees hips most likely.  Walks with a limp.  Personal hygiene questionable.  Not in acute distress not toxic.    Assessment and Plan  Diabetes mellitus type 2 with multiple drug allergies hypertension and hyperlipidemia.    Tubular adenomatous colon polyp.    Super morbid obesity see below refuses bariatric surgical intervention.    Pernicious anemia.  Vitamin B12 1000 mcg administered left deltoid with flu shot today as well.  Linked to diabetes mellitus type 2 check A1c blood sugar lipid panel.    Insulin resistance.    Multiple drug allergies including hydralazine latex Naprosyn nitrofurantoin sulfa and Vicks VapoRub.    Time: total time spent with the patient was 25 minutes of which >50% was spent in counseling and coordination of care    The following high BMI interventions were performed this visit: encouragement to exercise    Lester Flores MD    Patient Active Problem List   Diagnosis     Thrombocytopenia     Fibromyalgia     Carpal Tunnel Syndrome     Urinary Tract Infection      Endometrial Hyperplasia     Cholelithiasis     Leukocytosis     Urine Tests Nonspecific Abnormal Findings     Obesity     Essential Hypertension     Pernicious Anemia     Immunology Studies Raised Immunoglobulin Level     Vaginal bleeding     Type 2 diabetes mellitus (H)

## 2021-06-21 NOTE — LETTER
"Letter by Zoey Leung CNP at      Author: Zoey Leung CNP Service: -- Author Type: --    Filed:  Encounter Date: 2021 Status: (Other)         [facility] - [address] [address]                                  2021    Patient: Jazmin Bauman   MR Number: [id number]   YOB: 1955   Date of Visit: 2021     Dear  Home:    Thank you for referring Jazmin Bauman to me for evaluation. Below are the relevant portions of my assessment and plan of care.    If you have questions, please do not hesitate to call me. I look forward to following Jazmin along with you.    Sincerely,        Zoey Leung CNP          CC  No Recipients  Zoey Leung CNP  2021  4:05 PM  Sign when Signing Visit    [facility]      NAME:  Jazmin Bauman             :  1955    MRN: [id number]    CODE STATUS:  FULL CODE    FACILITY: [facility] [[id number]]         CHIEF COMPLAIN/REASON FOR VISIT:  Chief Complaint   Patient presents with   ? Problem Visit     pain management        HISTORY OF PRESENT ILLNESS: Jazmin Bauman is a 65 y.o. female. Pt was at [facility] from  to  and underwent a RBKA r/t ongoing DM ulcer with osteomyelitis. Per her EMR dc summary \" with HTN,morbid obesity, DM, A fib, CKD 4 presented for evaluation of R foot swelling, difficulty ambulation, pain found to have osteomyelitis now s/p amputation. She has now been to the OR twice this stay, last was on 20.   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   Patient had a chronic diabetic foot ulcer on her R heel, presented for increased swelling, difficulty ambulating, and pain. MRI showed extensive soft tissue necrosis and some osteomyelitis along with cellulitis  Met sepsis criteria on admission with leukocytosis and elevated CRP  Podiatry saw and the foot was not " "felt to be salvageable and BKA was recommended\"     August 15-21: Hospitalized for sepsis with MRSA bacteremia, she had possible endocarditis and TTE revealing vegetation of the aortic valve. She had a CT of the abdomen and pelvis that showed left lower abdominal pannus ulceration but without abscess.  Her right BKA stump ulceration was negative for osteomyelitis or cellulitis.  She was treated with IV Vanco for 14 days which will be completed on August 30.  Her left lower abdominal ulceration has never been biopsied.  It was not biopsied in the hospital and there was no surgical intervention.  Her right BKA stump was negative for osteomyelitis.    CKD stage IV with baseline creatinine 1.9-2.0 now.   Her insulin was adjusted and she is now on sliding scale insulin with 17 units of Lantus at bedtime.     For her hypertension, Norvasc has been discontinued.  Blood pressures remain in the 130s over 70s on average.  She was incidentally found to have a lung nodule on CT scan; follow up CT was on December 1 and revealed resolution of the nodule.    Today: Seen today to review pain management, patient has been on every 3 hours as needed hydromorphone that she is not using since her surgery last summer.  I will decrease this to twice daily as needed.  She is okay with that, we discussed discontinuing it altogether however she would like to have some available for acute pain needs.  She did mention as well as OT who was also in the room, that she is continuing to have loose stools.  She thinks it is just the food here in the facility and when she would like some Imodium, however given her recent C. difficile infection I had like to test for cure before giving Imodium.  Denying nausea, weight loss, abdominal pain or tenderness.    Allergies   Allergen Reactions   ? Hydralazine      Paralysis of legs   ? Latex Itching     Skin Burns   ? Naprosyn [Naproxen] Swelling     throat   ? Nitrofurantoin Hives   ? Sulfa (Sulfonamide " "Antibiotics) Rash   ? Vicks Vaporub [Camphor-Eucalyptus Oil-Menthol] Rash   :     Current Outpatient Medications   Medication Sig   ? acetaminophen (TYLENOL) 500 MG tablet Take 2 tablets (1,000 mg total) by mouth 3 (three) times a day.   ? apixaban ANTICOAGULANT (ELIQUIS) 5 mg Tab tablet Take 5 mg by mouth 2 (two) times a day.   ? aspirin 81 MG EC tablet Take 81 mg by mouth daily.   ? BD ULTRA-FINE MICRO PEN NEEDLE 32 gauge x 1/4\" Ndle USE AS DIRECTED 4 TIMES DAILY.   ? bisacodyL (DULCOLAX) 10 mg suppository Insert 10 mg into the rectum daily as needed. No stool greater than 72 hours   ? docusate sodium (COLACE) 100 MG capsule Take 100 mg by mouth daily as needed.    ? ferrous sulfate 325 (65 FE) MG tablet Take 1 tablet by mouth 2 (two) times a day with meals.   ? gabapentin (NEURONTIN) 100 MG capsule Take 200 mg by mouth 2 (two) times a day.   ? HYDROmorphone (DILAUDID) 2 MG tablet (Take 2mg daily and 2mg Q 3 hours PRN)   ? lancets (ACCU-CHEK MULTICLIX LANCET) Misc TEST 6 TIMES A DAY   ? LANTUS SOLOSTAR U-100 INSULIN 100 unit/mL (3 mL) pen Inject 10 Units under the skin at bedtime. 11.65 Type 2 with hyperglycemia  Contact provider if insulin prescribed is not the preferred insulin per insurance. (Patient taking differently: Inject 17 Units under the skin at bedtime. 11.65 Type 2 with hyperglycemia  Contact provider if insulin prescribed is not the preferred insulin per insurance.)   ? lidocaine (LIDODERM) 5 % Place 1 patch on the skin daily. Remove & Discard patch within 12 hours or as directed by MD   ? metoprolol tartrate (LOPRESSOR) 25 MG tablet Take 1 tablet (25 mg total) by mouth 2 (two) times a day.   ? miconazole (MICOTIN) 2 % powder Apply topically 2 (two) times a day. Apply to b/l groin/underneath breasts/underneath pannus   ? NOVOLOG U-100 INSULIN ASPART 100 unit/mL injection Check blood sugar four (4) times daily.  11.65 Type 2 with hyperglycemia  OneTouch Verio Flex  Meter  OneTouch Verio strips 2 boxes " "of 50  Delica Lancets box of 100  BD Ultra-fine Therese Pen Needles - NDC [address]-03 - dispense 1 case, refill PRN for 1 year (Patient taking differently: Inject under the skin see administration instructions. 7 B  10 L  10 S)   ? omeprazole (PRILOSEC) 20 MG capsule Take 20 mg by mouth 2 (two) times a day before meals.   ? polyethylene glycol (MIRALAX) 17 gram packet Take 1 packet (17 g total) by mouth daily as needed.   ? Saccharomyces boulardii (FLORASTOR) 250 mg capsule Take 250 mg by mouth daily.   ? senna (SENOKOT) 8.6 mg tablet Take 1 tablet by mouth 2 (two) times a day. (Patient taking differently: Take 1 tablet by mouth 2 (two) times a day as needed. )   ? sodium bicarbonate 650 MG tablet Take 1 tablet (650 mg total) by mouth 2 (two) times a day. OTC product         REVIEW OF SYSTEMS:    Currently, no fever, chills, or rigors. Does not have any visual or hearing problems. Denies any chest pain, headaches, palpitations, lightheadedness, dizziness, shortness of breath, or cough. Appetite is good. Denies any GERD symptoms. Denies any difficulty with swallowing, nausea, or vomiting.  Denies any abdominal pain, diarrhea or constipation. Denies any urinary symptoms, yeager in place. No insomnia. No active bleeding. No rash.       PHYSICAL EXAMINATION:  Vitals:    04/01/21 1251   BP: 148/64   Pulse: (!) 56   Resp: 18   Temp: 96.8  F (36  C)   SpO2: 98%   Weight: (!) 241 lb 9.6 oz (109.6 kg)   Height: 5' 1\" (1.549 m)         GENERAL: Awake, Alert, oriented x3, not in any form of acute distress, answers questions appropriately, follows simple commands, conversant with flat affect.  Patient did have PFT  HEENT: Head is normocephalic with normal hair distribution. No evidence of trauma. Ears: No acute purulent discharge. Eyes: Sclera anicteric.  LUNG: Clear to auscultation with good chest expansion.   BACK: ROM normal, no CVA tenderness.  CVS: There is good S1  S2,  rhythm is regular.  ABDOMEN: Globular and ROTUND.  " Nontender with palpation. chronic abdominal wall wounds bilaterally that are healing well.  EXTREMITIES: UE Full ROM. Right BKA; now with prosthesis on.  SKIN: Warm and dry, no erythema noted, open area on abdomen.    NEUROLOGICAL: The patient is oriented to person, place and time. Strength and sensation are grossly intact. Face is symmetric.    LABS:    Lab Results   Component Value Date    WBC 7.9 08/21/2020    HGB 9.2 (L) 08/21/2020    HCT 28.6 (L) 08/21/2020    MCV 89 08/21/2020     08/21/2020       Results for orders placed or performed during the hospital encounter of 08/15/20   Basic Metabolic Panel   Result Value Ref Range    Sodium 137 136 - 145 mmol/L    Potassium 4.1 3.5 - 5.0 mmol/L    Chloride 103 98 - 107 mmol/L    CO2 23 22 - 31 mmol/L    Anion Gap, Calculation 11 5 - 18 mmol/L    Glucose 178 (H) 70 - 125 mg/dL    Calcium 11.0 (H) 8.5 - 10.5 mg/dL    BUN 37 (H) 8 - 22 mg/dL    Creatinine 2.05 (H) 0.60 - 1.10 mg/dL    GFR MDRD Af Amer 29 (L) >60 mL/min/1.73m2    GFR MDRD Non Af Amer 24 (L) >60 mL/min/1.73m2           Lab Results   Component Value Date    HGBA1C 10.3 (H) 06/17/2020     Vitamin D, Total (25-Hydroxy)   Date Value Ref Range Status   04/28/2016 29.8 (L) 30.0 - 80.0 ng/mL Final     Lab Results   Component Value Date    GTJLZODE08 285 01/07/2011       ASSESSMENT/PLAN:  1. Diarrhea, unspecified type    2. Primary osteoarthritis of both knees      Chronic pain: Improved. Not using PRN but would like some available.  -Decrease hydromorphone 2 mg p.o. twice daily as needed.    Loose stool   C. difficile positive, recurrent:   Completed vancomycin mid-March.  Now complaining of loose stools again.  She thinks most of her loose stools are from the food and is requesting Imodium.  Although there is a possibility of false positive after recent treatment, I had like to test to ensure that the C. difficile has cleared before giving Imodium.   -Repeat C. difficile culture.    Chronic  conditions,:     Left abdominal wall open area: Followed by the wound team.  The wound is drastically improved and almost closed with no redness or drainage.  This is a huge improvement and the patient is very happy about this.  -Continue orders per wound care team and change dressing as needed for increased drainage.    Osteoarthritis: Knee injections completed on 1/21.  She has had mild improvement in pain.   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 Improved. Unable to assess stump, in  and brace.   -Continues on Dilaudid; using infrequently.    Anticoagulated:  -Omeprazole 20 mg daily.   -Hemoglobin on Monday.    Insulin-dependent diabetes: A1c 10.3--> 8.4.  Now on glargine 17 units nightly and sliding scale NovoLog 7 units 3 times daily with meals.  Blood sugars average 130-200. A few outliers to 280.      Essential hypertension: Satisfactory. amlodipine d.cd.   -metoprolol    Acute on chronic blood loss anemia- likely from anemia chronic disease, iron deficiency, chronic kidney disease, surgery. On oral iron.  Sees nephrologist next week, has labs ordered for Monday.    CKD 4: Followed by Dr. Iqbal.  Calcium improved at 9.3.   -Sodium bicarb.     Coronary artery disease:  -also needs outpt CRISTINA eval   -Continue metoprolol and aspirin.       A. Fib: Reports a nosebleed that occurred this morning and that was distressing to her she is concerned that it is due to her anticoagulation.  Nosebleed did resolve in a timely manner, without any more intervention than nursing and the patient applying pressure.  She does not want to check her hemoglobin until next week when her nephrologist has labs ordered anyway.  We discussed risks and benefits of anticoagulation secondary to her A. fib, CAD, PAD, and diabetes.  We will continue monitoring for further bleeding.  -On Eliquis 5 mg twice daily .      Unintended weight loss: Resolved.         Electronically signed by:  Zoey Leung CNP  This progress note was  completed using Dragon software and there may be grammatical errors.

## 2021-06-21 NOTE — LETTER
"Letter by Zoey Leung CNP at      Author: Zoey Leung CNP Service: -- Author Type: --    Filed:  Encounter Date: 2021 Status: (Other)         [facility] - [address] [address]                                  February 3, 2021    Patient: Jazmin Bauman   MR Number: [id number]   YOB: 1955   Date of Visit: 2021     Dear  Home:    Thank you for referring Jazmin Bauman to me for evaluation. Below are the relevant portions of my assessment and plan of care.    If you have questions, please do not hesitate to call me. I look forward to following Jazmin along with you.    Sincerely,        Zoey Leung CNP          CC  No Recipients  Zoey Leung CNP  2/3/2021 12:06 PM  Sign when Signing Visit    [facility]      NAME:  Jazmin Bauman             :  1955    MRN: [id number]    CODE STATUS:  FULL CODE    FACILITY: [facility] [[id number]]         CHIEF COMPLAIN/REASON FOR VISIT:  Chief Complaint   Patient presents with   ? Problem Visit     Eliquis, knee injection        HISTORY OF PRESENT ILLNESS: Jazmin Bauman is a 65 y.o. female. Pt was at [facility] from  to  and underwent a RBKA r/t ongoing DM ulcer with osteomyelitis. Per her EMR dc summary \" with HTN,morbid obesity, DM, A fib, CKD 4 presented for evaluation of R foot swelling, difficulty ambulation, pain found to have osteomyelitis now s/p amputation. She has now been to the OR twice this stay, last was on 20.   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   Patient had a chronic diabetic foot ulcer on her R heel, presented for increased swelling, difficulty ambulating, and pain. MRI showed extensive soft tissue necrosis and some osteomyelitis along with cellulitis  Met sepsis criteria on admission with leukocytosis and elevated CRP  Podiatry saw and the " "foot was not felt to be salvageable and BKA was recommended\"     August 15-21: Hospitalized for sepsis with MRSA bacteremia, she had possible endocarditis and TTE revealing vegetation of the aortic valve. She had a CT of the abdomen and pelvis that showed left lower abdominal pannus ulceration but without abscess.  Her right BKA stump ulceration was negative for osteomyelitis or cellulitis.  She was treated with IV Vanco for 14 days which will be completed on August 30.  Her left lower abdominal ulceration has never been biopsied.  It was not biopsied in the hospital and there was no surgical intervention.  Her right BKA stump was negative for osteomyelitis.  CKD stage IV with baseline creatinine 2.5-3.  She was at 1.82 at the end of hospitalization.  She is discharged on sodium bicarb twice daily.  Her insulin was adjusted and she is now on sliding scale insulin with 10 units of Lantus at bedtime.  Blood sugars have been less than 200 on average.    For her hypertension, Norvasc has been discontinued.  Blood pressures remain in the 130s over 70s on average.  She was incidentally found to have a lung nodule on CT scan; follow up CT was on December 1 and revealed resolution of the nodule.    Today:  Seen today for joint injections: see procedure note below.   Also discussed transitioning from coumadin to elaquis to eliminate the INR draws. We have not made this switch yet but she is open to discussing it further. Will switch to elaquis after vancomycin is complete.       Allergies   Allergen Reactions   ? Hydralazine      Paralysis of legs   ? Latex Itching     Skin Burns   ? Naprosyn [Naproxen] Swelling     throat   ? Nitrofurantoin Hives   ? Sulfa (Sulfonamide Antibiotics) Rash   ? Vicks Vaporub [Camphor-Eucalyptus Oil-Menthol] Rash   :     Current Outpatient Medications   Medication Sig   ? acetaminophen (TYLENOL) 500 MG tablet Take 2 tablets (1,000 mg total) by mouth 3 (three) times a day.   ? aspirin 81 MG EC " "tablet Take 81 mg by mouth daily.   ? BD ULTRA-FINE MICRO PEN NEEDLE 32 gauge x 1/4\" Ndle USE AS DIRECTED 4 TIMES DAILY.   ? bisacodyL (DULCOLAX) 10 mg suppository Insert 10 mg into the rectum daily as needed. No stool greater than 72 hours   ? docusate sodium (COLACE) 100 MG capsule Take 100 mg by mouth daily as needed.    ? ferrous sulfate 325 (65 FE) MG tablet Take 1 tablet by mouth 2 (two) times a day with meals.   ? gabapentin (NEURONTIN) 100 MG capsule Take 200 mg by mouth 2 (two) times a day.   ? HYDROmorphone (DILAUDID) 2 MG tablet (Take 2mg daily and 2mg Q 3 hours PRN)   ? lancets (ACCU-CHEK MULTICLIX LANCET) Misc TEST 6 TIMES A DAY   ? LANTUS SOLOSTAR U-100 INSULIN 100 unit/mL (3 mL) pen Inject 10 Units under the skin at bedtime. 11.65 Type 2 with hyperglycemia  Contact provider if insulin prescribed is not the preferred insulin per insurance. (Patient taking differently: Inject 17 Units under the skin at bedtime. 11.65 Type 2 with hyperglycemia  Contact provider if insulin prescribed is not the preferred insulin per insurance.)   ? lidocaine (LIDODERM) 5 % Place 1 patch on the skin daily. Remove & Discard patch within 12 hours or as directed by MD   ? metoprolol tartrate (LOPRESSOR) 25 MG tablet Take 1 tablet (25 mg total) by mouth 2 (two) times a day.   ? miconazole (MICOTIN) 2 % powder Apply topically 2 (two) times a day. Apply to b/l groin/underneath breasts/underneath pannus   ? NOVOLOG U-100 INSULIN ASPART 100 unit/mL injection Check blood sugar four (4) times daily.  11.65 Type 2 with hyperglycemia  OneTouch Verio Flex  Meter  OneTouch Verio strips 2 boxes of 50  Delica Lancets box of 100  BD Ultra-fine Therese Pen Needles - NDC [address]-38 - dispense 1 case, refill PRN for 1 year (Patient taking differently: Inject under the skin see administration instructions. 7 B  10 L  10 S)   ? omeprazole (PRILOSEC) 20 MG capsule Take 20 mg by mouth 2 (two) times a day before meals.   ? polyethylene glycol " "(MIRALAX) 17 gram packet Take 1 packet (17 g total) by mouth daily as needed.   ? Saccharomyces boulardii (FLORASTOR) 250 mg capsule Take 250 mg by mouth daily.   ? senna (SENOKOT) 8.6 mg tablet Take 1 tablet by mouth 2 (two) times a day. (Patient taking differently: Take 1 tablet by mouth 2 (two) times a day as needed. )   ? sodium bicarbonate 650 MG tablet Take 1 tablet (650 mg total) by mouth 2 (two) times a day. OTC product   ? warfarin sodium (WARFARIN ORAL) Take by mouth. 1/26/21 INR 2.0  Cont 3mg Tues and Fri and 2.5mg AOD.  Next INR 2/9/21.    1/19/21  INR 2.1 Cont 3mg T & Fr, 2.5mg AOD. Next INR 1/26 1/5/21 INR 2.3  Cont 3mg Tues and Fri and 2.5mg AOD.  Next INR 1/19/21.    1/4/21 INR missed.  Take 2.5mg on 1/4.  Next INR 1/5/21.    12/28/20 INR 2.9  Cont 3mg Tues and Fri and 2.5mg AOD.  Next INR 1/4/21.  12/22/20 INR 2.1  Take 3mg Tues and Fri and 2.5mg AOD.  Next INR 12/28 12/18/20 INR 2.5 2.5mg daily. Next INR 12/22.  12/16/20 INR 3.3 Hold x2 Next INR 12/18 12/14/20 INR 3.2 Hold tonight, then resume 3mg W & Sat, 2.5mg AOD. Next         REVIEW OF SYSTEMS:    Currently, no fever, chills, or rigors. Does not have any visual or hearing problems. Denies any chest pain, headaches, palpitations, lightheadedness, dizziness, shortness of breath, or cough. Appetite is good. Denies any GERD symptoms. Denies any difficulty with swallowing, nausea, or vomiting.  Denies any abdominal pain, diarrhea or constipation. Denies any urinary symptoms, yeager in place. No insomnia. No active bleeding. No rash.       PHYSICAL EXAMINATION:  Vitals:    01/21/21 1017   BP: 129/60   Pulse: (!) 58   Resp: 18   Temp: 97.4  F (36.3  C)   SpO2: 98%   Weight: (!) 224 lb 1.6 oz (101.7 kg)   Height: 5' 1\" (1.549 m)         GENERAL: Awake, Alert, oriented x3, not in any form of acute distress, answers questions appropriately, follows simple commands, conversant with flat affect.  Patient did have PFT  HEENT: Head is normocephalic with " normal hair distribution. No evidence of trauma. Ears: No acute purulent discharge. Eyes: Sclera anicteric.  LUNG: Clear to auscultation with good chest expansion.   BACK: ROM normal, no CVA tenderness.  CVS: There is good S1  S2,  rhythm is regular.  ABDOMEN: Globular and ROTUND.  Nontender with palpation. chronic abdominal wall wounds bilaterally that are healing well.  EXTREMITIES: UE Full ROM. Right BKA, wound covered, healing.  SKIN: Warm and dry, no erythema noted, open area on abdomen.    NEUROLOGICAL: The patient is oriented to person, place and time. Strength and sensation are grossly intact. Face is symmetric.    LABS:    Lab Results   Component Value Date    WBC 7.9 08/21/2020    HGB 9.2 (L) 08/21/2020    HCT 28.6 (L) 08/21/2020    MCV 89 08/21/2020     08/21/2020       Results for orders placed or performed during the hospital encounter of 08/15/20   Basic Metabolic Panel   Result Value Ref Range    Sodium 137 136 - 145 mmol/L    Potassium 4.1 3.5 - 5.0 mmol/L    Chloride 103 98 - 107 mmol/L    CO2 23 22 - 31 mmol/L    Anion Gap, Calculation 11 5 - 18 mmol/L    Glucose 178 (H) 70 - 125 mg/dL    Calcium 11.0 (H) 8.5 - 10.5 mg/dL    BUN 37 (H) 8 - 22 mg/dL    Creatinine 2.05 (H) 0.60 - 1.10 mg/dL    GFR MDRD Af Amer 29 (L) >60 mL/min/1.73m2    GFR MDRD Non Af Amer 24 (L) >60 mL/min/1.73m2           Lab Results   Component Value Date    HGBA1C 10.3 (H) 06/17/2020     Vitamin D, Total (25-Hydroxy)   Date Value Ref Range Status   04/28/2016 29.8 (L) 30.0 - 80.0 ng/mL Final     Lab Results   Component Value Date    YUEOZODM84 285 01/07/2011       ASSESSMENT/PLAN:  1. Paroxysmal atrial fibrillation (H)    2. Primary osteoarthritis of both knees         COVID-19: positive test identified 12/5 via PCR. Resolved.  Although unknown if recent loose stools are related to this.     Loose stool  C. difficile positive: Have been getting varying reports regarding loose stools.  Reports today that they are  "semiformed.  C. difficile positive after multiple attempts send expanded lab mycin.  Reporting improvement in stools and no systemic symptoms.    Osteoarthritis:   Procedure Note     Procedure:  Left knee and right knee injection      Diagnosis: Osteoarthritis of bilateral knees.     Anesthesia/Sedation: Lidocaine 1%, local anesthetic     Injectable Lot #'s:  Triamcinilone acetonide x 2 bottles: [id number] ; 10/21  Lidocaine 1%:  [id number]; 09/2022     Procedure Details: The intended procedure, risks (infection, skin atrophy, ineffective pain management), and alternatives (PT/OT, oral pain management) were discussed with the patient and informed consent was obtained. \"Pause for the cause\" was utilized to identify correct patient and intended procedure. The joint was cleansed with betadine and allowed to dry. 1 cc of Lidocaine mixed with 40 mg of triamcinolone was injected into the right knee using a lanie-medial approach and 1 cc of lidocaine mixed with 40 mg of triamcinolone was injected into the left knee using the anterior medial injection technique.  The patient tolerated the procedure well.      Pain:   Pre procedure: 8/10 when moving, 7/10 when sitting  Post procedure: 5/10     Successful joint injections.     Anticoagulated:  -Omeprazole 20 mg daily.     Left abdominal wall open area: Followed by the wound team.  The wound is drastically improved and almost closed with no redness or drainage.  This is a huge improvement and the patient is very happy about this.  -Continue orders per wound care team and change dressing as needed for increased drainage.    Chronic conditions, reviewed:     Insulin-dependent diabetes: A1c 10.3--> 8.4.  Now on glargine 15 units nightly and sliding scale NovoLog 3 times daily with meals.  Blood sugars average 130-200. A few outliers to 280.      Essential hypertension: Satisfactory. amlodipine d.cd.   -metoprolol    Acute on chronic blood loss anemia-likely from anemia chronic " disease, iron deficiency, chronic kidney disease, surgery. On oral iron .    CKD 4: Followed by Dr. Iqbal.  Calcium improved at 9.1.  -Sodium bicarb.     Coronary artery disease:  -also needs outpt CRISTINA eval   -Continue metoprolol and aspirin.       A. Fib on Coumadin: INR therapeutic.      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 Improved. Unable to assess stump, in  and brace.   -Continues on Dilaudid.    Unintended weight loss: Patient has had greater than 20 pound weight loss since July.  She says the food is just bad here and it is always cold.    -Weight improved now at 236.  This is her baseline now.    Communicated concerns with patient, nursing.     Electronically signed by:  Zoey Leung CNP  This progress note was completed using Dragon software and there may be grammatical errors.

## 2021-06-21 NOTE — LETTER
"Letter by Zoey Leung CNP at      Author: Zoey Leung CNP Service: -- Author Type: --    Filed:  Encounter Date: 2020 Status: (Other)         [facility] - [address] [address]                                  2020    Patient: Jazmin Bauman   MR Number: [id number]   YOB: 1955   Date of Visit: 2020     Dear Dr. Home:    Thank you for referring Jazmin Bauman to me for evaluation. Below are the relevant portions of my assessment and plan of care.    If you have questions, please do not hesitate to call me. I look forward to following Jazmin along with you.    Sincerely,        Zoey Leung CNP          CC  No Recipients  Zoey Leung CNP  2020 10:00 PM  Sign when Signing Visit    [facility]      NAME:  Jazmin Bauman             :  1955    MRN: [id number]    CODE STATUS:  FULL CODE    FACILITY: [facility] [[id number]]         CHIEF COMPLAIN/REASON FOR VISIT:  Chief Complaint   Patient presents with   ? Follow-up     Covid 19        HISTORY OF PRESENT ILLNESS: Jazmin Bauman is a 65 y.o. female. Pt was at [facility] from  to  and underwent a RBKA r/t ongoing DM ulcer with osteomyelitis. Per her EMR dc summary \" with HTN,morbid obesity, DM, A fib, CKD 4 presented for evaluation of R foot swelling, difficulty ambulation, pain found to have osteomyelitis now s/p amputation. She has now been to the OR twice this stay, last was on 20.   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   Patient had a chronic diabetic foot ulcer on her R heel, presented for increased swelling, difficulty ambulating, and pain. MRI showed extensive soft tissue necrosis and some osteomyelitis along with cellulitis  Met sepsis criteria on admission with leukocytosis and elevated CRP  Podiatry saw and the foot was not " "felt to be salvageable and BKA was recommended\"       August 15-21: Hospitalized for sepsis with MRSA bacteremia, she had possible endocarditis and TTE revealing vegetation of the aortic valve. She had a CT of the abdomen and pelvis that showed left lower abdominal pannus ulceration but without abscess.  Her right BKA stump ulceration was negative for osteomyelitis or cellulitis.  She was treated with IV Vanco for 14 days which will be completed on August 30.  Her left lower abdominal ulceration has never been biopsied.  It was not biopsied in the hospital and there was no surgical intervention.  Her right BKA stump was negative for osteomyelitis.  CKD stage IV with baseline creatinine 2.5-3.  She was at 1.82 at the end of hospitalization.  She is discharged on sodium bicarb twice daily.  Her insulin was adjusted and she is now on sliding scale insulin with 10 units of Lantus at bedtime.  Blood sugars have been less than 200 on average.    For her hypertension, Norvasc has been discontinued.  Blood pressures remain in the 130s over 70s on average.  She was incidentally found to have a lung nodule on CT scan; follow up CT was on December 1 and revealed resolution of the nodule.    Today: Seen for COVID-19 and viewed wounds as well.  She was diagnosed last week and has been generally asymptomatic although she does report increased fatigue.  Will obtain general Covid labs and as needed orders in the event that she becomes symptomatic.  She is eating although her appetite has been poor for the last 6 months in general.  Weight is actually up back to her August baseline at 236.  I will check a BMP given her CKD as well as CRP and CBC.  Her quarantine should be up by Thursday of this week as long as she does not develop symptoms.  I reviewed her abdominal wounds which are markedly improved from the last time I saw them.  The right side is pretty much resolved and the left side has only a small pocket with scar tissue and " "no open area.      Allergies   Allergen Reactions   ? Hydralazine      Paralysis of legs   ? Latex Itching     Skin Burns   ? Naprosyn [Naproxen] Swelling     throat   ? Nitrofurantoin Hives   ? Sulfa (Sulfonamide Antibiotics) Rash   ? Vicks Vaporub [Camphor-Eucalyptus Oil-Menthol] Rash   :     Current Outpatient Medications   Medication Sig   ? acetaminophen (TYLENOL) 500 MG tablet Take 2 tablets (1,000 mg total) by mouth 3 (three) times a day.   ? aspirin 81 MG EC tablet Take 81 mg by mouth daily.   ? BD ULTRA-FINE MICRO PEN NEEDLE 32 gauge x 1/4\" Ndle USE AS DIRECTED 4 TIMES DAILY.   ? bisacodyL (DULCOLAX) 10 mg suppository Insert 10 mg into the rectum daily as needed. No stool greater than 72 hours   ? cholecalciferol, vitamin D3, (VITAMIN D3) 1,000 unit capsule Take 1,000 Units by mouth daily.   ? collagenase ointment Apply daily per WOC   ? docusate sodium (COLACE) 100 MG capsule Take 100 mg by mouth daily as needed.    ? ferrous sulfate 325 (65 FE) MG tablet Take 1 tablet by mouth 2 (two) times a day with meals.   ? gabapentin (NEURONTIN) 100 MG capsule Take 200 mg by mouth 2 (two) times a day.   ? HYDROmorphone (DILAUDID) 2 MG tablet (Take 2mg daily and 2mg Q 3 hours PRN)   ? lancets (ACCU-CHEK MULTICLIX LANCET) Misc TEST 6 TIMES A DAY   ? LANDEEPIKA SOLOSTAR U-100 INSULIN 100 unit/mL (3 mL) pen Inject 10 Units under the skin at bedtime. 11.65 Type 2 with hyperglycemia  Contact provider if insulin prescribed is not the preferred insulin per insurance. (Patient taking differently: Inject 15 Units under the skin at bedtime. 11.65 Type 2 with hyperglycemia  Contact provider if insulin prescribed is not the preferred insulin per insurance.)   ? lidocaine (XYLOCAINE) 5 % ointment Apply topically 4 (four) times a day. Apply to left shoulder or around wound (not inside wound)   ? metoprolol tartrate (LOPRESSOR) 25 MG tablet Take 1 tablet (25 mg total) by mouth 2 (two) times a day.   ? miconazole (MICOTIN) 2 % powder " Apply topically 2 (two) times a day. Apply to b/l groin/underneath breasts/underneath pannus   ? NOVOLOG U-100 INSULIN ASPART 100 unit/mL injection Check blood sugar four (4) times daily.  11.65 Type 2 with hyperglycemia  OneTouch Verio Flex  Meter  OneTouch Verio strips 2 boxes of 50  Delica Lancets box of 100  BD Ultra-fine Therese Pen Needles - NDC [address]-23 - dispense 1 case, refill PRN for 1 year (Patient taking differently: Inject 7 Units under the skin 3 (three) times a day before meals. )   ? polyethylene glycol (MIRALAX) 17 gram packet Take 1 packet (17 g total) by mouth daily as needed.   ? Saccharomyces boulardii (FLORASTOR) 250 mg capsule Take 250 mg by mouth daily.   ? senna (SENOKOT) 8.6 mg tablet Take 1 tablet by mouth 2 (two) times a day. (Patient taking differently: Take 1 tablet by mouth 2 (two) times a day as needed. )   ? sodium bicarbonate 650 MG tablet Take 1 tablet (650 mg total) by mouth 2 (two) times a day. OTC product   ? warfarin sodium (WARFARIN ORAL) Take by mouth. 12/11/20 missed INR cont same, next INR 12/15.  11/19/20 INR 2.4 3mg W & Sat, 2.5mg AOD. Next INR 12/10.  11/5/20 INR 2.2 Cont 3mg W & Sat, 2.5mg AOD. Next INR 11/19.  10/29/20 INR 2.2 Cont 3mg W & Sat, 2.5mg AOD. Next INR 11/19  10/15/20 INR 2.3 3mg W, Sat and 2.5mg AOD. Next INR 10/29  10/6/20 INR 2.3  Take 3mg Wed and Sat and 2.5mg AOD.  Next INR 10/15/20.    9/29/20 INR 3.1 Take 2.5mg daily. Next INR 10/6.  9/22/20 INR 2.1 Take 3mg daily Next INR 9/29  (LD Antib 7/20)  9/18/20 INR 1.6 5mg tonight, then 3mg daily. Next INR 9/22.  9/14/20 INR 1.7 Cont 2.5,g daily. Next INR 9/17 9/11/20 INR 1.9 Take 2.5mg daily Next INR 9/14 9/8/20 INR 1.6  Take 2.5mg daily.  Next INR 9/11/20.         REVIEW OF SYSTEMS:    Currently, no fever, chills, or rigors. Does not have any visual or hearing problems. Denies any chest pain, headaches, palpitations, lightheadedness, dizziness, shortness of breath, or cough. Appetite is good. Denies  "any GERD symptoms. Denies any difficulty with swallowing, nausea, or vomiting.  Denies any abdominal pain, diarrhea or constipation. Denies any urinary symptoms, yeager in place. No insomnia. No active bleeding. No rash.       PHYSICAL EXAMINATION:  Vitals:    12/08/20 1253   BP: 120/57   Pulse: 64   Resp: 18   Temp: 97.7  F (36.5  C)   SpO2: 96%   Weight: (!) 236 lb 3.2 oz (107.1 kg)   Height: 5' 1\" (1.549 m)         GENERAL: Awake, Alert, oriented x3, not in any form of acute distress, answers questions appropriately, follows simple commands, conversant with flat affect  HEENT: Head is normocephalic with normal hair distribution. No evidence of trauma. Ears: No acute purulent discharge. Eyes: Sclera anicteric.  CHEST: No tenderness or deformity, no crepitus  LUNG: Clear to auscultation with good chest expansion. There DM BS  BACK: ROM normal, no CVA tenderness, no spinal tenderness   CVS: There is good S1  S2,  rhythm is regular.  ABDOMEN: Globular and ROTUND.  Chronic abdominal wall wounds bilaterally, left side with moderate drainage I did not see the wound bases it was covered with calcium alginate by wound team earlier this morning.   EXTREMITIES: UE Full ROM. Right BKA, wound with moderate sanguinous drainage on bandage.  SKIN: Warm and dry, no erythema noted, open area on abdomen.    NEUROLOGICAL: The patient is oriented to person, place and time. Strength and sensation are grossly intact. Face is symmetric.    LABS:    Lab Results   Component Value Date    WBC 7.9 08/21/2020    HGB 9.2 (L) 08/21/2020    HCT 28.6 (L) 08/21/2020    MCV 89 08/21/2020     08/21/2020       Results for orders placed or performed during the hospital encounter of 08/15/20   Basic Metabolic Panel   Result Value Ref Range    Sodium 137 136 - 145 mmol/L    Potassium 4.1 3.5 - 5.0 mmol/L    Chloride 103 98 - 107 mmol/L    CO2 23 22 - 31 mmol/L    Anion Gap, Calculation 11 5 - 18 mmol/L    Glucose 178 (H) 70 - 125 mg/dL    Calcium " 11.0 (H) 8.5 - 10.5 mg/dL    BUN 37 (H) 8 - 22 mg/dL    Creatinine 2.05 (H) 0.60 - 1.10 mg/dL    GFR MDRD Af Amer 29 (L) >60 mL/min/1.73m2    GFR MDRD Non Af Amer 24 (L) >60 mL/min/1.73m2           Lab Results   Component Value Date    HGBA1C 10.3 (H) 06/17/2020     Vitamin D, Total (25-Hydroxy)   Date Value Ref Range Status   04/28/2016 29.8 (L) 30.0 - 80.0 ng/mL Final     Lab Results   Component Value Date    [id number] 285 01/07/2011       ASSESSMENT/PLAN:  1. CKD (chronic kidney disease) stage 4, GFR 15-29 ml/min (H)    2. Paroxysmal atrial fibrillation (H)    3. 2019 novel coronavirus disease (COVID-19)    4. Chronic wound infection of abdomen, subsequent encounter         COVID-19: positive test identified 12/5 via PCR obtained a few days ago; now resides on covid unit.  -droplet and airborne precuations.  -Add CRP, CBC and BMP for labs tomorrow.  -O2 1-4 L via NC to keep sats >88%.   -Continue supportive treatment with guaifenesin, albuterol inhalers, encourage p.o. intake.  -Monitor for saturations less than 90%, or requirement of O2 via nasal cannula and update provider so that oral corticosteroids can be initiated.       Left abdominal wall open area: Followed by the wound team.  The wound is drastically improved and almost closed with no redness or drainage.  This is a huge improvement and the patient is very happy about this.  -Continue orders per wound care team and change dressing as needed for increased drainage.    Right knee AKA wound: Dressing changes morning by wound team.    Chronic conditions, reviewed:     Insulin-dependent diabetes: A1c 10.3.  Now on glargine 15 units nightly and sliding scale NovoLog 3 times daily with meals.  Blood sugars average 130-200. A few outliers to 280.      Essential hypertension: Satisfactory in TCU thus far, amlodipine d.cd.   -metoprolol    Acute on chronic blood loss anemia-likely from anemia chronic disease, iron deficiency, chronic kidney disease, surgery. On  oral iron .    CKD 4: Followed by Dr. Iqbal.  Calcium improved at 9.1.  -Sodium bicarb.     Coronary artery disease:  -also needs outpt CRISTINA eval   -Continue metoprolol and aspirin.     Urinary retention: recent UTI, treated.       A. Fib on Coumadin: INR therapeutic.      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 Improved. Unable to assess stump, in  and brace.   -Continues on Dilaudid.    Unintended weight loss: Patient has had greater than 20 pound weight loss since July.  She says the food is just bad here and it is always cold.    -Weight improved now at 236.  This is her baseline.    Communicated concerns with patient, nursing.    Electronically signed by:  Zoey Leung, CNP  This progress note was completed using Dragon software and there may be grammatical errors.

## 2021-06-21 NOTE — LETTER
"Letter by Zoey Leung CNP at      Author: Zoey Leung CNP Service: -- Author Type: --    Filed:  Encounter Date: 2021 Status: (Other)         The \[facility] [place] [facility]"" At [place] - [address] [address]                                  2021    Patient: Jazmin Bauman   MR Number: [id number]   YOB: 1955   Date of Visit: 2021     Dear  Home:    Thank you for referring Jazmin Bauman to me for evaluation. Below are the relevant portions of my assessment and plan of care.    If you have questions, please do not hesitate to call me. I look forward to following Jazmin along with you.    Sincerely,        Zoey Leung CNP          CC  No Recipients  Zoey Leung CNP  2021  9:44 AM  Sign when Signing Visit    [facility]      NAME:  Jazmin Bauman             :  1955    MRN: [id number]    CODE STATUS:  FULL CODE    FACILITY: [facility] [[id number]]         CHIEF COMPLAIN/REASON FOR VISIT:  Chief Complaint   Patient presents with   ? Follow-up     knee injection f/u, Preventative management        HISTORY OF PRESENT ILLNESS: Jazmin Bauman is a 65 y.o. female. Pt was at [facility] from  to  and underwent a RBKA r/t ongoing DM ulcer with osteomyelitis. Per her EMR dc summary \" with HTN,morbid obesity, DM, A fib, CKD 4 presented for evaluation of R foot swelling, difficulty ambulation, pain found to have osteomyelitis now s/p amputation. She has now been to the OR twice this stay, last was on 20.   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   Patient had a chronic diabetic foot ulcer on her R heel, presented for increased swelling, difficulty ambulating, and pain. MRI showed extensive soft tissue necrosis and some osteomyelitis along with cellulitis  Met sepsis criteria on admission with leukocytosis and elevated " "CRP  Podiatry saw and the foot was not felt to be salvageable and BKA was recommended\"     August 15-21: Hospitalized for sepsis with MRSA bacteremia, she had possible endocarditis and TTE revealing vegetation of the aortic valve. She had a CT of the abdomen and pelvis that showed left lower abdominal pannus ulceration but without abscess.  Her right BKA stump ulceration was negative for osteomyelitis or cellulitis.  She was treated with IV Vanco for 14 days which will be completed on August 30.  Her left lower abdominal ulceration has never been biopsied.  It was not biopsied in the hospital and there was no surgical intervention.  Her right BKA stump was negative for osteomyelitis.  CKD stage IV with baseline creatinine 2.5-3.  She was at 1.82 at the end of hospitalization.  She is discharged on sodium bicarb twice daily.  Her insulin was adjusted and she is now on sliding scale insulin with 10 units of Lantus at bedtime.  Blood sugars have been less than 200 on average.    For her hypertension, Norvasc has been discontinued.  Blood pressures remain in the 130s over 70s on average.  She was incidentally found to have a lung nodule on CT scan; follow up CT was on December 1 and revealed resolution of the nodule.    Today: Seen today to follow-up to recent knee injections that were completed last week on 1/21.  She tolerated the procedure well and has since had some mild improvement in her knee pain with no worsening of symptoms, no redness, swelling or injection site pain.  She is no longer able to use lidocaine patches due to insurance.  She is also reporting improvement in her C. difficile symptoms and stools are now semiformed.  She completed vancomycin on Sunday.   Also seen to discuss wellness and preventative care.  She is due for mammogram and DEXA scan both of which she like to defer at this time due to her frequent hospitalizations.  She is open to discussing these again a couple months.  She had " "previously asked about seeing her gynecologist due to being told she needed a biopsy in the future but today she wants to defer this as well.  She has had no vaginal bleeding or pain.  No other concerning symptoms and has had recent CT scan of the chest that revealed resolution of previous pulmonary nodule.  She is otherwise feeling well and denying concerns.      Allergies   Allergen Reactions   ? Hydralazine      Paralysis of legs   ? Latex Itching     Skin Burns   ? Naprosyn [Naproxen] Swelling     throat   ? Nitrofurantoin Hives   ? Sulfa (Sulfonamide Antibiotics) Rash   ? Vicks Vaporub [Camphor-Eucalyptus Oil-Menthol] Rash   :     Current Outpatient Medications   Medication Sig   ? acetaminophen (TYLENOL) 500 MG tablet Take 2 tablets (1,000 mg total) by mouth 3 (three) times a day.   ? aspirin 81 MG EC tablet Take 81 mg by mouth daily.   ? BD ULTRA-FINE MICRO PEN NEEDLE 32 gauge x 1/4\" Ndle USE AS DIRECTED 4 TIMES DAILY.   ? bisacodyL (DULCOLAX) 10 mg suppository Insert 10 mg into the rectum daily as needed. No stool greater than 72 hours   ? docusate sodium (COLACE) 100 MG capsule Take 100 mg by mouth daily as needed.    ? ferrous sulfate 325 (65 FE) MG tablet Take 1 tablet by mouth 2 (two) times a day with meals.   ? gabapentin (NEURONTIN) 100 MG capsule Take 200 mg by mouth 2 (two) times a day.   ? HYDROmorphone (DILAUDID) 2 MG tablet (Take 2mg daily and 2mg Q 3 hours PRN)   ? lancets (ACCU-CHEK MULTICLIX LANCET) Misc TEST 6 TIMES A DAY   ? LANTUS SOLOSTAR U-100 INSULIN 100 unit/mL (3 mL) pen Inject 10 Units under the skin at bedtime. 11.65 Type 2 with hyperglycemia  Contact provider if insulin prescribed is not the preferred insulin per insurance. (Patient taking differently: Inject 17 Units under the skin at bedtime. 11.65 Type 2 with hyperglycemia  Contact provider if insulin prescribed is not the preferred insulin per insurance.)   ? lidocaine (LIDODERM) 5 % Place 1 patch on the skin daily. Remove & " Discard patch within 12 hours or as directed by MD   ? metoprolol tartrate (LOPRESSOR) 25 MG tablet Take 1 tablet (25 mg total) by mouth 2 (two) times a day.   ? miconazole (MICOTIN) 2 % powder Apply topically 2 (two) times a day. Apply to b/l groin/underneath breasts/underneath pannus   ? NOVOLOG U-100 INSULIN ASPART 100 unit/mL injection Check blood sugar four (4) times daily.  11.65 Type 2 with hyperglycemia  OneTouch Verio Flex  Meter  OneTouch Verio strips 2 boxes of 50  Delica Lancets box of 100  BD Ultra-fine Therese Pen Needles - NDC [address]-47 - dispense 1 case, refill PRN for 1 year (Patient taking differently: Inject under the skin see administration instructions. 7 B  10 L  10 S)   ? omeprazole (PRILOSEC) 20 MG capsule Take 20 mg by mouth 2 (two) times a day before meals.   ? polyethylene glycol (MIRALAX) 17 gram packet Take 1 packet (17 g total) by mouth daily as needed.   ? Saccharomyces boulardii (FLORASTOR) 250 mg capsule Take 250 mg by mouth daily.   ? senna (SENOKOT) 8.6 mg tablet Take 1 tablet by mouth 2 (two) times a day. (Patient taking differently: Take 1 tablet by mouth 2 (two) times a day as needed. )   ? sodium bicarbonate 650 MG tablet Take 1 tablet (650 mg total) by mouth 2 (two) times a day. OTC product   ? warfarin sodium (WARFARIN ORAL) Take by mouth. 2/5/21  Hold Warfarin x 3 days.  Next INR 2/8/21(possibly switching to Eliquis depending on the INR result)  1/26/21 INR 2.0  Cont 3mg Tues and Fri and 2.5mg AOD.  Next INR 2/9/21.    1/19/21  INR 2.1 Cont 3mg T & Fr, 2.5mg AOD. Next INR 1/26 1/5/21 INR 2.3  Cont 3mg Tues and Fri and 2.5mg AOD.  Next INR 1/19/21.    1/4/21 INR missed.  Take 2.5mg on 1/4.  Next INR 1/5/21.    12/28/20 INR 2.9  Cont 3mg Tues and Fri and 2.5mg AOD.  Next INR 1/4/21.  12/22/20 INR 2.1  Take 3mg Tues and Fri and 2.5mg AOD.  Next INR 12/28 12/18/20 INR 2.5 2.5mg daily. Next INR 12/22.  12/16/20 INR 3.3 Hold x2 Next INR 12/18 12/14/20 INR 3.2 Hold tonight,  "then resume 3mg W & Sat, 2.5mg AOD. Next         REVIEW OF SYSTEMS:    Currently, no fever, chills, or rigors. Does not have any visual or hearing problems. Denies any chest pain, headaches, palpitations, lightheadedness, dizziness, shortness of breath, or cough. Appetite is good. Denies any GERD symptoms. Denies any difficulty with swallowing, nausea, or vomiting.  Denies any abdominal pain, diarrhea or constipation. Denies any urinary symptoms, yeager in place. No insomnia. No active bleeding. No rash.       PHYSICAL EXAMINATION:  Vitals:    01/27/21 1210   BP: 157/71   Pulse: 72   Resp: 18   Temp: 97.1  F (36.2  C)   SpO2: 97%   Weight: (!) 224 lb 1.6 oz (101.7 kg)   Height: 5' 1\" (1.549 m)         GENERAL: Awake, Alert, oriented x3, not in any form of acute distress, answers questions appropriately, follows simple commands, conversant with flat affect.  Patient did have PFT  HEENT: Head is normocephalic with normal hair distribution. No evidence of trauma. Ears: No acute purulent discharge. Eyes: Sclera anicteric.  LUNG: Clear to auscultation with good chest expansion.   BACK: ROM normal, no CVA tenderness.  CVS: There is good S1  S2,  rhythm is regular.  ABDOMEN: Globular and ROTUND.  Nontender with palpation. chronic abdominal wall wounds bilaterally that are healing well.  EXTREMITIES: UE Full ROM. Right BKA, wound covered, healing.  SKIN: Warm and dry, no erythema noted, open area on abdomen.    NEUROLOGICAL: The patient is oriented to person, place and time. Strength and sensation are grossly intact. Face is symmetric.    LABS:    Lab Results   Component Value Date    WBC 7.9 08/21/2020    HGB 9.2 (L) 08/21/2020    HCT 28.6 (L) 08/21/2020    MCV 89 08/21/2020     08/21/2020       Results for orders placed or performed during the hospital encounter of 08/15/20   Basic Metabolic Panel   Result Value Ref Range    Sodium 137 136 - 145 mmol/L    Potassium 4.1 3.5 - 5.0 mmol/L    Chloride 103 98 - 107 " "mmol/L    CO2 23 22 - 31 mmol/L    Anion Gap, Calculation 11 5 - 18 mmol/L    Glucose 178 (H) 70 - 125 mg/dL    Calcium 11.0 (H) 8.5 - 10.5 mg/dL    BUN 37 (H) 8 - 22 mg/dL    Creatinine 2.05 (H) 0.60 - 1.10 mg/dL    GFR MDRD Af Amer 29 (L) >60 mL/min/1.73m2    GFR MDRD Non Af Amer 24 (L) >60 mL/min/1.73m2           Lab Results   Component Value Date    HGBA1C 10.3 (H) 06/17/2020     Vitamin D, Total (25-Hydroxy)   Date Value Ref Range Status   04/28/2016 29.8 (L) 30.0 - 80.0 ng/mL Final     Lab Results   Component Value Date    BFBCYAOP06 285 01/07/2011       ASSESSMENT/PLAN:  1. Primary osteoarthritis of both knees    2. Wellness examination         COVID-19: positive test identified 12/5 via PCR. Resolved.      Wellness and primary care management: In discussion today regarding mammogram and DEXA scan due, patient is aware of this but says she wants to delay this due to her busy year with frequent hospitalizations and doctors appointments.  She would like some \"time away from hospital \"and is frustrated that they do not just do all the stuff while she is in the hospital.  She is open to discussing this again at her next regulatory and wellness visit.  She denies any new symptoms.  She is nonambulatory but given her chronic kidney disease should have a DEXA scan as she is unable to tolerate calcium.  She would also like to visit her gynecologist as last time she was and she was told she would need an endometrial biopsy.    Loose stool  C. difficile positive: Have been getting varying reports regarding loose stools.  Reports today that they are semiformed.  C. difficile positive after multiple attempts to send lab specimen.  Completed vancomycin 1/24.     Osteoarthritis: Follow-up knee injections completed on 1/21.  She has had mild improvement in pain.  Plans to start working with therapies.  No redness, swelling or injections site pain.    Anticoagulated:  -Omeprazole 20 mg daily.     Left abdominal wall open " area: Followed by the wound team.  The wound is drastically improved and almost closed with no redness or drainage.  This is a huge improvement and the patient is very happy about this.  -Continue orders per wound care team and change dressing as needed for increased drainage.    Chronic conditions, reviewed:     Insulin-dependent diabetes: A1c 10.3--> 8.4.  Now on glargine 15 units nightly and sliding scale NovoLog 3 times daily with meals.  Blood sugars average 130-200. A few outliers to 280.      Essential hypertension: Satisfactory. amlodipine d.cd.   -metoprolol    Acute on chronic blood loss anemia-likely from anemia chronic disease, iron deficiency, chronic kidney disease, surgery. On oral iron .    CKD 4: Followed by Dr. Iqbal.  Calcium improved at 9.1.  -Sodium bicarb.     Coronary artery disease:  -also needs outpt CRISTINA eval   -Continue metoprolol and aspirin.       A. Fib on Coumadin: INR therapeutic.      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 Improved. Unable to assess stump, in  and brace.   -Continues on Dilaudid.    Unintended weight loss: Patient has had greater than 20 pound weight loss since July.  She says the food is just bad here and it is always cold.    -Weight improved now at 236.  This is her baseline now.    Communicated concerns with patient, nursing.     Electronically signed by:  Zoey Leung CNP  This progress note was completed using Dragon software and there may be grammatical errors.

## 2021-06-21 NOTE — LETTER
"Letter by Zoey Leung CNP at      Author: Zoey Leung CNP Service: -- Author Type: --    Filed:  Encounter Date: 10/15/2020 Status: (Other)         [facility] [address] [address]                                  2020    Patient: Jazmin Bauman   MR Number: [id number]   YOB: 1955   Date of Visit: 10/15/2020     Dear Dr. Briones:    Thank you for referring Jazmin Bauman to me for evaluation. Below are the relevant portions of my assessment and plan of care.    If you have questions, please do not hesitate to call me. I look forward to following Jazmin along with you.    Sincerely,        Zoey Leung CNP          CC  No Recipients  Zoey Leung CNP  10/19/2020 10:25 PM  [facility]    [facility]      NAME:  Jazmin Bauman             :  1955    MRN: [id number]    CODE STATUS:  FULL CODE    FACILITY: [facility] [[id number]]         CHIEF COMPLAIN/REASON FOR VISIT:  No chief complaint on file.      HISTORY OF PRESENT ILLNESS: Jazmin Bauman is a 65 y.o. female. Pt was at [facility] from  to  and underwent a RBKA r/t ongoing DM ulcer with osteomyelitis. Per her EMR dc summary \" with HTN,morbid obesity, DM, A fib, CKD 4 presented for evaluation of R foot swelling, difficulty ambulation, pain found to have osteomyelitis now s/p amputation. She has now been to the OR twice this stay, last was on 20.   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   Patient had a chronic diabetic foot ulcer on her R heel, presented for increased swelling, difficulty ambulating, and pain. MRI showed extensive soft tissue necrosis and some osteomyelitis along with cellulitis  Met sepsis criteria on admission with leukocytosis and elevated CRP  Podiatry saw and the foot was not felt to be salvageable and BKA was recommended\"       August " 15-21: Hospitalized for sepsis with MRSA bacteremia, she had possible endocarditis and TTE revealing vegetation of the aortic valve. She had a CT of the abdomen and pelvis that showed left lower abdominal pannus ulceration but without abscess.  Her right BKA stump ulceration was negative for osteomyelitis or cellulitis.  She was treated with IV Vanco for 14 days which will be completed on August 30.  Her left lower abdominal ulceration has never been biopsied.  It was not biopsied in the hospital and there was no surgical intervention.  Her right BKA stump was negative for osteomyelitis.  CKD stage IV with baseline creatinine 2.5-3.  She was at 1.82 at the end of hospitalization.  She is discharged on sodium bicarb twice daily.  Her insulin was adjusted and she is now on sliding scale insulin with 10 units of Lantus at bedtime.  Blood sugars have been less than 200 on average.    For her hypertension, Norvasc has been discontinued.  Blood pressures remain in the 130s over 70s on average.  She was incidentally found to have a lung nodule on CT scan, will need to follow-up with this.  Please see imaging report from August hospitalization.  Multiple nodules and enlarged lymph nodes.    Today: Follow up today as she recently transferred [facility]. She has completed therapy secondary to stagnation in progress. She is having her wounds dressed by nursing during my visit and I am able to view her abdominal open area which is healing nicely. Good granulation tissue at base. Scant drainage. She also brings up her pernicious anemia and requests for b12 and I again reminded her that she her level was therapeutic. Pain well controlled. Weights stable at 227 x 3 weeks now. Eating and drinking well.       Allergies   Allergen Reactions   ? Hydralazine      Paralysis of legs   ? Latex Itching     Skin Burns   ? Naprosyn [Naproxen] Swelling     throat   ? Nitrofurantoin Hives   ? Sulfa (Sulfonamide Antibiotics) Rash   ? Vicks  "Vaporub [Camphor-Eucalyptus Oil-Menthol] Rash   :     Current Outpatient Medications   Medication Sig   ? acetaminophen (TYLENOL) 500 MG tablet Take 2 tablets (1,000 mg total) by mouth 3 (three) times a day.   ? aspirin 81 MG EC tablet Take 81 mg by mouth daily.   ? BD ULTRA-FINE MICRO PEN NEEDLE 32 gauge x 1/4\" Ndle USE AS DIRECTED 4 TIMES DAILY.   ? bisacodyL (DULCOLAX) 10 mg suppository Insert 10 mg into the rectum daily as needed. No stool greater than 72 hours   ? docusate sodium (COLACE) 100 MG capsule Take 100 mg by mouth daily as needed.    ? ferrous sulfate 325 (65 FE) MG tablet Take 1 tablet by mouth 2 (two) times a day with meals.   ? gabapentin (NEURONTIN) 100 MG capsule Take 200 mg by mouth 2 (two) times a day.   ? HYDROmorphone (DILAUDID) 2 MG tablet (Take 2mg daily and 2mg Q 3 hours PRN)   ? lancets (ACCU-CHEK MULTICLIX LANCET) Misc TEST 6 TIMES A DAY   ? LANTUS SOLOSTAR U-100 INSULIN 100 unit/mL (3 mL) pen Inject 10 Units under the skin at bedtime. 11.65 Type 2 with hyperglycemia  Contact provider if insulin prescribed is not the preferred insulin per insurance. (Patient taking differently: Inject 15 Units under the skin at bedtime. 11.65 Type 2 with hyperglycemia  Contact provider if insulin prescribed is not the preferred insulin per insurance.)   ? lidocaine (XYLOCAINE) 5 % ointment Apply topically 4 (four) times a day. Apply to left shoulder or around wound (not inside wound)   ? metoprolol tartrate (LOPRESSOR) 25 MG tablet Take 1 tablet (25 mg total) by mouth 2 (two) times a day.   ? miconazole (MICOTIN) 2 % powder Apply topically 2 (two) times a day. Apply to b/l groin/underneath breasts/underneath pannus   ? NOVOLOG U-100 INSULIN ASPART 100 unit/mL injection Check blood sugar four (4) times daily.  11.65 Type 2 with hyperglycemia  OneTouch Verio Flex  Meter  OneTouch Verio strips 2 boxes of 50  Delica Lancets box of 100  BD Ultra-fine Therese Pen Needles - NDC [address]-96 - dispense 1 case, " refill PRN for 1 year (Patient taking differently: Inject 7 Units under the skin 3 (three) times a day before meals. )   ? polyethylene glycol (MIRALAX) 17 gram packet Take 1 packet (17 g total) by mouth daily as needed.   ? Saccharomyces boulardii (FLORASTOR) 250 mg capsule Take 250 mg by mouth daily.   ? senna (SENOKOT) 8.6 mg tablet Take 1 tablet by mouth 2 (two) times a day. (Patient taking differently: Take 1 tablet by mouth 2 (two) times a day as needed. )   ? sodium bicarbonate 650 MG tablet Take 1 tablet (650 mg total) by mouth 2 (two) times a day. OTC product   ? cholecalciferol, vitamin D3, (VITAMIN D3) 1,000 unit capsule Take 1,000 Units by mouth daily.   ? collagenase ointment Apply daily per WOC   ? warfarin sodium (WARFARIN ORAL) Take by mouth. 10/15/20 INR 2.3 3mg W, Sat and 2.5mg AOD. Next INR 10/29  10/6/20 INR 2.3  Take 3mg Wed and Sat and 2.5mg AOD.  Next INR 10/15/20.    9/29/20 INR 3.1 Take 2.5mg daily. Next INR 10/6.  9/22/20 INR 2.1 Take 3mg daily Next INR 9/29  (LD Antib 7/20)  9/18/20 INR 1.6 5mg tonight, then 3mg daily. Next INR 9/22.  9/14/20 INR 1.7 Cont 2.5,g daily. Next INR 9/17 9/11/20 INR 1.9 Take 2.5mg daily Next INR 9/14 9/8/20 INR 1.6  Take 2.5mg daily.  Next INR 9/11/20.    9/3/20 INR 3.4  Hold x 2 days, then take 2mg daily.  Next INR 9/8/20.  9/2/20 INR 3.4 Hold 9/2 recheck INR 9/3  8/28/20 INR 2.6  Cont 3mg daily.  Next INR 9/1/20.         REVIEW OF SYSTEMS:    Currently, no fever, chills, or rigors. Does not have any visual or hearing problems. Denies any chest pain, headaches, palpitations, lightheadedness, dizziness, shortness of breath, or cough. Appetite is good. Denies any GERD symptoms. Denies any difficulty with swallowing, nausea, or vomiting.  Denies any abdominal pain, diarrhea or constipation. Denies any urinary symptoms, yeager in place. No insomnia. No active bleeding. No rash.       PHYSICAL EXAMINATION:  Vitals:    10/15/20 1144   BP: 123/89   Pulse: 78   Resp:  "17   Temp: 97.8  F (36.6  C)   SpO2: 97%   Weight: (!) 226 lb 14.4 oz (102.9 kg)   Height: 5' 1\" (1.549 m)         GENERAL: Awake, Alert, oriented x3, not in any form of acute distress, answers questions appropriately, follows simple commands, conversant with flat affect  HEENT: Head is normocephalic with normal hair distribution. No evidence of trauma. Ears: No acute purulent discharge. Eyes: Sclera anicteric.  CHEST: No tenderness or deformity, no crepitus  LUNG: Clear to auscultation with good chest expansion. There DM BS  BACK: ROM normal, no CVA tenderness, no spinal tenderness   CVS: There is good S1  S2,  rhythm is regular.  ABDOMEN: Globular and ROTUND tender to palpation, non distended, no masses, no organomegaly, good bowel sounds, no rebound or guarding, no peritoneal signs.   EXTREMITIES: UE Full ROM. Right BKA, IN a  with protected brace  SKIN: Warm and dry, no erythema noted, open area on abdomen viewed. Good granulation tissue and reduced circumference and depth. Scant drainage.    NEUROLOGICAL: The patient is oriented to person, place and time. Strength and sensation are grossly intact. Face is symmetric.    LABS:    Lab Results   Component Value Date    WBC 7.9 08/21/2020    HGB 9.2 (L) 08/21/2020    HCT 28.6 (L) 08/21/2020    MCV 89 08/21/2020     08/21/2020       Results for orders placed or performed during the hospital encounter of 08/15/20   Basic Metabolic Panel   Result Value Ref Range    Sodium 137 136 - 145 mmol/L    Potassium 4.1 3.5 - 5.0 mmol/L    Chloride 103 98 - 107 mmol/L    CO2 23 22 - 31 mmol/L    Anion Gap, Calculation 11 5 - 18 mmol/L    Glucose 178 (H) 70 - 125 mg/dL    Calcium 11.0 (H) 8.5 - 10.5 mg/dL    BUN 37 (H) 8 - 22 mg/dL    Creatinine 2.05 (H) 0.60 - 1.10 mg/dL    GFR MDRD Af Amer 29 (L) >60 mL/min/1.73m2    GFR MDRD Non Af Amer 24 (L) >60 mL/min/1.73m2           Lab Results   Component Value Date    HGBA1C 10.3 (H) 06/17/2020     Vitamin D, Total " (25-Hydroxy)   Date Value Ref Range Status   04/28/2016 29.8 (L) 30.0 - 80.0 ng/mL Final     Lab Results   Component Value Date    CTILGNCP19 285 01/07/2011       ASSESSMENT/PLAN:  1. Below-knee amputation (H)    2. Wound infection    3. Pernicious anemia    4. CKD (chronic kidney disease) stage 4, GFR 15-29 ml/min (H)      Right BKA: Hx of phantom limb pain. Improved. Unable to assess stump, in  and brace.   -Continues on Dilaudid.  -Continue PT and OT as directed by them.    Unintended weight loss: Patient has had greater than 20 pound weight loss since July.  She says the food is just bad here and it is always cold.  Monitoring.  We will put her on a protein supplement for wound healing.   Weight today 226. Stable now x 3 weeks. Reports decent po intake.     Acute on chronic blood loss anemia-likely from anemia chronic disease, iron deficiency, chronic kidney disease, surgery. On oral iron .    History of B12 deficiency: Had inquired about having her B12 resumed, we obtained a level which was 1400 so no need for injections at this time.  She is okay with this.  -discussed this again at her request and reminded her of her recent level of 1400.      Left abdominal wall cellulitis and right side: Viewed today with nursing. IMPROVED. Reduction in size and depth. Edges are healing with good granulation tissue at the base. Scant drainage. No s/sx of secondary infection. Great progress.      Insulin-dependent diabetes: A1c 10.3.  Now on glargine 15 units nightly and sliding scale NovoLog 3 times daily with meals.  Blood sugars average 130-200. A few outliers to 280.      Essential hypertension: Satisfactory in TCU thus far, amlodipine d.cd.   -metoprolol    CKD 4: Followed by Dr. Iqbal whom she saw yesterday.  He ordered repeat BMP in a month.  Is not concerned about calcium that has stayed stable at 11.3.  Of note she did have elevated uric acid level at 11. Will not treat unless symptomatic.   -Sodium bicarb.      Coronary artery disease:  -also needs outpt CRISTINA eval  -Continue metoprolol and aspirin.     Urinary retention: recent UTI, treated.      A. Fib on Coumadin: INR therapeutic.     TCU/PT report: Using the easy stand for transfer at this time due to left leg weakness.    Communicated concerns with patient, nursing.    Electronically signed by:  Zoey Leung CNP  This progress note was completed using Dragon software and there may be grammatical errors.

## 2021-06-21 NOTE — LETTER
"Letter by Zoey Leung CNP at      Author: Zoey Leung CNP Service: -- Author Type: --    Filed:  Encounter Date: 3/25/2021 Status: (Other)         [facility] - [address] [address]                                  2021    Patient: Jazmin Bauman   MR Number: [id number]   YOB: 1955   Date of Visit: 3/25/2021     Dear  Home:    Thank you for referring Jazmin Bauman to me for evaluation. Below are the relevant portions of my assessment and plan of care.    If you have questions, please do not hesitate to call me. I look forward to following Jazmin along with you.    Sincerely,        Zoey Leung CNP          CC  No Recipients  Zoey Leung CNP  3/28/2021  9:29 PM  Sign when Signing Visit    [facility]      NAME:  Jazmin Bauman             :  1955    MRN: [id number]    CODE STATUS:  FULL CODE    FACILITY: [facility] [[id number]]         CHIEF COMPLAIN/REASON FOR VISIT:  Chief Complaint   Patient presents with   ? Review Of Multiple Medical Conditions     Regulatory        HISTORY OF PRESENT ILLNESS: Jazmin Bauman is a 65 y.o. female. Pt was at [facility] from  to  and underwent a RBKA r/t ongoing DM ulcer with osteomyelitis. Per her EMR dc summary \" with HTN,morbid obesity, DM, A fib, CKD 4 presented for evaluation of R foot swelling, difficulty ambulation, pain found to have osteomyelitis now s/p amputation. She has now been to the OR twice this stay, last was on 20.   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   Patient had a chronic diabetic foot ulcer on her R heel, presented for increased swelling, difficulty ambulating, and pain. MRI showed extensive soft tissue necrosis and some osteomyelitis along with cellulitis  Met sepsis criteria on admission with leukocytosis and elevated CRP  Podiatry " "saw and the foot was not felt to be salvageable and BKA was recommended\"     August 15-21: Hospitalized for sepsis with MRSA bacteremia, she had possible endocarditis and TTE revealing vegetation of the aortic valve. She had a CT of the abdomen and pelvis that showed left lower abdominal pannus ulceration but without abscess.  Her right BKA stump ulceration was negative for osteomyelitis or cellulitis.  She was treated with IV Vanco for 14 days which will be completed on August 30.  Her left lower abdominal ulceration has never been biopsied.  It was not biopsied in the hospital and there was no surgical intervention.  Her right BKA stump was negative for osteomyelitis.    CKD stage IV with baseline creatinine 1.9-2.0 now.   Her insulin was adjusted and she is now on sliding scale insulin with 17 units of Lantus at bedtime.     For her hypertension, Norvasc has been discontinued.  Blood pressures remain in the 130s over 70s on average.  She was incidentally found to have a lung nodule on CT scan; follow up CT was on December 1 and revealed resolution of the nodule.    Today: Seen today for regulatory visit for [email or web address].  She had recent C. difficile infection with recurrence and now reports resolution of loose stools.  We switched her from Coumadin to Eliquis about a month ago.  She had been doing well, and I did not notice any excessive bruising however she had a bloody nose this morning that she was concerned about.  It did resolve on its own with pressure.  She is concerned that she should be back on Coumadin.  We did have a risks and benefits discussion of anticoagulation and discussed continued monitoring.  I did suggest getting a hemoglobin for tomorrow however she has labs due on Monday from Dr. Frost, her nephrologist.  I reviewed her blood sugars which had initially been less than 200 however recently ranging between 203 100 for the last 2 weeks.  Lantus and NovoLog have recently been adjusted.  " "She is infrequently using Dilaudid.  Will discuss tapering.     Allergies   Allergen Reactions   ? Hydralazine      Paralysis of legs   ? Latex Itching     Skin Burns   ? Naprosyn [Naproxen] Swelling     throat   ? Nitrofurantoin Hives   ? Sulfa (Sulfonamide Antibiotics) Rash   ? Vicks Vaporub [Camphor-Eucalyptus Oil-Menthol] Rash   :     Current Outpatient Medications   Medication Sig   ? acetaminophen (TYLENOL) 500 MG tablet Take 2 tablets (1,000 mg total) by mouth 3 (three) times a day.   ? apixaban ANTICOAGULANT (ELIQUIS) 5 mg Tab tablet Take 5 mg by mouth 2 (two) times a day.   ? aspirin 81 MG EC tablet Take 81 mg by mouth daily.   ? BD ULTRA-FINE MICRO PEN NEEDLE 32 gauge x 1/4\" Ndle USE AS DIRECTED 4 TIMES DAILY.   ? bisacodyL (DULCOLAX) 10 mg suppository Insert 10 mg into the rectum daily as needed. No stool greater than 72 hours   ? docusate sodium (COLACE) 100 MG capsule Take 100 mg by mouth daily as needed.    ? ferrous sulfate 325 (65 FE) MG tablet Take 1 tablet by mouth 2 (two) times a day with meals.   ? gabapentin (NEURONTIN) 100 MG capsule Take 200 mg by mouth 2 (two) times a day.   ? HYDROmorphone (DILAUDID) 2 MG tablet (Take 2mg daily and 2mg Q 3 hours PRN)   ? lancets (ACCU-CHEK MULTICLIX LANCET) Misc TEST 6 TIMES A DAY   ? LANLISAUS SOLOSTAR U-100 INSULIN 100 unit/mL (3 mL) pen Inject 10 Units under the skin at bedtime. 11.65 Type 2 with hyperglycemia  Contact provider if insulin prescribed is not the preferred insulin per insurance. (Patient taking differently: Inject 17 Units under the skin at bedtime. 11.65 Type 2 with hyperglycemia  Contact provider if insulin prescribed is not the preferred insulin per insurance.)   ? lidocaine (LIDODERM) 5 % Place 1 patch on the skin daily. Remove & Discard patch within 12 hours or as directed by MD   ? metoprolol tartrate (LOPRESSOR) 25 MG tablet Take 1 tablet (25 mg total) by mouth 2 (two) times a day.   ? miconazole (MICOTIN) 2 % powder Apply topically 2 " "(two) times a day. Apply to b/l groin/underneath breasts/underneath pannus   ? NOVOLOG U-100 INSULIN ASPART 100 unit/mL injection Check blood sugar four (4) times daily.  11.65 Type 2 with hyperglycemia  OneTouch Verio Flex  Meter  OneTouch Verio strips 2 boxes of 50  Delica Lancets box of 100  BD Ultra-fine Therese Pen Needles - NDC [address]-45 - dispense 1 case, refill PRN for 1 year (Patient taking differently: Inject under the skin see administration instructions. 7 B  10 L  10 S)   ? omeprazole (PRILOSEC) 20 MG capsule Take 20 mg by mouth 2 (two) times a day before meals.   ? polyethylene glycol (MIRALAX) 17 gram packet Take 1 packet (17 g total) by mouth daily as needed.   ? Saccharomyces boulardii (FLORASTOR) 250 mg capsule Take 250 mg by mouth daily.   ? senna (SENOKOT) 8.6 mg tablet Take 1 tablet by mouth 2 (two) times a day. (Patient taking differently: Take 1 tablet by mouth 2 (two) times a day as needed. )   ? sodium bicarbonate 650 MG tablet Take 1 tablet (650 mg total) by mouth 2 (two) times a day. OTC product         REVIEW OF SYSTEMS:    Currently, no fever, chills, or rigors. Does not have any visual or hearing problems. Denies any chest pain, headaches, palpitations, lightheadedness, dizziness, shortness of breath, or cough. Appetite is good. Denies any GERD symptoms. Denies any difficulty with swallowing, nausea, or vomiting.  Denies any abdominal pain, diarrhea or constipation. Denies any urinary symptoms, yeager in place. No insomnia. No active bleeding. No rash.       PHYSICAL EXAMINATION:  Vitals:    03/25/21 1356   BP: 118/63   Pulse: 68   Resp: 18   Temp: 97.9  F (36.6  C)   SpO2: 94%   Weight: (!) 242 lb 12.8 oz (110.1 kg)   Height: 5' 1\" (1.549 m)         GENERAL: Awake, Alert, oriented x3, not in any form of acute distress, answers questions appropriately, follows simple commands, conversant with flat affect.  Patient did have PFT  HEENT: Head is normocephalic with normal hair distribution. " No evidence of trauma. Ears: No acute purulent discharge. Eyes: Sclera anicteric.  LUNG: Clear to auscultation with good chest expansion.   BACK: ROM normal, no CVA tenderness.  CVS: There is good S1  S2,  rhythm is regular.  ABDOMEN: Globular and ROTUND.  Nontender with palpation. chronic abdominal wall wounds bilaterally that are healing well.  EXTREMITIES: UE Full ROM. Right BKA; now with prosthesis on.  SKIN: Warm and dry, no erythema noted, open area on abdomen.    NEUROLOGICAL: The patient is oriented to person, place and time. Strength and sensation are grossly intact. Face is symmetric.    LABS:    Lab Results   Component Value Date    WBC 7.9 08/21/2020    HGB 9.2 (L) 08/21/2020    HCT 28.6 (L) 08/21/2020    MCV 89 08/21/2020     08/21/2020       Results for orders placed or performed during the hospital encounter of 08/15/20   Basic Metabolic Panel   Result Value Ref Range    Sodium 137 136 - 145 mmol/L    Potassium 4.1 3.5 - 5.0 mmol/L    Chloride 103 98 - 107 mmol/L    CO2 23 22 - 31 mmol/L    Anion Gap, Calculation 11 5 - 18 mmol/L    Glucose 178 (H) 70 - 125 mg/dL    Calcium 11.0 (H) 8.5 - 10.5 mg/dL    BUN 37 (H) 8 - 22 mg/dL    Creatinine 2.05 (H) 0.60 - 1.10 mg/dL    GFR MDRD Af Amer 29 (L) >60 mL/min/1.73m2    GFR MDRD Non Af Amer 24 (L) >60 mL/min/1.73m2           Lab Results   Component Value Date    HGBA1C 10.3 (H) 06/17/2020     Vitamin D, Total (25-Hydroxy)   Date Value Ref Range Status   04/28/2016 29.8 (L) 30.0 - 80.0 ng/mL Final     Lab Results   Component Value Date    JIJGTLDX47 285 01/07/2011       ASSESSMENT/PLAN:  1. CKD (chronic kidney disease) stage 4, GFR 15-29 ml/min (H)    2. Hx of right BKA (H)    3. Primary osteoarthritis of both knees    4. Clostridium difficile infection         Loose stool  C. difficile positive, recurrent:   Completed vancomycin.  Reporting formed soft stools.    Chronic conditions,:     Left abdominal wall open area: Followed by the wound team.  The  wound is drastically improved and almost closed with no redness or drainage.  This is a huge improvement and the patient is very happy about this.  -Continue orders per wound care team and change dressing as needed for increased drainage.    Osteoarthritis: Knee injections completed on 1/21.  She has had mild improvement in pain.   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 Improved. Unable to assess stump, in  and brace.   -Continues on Dilaudid; using infrequently.    Anticoagulated:  -Omeprazole 20 mg daily.   -Hemoglobin on Monday.    Insulin-dependent diabetes: A1c 10.3--> 8.4.  Now on glargine 17 units nightly and sliding scale NovoLog 7 units 3 times daily with meals.  Blood sugars average 130-200. A few outliers to 280.      Essential hypertension: Satisfactory. amlodipine d.cd.   -metoprolol    Acute on chronic blood loss anemia- likely from anemia chronic disease, iron deficiency, chronic kidney disease, surgery. On oral iron.  Sees nephrologist next week, has labs ordered for Monday.    CKD 4: Followed by Dr. Iqbal.  Calcium improved at 9.3.   -Sodium bicarb.     Coronary artery disease:  -also needs outpt CRISTINA eval   -Continue metoprolol and aspirin.       A. Fib: Reports a nosebleed that occurred this morning and that was distressing to her she is concerned that it is due to her anticoagulation.  Nosebleed did resolve in a timely manner, without any more intervention than nursing and the patient applying pressure.  She does not want to check her hemoglobin until next week when her nephrologist has labs ordered anyway.  We discussed risks and benefits of anticoagulation secondary to her A. fib, CAD, PAD, and diabetes.  We will continue monitoring for further bleeding.  -On Eliquis 5 mg twice daily .      Unintended weight loss: Resolved.    Communicated concerns with patient, nursing.  Case Management:  I have reviewed the facility/SNF care plan/MDS which was done Quarterly, including the falls risk,  nutrition and pain screening. I also reviewed the current immunizations, and preventive care.. Future cancer screening indicated is Mammogram, colonoscopy and provider and pt will formulate a POC.   Patient's desire to return to the community is present, but is not able due to care needs .       Electronically signed by:  Zoey Leung CNP  This progress note was completed using Dragon software and there may be grammatical errors.

## 2021-06-21 NOTE — LETTER
"Letter by Zoey Leung CNP at      Author: Zoey Leung CNP Service: -- Author Type: --    Filed:  Encounter Date: 12/15/2020 Status: (Other)         [facility] - [address] [address]                                  2020    Patient: Jazmin Bauman   MR Number: [id number]   YOB: 1955   Date of Visit: 12/15/2020     Dear Dr. Home:    Thank you for referring Jazmin Bauman to me for evaluation. Below are the relevant portions of my assessment and plan of care.    If you have questions, please do not hesitate to call me. I look forward to following Jazmin along with you.    Sincerely,        Zoey Leung CNP          CC  No Recipients  Zoey Leung CNP  2020 11:06 AM  Sign when Signing Visit    [facility]      NAME:  Jazmin Bamuan             :  1955    MRN: [id number]    CODE STATUS:  FULL CODE    FACILITY: [facility] [[id number]]         CHIEF COMPLAIN/REASON FOR VISIT:  Chief Complaint   Patient presents with   ? Follow-up   ? Problem Visit       HISTORY OF PRESENT ILLNESS: Jazmin Bauman is a 65 y.o. female. Pt was at [facility] from  to  and underwent a RBKA r/t ongoing DM ulcer with osteomyelitis. Per her EMR dc summary \" with HTN,morbid obesity, DM, A fib, CKD 4 presented for evaluation of R foot swelling, difficulty ambulation, pain found to have osteomyelitis now s/p amputation. She has now been to the OR twice this stay, last was on 20.   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   Patient had a chronic diabetic foot ulcer on her R heel, presented for increased swelling, difficulty ambulating, and pain. MRI showed extensive soft tissue necrosis and some osteomyelitis along with cellulitis  Met sepsis criteria on admission with leukocytosis and elevated CRP  Podiatry saw and the foot was " "not felt to be salvageable and BKA was recommended\"       August 15-21: Hospitalized for sepsis with MRSA bacteremia, she had possible endocarditis and TTE revealing vegetation of the aortic valve. She had a CT of the abdomen and pelvis that showed left lower abdominal pannus ulceration but without abscess.  Her right BKA stump ulceration was negative for osteomyelitis or cellulitis.  She was treated with IV Vanco for 14 days which will be completed on August 30.  Her left lower abdominal ulceration has never been biopsied.  It was not biopsied in the hospital and there was no surgical intervention.  Her right BKA stump was negative for osteomyelitis.  CKD stage IV with baseline creatinine 2.5-3.  She was at 1.82 at the end of hospitalization.  She is discharged on sodium bicarb twice daily.  Her insulin was adjusted and she is now on sliding scale insulin with 10 units of Lantus at bedtime.  Blood sugars have been less than 200 on average.    For her hypertension, Norvasc has been discontinued.  Blood pressures remain in the 130s over 70s on average.  She was incidentally found to have a lung nodule on CT scan; follow up CT was on December 1 and revealed resolution of the nodule.    Today: Seen today in her room for complaints of frequent loose stools.  They developed about 48 hours ago and she reports they start out hard and then become soft and loose and watery.  She is denying any nausea or appetite changes but typically has low appetite at baseline.  Her weights have improved however.  She was recently on  Covid unit and had a mild increase CRP, fatigue but no other overt symptoms.  Unknown if this is a late symptom of Covid.  Will check a C. difficile PCR as well.  She is more dismissive today than her usual.  She typically will talk with me and open up to me about things going on but today she appears fatigued and that she just wants me to leave her room.  She is alert and oriented able to answer my " "questions.  She denies any abdominal tenderness on exam.  Bowel sounds hypoactive.  Denies any GERD symptoms.  Has had these loose stools in the past.  On Florastor for this.  She is also on aspirin and Coumadin with no GI protection so we will start omeprazole twice daily and then once daily after 30 days.  Consider reevaluating aspirin and Coumadin use with cardiology.  Vital signs are stable.  No hypotension or tachycardia.      Allergies   Allergen Reactions   ? Hydralazine      Paralysis of legs   ? Latex Itching     Skin Burns   ? Naprosyn [Naproxen] Swelling     throat   ? Nitrofurantoin Hives   ? Sulfa (Sulfonamide Antibiotics) Rash   ? Vicks Vaporub [Camphor-Eucalyptus Oil-Menthol] Rash   :     Current Outpatient Medications   Medication Sig   ? acetaminophen (TYLENOL) 500 MG tablet Take 2 tablets (1,000 mg total) by mouth 3 (three) times a day.   ? aspirin 81 MG EC tablet Take 81 mg by mouth daily.   ? BD ULTRA-FINE MICRO PEN NEEDLE 32 gauge x 1/4\" Ndle USE AS DIRECTED 4 TIMES DAILY.   ? bisacodyL (DULCOLAX) 10 mg suppository Insert 10 mg into the rectum daily as needed. No stool greater than 72 hours   ? cholecalciferol, vitamin D3, (VITAMIN D3) 1,000 unit capsule Take 1,000 Units by mouth daily.   ? collagenase ointment Apply daily per WOC   ? docusate sodium (COLACE) 100 MG capsule Take 100 mg by mouth daily as needed.    ? ferrous sulfate 325 (65 FE) MG tablet Take 1 tablet by mouth 2 (two) times a day with meals.   ? gabapentin (NEURONTIN) 100 MG capsule Take 200 mg by mouth 2 (two) times a day.   ? HYDROmorphone (DILAUDID) 2 MG tablet (Take 2mg daily and 2mg Q 3 hours PRN)   ? lancets (ACCU-CHEK MULTICLIX LANCET) Misc TEST 6 TIMES A DAY   ? LANDEEPIKA SOLOSTAR U-100 INSULIN 100 unit/mL (3 mL) pen Inject 10 Units under the skin at bedtime. 11.65 Type 2 with hyperglycemia  Contact provider if insulin prescribed is not the preferred insulin per insurance. (Patient taking differently: Inject 15 Units under " the skin at bedtime. 11.65 Type 2 with hyperglycemia  Contact provider if insulin prescribed is not the preferred insulin per insurance.)   ? lidocaine (XYLOCAINE) 5 % ointment Apply topically 4 (four) times a day. Apply to left shoulder or around wound (not inside wound)   ? metoprolol tartrate (LOPRESSOR) 25 MG tablet Take 1 tablet (25 mg total) by mouth 2 (two) times a day.   ? miconazole (MICOTIN) 2 % powder Apply topically 2 (two) times a day. Apply to b/l groin/underneath breasts/underneath pannus   ? NOVOLOG U-100 INSULIN ASPART 100 unit/mL injection Check blood sugar four (4) times daily.  11.65 Type 2 with hyperglycemia  OneTouch Verio Flex  Meter  OneTouch Verio strips 2 boxes of 50  Delica Lancets box of 100  BD Ultra-fine Therese Pen Needles - NDC [address]-73 - dispense 1 case, refill PRN for 1 year (Patient taking differently: Inject 7 Units under the skin 3 (three) times a day before meals. )   ? polyethylene glycol (MIRALAX) 17 gram packet Take 1 packet (17 g total) by mouth daily as needed.   ? Saccharomyces boulardii (FLORASTOR) 250 mg capsule Take 250 mg by mouth daily.   ? senna (SENOKOT) 8.6 mg tablet Take 1 tablet by mouth 2 (two) times a day. (Patient taking differently: Take 1 tablet by mouth 2 (two) times a day as needed. )   ? sodium bicarbonate 650 MG tablet Take 1 tablet (650 mg total) by mouth 2 (two) times a day. OTC product   ? warfarin sodium (WARFARIN ORAL) Take by mouth. 12/16/20 INR 3.3 Hold x2 Next INR 12/18 12/14/20 INR 3.2 Hold tonight, then resume 3mg W & Sat, 2.5mg AOD. Next INR 12/22.  12/11/20 missed INR cont same, next INR 12/15.  11/19/20 INR 2.4 3mg W & Sat, 2.5mg AOD. Next INR 12/10.  11/5/20 INR 2.2 Cont 3mg W & Sat, 2.5mg AOD. Next INR 11/19.  10/29/20 INR 2.2 Cont 3mg W & Sat, 2.5mg AOD. Next INR 11/19  10/15/20 INR 2.3 3mg W, Sat and 2.5mg AOD. Next INR 10/29  10/6/20 INR 2.3  Take 3mg Wed and Sat and 2.5mg AOD.  Next INR 10/15/20.    9/29/20 INR 3.1 Take 2.5mg daily.  "Next INR 10/6.  9/22/20 INR 2.1 Take 3mg daily Next INR 9/29  (LD Antib 7/20)         REVIEW OF SYSTEMS:    Currently, no fever, chills, or rigors. Does not have any visual or hearing problems. Denies any chest pain, headaches, palpitations, lightheadedness, dizziness, shortness of breath, or cough. Appetite is good. Denies any GERD symptoms. Denies any difficulty with swallowing, nausea, or vomiting.  Denies any abdominal pain, diarrhea or constipation. Denies any urinary symptoms, yeager in place. No insomnia. No active bleeding. No rash.       PHYSICAL EXAMINATION:  Vitals:    12/15/20 1331   BP: 139/67   Pulse: 63   Resp: 21   Temp: 96.8  F (36  C)   SpO2: 97%   Weight: (!) 236 lb 3.2 oz (107.1 kg)   Height: 5' 1\" (1.549 m)         GENERAL: Awake, Alert, oriented x3, not in any form of acute distress, answers questions appropriately, follows simple commands, conversant with flat affect.  Patient did have PFT  HEENT: Head is normocephalic with normal hair distribution. No evidence of trauma. Ears: No acute purulent discharge. Eyes: Sclera anicteric.  LUNG: Clear to auscultation with good chest expansion.   BACK: ROM normal, no CVA tenderness.  CVS: There is good S1  S2,  rhythm is regular.  ABDOMEN: Globular and ROTUND.  Nontender with palpation. chronic abdominal wall wounds bilaterally that are healing well.  EXTREMITIES: UE Full ROM. Right BKA, wound covered, healing.  SKIN: Warm and dry, no erythema noted, open area on abdomen.    NEUROLOGICAL: The patient is oriented to person, place and time. Strength and sensation are grossly intact. Face is symmetric.    LABS:    Lab Results   Component Value Date    WBC 7.9 08/21/2020    HGB 9.2 (L) 08/21/2020    HCT 28.6 (L) 08/21/2020    MCV 89 08/21/2020     08/21/2020       Results for orders placed or performed during the hospital encounter of 08/15/20   Basic Metabolic Panel   Result Value Ref Range    Sodium 137 136 - 145 mmol/L    Potassium 4.1 3.5 - 5.0 " mmol/L    Chloride 103 98 - 107 mmol/L    CO2 23 22 - 31 mmol/L    Anion Gap, Calculation 11 5 - 18 mmol/L    Glucose 178 (H) 70 - 125 mg/dL    Calcium 11.0 (H) 8.5 - 10.5 mg/dL    BUN 37 (H) 8 - 22 mg/dL    Creatinine 2.05 (H) 0.60 - 1.10 mg/dL    GFR MDRD Af Amer 29 (L) >60 mL/min/1.73m2    GFR MDRD Non Af Amer 24 (L) >60 mL/min/1.73m2           Lab Results   Component Value Date    HGBA1C 10.3 (H) 06/17/2020     Vitamin D, Total (25-Hydroxy)   Date Value Ref Range Status   04/28/2016 29.8 (L) 30.0 - 80.0 ng/mL Final     Lab Results   Component Value Date    NCFKCSDF03 285 01/07/2011       ASSESSMENT/PLAN:  1. Paroxysmal atrial fibrillation (H)    2. Diarrhea, unspecified type    3. Anticoagulated         COVID-19: positive test identified 12/5 via PCR. Resolved.  Although unknown if recent loose stools are related to this.     Loose stool: Less dismissive but the room clearly smelled of recent stools.  Nursing reports is going on for about 48 hours.  We will check her renal function to ensure she is adequately hydrated although she does report she is drinking okay and denies nausea.  Is on iron supplementation so we will hold off on fecal occult blood test but can do that if there is increased black tarry stools noted.   I do not see evidence of colonoscopy in her chart, given her age she is likely due for one if she has not had colon cancer screening.  -Obtain C. difficile sample.   -CBC, BMP, INR, procalcitonin-ensure no GI bleed.       Anticoagulated:  -Omeprazole 20 mg p.o. twice daily x30 days then once daily.     Left abdominal wall open area: Followed by the wound team.  The wound is drastically improved and almost closed with no redness or drainage.  This is a huge improvement and the patient is very happy about this.  -Continue orders per wound care team and change dressing as needed for increased drainage.    Right knee AKA wound: Dressing changes by wound team.    Chronic conditions,  reviewed:     Insulin-dependent diabetes: A1c 10.3.  Now on glargine 15 units nightly and sliding scale NovoLog 3 times daily with meals.  Blood sugars average 130-200. A few outliers to 280.      Essential hypertension: Satisfactory in TCU thus far, amlodipine d.cd.   -metoprolol    Acute on chronic blood loss anemia-likely from anemia chronic disease, iron deficiency, chronic kidney disease, surgery. On oral iron .    CKD 4: Followed by Dr. Iqbal.  Calcium improved at 9.1.  -Sodium bicarb.     Coronary artery disease:  -also needs outpt CRISTINA eval   -Continue metoprolol and aspirin.  Will consider discontinuing aspirin after reviewing chart and discussion with patient.        A. Fib on Coumadin: INR therapeutic.      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 Improved. Unable to assess stump, in  and brace.   -Continues on Dilaudid.    Unintended weight loss: Patient has had greater than 20 pound weight loss since July.  She says the food is just bad here and it is always cold.    -Weight improved now at 236.  This is her baseline.    Communicated concerns with patient, nursing.     Electronically signed by:  Zoey Leung, CNP  This progress note was completed using Dragon software and there may be grammatical errors.

## 2021-06-21 NOTE — LETTER
"Letter by Zoey Leung CNP at      Author: Zoey Leung CNP Service: -- Author Type: --    Filed:  Encounter Date: 11/10/2020 Status: (Other)         [facility] - [address] [address]                                  November 15, 2020    Patient: Jazmin Bauman   MR Number: [id number]   YOB: 1955   Date of Visit: 11/10/2020     Dear  Home:    Thank you for referring Jazmin Bauman to me for evaluation. Below are the relevant portions of my assessment and plan of care.    If you have questions, please do not hesitate to call me. I look forward to following Jazmin along with you.    Sincerely,        Zoey Leung CNP          CC  No Recipients  Zoey Leung CNP  11/15/2020  9:43 PM  Sign when Signing Visit    [facility]      NAME:  Jazmin Bauman             :  1955    MRN: [id number]    CODE STATUS:  FULL CODE    FACILITY: [facility] [[id number]]         CHIEF COMPLAIN/REASON FOR VISIT:  Chief Complaint   Patient presents with   ? Follow-up     Wound follow up        HISTORY OF PRESENT ILLNESS: Jazmin Bauman is a 65 y.o. female. Pt was at [facility] from  to  and underwent a RBKA r/t ongoing DM ulcer with osteomyelitis. Per her EMR dc summary \" with HTN,morbid obesity, DM, A fib, CKD 4 presented for evaluation of R foot swelling, difficulty ambulation, pain found to have osteomyelitis now s/p amputation. She has now been to the OR twice this stay, last was on 20.   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   Patient had a chronic diabetic foot ulcer on her R heel, presented for increased swelling, difficulty ambulating, and pain. MRI showed extensive soft tissue necrosis and some osteomyelitis along with cellulitis  Met sepsis criteria on admission with leukocytosis and elevated CRP  Podiatry saw and the foot " "was not felt to be salvageable and BKA was recommended\"       August 15-21: Hospitalized for sepsis with MRSA bacteremia, she had possible endocarditis and TTE revealing vegetation of the aortic valve. She had a CT of the abdomen and pelvis that showed left lower abdominal pannus ulceration but without abscess.  Her right BKA stump ulceration was negative for osteomyelitis or cellulitis.  She was treated with IV Vanco for 14 days which will be completed on August 30.  Her left lower abdominal ulceration has never been biopsied.  It was not biopsied in the hospital and there was no surgical intervention.  Her right BKA stump was negative for osteomyelitis.  CKD stage IV with baseline creatinine 2.5-3.  She was at 1.82 at the end of hospitalization.  She is discharged on sodium bicarb twice daily.  Her insulin was adjusted and she is now on sliding scale insulin with 10 units of Lantus at bedtime.  Blood sugars have been less than 200 on average.    For her hypertension, Norvasc has been discontinued.  Blood pressures remain in the 130s over 70s on average.  She was incidentally found to have a lung nodule on CT scan, will need to follow-up with this.  Please see imaging report from August hospitalization.  Multiple nodules and enlarged lymph nodes.    Today: Patient seen to follow-up on recent initiation of antibiotics for chronic wound infection.  She has abdominal wound on the left and right side however the left had increased bleeding and drainage last week.  She also had increased bleeding from her right lower extremity stump.  She is followed by the wound team who changed her dressing to calcium alginate.  We obtained an INR which was therapeutic at 2.2, as well as a CBC and BMP.  CBC revealed a mild white count elevation to 12.3.  Due to her history of MRSA and abdominal pannus infection requiring surgical debridement, I did start her on antibiotics, to cover MRSA as well.  She was started on Keflex and doxy on " "Friday of last week.  I ordered a recheck of the CBC on Monday which revealed improvement in white count down to 10.3.  She is also reporting improvement in pain and less drainage in the wound sites.  She looks good she is up wheeling around the unit independently.  She is alert and oriented to her baseline.  No notable side effects from antibiotics.  She was seen by wound team this morning as well.      Allergies   Allergen Reactions   ? Hydralazine      Paralysis of legs   ? Latex Itching     Skin Burns   ? Naprosyn [Naproxen] Swelling     throat   ? Nitrofurantoin Hives   ? Sulfa (Sulfonamide Antibiotics) Rash   ? Vicks Vaporub [Camphor-Eucalyptus Oil-Menthol] Rash   :     Current Outpatient Medications   Medication Sig   ? acetaminophen (TYLENOL) 500 MG tablet Take 2 tablets (1,000 mg total) by mouth 3 (three) times a day.   ? aspirin 81 MG EC tablet Take 81 mg by mouth daily.   ? BD ULTRA-FINE MICRO PEN NEEDLE 32 gauge x 1/4\" Ndle USE AS DIRECTED 4 TIMES DAILY.   ? bisacodyL (DULCOLAX) 10 mg suppository Insert 10 mg into the rectum daily as needed. No stool greater than 72 hours   ? cholecalciferol, vitamin D3, (VITAMIN D3) 1,000 unit capsule Take 1,000 Units by mouth daily.   ? collagenase ointment Apply daily per WOC   ? docusate sodium (COLACE) 100 MG capsule Take 100 mg by mouth daily as needed.    ? ferrous sulfate 325 (65 FE) MG tablet Take 1 tablet by mouth 2 (two) times a day with meals.   ? gabapentin (NEURONTIN) 100 MG capsule Take 200 mg by mouth 2 (two) times a day.   ? HYDROmorphone (DILAUDID) 2 MG tablet (Take 2mg daily and 2mg Q 3 hours PRN)   ? lancets (ACCU-CHEK MULTICLIX LANCET) Misc TEST 6 TIMES A DAY   ? LANTUS SOLOSTAR U-100 INSULIN 100 unit/mL (3 mL) pen Inject 10 Units under the skin at bedtime. 11.65 Type 2 with hyperglycemia  Contact provider if insulin prescribed is not the preferred insulin per insurance. (Patient taking differently: Inject 15 Units under the skin at bedtime. 11.65 " Type 2 with hyperglycemia  Contact provider if insulin prescribed is not the preferred insulin per insurance.)   ? lidocaine (XYLOCAINE) 5 % ointment Apply topically 4 (four) times a day. Apply to left shoulder or around wound (not inside wound)   ? metoprolol tartrate (LOPRESSOR) 25 MG tablet Take 1 tablet (25 mg total) by mouth 2 (two) times a day.   ? miconazole (MICOTIN) 2 % powder Apply topically 2 (two) times a day. Apply to b/l groin/underneath breasts/underneath pannus   ? NOVOLOG U-100 INSULIN ASPART 100 unit/mL injection Check blood sugar four (4) times daily.  11.65 Type 2 with hyperglycemia  OneTouch Verio Flex  Meter  OneTouch Verio strips 2 boxes of 50  Delica Lancets box of 100  BD Ultra-fine Therese Pen Needles - NDC [address]-31 - dispense 1 case, refill PRN for 1 year (Patient taking differently: Inject 7 Units under the skin 3 (three) times a day before meals. )   ? polyethylene glycol (MIRALAX) 17 gram packet Take 1 packet (17 g total) by mouth daily as needed.   ? Saccharomyces boulardii (FLORASTOR) 250 mg capsule Take 250 mg by mouth daily.   ? senna (SENOKOT) 8.6 mg tablet Take 1 tablet by mouth 2 (two) times a day. (Patient taking differently: Take 1 tablet by mouth 2 (two) times a day as needed. )   ? sodium bicarbonate 650 MG tablet Take 1 tablet (650 mg total) by mouth 2 (two) times a day. OTC product   ? warfarin sodium (WARFARIN ORAL) Take by mouth. 11/5/20 INR 2.2 Cont 3mg W & Sat, 2.5mg AOD. Next INR 11/19.  10/29/20 INR 2.2 Cont 3mg W & Sat, 2.5mg AOD. Next INR 11/19  10/15/20 INR 2.3 3mg W, Sat and 2.5mg AOD. Next INR 10/29  10/6/20 INR 2.3  Take 3mg Wed and Sat and 2.5mg AOD.  Next INR 10/15/20.    9/29/20 INR 3.1 Take 2.5mg daily. Next INR 10/6.  9/22/20 INR 2.1 Take 3mg daily Next INR 9/29  (LD Antib 7/20)  9/18/20 INR 1.6 5mg tonight, then 3mg daily. Next INR 9/22.  9/14/20 INR 1.7 Cont 2.5,g daily. Next INR 9/17 9/11/20 INR 1.9 Take 2.5mg daily Next INR 9/14 9/8/20 INR 1.6  Take  "2.5mg daily.  Next INR 9/11/20.         REVIEW OF SYSTEMS:    Currently, no fever, chills, or rigors. Does not have any visual or hearing problems. Denies any chest pain, headaches, palpitations, lightheadedness, dizziness, shortness of breath, or cough. Appetite is good. Denies any GERD symptoms. Denies any difficulty with swallowing, nausea, or vomiting.  Denies any abdominal pain, diarrhea or constipation. Denies any urinary symptoms, yeager in place. No insomnia. No active bleeding. No rash.       PHYSICAL EXAMINATION:  Vitals:    11/10/20 1536   BP: 129/71   Pulse: 60   Resp: 18   Temp: 96.8  F (36  C)   SpO2: 96%   Weight: (!) 233 lb (105.7 kg)   Height: 5' 1\" (1.549 m)         GENERAL: Awake, Alert, oriented x3, not in any form of acute distress, answers questions appropriately, follows simple commands, conversant with flat affect  HEENT: Head is normocephalic with normal hair distribution. No evidence of trauma. Ears: No acute purulent discharge. Eyes: Sclera anicteric.  CHEST: No tenderness or deformity, no crepitus  LUNG: Clear to auscultation with good chest expansion. There DM BS  BACK: ROM normal, no CVA tenderness, no spinal tenderness   CVS: There is good S1  S2,  rhythm is regular.  ABDOMEN: Globular and ROTUND.  Chronic abdominal wall wounds bilaterally, left side with moderate drainage I did not see the wound bases it was covered with calcium alginate by wound team earlier this morning.   EXTREMITIES: UE Full ROM. Right BKA, wound with moderate sanguinous drainage on bandage.  SKIN: Warm and dry, no erythema noted, open area on abdomen.    NEUROLOGICAL: The patient is oriented to person, place and time. Strength and sensation are grossly intact. Face is symmetric.    LABS:    Lab Results   Component Value Date    WBC 7.9 08/21/2020    HGB 9.2 (L) 08/21/2020    HCT 28.6 (L) 08/21/2020    MCV 89 08/21/2020     08/21/2020       Results for orders placed or performed during the hospital encounter " of 08/15/20   Basic Metabolic Panel   Result Value Ref Range    Sodium 137 136 - 145 mmol/L    Potassium 4.1 3.5 - 5.0 mmol/L    Chloride 103 98 - 107 mmol/L    CO2 23 22 - 31 mmol/L    Anion Gap, Calculation 11 5 - 18 mmol/L    Glucose 178 (H) 70 - 125 mg/dL    Calcium 11.0 (H) 8.5 - 10.5 mg/dL    BUN 37 (H) 8 - 22 mg/dL    Creatinine 2.05 (H) 0.60 - 1.10 mg/dL    GFR MDRD Af Amer 29 (L) >60 mL/min/1.73m2    GFR MDRD Non Af Amer 24 (L) >60 mL/min/1.73m2           Lab Results   Component Value Date    HGBA1C 10.3 (H) 06/17/2020     Vitamin D, Total (25-Hydroxy)   Date Value Ref Range Status   04/28/2016 29.8 (L) 30.0 - 80.0 ng/mL Final     Lab Results   Component Value Date    SVEIEDWW98 285 01/07/2011       ASSESSMENT/PLAN:  1. Visit for wound check    2. Below-knee amputation (H)    3. Chronic wound infection of abdomen, subsequent encounter        Left abdominal wall cellulitis and right side: Seen by wound team and dressing was changed with new orders for calcium alginate.  She is reporting improvement in pain, there is less bleeding, less drainage.  CBC yesterday with white count now down to 10.4 from 12.3.   -Continue doxycycline and Keflex as ordered.  -Continue orders per wound care team and change dressing as needed for increased drainage.    Also reviewed right knee AKA wound: Dressing changes morning by wound team, minimal to no pain.  Drainage improved, bleeding scant.    Chronic conditions, reviewed:     Insulin-dependent diabetes: A1c 10.3.  Now on glargine 15 units nightly and sliding scale NovoLog 3 times daily with meals.  Blood sugars average 130-200. A few outliers to 280.      Essential hypertension: Satisfactory in TCU thus far, amlodipine d.cd.   -metoprolol    Acute on chronic blood loss anemia-likely from anemia chronic disease, iron deficiency, chronic kidney disease, surgery. On oral iron .    CKD 4: Followed by Dr. Iqbal. He ordered repeat BMP in a month.  Is not concerned about calcium  that has stayed stable at 11.3.  Of note she did have elevated uric acid level at 11. Will not treat unless symptomatic.   -Sodium bicarb.     Coronary artery disease:  -also needs outpt CRISTINA eval   -Continue metoprolol and aspirin.     Urinary retention: recent UTI, treated.       A. Fib on Coumadin: INR therapeutic.      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 Improved. Unable to assess stump, in  and brace.   -Continues on Dilaudid.    Unintended weight loss: Patient has had greater than 20 pound weight loss since July.  She says the food is just bad here and it is always cold.  Monitoring.  We will put her on a protein supplement for wound healing.   Weight today 226. Stable now x 3 weeks. Reports decent po intake.     Communicated concerns with patient, nursing.    Electronically signed by:  Zoey Leung CNP  This progress note was completed using Dragon software and there may be grammatical errors.

## 2021-06-21 NOTE — LETTER
Letter by Harry Blackwood MD at      Author: Harry Blackwood MD Service: -- Author Type: --    Filed:  Encounter Date: 1/22/2021 Status: (Other)         [facility] - [address] [address]                                  January 26, 2021    Patient: Jazmin Bauman   MR Number: [id number]   YOB: 1955   Date of Visit: 1/22/2021     Dear  Home:    Thank you for referring Jazmin Bauman to me for evaluation. Below are the relevant portions of my assessment and plan of care.    If you have questions, please do not hesitate to call me. I look forward to following Jazmin along with you.    Sincerely,        Harry Blackwood MD          CC  No Recipients  aHrry Blackwood MD  1/24/2021 10:22 PM  Signed   [facility]    Facility:  [facility] [[id number]]    Code Status:  FULL CODE    Chief Complaint   Patient presents with   ? Review Of Multiple Medical Conditions     Regulatroy    : Patient is seen for federally mandated regulatory visit                  Patient Active Problem List   Diagnosis   ? Carpal Tunnel Syndrome   ? Urinary Tract Infection   ? Endometrial Hyperplasia   ? Cholelithiasis   ? Leukocytosis   ? Urine Tests Nonspecific Abnormal Findings   ? Obesity   ? Essential hypertension   ? Pernicious Anemia   ? Immunology Studies Raised Immunoglobulin Level   ? Vaginal bleeding   ? Type 2 diabetes mellitus with other specified complication, with long-term current use of insulin (H)   ? Atrial fibrillation with RVR (H)   ? Acute kidney injury superimposed on CKD (H)   ? Non-traumatic rhabdomyolysis   ? Metabolic acidosis   ? Troponin level elevated   ? Bacteremia   ? Acute respiratory failure with hypoxia (H)   ? Acute encephalopathy   ? Acute pulmonary edema (H)   ? Wheezing   ? Moderate protein malnutrition (H)   ? Abnormal cytological findings in specimens from other  female genital organs   ? Chronic kidney disease, stage III (moderate)   ? Hyperkalemia   ? Osteoarthritis   ? Acute kidney injury (H)   ? Sepsis (H)   ? 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 Sepsis, due to unspecified organism, unspecified whether acute organ dysfunction present (H)   ? Cellulitis of right foot   ? CKD (chronic kidney disease) stage 4, GFR 15-29 ml/min (H)   ? Chronic wound infection of abdomen, subsequent encounter   ? Acute post-operative pain   ? Phantom limb pain (H)   ? Paroxysmal atrial fibrillation (H)   ? Urinary retention   ? Coronary artery disease without angina pectoris   ? Hx of right BKA (H)   ? Weakness   ? Slow transit constipation   ? Wound infection   ? Fever and chills   ? Acute pain of left shoulder   ? Physical deconditioning   ? Gram-positive bacteremia   ? Chronic pain syndrome   ? MRSA bacteremia   ? Hypercalcemia   ? Paronychia of great toe, left   ? Morbid obesity (H)   ? Below-knee amputation (H)   ? Other depression   ? Unintended weight loss   ? 2019 novel coronavirus disease (COVID-19)   ? Frequent loose stools   ? Anticoagulated   ? Primary osteoarthritis of both knees       History:Jazmin Bauman  is a 65 year old female with history of right BKA June 23, 2020 with subsequent stump infection, bilateral chronic abdominal wounds, CKD 4, morbid obesity, insulin-dependent type 2 diabetes, chronic atrial fibrillation, peripheral neuropathy, GERD, peripheral edema, obesity, anemia of chronic disease with iron deficiency, MRSA bacteremia with possible endocarditis August 2020, recent C. difficile colitis      Most recent Hospital Course: Patient was hospitalized August 15 through August 22 after developing fever and chills at her TCU facility.  Patient was felt to have secondary infection of her chronic abdominal wound at outset and was started on vancomycin plus Zosyn.    August 15 culture came back positive for MRSA.  MRSA persisted in August  16 cultures but have been negative since then.  Her MSSA bacteremia was of unknown source but there were several possibilities with right BKA stump drainage which was new abdominal wounds.  Patient also had acute left shoulder pain concerning for seeding of the joint, as well as abnormal TTE on August 17 of the mobile atrial valve vegetation concerning for endocarditis although the vegetation was not seen but if she had her KEMI on August 19.  ID recommends IV Vanco through August 30.    Chronic abdominal pannus ulcerations were seen by wound care with updated wound instructions and all agree steady improvement.    Right BKA stump ulceration was seen by Dr. Corcoran with debridement, no evidence for cellulitis or abscess.    Left shoulder pain evaluated including with CT no evidence of septic joint, degenerative changes noted.  Chronic pain management ongoing.    Patient had sodium bicarbonate added to her her program due to CKD 4/low bicarb    Diabetes was managed with changes in insulin dosage.    Paroxysmal atrial fibrillation treated with ongoing warfarin    Lung nodules were noted although patient is pretty sure that the right lower lobe 1.3 cm nodule has been seen in the past.  Other noncalcified nodules which are small noted.  Questionably infectious?  Radiology recommended short-term follow-up.  Patient had no respiratory symptoms.    CRISTINA was again suspected with outpatient sleep study recommended, patient continues to decline that.    Chronic pain with acute flare evaluated by pain care team.  Patient now on renally dosed Neurontin p.o. Dilaudid 2 mg every 3 hours and prior to dressing changes along with scheduled Tylenol lidocaine ointment.  She was on morphine gel with dressing changes although that has not been continued here at the TCU.      Subjective/ROS:    -augmented by discussion with facility staff involved in direct care    -Patient is just completing 10 days of vancomycin for C. difficile  "colitis.  She has been dealing with loose stools off and on for a month.  She is frustrated because many of her stool samples were rejected because they were partially formed or formed yet she knew she had mostly loose stools.  She has not been on antibiotics in many weeks.  She says things have finally improved over the last 48 hours.  She has not had any bowel movement today and perhaps just 1 small one yesterday.  She says she is feeling better and able to eat again.    -Patient has lost further weight down to 224 pounds for example she is 248 pounds July 24.    -Blood sugars markedly elevated over the last 48 hours.  I have trouble understanding the situation but Ms. Leung, CNP informs me that patient's left knee was injected with cortisone at that time which explains it.    -To talk about patient developed Covid infection + 12/1/2020.  Fortunately she had very little symptoms.  She recalls being more tired and some headache.  She had her first Covid vaccine and has her second 1 next Tuesday.    -Patient reports frustration with the facility in many ways and is hoping to try to move out as soon as possible    -Among her frustrations is that her lidocaine ointment is expensive and she feels like she is getting charged for it based on letters that are arriving.  He did know on the Lantus.  She wonders if cheaper options are available.    -Patient finds the Voltaren \"worthless\" she was using it topically for her knees.    -Despite recent low vitamin D level at 26, nephrology has recommended no vitamin D and she is also had discontinuation of patiromer  because of hypercalcemia which has improved with those changes    -He also has elevated uric acid 10.5, nephrology is aware.  Patient had painful foot presumed gout recently managed by Ms. Leung.  Renally dosed allopurinol recommended due to persistent hyperuricemia.    She did see nephrology on January 12 with no changes in her antihypertensive no changes in her " anemia of chronic disease management discontinuation of her patirmer.    -Patient wants Pap/gynecologic exam with her primary care gynecologist.  She says the facility so that she can have that.  Not sure that is true.  Will need to look into it..  Suspect she is probably due for mammogram, possible DEXA etc.  -Patient has come to accept her sodium bicarb treatment  -Patient remains on warfarin    -I did not discuss suspected CRISTINA again, see my previous note, patient doubts she would accept treatment even if she had a.  Furthermore she is lost a lot of weight which may be making a difference.    -12/4/2020 CT shows that pulmonary nodule resolved.      No falls injuries fever sweats chills vomiting cough shortness of breath   remainder negative    Past Medical History:   Diagnosis Date   ? Anemia     pernicious   ? Diabetes mellitus (H)     borderline   ? Endometrial hyperplasia    ? Fibromyalgia    ? History of recurrent UTI (urinary tract infection)    ? History of transfusion    ? Hypertension    ? Thrombocytopenia (H)    ? Vaginal bleeding 1/2015     Past Surgical History:   Procedure Laterality Date   ? BELOW KNEE LEG AMPUTATION Right 6/18/2020    Procedure: AMPUTATION, BELOW KNEE;  Surgeon: Epi Corcoran MD;  Location: [facility];  Service: General   ? BELOW KNEE LEG AMPUTATION Right 6/23/2020    Procedure: REVISION AMPUTATION, BELOW KNEE;  Surgeon: Epi Corcoran MD;  Location: [facility] OR;  Service: General   ? bilateral carpal tunnel release  2009   ? CHOLECYSTECTOMY     ? COLONOSCOPY N/A 9/11/2017    Procedure: COLONOSCOPY;  Surgeon: Tyler Bhakta MD;  Location: [facility] GI;  Service:    ? COMBINED HYSTEROSCOPY DIAGNOSTIC / D&C N/A 1/28/2015    Procedure: DILATION AND CURETTAGE WITH HYSTEROSCOPY;  Surgeon: Grant Beal MD;  Location: [facility] OR;  Service:    ? DILATION AND CURETTAGE OF UTERUS     ? IR NON TUNNELED CATHETER >5 YEARS  1/21/2020   ? PICC  1/20/2020         ? [facility]  8/21/2020        ? ND HYSTEROSCOPY,W/ENDO BX N/A 9/5/2019    Procedure: HYSTEROSCOPY, DILATION AND CURETTAGE;  Surgeon: Grant Beal MD;  Location: [facility];  Service: Gynecology   ? ND HYSTEROSCOPY,W/ENDO BX N/A 12/9/2019    Procedure: HYSTEROSCOPY, DILATION AND CURETTAGE;  Surgeon: Grant Beal MD;  Location: [facility];  Service: Gynecology          Family History   Problem Relation Age of Onset   ? Colon cancer Father    :       Social History     Socioeconomic History   ? Marital status: Single     Spouse name: Not on file   ? Number of children: Not on file   ? Years of education: Not on file   ? Highest education level: Not on file   Occupational History   ? Not on file   Social Needs   ? Financial resource strain: Not on file   ? Food insecurity     Worry: Not on file     Inability: Not on file   ? Transportation needs     Medical: Not on file     Non-medical: Not on file   Tobacco Use   ? Smoking status: Never Smoker   ? Smokeless tobacco: Never Used   Substance and Sexual Activity   ? Alcohol use: Not Currently     Comment: rare   ? Drug use: No   ? Sexual activity: Not on file   Lifestyle   ? Physical activity     Days per week: Not on file     Minutes per session: Not on file   ? Stress: Not on file   Relationships   ? Social connections     Talks on phone: Not on file     Gets together: Not on file     Attends Oriental orthodox service: Not on file     Active member of club or organization: Not on file     Attends meetings of clubs or organizations: Not on file     Relationship status: Not on file   ? Intimate partner violence     Fear of current or ex partner: Not on file     Emotionally abused: Not on file     Physically abused: Not on file     Forced sexual activity: Not on file   Other Topics Concern   ? Not on file   Social History Narrative   ? Not on file   :        Current Outpatient Medications on File Prior to Visit   Medication Sig Dispense Refill   ?  "acetaminophen (TYLENOL) 500 MG tablet Take 2 tablets (1,000 mg total) by mouth 3 (three) times a day.  0   ? aspirin 81 MG EC tablet Take 81 mg by mouth daily.     ? BD ULTRA-FINE MICRO PEN NEEDLE 32 gauge x 1/4\" Ndle USE AS DIRECTED 4 TIMES DAILY. 360 each 3   ? bisacodyL (DULCOLAX) 10 mg suppository Insert 10 mg into the rectum daily as needed. No stool greater than 72 hours     ? docusate sodium (COLACE) 100 MG capsule Take 100 mg by mouth daily as needed.      ? ferrous sulfate 325 (65 FE) MG tablet Take 1 tablet by mouth 2 (two) times a day with meals.     ? gabapentin (NEURONTIN) 100 MG capsule Take 200 mg by mouth 2 (two) times a day.     ? HYDROmorphone (DILAUDID) 2 MG tablet (Take 2mg daily and 2mg Q 3 hours PRN) 180 tablet 0   ? lancets (ACCU-CHEK MULTICLIX LANCET) Misc TEST 6 TIMES A  each 11   ? LANTUS SOLOSTAR U-100 INSULIN 100 unit/mL (3 mL) pen Inject 10 Units under the skin at bedtime. 11.65 Type 2 with hyperglycemia  Contact provider if insulin prescribed is not the preferred insulin per insurance. (Patient taking differently: Inject 17 Units under the skin at bedtime. 11.65 Type 2 with hyperglycemia  Contact provider if insulin prescribed is not the preferred insulin per insurance.) 50 mL 0   ? lidocaine (XYLOCAINE) 5 % ointment Apply topically 4 (four) times a day. Apply to left shoulder or around wound (not inside wound) 35.44 g 0   ? metoprolol tartrate (LOPRESSOR) 25 MG tablet Take 1 tablet (25 mg total) by mouth 2 (two) times a day. 180 tablet 3   ? miconazole (MICOTIN) 2 % powder Apply topically 2 (two) times a day. Apply to b/l groin/underneath breasts/underneath pannus 100 g 0   ? NOVOLOG U-100 INSULIN ASPART 100 unit/mL injection Check blood sugar four (4) times daily.  11.65 Type 2 with hyperglycemia  OneTouch Verio Flex  Meter  OneTouch Verio strips 2 boxes of 50  Delica Lancets box of 100  BD Ultra-fine Therese Pen Needles - NDC [address]-34 - dispense 1 case, refill PRN for 1 year " (Patient taking differently: Inject under the skin see administration instructions. 7 B  10 L  10 S) 10 mL PRN   ? omeprazole (PRILOSEC) 20 MG capsule Take 20 mg by mouth 2 (two) times a day before meals.     ? polyethylene glycol (MIRALAX) 17 gram packet Take 1 packet (17 g total) by mouth daily as needed.  0   ? Saccharomyces boulardii (FLORASTOR) 250 mg capsule Take 250 mg by mouth daily.     ? senna (SENOKOT) 8.6 mg tablet Take 1 tablet by mouth 2 (two) times a day. (Patient taking differently: Take 1 tablet by mouth 2 (two) times a day as needed. )  0   ? sodium bicarbonate 650 MG tablet Take 1 tablet (650 mg total) by mouth 2 (two) times a day. OTC product  0   ? warfarin sodium (WARFARIN ORAL) Take by mouth. 1/19/21  INR 2.1 Cont 3mg T & Fr, 2.5mg AOD. Next INR 1/26 1/5/21 INR 2.3  Cont 3mg Tues and Fri and 2.5mg AOD.  Next INR 1/19/21.    1/4/21 INR missed.  Take 2.5mg on 1/4.  Next INR 1/5/21.    12/28/20 INR 2.9  Cont 3mg Tues and Fri and 2.5mg AOD.  Next INR 1/4/21.  12/22/20 INR 2.1  Take 3mg Tues and Fri and 2.5mg AOD.  Next INR 12/28 12/18/20 INR 2.5 2.5mg daily. Next INR 12/22.  12/16/20 INR 3.3 Hold x2 Next INR 12/18 12/14/20 INR 3.2 Hold tonight, then resume 3mg W & Sat, 2.5mg AOD. Next     ? [DISCONTINUED] cholecalciferol, vitamin D3, (VITAMIN D3) 1,000 unit capsule Take 1,000 Units by mouth daily.     ? [DISCONTINUED] collagenase ointment Apply daily per WOC  0   ? [DISCONTINUED] vancomycin (VANCOCIN) 125 MG capsule Take 125 mg by mouth 4 (four) times a day.       No current facility-administered medications on file prior to visit.    :      ALLERGIES:  Hydralazine, Latex, Naprosyn [naproxen], Nitrofurantoin, Sulfa (sulfonamide antibiotics), and Vicks vaporub [camphor-eucalyptus oil-menthol]    Vitals:  129/60 respirations 18 temperature 96.8 heart rate 64 O2 sats 97% on room air weight is 224 pounds  Blood sugars 153-573    Physical exam:    Patient is alert she is oriented x3 she is normally  conversant normocephalic gaze is conjugate sclera appear clear purposeful movement of all 4 extremities noted.  We do remove the sleeve from her stump, well-healed without any wound.  No edema in the left lower extremity.  Patient's abdominal wounds are basically healed to the naked eye although she still has a little seepage from the one in the left belly so there may be subclinical opening there I suspect.  The leg is also in the same shape, nearly fully epithelialized    Due to the 2020 Covid 19 pandemic, except as noted above, the patient was visually observed at a 6 foot plus distance.  An observational exam was performed in an effort to keep patient safe from Covid 19 and other communicable diseases.   Labs:  Lab Results   Component Value Date    WBC 7.9 08/21/2020    HGB 9.2 (L) 08/21/2020    HCT 28.6 (L) 08/21/2020    MCV 89 08/21/2020     08/21/2020     Results for orders placed or performed during the hospital encounter of 08/15/20   Basic Metabolic Panel   Result Value Ref Range    Sodium 137 136 - 145 mmol/L    Potassium 4.1 3.5 - 5.0 mmol/L    Chloride 103 98 - 107 mmol/L    CO2 23 22 - 31 mmol/L    Anion Gap, Calculation 11 5 - 18 mmol/L    Glucose 178 (H) 70 - 125 mg/dL    Calcium 11.0 (H) 8.5 - 10.5 mg/dL    BUN 37 (H) 8 - 22 mg/dL    Creatinine 2.05 (H) 0.60 - 1.10 mg/dL    GFR MDRD Af Amer 29 (L) >60 mL/min/1.73m2    GFR MDRD Non Af Amer 24 (L) >60 mL/min/1.73m2         Lab Results   Component Value Date    TSH 1.83 06/17/2020     Lab Results   Component Value Date    HGBA1C 10.3 (H) 06/17/2020     [unfilled]  Lab Results   Component Value Date    CMOJSCEC64 285 01/07/2011     Lab Results   Component Value Date     (H) 01/20/2020       Assessment/Plan:      ICD-10-CM    1. Paroxysmal atrial fibrillation (H)  I48.0    2. Type 2 diabetes mellitus with other specified complication, with long-term current use of insulin (H)  E11.69     Z79.4    3. Essential hypertension  I10    4. CKD  (chronic kidney disease) stage 4, GFR 15-29 ml/min (H)  N18.4        Multijoint arthritis   Knee injection a couple of days ago has led to hypoglycemia.  Patient's not sure if it is helping yet or not.  -Patient also complains of bilateral wrist arthritis.  She wonders about having those injected as well.  I offered to send her to hand surgeon for evaluation though she does not choose appetite right now.  I would recommend against cortisone injection in the near future considering her response to this 1 with hyperglycemia.  Patient also has shoulder arthritis.  -She requests discontinuation of alternatives done  -She requests a cheaper option for the 5% lidocaine ointment which she likes and feels like it does do something.  I wrote an order for the pharmacy to check into other options such as 3% cream etc. that may be cheaper     DM 2   Control has been pretty good but has deteriorated after the cortisone injection.  Ms. Leung just increased her insulin and I am going to do it again today.  -Lantus up to 17 units  -Mealtime insulin 7 in the morning 10 at noon and 10 in the evening with supper  -Continue to monitor sugars frequently  -She may need backing off the insulin once her cortisone is metabolized       MRSA bacteremia  Possible endocarditis   Resolved.  The source was never certain.  She had several open wounds however.  The endocarditis was never definitively diagnosed either.  She completed vancomycin treatment months ago.    left shoulder pain   Some acute pain with her bacteremia a few months ago.  Now more of a chronic pain.  Questionable osteoarthritis?    Abdominal wounds   Drastically improved since I last saw him.  -No change in plan    Right stump wound   Resolved        Hypertension   Blood pressure has improved concurrently with weight loss not sure if it is a cause-and-effect.  She is no longer on amlodipine.  She still takes metoprolol 25 twice daily with good blood pressures  overall.    Paroxysmal atrial fibrillation   Continue warfarin with INR monitoring.  Continue metoprolol 25 mg twice daily.      CKD 4  Metabolic acidosis  Hypercalcemia   Sodium bicarbonate 650 twice daily added during the hospital stay.  Follow-up with nephrology completed a few days ago.  No major changes in her treatment however her patiromer and vitamin D were discontinued by nephrology.  -I asked staff to send the vitamin D level to Dr. Frost for review since she does have multiple risk factors for osteoporosis and is deficient at 26    History of peripheral edema and Lasix use   Continues to do well without edema off of Lasix now for several months    Constipation   No recurrence she has her bowel program worked out now with some confidence.    BKA June 23 2020   Now with full healing.    Lung nodules   Follow-up CT [address] reassuring.  No further follow-up anticipated    Acute on chronic pain with current opiate use.   She remains on hydromorphone gabapentin in addition to her topicals.  She also takes acetaminophen.  -I ordered pain consult last time I saw her.  -I continue to argue in favor of taper to 0 but will leave that to her pain specialist.    Probable gout    Acute attack resolved.  Managed by Ms. Leung.  Recommend consideration of chronic allopurinol therapy renally dosed due to uric acid 10.5      Case discussed with:    Facility staff           Harry Blackwood MD

## 2021-06-21 NOTE — LETTER
"Letter by Zoey Leung CNP at      Author: Zoey Leung CNP Service: -- Author Type: --    Filed:  Encounter Date: 2020 Status: (Other)         [facility] - [address] [address]                                  2020    Patient: Jazmin Bauman   MR Number: [id number]   YOB: 1955   Date of Visit: 2020     Dear Dr. Home:    Thank you for referring Jazmin Bauman to me for evaluation. Below are the relevant portions of my assessment and plan of care.    If you have questions, please do not hesitate to call me. I look forward to following Jazmin along with you.    Sincerely,        Zoey Leung CNP          CC  No Recipients  Zoey Leung CNP  2020 10:56 PM  Sign when Signing Visit    [facility]      NAME:  Jazmin Bauman             :  1955    MRN: [id number]    CODE STATUS:  FULL CODE    FACILITY: [facility] [[id number]]         CHIEF COMPLAIN/REASON FOR VISIT:  Chief Complaint   Patient presents with   ? Problem Visit       HISTORY OF PRESENT ILLNESS: Jazmin Bauman is a 65 y.o. female. Pt was at [facility] from  to  and underwent a RBKA r/t ongoing DM ulcer with osteomyelitis. Per her EMR dc summary \" with HTN,morbid obesity, DM, A fib, CKD 4 presented for evaluation of R foot swelling, difficulty ambulation, pain found to have osteomyelitis now s/p amputation. She has now been to the OR twice this stay, last was on 20.   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   Patient had a chronic diabetic foot ulcer on her R heel, presented for increased swelling, difficulty ambulating, and pain. MRI showed extensive soft tissue necrosis and some osteomyelitis along with cellulitis  Met sepsis criteria on admission with leukocytosis and elevated CRP  Podiatry saw and the foot was not felt to be " "salvageable and BKA was recommended\"     August 15-21: Hospitalized for sepsis with MRSA bacteremia, she had possible endocarditis and TTE revealing vegetation of the aortic valve. She had a CT of the abdomen and pelvis that showed left lower abdominal pannus ulceration but without abscess.  Her right BKA stump ulceration was negative for osteomyelitis or cellulitis.  She was treated with IV Vanco for 14 days which will be completed on August 30.  Her left lower abdominal ulceration has never been biopsied.  It was not biopsied in the hospital and there was no surgical intervention.  Her right BKA stump was negative for osteomyelitis.  CKD stage IV with baseline creatinine 2.5-3.  She was at 1.82 at the end of hospitalization.  She is discharged on sodium bicarb twice daily.  Her insulin was adjusted and she is now on sliding scale insulin with 10 units of Lantus at bedtime.  Blood sugars have been less than 200 on average.    For her hypertension, Norvasc has been discontinued.  Blood pressures remain in the 130s over 70s on average.  She was incidentally found to have a lung nodule on CT scan; follow up CT was on December 1 and revealed resolution of the nodule.    Today: Seen to follow-up on recent complaints of loose stool and nursing's concern for GI bleed.  Her hemoglobin was stable at 11.6.  Blood pressures have been stable and no tachycardia.  She is denying any pain or loose stools today.  She looks more alert and energetic today compared to last week.  She is denying any nausea or abdominal discomfort.  She does have a history of diverticulitis however no further complaints of pain or loose stool.    She did request a discussion regarding knee injections for osteoarthritis.  She has a desire to complete therapy and work to get a prosthesis for possibly ambulation.  She is not been very engaged in therapy had her on in the past.  We will have to have a bigger discussion of the risks versus benefits and " "discussed with therapy whether they be willing to take her on.      Allergies   Allergen Reactions   ? Hydralazine      Paralysis of legs   ? Latex Itching     Skin Burns   ? Naprosyn [Naproxen] Swelling     throat   ? Nitrofurantoin Hives   ? Sulfa (Sulfonamide Antibiotics) Rash   ? Vicks Vaporub [Camphor-Eucalyptus Oil-Menthol] Rash   :     Current Outpatient Medications   Medication Sig   ? acetaminophen (TYLENOL) 500 MG tablet Take 2 tablets (1,000 mg total) by mouth 3 (three) times a day.   ? aspirin 81 MG EC tablet Take 81 mg by mouth daily.   ? BD ULTRA-FINE MICRO PEN NEEDLE 32 gauge x 1/4\" Ndle USE AS DIRECTED 4 TIMES DAILY.   ? bisacodyL (DULCOLAX) 10 mg suppository Insert 10 mg into the rectum daily as needed. No stool greater than 72 hours   ? cholecalciferol, vitamin D3, (VITAMIN D3) 1,000 unit capsule Take 1,000 Units by mouth daily.   ? collagenase ointment Apply daily per WOC   ? docusate sodium (COLACE) 100 MG capsule Take 100 mg by mouth daily as needed.    ? ferrous sulfate 325 (65 FE) MG tablet Take 1 tablet by mouth 2 (two) times a day with meals.   ? gabapentin (NEURONTIN) 100 MG capsule Take 200 mg by mouth 2 (two) times a day.   ? HYDROmorphone (DILAUDID) 2 MG tablet (Take 2mg daily and 2mg Q 3 hours PRN)   ? lancets (ACCU-CHEK MULTICLIX LANCET) Misc TEST 6 TIMES A DAY   ? LANDEEPIKA SOLOSTAR U-100 INSULIN 100 unit/mL (3 mL) pen Inject 10 Units under the skin at bedtime. 11.65 Type 2 with hyperglycemia  Contact provider if insulin prescribed is not the preferred insulin per insurance. (Patient taking differently: Inject 15 Units under the skin at bedtime. 11.65 Type 2 with hyperglycemia  Contact provider if insulin prescribed is not the preferred insulin per insurance.)   ? lidocaine (XYLOCAINE) 5 % ointment Apply topically 4 (four) times a day. Apply to left shoulder or around wound (not inside wound)   ? metoprolol tartrate (LOPRESSOR) 25 MG tablet Take 1 tablet (25 mg total) by mouth 2 (two) " times a day.   ? miconazole (MICOTIN) 2 % powder Apply topically 2 (two) times a day. Apply to b/l groin/underneath breasts/underneath pannus   ? NOVOLOG U-100 INSULIN ASPART 100 unit/mL injection Check blood sugar four (4) times daily.  11.65 Type 2 with hyperglycemia  OneTouch Verio Flex  Meter  OneTouch Verio strips 2 boxes of 50  Delica Lancets box of 100  BD Ultra-fine Therese Pen Needles - NDC [address]-41 - dispense 1 case, refill PRN for 1 year (Patient taking differently: Inject 7 Units under the skin 3 (three) times a day before meals. )   ? polyethylene glycol (MIRALAX) 17 gram packet Take 1 packet (17 g total) by mouth daily as needed.   ? Saccharomyces boulardii (FLORASTOR) 250 mg capsule Take 250 mg by mouth daily.   ? senna (SENOKOT) 8.6 mg tablet Take 1 tablet by mouth 2 (two) times a day. (Patient taking differently: Take 1 tablet by mouth 2 (two) times a day as needed. )   ? sodium bicarbonate 650 MG tablet Take 1 tablet (650 mg total) by mouth 2 (two) times a day. OTC product   ? warfarin sodium (WARFARIN ORAL) Take by mouth. 12/22/20 INR 2.1  Take 3mg Tues and Fri and 2.5mg AOD.  Next INR 12/28/20.    12/18/20 INR 2.5 2.5mg daily. Next INR 12/22.  12/16/20 INR 3.3 Hold x2 Next INR 12/18 12/14/20 INR 3.2 Hold tonight, then resume 3mg W & Sat, 2.5mg AOD. Next INR 12/22.  12/11/20 missed INR cont same, next INR 12/15.  11/19/20 INR 2.4 3mg W & Sat, 2.5mg AOD. Next INR 12/10.  11/5/20 INR 2.2 Cont 3mg W & Sat, 2.5mg AOD. Next INR 11/19.  10/29/20 INR 2.2 Cont 3mg W & Sat, 2.5mg AOD. Next INR 11/19  10/15/20 INR 2.3 3mg W, Sat and 2.5mg AOD. Next INR 10/29  10/6/20 INR 2.3  Take 3mg Wed and Sat and 2.5mg AOD.  Next INR 10/15/20.    9/29/20 INR 3.1 Take 2.5mg daily. Next INR 10/6.  9/22/20 INR 2.1 Take 3mg daily Next INR 9/29  (LD Antib 7/20)         REVIEW OF SYSTEMS:    Currently, no fever, chills, or rigors. Does not have any visual or hearing problems. Denies any chest pain, headaches, palpitations,  "lightheadedness, dizziness, shortness of breath, or cough. Appetite is good. Denies any GERD symptoms. Denies any difficulty with swallowing, nausea, or vomiting.  Denies any abdominal pain, diarrhea or constipation. Denies any urinary symptoms, yeager in place. No insomnia. No active bleeding. No rash.       PHYSICAL EXAMINATION:  Vitals:    12/21/20 1244   BP: 121/60   Pulse: 74   Resp: 18   Temp: 97.5  F (36.4  C)   SpO2: 97%   Weight: (!) 237 lb 3.2 oz (107.6 kg)   Height: 5' 1\" (1.549 m)         GENERAL: Awake, Alert, oriented x3, not in any form of acute distress, answers questions appropriately, follows simple commands, conversant with flat affect.  Patient did have PFT  HEENT: Head is normocephalic with normal hair distribution. No evidence of trauma. Ears: No acute purulent discharge. Eyes: Sclera anicteric.  LUNG: Clear to auscultation with good chest expansion.   BACK: ROM normal, no CVA tenderness.  CVS: There is good S1  S2,  rhythm is regular.  ABDOMEN: Globular and ROTUND.  Nontender with palpation. chronic abdominal wall wounds bilaterally that are healing well.  EXTREMITIES: UE Full ROM. Right BKA, wound covered, healing.  SKIN: Warm and dry, no erythema noted, open area on abdomen.    NEUROLOGICAL: The patient is oriented to person, place and time. Strength and sensation are grossly intact. Face is symmetric.    LABS:    Lab Results   Component Value Date    WBC 7.9 08/21/2020    HGB 9.2 (L) 08/21/2020    HCT 28.6 (L) 08/21/2020    MCV 89 08/21/2020     08/21/2020       Results for orders placed or performed during the hospital encounter of 08/15/20   Basic Metabolic Panel   Result Value Ref Range    Sodium 137 136 - 145 mmol/L    Potassium 4.1 3.5 - 5.0 mmol/L    Chloride 103 98 - 107 mmol/L    CO2 23 22 - 31 mmol/L    Anion Gap, Calculation 11 5 - 18 mmol/L    Glucose 178 (H) 70 - 125 mg/dL    Calcium 11.0 (H) 8.5 - 10.5 mg/dL    BUN 37 (H) 8 - 22 mg/dL    Creatinine 2.05 (H) 0.60 - 1.10 " mg/dL    GFR MDRD Af Amer 29 (L) >60 mL/min/1.73m2    GFR MDRD Non Af Amer 24 (L) >60 mL/min/1.73m2           Lab Results   Component Value Date    HGBA1C 10.3 (H) 06/17/2020     Vitamin D, Total (25-Hydroxy)   Date Value Ref Range Status   04/28/2016 29.8 (L) 30.0 - 80.0 ng/mL Final     Lab Results   Component Value Date    [id number] 285 01/07/2011       ASSESSMENT/PLAN:  1. Diarrhea, unspecified type    2. Primary osteoarthritis involving multiple joints         COVID-19: positive test identified 12/5 via PCR. Resolved.  Although unknown if recent loose stools are related to this.     Loose stool: Reports that this is resolved and no longer having loose stools.  Appears to be feeling better, well hydrated eating and drinking.  -Most recent colonoscopy in 2017.  Some evidence of diverticulitis otherwise clear.    Osteoarthritis: Requests discussion regarding knee injections in order to complete therapy and work with a prosthesis.  Will discuss at next visit in depth the risks and benefits as well as whether therapy will take her on again.    Anticoagulated:  -Omeprazole 20 mg p.o. twice daily x30 days then once daily.     Left abdominal wall open area: Followed by the wound team.  The wound is drastically improved and almost closed with no redness or drainage.  This is a huge improvement and the patient is very happy about this.  -Continue orders per wound care team and change dressing as needed for increased drainage.    Chronic conditions, reviewed:     Insulin-dependent diabetes: A1c 10.3.  Now on glargine 15 units nightly and sliding scale NovoLog 3 times daily with meals.  Blood sugars average 130-200. A few outliers to 280.      Essential hypertension: Satisfactory in TCU thus far, amlodipine d.cd.   -metoprolol    Acute on chronic blood loss anemia-likely from anemia chronic disease, iron deficiency, chronic kidney disease, surgery. On oral iron .    CKD 4: Followed by Dr. Iqbal.  Calcium improved at  9.1.  -Sodium bicarb.     Coronary artery disease:  -also needs outpt CRISTINA eval   -Continue metoprolol and aspirin.       A. Fib on Coumadin: INR therapeutic.      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 Improved. Unable to assess stump, in  and brace.   -Continues on Dilaudid.    Unintended weight loss: Patient has had greater than 20 pound weight loss since July.  She says the food is just bad here and it is always cold.    -Weight improved now at 236.  This is her baseline.    Communicated concerns with patient, nursing.     Electronically signed by:  Zoey Leung CNP  This progress note was completed using Dragon software and there may be grammatical errors.

## 2021-06-21 NOTE — LETTER
"Letter by Zoey Leung CNP at      Author: Zoey Leung CNP Service: -- Author Type: --    Filed:  Encounter Date: 10/15/2020 Status: (Other)         Patient: Jazmin Bauman   MR Number: [id number]   YOB: 1955   Date of Visit: 10/15/2020       [facility]      NAME:  Jazmin Bauman             :  1955    MRN: [id number]    CODE STATUS:  FULL CODE    FACILITY: [facility] [[id number]]         CHIEF COMPLAIN/REASON FOR VISIT:  No chief complaint on file.      HISTORY OF PRESENT ILLNESS: Jazmin Bauman is a 65 y.o. female. Pt was at [facility] from  to  and underwent a RBKA r/t ongoing DM ulcer with osteomyelitis. Per her EMR dc summary \" with HTN,morbid obesity, DM, A fib, CKD 4 presented for evaluation of R foot swelling, difficulty ambulation, pain found to have osteomyelitis now s/p amputation. She has now been to the OR twice this stay, last was on 20.   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   Patient had a chronic diabetic foot ulcer on her R heel, presented for increased swelling, difficulty ambulating, and pain. MRI showed extensive soft tissue necrosis and some osteomyelitis along with cellulitis  Met sepsis criteria on admission with leukocytosis and elevated CRP  Podiatry saw and the foot was not felt to be salvageable and BKA was recommended\"       August 15-: Hospitalized for sepsis with MRSA bacteremia, she had possible endocarditis and TTE revealing vegetation of the aortic valve. She had a CT of the abdomen and pelvis that showed left lower abdominal pannus ulceration but without abscess.  Her right BKA stump ulceration was negative for osteomyelitis or cellulitis.  She was treated with IV Vanco for 14 days which will be completed on .  Her left lower abdominal ulceration has never been biopsied.  It was not biopsied in the hospital and there was no " "surgical intervention.  Her right BKA stump was negative for osteomyelitis.  CKD stage IV with baseline creatinine 2.5-3.  She was at 1.82 at the end of hospitalization.  She is discharged on sodium bicarb twice daily.  Her insulin was adjusted and she is now on sliding scale insulin with 10 units of Lantus at bedtime.  Blood sugars have been less than 200 on average.    For her hypertension, Norvasc has been discontinued.  Blood pressures remain in the 130s over 70s on average.  She was incidentally found to have a lung nodule on CT scan, will need to follow-up with this.  Please see imaging report from August hospitalization.  Multiple nodules and enlarged lymph nodes.    Today: Follow up today as she recently transferred from [facility]. She has completed therapy secondary to stagnation in progress. She is having her wounds dressed by nursing during my visit and I am able to view her abdominal open area which is healing nicely. Good granulation tissue at base. Scant drainage. She also brings up her pernicious anemia and requests for b12 and I again reminded her that she her level was therapeutic. Pain well controlled. Weights stable at 227 x 3 weeks now. Eating and drinking well.       Allergies   Allergen Reactions   ? Hydralazine      Paralysis of legs   ? Latex Itching     Skin Burns   ? Naprosyn [Naproxen] Swelling     throat   ? Nitrofurantoin Hives   ? Sulfa (Sulfonamide Antibiotics) Rash   ? Vicks Vaporub [Camphor-Eucalyptus Oil-Menthol] Rash   :     Current Outpatient Medications   Medication Sig   ? acetaminophen (TYLENOL) 500 MG tablet Take 2 tablets (1,000 mg total) by mouth 3 (three) times a day.   ? aspirin 81 MG EC tablet Take 81 mg by mouth daily.   ? BD ULTRA-FINE MICRO PEN NEEDLE 32 gauge x 1/4\" Ndle USE AS DIRECTED 4 TIMES DAILY.   ? bisacodyL (DULCOLAX) 10 mg suppository Insert 10 mg into the rectum daily as needed. No stool greater than 72 hours   ? docusate sodium (COLACE) 100 MG capsule Take " 100 mg by mouth daily as needed.    ? ferrous sulfate 325 (65 FE) MG tablet Take 1 tablet by mouth 2 (two) times a day with meals.   ? gabapentin (NEURONTIN) 100 MG capsule Take 200 mg by mouth 2 (two) times a day.   ? HYDROmorphone (DILAUDID) 2 MG tablet (Take 2mg daily and 2mg Q 3 hours PRN)   ? lancets (ACCU-CHEK MULTICLIX LANCET) Misc TEST 6 TIMES A DAY   ? LANTUS SOLOSTAR U-100 INSULIN 100 unit/mL (3 mL) pen Inject 10 Units under the skin at bedtime. 11.65 Type 2 with hyperglycemia  Contact provider if insulin prescribed is not the preferred insulin per insurance. (Patient taking differently: Inject 15 Units under the skin at bedtime. 11.65 Type 2 with hyperglycemia  Contact provider if insulin prescribed is not the preferred insulin per insurance.)   ? lidocaine (XYLOCAINE) 5 % ointment Apply topically 4 (four) times a day. Apply to left shoulder or around wound (not inside wound)   ? metoprolol tartrate (LOPRESSOR) 25 MG tablet Take 1 tablet (25 mg total) by mouth 2 (two) times a day.   ? miconazole (MICOTIN) 2 % powder Apply topically 2 (two) times a day. Apply to b/l groin/underneath breasts/underneath pannus   ? NOVOLOG U-100 INSULIN ASPART 100 unit/mL injection Check blood sugar four (4) times daily.  11.65 Type 2 with hyperglycemia  OneTouch Verio Flex  Meter  OneTouch Verio strips 2 boxes of 50  Delica Lancets box of 100  BD Ultra-fine Therese Pen Needles - NDC [address]-96 - dispense 1 case, refill PRN for 1 year (Patient taking differently: Inject 7 Units under the skin 3 (three) times a day before meals. )   ? polyethylene glycol (MIRALAX) 17 gram packet Take 1 packet (17 g total) by mouth daily as needed.   ? Saccharomyces boulardii (FLORASTOR) 250 mg capsule Take 250 mg by mouth daily.   ? senna (SENOKOT) 8.6 mg tablet Take 1 tablet by mouth 2 (two) times a day. (Patient taking differently: Take 1 tablet by mouth 2 (two) times a day as needed. )   ? sodium bicarbonate 650 MG tablet Take 1 tablet (650  "mg total) by mouth 2 (two) times a day. OTC product   ? cholecalciferol, vitamin D3, (VITAMIN D3) 1,000 unit capsule Take 1,000 Units by mouth daily.   ? collagenase ointment Apply daily per WOC   ? warfarin sodium (WARFARIN ORAL) Take by mouth. 10/15/20 INR 2.3 3mg W, Sat and 2.5mg AOD. Next INR 10/29  10/6/20 INR 2.3  Take 3mg Wed and Sat and 2.5mg AOD.  Next INR 10/15/20.    9/29/20 INR 3.1 Take 2.5mg daily. Next INR 10/6.  9/22/20 INR 2.1 Take 3mg daily Next INR 9/29  (LD Antib 7/20)  9/18/20 INR 1.6 5mg tonight, then 3mg daily. Next INR 9/22.  9/14/20 INR 1.7 Cont 2.5,g daily. Next INR 9/17 9/11/20 INR 1.9 Take 2.5mg daily Next INR 9/14 9/8/20 INR 1.6  Take 2.5mg daily.  Next INR 9/11/20.    9/3/20 INR 3.4  Hold x 2 days, then take 2mg daily.  Next INR 9/8/20.  9/2/20 INR 3.4 Hold 9/2 recheck INR 9/3  8/28/20 INR 2.6  Cont 3mg daily.  Next INR 9/1/20.         REVIEW OF SYSTEMS:    Currently, no fever, chills, or rigors. Does not have any visual or hearing problems. Denies any chest pain, headaches, palpitations, lightheadedness, dizziness, shortness of breath, or cough. Appetite is good. Denies any GERD symptoms. Denies any difficulty with swallowing, nausea, or vomiting.  Denies any abdominal pain, diarrhea or constipation. Denies any urinary symptoms, yeager in place. No insomnia. No active bleeding. No rash.       PHYSICAL EXAMINATION:  Vitals:    10/15/20 1144   BP: 123/89   Pulse: 78   Resp: 17   Temp: 97.8  F (36.6  C)   SpO2: 97%   Weight: (!) 226 lb 14.4 oz (102.9 kg)   Height: 5' 1\" (1.549 m)         GENERAL: Awake, Alert, oriented x3, not in any form of acute distress, answers questions appropriately, follows simple commands, conversant with flat affect  HEENT: Head is normocephalic with normal hair distribution. No evidence of trauma. Ears: No acute purulent discharge. Eyes: Sclera anicteric.  CHEST: No tenderness or deformity, no crepitus  LUNG: Clear to auscultation with good chest expansion. " There DM BS  BACK: ROM normal, no CVA tenderness, no spinal tenderness   CVS: There is good S1  S2,  rhythm is regular.  ABDOMEN: Globular and ROTUND tender to palpation, non distended, no masses, no organomegaly, good bowel sounds, no rebound or guarding, no peritoneal signs.   EXTREMITIES: UE Full ROM. Right BKA, IN a  with protected brace  SKIN: Warm and dry, no erythema noted, open area on abdomen viewed. Good granulation tissue and reduced circumference and depth. Scant drainage.    NEUROLOGICAL: The patient is oriented to person, place and time. Strength and sensation are grossly intact. Face is symmetric.    LABS:    Lab Results   Component Value Date    WBC 7.9 08/21/2020    HGB 9.2 (L) 08/21/2020    HCT 28.6 (L) 08/21/2020    MCV 89 08/21/2020     08/21/2020       Results for orders placed or performed during the hospital encounter of 08/15/20   Basic Metabolic Panel   Result Value Ref Range    Sodium 137 136 - 145 mmol/L    Potassium 4.1 3.5 - 5.0 mmol/L    Chloride 103 98 - 107 mmol/L    CO2 23 22 - 31 mmol/L    Anion Gap, Calculation 11 5 - 18 mmol/L    Glucose 178 (H) 70 - 125 mg/dL    Calcium 11.0 (H) 8.5 - 10.5 mg/dL    BUN 37 (H) 8 - 22 mg/dL    Creatinine 2.05 (H) 0.60 - 1.10 mg/dL    GFR MDRD Af Amer 29 (L) >60 mL/min/1.73m2    GFR MDRD Non Af Amer 24 (L) >60 mL/min/1.73m2           Lab Results   Component Value Date    HGBA1C 10.3 (H) 06/17/2020     Vitamin D, Total (25-Hydroxy)   Date Value Ref Range Status   04/28/2016 29.8 (L) 30.0 - 80.0 ng/mL Final     Lab Results   Component Value Date    [id number] 285 01/07/2011       ASSESSMENT/PLAN:  1. Below-knee amputation (H)    2. Wound infection    3. Pernicious anemia    4. CKD (chronic kidney disease) stage 4, GFR 15-29 ml/min (H)      Right BKA: Hx of phantom limb pain. Improved. Unable to assess stump, in  and brace.   -Continues on Dilaudid.  -Continue PT and OT as directed by them.    Unintended weight loss: Patient has  had greater than 20 pound weight loss since July.  She says the food is just bad here and it is always cold.  Monitoring.  We will put her on a protein supplement for wound healing.   Weight today 226. Stable now x 3 weeks. Reports decent po intake.     Acute on chronic blood loss anemia-likely from anemia chronic disease, iron deficiency, chronic kidney disease, surgery. On oral iron .    History of B12 deficiency: Had inquired about having her B12 resumed, we obtained a level which was 1400 so no need for injections at this time.  She is okay with this.  -discussed this again at her request and reminded her of her recent level of 1400.      Left abdominal wall cellulitis and right side: Viewed today with nursing. IMPROVED. Reduction in size and depth. Edges are healing with good granulation tissue at the base. Scant drainage. No s/sx of secondary infection. Great progress.      Insulin-dependent diabetes: A1c 10.3.  Now on glargine 15 units nightly and sliding scale NovoLog 3 times daily with meals.  Blood sugars average 130-200. A few outliers to 280.      Essential hypertension: Satisfactory in TCU thus far, amlodipine d.cd.   -metoprolol    CKD 4: Followed by Dr. Iqbal whom she saw yesterday.  He ordered repeat BMP in a month.  Is not concerned about calcium that has stayed stable at 11.3.  Of note she did have elevated uric acid level at 11. Will not treat unless symptomatic.   -Sodium bicarb.     Coronary artery disease:  -also needs outpt CRISTINA eval  -Continue metoprolol and aspirin.     Urinary retention: recent UTI, treated.      A. Fib on Coumadin: INR therapeutic.     TCU/PT report: Using the easy stand for transfer at this time due to left leg weakness.    Communicated concerns with patient, nursing.    Electronically signed by:  Zoey Leung, CNP  This progress note was completed using Dragon software and there may be grammatical errors.

## 2021-06-23 NOTE — TELEPHONE ENCOUNTER
Currently using: Novolog       Medication Problem: Medication was increased to 15 units at the last visit and the pharmacy never received a new rx with changes.    Please send new RX to: [facility] on file     - Who called- Patient   - Call back number @ [phone]  - Okay to leave a message- Yes

## 2021-06-25 NOTE — PROGRESS NOTES
"  [facility]      NAME:  Jazmin Bauman             :  1955    MRN: [id number]    CODE STATUS:  FULL CODE    FACILITY: [facility] [[id number]]         CHIEF COMPLAIN/REASON FOR VISIT:  Chief Complaint   Patient presents with     Review Of Multiple Medical Conditions     Regulatory        HISTORY OF PRESENT ILLNESS: Jazmin Bauman is a 65 y.o. female. Pt was at [facility] from  to  and underwent a RBKA r/t ongoing DM ulcer with osteomyelitis. Per her EMR dc summary \" with HTN,morbid obesity, DM, A fib, CKD 4 presented for evaluation of R foot swelling, difficulty ambulation, pain found to have osteomyelitis now s/p amputation. She has now been to the OR twice this stay, last was on 20.   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   Patient had a chronic diabetic foot ulcer on her R heel, presented for increased swelling, difficulty ambulating, and pain. MRI showed extensive soft tissue necrosis and some osteomyelitis along with cellulitis  Met sepsis criteria on admission with leukocytosis and elevated CRP  Podiatry saw and the foot was not felt to be salvageable and BKA was recommended\"     August 15-: Hospitalized for sepsis with MRSA bacteremia, she had possible endocarditis and TTE revealing vegetation of the aortic valve. She had a CT of the abdomen and pelvis that showed left lower abdominal pannus ulceration but without abscess.  Her right BKA stump ulceration was negative for osteomyelitis or cellulitis.  She was treated with IV Vanco for 14 days which will be completed on .  Her left lower abdominal ulceration has never been biopsied.  It was not biopsied in the hospital and there was no surgical intervention.  Her right BKA stump was negative for osteomyelitis.    CKD stage IV with baseline creatinine 1.9-2.0 now.   Her insulin was adjusted and she is now on sliding scale insulin with 17 " "units of Lantus at bedtime.     For her hypertension, Norvasc has been discontinued.  Blood pressures remain in the 130s over 70s on average.  She was incidentally found to have a lung nodule on CT scan; follow up CT was on December 1 and revealed resolution of the nodule.    Today: Seen today for regulatory visit for [employer].  Multiple C. difficile infections with extended vancomycin used to eradicate.  Finally reporting some improvement in loose stools.  Continues on Florastor.  Reviewed vital signs, blood sugars, weights.  Her weight has stabilized for the last 6 months at 240 pounds.  Blood sugars which were previously well controlled, have increased over the last 2 months, now averaging 200-300 daily.  Fasting blood sugars over 200.  Will increase Lantus, increase Humalog and also add Tradjenta.  Blood pressures also with mild elevation recently with multiple blood pressures greater than 140/90.  Given patient's age, concurrent diabetes, history of PAD with BKA, will need good control pressures.  Pain well controlled on lower Dilaudid dose.  Mood agitated, patient would like to return home or discharge to [facility].  No current plans for this.    Allergies   Allergen Reactions     Hydralazine      Paralysis of legs     Latex Itching     Skin Burns     Naprosyn [Naproxen] Swelling     throat     Nitrofurantoin Hives     Sulfa (Sulfonamide Antibiotics) Rash     Vicks Vaporub [Camphor-Eucalyptus Oil-Menthol] Rash   :     Current Outpatient Medications   Medication Sig     linaGLIPtin 5 mg Tab Take 5 mg by mouth daily.     acetaminophen (TYLENOL) 500 MG tablet Take 2 tablets (1,000 mg total) by mouth 3 (three) times a day.     apixaban ANTICOAGULANT (ELIQUIS) 5 mg Tab tablet Take 5 mg by mouth 2 (two) times a day.     aspirin 81 MG EC tablet Take 81 mg by mouth daily.     BD ULTRA-FINE MICRO PEN NEEDLE 32 gauge x 1/4\" Ndle USE AS DIRECTED 4 TIMES DAILY.     ferrous sulfate 325 (65 FE) MG tablet Take 1 tablet by " mouth daily with breakfast.      gabapentin (NEURONTIN) 100 MG capsule Take by mouth 2 (two) times a day. Takes 300 mg in am and 200 mg in the pm     hydrocortisone 1 % cream Apply topically 2 (two) times a day as needed.     HYDROmorphone (DILAUDID) 2 MG tablet (Take 2mg daily and 2mg two times a day prn     insulin lispro (HUMALOG) 100 unit/mL injection Inject 10 Units under the skin daily with breakfast.     lancets (ACCU-CHEK MULTICLIX LANCET) Misc TEST 6 TIMES A DAY     LANTUS SOLOSTAR U-100 INSULIN 100 unit/mL (3 mL) pen Inject 17 Units under the skin at bedtime. 11.65 Type 2 with hyperglycemia  Contact provider if insulin prescribed is not the preferred insulin per insurance.     lidocaine (LIDODERM) 5 % Place 1 patch on the skin daily. Remove & Discard patch within 12 hours or as directed by MD     LIDOCAINE HCL IM Inject into the shoulder, thigh, or buttocks. Inject 1 dose intramuscularly as needed for med 5ml bottle     metoprolol tartrate (LOPRESSOR) 25 MG tablet Take 1 tablet (25 mg total) by mouth 2 (two) times a day.     miconazole, bulk, Powd Use As Directed 2 (two) times a day.     omeprazole (PRILOSEC) 20 MG capsule Take 20 mg by mouth daily before breakfast.      Saccharomyces boulardii (FLORASTOR) 250 mg capsule Take 250 mg by mouth daily.     sodium bicarbonate 650 MG tablet Take 1 tablet (650 mg total) by mouth 2 (two) times a day. OTC product     triamcinolone acetonide 40 mg/mL Kit Inject as directed. Inject 1 dose intramuscularly as needed for med x 2 bottles         REVIEW OF SYSTEMS:    Currently, no fever, chills, or rigors. Does not have any visual or hearing problems. Denies any chest pain, headaches, palpitations, lightheadedness, dizziness, shortness of breath, or cough. Appetite is good. Denies any GERD symptoms. Denies any difficulty with swallowing, nausea, or vomiting.  Denies any abdominal pain, diarrhea or constipation. Denies any urinary symptoms, yeager in place. No insomnia. No  "active bleeding. No rash.       PHYSICAL EXAMINATION:  Vitals:    05/20/21 1146   BP: 142/76   Pulse: 64   Resp: 18   Temp: 97.3  F (36.3  C)   SpO2: 96%   Weight: (!) 244 lb 9.6 oz (110.9 kg)   Height: 5' 1\" (1.549 m)         GENERAL: Awake, Alert, oriented x3, not in any form of acute distress, answers questions appropriately, follows simple commands, conversant with flat affect.  Patient did have PFT  HEENT: Head is normocephalic with normal hair distribution. No evidence of trauma. Ears: No acute purulent discharge. Eyes: Sclera anicteric.  LUNG: Clear to auscultation with good chest expansion.   BACK: ROM normal, no CVA tenderness.  CVS: There is good S1  S2,  rhythm is regular.  ABDOMEN: Globular and ROTUND.  Nontender with palpation. chronic abdominal wall wounds bilaterally that are healing well.  EXTREMITIES: UE Full ROM. Right BKA; now with prosthesis on.  SKIN: Warm and dry, no erythema noted, open area on abdomen.    NEUROLOGICAL: The patient is oriented to person, place and time. Strength and sensation are grossly intact. Face is symmetric.    LABS:    Lab Results   Component Value Date    WBC 7.9 08/21/2020    HGB 9.2 (L) 08/21/2020    HCT 28.6 (L) 08/21/2020    MCV 89 08/21/2020     08/21/2020       Results for orders placed or performed during the hospital encounter of 08/15/20   Basic Metabolic Panel   Result Value Ref Range    Sodium 137 136 - 145 mmol/L    Potassium 4.1 3.5 - 5.0 mmol/L    Chloride 103 98 - 107 mmol/L    CO2 23 22 - 31 mmol/L    Anion Gap, Calculation 11 5 - 18 mmol/L    Glucose 178 (H) 70 - 125 mg/dL    Calcium 11.0 (H) 8.5 - 10.5 mg/dL    BUN 37 (H) 8 - 22 mg/dL    Creatinine 2.05 (H) 0.60 - 1.10 mg/dL    GFR MDRD Af Amer 29 (L) >60 mL/min/1.73m2    GFR MDRD Non Af Amer 24 (L) >60 mL/min/1.73m2           Lab Results   Component Value Date    HGBA1C 10.3 (H) 06/17/2020     Vitamin D, Total (25-Hydroxy)   Date Value Ref Range Status   04/28/2016 29.8 (L) 30.0 - 80.0 ng/mL " Final     Lab Results   Component Value Date    GJYOIKMG13 285 01/07/2011       ASSESSMENT/PLAN:  1. Primary osteoarthritis of both knees    2. Clostridium difficile infection    3. Morbid obesity (H)    4. CKD (chronic kidney disease) stage 4, GFR 15-29 ml/min (H)    5. Hx of right BKA (H)    6. Type 2 diabetes mellitus with other specified complication, with long-term current use of insulin (H)    7. HTN (hypertension)         Loose stool  C. difficile positive, recurrent: Unstable.  -Continue Florastor.  -Continues on vancomycin.  Reporting formed soft stools.    Chronic conditions:     Left abdominal wall open area: Followed by the wound team.    -Continue orders per wound care team and change dressing as needed for increased drainage.    Osteoarthritis: Knee injections completed on 1/21.  She has had mild improvement in pain.   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 Improved. Unable to assess stump, in  and brace.   -Continues on Dilaudid which I have weaned drastically; using infrequently.  He does twice daily scheduled doses.    Anticoagulated:  -Omeprazole 20 mg daily.   -March 29 was 11.7.    Insulin-dependent diabetes: Unstable with recent elevations averaging 200-300 over the last 2 months.  Previously was getting better controlled A1c 10.3--> 8.0.  -Increase glargine to 22 units nightly.  -Increase NovoLog to 10 units 3 times daily with meals.  -Start Tradjenta 5 mg p.o. daily.     Essential hypertension: Unstable, occasional outliers greater than 140/90.  We will need to monitor closely and resume amlodipine if continued elevations.  -metoprolol    Acute on chronic blood loss anemia- likely from anemia chronic disease, iron deficiency, chronic kidney disease, surgery. On oral iron-most recent iron studies within normal limits.  Sees nephrologist this month.    CKD 4: Followed by Dr. Iqbal.  Calcium improved at 9.3.   -Sodium bicarb.     Coronary artery disease:  -also needs outpt CRISTINA eval    -Continue metoprolol and aspirin.       A. Fib: We discussed risks and benefits of anticoagulation secondary to her A. fib, CAD, PAD, and diabetes.  We will continue monitoring for further bleeding.  -On Eliquis 5 mg twice daily .      Unintended weight loss: Resolved.    Communicated concerns with patient, nursing.  Case Management:  I have reviewed the facility/SNF care plan/MDS which was done Quarterly, including the falls risk, nutrition and pain screening. I also reviewed the current immunizations, and preventive care.. Future cancer screening indicated is Mammogram, colonoscopy and provider and pt will formulate a POC.   Patient's desire to return to the community is present, but is not able due to care needs .       Electronically signed by:  Zoey Leung CNP  This progress note was completed using Dragon software and there may be grammatical errors.

## 2021-06-25 NOTE — TELEPHONE ENCOUNTER
[facility] Nurse Triage Telephone Note      Provider: BARRY Smith  Facility: [facility]    Facility Type: [facility]    Caller: Yoanna  Call Back Number:  [phone]    Allergies: Hydralazine, Latex, Naprosyn [naproxen], Nitrofurantoin, Sulfa (sulfonamide antibiotics), and Vicks vaporub [camphor-eucalyptus oil-menthol]    Reason for call: Nurse calling to clarify recent order change for insulin. NP wrote order for Novolog 10u three times a day with meals. Pt is not on Novolog, pt receives Humalog 7u in AM, 10u at lunch and dinner. Clarifying if this was meant to be Humalog 10u three times a day with meals. NP notes indicate pt has been receiving Novolog same dose.    Verbal Order/Direction given by Provider: discontinue Humalog. Novolog inject 10u subcutaneous three times a day with meals.    Provider giving order: VIJAY Youssef    Verbal order given to: Unable to reach nurse.      Gely Shaw RN

## 2021-06-28 NOTE — PROGRESS NOTES
Progress Notes by Carly Arora NP at 9/18/2019  8:40 AM     Author: Carly Arora NP Service: -- Author Type: Nurse Practitioner    Filed: 9/18/2019 10:02 AM Encounter Date: 9/18/2019 Status: Signed    : Carly Arora NP (Nurse Practitioner)       [facility] Consult Note    Date of Service:  9/18/2019    Requesting Provider: Dr. Lester Flores    Chief Complaint: abdominal wound    History of Present Illness: Jazmin Bauman is being seen at the [facility] today regarding abdominal wound. They arrive to the clinic today alone. The patient reports that she noted the wound about 3 months ago; this area was painful; she is unsure what caused this. Sought medical treatment; she was prescribed topical cream this has not helped; was also treated with oral antibiotic; she did not take the full course. She has diabetes; checks sugars daily; yesterday was 213; she is vague about her numbers; states these are variable; last A1c was 11.1!!!. Reports pain of 2/10 rare; currently using nothing for pain.  Denies any fevers, chills, or generalized ill feeling. Denies history of cancer however has endometrial hyperplasia; has have x3 D&C; scheduled to have another one in December; diagnosed in 2015. Sleeps in a bed/recliner with legs elevated. Pt still has their uterus and ovaries, they deny any abnormal vaginal bleeding. Denies history of DVT, joint replacement, cellulitis and vein procedures.       Review of Systems:   Constitutional:  negative  for fever, chills or night sweats  EENTM: negative for glasses;  negative [place]  GI:  negative for nausea/vomiting;  negative for constipation  negative diarrhea;  negative for fecal incontinence negative weight loss  :  negative dysuria, negative incontinence  MS: patient is ambulatory;  does not use assistive devices  Cardiac:  negative   Respiratory:  negative SOB  Endocrine:  positive  "diabetes  Psych:  negative depression/anxiety    Past Medical History:    Past Medical History:   Diagnosis Date   ? Anemia     pernicious   ? B12 deficiency    ? Carpal tunnel syndrome     had surgery   ? Diabetes mellitus (H)     borderline   ? Endometrial hyperplasia    ? Fibromyalgia    ? Heart murmur     pt states her mitral valve leaks   ? History of recurrent UTI (urinary tract infection)    ? Hypertension    ? Obesity    ? Thrombocytopenia (H)    ? Vaginal bleeding 1/2015        Surgical History:   Past Surgical History:   Procedure Laterality Date   ? bilateral carpal tunnel release  2009   ? CHOLECYSTECTOMY     ? COLONOSCOPY N/A 9/11/2017    Procedure: COLONOSCOPY;  Surgeon: Tyler Bhakta MD;  Location: [facility];  Service:    ? COMBINED HYSTEROSCOPY DIAGNOSTIC / D&C N/A 1/28/2015    Procedure: DILATION AND CURETTAGE WITH HYSTEROSCOPY;  Surgeon: Grant Beal MD;  Location: [facility];  Service:    ? DILATION AND CURETTAGE OF UTERUS     ? OR HYSTEROSCOPY,W/ENDO BX N/A 9/5/2019    Procedure: HYSTEROSCOPY, DILATION AND CURETTAGE;  Surgeon: Grant Beal MD;  [place] [facility];  Service: Gynecology        Medications:    Current Outpatient Medications:   ?  ACCU-CHEK SAM PLUS TEST STRP strips, TEST 6 TIMES A DAY (Patient taking differently: TEST 1 TIME A DAY), Disp: 100 strip, Rfl: 10  ?  acetaminophen (TYLENOL) 325 MG tablet, Take 325-650 mg by mouth every 8 (eight) hours as needed. , Disp: , Rfl:   ?  allopurinol (ZYLOPRIM) 100 MG tablet, Take 1 tablet (100 mg total) by mouth daily. (Patient taking differently: Take 100 mg by mouth daily as needed.    ), Disp: 90 tablet, Rfl: 5  ?  aspirin 81 MG EC tablet, Take 81 mg by mouth daily., Disp: , Rfl:   ?  BD ULTRA-FINE MICRO PEN NEEDLE 32 gauge x 1/4\" Ndle, USE AS DIRECTED 4 TIMES DAILY., Disp: 100 each, Rfl: 0  ?  blood glucose test strips, Use 1 each As Directed 6 (six) times a day. Dispense brand per patient's insurance " at pharmacy discretion., Disp: 200 strip, Rfl: 5  ?  cholecalciferol, vitamin D3, (VITAMIN D3) 1,000 unit capsule, Take 1,000 Units by mouth daily., Disp: , Rfl:   ?  colchicine (MITIGARE) 0.6 mg cap, Take 0.6 mg by mouth 2 (two) times a day. (Patient taking differently: Take 0.6 mg by mouth 2 (two) times a day as needed.    ), Disp: 60 capsule, Rfl: 1  ?  docusate sodium (COLACE) 50 MG capsule, Take by mouth once daily., Disp: , Rfl:   ?  ferrous sulfate 65 mg elemental iron (IRON), Take 1 tablet by mouth 2 (two) times a day., Disp: , Rfl:   ?  glipiZIDE (GLUCOTROL) 10 MG tablet, Take 10 mg by mouth 2 (two) times a day as needed., Disp: , Rfl:   ?  insulin aspart U-100 (NOVOLOG FLEXPEN U-100 INSULIN) 100 unit/mL (3 mL) injection pen, Inject 15 Units under the skin 3 (three) times a day before meals. contact dr for further refills., Disp: 15 mL, Rfl: 0  ?  insulin glargine (BASAGLAR KWIKPEN) 100 unit/mL (3 mL) pen, inject 57 units under the skin every morning. do not mix with any other insulin., Disp: 6 mL, Rfl: 2  ?  lancets (ACCU-CHEK MULTICLIX LANCET) Misc, TEST 6 TIMES A DAY (Patient taking differently: TEST 1 TIME A DAY), Disp: 200 each, Rfl: 11  ?  magnesium 250 mg Tab, Take 500 mg by mouth 2 (two) times a day. , Disp: , Rfl:     Current Facility-Administered Medications:   ?  cyanocobalamin injection 1,000 mcg, 1,000 mcg, Intramuscular, Q30 Days, Lester Flores MD, 1,000 mcg at 08/30/19 1233  ?  lidocaine 2 % jelly (XYLOCAINE), , Topical, PRN, Carly Arora NP, 1 application at 09/18/19 0848    Allergies:   Allergies   Allergen Reactions   ? Hydralazine      Paralysis of legs   ? Latex Itching     Skin Burns   ? Naprosyn [Naproxen] Swelling     throat   ? Nitrofurantoin Hives   ? Sulfa (Sulfonamide Antibiotics) Rash   ? Vicks Vaporub [Camphor-Eucalyptus Oil-Menthol] Rash       Family history:   Family History   Problem Relation Age of Onset   ? Colon cancer Father         Social History:   Social  "History     Socioeconomic History   ? Marital status: Single     Spouse name: Not on file   ? Number of children: Not on file   ? Years of education: Not on file   ? Highest education level: Not on file   Occupational History   ? Not on file   Social Needs   ? Financial resource strain: Not on file   ? Food insecurity:     Worry: Not on file     Inability: Not on file   ? Transportation needs:     Medical: Not on file     Non-medical: Not on file   Tobacco Use   ? Smoking status: Never Smoker   ? Smokeless tobacco: Never Used   Substance and Sexual Activity   ? Alcohol use: Yes     Comment: rare   ? Drug use: No   ? Sexual activity: Not on file   Lifestyle   ? Physical activity:     Days per week: Not on file     Minutes per session: Not on file   ? Stress: Not on file   Relationships   ? Social connections:     Talks on phone: Not on file     Gets together: Not on file     Attends Yazidi service: Not on file     Active member of club or organization: Not on file     Attends meetings of clubs or organizations: Not on file     Relationship status: Not on file   ? Intimate partner violence:     Fear of current or ex partner: Not on file     Emotionally abused: Not on file     Physically abused: Not on file     Forced sexual activity: Not on file   Other Topics Concern   ? Not on file   Social History Narrative   ? Not on file        Physical Exam  Vitals: Blood pressure 132/70, pulse 76, temperature 98.3  F (36.8  C), temperature source Oral, resp. rate 20, height 5' 1\" (1.549 m), weight (!) 301 lb (136.5 kg).  General: This is a 64 y.o. female who appears their reported age, not in acute distress  Head: normocephalic, Atraumatic; wearing glasses; non-icteric sclera; no exudate; mild hearing loss   Respiratory: Clear throughout all lung fields; unlabored breathing; no cough   Cardiac: Regular, Rate and Rhythm, no murmurs appreciated   Skin: Uniformly warm and dry  Psych: Alert and oriented x4; calm and cooperative " throughout exam  Abdomen: Normal bowel sounds. Soft, symmetric, no guarding or rigidity, nontender with palpation.  No organomegaly or masses palpated.  Large pannus; the right pannus there is a full thickness irregular shaped ulcer; with 100% slough; surrounding scar tissue; minimally tender; no erythema    Circumferential volume measures:    No flowsheet data found.    Ulceration(s)/Wound(s):     VASC Wound 09/18/19 right pannus (Active)   Pre Size Length 5.3 9/18/2019  8:00 AM   Pre Size Width 4 9/18/2019  8:00 AM   Pre Size Depth 0.1 9/18/2019  8:00 AM   Pre Total Sq cm 21.2 9/18/2019  8:00 AM       Wound 09/05/19 Intertrigo (open area in skin fold) Abdomen Right (Active)       Incision 09/05/19 Surgical Perineum (Active)       Laboratory studies:   No results found for: SEDRATE  Lab Results   Component Value Date    CREATININE 1.30 (H) 08/30/2019     Lab Results   Component Value Date    HGBA1C 11.1 (H) 08/30/2019     Lab Results   Component Value Date    BUN 20 08/30/2019     Lab Results   Component Value Date    ALBUMIN 3.2 (L) 08/30/2019     Vitamin D, Total (25-Hydroxy)   Date Value Ref Range Status   04/28/2016 29.8 (L) 30.0 - 80.0 ng/mL Final             Impression:   1. Open wound of abdominal wall, initial encounter     2. Type 2 diabetes mellitus with other skin ulcer, with long-term current use of insulin (H)     3. Morbid obesity with BMI of 50.0-59.9, adult (H)         9/18/19 right abdomen          Assessment/Plan:  1. Debridement: After discussion of risk factors and verbal consent was obtained 2% Lidocaine HCL jelly was applied, under clean conditions, the right abdominal ulceration(s) were debrided using currette. Devitalized and nonviable tissue, along with any fibrin and slough, was removed to improve granulation tissue formation, stimulate wound healing, decrease overall bacteria load, disrupt biofilm formation and decrease edge senescence.  Total excisional debridement was 21.2 sq cm from  the epidermis/dermis area and into the subcutaneous tissue with a depth of 0.1 cm.   Ulcers were improved afterwards and .  Measures were unchanged after debridement.    2. Treatment: wound treatment will include irrigation and dressings to promote autolytic debridement which will include: medihoney, cover with large bordered foam; ok to shower; if wound does not resolve may need to consider surgical excision of the pannus.    3. Edema. na    4. Offloading: possibility that there is a pressure component; will continue to monitor    5. Nutrition: we spoke about her diabetes and how this impacts wound healing; her diabetes is not controlled; last A1C 11.1%; needs to get better control of this; not interested in bariatrcs    Patient to return to clinic in 3 week(s) for re-evaluation. They were instructed to call the clinic sooner with any further questions or concerns. Answered all questions.    Carly Arora DNP, RN, CNP, [facility]  [phone]      This note was electronically signed by Carly Arora

## 2021-06-28 NOTE — PROGRESS NOTES
Progress Notes by Alejandrina Patel MD at 2/25/2020 10:30 AM     Author: Alejandrina Patel MD Service: -- Author Type: Physician    Filed: 2/25/2020 11:02 AM Encounter Date: 2/25/2020 Status: Signed    : Alejandrina Patel MD (Physician)           Click to link to [facility] NOTE       Assessment/Plan:   1.  Paroxysmal atrial fibrillation: The patient is in normal sinus rhythm at this time.  Continue metoprolol 25 mg twice a day.  Her [id number] score is 3.  Currently she is on warfarin for chronic anticoagulation to prevent stroke.  We discussed the watchman implantation.  The patient is interested.  She said she will read more information and call me back.    2.  Essential hypertension: Her blood pressure is controlled with metoprolol.    3.  Type 2 diabetes, UTI, morbid obesity: Stable, no change in treatment.    Thank you for the opportunity to be involved in the care of Jazmin Bauman. If you have any questions, please feel free to contact me.  I will see the patient again in 6 months and as needed.    Much or all of the text in this note was generated through the use of Dragon Dictate voice-to-text software. Errors in spelling or words which seem out of context are unintentional.   Sound alike errors, in particular, may have escaped editing.       History of Present Illness:   It is my pleasure to see Jazmin Bauman at the [facility] clinic for evaluation of Consult.  Jazmin Bauman is a 64 y.o. female with a medical history of paroxysmal atrial fibrillation, morbid obesity, essential hypertension, type 2 diabetes, chronic kidney disease stage III and a history of for urinary tract infection.    The patient was admitted to hospital in January with a prolonged hospitalization.  She was diagnosed with a UTI and sepsis, acute on chronic renal injury, atrial fibrillation with RVR, acute respiratory failure and metabolic encephalopathy.  The patient was discharged to  TCU for continuing rehab.  She states that she has been doing much better now.  She had no chest pain, shortness of breath, palpitations, dizziness.  Her leg edema was much improved.  Currently she is not on diuretics.  Her blood pressure and heart rate are controlled.  She had no side effects of from her current medications.    Past Medical History:     Patient Active Problem List   Diagnosis   ? Carpal Tunnel Syndrome   ? Urinary Tract Infection   ? Endometrial Hyperplasia   ? Cholelithiasis   ? Leukocytosis   ? Urine Tests Nonspecific Abnormal Findings   ? Obesity   ? Essential hypertension   ? Pernicious Anemia   ? Immunology Studies Raised Immunoglobulin Level   ? Vaginal bleeding   ? Type 2 diabetes mellitus (H)   ? Atrial fibrillation with RVR (H)   ? Acute kidney injury superimposed on CKD (H)   ? Non-traumatic rhabdomyolysis   ? Metabolic acidosis   ? Troponin level elevated   ? Bacteremia due to Gram-negative bacteria   ? Acute respiratory failure with hypoxia (H)   ? Acute encephalopathy   ? Acute pulmonary edema (H)   ? Wheezing   ? Moderate protein malnutrition (H)       Past Surgical History:     Past Surgical History:   Procedure Laterality Date   ? bilateral carpal tunnel release  2009   ? CHOLECYSTECTOMY     ? COLONOSCOPY N/A 9/11/2017    Procedure: COLONOSCOPY;  Surgeon: Tyler Bhakta MD;  Location: [facility];  Service:    ? COMBINED HYSTEROSCOPY DIAGNOSTIC / D&C N/A 1/28/2015    Procedure: DILATION AND CURETTAGE WITH HYSTEROSCOPY;  Surgeon: Grant Beal MD;  Location: [facility];  Service:    ? DILATION AND CURETTAGE OF UTERUS     ? IR NON TUNNELED CATHETER >5 YEARS  1/21/2020   ? PICC  1/20/2020        ? ID HYSTEROSCOPY,W/ENDO BX N/A 9/5/2019    Procedure: HYSTEROSCOPY, DILATION AND CURETTAGE;  Surgeon: Grant Beal MD;  [facility];  Service: Gynecology   ? ID HYSTEROSCOPY,W/ENDO BX N/A 12/9/2019    Procedure: HYSTEROSCOPY, DILATION AND CURETTAGE;   "Surgeon: Grant Beal MD;  [place] [facility]  Service: [place]ne[place]       Family History:     Family History   Problem Relation Age of Onset   ? Colon cancer Father         Social History:    reports that she has never smoked. She has never used smokeless tobacco. She reports previous alcohol use. She reports that she does not use drugs.    Review of Systems:   General: WNL  Eyes: WNL  Ears/Nose/Throat: Nosebleeds  Lungs: WNL  Heart: Leg Swelling  Stomach: Constipation  Bladder: Frequent Urination at Night  Muscle/Joints: Joint Pain, Muscle Weakness, Muscle Pain  Skin: Poor Wound Healing  Nervous System: Daytime Sleepiness, Falls  Mental Health: WNL     Blood: WNL    Meds:     Current Outpatient Medications:   ?  ACCU-CHEK SAM PLUS TEST STRP strips, TEST 6 TIMES A DAY (Patient taking differently: TEST 1 TIME A DAY), Disp: 100 strip, Rfl: 10  ?  ACCU-CHEK SAM PLUS TEST STRP strips, test blood sugar level 6 times daily, Disp: 600 strip, Rfl: 4  ?  acetaminophen (TYLENOL) 325 MG tablet, Take 2 tablets (650 mg total) by mouth 3 (three) times a day., Disp: , Rfl: 0  ?  aspirin 81 MG EC tablet, Take 81 mg by mouth daily., Disp: , Rfl:   ?  BD ULTRA-FINE MICRO PEN NEEDLE 32 gauge x 1/4\" Ndle, USE AS DIRECTED 4 TIMES DAILY., Disp: 360 each, Rfl: 3  ?  calcium acetate (PHOSLO) 667 mg capsule, Take 1 capsule (667 mg total) by mouth 3 (three) times a day with meals., Disp: , Rfl: 0  ?  cholecalciferol, vitamin D3, (VITAMIN D3) 1,000 unit capsule, Take 1,000 Units by mouth daily., Disp: , Rfl:   ?  docusate sodium (COLACE) 50 MG capsule, Take by mouth daily. , Disp: , Rfl:   ?  ferrous sulfate 325 (65 FE) MG tablet, Take 1 tablet by mouth 2 (two) times a day with meals., Disp: , Rfl:   ?  gabapentin (NEURONTIN) 100 MG capsule, Take 100 mg by mouth 2 (two) times a day as needed (pain). (Patient taking differently: Take 300 mg by mouth 2 (two) times a day as needed (pain). ), Disp: , Rfl: 0  ?  " HYDROcodone-acetaminophen 5-325 mg per tablet, Take 1 tablet by mouth every 4 (four) hours as needed for pain., Disp: , Rfl:   ?  insulin glargine (BASAGLAR KWIKPEN) 100 unit/mL (3 mL) pen, Inject 25 Units under the skin 2 (two) times a day., Disp: , Rfl: 0  ?  lancets (ACCU-CHEK MULTICLIX LANCET) Misc, TEST 6 TIMES A DAY (Patient taking differently: TEST 1 TIME A DAY), Disp: 200 each, Rfl: 11  ?  magnesium 250 mg Tab, Take 500 mg by mouth 2 (two) times a day. , Disp: , Rfl:   ?  metoprolol tartrate (LOPRESSOR) 25 MG tablet, Take 1 tablet (25 mg total) by mouth 2 (two) times a day., Disp: , Rfl: 0  ?  NOVOLOG FLEXPEN U-100 INSULIN 100 unit/mL (3 mL) injection pen, As directed, Disp: 5 Pre-filled Pen Syringe, Rfl: PRN  ?  omeprazole (PRILOSEC) 20 MG capsule, Take 1 capsule (20 mg total) by mouth at bedtime., Disp: , Rfl: 0  ?  oseltamivir (TAMIFLU) 30 MG capsule, Take 30 mg by mouth., Disp: , Rfl:   ?  warfarin ANTICOAGULANT (COUMADIN/JANTOVEN) 1 MG tablet, Take 1.5 tablet daily, repeat INR in 3 days. Adjust dose based on INR results as directed., Disp: , Rfl: 0    Current Facility-Administered Medications:   ?  cyanocobalamin injection 1,000 mcg, 1,000 mcg, Intramuscular, Q30 Days, Lester Flores MD, 1,000 mcg at 11/22/19 1422  ?  lidocaine 2 % jelly (XYLOCAINE), , Topical, PRN, Carly Arora NP, 1 application at 09/18/19 0848    Allergies:   Hydralazine; Latex; Naprosyn [naproxen]; Nitrofurantoin; Sulfa (sulfonamide antibiotics); and Vicks vaporub [camphor-eucalyptus oil-menthol]      Objective:      Physical Exam        There is no height or weight on file to calculate BMI.  /58 (Patient Site: Right Arm, Patient Position: Sitting, Cuff Size: Adult Regular) Comment (Patient Site): lower  Pulse 66   Resp 18   SpO2 96%   Breastfeeding No     General Appearance:   Awake, Alert, No acute distress.   HEENT:  Pupil equal and reactive to light. No scleral icterus; the mucous membranes were moist.    Neck: No cervical bruits. No JVD. No thyromegaly.     Chest: The spine was straight. The chest was symmetric.   Lungs:   Respirations unlabored; Lungs are clear to auscultation. No crackles. No wheezing.   Cardiovascular:   Regular rhythm and rate, normal first and second heart sounds with I/VI systolic murmurs. No rubs or gallops.    Abdomen:  Obese. Soft. No tenderness. Non-distended. Bowels sounds are present   Extremities: Equal tibial pulses. Trace leg edema.   Skin: No rashes or ulcers. Warm, Dry.   Musculoskeletal: No tenderness. No deformity.   Neurologic: Mood and affect are appropriate. No focal deficits.         EKG: Personally reviewed  Normal sinus rhythm   Left axis deviation   Abnormal ECG   When compared with ECG of 20-JAN-2020 19:34,   Sinus rhythm has replaced Atrial fibrillation    Cardiac Imaging Studies  ECHO on 1-:    Technically difficult study. When compared to the previous study dated 7/19/2017, no significant change.    Left ventricle ejection fraction is normal. The calculated left ventricular ejection fraction is 63%.    Normal left ventricular size and systolic function.    TAPSE is abnormal, which is consistent with abnormal right ventricular systolic function.    Mild aortic regurgitation.    The ascending aorta is mildly dilated.    Lab Review   Lab Results   Component Value Date     02/13/2020     02/13/2020    K 4.5 02/17/2020     02/13/2020     02/13/2020    CO2 27 02/13/2020    CO2 27 02/13/2020    BUN 47 (H) 02/13/2020    BUN 47 (H) 02/13/2020    CREATININE 2.80 (H) 02/13/2020    CREATININE 2.80 (H) 02/13/2020    CALCIUM 8.4 (L) 02/13/2020    CALCIUM 8.4 (L) 02/13/2020     Lab Results   Component Value Date    WBC 6.2 02/13/2020    WBC 6.3 02/13/2020    WBC 8.5 06/26/2015    HGB 9.2 (L) 02/13/2020    HGB 9.2 (L) 02/13/2020    HCT 30.6 (L) 02/13/2020    HCT 30.6 (L) 02/13/2020    MCV 95 02/13/2020    MCV 96 02/13/2020     02/13/2020    PLT  249 02/13/2020     Lab Results   Component Value Date    CHOL 142 10/25/2019    CHOL 129 08/30/2019    CHOL 140 07/25/2019     Lab Results   Component Value Date    HDL 41 (L) 10/25/2019    HDL 35 (L) 08/30/2019    HDL 33 (L) 07/25/2019     Lab Results   Component Value Date    LDLCALC 74 10/25/2019    LDLCALC 68 08/30/2019    LDLCALC 81 07/25/2019     Lab Results   Component Value Date    TRIG 136 10/25/2019    TRIG 128 08/30/2019    TRIG 128 07/25/2019     No components found for: CHOLHDL  Lab Results   Component Value Date    TROPONINI 0.22 01/21/2020     Lab Results   Component Value Date     (H) 01/20/2020     Lab Results   Component Value Date    TSH 2.20 01/20/2020

## 2021-06-28 NOTE — PROGRESS NOTES
"Progress Notes by Carly Arora NP at 10/9/2019  9:20 AM     Author: Carly Arora NP Service: -- Author Type: Nurse Practitioner    Filed: 10/9/2019 10:33 AM Encounter Date: 10/9/2019 Status: Signed    : Carly Arora NP (Nurse Practitioner)       Follow up Vascular Visit       Date of Service:10/9/2019    Date Last Seen: 9/18/2019; 9/20/2019    Chief Complaint: abdominal wound        Pt returns to the [facility] with regards to their abdominal wound. They arrive to clinic alone today. She has uncontrolled diabetes. We started her on medihoney and large bordered foam. She has a large pannus which is contributing the wound not healing. She is not interested in bariatrics. She has been checking her fs and these are variable; she was not more specific. She denies fevers, chills. She was last seen one month ago. The etiology of the wound is unclear.       Allergies: Hydralazine; Latex; Naprosyn [naproxen]; Nitrofurantoin; Sulfa (sulfonamide antibiotics); and Vicks vaporub [camphor-eucalyptus oil-menthol]    Medications:   Current Outpatient Medications:   ?  ACCU-CHEK SAM PLUS TEST STRP strips, TEST 6 TIMES A DAY (Patient taking differently: TEST 1 TIME A DAY), Disp: 100 strip, Rfl: 10  ?  acetaminophen (TYLENOL) 325 MG tablet, Take 325-650 mg by mouth every 8 (eight) hours as needed. , Disp: , Rfl:   ?  allopurinol (ZYLOPRIM) 100 MG tablet, Take 1 tablet (100 mg total) by mouth daily. (Patient taking differently: Take 100 mg by mouth daily as needed.    ), Disp: 90 tablet, Rfl: 5  ?  aspirin 81 MG EC tablet, Take 81 mg by mouth daily., Disp: , Rfl:   ?  BD ULTRA-FINE MICRO PEN NEEDLE 32 gauge x 1/4\" Ndle, USE AS DIRECTED 4 TIMES DAILY., Disp: 100 each, Rfl: 0  ?  blood glucose test strips, Use 1 each As Directed 6 (six) times a day. Dispense brand per patient's insurance at pharmacy discretion., Disp: 200 strip, Rfl: 5  ?  cholecalciferol, vitamin D3, (VITAMIN " "D3) 1,000 unit capsule, Take 1,000 Units by mouth daily., Disp: , Rfl:   ?  colchicine (MITIGARE) 0.6 mg cap, Take 0.6 mg by mouth 2 (two) times a day. (Patient taking differently: Take 0.6 mg by mouth 2 (two) times a day as needed.    ), Disp: 60 capsule, Rfl: 1  ?  docusate sodium (COLACE) 50 MG capsule, Take by mouth once daily., Disp: , Rfl:   ?  ferrous sulfate 65 mg elemental iron (IRON), Take 1 tablet by mouth 2 (two) times a day., Disp: , Rfl:   ?  glipiZIDE (GLUCOTROL) 10 MG tablet, Take 10 mg by mouth 2 (two) times a day as needed., Disp: , Rfl:   ?  insulin aspart U-100 (NOVOLOG FLEXPEN U-100 INSULIN) 100 unit/mL (3 mL) injection pen, Inject 15 Units under the skin 3 (three) times a day before meals. contact dr for further refills., Disp: 15 mL, Rfl: 0  ?  insulin glargine (BASAGLAR KWIKPEN) 100 unit/mL (3 mL) pen, inject 57 units under the skin every morning. do not mix with any other insulin., Disp: 6 mL, Rfl: 1  ?  lancets (ACCU-CHEK MULTICLIX LANCET) Misc, TEST 6 TIMES A DAY (Patient taking differently: TEST 1 TIME A DAY), Disp: 200 each, Rfl: 11  ?  magnesium 250 mg Tab, Take 500 mg by mouth 2 (two) times a day. , Disp: , Rfl:     Current Facility-Administered Medications:   ?  cyanocobalamin injection 1,000 mcg, 1,000 mcg, Intramuscular, Q30 Days, Lester Flores MD, 1,000 mcg at 08/30/19 1233  ?  lidocaine 2 % jelly (XYLOCAINE), , Topical, PRN, Carly Arora NP, 1 application at 09/18/19 0848    History:   Past Medical History:   Diagnosis Date   ? Anemia     pernicious   ? B12 deficiency    ? Carpal tunnel syndrome     had surgery   ? Diabetes mellitus (H)     borderline   ? Endometrial hyperplasia    ? Fibromyalgia    ? Heart murmur     pt states her mitral valve leaks   ? History of recurrent UTI (urinary tract infection)    ? Hypertension    ? Obesity    ? Thrombocytopenia (H)    ? Vaginal bleeding 1/2015       Physical Exam:    Resp 22   Ht 5' 1\" (1.549 m)   Wt (!) 308 lb 8 oz " (139.9 kg)   BMI 58.29 kg/m      General:  Patient presents to clinic in no apparent distress.  Head: normocephalic atraumatic  Psychiatric:  Alert and oriented x3.   Respiratory: unlabored breathing; no cough  Integumentary:  Skin is uniformly warm, dry and pink.    Wound #1 Location: abdomen  Size: 2.3L x 3.3W x 0.1depth.  No sinus tract present, Wound base: slough  No undermining present. Wound is full thickness. There is moderate drainage. Periwound: no denudement, erythema, induration, maceration or warmth.      Circumferential volume measures:    No flowsheet data found.    Ulceration(s)/Wound(s):     VASC Wound 09/18/19 right pannus (Active)   Pre Size Length 2.3 10/9/2019  9:00 AM   Pre Size Width 3.3 10/9/2019  9:00 AM   Pre Size Depth 0.1 10/9/2019  9:00 AM   Pre Total Sq cm 7.26 10/9/2019  9:00 AM       Wound 09/05/19 Intertrigo (open area in skin fold) Abdomen Right (Active)       Incision 09/05/19 Surgical Perineum (Active)        Lab Values    No results found for: SEDRATE  Lab Results   Component Value Date    CREATININE 1.30 (H) 08/30/2019     Lab Results   Component Value Date    HGBA1C 11.1 (H) 08/30/2019     Lab Results   Component Value Date    BUN 20 08/30/2019     Lab Results   Component Value Date    ALBUMIN 3.2 (L) 08/30/2019     Vitamin D, Total (25-Hydroxy)   Date Value Ref Range Status   04/28/2016 29.8 (L) 30.0 - 80.0 ng/mL Final             Impression:  1. Open wound of abdominal wall, initial encounter     2. Type 2 diabetes mellitus with other skin ulcer, with long-term current use of insulin (H)     3. Morbid obesity with BMI of 50.0-59.9, adult (H)         9/18/19 abdomen       10/9/2019 abdomen after being treated with silver nitrate          Are any of these wounds new today: No; Location: na    Assessment/Plan:          1. Debridement: After discussion of risk factors and verbal consent was obtained 2% Lidocaine HCL jelly was applied, under clean conditions, the abdominal  ulceration(s) were debrided using currette. Devitalized and nonviable tissue, along with any fibrin and slough, was removed to improve granulation tissue formation, stimulate wound healing, decrease overall bacteria load, disrupt biofilm formation and decrease edge senescence.  Total excisional debridement was 7.26 sq cm from the epidermis/dermis area and into the subcutaneous tissue with a depth of 0.1 cm.   Ulcers were improved afterwards and .  Measures were unchanged after debridement.             2.  Wound treatment: wound treatment will include irrigation and dressings to promote autolytic debridement which will include:continue medihoney and bordered foam; change every 2 days; will consider endoform next visit           3. Edema: na.            4. Nutrition: uncontrolled diabetes; we spoke about her sugars and how this contributes to the wound not healing; focus on protein           5. Offloading: na     Patient will follow up with me in 3 weeks for reevaluation. They were instructed to call the clinic sooner with any signs or symptoms of infection or any further questions/concerns. Answered all questions.    Carly Arora DNP, RN, CNP, [facility]  [phone]        This note was electronically signed by Carly Arora

## 2021-06-28 NOTE — PROGRESS NOTES
"Progress Notes by Carly Arora NP at 10/30/2019  9:20 AM     Author: Carly Arora NP Service: -- Author Type: Nurse Practitioner    Filed: 10/30/2019  9:42 AM Encounter Date: 10/30/2019 Status: Signed    : Carly Arora NP (Nurse Practitioner)       Follow up Vascular Visit       Date of Service:10/30/2019    Date Last Seen: 10/9/2019; 10/9/2019    Chief Complaint: right abdominal wound        Pt returns to [facility] with regards to their right abdominal wound.  They arrive today alone. They are currently using manuka honey and bordered foam to the wounds. This is being done by the patient. They are feeling well today. Denies fevers, chills. No shortness of breath.     Allergies: Hydralazine; Latex; Naprosyn [naproxen]; Nitrofurantoin; Sulfa (sulfonamide antibiotics); and Vicks vaporub [camphor-eucalyptus oil-menthol]    Medications:   Current Outpatient Medications:   ?  ACCU-CHEK SAM PLUS TEST STRP strips, TEST 6 TIMES A DAY (Patient taking differently: TEST 1 TIME A DAY), Disp: 100 strip, Rfl: 10  ?  ACCU-CHEK SAM PLUS TEST STRP strips, test blood sugar level 6 times daily, Disp: 600 strip, Rfl: 4  ?  acetaminophen (TYLENOL) 325 MG tablet, Take 325-650 mg by mouth every 8 (eight) hours as needed. , Disp: , Rfl:   ?  allopurinol (ZYLOPRIM) 100 MG tablet, Take 1 tablet (100 mg total) by mouth daily. (Patient taking differently: Take 100 mg by mouth daily as needed.    ), Disp: 90 tablet, Rfl: 5  ?  aspirin 81 MG EC tablet, Take 81 mg by mouth daily., Disp: , Rfl:   ?  BD ULTRA-FINE MICRO PEN NEEDLE 32 gauge x 1/4\" Ndle, USE AS DIRECTED 4 TIMES DAILY., Disp: 100 each, Rfl: 0  ?  cholecalciferol, vitamin D3, (VITAMIN D3) 1,000 unit capsule, Take 1,000 Units by mouth daily., Disp: , Rfl:   ?  ciprofloxacin HCl (CIPRO) 250 MG tablet, Take 1 tablet (250 mg total) by mouth 2 (two) times a day for 7 days., Disp: 14 tablet, Rfl: 1  ?  colchicine (MITIGARE) 0.6 mg cap, Take 0.6 mg by " "mouth 2 (two) times a day. (Patient taking differently: Take 0.6 mg by mouth 2 (two) times a day as needed.    ), Disp: 60 capsule, Rfl: 1  ?  docusate sodium (COLACE) 50 MG capsule, Take by mouth once daily., Disp: , Rfl:   ?  ferrous sulfate 65 mg elemental iron (IRON), Take 1 tablet by mouth 2 (two) times a day., Disp: , Rfl:   ?  glipiZIDE (GLUCOTROL) 10 MG tablet, Take 10 mg by mouth 2 (two) times a day as needed., Disp: , Rfl:   ?  insulin aspart U-100 (NOVOLOG FLEXPEN U-100 INSULIN) 100 unit/mL (3 mL) injection pen, Inject 15 Units under the skin 3 (three) times a day before meals. contact dr for further refills., Disp: 15 mL, Rfl: 0  ?  insulin glargine (BASAGLAR KWIKPEN) 100 unit/mL (3 mL) pen, inject 57 units under the skin every morning. do not mix with any other insulin., Disp: 6 pen, Rfl: 1  ?  lancets (ACCU-CHEK MULTICLIX LANCET) Misc, TEST 6 TIMES A DAY (Patient taking differently: TEST 1 TIME A DAY), Disp: 200 each, Rfl: 11  ?  magnesium 250 mg Tab, Take 500 mg by mouth 2 (two) times a day. , Disp: , Rfl:     Current Facility-Administered Medications:   ?  cyanocobalamin injection 1,000 mcg, 1,000 mcg, Intramuscular, Q30 Days, Lester Flores MD, 1,000 mcg at 10/25/19 0857  ?  lidocaine 2 % jelly (XYLOCAINE), , Topical, PRN, Carly Arora NP, 1 application at 09/18/19 0848    History:   Past Medical History:   Diagnosis Date   ? Anemia     pernicious   ? B12 deficiency    ? Carpal tunnel syndrome     had surgery   ? Diabetes mellitus (H)     borderline   ? Endometrial hyperplasia    ? Fibromyalgia    ? Heart murmur     pt states her mitral valve leaks   ? History of recurrent UTI (urinary tract infection)    ? Hypertension    ? Obesity    ? Thrombocytopenia (H)    ? Vaginal bleeding 1/2015       Physical Exam:    /68   Pulse 84   Temp 97.6  F (36.4  C)   Resp 22   Ht 5' 1\" (1.549 m)   Wt (!) 312 lb (141.5 kg)   BMI 58.95 kg/m      General:  Patient presents to clinic in no " apparent distress.  Head: normocephalic atraumatic  Psychiatric:  Alert and oriented x3.   Respiratory: unlabored breathing; no cough  Integumentary:  Skin is uniformly warm, dry and pink.    Wound #1 Location: right abdomen  Size: 1.9L x 1.4W x 0.1depth.  No sinus tract present, Wound base: slough  No undermining present. Wound is full thickness. There is moderate drainage. Periwound: no denudement, erythema, induration, maceration or warmth.      Circumferential volume measures:    No flowsheet data found.    Ulceration(s)/Wound(s):     VASC Wound 09/18/19 right pannus (Active)   Pre Size Length 1.9 10/30/2019  9:00 AM   Pre Size Width 1.4 10/30/2019  9:00 AM   Pre Size Depth 0.1 10/30/2019  9:00 AM   Pre Total Sq cm 2.66 10/30/2019  9:00 AM       Wound 09/05/19 Intertrigo (open area in skin fold) Abdomen Right (Active)       Incision 09/05/19 Surgical Perineum (Active)        Lab Values    No results found for: SEDRATE  Lab Results   Component Value Date    CREATININE 1.39 (H) 10/25/2019     Lab Results   Component Value Date    HGBA1C 10.9 (H) 10/25/2019     Lab Results   Component Value Date    BUN 20 10/25/2019     Lab Results   Component Value Date    ALBUMIN 3.3 (L) 10/25/2019     Vitamin D, Total (25-Hydroxy)   Date Value Ref Range Status   04/28/2016 29.8 (L) 30.0 - 80.0 ng/mL Final             Impression:  1. Open wound of abdominal wall, subsequent encounter     2. Type 2 diabetes mellitus with other skin ulcer, with long-term current use of insulin (H)     3. Morbid obesity with BMI of 50.0-59.9, adult (H)       10/9/19 abdomen             Are any of these wounds new today: No; Location: na    Assessment/Plan:          1. Debridement: After discussion of risk factors and verbal consent was obtained 2% Lidocaine HCL jelly was applied, under clean conditions, the abdomen ulceration(s) were debrided using currette. Devitalized and nonviable tissue, along with any fibrin and slough, was removed to improve  granulation tissue formation, stimulate wound healing, decrease overall bacteria load, disrupt biofilm formation and decrease edge senescence.  Total excisional debridement was 2.66 sq cm from the epidermis/dermis area and into the subcutaneous tissue with a depth of 0.1 cm.   Ulcers were improved afterwards and .  Measures were unchanged after debridement.       2.  Wound treatment: wound treatment will include irrigation and dressings to promote autolytic debridement which will include:she is responding well to the current treatment plan; will continue with manuka honey and bordered foam           3. Edema: na.            4. Nutrition: uncontrolled diabetic           5. Offloading: keep direct pressure off the wounds     Patient will follow up with me in 4 weeks for reevaluation. They were instructed to call the clinic sooner with any signs or symptoms of infection or any further questions/concerns. Answered all questions.    Carly Arora DNP, RN, CNP, CWOCN, CFCN, CLT  [facility]   [phone]        This note was electronically signed by Carly Arora

## 2021-06-29 NOTE — PROGRESS NOTES
Progress Notes by Alejandrina Patel MD at 8/7/2020 12:50 PM     Author: Alejandrina Patel MD Service: -- Author Type: Physician    Filed: 8/7/2020  1:22 PM Encounter Date: 8/7/2020 Status: Signed    : Alejandrina Patel MD (Physician)           Click to link to [facility] NOTE       Assessment/Plan:   1.  Paroxysmal atrial fibrillation: The patient is in normal sinus rhythm at this time.  Continue metoprolol 25 mg twice a day.  Her [id number] score is 3.  Currently she is on warfarin for chronic anticoagulation to prevent stroke.  We discussed the watchman implantation again.  The patient is not interested in it at this time.      2.  Essential hypertension: Her blood pressure is controlled with metoprolol and amlodipine.    3.  Type 2 diabetes, UTI, morbid obesity: Stable, no change in treatment.    Thank you for the opportunity to be involved in the care of Jazmin Bauman. If you have any questions, please feel free to contact me.  I will see the patient again in 12 months and as needed.    Much or all of the text in this note was generated through the use of Dragon Dictate voice-to-text software. Errors in spelling or words which seem out of context are unintentional.   Sound alike errors, in particular, may have escaped editing.       History of Present Illness:   It is my pleasure to see Jazmin Bauman at the [facility] clinic for evaluation of Follow-up post hospital discharge.  Jazmin Bauman is a 65 y.o. female with a medical history of paroxysmal atrial fibrillation, morbid obesity, essential hypertension, type 2 diabetes, chronic kidney disease stage III, history of for urinary tract infection and right AKA.    The patient states that she had no chest pain, shortness of breath, palpitations, dizziness.  No left leg edema.  Currently she is not on diuretics.  Her blood pressure and heart rate are controlled.  She had no side effects of from her current  medications.    Her nuclear stress test is negative for inducible myocardial ischemia, preserved left ventricular ejection fraction.    Past Medical History:     Patient Active Problem List   Diagnosis   ? Carpal Tunnel Syndrome   ? Urinary Tract Infection   ? Endometrial Hyperplasia   ? Cholelithiasis   ? Leukocytosis   ? Urine Tests Nonspecific Abnormal Findings   ? Obesity   ? Essential hypertension   ? Pernicious Anemia   ? Immunology Studies Raised Immunoglobulin Level   ? Vaginal bleeding   ? Type 2 diabetes mellitus (H)   ? Atrial fibrillation with RVR (H)   ? Acute kidney injury superimposed on CKD (H)   ? Non-traumatic rhabdomyolysis   ? Metabolic acidosis   ? Troponin level elevated   ? Bacteremia   ? Acute respiratory failure with hypoxia (H)   ? Acute encephalopathy   ? Acute pulmonary edema (H)   ? Wheezing   ? Moderate protein malnutrition (H)   ? Abnormal cytological findings in specimens from other female genital organs   ? Chronic kidney disease, stage III (moderate) (H)   ? Hyperkalemia   ? Osteoarthritis   ? Acute kidney injury (H)   ? Sepsis (H)   ? 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 Sepsis, due to unspecified organism, unspecified whether acute organ dysfunction present (H)   ? Cellulitis of right foot   ? CKD (chronic kidney disease) stage 4, GFR 15-29 ml/min (H)   ? Chronic wound infection of abdomen, subsequent encounter   ? Acute post-operative pain   ? Phantom limb pain (H)   ? Paroxysmal atrial fibrillation (H)   ? Urinary retention   ? Coronary artery disease without angina pectoris   ? Hx of right BKA (H)   ? Weakness   ? Slow transit constipation       Past Surgical History:     Past Surgical History:   Procedure Laterality Date   ? BELOW KNEE LEG AMPUTATION Right 6/18/2020    Procedure: AMPUTATION, BELOW KNEE;  Surgeon: Epi Corcoran MD;  Location: [facility] [place];  Service: General   ? BELOW KNEE LEG AMPUTATION Right 6/23/2020    Procedure:  REVISION AMPUTATION, BELOW KNEE;  Surgeon: Epi Corcoran MD;  Location: [facility] [place];  Service: General   ? bilateral carpal tunnel release  2009   ? CHOLECYSTECTOMY     ? COLONOSCOPY N/A 9/11/2017    Procedure: COLONOSCOPY;  Surgeon: Tyler Bhakta MD;  Location: [facility];  Service:    ? COMBINED HYSTEROSCOPY DIAGNOSTIC / D&C N/A 1/28/2015    Procedure: DILATION AND CURETTAGE WITH HYSTEROSCOPY;  Surgeon: Grant Beal MD;  Location: [facility];  Service:    ? DILATION AND CURETTAGE OF UTERUS     ? IR NON TUNNELED CATHETER >5 YEARS  1/21/2020   ? PICC  1/20/2020        ? MA HYSTEROSCOPY,W/ENDO BX N/A 9/5/2019    Procedure: HYSTEROSCOPY, DILATION AND CURETTAGE;  Surgeon: Grant Beal MD;  Location: [facility] [place];  Service: Gynecology   ? MA HYSTEROSCOPY,W/ENDO BX N/A 12/9/2019    Procedure: HYSTEROSCOPY, DILATION AND CURETTAGE;  Surgeon: Grant Beal MD;  Location: [facility] [place];  Service: Gynecology       Family History:     Family History   Problem Relation Age of Onset   ? Colon cancer Father         Social History:    reports that she has never smoked. She has never used smokeless tobacco. She reports previous alcohol use. She reports that she does not use drugs.    Review of Systems:   General: Weight Loss  Eyes: WNL  Ears/Nose/Throat: WNL  Lungs: WNL  Heart: WNL  Stomach: Constipation  Bladder: Frequent Urination at Night  Muscle/Joints: WNL  Skin: Poor Wound Healing  Nervous System: WNL  Mental Health: WNL     Blood: WNL    Meds:     Current Outpatient Medications:   ?  acetaminophen (TYLENOL) 500 MG tablet, Take 2 tablets (1,000 mg total) by mouth every 6 (six) hours as needed for pain or fever., Disp: , Rfl: 0  ?  amLODIPine (NORVASC) 10 MG tablet, Take 1 tablet (10 mg total) by mouth daily. (Patient taking differently: Take 10 mg by mouth daily. Hold for systolic blood pressure less than 105), Disp: , Rfl: 0  ?  aspirin 81 MG EC tablet, Take 81 mg by mouth  "daily., Disp: , Rfl:   ?  BD ULTRA-FINE MICRO PEN NEEDLE 32 gauge x 1/4\" Ndle, USE AS DIRECTED 4 TIMES DAILY., Disp: 360 each, Rfl: 3  ?  bisacodyL (DULCOLAX) 10 mg suppository, Insert 10 mg into the rectum daily as needed. No stool greater than 72 hours, Disp: , Rfl:   ?  blood glucose test (ACCU-CHEK SAM PLUS TEST STRP) strips, TEST 1 TIME A DAY, Disp: 100 strip, Rfl: 10  ?  blood glucose test (ACCU-CHEK SAM PLUS TEST STRP) strips, test blood sugar level 6 times daily, Disp: 600 strip, Rfl: 4  ?  calcium acetate,phosphat bind, (PHOSLO) 667 mg capsule, Take 1 capsule (667 mg total) by mouth 3 (three) times a day with meals., Disp: 270 capsule, Rfl: 3  ?  docusate sodium (COLACE) 100 MG capsule, Take 100 mg by mouth daily., Disp: , Rfl:   ?  ferrous sulfate 325 (65 FE) MG tablet, Take 1 tablet by mouth 2 (two) times a day with meals., Disp: , Rfl:   ?  gabapentin (NEURONTIN) 100 MG capsule, Take 200 mg by mouth 2 (two) times a day., Disp: , Rfl:   ?  HYDROmorphone (DILAUDID) 2 MG tablet, Take 1 tablet (2 mg total) by mouth every 4 (four) hours as needed., Disp: 30 tablet, Rfl: 0  ?  insulin glargine (BASAGLAR KWIKPEN) 100 unit/mL (3 mL) pen, Inject 10 Units under the skin at bedtime. (Patient taking differently: Inject 20 Units under the skin at bedtime. ), Disp: , Rfl: 0  ?  lancets (ACCU-CHEK MULTICLIX LANCET) Misc, TEST 6 TIMES A DAY, Disp: 200 each, Rfl: 11  ?  lidocaine (XYLOCAINE) 2 % jelly, Apply topically daily., Disp: , Rfl:   ?  metoprolol tartrate (LOPRESSOR) 25 MG tablet, Take 1 tablet (25 mg total) by mouth 2 (two) times a day. (Patient taking differently: Take 25 mg by mouth 2 (two) times a day. Hold for systolic blood pressure less than 105), Disp: 180 tablet, Rfl: 3  ?  NOVOLOG FLEXPEN U-100 INSULIN 100 unit/mL (3 mL) injection pen, Check blood sugar four (4) times daily. 11. (Patient taking differently: Inject 10 Units under the skin 3 (three) times a day before meals. 10 unit three times a day " with meals   PLUS:    if 141 - 180 = 1 unit;  181 - 220 = 2 units; Plus sliding scale  221 - 260 = 3 units;  261 - 300 = 4 units;  301 - 340 = 5 units;  341 - 400 = 6 units;  401 - 999 = 7 units,), Disp: , Rfl: 0  ?  NOVOLOG FLEXPEN U-100 INSULIN 100 unit/mL (3 mL) injection pen, Check blood sugar four (4) times daily. {, Disp: , Rfl: 0  ?  polyethylene glycol (MIRALAX) 17 gram packet, Take 17 g by mouth daily. Hold for loose stool, Disp: , Rfl:   ?  senna (SENOKOT) 8.6 mg tablet, Take 2 tablets by mouth 2 (two) times a day. Hold for loose stool, Disp: , Rfl:   ?  sodium bicarbonate 650 MG tablet, Take 1 tablet (650 mg total) by mouth 2 (two) times a day. OTC product, Disp: , Rfl: 0  ?  warfarin sodium (WARFARIN ORAL), Take by mouth. 7/24/20 INR 2.4 Take 4mg M, W,Fand 3mg AOD. Next INR 7/28 7/21/20 INR 3.1  Take 3mg daily.  Next INR 7/24/20.(currently on Cipro x 3 days)   7/14/20 INR 2.3  Cont 5mg daily.  Next INR 7/21/20. 7/13/20 INR 2.3  Take 5mg.  Next INR 7/14/20. 7/7/20 INR 2.5  Cont 5mg daily.  Next INR 7/10/20., Disp: , Rfl:   ?  cholecalciferol, vitamin D3, (VITAMIN D3) 1,000 unit capsule, Take 1,000 Units by mouth daily., Disp: , Rfl:   ?  collagenase ointment, Apply daily per WOC, Disp: , Rfl: 0  ?  gabapentin (NEURONTIN) 400 MG capsule, Take 1 capsule (400 mg total) by mouth 3 (three) times a day., Disp: , Rfl: 0  ?  magnesium 250 mg Tab, Take 500 mg by mouth 2 (two) times a day. , Disp: , Rfl:   ?  omeprazole (PRILOSEC) 20 MG capsule, Take 20 mg by mouth., Disp: , Rfl:     Allergies:   Hydralazine; Latex; Naprosyn [naproxen]; Nitrofurantoin; Sulfa (sulfonamide antibiotics); and Vicks vaporub [camphor-eucalyptus oil-menthol]      Objective:      Physical Exam        There is no height or weight on file to calculate BMI.  /62 (Patient Site: Left Arm, Patient Position: Sitting, Cuff Size: Adult Large)   Pulse 72   Resp 16     General Appearance:   Awake, Alert, No acute distress.   HEENT:  Pupil  equal and reactive to light. No scleral icterus; the mucous membranes were moist.   Neck: No cervical bruits. No JVD. No thyromegaly.     Chest: The spine was straight. The chest was symmetric.   Lungs:   Respirations unlabored; Lungs are clear to auscultation. No crackles. No wheezing.   Cardiovascular:   Regular rhythm and rate, normal first and second heart sounds with I/VI systolic murmurs. No rubs or gallops.    Abdomen:  Obese. Soft. No tenderness. Non-distended. Bowels sounds are present   Extremities: Right AKA, no edema in left leg.   Skin: No rashes or ulcers. Warm, Dry.   Musculoskeletal: No tenderness. No deformity.   Neurologic: Mood and affect are appropriate. No focal deficits.         EKG: Personally reviewed  Normal sinus rhythm   Left axis deviation   Abnormal ECG   When compared with ECG of 20-JAN-2020 19:34,   Sinus rhythm has replaced Atrial fibrillation    Cardiac Imaging Studies  ECHO on 1-:    Technically difficult study. When compared to the previous study dated 7/19/2017, no significant change.    Left ventricle ejection fraction is normal. The calculated left ventricular ejection fraction is 63%.    Normal left ventricular size and systolic function.    TAPSE is abnormal, which is consistent with abnormal right ventricular systolic function.    Mild aortic regurgitation.    The ascending aorta is mildly dilated.    ECHO on 6-:    Normal left ventricular size and systolic function.    Left ventricle ejection fraction is normal. The calculated left ventricular ejection fraction is 61%.    Normal right ventricular size and systolic function.    No hemodynamically significant valvular heart abnormalities.    When compared to the previous study dated 1/21/2020, normal RV function on current study.    Nuclear stress test on 7-:  ?  The nuclear stress test is probably negative for inducible myocardial ischemia or infarction.  ?  The patient is at a low risk of future cardiac  ischemic events.  ?  The left ventricular ejection fraction at stress is 66%.  ?  The  images demonstrate breast attenuation as well as subdiaphragmatic RAMP artifacts.  ?  There is no prior study for comparison.    Lab Review   Lab Results   Component Value Date     06/28/2020    K 4.2 06/28/2020     06/28/2020    CO2 27 06/28/2020    BUN 30 (H) 06/28/2020    CREATININE 1.57 (H) 06/28/2020    CALCIUM 8.8 06/28/2020     Lab Results   Component Value Date    WBC 9.5 06/27/2020    WBC 8.5 06/26/2015    HGB 7.9 (L) 06/27/2020    HCT 25.5 (L) 06/27/2020    MCV 95 06/27/2020     06/27/2020     Lab Results   Component Value Date    CHOL 137 02/27/2020    CHOL 142 10/25/2019    CHOL 129 08/30/2019     Lab Results   Component Value Date    HDL 30 (L) 02/27/2020    HDL 41 (L) 10/25/2019    HDL 35 (L) 08/30/2019     Lab Results   Component Value Date    LDLCALC 66 02/27/2020    LDLCALC 74 10/25/2019    LDLCALC 68 08/30/2019     Lab Results   Component Value Date    TRIG 204 (H) 02/27/2020    TRIG 136 10/25/2019    TRIG 128 08/30/2019     No components found for: CHOLHDL  Lab Results   Component Value Date    TROPONINI 0.02 06/23/2020     Lab Results   Component Value Date     (H) 01/20/2020     Lab Results   Component Value Date    TSH 1.83 06/17/2020

## 2021-07-03 NOTE — ADDENDUM NOTE
Addendum Note by Zeenat Irvin MLT at 7/28/2017  9:11 AM     Author: Zeenat Irvin MLT Service: -- Author Type:     Filed: 7/28/2017  9:11 AM Encounter Date: 7/28/2017 Status: Signed    : Zeenat Irvin MLT ()    Addended by: ZEENAT IRVIN on: 7/28/2017 09:11 AM        Modules accepted: Orders

## 2021-07-03 NOTE — ADDENDUM NOTE
Addendum Note by Jigar Pereyra RN at 8/7/2020  4:37 PM     Author: Jigar Pereyra RN Service: -- Author Type: Registered Nurse    Filed: 8/7/2020  4:37 PM Encounter Date: 8/7/2020 Status: Signed    : Jigar Pereyra RN (Registered Nurse)    Addended by: JIGAR PEREYRA on: 8/7/2020 04:37 PM        Modules accepted: Orders

## 2021-07-03 NOTE — ADDENDUM NOTE
Addendum Note by India Randolph CMA at 8/30/2019 11:00 AM     Author: India Randolph CMA Service: -- Author Type: Certified Medical Assistant    Filed: 8/30/2019 12:38 PM Encounter Date: 8/30/2019 Status: Signed    : India Randolph CMA (Certified Medical Assistant)    Addended by: INDIA RANDOLPH on: 8/30/2019 12:38 PM        Modules accepted: Orders

## 2021-07-03 NOTE — ADDENDUM NOTE
Addendum Note by India Randolph CMA at 8/13/2018 10:57 AM     Author: India Randolph CMA Service: -- Author Type: Certified Medical Assistant    Filed: 8/13/2018 10:57 AM Encounter Date: 8/13/2018 Status: Signed    : India Randolph CMA (Certified Medical Assistant)    Addended by: INDIA RANDOLPH on: 8/13/2018 10:57 AM        Modules accepted: Orders

## 2021-07-04 NOTE — LETTER
"Letter by Zoey Leung CNP at      Author: Zoey Leung CNP Service: -- Author Type: --    Filed:  Encounter Date: 2021 Status: (Other)         [facility] - [address] [address]                                  May 26, 2021    Patient: Jazmin Bauman   MR Number: [id number]   YOB: 1955   Date of Visit: 2021     Dear  Home:    Thank you for referring Jazmin Bauman to me for evaluation. Below are the relevant portions of my assessment and plan of care.    If you have questions, please do not hesitate to call me. I look forward to following Jazmin along with you.    Sincerely,        Zoey Leung CNP          CC  No Recipients  Zoey Leung CNP  2021 10:33 PM  Sign when Signing Visit    [facility]      NAME:  Jazmin Bauman             :  1955    MRN: [id number]    CODE STATUS:  FULL CODE    FACILITY: [facility] [[id number]]         CHIEF COMPLAIN/REASON FOR VISIT:  Chief Complaint   Patient presents with   ? Review Of Multiple Medical Conditions     Regulatory        HISTORY OF PRESENT ILLNESS: Jazmin Bauman is a 65 y.o. female. Pt was at [facility] from  to  and underwent a RBKA r/t ongoing DM ulcer with osteomyelitis. Per her EMR dc summary \" with HTN,morbid obesity, DM, A fib, CKD 4 presented for evaluation of R foot swelling, difficulty ambulation, pain found to have osteomyelitis now s/p amputation. She has now been to the OR twice this stay, last was on 20.   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   Patient had a chronic diabetic foot ulcer on her R heel, presented for increased swelling, difficulty ambulating, and pain. MRI showed extensive soft tissue necrosis and some osteomyelitis along with cellulitis  Met sepsis criteria on admission with leukocytosis and elevated CRP  Podiatry saw " "and the foot was not felt to be salvageable and BKA was recommended\"     August 15-21: Hospitalized for sepsis with MRSA bacteremia, she had possible endocarditis and TTE revealing vegetation of the aortic valve. She had a CT of the abdomen and pelvis that showed left lower abdominal pannus ulceration but without abscess.  Her right BKA stump ulceration was negative for osteomyelitis or cellulitis.  She was treated with IV Vanco for 14 days which will be completed on August 30.  Her left lower abdominal ulceration has never been biopsied.  It was not biopsied in the hospital and there was no surgical intervention.  Her right BKA stump was negative for osteomyelitis.    CKD stage IV with baseline creatinine 1.9-2.0 now.   Her insulin was adjusted and she is now on sliding scale insulin with 17 units of Lantus at bedtime.     For her hypertension, Norvasc has been discontinued.  Blood pressures remain in the 130s over 70s on average.  She was incidentally found to have a lung nodule on CT scan; follow up CT was on December 1 and revealed resolution of the nodule.    Today: Seen today for regulatory visit for [facility].  Multiple C. difficile infections with extended vancomycin used to eradicate.  Finally reporting some improvement in loose stools.  Continues on Florastor.  Reviewed vital signs, blood sugars, weights.  Her weight has stabilized for the last 6 months at 240 pounds.  Blood sugars which were previously well controlled, have increased over the last 2 months, now averaging 200-300 daily.  Fasting blood sugars over 200.  Will increase Lantus, increase Humalog and also add Tradjenta.  Blood pressures also with mild elevation recently with multiple blood pressures greater than 140/90.  Given patient's age, concurrent diabetes, history of PAD with BKA, will need good control pressures.  Pain well controlled on lower Dilaudid dose.  Mood agitated, patient would like to return home or discharge to [facility].  " "No current plans for this.    Allergies   Allergen Reactions   ? Hydralazine      Paralysis of legs   ? Latex Itching     Skin Burns   ? Naprosyn [Naproxen] Swelling     throat   ? Nitrofurantoin Hives   ? Sulfa (Sulfonamide Antibiotics) Rash   ? Vicks Vaporub [Camphor-Eucalyptus Oil-Menthol] Rash   :     Current Outpatient Medications   Medication Sig   ? linaGLIPtin 5 mg Tab Take 5 mg by mouth daily.   ? acetaminophen (TYLENOL) 500 MG tablet Take 2 tablets (1,000 mg total) by mouth 3 (three) times a day.   ? apixaban ANTICOAGULANT (ELIQUIS) 5 mg Tab tablet Take 5 mg by mouth 2 (two) times a day.   ? aspirin 81 MG EC tablet Take 81 mg by mouth daily.   ? BD ULTRA-FINE MICRO PEN NEEDLE 32 gauge x 1/4\" Ndle USE AS DIRECTED 4 TIMES DAILY.   ? ferrous sulfate 325 (65 FE) MG tablet Take 1 tablet by mouth daily with breakfast.    ? gabapentin (NEURONTIN) 100 MG capsule Take by mouth 2 (two) times a day. Takes 300 mg in am and 200 mg in the pm   ? hydrocortisone 1 % cream Apply topically 2 (two) times a day as needed.   ? HYDROmorphone (DILAUDID) 2 MG tablet (Take 2mg daily and 2mg two times a day prn   ? insulin lispro (HUMALOG) 100 unit/mL injection Inject 10 Units under the skin daily with breakfast.   ? lancets (ACCU-CHEK MULTICLIX LANCET) Misc TEST 6 TIMES A DAY   ? LANTUS SOLOSTAR U-100 INSULIN 100 unit/mL (3 mL) pen Inject 17 Units under the skin at bedtime. 11.65 Type 2 with hyperglycemia  Contact provider if insulin prescribed is not the preferred insulin per insurance.   ? lidocaine (LIDODERM) 5 % Place 1 patch on the skin daily. Remove & Discard patch within 12 hours or as directed by MD   ? LIDOCAINE HCL IM Inject into the shoulder, thigh, or buttocks. Inject 1 dose intramuscularly as needed for med 5ml bottle   ? metoprolol tartrate (LOPRESSOR) 25 MG tablet Take 1 tablet (25 mg total) by mouth 2 (two) times a day.   ? miconazole, bulk, Powd Use As Directed 2 (two) times a day.   ? omeprazole (PRILOSEC) 20 MG " "capsule Take 20 mg by mouth daily before breakfast.    ? Saccharomyces boulardii (FLORASTOR) 250 mg capsule Take 250 mg by mouth daily.   ? sodium bicarbonate 650 MG tablet Take 1 tablet (650 mg total) by mouth 2 (two) times a day. OTC product   ? triamcinolone acetonide 40 mg/mL Kit Inject as directed. Inject 1 dose intramuscularly as needed for med x 2 bottles         REVIEW OF SYSTEMS:    Currently, no fever, chills, or rigors. Does not have any visual or hearing problems. Denies any chest pain, headaches, palpitations, lightheadedness, dizziness, shortness of breath, or cough. Appetite is good. Denies any GERD symptoms. Denies any difficulty with swallowing, nausea, or vomiting.  Denies any abdominal pain, diarrhea or constipation. Denies any urinary symptoms, yeager in place. No insomnia. No active bleeding. No rash.       PHYSICAL EXAMINATION:  Vitals:    05/20/21 1146   BP: 142/76   Pulse: 64   Resp: 18   Temp: 97.3  F (36.3  C)   SpO2: 96%   Weight: (!) 244 lb 9.6 oz (110.9 kg)   Height: 5' 1\" (1.549 m)         GENERAL: Awake, Alert, oriented x3, not in any form of acute distress, answers questions appropriately, follows simple commands, conversant with flat affect.  Patient did have PFT  HEENT: Head is normocephalic with normal hair distribution. No evidence of trauma. Ears: No acute purulent discharge. Eyes: Sclera anicteric.  LUNG: Clear to auscultation with good chest expansion.   BACK: ROM normal, no CVA tenderness.  CVS: There is good S1  S2,  rhythm is regular.  ABDOMEN: Globular and ROTUND.  Nontender with palpation. chronic abdominal wall wounds bilaterally that are healing well.  EXTREMITIES: UE Full ROM. Right BKA; now with prosthesis on.  SKIN: Warm and dry, no erythema noted, open area on abdomen.    NEUROLOGICAL: The patient is oriented to person, place and time. Strength and sensation are grossly intact. Face is symmetric.    LABS:    Lab Results   Component Value Date    WBC 7.9 08/21/2020    " HGB 9.2 (L) 08/21/2020    HCT 28.6 (L) 08/21/2020    MCV 89 08/21/2020     08/21/2020       Results for orders placed or performed during the hospital encounter of 08/15/20   Basic Metabolic Panel   Result Value Ref Range    Sodium 137 136 - 145 mmol/L    Potassium 4.1 3.5 - 5.0 mmol/L    Chloride 103 98 - 107 mmol/L    CO2 23 22 - 31 mmol/L    Anion Gap, Calculation 11 5 - 18 mmol/L    Glucose 178 (H) 70 - 125 mg/dL    Calcium 11.0 (H) 8.5 - 10.5 mg/dL    BUN 37 (H) 8 - 22 mg/dL    Creatinine 2.05 (H) 0.60 - 1.10 mg/dL    GFR MDRD Af Amer 29 (L) >60 mL/min/1.73m2    GFR MDRD Non Af Amer 24 (L) >60 mL/min/1.73m2           Lab Results   Component Value Date    HGBA1C 10.3 (H) 06/17/2020     Vitamin D, Total (25-Hydroxy)   Date Value Ref Range Status   04/28/2016 29.8 (L) 30.0 - 80.0 ng/mL Final     Lab Results   Component Value Date    IMBLRXPC50 285 01/07/2011       ASSESSMENT/PLAN:  1. Primary osteoarthritis of both knees    2. Clostridium difficile infection    3. Morbid obesity (H)    4. CKD (chronic kidney disease) stage 4, GFR 15-29 ml/min (H)    5. Hx of right BKA (H)    6. Type 2 diabetes mellitus with other specified complication, with long-term current use of insulin (H)    7. HTN (hypertension)         Loose stool  C. difficile positive, recurrent: Unstable.  -Continue Florastor.  -Continues on vancomycin.  Reporting formed soft stools.    Chronic conditions:     Left abdominal wall open area: Followed by the wound team.    -Continue orders per wound care team and change dressing as needed for increased drainage.    Osteoarthritis: Knee injections completed on 1/21.  She has had mild improvement in pain.   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 Improved. Unable to assess stump, in  and brace.   -Continues on Dilaudid which I have weaned drastically; using infrequently.  He does twice daily scheduled doses.    Anticoagulated:  -Omeprazole 20 mg daily.   -March 29 was 11.7.    Insulin-dependent  diabetes: Unstable with recent elevations averaging 200-300 over the last 2 months.  Previously was getting better controlled A1c 10.3--> 8.0.  -Increase glargine to 22 units nightly.  -Increase NovoLog to 10 units 3 times daily with meals.  -Start Tradjenta 5 mg p.o. daily.     Essential hypertension: Unstable, occasional outliers greater than 140/90.  We will need to monitor closely and resume amlodipine if continued elevations.  -metoprolol    Acute on chronic blood loss anemia- likely from anemia chronic disease, iron deficiency, chronic kidney disease, surgery. On oral iron-most recent iron studies within normal limits.  Sees nephrologist this month.    CKD 4: Followed by Dr. Iqbal.  Calcium improved at 9.3.   -Sodium bicarb.     Coronary artery disease:  -also needs outpt CRISTINA eval   -Continue metoprolol and aspirin.       A. Fib: We discussed risks and benefits of anticoagulation secondary to her A. fib, CAD, PAD, and diabetes.  We will continue monitoring for further bleeding.  -On Eliquis 5 mg twice daily .      Unintended weight loss: Resolved.    Communicated concerns with patient, nursing.  Case Management:  I have reviewed the facility/SNF care plan/MDS which was done Quarterly, including the falls risk, nutrition and pain screening. I also reviewed the current immunizations, and preventive care.. Future cancer screening indicated is Mammogram, colonoscopy and provider and pt will formulate a POC.   Patient's desire to return to the community is present, but is not able due to care needs .       Electronically signed by:  Zoey Leung, CNP  This progress note was completed using Dragon software and there may be grammatical errors.

## 2021-07-04 NOTE — LETTER
Letter by Alejandrina Patel MD at      Author: Alejandrina Patel MD Service: -- Author Type: --    Filed:  Encounter Date: 5/27/2021 Status: (Other)         Jazmin Bauman  [address] [address]      May 27, 2021      Dear Jazmin,    This letter is to remind you that you will be due for your follow up appointment with Dr. Alejandrina Patel in August, 2021. To help ensure you are in the best health possible, a regular follow-up with your cardiologist is essential.     Please call our Patient Scheduling Line at [phone] to schedule your appointment at your earliest convenience.  If you have recently scheduled an appointment, please disregard this letter.    We look forward to seeing you again. As always, we are available at the number  above for any questions or concerns you may have.      Sincerely,     The Physicians and Staff of [facility]

## 2021-07-21 ENCOUNTER — RECORDS - HEALTHEAST (OUTPATIENT)
Dept: ADMINISTRATIVE | Facility: CLINIC | Age: 66
End: 2021-07-21

## 2021-07-22 ENCOUNTER — TELEPHONE (OUTPATIENT)
Dept: GERIATRICS | Facility: CLINIC | Age: 66
End: 2021-07-22

## 2021-07-22 NOTE — TELEPHONE ENCOUNTER
[facility] Nurse Triage Telephone Note    Provider: BARRY Smith  Facility: [facility]   Facility Type:  LTC    Caller: Patricia  Call Back Number: [phone]    Allergies   Allergen Reactions     Hydralazine Unknown     Paralysis of legs     Latex Itching     Skin Burns     Naprosyn [Naproxen] Swelling     throat     Nitrofurantoin Hives     Sulfa (Sulfonamide Antibiotics) [Sulfa Drugs] Rash     Vicks Vaporub [Mentholatum Cherry Chest] Rash       Reason for call: Pt Hgb A1C today 9.0    Verbal Order/Direction given by Provider: NNO    Provider giving Order:  BARRY Smith    Verbal Order given to: Patricia Shaw RN

## 2021-07-29 ENCOUNTER — NURSING HOME VISIT (OUTPATIENT)
Dept: GERIATRICS | Facility: CLINIC | Age: 66
End: 2021-07-29
Payer: COMMERCIAL

## 2021-07-29 VITALS
SYSTOLIC BLOOD PRESSURE: 158 MMHG | BODY MASS INDEX: 46.82 KG/M2 | HEART RATE: 64 BPM | RESPIRATION RATE: 18 BRPM | HEIGHT: 61 IN | OXYGEN SATURATION: 97 % | DIASTOLIC BLOOD PRESSURE: 74 MMHG | WEIGHT: 248 LBS | TEMPERATURE: 96.4 F

## 2021-07-29 DIAGNOSIS — M15.0 PRIMARY OSTEOARTHRITIS INVOLVING MULTIPLE JOINTS: ICD-10-CM

## 2021-07-29 DIAGNOSIS — E11.69 TYPE 2 DIABETES MELLITUS WITH OTHER SPECIFIED COMPLICATION, WITH LONG-TERM CURRENT USE OF INSULIN (H): ICD-10-CM

## 2021-07-29 DIAGNOSIS — Z79.4 TYPE 2 DIABETES MELLITUS WITH OTHER SPECIFIED COMPLICATION, WITH LONG-TERM CURRENT USE OF INSULIN (H): ICD-10-CM

## 2021-07-29 DIAGNOSIS — G89.4 CHRONIC PAIN SYNDROME: Primary | ICD-10-CM

## 2021-07-29 DIAGNOSIS — I48.0 PAROXYSMAL ATRIAL FIBRILLATION (H): ICD-10-CM

## 2021-07-29 PROCEDURE — 99310 SBSQ NF CARE HIGH MDM 45: CPT | Performed by: NURSE PRACTITIONER

## 2021-07-29 RX ORDER — CALCIUM POLYCARBOPHIL 625 MG 625 MG/1
2 TABLET ORAL DAILY
COMMUNITY

## 2021-07-29 RX ORDER — INSULIN ASPART 100 [IU]/ML
16 INJECTION, SOLUTION INTRAVENOUS; SUBCUTANEOUS
COMMUNITY

## 2021-07-29 RX ORDER — INSULIN ASPART 100 [IU]/ML
12 INJECTION, SOLUTION INTRAVENOUS; SUBCUTANEOUS
Status: ON HOLD | COMMUNITY
End: 2022-06-27 | Stop reason: DRUGHIGH

## 2021-07-29 RX ORDER — GABAPENTIN 300 MG/1
300 CAPSULE ORAL EVERY MORNING
COMMUNITY

## 2021-07-29 RX ORDER — LIDOCAINE 4 G/G
1 PATCH TOPICAL EVERY 24 HOURS
COMMUNITY

## 2021-07-29 ASSESSMENT — MIFFLIN-ST. JEOR: SCORE: 1602.3

## 2021-07-29 NOTE — PROGRESS NOTES
"[facility]  Chief Complaint   Patient presents with     senior care Regulatory     [place] Medical Record Number:  [id number]  Place of Service where encounter took place:  [facility] () [[id number]]      [facility]      NAME:  Jazmin Bauman             :  1955    MRN: [id number]    CODE STATUS:  FULL CODE    FACILITY: [facility] [[id number]]         CHIEF COMPLAIN/REASON FOR VISIT:  Chief Complaint   Patient presents with     Review Of Multiple Medical Conditions     Regulatory        HISTORY OF PRESENT ILLNESS: Jazmin Bauman is a 65 y.o. female. Pt was at [facility] from  to  and underwent a RBKA r/t ongoing DM ulcer with osteomyelitis. Per her EMR dc summary \" with HTN,morbid obesity, DM, A fib, CKD 4 presented for evaluation of R foot swelling, difficulty ambulation, pain found to have osteomyelitis now s/p amputation. She has now been to the OR twice this stay, last was on 20.   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   Patient had a chronic diabetic foot ulcer on her R heel, presented for increased swelling, difficulty ambulating, and pain. MRI showed extensive soft tissue necrosis and some osteomyelitis along with cellulitis  Met sepsis criteria on admission with leukocytosis and elevated CRP  Podiatry saw and the foot was not felt to be salvageable and BKA was recommended\"     August 15-: Hospitalized for sepsis with MRSA bacteremia, she had possible endocarditis and TTE revealing vegetation of the aortic valve. She had a CT of the abdomen and pelvis that showed left lower abdominal pannus ulceration but without abscess.  Her right BKA stump ulceration was negative for osteomyelitis or cellulitis.  She was treated with IV Vanco for 14 days which will be completed on .  Her left lower abdominal ulceration has never been biopsied.  It was not biopsied in the " "[facility] and there was no surgical intervention.  Her right BKA stump was negative for osteomyelitis.    CKD stage IV with baseline creatinine 1.9-2.0 now.   Her insulin was adjusted and she is now on sliding scale insulin with 17 units of Lantus at bedtime.     For her hypertension, Norvasc has been discontinued.  Blood pressures remain in the 130s over 70s on average.  She was incidentally found to have a lung nodule on CT scan; follow up CT was on December 1 and revealed resolution of the nodule.    Today: Seen today for regulatory visit for [employer]. Majority of visit was spent reviewing blood sugars and diet compliance. A1c up to 9.0 from previous 8.0. One year ago 10.3. She has had weight gain and improved PO intake. Will increase lantus and novolog. Also on tradjenta. She is looking into the details of returning to live at her mothers house but will need a ramp to get her W/C in due to BKA. C/o some itching under the stump. Otherwise she is feeling well. She is reporting in improved stool consistency and less frequency; had 2 rounds of vanco.   VS and weights reviewed. Weight up about 10lbs. Continues to follow with Dr Iqbal for CKD stage 4; recent kidney function stable.    Blood pressures 140-160s.     Allergies   Allergen Reactions     Hydralazine      Paralysis of legs     Latex Itching     Skin Burns     Naprosyn [Naproxen] Swelling     throat     Nitrofurantoin Hives     Sulfa (Sulfonamide Antibiotics) Rash     Vicks Vaporub [Camphor-Eucalyptus Oil-Menthol] Rash   :     Current Outpatient Medications   Medication Sig     linaGLIPtin 5 mg Tab Take 5 mg by mouth daily.     acetaminophen (TYLENOL) 500 MG tablet Take 2 tablets (1,000 mg total) by mouth 3 (three) times a day.     apixaban ANTICOAGULANT (ELIQUIS) 5 mg Tab tablet Take 5 mg by mouth 2 (two) times a day.     aspirin 81 MG EC tablet Take 81 mg by mouth daily.     BD ULTRA-FINE MICRO PEN NEEDLE 32 gauge x 1/4\" Ndle USE AS DIRECTED 4 TIMES " DAILY.     ferrous sulfate 325 (65 FE) MG tablet Take 1 tablet by mouth daily with breakfast.      gabapentin (NEURONTIN) 100 MG capsule Take by mouth 2 (two) times a day. Takes 300 mg in am and 200 mg in the pm     hydrocortisone 1 % cream Apply topically 2 (two) times a day as needed.     HYDROmorphone (DILAUDID) 2 MG tablet (Take 2mg daily and 2mg two times a day prn     insulin lispro (HUMALOG) 100 unit/mL injection Inject 10 Units under the skin daily with breakfast.     lancets (ACCU-CHEK MULTICLIX LANCET) Misc TEST 6 TIMES A DAY     LANTUS SOLOSTAR U-100 INSULIN 100 unit/mL (3 mL) pen Inject 17 Units under the skin at bedtime. 11.65 Type 2 with hyperglycemia  Contact provider if insulin prescribed is not the preferred insulin per insurance.     lidocaine (LIDODERM) 5 % Place 1 patch on the skin daily. Remove & Discard patch within 12 hours or as directed by MD     LIDOCAINE HCL IM Inject into the shoulder, thigh, or buttocks. Inject 1 dose intramuscularly as needed for med 5ml bottle     metoprolol tartrate (LOPRESSOR) 25 MG tablet Take 1 tablet (25 mg total) by mouth 2 (two) times a day.     miconazole, bulk, Powd Use As Directed 2 (two) times a day.     omeprazole (PRILOSEC) 20 MG capsule Take 20 mg by mouth daily before breakfast.      Saccharomyces boulardii (FLORASTOR) 250 mg capsule Take 250 mg by mouth daily.     sodium bicarbonate 650 MG tablet Take 1 tablet (650 mg total) by mouth 2 (two) times a day. OTC product     triamcinolone acetonide 40 mg/mL Kit Inject as directed. Inject 1 dose intramuscularly as needed for med x 2 bottles         REVIEW OF SYSTEMS:    Currently, no fever, chills, or rigors. Does not have any visual or hearing problems. Denies any chest pain, headaches, palpitations, lightheadedness, dizziness, shortness of breath, or cough. Appetite is good. Denies any GERD symptoms. Denies any difficulty with swallowing, nausea, or vomiting.  Denies any abdominal pain, diarrhea or  "constipation. Denies any urinary symptoms, yeager in place. No insomnia. No active bleeding.   C/o itching under stump. Hyperglycemia.       PHYSICAL EXAMINATION:  Vitals:    05/20/21 1146   BP: 142/76   Pulse: 64   Resp: 18   Temp: 97.3  F (36.3  C)   SpO2: 96%   Weight: (!) 244 lb 9.6 oz (110.9 kg)   Height: 5' 1\" (1.549 m)         GENERAL: Awake, Alert, oriented x3, not in any form of acute distress, answers questions appropriately, follows simple commands, conversant with flat affect.  Patient did have PFT  HEENT: Head is normocephalic with normal hair distribution. No evidence of trauma. Ears: No acute purulent discharge. Eyes: Sclera anicteric.  LUNG: Clear to auscultation with good chest expansion.   BACK: ROM normal, no CVA tenderness.  CVS: There is good S1  S2,  rhythm is regular.  ABDOMEN: Globular and ROTUND.  Nontender with palpation. chronic abdominal wall wounds bilaterally that are healing well.  EXTREMITIES: UE Full ROM. Right BKA; now with prosthesis on.  SKIN: Warm and dry, no erythema noted, skin improving.    NEUROLOGICAL: The patient is oriented to person, place and time. Strength and sensation are grossly intact. Face is symmetric.    LABS:    Lab Results   Component Value Date    WBC 7.9 08/21/2020    HGB 9.2 (L) 08/21/2020    HCT 28.6 (L) 08/21/2020    MCV 89 08/21/2020     08/21/2020       Results for orders placed or performed during the hospital encounter of 08/15/20   Basic Metabolic Panel   Result Value Ref Range    Sodium 137 136 - 145 mmol/L    Potassium 4.1 3.5 - 5.0 mmol/L    Chloride 103 98 - 107 mmol/L    CO2 23 22 - 31 mmol/L    Anion Gap, Calculation 11 5 - 18 mmol/L    Glucose 178 (H) 70 - 125 mg/dL    Calcium 11.0 (H) 8.5 - 10.5 mg/dL    BUN 37 (H) 8 - 22 mg/dL    Creatinine 2.05 (H) 0.60 - 1.10 mg/dL    GFR MDRD Af Amer 29 (L) >60 mL/min/1.73m2    GFR MDRD Non Af Amer 24 (L) >60 mL/min/1.73m2           Lab Results   Component Value Date    HGBA1C 10.3 (H) 06/17/2020 "     Vitamin D, Total (25-Hydroxy)   Date Value Ref Range Status   04/28/2016 29.8 (L) 30.0 - 80.0 ng/mL Final     Lab Results   Component Value Date    JHVGJGWJ40 285 01/07/2011       ASSESSMENT/PLAN:    (G89.4) Chronic pain syndrome  (primary encounter diagnosis)  (E11.69,  Z79.4) Type 2 diabetes mellitus with other specified complication, with long-term current use of insulin (H)  (I48.0) Paroxysmal atrial fibrillation (H)      Loose stool  C. difficile positive, recurrent: Stable.   -Continue Florastor.  -Completed vanco x 2.     DM2: UNSTABLE. Extensive review and conversation today. Recent a1c 9.0. Fasting sugars in the 300s. Post prandials often 300-350. She has also gained some weight. Suspect she is just feeling better and eating better; c-diff has cleared, no longer with nausea or abdominal discomfort..   -Increase lantus to 30 units at bedtime.   -Increase novolog to 12 units tid with meals.   -Recheck a1c in early October.   -Tradjenta 5mg daily.      Left abdominal wall wounds: Closed.     Osteoarthritis:   Right BKA: Hx of phantom limb pain. Improved. Unable to assess stump, in  and brace.   -Continues on Dilaudid which I have weaned drastically; using infrequently. Now down to once daily and bid prn.     Anticoagulated:  -Omeprazole 20 mg daily.      Essential hypertension: Unstable, occasional outliers greater than 140/90.  Consider adding amlodipine if bps do not improve.   -metoprolol 25 bid.     Acute on chronic blood loss anemia- likely from anemia chronic disease, iron deficiency, chronic kidney disease, surgery. On oral iron-most recent iron studies within normal limits.  Sees nephrologist this month.    CKD 4: Followed by Dr. Iqbal.  Calcium improved at 9.3.   -Sodium bicarb.     Coronary artery disease:  -also needs outpt CRISTINA eval   -Continue metoprolol and aspirin.       A. Fib: We discussed risks and benefits of anticoagulation secondary to her A. fib, CAD, PAD, and diabetes.  We  will continue monitoring for further bleeding.  -On Eliquis 5 mg twice daily .      Unintended weight loss: Resolved.    Itching under stump:   -okay for hydrocortizone 1% bid to stump until resolved.     Communicated concerns with patient, nursing.  Case Management:  I have reviewed the facility/SNF care plan/MDS which was done Quarterly, including the falls risk, nutrition and pain screening. I also reviewed the current immunizations, and preventive care.. Future cancer screening indicated is Mammogram, colonoscopy and provider and pt will formulate a POC.   Patient's desire to return to the community is present, but is not able due to care needs .       Electronically signed by:  Zoey Leung CNP  This progress note was completed using Dragon software and there may be grammatical errors.

## 2021-07-29 NOTE — LETTER
7/29/2021        RE: Jazmin Bauman  [address]  [place] [address]        [facility]  Chief Complaint   Patient presents with     [facility] Regulatory     [facility] Record Number:  [id number]  Place of Service where encounter took place:  [facility] [place] () [[id number]]    HPI:    Jazmin Bauman  is 66 year old (1955), who is being seen today for a federally mandated E/M visit.  HPI information obtained from: {FGS HPI:798645}. Today's concerns are:  {FGS DX:933187}    ALLERGIES:Hydralazine, Latex, Naprosyn [naproxen], Nitrofurantoin, Sulfa (sulfonamide antibiotics) [sulfa drugs], and Vicks vaporub [mentholatum cherry chest]  PAST MEDICAL HISTORY:   has a past medical history of Anemia, Diabetes mellitus (H), Endometrial hyperplasia, Fibromyalgia, History of recurrent UTI (urinary tract infection), History of transfusion, Hypertension, Thrombocytopenia (H), and Vaginal bleeding (1/2015).  PAST SURGICAL HISTORY:   has a past surgical history that includes IR CVC Non Tunnel Placement (1/21/2020); other surgical history (2009); Cholecystectomy; Dilation and curettage; Dilation and curettage, operative hysteroscopy, combined (N/A, 1/28/2015); Colonoscopy (N/A, 9/11/2017); Pr Hysteroscopy,W/Endo Bx (N/A, 9/5/2019); Pr Hysteroscopy,W/Endo Bx (N/A, 12/9/2019); Picc (1/20/2020); Ir Non Tunneled Catheter >5 Years (1/21/2020); Amputate leg below knee (Right, 6/18/2020); Amputate leg below knee (Right, 6/23/2020); and Picc (8/21/2020).  FAMILY HISTORY: family history includes Colon Cancer in her father.  SOCIAL HISTORY:  reports that she has never smoked. She has never used smokeless tobacco. She reports previous alcohol use. She reports that she does not use drugs.    MEDICATIONS:  Current Outpatient Medications   Medication Sig Dispense Refill     acetaminophen (TYLENOL) 500 MG tablet [ACETAMINOPHEN (TYLENOL) 500 MG TABLET] Take 2 tablets (1,000 mg total) by mouth 3 (three)  "times a day.  0     apixaban ANTICOAGULANT (ELIQUIS) 5 mg Tab tablet [APIXABAN ANTICOAGULANT (ELIQUIS) 5 MG TAB TABLET] Take 5 mg by mouth 2 (two) times a day.       aspirin 81 MG EC tablet [ASPIRIN 81 MG EC TABLET] Take 81 mg by mouth daily.       BD ULTRA-FINE MICRO PEN NEEDLE 32 gauge x 1/4\" Ndle [BD ULTRA-FINE MICRO PEN NEEDLE 32 GAUGE X 1/4\" NDLE] USE AS DIRECTED 4 TIMES DAILY. 360 each 3     ferrous sulfate 325 (65 FE) MG tablet [FERROUS SULFATE 325 (65 FE) MG TABLET] Take 1 tablet by mouth daily with breakfast.        gabapentin (NEURONTIN) 100 MG capsule [GABAPENTIN (NEURONTIN) 100 MG CAPSULE] Take by mouth 2 (two) times a day. Takes 300 mg in am and 200 mg in the pm       hydrocortisone 1 % cream [HYDROCORTISONE 1 % CREAM] Apply topically 2 (two) times a day as needed.       HYDROmorphone (DILAUDID) 2 MG tablet [HYDROMORPHONE (DILAUDID) 2 MG TABLET] (Take 2mg daily and 2mg two times a day prn 90 tablet 0     insulin lispro (HUMALOG) 100 unit/mL injection [INSULIN LISPRO (HUMALOG) 100 UNIT/ML INJECTION] Inject 10 Units under the skin daily with breakfast.       lancets (ACCU-CHEK MULTICLIX LANCET) Misc [LANCETS (ACCU-CHEK MULTICLIX LANCET) MISC] TEST 6 TIMES A  each 11     LANTUS SOLOSTAR U-100 INSULIN 100 unit/mL (3 mL) pen [LANTUS SOLOSTAR U-100 INSULIN 100 UNIT/ML (3 ML) PEN] Inject 22 Units under the skin at bedtime. 11.65 Type 2 with hyperglycemia  Contact provider if insulin prescribed is not the preferred insulin per insurance. 15 mL 0     lidocaine (LIDODERM) 5 % [LIDOCAINE (LIDODERM) 5 %] Place 1 patch on the skin daily. Remove & Discard patch within 12 hours or as directed by MD       LIDOCAINE HCL IM [LIDOCAINE HCL IM] Inject into the shoulder, thigh, or buttocks. Inject 1 dose intramuscularly as needed for med 5ml bottle       linaGLIPtin 5 mg Tab [LINAGLIPTIN 5 MG TAB] Take 5 mg by mouth daily.       metoprolol tartrate (LOPRESSOR) 25 MG tablet [METOPROLOL TARTRATE (LOPRESSOR) 25 MG " "TABLET] Take 1 tablet (25 mg total) by mouth 2 (two) times a day. 180 tablet 3     miconazole, bulk, Powd [MICONAZOLE, BULK, POWD] Use As Directed 2 (two) times a day.       omeprazole (PRILOSEC) 20 MG capsule [OMEPRAZOLE (PRILOSEC) 20 MG CAPSULE] Take 20 mg by mouth daily before breakfast.        Saccharomyces boulardii (FLORASTOR) 250 mg capsule [SACCHAROMYCES BOULARDII (FLORASTOR) 250 MG CAPSULE] Take 250 mg by mouth daily.       sodium bicarbonate 650 MG tablet [SODIUM BICARBONATE 650 MG TABLET] Take 1 tablet (650 mg total) by mouth 2 (two) times a day. OTC product  0     triamcinolone acetonide 40 mg/mL Kit [TRIAMCINOLONE ACETONIDE 40 MG/ML KIT] Inject as directed. Inject 1 dose intramuscularly as needed for med x 2 bottles       {Providers Please double check the med list (in the plan section >> meds & orders tab) and Discontinue any of the meds flagged by the TC to be discontinued}  ***  Case Management:  I have reviewed the care plan and MDS and do agree with the plan. Patient's desire to return to the community is {FGS RETURN TO COMMUNITY:956331}. Information reviewed:  Medications, vital signs, orders, and nursing notes.    ROS:  {ROS FGS:369932}    Vitals:  BP (!) 158/74   Pulse 64   Temp (!) 96.4  F (35.8  C)   Resp 18   Ht 1.549 m (5' 1\")   Wt 112.5 kg (248 lb)   SpO2 97%   BMI 46.86 kg/m    Body mass index is 46.86 kg/m .  Exam:  {Nursing home physical exam :516906}    Lab/Diagnostic data:   {fgslab:380968}    ASSESSMENT/PLAN  {FGS DX:628809}    {fgsorders:394145}  ***    {fgstime1:361377}    Electronically signed by:  Nunu Rizvi***    {Providers Please double check the med list (in the plan section >> meds & orders tab) and Discontinue any of the meds flagged by the TC to be discontinued}            Sincerely,        Zoey Leung, CNP    "

## 2021-07-29 NOTE — LETTER
"    2021        RE: Jazmin Bauman  [address]  [place] [address]        [facility]  Chief Complaint   Patient presents with     assisted Regulatory     [facility] Record Number:  [id number]  Place of Service where encounter took place:  [facility] () [[id number]]      [facility]      NAME:  Jazmin Bauman             :  1955    MRN: [id number]    CODE STATUS:  FULL CODE    FACILITY: [facility] [[id number]]         CHIEF COMPLAIN/REASON FOR VISIT:  Chief Complaint   Patient presents with     Review Of Multiple Medical Conditions     Regulatory        HISTORY OF PRESENT ILLNESS: Jazmin Bauman is a 65 y.o. female. Pt was at [facility] from  to  and underwent a RBKA r/t ongoing DM ulcer with osteomyelitis. Per her EMR dc summary \" with HTN,morbid obesity, DM, A fib, CKD 4 presented for evaluation of R foot swelling, difficulty ambulation, pain found to have osteomyelitis now s/p amputation. She has now been to the OR twice this stay, last was on 20.   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   Patient had a chronic diabetic foot ulcer on her R heel, presented for increased swelling, difficulty ambulating, and pain. MRI showed extensive soft tissue necrosis and some osteomyelitis along with cellulitis  Met sepsis criteria on admission with leukocytosis and elevated CRP  Podiatry saw and the foot was not felt to be salvageable and BKA was recommended\"     August 15-: Hospitalized for sepsis with MRSA bacteremia, she had possible endocarditis and TTE revealing vegetation of the aortic valve. She had a CT of the abdomen and pelvis that showed left lower abdominal pannus ulceration but without abscess.  Her right BKA stump ulceration was negative for osteomyelitis or cellulitis.  She was treated with IV Vanco for 14 days which will be completed on .  Her " left lower abdominal ulceration has never been biopsied.  It was not biopsied in the hospital and there was no surgical intervention.  Her right BKA stump was negative for osteomyelitis.    CKD stage IV with baseline creatinine 1.9-2.0 now.   Her insulin was adjusted and she is now on sliding scale insulin with 17 units of Lantus at bedtime.     For her hypertension, Norvasc has been discontinued.  Blood pressures remain in the 130s over 70s on average.  She was incidentally found to have a lung nodule on CT scan; follow up CT was on December 1 and revealed resolution of the nodule.    Today: Seen today for regulatory visit for [employer]. Majority of visit was spent reviewing blood sugars and diet compliance. A1c up to 9.0 from previous 8.0. One year ago 10.3. She has had weight gain and improved PO intake. Will increase lantus and novolog. Also on tradjenta. She is looking into the details of returning to live at her mothers house but will need a ramp to get her W/C in due to BKA. C/o some itching under the stump. Otherwise she is feeling well. She is reporting in improved stool consistency and less frequency; had 2 rounds of vanco.   VS and weights reviewed. Weight up about 10lbs. Continues to follow with Dr Iqbal for CKD stage 4; recent kidney function stable.    Blood pressures 140-160s.     Allergies   Allergen Reactions     Hydralazine      Paralysis of legs     Latex Itching     Skin Burns     Naprosyn [Naproxen] Swelling     throat     Nitrofurantoin Hives     Sulfa (Sulfonamide Antibiotics) Rash     Vicks Vaporub [Camphor-Eucalyptus Oil-Menthol] Rash   :     Current Outpatient Medications   Medication Sig     linaGLIPtin 5 mg Tab Take 5 mg by mouth daily.     acetaminophen (TYLENOL) 500 MG tablet Take 2 tablets (1,000 mg total) by mouth 3 (three) times a day.     apixaban ANTICOAGULANT (ELIQUIS) 5 mg Tab tablet Take 5 mg by mouth 2 (two) times a day.     aspirin 81 MG EC tablet Take 81 mg by mouth  "daily.     BD ULTRA-FINE MICRO PEN NEEDLE 32 gauge x 1/4\" Ndle USE AS DIRECTED 4 TIMES DAILY.     ferrous sulfate 325 (65 FE) MG tablet Take 1 tablet by mouth daily with breakfast.      gabapentin (NEURONTIN) 100 MG capsule Take by mouth 2 (two) times a day. Takes 300 mg in am and 200 mg in the pm     hydrocortisone 1 % cream Apply topically 2 (two) times a day as needed.     HYDROmorphone (DILAUDID) 2 MG tablet (Take 2mg daily and 2mg two times a day prn     insulin lispro (HUMALOG) 100 unit/mL injection Inject 10 Units under the skin daily with breakfast.     lancets (ACCU-CHEK MULTICLIX LANCET) Misc TEST 6 TIMES A DAY     LANTUS SOLOSTAR U-100 INSULIN 100 unit/mL (3 mL) pen Inject 17 Units under the skin at bedtime. 11.65 Type 2 with hyperglycemia  Contact provider if insulin prescribed is not the preferred insulin per insurance.     lidocaine (LIDODERM) 5 % Place 1 patch on the skin daily. Remove & Discard patch within 12 hours or as directed by MD     LIDOCAINE HCL IM Inject into the shoulder, thigh, or buttocks. Inject 1 dose intramuscularly as needed for med 5ml bottle     metoprolol tartrate (LOPRESSOR) 25 MG tablet Take 1 tablet (25 mg total) by mouth 2 (two) times a day.     miconazole, bulk, Powd Use As Directed 2 (two) times a day.     omeprazole (PRILOSEC) 20 MG capsule Take 20 mg by mouth daily before breakfast.      Saccharomyces boulardii (FLORASTOR) 250 mg capsule Take 250 mg by mouth daily.     sodium bicarbonate 650 MG tablet Take 1 tablet (650 mg total) by mouth 2 (two) times a day. OTC product     triamcinolone acetonide 40 mg/mL Kit Inject as directed. Inject 1 dose intramuscularly as needed for med x 2 bottles         REVIEW OF SYSTEMS:    Currently, no fever, chills, or rigors. Does not have any visual or hearing problems. Denies any chest pain, headaches, palpitations, lightheadedness, dizziness, shortness of breath, or cough. Appetite is good. Denies any GERD symptoms. Denies any difficulty " "with swallowing, nausea, or vomiting.  Denies any abdominal pain, diarrhea or constipation. Denies any urinary symptoms, yeager in place. No insomnia. No active bleeding.   C/o itching under stump. Hyperglycemia.       PHYSICAL EXAMINATION:  Vitals:    05/20/21 1146   BP: 142/76   Pulse: 64   Resp: 18   Temp: 97.3  F (36.3  C)   SpO2: 96%   Weight: (!) 244 lb 9.6 oz (110.9 kg)   Height: 5' 1\" (1.549 m)         GENERAL: Awake, Alert, oriented x3, not in any form of acute distress, answers questions appropriately, follows simple commands, conversant with flat affect.  Patient did have PFT  HEENT: Head is normocephalic with normal hair distribution. No evidence of trauma. Ears: No acute purulent discharge. Eyes: Sclera anicteric.  LUNG: Clear to auscultation with good chest expansion.   BACK: ROM normal, no CVA tenderness.  CVS: There is good S1  S2,  rhythm is regular.  ABDOMEN: Globular and ROTUND.  Nontender with palpation. chronic abdominal wall wounds bilaterally that are healing well.  EXTREMITIES: UE Full ROM. Right BKA; now with prosthesis on.  SKIN: Warm and dry, no erythema noted, skin improving.    NEUROLOGICAL: The patient is oriented to person, place and time. Strength and sensation are grossly intact. Face is symmetric.    LABS:    Lab Results   Component Value Date    WBC 7.9 08/21/2020    HGB 9.2 (L) 08/21/2020    HCT 28.6 (L) 08/21/2020    MCV 89 08/21/2020     08/21/2020       Results for orders placed or performed during the hospital encounter of 08/15/20   Basic Metabolic Panel   Result Value Ref Range    Sodium 137 136 - 145 mmol/L    Potassium 4.1 3.5 - 5.0 mmol/L    Chloride 103 98 - 107 mmol/L    CO2 23 22 - 31 mmol/L    Anion Gap, Calculation 11 5 - 18 mmol/L    Glucose 178 (H) 70 - 125 mg/dL    Calcium 11.0 (H) 8.5 - 10.5 mg/dL    BUN 37 (H) 8 - 22 mg/dL    Creatinine 2.05 (H) 0.60 - 1.10 mg/dL    GFR MDRD Af Amer 29 (L) >60 mL/min/1.73m2    GFR MDRD Non Af Amer 24 (L) >60 mL/min/1.73m2 "           Lab Results   Component Value Date    HGBA1C 10.3 (H) 06/17/2020     Vitamin D, Total (25-Hydroxy)   Date Value Ref Range Status   04/28/2016 29.8 (L) 30.0 - 80.0 ng/mL Final     Lab Results   Component Value Date    VOPQMBCD56 285 01/07/2011       ASSESSMENT/PLAN:    (G89.4) Chronic pain syndrome  (primary encounter diagnosis)  (E11.69,  Z79.4) Type 2 diabetes mellitus with other specified complication, with long-term current use of insulin (H)  (I48.0) Paroxysmal atrial fibrillation (H)      Loose stool  C. difficile positive, recurrent: Stable.   -Continue Florastor.  -Completed vanco x 2.     DM2: UNSTABLE. Extensive review and conversation today. Recent a1c 9.0. Fasting sugars in the 300s. Post prandials often 300-350. She has also gained some weight. Suspect she is just feeling better and eating better; c-diff has cleared, no longer with nausea or abdominal discomfort..   -Increase lantus to 30 units at bedtime.   -Increase novolog to 12 units tid with meals.   -Recheck a1c in early October.   -Tradjenta 5mg daily.      Left abdominal wall wounds: Closed.     Osteoarthritis:   Right BKA: Hx of phantom limb pain. Improved. Unable to assess stump, in  and brace.   -Continues on Dilaudid which I have weaned drastically; using infrequently. Now down to once daily and bid prn.     Anticoagulated:  -Omeprazole 20 mg daily.      Essential hypertension: Unstable, occasional outliers greater than 140/90.  Consider adding amlodipine if bps do not improve.   -metoprolol 25 bid.     Acute on chronic blood loss anemia- likely from anemia chronic disease, iron deficiency, chronic kidney disease, surgery. On oral iron-most recent iron studies within normal limits.  Sees nephrologist this month.    CKD 4: Followed by Dr. Iqbal.  Calcium improved at 9.3.   -Sodium bicarb.     Coronary artery disease:  -also needs outpt CRISTINA eval   -Continue metoprolol and aspirin.       A. Fib: We discussed risks and benefits  of anticoagulation secondary to her A. fib, CAD, PAD, and diabetes.  We will continue monitoring for further bleeding.  -On Eliquis 5 mg twice daily .      Unintended weight loss: Resolved.    Itching under stump:   -okay for hydrocortizone 1% bid to stump until resolved.     Communicated concerns with patient, nursing.  Case Management:  I have reviewed the facility/SNF care plan/MDS which was done Quarterly, including the falls risk, nutrition and pain screening. I also reviewed the current immunizations, and preventive care.. Future cancer screening indicated is Mammogram, colonoscopy and provider and pt will formulate a POC.   Patient's desire to return to the community is present, but is not able due to care needs .       Electronically signed by:  Zoey Leung CNP  This progress note was completed using Dragon software and there may be grammatical errors.            Sincerely,        Zoey Leung CNP

## 2021-07-30 RX ORDER — HYDROMORPHONE HYDROCHLORIDE 2 MG/1
TABLET ORAL
Qty: 90 TABLET | Refills: 0 | Status: SHIPPED | OUTPATIENT
Start: 2021-07-30 | End: 2021-10-11

## 2021-08-03 PROBLEM — E66.01 MORBID OBESITY (H): Status: RESOLVED | Noted: 2020-09-24 | Resolved: 2021-03-01

## 2021-08-03 PROBLEM — T87.43 RIGHT BKA INFECTION (H): Status: RESOLVED | Noted: 2020-06-30 | Resolved: 2020-06-30

## 2021-09-01 ENCOUNTER — TELEPHONE (OUTPATIENT)
Dept: GERIATRICS | Facility: CLINIC | Age: 66
End: 2021-09-01

## 2021-09-01 NOTE — TELEPHONE ENCOUNTER
FGS Nurse Triage Telephone Note    Provider: BARRY Smith  Facility: [facility]  Facility Type:  [facility]    Caller: Mary  Call Back Number: [phone]    Allergies:    Allergies   Allergen Reactions     Hydralazine Unknown     Paralysis of legs     Latex Itching     Skin Burns     Naprosyn [Naproxen] Swelling     throat     Nitrofurantoin Hives     Sulfa (Sulfonamide Antibiotics) [Sulfa Drugs] Rash     Vicks Vaporub [Mentholatum Cherry Chest] Rash        Reason for call: Nurse calling to report that patient had one episode of loose stool over the night shift.  Patient has a history of C-diff.  Staff reports that the stool was foul smelling.  No fever or abdominal pain.      Verbal Order/Direction given by Provider: If patient has another episode of loose stools, send off a specimen to check for C-diff.      Provider giving Order:  BARRY Smith    Verbal Order given to: Mary Jacobsen RN

## 2021-09-03 ENCOUNTER — OFFICE VISIT (OUTPATIENT)
Dept: CARDIOLOGY | Facility: CLINIC | Age: 66
End: 2021-09-03
Payer: COMMERCIAL

## 2021-09-03 VITALS
HEART RATE: 68 BPM | RESPIRATION RATE: 16 BRPM | BODY MASS INDEX: 48.94 KG/M2 | WEIGHT: 259 LBS | SYSTOLIC BLOOD PRESSURE: 122 MMHG | DIASTOLIC BLOOD PRESSURE: 70 MMHG

## 2021-09-03 DIAGNOSIS — I10 ESSENTIAL HYPERTENSION: ICD-10-CM

## 2021-09-03 DIAGNOSIS — I48.0 PAROXYSMAL ATRIAL FIBRILLATION (H): Primary | ICD-10-CM

## 2021-09-03 PROCEDURE — 99214 OFFICE O/P EST MOD 30 MIN: CPT | Performed by: INTERNAL MEDICINE

## 2021-09-03 NOTE — LETTER
9/3/2021    Zoey Leung, CNP  [address] [facility] [address] 1st Floor  [facility] [address]    RE: Jazmin Bauman       Dear Colleague,    I had the pleasure of seeing Jazmin Bauman in the [facility].              Assessment/Plan:   1.  Paroxysmal atrial fibrillation: The patient is in normal sinus rhythm at this time.  Continue metoprolol 25 mg twice a day.  Her [id number] score is 3.    Continue chronic anticoagulation Eliquis 5 mg twice a day.  Probably there is no additional benefit for actual aspirin 81 mg daily which is discontinued today.      2.  Essential hypertension: Her blood pressure is controlled with metoprolol 25 mg twice a day.  Amlodipine was discontinued.     3.  Type 2 diabetes, morbid obesity: Stable, no change in treatment.    Thank you for the opportunity to be involved in the care of Jazmin Bauman. If you have any questions, please feel free to contact me.  I will see the patient again in 12 months and as needed.    Much or all of the text in this note was generated through the use of Dragon Dictate voice-to-text software. Errors in spelling or words which seem out of context are unintentional. Sound alike errors, in particular, may have escaped editing.       History of Present Illness:   It is my pleasure to see Jazmin Bauman at the [facility] clinic for routine cardiology follow up.  Jazmin Bauman is a 66 year old female with a medical history of paroxysmal atrial fibrillation, morbid obesity, essential hypertension, type 2 diabetes, stage III CKD, right AKA.    She states that she has no chest pain, shortness of breath.  Her dyspnea on exertion has been stable.  She has no palpitations, dizziness, orthopnea.  She has no left leg edema.  Her blood pressure and heart rate are controlled.  She has been on aspirin and Eliquis.  She complains of sometimes nose bleeding.    Past Medical History:      Patient Active Problem List   Diagnosis     Carpal Tunnel Syndrome     Urinary Tract Infection     Endometrial Hyperplasia     Cholelithiasis     Leukocytosis     Urine Tests Nonspecific Abnormal Findings     Obesity     Essential hypertension     Pernicious Anemia     Immunology Studies Raised Immunoglobulin Level     Vaginal bleeding     Type 2 diabetes mellitus with other specified complication, with long-term current use of insulin (H)     Atrial fibrillation with RVR (H)     Acute kidney injury superimposed on CKD (H)     Non-traumatic rhabdomyolysis     Metabolic acidosis     Troponin level elevated     Bacteremia     Acute respiratory failure with hypoxia (H)     Acute encephalopathy     Acute pulmonary edema (H)     Wheezing     Moderate protein malnutrition (H)     Abnormal cytological findings in specimens from other female genital organs     Chronic kidney disease, stage III (moderate)     Hyperkalemia     Osteoarthritis     Acute kidney injury (H)     Sepsis (H)     Sepsis, due to unspecified organism, unspecified whether acute organ dysfunction present (H)     Cellulitis of right foot     CKD (chronic kidney disease) stage 4, GFR 15-29 ml/min (H)     Chronic wound infection of abdomen, subsequent encounter     Acute post-operative pain     Phantom limb pain (H)     Paroxysmal atrial fibrillation (H)     Urinary retention     Coronary artery disease without angina pectoris     Hx of right BKA (H)     Weakness     Slow transit constipation     Wound infection     Fever and chills     Acute pain of left shoulder     Physical deconditioning     Gram-positive bacteremia     Chronic pain syndrome     MRSA bacteremia     Hypercalcemia     Paronychia of great toe, left     Below-knee amputation (H)     Other depression     Unintended weight loss     2019 novel coronavirus disease (COVID-19)     Frequent loose stools     Anticoagulated     Primary osteoarthritis of both knees     Asthma     Wellness  examination     Clostridium difficile infection     Morbid obesity (H)       Past Surgical History:     Past Surgical History:   Procedure Laterality Date     AMPUTATE LEG BELOW KNEE Right 6/18/2020    Procedure: AMPUTATION, BELOW KNEE;  Surgeon: Epi Corcoran MD;  Location: [facility] OR;  Service: General     AMPUTATE LEG BELOW KNEE Right 6/23/2020    Procedure: REVISION AMPUTATION, BELOW KNEE;  Surgeon: Epi Corcoran MD;  Location: [facility] OR;  Service: General     CHOLECYSTECTOMY       COLONOSCOPY N/A 9/11/2017    Procedure: COLONOSCOPY;  Surgeon: Tyler Bhakta MD;  Location: [facility] GI;  Service:      DILATION AND CURETTAGE       DILATION AND CURETTAGE, OPERATIVE HYSTEROSCOPY, COMBINED N/A 1/28/2015    Procedure: DILATION AND CURETTAGE WITH HYSTEROSCOPY;  Surgeon: Grant Beal MD;  Location: [facility];  Service:      IR CVC NON TUNNEL PLACEMENT  1/21/2020     IR NON TUNNELED CATHETER >5 YEARS  1/21/2020     OTHER SURGICAL HISTORY  2009    bilateral carpal tunnel release     PICC  1/20/2020          PICC  8/21/2020          OR HYSTEROSCOPY,W/ENDO BX N/A 9/5/2019    Procedure: HYSTEROSCOPY, DILATION AND CURETTAGE;  Surgeon: Grant Beal MD;  [place] [facility]  Service: [place]necology     OR HYSTEROSCOPY,W/ENDO BX N/A 12/9/2019    Procedure: HYSTEROSCOPY, DILATION AND CURETTAGE;  Surgeon: Grant Beal MD;  [place] [facility]  Service: Gynecology       Family History:     Family History   Problem Relation Age of Onset     Colon Cancer Father         Social History:    reports that she has never smoked. She has never used smokeless tobacco. She reports previous alcohol use. She reports that she does not use drugs.    Review of Systems:   12 systems are reviewed negative except for in HPI.    Meds:     Current Outpatient Medications:      acetaminophen (TYLENOL) 500 MG tablet, [ACETAMINOPHEN (TYLENOL) 500 MG TABLET] Take 2 tablets (1,000 mg  "total) by mouth 3 (three) times a day., Disp: , Rfl: 0     apixaban ANTICOAGULANT (ELIQUIS) 5 mg Tab tablet, [APIXABAN ANTICOAGULANT (ELIQUIS) 5 MG TAB TABLET] Take 5 mg by mouth 2 (two) times a day., Disp: , Rfl:      BD ULTRA-FINE MICRO PEN NEEDLE 32 gauge x 1/4\" Ndle, [BD ULTRA-FINE MICRO PEN NEEDLE 32 GAUGE X 1/4\" NDLE] USE AS DIRECTED 4 TIMES DAILY., Disp: 360 each, Rfl: 3     calcium polycarbophil (FIBERCON) 625 MG tablet, Take 2 tablets by mouth daily, Disp: , Rfl:      ferrous sulfate 325 (65 FE) MG tablet, [FERROUS SULFATE 325 (65 FE) MG TABLET] Take 1 tablet by mouth daily with breakfast. , Disp: , Rfl:      gabapentin (NEURONTIN) 100 MG capsule, Take 200 mg by mouth every evening , Disp: , Rfl:      gabapentin (NEURONTIN) 300 MG capsule, Take 300 mg by mouth every morning, Disp: , Rfl:      hydrocortisone 1 % cream, [HYDROCORTISONE 1 % CREAM] Apply topically 2 (two) times a day as needed., Disp: , Rfl:      HYDROmorphone (DILAUDID) 2 MG tablet, [HYDROMORPHONE (DILAUDID) 2 MG TABLET] (Take 2mg daily and 2mg two times a day prn, Disp: 90 tablet, Rfl: 0     insulin aspart (NOVOLOG FLEXPEN) 100 UNIT/ML pen, Inject 10 Units Subcutaneous 2 times daily (with meals), Disp: , Rfl:      insulin aspart (NOVOLOG FLEXPEN) 100 UNIT/ML pen, Inject 7 Units Subcutaneous daily (with breakfast), Disp: , Rfl:      insulin lispro (HUMALOG) 100 unit/mL injection, Inject 10 Units Subcutaneous 2 times daily (with meals) , Disp: , Rfl:      lancets (ACCU-CHEK MULTICLIX LANCET) Misc, [LANCETS (ACCU-CHEK MULTICLIX LANCET) MISC] TEST 6 TIMES A DAY, Disp: 200 each, Rfl: 11     LANTUS SOLOSTAR U-100 INSULIN 100 unit/mL (3 mL) pen, [LANTUS SOLOSTAR U-100 INSULIN 100 UNIT/ML (3 ML) PEN] Inject 22 Units under the skin at bedtime. 11.65 Type 2 with hyperglycemia  Contact provider if insulin prescribed is not the preferred insulin per insurance., Disp: 15 mL, Rfl: 0     Lidocaine (LIDOCARE) 4 % Patch, Place 1 patch onto the skin every 24 " hours To prevent lidocaine toxicity, patient should be patch free for 12 hrs daily., Disp: , Rfl:      lidocaine (LIDODERM) 5 %, [LIDOCAINE (LIDODERM) 5 %] Place 1 patch on the skin daily. Remove & Discard patch within 12 hours or as directed by MD, Disp: , Rfl:      LIDOCAINE HCL IM, [LIDOCAINE HCL IM] Inject into the shoulder, thigh, or buttocks. Inject 1 dose intramuscularly as needed for med 5ml bottle, Disp: , Rfl:      linaGLIPtin 5 mg Tab, [LINAGLIPTIN 5 MG TAB] Take 5 mg by mouth daily., Disp: , Rfl:      metoprolol tartrate (LOPRESSOR) 25 MG tablet, [METOPROLOL TARTRATE (LOPRESSOR) 25 MG TABLET] Take 1 tablet (25 mg total) by mouth 2 (two) times a day., Disp: 180 tablet, Rfl: 3     miconazole, bulk, Powd, [MICONAZOLE, BULK, POWD] Use As Directed 2 (two) times a day., Disp: , Rfl:      omeprazole (PRILOSEC) 20 MG capsule, [OMEPRAZOLE (PRILOSEC) 20 MG CAPSULE] Take 20 mg by mouth daily before breakfast. , Disp: , Rfl:      Saccharomyces boulardii (FLORASTOR) 250 mg capsule, [SACCHAROMYCES BOULARDII (FLORASTOR) 250 MG CAPSULE] Take 250 mg by mouth daily., Disp: , Rfl:      sodium bicarbonate 650 MG tablet, [SODIUM BICARBONATE 650 MG TABLET] Take 1 tablet (650 mg total) by mouth 2 (two) times a day. OTC product, Disp: , Rfl: 0     triamcinolone acetonide 40 mg/mL Kit, [TRIAMCINOLONE ACETONIDE 40 MG/ML KIT] Inject as directed. Inject 1 dose intramuscularly as needed for med x 2 bottles, Disp: , Rfl:     Allergies:   Hydralazine, Latex, Naprosyn [naproxen], Nitrofurantoin, Sulfa (sulfonamide antibiotics) [sulfa drugs], and Vicks vaporub [mentholatum cherry chest]      Objective:      Physical Exam  117.5 kg (259 lb)     Body mass index is 48.94 kg/m .  /70 (BP Location: Left arm, Patient Position: Sitting, Cuff Size: Adult Large)   Pulse 68   Resp 16   Wt 117.5 kg (259 lb)   BMI 48.94 kg/m      General Appearance:   Awake, Alert, No acute distress.   HEENT:  Pupil equal and reactive to light. No scleral  icterus; the mucous membranes were moist.   Neck: No cervical bruits. No JVD. No thyromegaly.     Chest: The spine was straight. The chest was symmetric.   Lungs:   Respirations unlabored; Lungs are clear to auscultation. No crackles. No wheezing.   Cardiovascular:   Regular rhythm and rate, normal first and second heart sounds with no murmurs. No rubs or gallops.    Abdomen:  Obese. Soft. No tenderness. Non-distended. Bowels sounds are present   Extremities: Right AKA. No left leg edema.   Skin: No rashes or ulcers. Warm, Dry.   Musculoskeletal: No tenderness. No deformity.   Neurologic: Mood and affect are appropriate. No focal deficits.         EKG: Personally reviewed  Normal sinus rhythm   Left axis deviation   Abnormal ECG   When compared with ECG of 20-JAN-2020 19:34,   Sinus rhythm has replaced Atrial fibrillation     Cardiac Imaging Studies  ECHO on 1-:    Technically difficult study. When compared to the previous study dated 7/19/2017, no significant change.    Left ventricle ejection fraction is normal. The calculated left ventricular ejection fraction is 63%.    Normal left ventricular size and systolic function.    TAPSE is abnormal, which is consistent with abnormal right ventricular systolic function.    Mild aortic regurgitation.    The ascending aorta is mildly dilated.     ECHO on 6-:    Normal left ventricular size and systolic function.    Left ventricle ejection fraction is normal. The calculated left ventricular ejection fraction is 61%.    Normal right ventricular size and systolic function.    No hemodynamically significant valvular heart abnormalities.    When compared to the previous study dated 1/21/2020, normal RV function on current study.     Nuclear stress test on 7-:     The nuclear stress test is probably negative for inducible myocardial ischemia or infarction.     The patient is at a low risk of future cardiac ischemic events.     The left ventricular ejection  fraction at stress is 66%.     The  images demonstrate breast attenuation as well as subdiaphragmatic RAMP artifacts.     There is no prior study for comparison.    Lab Review   Lab Results   Component Value Date     08/20/2020    CO2 22 08/20/2020    BUN 25 08/20/2020     Lab Results   Component Value Date    WBC 7.9 08/21/2020    HGB 9.2 08/21/2020    HCT 28.6 08/21/2020    MCV 89 08/21/2020     08/21/2020     Lab Results   Component Value Date    CHOL 137 02/27/2020    CHOL 142 10/25/2019    CHOL 129 08/30/2019     Lab Results   Component Value Date    HDL 30 (L) 02/27/2020    HDL 41 (L) 10/25/2019    HDL 35 (L) 08/30/2019     No components found for: LDLCALC  Lab Results   Component Value Date    TRIG 204 (H) 02/27/2020    TRIG 136 10/25/2019    TRIG 128 08/30/2019     No components found for: CHOLHDL  Lab Results   Component Value Date    TROPONINI <0.01 08/17/2020     Lab Results   Component Value Date     01/20/2020     Lab Results   Component Value Date    TSH 1.83 06/17/2020                 Thank you for allowing me to participate in the care of your patient.      Sincerely,     Alejandrina Patel MD     [facility]  cc:   No referring provider defined for this encounter.

## 2021-09-03 NOTE — PROGRESS NOTES
Assessment/Plan:   1.  Paroxysmal atrial fibrillation: The patient is in normal sinus rhythm at this time.  Continue metoprolol 25 mg twice a day.  Her [id number] score is 3.    Continue chronic anticoagulation Eliquis 5 mg twice a day.  Probably there is no additional benefit for actual aspirin 81 mg daily which is discontinued today.      2.  Essential hypertension: Her blood pressure is controlled with metoprolol 25 mg twice a day.  Amlodipine was discontinued.     3.  Type 2 diabetes, morbid obesity: Stable, no change in treatment.    Thank you for the opportunity to be involved in the care of Jazmin Bauman. If you have any questions, please feel free to contact me.  I will see the patient again in 12 months and as needed.    Much or all of the text in this note was generated through the use of Dragon Dictate voice-to-text software. Errors in spelling or words which seem out of context are unintentional. Sound alike errors, in particular, may have escaped editing.       History of Present Illness:   It is my pleasure to see Jazmin Bauman at the [facility] clinic for routine cardiology follow up.  Jazmin Bauman is a 66 year old female with a medical history of paroxysmal atrial fibrillation, morbid obesity, essential hypertension, type 2 diabetes, stage III CKD, right AKA.    She states that she has no chest pain, shortness of breath.  Her dyspnea on exertion has been stable.  She has no palpitations, dizziness, orthopnea.  She has no left leg edema.  Her blood pressure and heart rate are controlled.  She has been on aspirin and Eliquis.  She complains of sometimes nose bleeding.    Past Medical History:     Patient Active Problem List   Diagnosis     Carpal Tunnel Syndrome     Urinary Tract Infection     Endometrial Hyperplasia     Cholelithiasis     Leukocytosis     Urine Tests Nonspecific Abnormal Findings     Obesity     Essential hypertension     Pernicious Anemia      Immunology Studies Raised Immunoglobulin Level     Vaginal bleeding     Type 2 diabetes mellitus with other specified complication, with long-term current use of insulin (H)     Atrial fibrillation with RVR (H)     Acute kidney injury superimposed on CKD (H)     Non-traumatic rhabdomyolysis     Metabolic acidosis     Troponin level elevated     Bacteremia     Acute respiratory failure with hypoxia (H)     Acute encephalopathy     Acute pulmonary edema (H)     Wheezing     Moderate protein malnutrition (H)     Abnormal cytological findings in specimens from other female genital organs     Chronic kidney disease, stage III (moderate)     Hyperkalemia     Osteoarthritis     Acute kidney injury (H)     Sepsis (H)     Sepsis, due to unspecified organism, unspecified whether acute organ dysfunction present (H)     Cellulitis of right foot     CKD (chronic kidney disease) stage 4, GFR 15-29 ml/min (H)     Chronic wound infection of abdomen, subsequent encounter     Acute post-operative pain     Phantom limb pain (H)     Paroxysmal atrial fibrillation (H)     Urinary retention     Coronary artery disease without angina pectoris     Hx of right BKA (H)     Weakness     Slow transit constipation     Wound infection     Fever and chills     Acute pain of left shoulder     Physical deconditioning     Gram-positive bacteremia     Chronic pain syndrome     MRSA bacteremia     Hypercalcemia     Paronychia of great toe, left     Below-knee amputation (H)     Other depression     Unintended weight loss     2019 novel coronavirus disease (COVID-19)     Frequent loose stools     Anticoagulated     Primary osteoarthritis of both knees     Asthma     Wellness examination     Clostridium difficile infection     Morbid obesity (H)       Past Surgical History:     Past Surgical History:   Procedure Laterality Date     AMPUTATE LEG BELOW KNEE Right 6/18/2020    Procedure: AMPUTATION, BELOW KNEE;  Surgeon: Epi Corcoran MD;   "Location: [facility] OR;  Service: General     AMPUTATE LEG BELOW KNEE Right 6/23/2020    Procedure: REVISION AMPUTATION, BELOW KNEE;  Surgeon: Epi Corcoran MD;  Location: [facility] OR;  Service: General     CHOLECYSTECTOMY       COLONOSCOPY N/A 9/11/2017    Procedure: COLONOSCOPY;  Surgeon: Tyler Bhakta MD;  Location: [facility] GI;  Service:      DILATION AND CURETTAGE       DILATION AND CURETTAGE, OPERATIVE HYSTEROSCOPY, COMBINED N/A 1/28/2015    Procedure: DILATION AND CURETTAGE WITH HYSTEROSCOPY;  Surgeon: Grant Beal MD;  Location: [facility] OR;  Service:      IR CVC NON TUNNEL PLACEMENT  1/21/2020     IR NON TUNNELED CATHETER >5 YEARS  1/21/2020     OTHER SURGICAL HISTORY  2009    bilateral carpal tunnel release     PICC  1/20/2020          PICC  8/21/2020          MA HYSTEROSCOPY,W/ENDO BX N/A 9/5/2019    Procedure: HYSTEROSCOPY, DILATION AND CURETTAGE;  Surgeon: Grant Beal MD;  Location: [facility] OR;  Service: Gynecology     MA HYSTEROSCOPY,W/ENDO BX N/A 12/9/2019    Procedure: HYSTEROSCOPY, DILATION AND CURETTAGE;  Surgeon: Gratn Beal MD;  Location: [facility];  Service: Gynecology       Family History:     Family History   Problem Relation Age of Onset     Colon Cancer Father         Social History:    reports that she has never smoked. She has never used smokeless tobacco. She reports previous alcohol use. She reports that she does not use drugs.    Review of Systems:   12 systems are reviewed negative except for in HPI.    Meds:     Current Outpatient Medications:      acetaminophen (TYLENOL) 500 MG tablet, [ACETAMINOPHEN (TYLENOL) 500 MG TABLET] Take 2 tablets (1,000 mg total) by mouth 3 (three) times a day., Disp: , Rfl: 0     apixaban ANTICOAGULANT (ELIQUIS) 5 mg Tab tablet, [APIXABAN ANTICOAGULANT (ELIQUIS) 5 MG TAB TABLET] Take 5 mg by mouth 2 (two) times a day., Disp: , Rfl:      BD ULTRA-FINE MICRO PEN NEEDLE 32 gauge x 1/4\" Ndle, [BD " "ULTRA-FINE MICRO PEN NEEDLE 32 GAUGE X 1/4\" NDLE] USE AS DIRECTED 4 TIMES DAILY., Disp: 360 each, Rfl: 3     calcium polycarbophil (FIBERCON) 625 MG tablet, Take 2 tablets by mouth daily, Disp: , Rfl:      ferrous sulfate 325 (65 FE) MG tablet, [FERROUS SULFATE 325 (65 FE) MG TABLET] Take 1 tablet by mouth daily with breakfast. , Disp: , Rfl:      gabapentin (NEURONTIN) 100 MG capsule, Take 200 mg by mouth every evening , Disp: , Rfl:      gabapentin (NEURONTIN) 300 MG capsule, Take 300 mg by mouth every morning, Disp: , Rfl:      hydrocortisone 1 % cream, [HYDROCORTISONE 1 % CREAM] Apply topically 2 (two) times a day as needed., Disp: , Rfl:      HYDROmorphone (DILAUDID) 2 MG tablet, [HYDROMORPHONE (DILAUDID) 2 MG TABLET] (Take 2mg daily and 2mg two times a day prn, Disp: 90 tablet, Rfl: 0     insulin aspart (NOVOLOG FLEXPEN) 100 UNIT/ML pen, Inject 10 Units Subcutaneous 2 times daily (with meals), Disp: , Rfl:      insulin aspart (NOVOLOG FLEXPEN) 100 UNIT/ML pen, Inject 7 Units Subcutaneous daily (with breakfast), Disp: , Rfl:      insulin lispro (HUMALOG) 100 unit/mL injection, Inject 10 Units Subcutaneous 2 times daily (with meals) , Disp: , Rfl:      lancets (ACCU-CHEK MULTICLIX LANCET) Misc, [LANCETS (ACCU-CHEK MULTICLIX LANCET) MISC] TEST 6 TIMES A DAY, Disp: 200 each, Rfl: 11     LANTUS SOLOSTAR U-100 INSULIN 100 unit/mL (3 mL) pen, [LANTUS SOLOSTAR U-100 INSULIN 100 UNIT/ML (3 ML) PEN] Inject 22 Units under the skin at bedtime. 11.65 Type 2 with hyperglycemia  Contact provider if insulin prescribed is not the preferred insulin per insurance., Disp: 15 mL, Rfl: 0     Lidocaine (LIDOCARE) 4 % Patch, Place 1 patch onto the skin every 24 hours To prevent lidocaine toxicity, patient should be patch free for 12 hrs daily., Disp: , Rfl:      lidocaine (LIDODERM) 5 %, [LIDOCAINE (LIDODERM) 5 %] Place 1 patch on the skin daily. Remove & Discard patch within 12 hours or as directed by MD, Disp: , Rfl:      LIDOCAINE " HCL IM, [LIDOCAINE HCL IM] Inject into the shoulder, thigh, or buttocks. Inject 1 dose intramuscularly as needed for med 5ml bottle, Disp: , Rfl:      linaGLIPtin 5 mg Tab, [LINAGLIPTIN 5 MG TAB] Take 5 mg by mouth daily., Disp: , Rfl:      metoprolol tartrate (LOPRESSOR) 25 MG tablet, [METOPROLOL TARTRATE (LOPRESSOR) 25 MG TABLET] Take 1 tablet (25 mg total) by mouth 2 (two) times a day., Disp: 180 tablet, Rfl: 3     miconazole, bulk, Powd, [MICONAZOLE, BULK, POWD] Use As Directed 2 (two) times a day., Disp: , Rfl:      omeprazole (PRILOSEC) 20 MG capsule, [OMEPRAZOLE (PRILOSEC) 20 MG CAPSULE] Take 20 mg by mouth daily before breakfast. , Disp: , Rfl:      Saccharomyces boulardii (FLORASTOR) 250 mg capsule, [SACCHAROMYCES BOULARDII (FLORASTOR) 250 MG CAPSULE] Take 250 mg by mouth daily., Disp: , Rfl:      sodium bicarbonate 650 MG tablet, [SODIUM BICARBONATE 650 MG TABLET] Take 1 tablet (650 mg total) by mouth 2 (two) times a day. OTC product, Disp: , Rfl: 0     triamcinolone acetonide 40 mg/mL Kit, [TRIAMCINOLONE ACETONIDE 40 MG/ML KIT] Inject as directed. Inject 1 dose intramuscularly as needed for med x 2 bottles, Disp: , Rfl:     Allergies:   Hydralazine, Latex, Naprosyn [naproxen], Nitrofurantoin, Sulfa (sulfonamide antibiotics) [sulfa drugs], and Vicks vaporub [mentholatum cherry chest]      Objective:      Physical Exam  117.5 kg (259 lb)     Body mass index is 48.94 kg/m .  /70 (BP Location: Left arm, Patient Position: Sitting, Cuff Size: Adult Large)   Pulse 68   Resp 16   Wt 117.5 kg (259 lb)   BMI 48.94 kg/m      General Appearance:   Awake, Alert, No acute distress.   HEENT:  Pupil equal and reactive to light. No scleral icterus; the mucous membranes were moist.   Neck: No cervical bruits. No JVD. No thyromegaly.     Chest: The spine was straight. The chest was symmetric.   Lungs:   Respirations unlabored; Lungs are clear to auscultation. No crackles. No wheezing.   Cardiovascular:   Regular  rhythm and rate, normal first and second heart sounds with no murmurs. No rubs or gallops.    Abdomen:  Obese. Soft. No tenderness. Non-distended. Bowels sounds are present   Extremities: Right AKA. No left leg edema.   Skin: No rashes or ulcers. Warm, Dry.   Musculoskeletal: No tenderness. No deformity.   Neurologic: Mood and affect are appropriate. No focal deficits.         EKG: Personally reviewed  Normal sinus rhythm   Left axis deviation   Abnormal ECG   When compared with ECG of 20-JAN-2020 19:34,   Sinus rhythm has replaced Atrial fibrillation     Cardiac Imaging Studies  ECHO on 1-:    Technically difficult study. When compared to the previous study dated 7/19/2017, no significant change.    Left ventricle ejection fraction is normal. The calculated left ventricular ejection fraction is 63%.    Normal left ventricular size and systolic function.    TAPSE is abnormal, which is consistent with abnormal right ventricular systolic function.    Mild aortic regurgitation.    The ascending aorta is mildly dilated.     ECHO on 6-:    Normal left ventricular size and systolic function.    Left ventricle ejection fraction is normal. The calculated left ventricular ejection fraction is 61%.    Normal right ventricular size and systolic function.    No hemodynamically significant valvular heart abnormalities.    When compared to the previous study dated 1/21/2020, normal RV function on current study.     Nuclear stress test on 7-:     The nuclear stress test is probably negative for inducible myocardial ischemia or infarction.     The patient is at a low risk of future cardiac ischemic events.     The left ventricular ejection fraction at stress is 66%.     The  images demonstrate breast attenuation as well as subdiaphragmatic RAMP artifacts.     There is no prior study for comparison.    Lab Review   Lab Results   Component Value Date     08/20/2020    CO2 22 08/20/2020    BUN  25 08/20/2020     Lab Results   Component Value Date    WBC 7.9 08/21/2020    HGB 9.2 08/21/2020    HCT 28.6 08/21/2020    MCV 89 08/21/2020     08/21/2020     Lab Results   Component Value Date    CHOL 137 02/27/2020    CHOL 142 10/25/2019    CHOL 129 08/30/2019     Lab Results   Component Value Date    HDL 30 (L) 02/27/2020    HDL 41 (L) 10/25/2019    HDL 35 (L) 08/30/2019     No components found for: LDLCALC  Lab Results   Component Value Date    TRIG 204 (H) 02/27/2020    TRIG 136 10/25/2019    TRIG 128 08/30/2019     No components found for: CHOLHDL  Lab Results   Component Value Date    TROPONINI <0.01 08/17/2020     Lab Results   Component Value Date     01/20/2020     Lab Results   Component Value Date    TSH 1.83 06/17/2020

## 2021-10-05 DIAGNOSIS — Z11.59 ENCOUNTER FOR SCREENING FOR OTHER VIRAL DISEASES: ICD-10-CM

## 2021-10-11 ENCOUNTER — ANESTHESIA EVENT (OUTPATIENT)
Dept: SURGERY | Facility: CLINIC | Age: 66
End: 2021-10-11
Payer: COMMERCIAL

## 2021-10-11 RX ORDER — FENTANYL CITRATE 50 UG/ML
25 INJECTION, SOLUTION INTRAMUSCULAR; INTRAVENOUS EVERY 5 MIN PRN
Status: CANCELLED | OUTPATIENT
Start: 2021-10-11

## 2021-10-11 RX ORDER — HYDROMORPHONE HCL IN WATER/PF 6 MG/30 ML
0.2 PATIENT CONTROLLED ANALGESIA SYRINGE INTRAVENOUS EVERY 5 MIN PRN
Status: CANCELLED | OUTPATIENT
Start: 2021-10-11

## 2021-10-12 ENCOUNTER — HOSPITAL ENCOUNTER (OUTPATIENT)
Facility: CLINIC | Age: 66
Discharge: NURSING, REHAB OR LONG-TERM CARE | End: 2021-10-12
Attending: OBSTETRICS & GYNECOLOGY | Admitting: OBSTETRICS & GYNECOLOGY
Payer: COMMERCIAL

## 2021-10-12 ENCOUNTER — ANESTHESIA (OUTPATIENT)
Dept: SURGERY | Facility: CLINIC | Age: 66
End: 2021-10-12
Payer: COMMERCIAL

## 2021-10-12 VITALS
RESPIRATION RATE: 20 BRPM | TEMPERATURE: 97.8 F | SYSTOLIC BLOOD PRESSURE: 150 MMHG | BODY MASS INDEX: 47.24 KG/M2 | DIASTOLIC BLOOD PRESSURE: 61 MMHG | WEIGHT: 250 LBS | OXYGEN SATURATION: 98 %

## 2021-10-12 DIAGNOSIS — Z79.01 ANTICOAGULATED: Primary | ICD-10-CM

## 2021-10-12 LAB — GLUCOSE BLDC GLUCOMTR-MCNC: 175 MG/DL (ref 70–99)

## 2021-10-12 PROCEDURE — 88305 TISSUE EXAM BY PATHOLOGIST: CPT | Mod: TC | Performed by: OBSTETRICS & GYNECOLOGY

## 2021-10-12 PROCEDURE — 258N000003 HC RX IP 258 OP 636: Performed by: NURSE ANESTHETIST, CERTIFIED REGISTERED

## 2021-10-12 PROCEDURE — 370N000017 HC ANESTHESIA TECHNICAL FEE, PER MIN: Performed by: OBSTETRICS & GYNECOLOGY

## 2021-10-12 PROCEDURE — 250N000009 HC RX 250: Performed by: OBSTETRICS & GYNECOLOGY

## 2021-10-12 PROCEDURE — 250N000009 HC RX 250: Performed by: NURSE ANESTHETIST, CERTIFIED REGISTERED

## 2021-10-12 PROCEDURE — 258N000003 HC RX IP 258 OP 636: Performed by: ANESTHESIOLOGY

## 2021-10-12 PROCEDURE — 272N000001 HC OR GENERAL SUPPLY STERILE: Performed by: OBSTETRICS & GYNECOLOGY

## 2021-10-12 PROCEDURE — 360N000076 HC SURGERY LEVEL 3, PER MIN: Performed by: OBSTETRICS & GYNECOLOGY

## 2021-10-12 PROCEDURE — 999N000141 HC STATISTIC PRE-PROCEDURE NURSING ASSESSMENT: Performed by: OBSTETRICS & GYNECOLOGY

## 2021-10-12 PROCEDURE — 250N000013 HC RX MED GY IP 250 OP 250 PS 637: Performed by: PHYSICIAN ASSISTANT

## 2021-10-12 PROCEDURE — 710N000012 HC RECOVERY PHASE 2, PER MINUTE: Performed by: OBSTETRICS & GYNECOLOGY

## 2021-10-12 PROCEDURE — 250N000011 HC RX IP 250 OP 636: Performed by: NURSE ANESTHETIST, CERTIFIED REGISTERED

## 2021-10-12 PROCEDURE — 82962 GLUCOSE BLOOD TEST: CPT

## 2021-10-12 RX ORDER — PROPOFOL 10 MG/ML
INJECTION, EMULSION INTRAVENOUS PRN
Status: DISCONTINUED | OUTPATIENT
Start: 2021-10-12 | End: 2021-10-12

## 2021-10-12 RX ORDER — DEXAMETHASONE SODIUM PHOSPHATE 4 MG/ML
INJECTION, SOLUTION INTRA-ARTICULAR; INTRALESIONAL; INTRAMUSCULAR; INTRAVENOUS; SOFT TISSUE PRN
Status: DISCONTINUED | OUTPATIENT
Start: 2021-10-12 | End: 2021-10-12

## 2021-10-12 RX ORDER — FENTANYL CITRATE 50 UG/ML
INJECTION, SOLUTION INTRAMUSCULAR; INTRAVENOUS PRN
Status: DISCONTINUED | OUTPATIENT
Start: 2021-10-12 | End: 2021-10-12

## 2021-10-12 RX ORDER — SODIUM CHLORIDE, SODIUM LACTATE, POTASSIUM CHLORIDE, CALCIUM CHLORIDE 600; 310; 30; 20 MG/100ML; MG/100ML; MG/100ML; MG/100ML
INJECTION, SOLUTION INTRAVENOUS CONTINUOUS PRN
Status: DISCONTINUED | OUTPATIENT
Start: 2021-10-12 | End: 2021-10-12

## 2021-10-12 RX ORDER — PROPOFOL 10 MG/ML
INJECTION, EMULSION INTRAVENOUS CONTINUOUS PRN
Status: DISCONTINUED | OUTPATIENT
Start: 2021-10-12 | End: 2021-10-12

## 2021-10-12 RX ORDER — OXYCODONE HYDROCHLORIDE 5 MG/1
5 TABLET ORAL EVERY 4 HOURS PRN
Status: DISCONTINUED | OUTPATIENT
Start: 2021-10-12 | End: 2021-10-12 | Stop reason: HOSPADM

## 2021-10-12 RX ORDER — OXYCODONE HYDROCHLORIDE 5 MG/1
5 TABLET ORAL
Status: DISCONTINUED | OUTPATIENT
Start: 2021-10-12 | End: 2021-10-12 | Stop reason: HOSPADM

## 2021-10-12 RX ORDER — HYDROMORPHONE HYDROCHLORIDE 2 MG/1
2 TABLET ORAL DAILY
Status: ON HOLD | COMMUNITY
End: 2022-06-27

## 2021-10-12 RX ORDER — LIDOCAINE 40 MG/G
CREAM TOPICAL
Status: DISCONTINUED | OUTPATIENT
Start: 2021-10-12 | End: 2021-10-12 | Stop reason: HOSPADM

## 2021-10-12 RX ORDER — ACETAMINOPHEN 325 MG/1
650 TABLET ORAL EVERY 6 HOURS PRN
Qty: 30 TABLET | Refills: 0 | Status: ON HOLD | COMMUNITY
Start: 2021-10-12 | End: 2022-06-27

## 2021-10-12 RX ORDER — LIDOCAINE HYDROCHLORIDE 10 MG/ML
INJECTION, SOLUTION INFILTRATION; PERINEURAL PRN
Status: DISCONTINUED | OUTPATIENT
Start: 2021-10-12 | End: 2021-10-12 | Stop reason: HOSPADM

## 2021-10-12 RX ORDER — ACETAMINOPHEN 325 MG/1
975 TABLET ORAL ONCE
Status: DISCONTINUED | OUTPATIENT
Start: 2021-10-12 | End: 2021-10-12

## 2021-10-12 RX ORDER — ONDANSETRON 2 MG/ML
4 INJECTION INTRAMUSCULAR; INTRAVENOUS EVERY 30 MIN PRN
Status: DISCONTINUED | OUTPATIENT
Start: 2021-10-12 | End: 2021-10-12 | Stop reason: HOSPADM

## 2021-10-12 RX ORDER — HYDROMORPHONE HYDROCHLORIDE 2 MG/1
2 TABLET ORAL 2 TIMES DAILY PRN
Status: ON HOLD | COMMUNITY
End: 2022-06-27

## 2021-10-12 RX ORDER — MEPERIDINE HYDROCHLORIDE 25 MG/ML
12.5 INJECTION INTRAMUSCULAR; INTRAVENOUS; SUBCUTANEOUS
Status: DISCONTINUED | OUTPATIENT
Start: 2021-10-12 | End: 2021-10-12 | Stop reason: HOSPADM

## 2021-10-12 RX ORDER — ONDANSETRON 2 MG/ML
INJECTION INTRAMUSCULAR; INTRAVENOUS PRN
Status: DISCONTINUED | OUTPATIENT
Start: 2021-10-12 | End: 2021-10-12

## 2021-10-12 RX ORDER — SODIUM CHLORIDE, SODIUM LACTATE, POTASSIUM CHLORIDE, CALCIUM CHLORIDE 600; 310; 30; 20 MG/100ML; MG/100ML; MG/100ML; MG/100ML
INJECTION, SOLUTION INTRAVENOUS CONTINUOUS
Status: DISCONTINUED | OUTPATIENT
Start: 2021-10-12 | End: 2021-10-12 | Stop reason: HOSPADM

## 2021-10-12 RX ORDER — ACETAMINOPHEN 325 MG/1
975 TABLET ORAL ONCE
Status: COMPLETED | OUTPATIENT
Start: 2021-10-12 | End: 2021-10-12

## 2021-10-12 RX ORDER — KETOROLAC TROMETHAMINE 30 MG/ML
INJECTION, SOLUTION INTRAMUSCULAR; INTRAVENOUS PRN
Status: DISCONTINUED | OUTPATIENT
Start: 2021-10-12 | End: 2021-10-12

## 2021-10-12 RX ORDER — KETAMINE HYDROCHLORIDE 50 MG/ML
INJECTION, SOLUTION INTRAMUSCULAR; INTRAVENOUS PRN
Status: DISCONTINUED | OUTPATIENT
Start: 2021-10-12 | End: 2021-10-12

## 2021-10-12 RX ORDER — FENTANYL CITRATE 50 UG/ML
25 INJECTION, SOLUTION INTRAMUSCULAR; INTRAVENOUS
Status: DISCONTINUED | OUTPATIENT
Start: 2021-10-12 | End: 2021-10-12 | Stop reason: HOSPADM

## 2021-10-12 RX ORDER — ONDANSETRON 4 MG/1
4 TABLET, ORALLY DISINTEGRATING ORAL EVERY 30 MIN PRN
Status: DISCONTINUED | OUTPATIENT
Start: 2021-10-12 | End: 2021-10-12 | Stop reason: HOSPADM

## 2021-10-12 RX ADMIN — KETAMINE HYDROCHLORIDE 25 MG: 50 INJECTION, SOLUTION INTRAMUSCULAR; INTRAVENOUS at 14:49

## 2021-10-12 RX ADMIN — FENTANYL CITRATE 25 MCG: 50 INJECTION, SOLUTION INTRAMUSCULAR; INTRAVENOUS at 14:53

## 2021-10-12 RX ADMIN — PROPOFOL 50 MG: 10 INJECTION, EMULSION INTRAVENOUS at 14:43

## 2021-10-12 RX ADMIN — KETOROLAC TROMETHAMINE 15 MG: 30 INJECTION, SOLUTION INTRAMUSCULAR at 15:08

## 2021-10-12 RX ADMIN — KETAMINE HYDROCHLORIDE 12.5 MG: 50 INJECTION, SOLUTION INTRAMUSCULAR; INTRAVENOUS at 14:58

## 2021-10-12 RX ADMIN — SODIUM CHLORIDE, POTASSIUM CHLORIDE, SODIUM LACTATE AND CALCIUM CHLORIDE: 600; 310; 30; 20 INJECTION, SOLUTION INTRAVENOUS at 15:08

## 2021-10-12 RX ADMIN — SODIUM CHLORIDE, POTASSIUM CHLORIDE, SODIUM LACTATE AND CALCIUM CHLORIDE: 600; 310; 30; 20 INJECTION, SOLUTION INTRAVENOUS at 14:15

## 2021-10-12 RX ADMIN — MIDAZOLAM 2 MG: 1 INJECTION INTRAMUSCULAR; INTRAVENOUS at 14:28

## 2021-10-12 RX ADMIN — ACETAMINOPHEN 975 MG: 325 TABLET ORAL at 13:47

## 2021-10-12 RX ADMIN — DEXAMETHASONE SODIUM PHOSPHATE 4 MG: 4 INJECTION, SOLUTION INTRA-ARTICULAR; INTRALESIONAL; INTRAMUSCULAR; INTRAVENOUS; SOFT TISSUE at 14:56

## 2021-10-12 RX ADMIN — SODIUM CHLORIDE, POTASSIUM CHLORIDE, SODIUM LACTATE AND CALCIUM CHLORIDE: 600; 310; 30; 20 INJECTION, SOLUTION INTRAVENOUS at 14:28

## 2021-10-12 RX ADMIN — ONDANSETRON 4 MG: 2 INJECTION INTRAMUSCULAR; INTRAVENOUS at 15:08

## 2021-10-12 RX ADMIN — FENTANYL CITRATE 25 MCG: 50 INJECTION, SOLUTION INTRAMUSCULAR; INTRAVENOUS at 15:01

## 2021-10-12 RX ADMIN — FENTANYL CITRATE 25 MCG: 50 INJECTION, SOLUTION INTRAMUSCULAR; INTRAVENOUS at 14:57

## 2021-10-12 RX ADMIN — FENTANYL CITRATE 25 MCG: 50 INJECTION, SOLUTION INTRAMUSCULAR; INTRAVENOUS at 15:08

## 2021-10-12 RX ADMIN — KETAMINE HYDROCHLORIDE 12.5 MG: 50 INJECTION, SOLUTION INTRAMUSCULAR; INTRAVENOUS at 15:03

## 2021-10-12 RX ADMIN — PROPOFOL 50 MCG/KG/MIN: 10 INJECTION, EMULSION INTRAVENOUS at 14:43

## 2021-10-12 NOTE — ANESTHESIA POSTPROCEDURE EVALUATION
Patient: Jazmin Bauman    Procedure: Procedure(s):  HYSTEROSCOPY,  DILATION AND CURETTAGE       Diagnosis:Endometrial polyp [N84.0]  Diagnosis Additional Information: No value filed.    Anesthesia Type:  MAC    Note:  Disposition: Outpatient   Postop Pain Control: Uneventful            Sign Out: Well controlled pain   PONV: No   Neuro/Psych: Uneventful            Sign Out: Acceptable/Baseline neuro status   Airway/Respiratory: Uneventful            Sign Out: Acceptable/Baseline resp. status   CV/Hemodynamics: Uneventful            Sign Out: Acceptable CV status; No obvious hypovolemia; No obvious fluid overload   Other NRE: NONE   DID A NON-ROUTINE EVENT OCCUR? No           Last vitals:  Vitals Value Taken Time   /65 10/12/21 1525   Temp 36.3  C (97.3  F) 10/12/21 1525   Pulse 59 10/12/21 1537   Resp 18 10/12/21 1525   SpO2 100 % 10/12/21 1537   Vitals shown include unvalidated device data.    Electronically Signed By: Brant Benítez MD  October 12, 2021  3:38 PM

## 2021-10-12 NOTE — OP NOTE
Procedure Date: 10/12/2021    PREOPERATIVE STATUS AND DIAGNOSIS:  Jazmin is a 66-year-old woman who comes in for evaluation of vaginal bleeding.  Ultrasound showed an endometrial stripe of 4 mm.  The endometrial biopsy was minimal.  She comes in now for further evaluation.    NAME OF OPERATION:     1.  Hysteroscopy.  2.  D and C.    OPERATIVE FINDINGS:  Uterus, tubes, and ovaries were normal structures.  The endometrial cavity appeared to be normal.    DESCRIPTION OF PROCEDURE:  Under intravenous sedation, the patient was placed in dorsal lithotomy position and examined.  She was prepared and draped in the usual fashion.  Uterus probed to 7 cm, it was dilated to a #8 Hegar dilator.  Curettage was performed.  Small amount of tissue was obtained.  At this point, the procedure was terminated.    ESTIMATED BLOOD LOSS:  10 mL    COMPLICATIONS:  None.    SURGEON:  MD Grant Choe MD        D: 10/12/2021   T: 10/12/2021   MT: [id number]    Name:     JAZMIN HERNANDES  MRN:      [id number]        Account:        [id number]   :      1955           Procedure Date: 10/12/2021     Document: [id number]

## 2021-10-12 NOTE — BRIEF OP NOTE
[facility]    Brief Operative Note    Pre-operative diagnosis: Endometrial polyp [N84.0]  Post-operative diagnosis: Post Menopausal Bleeding    Procedure: Procedure(s):  HYSTEROSCOPY,  DILATION AND CURETTAGE  Surgeon: Surgeon(s) and Role:     * Grant Beal MD - Primary  Anesthesia: Combined MAC with Local   Estimated Blood Loss: 10 cc    Drains: none  Specimens:   ID Type Source Tests Collected by Time Destination   1 : Endometrial Currettings Curettings Endometrium SURGICAL PATHOLOGY EXAM Grant Beal MD 10/12/2021  3:05 PM      Findings:   none  Complications: none.  Implants: none

## 2021-10-12 NOTE — PHARMACY-ADMISSION MEDICATION HISTORY
Pharmacy Note - Admission Medication History    Pertinent Provider Information: N/A   ______________________________________________________________________    Prior To Admission (PTA) med list completed and updated in EMR.       PTA Med List   Medication Sig Last Dose     acetaminophen (TYLENOL) 500 MG tablet [ACETAMINOPHEN (TYLENOL) 500 MG TABLET] Take 2 tablets (1,000 mg total) by mouth 3 (three) times a day. 10/11/2021 at Unknown time     apixaban ANTICOAGULANT (ELIQUIS) 5 mg Tab tablet [APIXABAN ANTICOAGULANT (ELIQUIS) 5 MG TAB TABLET] Take 5 mg by mouth 2 (two) times a day. 10/9/2021 at 2000     calcium polycarbophil (FIBERCON) 625 MG tablet Take 2 tablets by mouth daily 10/11/2021 at 0800     ferrous sulfate 325 (65 FE) MG tablet [FERROUS SULFATE 325 (65 FE) MG TABLET] Take 1 tablet by mouth daily with breakfast.  10/11/2021 at 0800     gabapentin (NEURONTIN) 100 MG capsule Take 200 mg by mouth every evening  10/11/2021 at 1700     gabapentin (NEURONTIN) 300 MG capsule Take 300 mg by mouth every morning 10/11/2021 at 0800     hydrocortisone 1 % cream Apply topically 2 times daily Apply to skin under stump for itching. 10/11/2021 at 0800     HYDROmorphone (DILAUDID) 2 MG tablet Take 2 mg by mouth daily 10/11/2021 at 0800     HYDROmorphone (DILAUDID) 2 MG tablet Take 2 mg by mouth 2 times daily as needed (left shoulder pain) Unknown at Unknown time     insulin aspart (NOVOLOG FLEXPEN) 100 UNIT/ML pen Inject 12 Units Subcutaneous daily (with dinner)  10/10/2021 at 1700     insulin aspart (NOVOLOG FLEXPEN) 100 UNIT/ML pen Inject 14 Units Subcutaneous 2 times daily (with meals) Breakfast and Lunch 10/11/2021 at 1200     insulin glargine (LANTUS PEN) 100 UNIT/ML pen Inject 30 Units Subcutaneous At Bedtime 10/11/2021 at PM     Lidocaine (LIDOCARE) 4 % Patch Place 1 patch onto the skin every 24 hours To prevent lidocaine toxicity, patient should be patch free for 12 hrs daily. 10/11/2021 at 0800     linaGLIPtin 5 mg  Tab [LINAGLIPTIN 5 MG TAB] Take 5 mg by mouth daily. 10/11/2021 at 0800     metoprolol tartrate (LOPRESSOR) 25 MG tablet [METOPROLOL TARTRATE (LOPRESSOR) 25 MG TABLET] Take 1 tablet (25 mg total) by mouth 2 (two) times a day. 10/12/2021 at 0800     omeprazole (PRILOSEC) 20 MG DR capsule Take 20 mg by mouth daily 10/11/2021 at 0800     Saccharomyces boulardii (FLORASTOR) 250 mg capsule [SACCHAROMYCES BOULARDII (FLORASTOR) 250 MG CAPSULE] Take 250 mg by mouth daily. 10/11/2021 at 0800     sodium bicarbonate 650 MG tablet [SODIUM BICARBONATE 650 MG TABLET] Take 1 tablet (650 mg total) by mouth 2 (two) times a day. OTC product 10/11/2021 at 2000       Information source(s): Facility ([facility]/) medication list/MAR    Method of interview communication: N/A    Patient was asked about OTC/herbal products specifically.  PTA med list reflects this.    Based on the pharmacist's assessment, the PTA med list information appears reliable    Allergies were reviewed, assessed, and updated with the patient.      Patient does not use any multi-dose medications prior to admission.     Thank you for the opportunity to participate in the care of this patient.      Aryan Dietrich ZAK     10/12/2021     1:12 PM

## 2021-10-12 NOTE — ANESTHESIA CARE TRANSFER NOTE
Patient: Jazmin Bauman    Procedure: Procedure(s):  HYSTEROSCOPY,  DILATION AND CURETTAGE       Diagnosis: Endometrial polyp [N84.0]  Diagnosis Additional Information: No value filed.    Anesthesia Type:   MAC     Note:    Oropharynx: oropharynx clear of all foreign objects and spontaneously breathing  Level of Consciousness: awake  Oxygen Supplementation: face mask  Level of Supplemental Oxygen (L/min / FiO2): 10  Independent Airway: airway patency satisfactory and stable  Dentition: dentition unchanged  Vital Signs Stable: post-procedure vital signs reviewed and stable  Report to RN Given: handoff report given  Patient transferred to: Phase II    Handoff Report: Identifed the Patient, Identified the Reponsible Provider, Reviewed the pertinent medical history, Discussed the surgical course, Reviewed Intra-OP anesthesia mangement and issues during anesthesia, Set expectations for post-procedure period and Allowed opportunity for questions and acknowledgement of understanding      Vitals:  Vitals Value Taken Time   /65    Temp 97.5f    Pulse 63 10/12/21 1528   Resp 12    SpO2 100 % 10/12/21 1528   Vitals shown include unvalidated device data.    Electronically Signed By: FITO Rincon CRNA  October 12, 2021  3:29 PM

## 2021-10-12 NOTE — H&P
History and Physical Update    I have examined the patient and reviewed the history and physical that is present on this chart. The changes in the patient's history and physical condition are as follows: NONE    Grant Beal MD, MD

## 2021-10-12 NOTE — ANESTHESIA PREPROCEDURE EVALUATION
Anesthesia Pre-Procedure Evaluation    Patient: Jazmin Bauman   MRN: [id number] : 1955        Preoperative Diagnosis: Endometrial polyp [N84.0]    Procedure : Procedure(s):  HYSTEROSCOPY,  DILATION AND CURETTAGE          Past Medical History:   Diagnosis Date     Anemia     pernicious     Diabetes mellitus (H)     borderline     Endometrial hyperplasia      Fibromyalgia      History of recurrent UTI (urinary tract infection)      History of transfusion      Hypertension      Thrombocytopenia (H)      Vaginal bleeding 2015      Past Surgical History:   Procedure Laterality Date     AMPUTATE LEG BELOW KNEE Right 2020    Procedure: AMPUTATION, BELOW KNEE;  Surgeon: Epi Corcoran MD;  Location: [facility] OR;  Service: General     AMPUTATE LEG BELOW KNEE Right 2020    Procedure: REVISION AMPUTATION, BELOW KNEE;  Surgeon: Epi Corcoran MD;  Location: [facility] OR;  Service: General     CHOLECYSTECTOMY       COLONOSCOPY N/A 2017    Procedure: COLONOSCOPY;  Surgeon: Tyler Bhakta MD;  Location: [facility] GI;  Service:      DILATION AND CURETTAGE       DILATION AND CURETTAGE, OPERATIVE HYSTEROSCOPY, COMBINED N/A 2015    Procedure: DILATION AND CURETTAGE WITH HYSTEROSCOPY;  Surgeon: Grant Beal MD;  Location: [facility];  Service:      IR CVC NON TUNNEL PLACEMENT  2020     IR NON TUNNELED CATHETER >5 YEARS  2020     OTHER SURGICAL HISTORY  2009    bilateral carpal tunnel release     [facility]  2020          [facility]  2020          SD HYSTEROSCOPY,W/ENDO BX N/A 2019    Procedure: HYSTEROSCOPY, DILATION AND CURETTAGE;  Surgeon: Grant Beal MD;  Location: [facility] OR;  Service: Gynecology     SD HYSTEROSCOPY,W/ENDO BX N/A 2019    Procedure: HYSTEROSCOPY, DILATION AND CURETTAGE;  Surgeon: Grant Beal MD;  [place] [facility];  Service: Gynecology      Allergies   Allergen Reactions     Hydralazine Unknown      Past Medical History:   Diagnosis Date     Colon cancer (H)      Hypertension      Shortness of breath      Sleep apnea      Thyroid disease      Patient Active Problem List   Diagnosis     Seborrheic dermatitis     Alopecia     Xerosis cutis     Vitamin D deficiency     Nodules, thyroid     Postmenopausal     Colon cancer     CARDIOVASCULAR SCREENING; LDL GOAL LESS THAN 160     Impingement syndrome of right shoulder     AC (acromioclavicular) joint arthritis     Anxiety state     Attention deficit disorder     ACP (advance care planning)     Gastroesophageal reflux disease without esophagitis     Diet-controlled diabetes mellitus (H)     Hyperlipidemia LDL goal <100     Essential hypertension with goal blood pressure less than 140/90     Fatty liver     Major depression in complete remission (H)     Chronic bilateral low back pain with bilateral sciatica     Morbid obesity due to excess calories (H)     BPPV (benign paroxysmal positional vertigo), right     Past Surgical History:   Procedure Laterality Date     COLON SURGERY  2003     THYROIDECTOMY  12/6/2011    Procedure:THYROIDECTOMY; Right Thyroid Lobectomy and bilateral removal of skin tags; Surgeon:SAJI ZAZUETA; Location:[facility] OR     TONSILLECTOMY      While she was in 6th grade     Social History     Social History     Marital status:      Spouse name: Anand     Number of children: 2     Years of education: 12     Occupational History     Assembly/factory Homemaker     2005     Social History Main Topics     Smoking status: Former Smoker     Quit date: 12/12/1982     Smokeless tobacco: Never Used     Alcohol use No     Drug use: No     Sexual activity: Yes     Partners: Male     Other Topics Concern     Not on file     Social History Narrative     Family History   Problem Relation Age of Onset     Diabetes Mother      Hypertension Mother      Thyroid Disease Mother      HEART DISEASE Father      Cardiovascular Father      Neurologic Disorder Father       Parkinson's     Arthritis Father      Fibromyalgia     GASTROINTESTINAL DISEASE Maternal Grandmother      gallbladder removed     HEART DISEASE Maternal Grandfather      Diabetes Brother      Hypertension Brother      Neurologic Disorder Brother      ADHD     Unknown/Adopted Son      Unknown/Adopted Daughter      Neurologic Disorder Brother      ADHD     Lab Results   Component Value Date    A1C 6.9 03/01/2018      Last Comprehensive Metabolic Panel:  Sodium   Date Value Ref Range Status   06/08/2018 139 133 - 144 mmol/L Final     Potassium   Date Value Ref Range Status   06/08/2018 4.2 3.4 - 5.3 mmol/L Final     Chloride   Date Value Ref Range Status   06/08/2018 104 94 - 109 mmol/L Final     Carbon Dioxide   Date Value Ref Range Status   06/08/2018 31 20 - 32 mmol/L Final     Anion Gap   Date Value Ref Range Status   06/08/2018 4 3 - 14 mmol/L Final     Glucose   Date Value Ref Range Status   06/08/2018 179 (H) 70 - 99 mg/dL Final     Comment:     Fasting specimen     Urea Nitrogen   Date Value Ref Range Status   06/08/2018 20 7 - 30 mg/dL Final     Creatinine   Date Value Ref Range Status   06/08/2018 1.12 (H) 0.52 - 1.04 mg/dL Final     GFR Estimate   Date Value Ref Range Status   06/08/2018 47 (L) >60 mL/min/1.7m2 Final     Comment:     Non  GFR Calc     Calcium   Date Value Ref Range Status   06/08/2018 9.3 8.5 - 10.1 mg/dL Final       SUBJECTIVE FINDINGS:  A 75-year-old female presents for right foot.  She relates the big toenail, something fell on it, like a can.  This was 6+ months ago.  It turned black and blue and then it kind of got loose.  She kind of cut and tore it off.  She relates it does not hurt.  It is a little tender at times.  She relates she is diabetic.  She relates she has numbness and tingling in her feet.  No history of ulcers or sores.  She relates she is now wearing diabetic shoes.  She has SAS shoes with inserts in it, in which she needs new ones.  She is  Paralysis of legs     Latex Itching     Skin Burns     Naprosyn [Naproxen] Swelling     throat     Nitrofurantoin Hives     Sulfa (Sulfonamide Antibiotics) [Sulfa Drugs] Rash     Vicks Vaporub [Mentholatum Cherry Chest] Rash      Social History     Tobacco Use     Smoking status: Never Smoker     Smokeless tobacco: Never Used   Substance Use Topics     Alcohol use: Not Currently     Comment: Alcoholic Drinks/day: rare      Wt Readings from Last 1 Encounters:   09/03/21 117.5 kg (259 lb)        Anesthesia Evaluation   Pt has had prior anesthetic.     No history of anesthetic complications       ROS/MED HX  ENT/Pulmonary:     (+) sleep apnea, CRISTINA risk factors, obese, asthma     Neurologic:  - neg neurologic ROS     Cardiovascular:     (+) hypertension--CAD ---    METS/Exercise Tolerance:     Hematologic:     (+) anemia,     Musculoskeletal:   (+) arthritis,     GI/Hepatic:       Renal/Genitourinary:     (+) renal disease,     Endo:     (+) type II DM, Using insulin, Obesity,     Psychiatric/Substance Use:       Infectious Disease:       Malignancy:       Other:      (+) , H/O Chronic Pain,        Physical Exam    Airway        Mallampati: II   TM distance: > 3 FB   Neck ROM: full   Mouth opening: > 3 cm    Respiratory Devices and Support         Dental  no notable dental history         Cardiovascular   cardiovascular exam normal          Pulmonary   pulmonary exam normal                OUTSIDE LABS:  CBC:   Lab Results   Component Value Date    WBC 7.9 08/21/2020    WBC 6.3 08/20/2020    HGB 9.2 (L) 08/21/2020    HGB 9.2 (L) 08/20/2020    HCT 28.6 (L) 08/21/2020    HCT 28.5 (L) 08/20/2020     08/21/2020     08/20/2020     BMP:   Lab Results   Component Value Date     08/20/2020     08/19/2020    POTASSIUM 3.9 08/20/2020    POTASSIUM 4.0 08/19/2020    CHLORIDE 109 (H) 08/20/2020    CHLORIDE 109 (H) 08/19/2020    CO2 22 08/20/2020    CO2 23 08/19/2020    BUN 25 (H) 08/20/2020    BUN 29 (H)  08/19/2020    CR 1.81 (H) 08/20/2020    CR 1.82 (H) 08/19/2020     08/21/2020     08/21/2020     COAGS:   Lab Results   Component Value Date    PTT 65 (H) 08/15/2020    INR 3.02 (H) 08/21/2020    FIBR 740 (H) 06/23/2020     POC:   Lab Results   Component Value Date    HCG Negative 06/25/2020     HEPATIC:   Lab Results   Component Value Date    ALBUMIN 1.9 (L) 08/20/2020    PROTTOTAL 5.2 (L) 08/20/2020    ALT 20 08/20/2020    AST 16 08/20/2020    ALKPHOS 360 (H) 08/20/2020    BILITOTAL 0.3 08/20/2020     OTHER:   Lab Results   Component Value Date    PH 7.32 (L) 01/23/2020    LACT 1.2 08/17/2020    A1C 10.3 (H) 06/17/2020    MELINDA 8.3 (L) 08/20/2020    PHOS 6.1 (H) 01/30/2020    MAG 2.3 06/28/2020    TSH 1.83 06/17/2020    CRP 25.5 (H) 06/16/2020    SED 93 (H) 08/15/2020       Anesthesia Plan    ASA Status:  3      Anesthesia Type: MAC.   Induction: Propofol.   Maintenance: TIVA.        Consents    Anesthesia Plan(s) and associated risks, benefits, and realistic alternatives discussed. Questions answered and patient/representative(s) expressed understanding.     - Discussed with:  Patient      - Extended Intubation/Ventilatory Support Discussed: No.      - Patient is DNR/DNI Status: No    Use of blood products discussed: No .     Postoperative Care       PONV prophylaxis: Ondansetron (or other 5HT-3), Dexamethasone or Solumedrol     Comments:                    Magy Churchill MD   intermittently using econazole cream on her feet.      OBJECTIVE FINDINGS:  DP and PT are 2/4 bilaterally.  She has hyperkeratotic tissue buildup, plantar first and fifth MPJ bilaterally.  Plantar medial hallux on the right.  She has dorsally contracted digits 2-5 bilaterally.  She has dorsomedial first MPJ prominence bilaterally.  Sharp/dull is intact with 5.07 Cochrane-Balbir monofilament bilaterally, although she did not feel it on the plantar first and second MPJs the first time I did it.  Then the second time, she did.  She has dry scaly skin in a moccasin pattern bilaterally.  She has right hallux nails with some subungual debris, dystrophy, new nail growing in, the nails come off.  There is no erythema, no drainage, no odor, no calor bilaterally.  She has some dystrophy and discoloration of the nails bilaterally with some subungual debris and thickening to differing degrees.      ASSESSMENT AND PLAN:  Onychomycosis bilaterally.  Tinea pedis bilaterally.  She is diabetic with peripheral neuropathy.  She has hammertoes and callus present.  Diagnosis and treatment options discussed with the patient.  I advised her on the econazole cream use.  She will start using that regularly on the toes and the feet.  Prescription for diabetic shoes and inserts given and use discussed with her.  She will return to clinic and see me in about 3 months.

## 2021-10-13 LAB
PATH REPORT.COMMENTS IMP SPEC: NORMAL
PATH REPORT.FINAL DX SPEC: NORMAL
PATH REPORT.GROSS SPEC: NORMAL
PATH REPORT.MICROSCOPIC SPEC OTHER STN: NORMAL
PATH REPORT.RELEVANT HX SPEC: NORMAL
PHOTO IMAGE: NORMAL

## 2021-10-13 PROCEDURE — 88305 TISSUE EXAM BY PATHOLOGIST: CPT | Mod: 26 | Performed by: PATHOLOGY

## 2022-01-18 VITALS
TEMPERATURE: 97.4 F | DIASTOLIC BLOOD PRESSURE: 68 MMHG | BODY MASS INDEX: 43.65 KG/M2 | OXYGEN SATURATION: 97 % | WEIGHT: 231 LBS | HEART RATE: 61 BPM | SYSTOLIC BLOOD PRESSURE: 148 MMHG

## 2022-01-18 VITALS
BODY MASS INDEX: 58.01 KG/M2 | HEART RATE: 54 BPM | WEIGHT: 241 LBS | OXYGEN SATURATION: 98 % | HEIGHT: 61 IN | TEMPERATURE: 97.1 F | OXYGEN SATURATION: 97 % | DIASTOLIC BLOOD PRESSURE: 50 MMHG | OXYGEN SATURATION: 98 % | SYSTOLIC BLOOD PRESSURE: 141 MMHG | DIASTOLIC BLOOD PRESSURE: 77 MMHG | BODY MASS INDEX: 43.46 KG/M2 | WEIGHT: 230 LBS | HEART RATE: 56 BPM | DIASTOLIC BLOOD PRESSURE: 62 MMHG | RESPIRATION RATE: 18 BRPM | SYSTOLIC BLOOD PRESSURE: 115 MMHG | SYSTOLIC BLOOD PRESSURE: 134 MMHG | OXYGEN SATURATION: 96 % | HEART RATE: 78 BPM | DIASTOLIC BLOOD PRESSURE: 70 MMHG | SYSTOLIC BLOOD PRESSURE: 123 MMHG | WEIGHT: 227.6 LBS | BODY MASS INDEX: 42.97 KG/M2 | BODY MASS INDEX: 42.84 KG/M2 | RESPIRATION RATE: 17 BRPM | HEIGHT: 61 IN | DIASTOLIC BLOOD PRESSURE: 89 MMHG | OXYGEN SATURATION: 99 % | TEMPERATURE: 97 F | TEMPERATURE: 96.6 F | TEMPERATURE: 97.8 F | SYSTOLIC BLOOD PRESSURE: 134 MMHG | HEART RATE: 75 BPM | HEART RATE: 64 BPM | WEIGHT: 226.9 LBS | BODY MASS INDEX: 45.54 KG/M2 | HEIGHT: 61 IN

## 2022-01-18 VITALS
DIASTOLIC BLOOD PRESSURE: 60 MMHG | SYSTOLIC BLOOD PRESSURE: 148 MMHG | BODY MASS INDEX: 45.88 KG/M2 | RESPIRATION RATE: 18 BRPM | WEIGHT: 242.8 LBS | HEART RATE: 70 BPM | TEMPERATURE: 98.6 F | OXYGEN SATURATION: 98 %

## 2022-01-18 VITALS
TEMPERATURE: 97.9 F | TEMPERATURE: 97.4 F | WEIGHT: 242.8 LBS | HEART RATE: 68 BPM | WEIGHT: 224.1 LBS | HEIGHT: 61 IN | SYSTOLIC BLOOD PRESSURE: 157 MMHG | SYSTOLIC BLOOD PRESSURE: 118 MMHG | DIASTOLIC BLOOD PRESSURE: 71 MMHG | RESPIRATION RATE: 18 BRPM | OXYGEN SATURATION: 97 % | WEIGHT: 224.1 LBS | OXYGEN SATURATION: 98 % | HEIGHT: 61 IN | RESPIRATION RATE: 18 BRPM | DIASTOLIC BLOOD PRESSURE: 63 MMHG | SYSTOLIC BLOOD PRESSURE: 129 MMHG | RESPIRATION RATE: 18 BRPM | BODY MASS INDEX: 42.31 KG/M2 | HEART RATE: 58 BPM | BODY MASS INDEX: 42.31 KG/M2 | HEIGHT: 61 IN | TEMPERATURE: 97.1 F | BODY MASS INDEX: 45.84 KG/M2 | OXYGEN SATURATION: 94 % | HEART RATE: 72 BPM | DIASTOLIC BLOOD PRESSURE: 60 MMHG

## 2022-01-18 VITALS
SYSTOLIC BLOOD PRESSURE: 121 MMHG | OXYGEN SATURATION: 97 % | HEIGHT: 61 IN | HEART RATE: 74 BPM | WEIGHT: 237.2 LBS | DIASTOLIC BLOOD PRESSURE: 60 MMHG | RESPIRATION RATE: 18 BRPM | BODY MASS INDEX: 44.78 KG/M2 | TEMPERATURE: 97.5 F

## 2022-01-18 VITALS
BODY MASS INDEX: 43.65 KG/M2 | OXYGEN SATURATION: 94 % | TEMPERATURE: 97 F | RESPIRATION RATE: 18 BRPM | WEIGHT: 231 LBS | SYSTOLIC BLOOD PRESSURE: 165 MMHG | DIASTOLIC BLOOD PRESSURE: 78 MMHG | WEIGHT: 285.6 LBS | BODY MASS INDEX: 53.96 KG/M2 | TEMPERATURE: 97 F | DIASTOLIC BLOOD PRESSURE: 66 MMHG | OXYGEN SATURATION: 98 % | HEART RATE: 60 BPM | HEART RATE: 60 BPM | SYSTOLIC BLOOD PRESSURE: 144 MMHG

## 2022-01-18 VITALS
TEMPERATURE: 97.7 F | SYSTOLIC BLOOD PRESSURE: 146 MMHG | HEART RATE: 77 BPM | BODY MASS INDEX: 53.76 KG/M2 | DIASTOLIC BLOOD PRESSURE: 67 MMHG | WEIGHT: 284.5 LBS

## 2022-01-18 VITALS
OXYGEN SATURATION: 96 % | DIASTOLIC BLOOD PRESSURE: 63 MMHG | WEIGHT: 231 LBS | TEMPERATURE: 97 F | BODY MASS INDEX: 43.65 KG/M2 | BODY MASS INDEX: 44.6 KG/M2 | DIASTOLIC BLOOD PRESSURE: 57 MMHG | HEART RATE: 64 BPM | OXYGEN SATURATION: 96 % | SYSTOLIC BLOOD PRESSURE: 120 MMHG | WEIGHT: 236.2 LBS | HEIGHT: 61 IN | TEMPERATURE: 97.7 F | RESPIRATION RATE: 18 BRPM | HEART RATE: 57 BPM | SYSTOLIC BLOOD PRESSURE: 139 MMHG

## 2022-01-18 VITALS
BODY MASS INDEX: 43.65 KG/M2 | SYSTOLIC BLOOD PRESSURE: 140 MMHG | TEMPERATURE: 97 F | HEART RATE: 65 BPM | WEIGHT: 231 LBS | OXYGEN SATURATION: 97 % | DIASTOLIC BLOOD PRESSURE: 65 MMHG

## 2022-01-18 VITALS
DIASTOLIC BLOOD PRESSURE: 62 MMHG | HEART RATE: 72 BPM | SYSTOLIC BLOOD PRESSURE: 130 MMHG | SYSTOLIC BLOOD PRESSURE: 114 MMHG | OXYGEN SATURATION: 98 % | BODY MASS INDEX: 42.7 KG/M2 | WEIGHT: 226 LBS | RESPIRATION RATE: 16 BRPM | TEMPERATURE: 97.4 F | HEART RATE: 76 BPM | DIASTOLIC BLOOD PRESSURE: 82 MMHG

## 2022-01-18 VITALS
SYSTOLIC BLOOD PRESSURE: 170 MMHG | BODY MASS INDEX: 47.44 KG/M2 | BODY MASS INDEX: 43.27 KG/M2 | HEART RATE: 70 BPM | TEMPERATURE: 96.9 F | TEMPERATURE: 97.8 F | DIASTOLIC BLOOD PRESSURE: 70 MMHG | SYSTOLIC BLOOD PRESSURE: 171 MMHG | HEART RATE: 63 BPM | DIASTOLIC BLOOD PRESSURE: 68 MMHG | WEIGHT: 251.1 LBS | WEIGHT: 229 LBS

## 2022-01-18 VITALS
TEMPERATURE: 96.8 F | WEIGHT: 236.2 LBS | OXYGEN SATURATION: 97 % | RESPIRATION RATE: 21 BRPM | SYSTOLIC BLOOD PRESSURE: 139 MMHG | HEART RATE: 63 BPM | BODY MASS INDEX: 44.6 KG/M2 | HEIGHT: 61 IN | DIASTOLIC BLOOD PRESSURE: 67 MMHG

## 2022-01-18 VITALS
RESPIRATION RATE: 18 BRPM | BODY MASS INDEX: 43.99 KG/M2 | HEART RATE: 60 BPM | SYSTOLIC BLOOD PRESSURE: 129 MMHG | WEIGHT: 233 LBS | DIASTOLIC BLOOD PRESSURE: 71 MMHG | HEIGHT: 61 IN | TEMPERATURE: 96.8 F | OXYGEN SATURATION: 96 %

## 2022-01-18 VITALS
TEMPERATURE: 96.9 F | HEART RATE: 60 BPM | SYSTOLIC BLOOD PRESSURE: 134 MMHG | DIASTOLIC BLOOD PRESSURE: 86 MMHG | WEIGHT: 259.6 LBS | BODY MASS INDEX: 49.05 KG/M2

## 2022-01-18 VITALS
SYSTOLIC BLOOD PRESSURE: 135 MMHG | HEART RATE: 63 BPM | BODY MASS INDEX: 51.66 KG/M2 | TEMPERATURE: 98.2 F | DIASTOLIC BLOOD PRESSURE: 60 MMHG | WEIGHT: 273.4 LBS

## 2022-01-18 VITALS
BODY MASS INDEX: 44.59 KG/M2 | HEART RATE: 67 BPM | OXYGEN SATURATION: 99 % | WEIGHT: 236 LBS | DIASTOLIC BLOOD PRESSURE: 73 MMHG | TEMPERATURE: 96.4 F | SYSTOLIC BLOOD PRESSURE: 127 MMHG

## 2022-01-18 VITALS
WEIGHT: 275.6 LBS | BODY MASS INDEX: 52.03 KG/M2 | BODY MASS INDEX: 52.03 KG/M2 | HEIGHT: 61 IN | WEIGHT: 275.6 LBS | HEIGHT: 61 IN

## 2022-01-18 VITALS
SYSTOLIC BLOOD PRESSURE: 186 MMHG | TEMPERATURE: 97.3 F | DIASTOLIC BLOOD PRESSURE: 55 MMHG | BODY MASS INDEX: 46.33 KG/M2 | WEIGHT: 245.4 LBS | HEIGHT: 61 IN | RESPIRATION RATE: 18 BRPM | OXYGEN SATURATION: 97 % | HEART RATE: 57 BPM

## 2022-01-18 VITALS
WEIGHT: 241.6 LBS | DIASTOLIC BLOOD PRESSURE: 64 MMHG | TEMPERATURE: 96.8 F | HEART RATE: 56 BPM | OXYGEN SATURATION: 98 % | HEIGHT: 61 IN | SYSTOLIC BLOOD PRESSURE: 148 MMHG | RESPIRATION RATE: 18 BRPM | BODY MASS INDEX: 45.61 KG/M2

## 2022-01-18 VITALS
TEMPERATURE: 97.3 F | WEIGHT: 233.4 LBS | HEART RATE: 53 BPM | DIASTOLIC BLOOD PRESSURE: 73 MMHG | OXYGEN SATURATION: 98 % | BODY MASS INDEX: 44.07 KG/M2 | HEIGHT: 61 IN | SYSTOLIC BLOOD PRESSURE: 149 MMHG | RESPIRATION RATE: 18 BRPM

## 2022-01-18 VITALS
TEMPERATURE: 96.8 F | DIASTOLIC BLOOD PRESSURE: 60 MMHG | OXYGEN SATURATION: 97 % | RESPIRATION RATE: 18 BRPM | BODY MASS INDEX: 42.31 KG/M2 | HEIGHT: 61 IN | HEART RATE: 64 BPM | WEIGHT: 224.1 LBS | SYSTOLIC BLOOD PRESSURE: 129 MMHG

## 2022-01-18 VITALS
DIASTOLIC BLOOD PRESSURE: 63 MMHG | WEIGHT: 236.2 LBS | HEART RATE: 65 BPM | BODY MASS INDEX: 44.6 KG/M2 | OXYGEN SATURATION: 95 % | HEIGHT: 61 IN | RESPIRATION RATE: 16 BRPM | TEMPERATURE: 97.7 F | SYSTOLIC BLOOD PRESSURE: 107 MMHG

## 2022-01-18 VITALS
TEMPERATURE: 97.5 F | HEART RATE: 64 BPM | BODY MASS INDEX: 42.51 KG/M2 | SYSTOLIC BLOOD PRESSURE: 126 MMHG | DIASTOLIC BLOOD PRESSURE: 57 MMHG | OXYGEN SATURATION: 96 % | WEIGHT: 225 LBS

## 2022-01-18 VITALS
TEMPERATURE: 97.3 F | BODY MASS INDEX: 45.73 KG/M2 | SYSTOLIC BLOOD PRESSURE: 147 MMHG | OXYGEN SATURATION: 97 % | HEIGHT: 61 IN | WEIGHT: 242.2 LBS | DIASTOLIC BLOOD PRESSURE: 87 MMHG | HEART RATE: 62 BPM | RESPIRATION RATE: 18 BRPM

## 2022-01-18 VITALS
WEIGHT: 223.6 LBS | TEMPERATURE: 97.1 F | BODY MASS INDEX: 42.21 KG/M2 | SYSTOLIC BLOOD PRESSURE: 160 MMHG | HEIGHT: 61 IN | DIASTOLIC BLOOD PRESSURE: 69 MMHG | OXYGEN SATURATION: 97 % | HEART RATE: 76 BPM | RESPIRATION RATE: 18 BRPM

## 2022-01-18 VITALS
BODY MASS INDEX: 48.94 KG/M2 | SYSTOLIC BLOOD PRESSURE: 129 MMHG | TEMPERATURE: 97.1 F | HEART RATE: 70 BPM | WEIGHT: 259 LBS | DIASTOLIC BLOOD PRESSURE: 88 MMHG

## 2022-01-18 VITALS
BODY MASS INDEX: 46.18 KG/M2 | SYSTOLIC BLOOD PRESSURE: 142 MMHG | DIASTOLIC BLOOD PRESSURE: 76 MMHG | OXYGEN SATURATION: 96 % | HEIGHT: 61 IN | RESPIRATION RATE: 18 BRPM | WEIGHT: 244.6 LBS | TEMPERATURE: 97.3 F | HEART RATE: 64 BPM

## 2022-05-15 ENCOUNTER — HOSPITAL ENCOUNTER (EMERGENCY)
Facility: HOSPITAL | Age: 67
Discharge: HOME | End: 2022-05-16
Attending: STUDENT IN AN ORGANIZED HEALTH CARE EDUCATION/TRAINING PROGRAM | Admitting: STUDENT IN AN ORGANIZED HEALTH CARE EDUCATION/TRAINING PROGRAM
Payer: COMMERCIAL

## 2022-05-15 ENCOUNTER — APPOINTMENT (OUTPATIENT)
Dept: CT IMAGING | Facility: HOSPITAL | Age: 67
End: 2022-05-15
Attending: STUDENT IN AN ORGANIZED HEALTH CARE EDUCATION/TRAINING PROGRAM
Payer: COMMERCIAL

## 2022-05-15 ENCOUNTER — APPOINTMENT (OUTPATIENT)
Dept: ULTRASOUND IMAGING | Facility: HOSPITAL | Age: 67
End: 2022-05-15
Attending: STUDENT IN AN ORGANIZED HEALTH CARE EDUCATION/TRAINING PROGRAM
Payer: COMMERCIAL

## 2022-05-15 DIAGNOSIS — N95.0 POSTMENOPAUSAL VAGINAL BLEEDING: ICD-10-CM

## 2022-05-15 DIAGNOSIS — N30.01 ACUTE CYSTITIS WITH HEMATURIA: ICD-10-CM

## 2022-05-15 LAB
ALBUMIN UR-MCNC: 100 MG/DL
ANION GAP SERPL CALCULATED.3IONS-SCNC: 9 MMOL/L (ref 5–18)
APPEARANCE UR: ABNORMAL
BILIRUB UR QL STRIP: NEGATIVE
BUN SERPL-MCNC: 39 MG/DL (ref 8–22)
CALCIUM SERPL-MCNC: 9.1 MG/DL (ref 8.5–10.5)
CHLORIDE BLD-SCNC: 104 MMOL/L (ref 98–107)
CO2 SERPL-SCNC: 22 MMOL/L (ref 22–31)
COLOR UR AUTO: ABNORMAL
CREAT SERPL-MCNC: 2.3 MG/DL (ref 0.6–1.1)
ERYTHROCYTE [DISTWIDTH] IN BLOOD BY AUTOMATED COUNT: 13 % (ref 10–15)
GFR SERPL CREATININE-BSD FRML MDRD: 23 ML/MIN/1.73M2
GLUCOSE BLD-MCNC: 364 MG/DL (ref 70–125)
GLUCOSE UR STRIP-MCNC: >1000 MG/DL
HCT VFR BLD AUTO: 38.9 % (ref 35–47)
HGB BLD-MCNC: 12.6 G/DL (ref 11.7–15.7)
HGB UR QL STRIP: ABNORMAL
INR PPP: 1.21 (ref 0.85–1.15)
KETONES UR STRIP-MCNC: NEGATIVE MG/DL
LEUKOCYTE ESTERASE UR QL STRIP: ABNORMAL
MCH RBC QN AUTO: 31.7 PG (ref 26.5–33)
MCHC RBC AUTO-ENTMCNC: 32.4 G/DL (ref 31.5–36.5)
MCV RBC AUTO: 98 FL (ref 78–100)
NITRATE UR QL: NEGATIVE
PH UR STRIP: 6 [PH] (ref 5–7)
PLATELET # BLD AUTO: 290 10E3/UL (ref 150–450)
POTASSIUM BLD-SCNC: 4.7 MMOL/L (ref 3.5–5)
RBC # BLD AUTO: 3.97 10E6/UL (ref 3.8–5.2)
RBC URINE: >182 /HPF
SODIUM SERPL-SCNC: 135 MMOL/L (ref 136–145)
SP GR UR STRIP: 1.01 (ref 1–1.03)
UROBILINOGEN UR STRIP-MCNC: <2 MG/DL
WBC # BLD AUTO: 12.3 10E3/UL (ref 4–11)
WBC CLUMPS #/AREA URNS HPF: PRESENT /HPF
WBC URINE: >182 /HPF

## 2022-05-15 PROCEDURE — 74176 CT ABD & PELVIS W/O CONTRAST: CPT

## 2022-05-15 PROCEDURE — 96361 HYDRATE IV INFUSION ADD-ON: CPT

## 2022-05-15 PROCEDURE — 96365 THER/PROPH/DIAG IV INF INIT: CPT

## 2022-05-15 PROCEDURE — 99285 EMERGENCY DEPT VISIT HI MDM: CPT | Mod: 25

## 2022-05-15 PROCEDURE — 85027 COMPLETE CBC AUTOMATED: CPT | Performed by: EMERGENCY MEDICINE

## 2022-05-15 PROCEDURE — 87086 URINE CULTURE/COLONY COUNT: CPT | Performed by: EMERGENCY MEDICINE

## 2022-05-15 PROCEDURE — 81001 URINALYSIS AUTO W/SCOPE: CPT | Performed by: EMERGENCY MEDICINE

## 2022-05-15 PROCEDURE — 76856 US EXAM PELVIC COMPLETE: CPT

## 2022-05-15 PROCEDURE — 36415 COLL VENOUS BLD VENIPUNCTURE: CPT | Performed by: EMERGENCY MEDICINE

## 2022-05-15 PROCEDURE — 80048 BASIC METABOLIC PNL TOTAL CA: CPT | Performed by: EMERGENCY MEDICINE

## 2022-05-15 PROCEDURE — 85610 PROTHROMBIN TIME: CPT | Performed by: EMERGENCY MEDICINE

## 2022-05-16 VITALS
RESPIRATION RATE: 16 BRPM | HEART RATE: 64 BPM | WEIGHT: 250 LBS | BODY MASS INDEX: 47.2 KG/M2 | HEIGHT: 61 IN | TEMPERATURE: 97.7 F | OXYGEN SATURATION: 95 % | SYSTOLIC BLOOD PRESSURE: 158 MMHG | DIASTOLIC BLOOD PRESSURE: 62 MMHG

## 2022-05-16 LAB — GLUCOSE BLDC GLUCOMTR-MCNC: 272 MG/DL (ref 70–99)

## 2022-05-16 PROCEDURE — 258N000003 HC RX IP 258 OP 636: Performed by: EMERGENCY MEDICINE

## 2022-05-16 PROCEDURE — 250N000011 HC RX IP 250 OP 636: Performed by: EMERGENCY MEDICINE

## 2022-05-16 RX ORDER — CEFDINIR 300 MG/1
300 CAPSULE ORAL DAILY
Qty: 7 CAPSULE | Refills: 0 | Status: SHIPPED | OUTPATIENT
Start: 2022-05-16 | End: 2022-05-23

## 2022-05-16 RX ORDER — CEFTRIAXONE 1 G/1
1 INJECTION, POWDER, FOR SOLUTION INTRAMUSCULAR; INTRAVENOUS ONCE
Status: COMPLETED | OUTPATIENT
Start: 2022-05-16 | End: 2022-05-16

## 2022-05-16 RX ADMIN — SODIUM CHLORIDE 1000 ML: 9 INJECTION, SOLUTION INTRAVENOUS at 01:35

## 2022-05-16 RX ADMIN — CEFTRIAXONE SODIUM 1 G: 1 INJECTION, POWDER, FOR SOLUTION INTRAMUSCULAR; INTRAVENOUS at 01:35

## 2022-05-16 NOTE — ED NOTES
EMERGENCY DEPARTMENT SIGN OUT NOTE        ED COURSE AND MEDICAL DECISION MAKING  11:45 PM Patient was signed out to me by Dr Juan Luis Kenney   1:27 AM Rechecked and updated the patient.      In brief, Jazmin Bauman is a 66 year old female who initially presented with abdominal pain that began earlier today and vaginal bleeding that began tonight. States she had surgery 5 months ago for her vaginal bleeding, but they were unable to find anything. States her symptoms now are similar to the symptoms she was having prior to surgery, however they are worse currently.      At time of sign out, disposition was pending ABO/RH type and screen lab, urine culture, CT abdomen pelvis, and US Pelvic Complete with Transvaginal.     12:49 AM CT showing evidence of UTI and UA suggestive (could be contaminated of course, but given CT findings, will treat). Will treat empirically with dose of IV rocephin, home with cefdinir.  US still pending.  1:30 AM US results reviewed. Will discuss with pt, refer back to obgyn for repeat direct visualization.  Antibx for suspected UTI.  1:45 AM Pt updated on results, need for close obgyn follow up, which she was told previously to see them again if vaginal bleeding returned. Will treat empirically for UTI with cefdinir.  Pt understands importance of all of this.    FINAL IMPRESSION    1. Acute cystitis with hematuria    2. Postmenopausal vaginal bleeding        ED MEDS  Medications   cefTRIAXone (ROCEPHIN) 1 g vial to attach to  mL bag for ADULTS or NS 50 mL bag for PEDS (1 g Intravenous New Bag 5/16/22 0135)   0.9% sodium chloride BOLUS (1,000 mLs Intravenous New Bag 5/16/22 0135)       LAB  Labs Ordered and Resulted from Time of ED Arrival to Time of ED Departure   CBC WITH PLATELETS - Abnormal       Result Value    WBC Count 12.3 (*)     RBC Count 3.97      Hemoglobin 12.6      Hematocrit 38.9      MCV 98      MCH 31.7      MCHC 32.4      RDW 13.0      Platelet Count 290     BASIC METABOLIC  PANEL - Abnormal    Sodium 135 (*)     Potassium 4.7      Chloride 104      Carbon Dioxide (CO2) 22      Anion Gap 9      Urea Nitrogen 39 (*)     Creatinine 2.30 (*)     Calcium 9.1      Glucose 364 (*)     GFR Estimate 23 (*)    INR - Abnormal    INR 1.21 (*)    ROUTINE UA WITH MICROSCOPIC REFLEX TO CULTURE - Abnormal    Color Urine Brown (*)     Appearance Urine Cloudy (*)     Glucose Urine >1000 (*)     Bilirubin Urine Negative      Ketones Urine Negative      Specific Gravity Urine 1.014      Blood Urine >1.0 mg/dL (*)     pH Urine 6.0      Protein Albumin Urine 100  (*)     Urobilinogen Urine <2.0      Nitrite Urine Negative      Leukocyte Esterase Urine 500 Ulices/uL (*)     WBC Clumps Urine Present (*)     RBC Urine >182 (*)     WBC Urine >182 (*)    TYPE AND SCREEN, ADULT   URINE CULTURE   ABO/RH TYPE AND SCREEN       EKG  None.     RADIOLOGY    US Pelvic Complete with Transvaginal   Final Result   IMPRESSION:   1.  Thickened endometrium with hypervascularity and endometrial sampling may be of benefit if clinically indicated.      2.  Abnormal increased echogenicity dependent portion of the bladder inferiorly and short-term follow-up ultrasound is recommended, please refer to above report for details.      3.  Abnormal area of echogenicity involving the cervix and direct visualization is recommended.      4.  The ovaries are not seen.               CT Abdomen Pelvis w/o Contrast   Final Result   IMPRESSION:    1.  Mild wall thickening of the urinary bladder suggestive of cystitis.   2.  3 mm nonobstructing left intrarenal stone.   3.  Small benign bilateral renal cysts, some hyperdense as before.   4.  Hepatic steatosis.             DISCHARGE MEDS  New Prescriptions    CEFDINIR (OMNICEF) 300 MG CAPSULE    Take 1 capsule (300 mg) by mouth daily for 7 days       [facility] EMERGENCY DEPARTMENT  [address] [id number] [phone]       Shiva  Cornelius KOWALSKI MD  05/16/22 0155

## 2022-05-16 NOTE — ED PROVIDER NOTES
Emergency Department Encounter         FINAL IMPRESSION:  Vaginal bleeding        ED COURSE AND MEDICAL DECISION MAKING          Patient morbidly obese 66-year-old male medical problems, who now arrives with vaginal bleeding.  Patient states that she initially thought was hematuria however states now with vaginal bleeding.  I hysteroscopy which had polyps removed within the last few months.  No cancer.  Now states she is soaking a pad or so an hour.    Arrival her vitals are stable.  Social clinically.  Heart and lungs normal.    Examination with female nurse present showing mild vaginal bleeding.  No hemorrhage.  Plan for basic labs CT as well as ultrasound.  Patient that she has flank pain as well as abdominal pain.    Pt signed out to oncoming doc pending workup                           At the conclusion of the encounter I discussed the results of all the tests and the disposition. The questions were answered. The patient or family acknowledged understanding and was agreeable with the care plan.                  MEDICATIONS GIVEN IN THE EMERGENCY DEPARTMENT:  Medications - No data to display    NEW PRESCRIPTIONS STARTED AT TODAY'S ED VISIT:  New Prescriptions    No medications on file       HPI     Patient information obtained from: Patient    Use of Interpretor: N/A     Jazmin Bauman is a 66 year old female with a pertinent history of UTI, CKD stage 4, paroxysmal atrial fibrillation, acute respiratory failure, sepsis, type II diabetes mellitus, HTNand morbid obesity who presents to this ED via EMS for evaluation of vaginal bleeding and abdominal pain.    Per chart review,  Patient had surgery with Dr. Grant Beal at [facility] on 10/12/21 for post-menopausal bleeding. Ultrasound showed an endometrial stripe of 4 mm. The endometrial biopsy was minimal. Cutterage was performed and a small amount of tissue was obtained. Findings showed normal structures of the uterus, tubes and  ovaries as well as a normal appearing endometrial cavity.     Patient reports with abdominal pain that began earlier today and vaginal bleeding that began tonight. States she had surgery 5 months ago for her vaginal bleeding, but they were unable to find anything. States her symptoms now are similar to the symptoms she was having prior to surgery, however they are worse currently. Notes she is on eliquis for a-fib. Denies melena, chest pain, shortness of breath, fever or any other medical complaint at this time.         REVIEW OF SYSTEMS:  Review of Systems   Constitutional: Negative for fever, malaise  HEENT: Negative runny nose, sore throat, ear pain, neck pain  Respiratory: Negative for shortness of breath, cough, congestion  Cardiovascular: Negative for chest pain, leg edema  Gastrointestinal: Negative for abdominal distention, constipation, vomiting, nausea, diarrhea. Positive for abdominal pain.   Genitourinary: Negative for dysuria. Positive for vaginal bleeding.   Integument: Negative for rash, skin breakdown  Neurological: Negative for paresthesias, weakness, headache.  Musculoskeletal: Negative for joint pain, joint swelling      All other systems reviewed and are negative.          MEDICAL HISTORY     Past Medical History:   Diagnosis Date     Anemia      Diabetes mellitus (H)      Endometrial hyperplasia      Fibromyalgia      History of recurrent UTI (urinary tract infection)      History of transfusion      Hypertension      Thrombocytopenia (H)      Vaginal bleeding 1/2015       Past Surgical History:   Procedure Laterality Date     AMPUTATE LEG BELOW KNEE Right 6/18/2020    Procedure: AMPUTATION, BELOW KNEE;  Surgeon: Epi Corcoran MD;  Location[facility];  Service: General     AMPUTATE LEG BELOW KNEE Right 6/23/2020    Procedure: REVISION AMPUTATION, BELOW KNEE;  Surgeon: Epi Corcoran MD;  Location: [facility];  Service: General     CHOLECYSTECTOMY       COLONOSCOPY N/A  "9/11/2017    Procedure: COLONOSCOPY;  Surgeon: Tyler Bhakta MD;  Location: [facility];  Service:      DILATION AND CURETTAGE       DILATION AND CURETTAGE, OPERATIVE HYSTEROSCOPY, COMBINED N/A 1/28/2015    Procedure: DILATION AND CURETTAGE WITH HYSTEROSCOPY;  Surgeon: Grant Beal MD;  Location: [facility] OR;  Service:      DILATION AND CURETTAGE, OPERATIVE HYSTEROSCOPY, COMBINED N/A 10/12/2021    Procedure: HYSTEROSCOPY,  DILATION AND CURETTAGE;  Surgeon: Grant Beal MD;  Location: [facility]     IR CVC NON TUNNEL PLACEMENT  1/21/2020     IR NON TUNNELED CATHETER >5 YEARS  1/21/2020     OTHER SURGICAL HISTORY  2009    bilateral carpal tunnel release     PICC  1/20/2020          PIC  8/21/2020          OR HYSTEROSCOPY,W/ENDO BX N/A 9/5/2019    Procedure: HYSTEROSCOPY, DILATION AND CURETTAGE;  Surgeon: Grant Beal MD;  Location: [facility] OR;  Service: Gynecology     OR HYSTEROSCOPY,W/ENDO BX N/A 12/9/2019    Procedure: HYSTEROSCOPY, DILATION AND CURETTAGE;  Surgeon: Grant Beal MD;  Location: [facility] [place];  Service: Gynecology       Social History     Tobacco Use     Smoking status: Never Smoker     Smokeless tobacco: Never Used   Substance Use Topics     Alcohol use: Not Currently     Comment: Alcoholic Drinks/day: rare     Drug use: No       acetaminophen (TYLENOL) 325 MG tablet  acetaminophen (TYLENOL) 500 MG tablet  apixaban ANTICOAGULANT (ELIQUIS) 5 mg Tab tablet  BD ULTRA-FINE MICRO PEN NEEDLE 32 gauge x 1/4\" Ndle  calcium polycarbophil (FIBERCON) 625 MG tablet  ferrous sulfate 325 (65 FE) MG tablet  gabapentin (NEURONTIN) 100 MG capsule  gabapentin (NEURONTIN) 300 MG capsule  hydrocortisone 1 % cream  HYDROmorphone (DILAUDID) 2 MG tablet  HYDROmorphone (DILAUDID) 2 MG tablet  insulin aspart (NOVOLOG FLEXPEN) 100 UNIT/ML pen  insulin aspart (NOVOLOG FLEXPEN) 100 UNIT/ML pen  insulin glargine (LANTUS PEN) 100 UNIT/ML pen  lancets (ACCU-CHEK MULTICLIX " "LANCET) Misc  Lidocaine (LIDOCARE) 4 % Patch  linaGLIPtin 5 mg Tab  metoprolol tartrate (LOPRESSOR) 25 MG tablet  omeprazole (PRILOSEC) 20 MG DR capsule  Saccharomyces boulardii (FLORASTOR) 250 mg capsule  sodium bicarbonate 650 MG tablet            PHYSICAL EXAM     BP (!) 177/69   Pulse 65   Temp 97.7  F (36.5  C) (Oral)   Resp 16   Ht 1.549 m (5' 1\")   Wt 113.4 kg (250 lb)   SpO2 96%   BMI 47.24 kg/m        PHYSICAL EXAM:     General: Patient appears well, nontoxic, comfortable  HEENT: Moist mucous membranes, no tongue swelling.  No head trauma.  No midline neck pain.  Cardiovascular: Normal rate, normal rhythm, no extremity edema.  No appreciable murmur.  Respiratory: No signs of respiratory distress, lungs are clear to auscultation bilaterally with no wheezes rhonchi or rales.  Abdominal: Soft, nontender, nondistended, no palpable masses, no guarding, no rebound  Musculoskeletal: Full range of motion of joints, no deformities appreciated.  : normal external vaginal mucosa, no hemorrhage, small amoutn of blood in the vault, no mass  Neurological: Alert and oriented, grossly neurologically intact.  Psychological: Normal affect and mood.  Integument: No rashes appreciated          RESULTS       Labs Ordered and Resulted from Time of ED Arrival to Time of ED Departure   CBC WITH PLATELETS - Abnormal       Result Value    WBC Count 12.3 (*)     RBC Count 3.97      Hemoglobin 12.6      Hematocrit 38.9      MCV 98      MCH 31.7      MCHC 32.4      RDW 13.0      Platelet Count 290     INR - Abnormal    INR 1.21 (*)    ROUTINE UA WITH MICROSCOPIC REFLEX TO CULTURE - Abnormal    Color Urine Brown (*)     Appearance Urine Cloudy (*)     Glucose Urine >1000 (*)     Bilirubin Urine Negative      Ketones Urine Negative      Specific Gravity Urine 1.014      Blood Urine >1.0 mg/dL (*)     pH Urine 6.0      Protein Albumin Urine 100  (*)     Urobilinogen Urine <2.0      Nitrite Urine Negative      Leukocyte Esterase " Urine 500 Ulices/uL (*)     WBC Clumps Urine Present (*)     RBC Urine >182 (*)     WBC Urine >182 (*)    BASIC METABOLIC PANEL   URINE CULTURE   ABO/RH TYPE AND SCREEN       CT Abdomen Pelvis w Contrast    (Results Pending)                     PROCEDURES:  Procedures:  Procedures       I, Terry Villela am serving as a scribe to document services personally performed by Juan Luis Kenney DO, based on my observations and the provider's statements to me.  I, Juan Luis Kenney DO, attest that Terry Villela is acting in a scribe capacity, has observed my performance of the services and has documented them in accordance with my direction.    Juan Luis Kenney DO  Emergency Medicine  [facility] EMERGENCY DEPARTMENT       Juan Luis Kenney DO  05/20/22 [id number]

## 2022-05-16 NOTE — ED TRIAGE NOTES
Pt presents with complaints of hematuria with clots that started at 2000 tonight.  Pt is on blood thinners for a-fib.  Pt states approx 4-5 months ago had similar episode     Triage Assessment     Row Name 05/15/22 [id number]       Triage Assessment (Adult)    Airway WDL WDL       Respiratory WDL    Respiratory WDL WDL       Skin Circulation/Temperature WDL    Skin Circulation/Temperature WDL WDL       Cardiac WDL    Cardiac WDL WDL       Peripheral/Neurovascular WDL    Peripheral Neurovascular WDL WDL       Cognitive/Neuro/Behavioral WDL    Cognitive/Neuro/Behavioral WDL WDL

## 2022-05-16 NOTE — DISCHARGE INSTRUCTIONS
Follow up closely with your previous obgyn - call number for close follow up regarding recurrent vaginal bleeding. Take antibiotic as directed until gone with next dose later today. Return for new/worsening concerns.

## 2022-05-17 ENCOUNTER — PATIENT OUTREACH (OUTPATIENT)
Dept: CARE COORDINATION | Facility: CLINIC | Age: 67
End: 2022-05-17
Payer: COMMERCIAL

## 2022-05-17 DIAGNOSIS — Z71.89 OTHER SPECIFIED COUNSELING: ICD-10-CM

## 2022-05-17 LAB — BACTERIA UR CULT: NORMAL

## 2022-05-17 NOTE — PROGRESS NOTES
Clinic Care Coordination Contact  [facility]       Clinical Data: Care Coordinator Outreach  Outreach attempted x 2.  Left message on patient's voicemail with call back information and requested return call.  Plan:Care Coordinator will try to reach patient again in 1-2 business days.    MARII Tracy  [phone]  [facility]        Clinic Care Coordination Contact  [facility]       Clinical Data: Care Coordinator Outreach  Outreach attempted x 1.  Left message on patient's voicemail with call back information and requested return call.  Plan: Care Coordinator will try to reach patient again in 1-2 business days.    MARII Tracy  [phone]  [facility]

## 2022-05-18 ENCOUNTER — TRANSFERRED RECORDS (OUTPATIENT)
Dept: HEALTH INFORMATION MANAGEMENT | Facility: CLINIC | Age: 67
End: 2022-05-18

## 2022-06-02 ENCOUNTER — TRANSFERRED RECORDS (OUTPATIENT)
Dept: HEALTH INFORMATION MANAGEMENT | Facility: CLINIC | Age: 67
End: 2022-06-02
Payer: COMMERCIAL

## 2022-06-23 LAB
CREATININE (EXTERNAL): 1.84 MG/DL (ref 0.55–1.02)
GFR ESTIMATED (EXTERNAL): 30 ML/MIN/1.73M2
GLUCOSE (EXTERNAL): 287 MG/DL (ref 70–100)
POTASSIUM (EXTERNAL): 4.6 MMOL/L (ref 3.5–5.1)

## 2022-06-24 NOTE — OR NURSING
Spoke with Adriana, surgery scheduler.  Updated on inability to find pre op on patient, left messages for patient.  Keep on schedule and will assess Monday morning.

## 2022-06-27 ENCOUNTER — ANESTHESIA EVENT (OUTPATIENT)
Dept: SURGERY | Facility: HOSPITAL | Age: 67
End: 2022-06-27
Payer: COMMERCIAL

## 2022-06-27 ENCOUNTER — HOSPITAL ENCOUNTER (OUTPATIENT)
Facility: HOSPITAL | Age: 67
Discharge: HOME | End: 2022-06-27
Attending: OBSTETRICS & GYNECOLOGY | Admitting: OBSTETRICS & GYNECOLOGY
Payer: COMMERCIAL

## 2022-06-27 ENCOUNTER — ANESTHESIA (OUTPATIENT)
Dept: SURGERY | Facility: HOSPITAL | Age: 67
End: 2022-06-27
Payer: COMMERCIAL

## 2022-06-27 VITALS
WEIGHT: 261.7 LBS | RESPIRATION RATE: 20 BRPM | SYSTOLIC BLOOD PRESSURE: 163 MMHG | TEMPERATURE: 98.6 F | HEIGHT: 61 IN | OXYGEN SATURATION: 99 % | DIASTOLIC BLOOD PRESSURE: 72 MMHG | HEART RATE: 60 BPM | BODY MASS INDEX: 49.41 KG/M2

## 2022-06-27 LAB
APTT PPP: 30 SECONDS (ref 22–38)
GLUCOSE BLDC GLUCOMTR-MCNC: 325 MG/DL (ref 70–99)
GLUCOSE BLDC GLUCOMTR-MCNC: 375 MG/DL (ref 70–99)
HOLD SPECIMEN: NORMAL
HOLD SPECIMEN: NORMAL
INR PPP: 1.12 (ref 0.85–1.15)
MRSA DNA SPEC QL NAA+PROBE: POSITIVE
SA TARGET DNA: POSITIVE

## 2022-06-27 PROCEDURE — 250N000012 HC RX MED GY IP 250 OP 636 PS 637: Performed by: ANESTHESIOLOGY

## 2022-06-27 PROCEDURE — 88305 TISSUE EXAM BY PATHOLOGIST: CPT | Mod: TC | Performed by: OBSTETRICS & GYNECOLOGY

## 2022-06-27 PROCEDURE — 85730 THROMBOPLASTIN TIME PARTIAL: CPT | Performed by: OBSTETRICS & GYNECOLOGY

## 2022-06-27 PROCEDURE — 85610 PROTHROMBIN TIME: CPT | Performed by: OBSTETRICS & GYNECOLOGY

## 2022-06-27 PROCEDURE — 82962 GLUCOSE BLOOD TEST: CPT

## 2022-06-27 PROCEDURE — 710N000012 HC RECOVERY PHASE 2, PER MINUTE: Performed by: OBSTETRICS & GYNECOLOGY

## 2022-06-27 PROCEDURE — 258N000001 HC RX 258: Performed by: OBSTETRICS & GYNECOLOGY

## 2022-06-27 PROCEDURE — 250N000011 HC RX IP 250 OP 636: Performed by: ANESTHESIOLOGY

## 2022-06-27 PROCEDURE — 250N000013 HC RX MED GY IP 250 OP 250 PS 637: Performed by: PHYSICIAN ASSISTANT

## 2022-06-27 PROCEDURE — 87641 MR-STAPH DNA AMP PROBE: CPT | Performed by: OBSTETRICS & GYNECOLOGY

## 2022-06-27 PROCEDURE — 999N000141 HC STATISTIC PRE-PROCEDURE NURSING ASSESSMENT: Performed by: OBSTETRICS & GYNECOLOGY

## 2022-06-27 PROCEDURE — 250N000009 HC RX 250: Performed by: OBSTETRICS & GYNECOLOGY

## 2022-06-27 PROCEDURE — 370N000017 HC ANESTHESIA TECHNICAL FEE, PER MIN: Performed by: OBSTETRICS & GYNECOLOGY

## 2022-06-27 PROCEDURE — 258N000003 HC RX IP 258 OP 636: Performed by: ANESTHESIOLOGY

## 2022-06-27 PROCEDURE — 360N000076 HC SURGERY LEVEL 3, PER MIN: Performed by: OBSTETRICS & GYNECOLOGY

## 2022-06-27 PROCEDURE — 36415 COLL VENOUS BLD VENIPUNCTURE: CPT | Performed by: OBSTETRICS & GYNECOLOGY

## 2022-06-27 PROCEDURE — 250N000011 HC RX IP 250 OP 636: Performed by: NURSE ANESTHETIST, CERTIFIED REGISTERED

## 2022-06-27 PROCEDURE — 87081 CULTURE SCREEN ONLY: CPT | Performed by: OBSTETRICS & GYNECOLOGY

## 2022-06-27 PROCEDURE — 88305 TISSUE EXAM BY PATHOLOGIST: CPT | Mod: 26 | Performed by: PATHOLOGY

## 2022-06-27 PROCEDURE — 272N000001 HC OR GENERAL SUPPLY STERILE: Performed by: OBSTETRICS & GYNECOLOGY

## 2022-06-27 PROCEDURE — 96372 THER/PROPH/DIAG INJ SC/IM: CPT | Performed by: ANESTHESIOLOGY

## 2022-06-27 RX ORDER — LIDOCAINE 40 MG/G
CREAM TOPICAL
Status: DISCONTINUED | OUTPATIENT
Start: 2022-06-27 | End: 2022-06-27 | Stop reason: HOSPADM

## 2022-06-27 RX ORDER — OXYCODONE HYDROCHLORIDE 5 MG/1
5 TABLET ORAL EVERY 4 HOURS PRN
Status: DISCONTINUED | OUTPATIENT
Start: 2022-06-27 | End: 2022-06-27 | Stop reason: HOSPADM

## 2022-06-27 RX ORDER — NALOXONE HYDROCHLORIDE 0.4 MG/ML
0.2 INJECTION, SOLUTION INTRAMUSCULAR; INTRAVENOUS; SUBCUTANEOUS
Status: DISCONTINUED | OUTPATIENT
Start: 2022-06-27 | End: 2022-06-27 | Stop reason: HOSPADM

## 2022-06-27 RX ORDER — ONDANSETRON 4 MG/1
4 TABLET, ORALLY DISINTEGRATING ORAL EVERY 30 MIN PRN
Status: DISCONTINUED | OUTPATIENT
Start: 2022-06-27 | End: 2022-06-27 | Stop reason: HOSPADM

## 2022-06-27 RX ORDER — NALOXONE HYDROCHLORIDE 0.4 MG/ML
0.4 INJECTION, SOLUTION INTRAMUSCULAR; INTRAVENOUS; SUBCUTANEOUS
Status: DISCONTINUED | OUTPATIENT
Start: 2022-06-27 | End: 2022-06-27 | Stop reason: HOSPADM

## 2022-06-27 RX ORDER — MEPERIDINE HYDROCHLORIDE 25 MG/ML
12.5 INJECTION INTRAMUSCULAR; INTRAVENOUS; SUBCUTANEOUS
Status: DISCONTINUED | OUTPATIENT
Start: 2022-06-27 | End: 2022-06-27 | Stop reason: HOSPADM

## 2022-06-27 RX ORDER — PROPOFOL 10 MG/ML
INJECTION, EMULSION INTRAVENOUS PRN
Status: DISCONTINUED | OUTPATIENT
Start: 2022-06-27 | End: 2022-06-27

## 2022-06-27 RX ORDER — NICOTINE POLACRILEX 4 MG
15-30 LOZENGE BUCCAL
Status: DISCONTINUED | OUTPATIENT
Start: 2022-06-27 | End: 2022-06-27 | Stop reason: HOSPADM

## 2022-06-27 RX ORDER — SODIUM CHLORIDE, SODIUM LACTATE, POTASSIUM CHLORIDE, CALCIUM CHLORIDE 600; 310; 30; 20 MG/100ML; MG/100ML; MG/100ML; MG/100ML
INJECTION, SOLUTION INTRAVENOUS CONTINUOUS
Status: DISCONTINUED | OUTPATIENT
Start: 2022-06-27 | End: 2022-06-27 | Stop reason: HOSPADM

## 2022-06-27 RX ORDER — FENTANYL CITRATE 50 UG/ML
INJECTION, SOLUTION INTRAMUSCULAR; INTRAVENOUS PRN
Status: DISCONTINUED | OUTPATIENT
Start: 2022-06-27 | End: 2022-06-27

## 2022-06-27 RX ORDER — HYDROMORPHONE HYDROCHLORIDE 1 MG/ML
0.5 INJECTION, SOLUTION INTRAMUSCULAR; INTRAVENOUS; SUBCUTANEOUS EVERY 5 MIN PRN
Status: DISCONTINUED | OUTPATIENT
Start: 2022-06-27 | End: 2022-06-27 | Stop reason: HOSPADM

## 2022-06-27 RX ORDER — PROPOFOL 10 MG/ML
INJECTION, EMULSION INTRAVENOUS CONTINUOUS PRN
Status: DISCONTINUED | OUTPATIENT
Start: 2022-06-27 | End: 2022-06-27

## 2022-06-27 RX ORDER — DEXTROSE MONOHYDRATE 25 G/50ML
25-50 INJECTION, SOLUTION INTRAVENOUS
Status: DISCONTINUED | OUTPATIENT
Start: 2022-06-27 | End: 2022-06-27 | Stop reason: HOSPADM

## 2022-06-27 RX ORDER — ACETAMINOPHEN 325 MG/1
975 TABLET ORAL ONCE
Status: DISCONTINUED | OUTPATIENT
Start: 2022-06-27 | End: 2022-06-27 | Stop reason: HOSPADM

## 2022-06-27 RX ORDER — ONDANSETRON 2 MG/ML
4 INJECTION INTRAMUSCULAR; INTRAVENOUS EVERY 30 MIN PRN
Status: DISCONTINUED | OUTPATIENT
Start: 2022-06-27 | End: 2022-06-27 | Stop reason: HOSPADM

## 2022-06-27 RX ORDER — ONDANSETRON 2 MG/ML
INJECTION INTRAMUSCULAR; INTRAVENOUS PRN
Status: DISCONTINUED | OUTPATIENT
Start: 2022-06-27 | End: 2022-06-27

## 2022-06-27 RX ORDER — ACETAMINOPHEN 325 MG/1
975 TABLET ORAL ONCE
Status: COMPLETED | OUTPATIENT
Start: 2022-06-27 | End: 2022-06-27

## 2022-06-27 RX ORDER — FENTANYL CITRATE 50 UG/ML
25 INJECTION, SOLUTION INTRAMUSCULAR; INTRAVENOUS
Status: DISCONTINUED | OUTPATIENT
Start: 2022-06-27 | End: 2022-06-27 | Stop reason: HOSPADM

## 2022-06-27 RX ORDER — FENTANYL CITRATE 50 UG/ML
25 INJECTION, SOLUTION INTRAMUSCULAR; INTRAVENOUS EVERY 5 MIN PRN
Status: DISCONTINUED | OUTPATIENT
Start: 2022-06-27 | End: 2022-06-27 | Stop reason: HOSPADM

## 2022-06-27 RX ORDER — FENTANYL CITRATE 50 UG/ML
50 INJECTION, SOLUTION INTRAMUSCULAR; INTRAVENOUS
Status: DISCONTINUED | OUTPATIENT
Start: 2022-06-27 | End: 2022-06-27 | Stop reason: HOSPADM

## 2022-06-27 RX ORDER — LIDOCAINE HYDROCHLORIDE 10 MG/ML
INJECTION, SOLUTION INFILTRATION; PERINEURAL PRN
Status: DISCONTINUED | OUTPATIENT
Start: 2022-06-27 | End: 2022-06-27 | Stop reason: HOSPADM

## 2022-06-27 RX ADMIN — PROPOFOL 100 MCG/KG/MIN: 10 INJECTION, EMULSION INTRAVENOUS at 11:51

## 2022-06-27 RX ADMIN — ACETAMINOPHEN 975 MG: 325 TABLET, FILM COATED ORAL at 11:30

## 2022-06-27 RX ADMIN — HUMAN INSULIN 10 UNITS: 100 INJECTION, SOLUTION SUBCUTANEOUS at 11:35

## 2022-06-27 RX ADMIN — SODIUM CHLORIDE, POTASSIUM CHLORIDE, SODIUM LACTATE AND CALCIUM CHLORIDE: 600; 310; 30; 20 INJECTION, SOLUTION INTRAVENOUS at 11:24

## 2022-06-27 RX ADMIN — SODIUM CHLORIDE, POTASSIUM CHLORIDE, SODIUM LACTATE AND CALCIUM CHLORIDE 250 ML: 600; 310; 30; 20 INJECTION, SOLUTION INTRAVENOUS at 11:25

## 2022-06-27 RX ADMIN — FENTANYL CITRATE 50 MCG: 50 INJECTION, SOLUTION INTRAMUSCULAR; INTRAVENOUS at 12:13

## 2022-06-27 RX ADMIN — PROPOFOL 40 MG: 10 INJECTION, EMULSION INTRAVENOUS at 11:51

## 2022-06-27 RX ADMIN — FENTANYL CITRATE 25 MCG: 50 INJECTION, SOLUTION INTRAMUSCULAR; INTRAVENOUS at 12:58

## 2022-06-27 RX ADMIN — FENTANYL CITRATE 50 MCG: 50 INJECTION, SOLUTION INTRAMUSCULAR; INTRAVENOUS at 11:51

## 2022-06-27 RX ADMIN — ONDANSETRON 4 MG: 2 INJECTION INTRAMUSCULAR; INTRAVENOUS at 12:14

## 2022-06-27 RX ADMIN — MIDAZOLAM 2 MG: 1 INJECTION INTRAMUSCULAR; INTRAVENOUS at 11:47

## 2022-06-27 NOTE — ANESTHESIA CARE TRANSFER NOTE
Patient: Jazmin Bauman    Procedure: Procedure(s):  HYSTEROSCOPY,POLYPECTOMY; DILATION AND CURETTAGE       Diagnosis: Post-menopausal bleeding [N95.0]  Diagnosis Additional Information: No value filed.    Anesthesia Type:   MAC     Note:    Oropharynx: oropharynx clear of all foreign objects and spontaneously breathing  Level of Consciousness: awake  Oxygen Supplementation: room air    Independent Airway: airway patency satisfactory and stable  Dentition: dentition unchanged  Vital Signs Stable: post-procedure vital signs reviewed and stable  Report to RN Given: handoff report given  Patient transferred to: Phase II    Handoff Report: Identifed the Patient, Identified the Reponsible Provider, Reviewed the pertinent medical history, Discussed the surgical course, Reviewed Intra-OP anesthesia mangement and issues during anesthesia, Set expectations for post-procedure period and Allowed opportunity for questions and acknowledgement of understanding      Vitals:  Vitals Value Taken Time   /88 06/27/22 1247   Temp 37  C (98.6  F) 06/27/22 1236   Pulse 67 06/27/22 1249   Resp 18 06/27/22 1236   SpO2 98 % 06/27/22 1249   Vitals shown include unvalidated device data.    Electronically Signed By: FITO Browning CRNA  June 27, 2022  12:50 PM

## 2022-06-27 NOTE — INTERVAL H&P NOTE
"I have reviewed the surgical (or preoperative) H&P that is linked to this encounter, and examined the patient. There are no significant changes    Clinical Conditions Present on Arrival:  Clinically Significant Risk Factors Present on Admission                 # Coagulation Defect: home medication list includes an anticoagulant medication   # Severe Obesity: Estimated body mass index is 49.45 kg/m  as calculated from the following:    Height as of this encounter: 1.549 m (5' 1\").    Weight as of this encounter: 118.7 kg (261 lb 11.2 oz).       "

## 2022-06-27 NOTE — OP NOTE
PROCEDURE NOTE - HYSTEROSCOPY AND DILATION AND CURETTAGE     Name: Jazmin Bauman   : 1955   MRN: [id number]     DATE OF SERVICE:  2022     PREOPERATIVE DIAGNOSIS: Postmenopausal thickened endometrium    POSTOPERATIVE DIAGNOSIS: Postmenopausal uterine polyp    PROCEDURES: Hysteroscopy, dilatation and curettage    SURGEON: Alexander Valdivia MD     ASSISTANT: OR staff    ANESTHESIA:  mac    ESTIMATED BLOOD LOSS:   2cc    HISTORY OF PRESENT ILLNESS:  This is a 67 year old female with history of postmenopausal bleeding thought to be secondary to thickened endometrium. She had a transvaginal ultrasound prior to which showed thickened endometrium.  She was given informed consent for the above procedure. The risks, benefits and alternatives were discussed with her including but not exclusive to uterine perforation, bleeding and infection. She expressed understanding and wished to proceed.     PROCEDURE:  The patient was brought to the operating room and after induction of MAC anesthesia was prepped and draped in the dorsal lithotomy position. A time out was called and the patient and the procedure were verified.  A bimanual exam was done, indicating a small anteverted uterus. . No other abnormalities were noted.     A sterile  speculum was then placed.  A paracervical block was given with 10 cc of 1% lidocaine divided in 9:00 and 3:00 o'clock.  The anterior lip of the cervix was grasped with a single tooth tenaculum. Uterine cavity was then sounded to 8cm. The cervix was gently dilated using Rica dilators and the hysteroscope was introduced without difficulty. The cavity of the uterus appeared to have a few simple appearing polyps. Using a myosure I removed them completely. I also sampled the lining of the uterus with the Myosure.  . The hysteroscope was removed. . Instruments were removed from vagina. No active bleeding was noted. Sponge and needle counts were correct X 2. She was taken to recovery  in stable condition.    @[facility]@       CC: Lester Flores, Alexander Valdivia MD

## 2022-06-27 NOTE — ANESTHESIA POSTPROCEDURE EVALUATION
Patient: Jazmin Bauman    Procedure: Procedure(s):  HYSTEROSCOPY,POLYPECTOMY; DILATION AND CURETTAGE       Anesthesia Type:  MAC    Note:  Disposition: Outpatient   Postop Pain Control: Uneventful            Sign Out: Well controlled pain   PONV: No   Neuro/Psych: Uneventful            Sign Out: Acceptable/Baseline neuro status   Airway/Respiratory: Uneventful            Sign Out: Acceptable/Baseline resp. status   CV/Hemodynamics: Uneventful            Sign Out: Acceptable CV status; No obvious hypovolemia; No obvious fluid overload   Other NRE: NONE   DID A NON-ROUTINE EVENT OCCUR? No           Last vitals:  Vitals Value Taken Time   /72 06/27/22 1345   Temp 37  C (98.6  F) 06/27/22 1236   Pulse 60 06/27/22 1345   Resp 20 06/27/22 1345   SpO2 99 % 06/27/22 1345       Electronically Signed By: Mary Lozano MD  June 27, 2022  3:19 PM

## 2022-06-27 NOTE — OR NURSING
Blood sugar 375 upon arrival to [facility]. No symptoms. Dr. Lozano informed-new orders.POONAM 189/81 recheck. Pt. States she has white coat syndrome. Dr. Lozano informed. No new orders.    [facility] states pt. Had Eliquis yesterday bid. Dr. Valdivia called and informed. Drawing PTT and INR per orders.

## 2022-06-27 NOTE — ANESTHESIA CARE TRANSFER NOTE
Patient: Jazmin Bauman    Procedure: Procedure(s):  HYSTEROSCOPY,POLYPECTOMY; DILATION AND CURETTAGE       Diagnosis: Post-menopausal bleeding [N95.0]  Diagnosis Additional Information: No value filed.    Anesthesia Type:   MAC     Note:    Oropharynx: oropharynx clear of all foreign objects and spontaneously breathing  Level of Consciousness: awake  Oxygen Supplementation: room air    Independent Airway: airway patency satisfactory and stable  Dentition: dentition unchanged  Vital Signs Stable: post-procedure vital signs reviewed and stable  Report to RN Given: handoff report given  Patient transferred to: Phase II    Handoff Report: Identifed the Patient, Identified the Reponsible Provider, Reviewed the pertinent medical history, Discussed the surgical course, Reviewed Intra-OP anesthesia mangement and issues during anesthesia, Set expectations for post-procedure period and Allowed opportunity for questions and acknowledgement of understanding      Vitals:  Vitals Value Taken Time   /88 06/27/22 1247   Temp 37  C (98.6  F) 06/27/22 1236   Pulse 69 06/27/22 1250   Resp 18 06/27/22 1236   SpO2 98 % 06/27/22 1250   Vitals shown include unvalidated device data.    Electronically Signed By: FITO Leone CRNA  June 27, 2022  12:51 PM

## 2022-06-30 LAB
BACTERIA SPEC CULT: ABNORMAL
PATH REPORT.COMMENTS IMP SPEC: NORMAL
PATH REPORT.COMMENTS IMP SPEC: NORMAL
PATH REPORT.FINAL DX SPEC: NORMAL
PATH REPORT.GROSS SPEC: NORMAL
PATH REPORT.MICROSCOPIC SPEC OTHER STN: NORMAL
PATH REPORT.RELEVANT HX SPEC: NORMAL
PHOTO IMAGE: NORMAL

## 2022-09-08 NOTE — LETTER
Letter by Carly Arora NP at      Author: Carly Arora NP Service: -- Author Type: --    Filed:  Encounter Date: 8/8/2019 Status: (Other)         08/08/19      Jazmin Bauman  [address]  [place] [address]    Dear Jazmin,    As a valued [facility] patient, your healthcare needs are our priority. Our clinic records indicate we have attempted to contact you to schedule a consultation  appointment, referred by Dr Benjamin, but have not heard back from you after three (3) attempts. To prevent further delays in your care please contact our office to schedule your appointment as soon as possible.      Sincerely,    [facility]        Birth Control Pills Counseling: Birth Control Pill Counseling: I discussed with the patient the potential side effects of OCPs including but not limited to increased risk of stroke, heart attack, thrombophlebitis, deep venous thrombosis, hepatic adenomas, breast changes, GI upset, headaches, and depression.  The patient verbalized understanding of the proper use and possible adverse effects of OCPs. All of the patient's questions and concerns were addressed.

## 2022-10-05 ENCOUNTER — OFFICE VISIT (OUTPATIENT)
Dept: CARDIOLOGY | Facility: CLINIC | Age: 67
End: 2022-10-05
Payer: COMMERCIAL

## 2022-10-05 VITALS
SYSTOLIC BLOOD PRESSURE: 162 MMHG | HEART RATE: 72 BPM | HEIGHT: 61 IN | BODY MASS INDEX: 49.45 KG/M2 | DIASTOLIC BLOOD PRESSURE: 78 MMHG | RESPIRATION RATE: 18 BRPM

## 2022-10-05 DIAGNOSIS — E11.69 TYPE 2 DIABETES MELLITUS WITH OTHER SPECIFIED COMPLICATION, WITH LONG-TERM CURRENT USE OF INSULIN (H): ICD-10-CM

## 2022-10-05 DIAGNOSIS — Z79.4 TYPE 2 DIABETES MELLITUS WITH OTHER SPECIFIED COMPLICATION, WITH LONG-TERM CURRENT USE OF INSULIN (H): ICD-10-CM

## 2022-10-05 DIAGNOSIS — E66.01 MORBID OBESITY (H): ICD-10-CM

## 2022-10-05 DIAGNOSIS — I48.0 PAROXYSMAL ATRIAL FIBRILLATION (H): Primary | ICD-10-CM

## 2022-10-05 DIAGNOSIS — N18.32 STAGE 3B CHRONIC KIDNEY DISEASE (H): ICD-10-CM

## 2022-10-05 DIAGNOSIS — I10 PRIMARY HYPERTENSION: ICD-10-CM

## 2022-10-05 PROCEDURE — 99214 OFFICE O/P EST MOD 30 MIN: CPT | Performed by: INTERNAL MEDICINE

## 2022-10-05 RX ORDER — INSULIN LISPRO 100 [IU]/ML
INJECTION, SOLUTION INTRAVENOUS; SUBCUTANEOUS
COMMUNITY

## 2022-10-05 RX ORDER — CARVEDILOL 12.5 MG/1
12.5 TABLET ORAL 2 TIMES DAILY WITH MEALS
Qty: 60 TABLET | Refills: 11 | Status: SHIPPED | OUTPATIENT
Start: 2022-10-05

## 2022-10-05 NOTE — PROGRESS NOTES
Assessment/Plan:   1.  Paroxysmal atrial fibrillation: The patient is in normal sinus rhythm at this time.  Her heart rate has been well controlled.   Metoprolol 25 mg twice a day is changed  to carvedilol 12.5 mg twice a day for better blood pressure control.  Her [id number] score is 3.    Continue chronic anticoagulation Eliquis 5 mg twice a day.     2.   Primary hypertension: Her blood pressure is high.  Her amlodipine was discontinued.  We discussed the management of hypertension.  The patient does not want to add new medications.  After discussion, discontinue metoprolol 25 mg twice a day, start carvedilol 12.5 mg twice a day for better blood pressure control.  If her blood pressure still not well controlled, and then consider to add hydrochlorothiazide 25 mg daily or Lasix 20 mg daily because she has mild left leg edema.  Prescription is sent to her pharmacy.    3.  Type 2 diabetes, stage III CKD, morbid obesity: Stable, no change in treatment.    Thank you for the opportunity to be involved in the care of Jazmin Bauman. If you have any questions, please feel free to contact me.  I will see the patient again in 6 months and as needed.    Much or all of the text in this note was generated through the use of Dragon Dictate voice-to-text software. Errors in spelling or words which seem out of context are unintentional. Sound alike errors, in particular, may have escaped editing.       History of Present Illness:   It is my pleasure to see Jazmin Bauman at the [facility] for routine cardiology follow up.  Jazmin Bauman is a 67 year old female with a medical history of paroxysmal atrial fibrillation, morbid obesity, essential hypertension, type 2 diabetes, stage III CKD, right AKA.    She states that she has no chest pain, shortness of breath.  Her dyspnea on exertion has been stable.  She has no palpitations, dizziness, orthopnea.  She has mild left leg edema, right  AKA.  Her blood pressure is high 162/78 this morning.  The patient discontinued amlodipine since she said amlodipine increased her blood pressure.    Past Medical History:     Patient Active Problem List   Diagnosis     Carpal Tunnel Syndrome     Urinary Tract Infection     Endometrial Hyperplasia     Cholelithiasis     Leukocytosis     Urine Tests Nonspecific Abnormal Findings     Obesity     Essential hypertension     Pernicious Anemia     Immunology Studies Raised Immunoglobulin Level     Vaginal bleeding     Type 2 diabetes mellitus with other specified complication, with long-term current use of insulin (H)     Atrial fibrillation with RVR (H)     Acute kidney injury superimposed on CKD (H)     Non-traumatic rhabdomyolysis     Metabolic acidosis     Troponin level elevated     Bacteremia     Acute respiratory failure with hypoxia (H)     Acute encephalopathy     Acute pulmonary edema (H)     Wheezing     Moderate protein malnutrition (H)     Abnormal cytological findings in specimens from other female genital organs     Chronic kidney disease, stage III (moderate) (H)     Hyperkalemia     Osteoarthritis     Acute kidney injury (H)     Sepsis (H)     Sepsis, due to unspecified organism, unspecified whether acute organ dysfunction present (H)     Cellulitis of right foot     CKD (chronic kidney disease) stage 4, GFR 15-29 ml/min (H)     Chronic wound infection of abdomen, subsequent encounter     Acute post-operative pain     Phantom limb pain (H)     Paroxysmal atrial fibrillation (H)     Urinary retention     Coronary artery disease without angina pectoris     Hx of right BKA (H)     Weakness     Slow transit constipation     Wound infection     Fever and chills     Acute pain of left shoulder     Physical deconditioning     Gram-positive bacteremia     Chronic pain syndrome     MRSA bacteremia     Hypercalcemia     Paronychia of great toe, left     Below-knee amputation (H)     Other depression     Unintended  weight loss     2019 novel coronavirus disease (COVID-19)     Frequent loose stools     Anticoagulated     Primary osteoarthritis of both knees     Asthma     Wellness examination     Clostridium difficile infection     Morbid obesity (H)       Past Surgical History:     Past Surgical History:   Procedure Laterality Date     AMPUTATE LEG BELOW KNEE Right 6/18/2020    Procedure: AMPUTATION, BELOW KNEE;  Surgeon: Epi Corcoran MD;  Location: [facility] OR;  Service: General     AMPUTATE LEG BELOW KNEE Right 6/23/2020    Procedure: REVISION AMPUTATION, BELOW KNEE;  Surgeon: Epi Corcoran MD;  Location: [facility] OR;  Service: General     CHOLECYSTECTOMY       COLONOSCOPY N/A 9/11/2017    Procedure: COLONOSCOPY;  Surgeon: Tyler Bhakta MD;  Location: [facility];  Service:      DILATION AND CURETTAGE       DILATION AND CURETTAGE, OPERATIVE HYSTEROSCOPY, COMBINED N/A 1/28/2015    Procedure: DILATION AND CURETTAGE WITH HYSTEROSCOPY;  Surgeon: Grant Beal MD;  Location: [facility];  Service:      DILATION AND CURETTAGE, OPERATIVE HYSTEROSCOPY, COMBINED N/A 10/12/2021    Procedure: HYSTEROSCOPY,  DILATION AND CURETTAGE;  Surgeon: Grant Beal MD;  Location: [facility]     DILATION AND CURETTAGE, OPERATIVE HYSTEROSCOPY, COMBINED N/A 6/27/2022    Procedure: HYSTEROSCOPY, POLYPECTOMY; DILATION AND CURETTAGE;  Surgeon: Alexander Valdivia MD;  Location: [facility]     IR CVC NON TUNNEL PLACEMENT > 5 YRS  1/21/2020     IR NON TUNNELED CATHETER >5 YEARS  1/21/2020     OTHER SURGICAL HISTORY  2009    bilateral carpal tunnel release     PICC  1/20/2020          [facility]  8/21/2020          NE HYSTEROSCOPY,W/ENDO BX N/A 9/5/2019    Procedure: HYSTEROSCOPY, DILATION AND CURETTAGE;  Surgeon: Grant Beal MD;  Location: [facility];  Service: Gynecology     NE HYSTEROSCOPY,W/ENDO BX N/A 12/9/2019    Procedure: HYSTEROSCOPY, DILATION AND CURETTAGE;  Surgeon: Emigdio  "Grant KOWALSKI MD;  Location: [facility] OR;  Service: Gynecology       Family History:     Family History   Problem Relation Age of Onset     Colon Cancer Father         Social History:    reports that she has never smoked. She has never used smokeless tobacco. She reports previous alcohol use. She reports that she does not use drugs.    Review of Systems:   12 systems are reviewed negative except for in HPI.    Meds:     Current Outpatient Medications:      acetaminophen (TYLENOL) 500 MG tablet, [ACETAMINOPHEN (TYLENOL) 500 MG TABLET] Take 2 tablets (1,000 mg total) by mouth 3 (three) times a day., Disp: , Rfl: 0     apixaban ANTICOAGULANT (ELIQUIS) 5 mg Tab tablet, [APIXABAN ANTICOAGULANT (ELIQUIS) 5 MG TAB TABLET] Take 5 mg by mouth 2 (two) times a day., Disp: , Rfl:      BD ULTRA-FINE MICRO PEN NEEDLE 32 gauge x 1/4\" Ndle, [BD ULTRA-FINE MICRO PEN NEEDLE 32 GAUGE X 1/4\" NDLE] USE AS DIRECTED 4 TIMES DAILY., Disp: 360 each, Rfl: 3     calcium polycarbophil (FIBERCON) 625 MG tablet, Take 2 tablets by mouth daily, Disp: , Rfl:      carvedilol (COREG) 12.5 MG tablet, Take 1 tablet (12.5 mg) by mouth 2 times daily (with meals), Disp: 60 tablet, Rfl: 11     cholecalciferol (VITAMIN D3) 25 mcg (1000 units) capsule, Take 1,000 Units by mouth, Disp: , Rfl:      ferrous sulfate 325 (65 FE) MG tablet, [FERROUS SULFATE 325 (65 FE) MG TABLET] Take 1 tablet by mouth daily with breakfast. , Disp: , Rfl:      gabapentin (NEURONTIN) 100 MG capsule, Take 200 mg by mouth every evening , Disp: , Rfl:      gabapentin (NEURONTIN) 300 MG capsule, Take 300 mg by mouth every morning, Disp: , Rfl:      hydrocortisone 1 % cream, Apply topically 2 times daily Apply to skin under stump for itching., Disp: , Rfl:      insulin aspart (NOVOLOG FLEXPEN) 100 UNIT/ML pen, Inject 16 Units Subcutaneous 3 times daily (with meals) Breakfast and Lunch, Disp: , Rfl:      insulin glargine (LANTUS PEN) 100 UNIT/ML pen, Inject 36 Units Subcutaneous At " "Bedtime, Disp: , Rfl:      insulin lispro (HUMALOG VIAL) 100 UNIT/ML vial, Humalog U-100 Insulin 100 unit/mL subcutaneous solution, Disp: , Rfl:      lancets (ACCU-CHEK MULTICLIX LANCET) Misc, [LANCETS (ACCU-CHEK MULTICLIX LANCET) MISC] TEST 6 TIMES A DAY, Disp: 200 each, Rfl: 11     Lidocaine (LIDOCARE) 4 % Patch, Place 1 patch onto the skin every 24 hours To prevent lidocaine toxicity, patient should be patch free for 12 hrs daily.  Apply to left shoulder, Disp: , Rfl:      linaGLIPtin 5 mg Tab, [LINAGLIPTIN 5 MG TAB] Take 5 mg by mouth daily., Disp: , Rfl:      miconazole (MICATIN) 2 % AERP powder, Apply topically 2 times daily, Disp: , Rfl:      Saccharomyces boulardii (FLORASTOR) 250 mg capsule, [SACCHAROMYCES BOULARDII (FLORASTOR) 250 MG CAPSULE] Take 250 mg by mouth daily., Disp: , Rfl:      sodium bicarbonate 650 MG tablet, [SODIUM BICARBONATE 650 MG TABLET] Take 1 tablet (650 mg total) by mouth 2 (two) times a day. OTC product, Disp: , Rfl: 0     empagliflozin (JARDIANCE) 10 MG TABS tablet, Take 10 mg by mouth daily, Disp: , Rfl:     Allergies:   Hydralazine, Latex, Naprosyn [naproxen], Nitrofurantoin, Sulfa (sulfonamide antibiotics) [sulfa drugs], Vicks vaporub, and Vicks vaporub [mentholatum cherry chest]      Objective:      Physical Exam     1.549 m (5' 1\")  Body mass index is 49.45 kg/m .  BP (!) 162/78 (BP Location: Left arm, Patient Position: Sitting, Cuff Size: Adult Regular)   Pulse 72   Resp 18   Ht 1.549 m (5' 1\")   BMI 49.45 kg/m      General Appearance:   Awake, Alert, No acute distress.   HEENT:  Pupil equal and reactive to light. No scleral icterus; the mucous membranes were moist.   Neck: No cervical bruits. No JVD. No thyromegaly.     Chest: The spine was straight. The chest was symmetric.   Lungs:   Respirations unlabored; Lungs are clear to auscultation. No crackles. No wheezing.   Cardiovascular:   Regular rhythm and rate, normal first and second heart sounds with no murmurs. No rubs " or gallops.    Abdomen:  Obese. Soft. No tenderness. Non-distended. Bowels sounds are present   Extremities: Right AKA. Mild left leg edema.   Skin: No rashes or ulcers. Warm, Dry.   Musculoskeletal: No tenderness. No deformity.   Neurologic: Mood and affect are appropriate. No focal deficits.         EKG: Personally reviewed  Normal sinus rhythm   Left axis deviation   Abnormal ECG   When compared with ECG of 20-JAN-2020 19:34,   Sinus rhythm has replaced Atrial fibrillation     Cardiac Imaging Studies  ECHO on 1-:    Technically difficult study. When compared to the previous study dated 7/19/2017, no significant change.    Left ventricle ejection fraction is normal. The calculated left ventricular ejection fraction is 63%.    Normal left ventricular size and systolic function.    TAPSE is abnormal, which is consistent with abnormal right ventricular systolic function.    Mild aortic regurgitation.    The ascending aorta is mildly dilated.     ECHO on 6-:    Normal left ventricular size and systolic function.    Left ventricle ejection fraction is normal. The calculated left ventricular ejection fraction is 61%.    Normal right ventricular size and systolic function.    No hemodynamically significant valvular heart abnormalities.    When compared to the previous study dated 1/21/2020, normal RV function on current study.     Nuclear stress test on 7-:     The nuclear stress test is probably negative for inducible myocardial ischemia or infarction.     The patient is at a low risk of future cardiac ischemic events.     The left ventricular ejection fraction at stress is 66%.     The  images demonstrate breast attenuation as well as subdiaphragmatic RAMP artifacts.     There is no prior study for comparison.    Lab Review   Lab Results   Component Value Date     08/20/2020    CO2 22 08/20/2020    BUN 25 08/20/2020     Lab Results   Component Value Date    WBC 7.9 08/21/2020     HGB 9.2 08/21/2020    HCT 28.6 08/21/2020    MCV 89 08/21/2020     08/21/2020     Lab Results   Component Value Date    CHOL 137 02/27/2020    CHOL 142 10/25/2019    CHOL 129 08/30/2019     Lab Results   Component Value Date    HDL 30 (L) 02/27/2020    HDL 41 (L) 10/25/2019    HDL 35 (L) 08/30/2019     No components found for: LDLCALC  Lab Results   Component Value Date    TRIG 204 (H) 02/27/2020    TRIG 136 10/25/2019    TRIG 128 08/30/2019     No results found for: CHOLHDL  Lab Results   Component Value Date    TROPONINI <0.01 08/17/2020     Lab Results   Component Value Date     01/20/2020     Lab Results   Component Value Date    TSH 1.83 06/17/2020

## 2022-10-05 NOTE — LETTER
10/5/2022    Lester Flores MD  [address] [facility] [address]    RE: Jazmin Bauman       Dear Colleague,     I had the pleasure of seeing Jazmin Bauman in the [facility].            Assessment/Plan:   1.  Paroxysmal atrial fibrillation: The patient is in normal sinus rhythm at this time.  Her heart rate has been well controlled.   Metoprolol 25 mg twice a day is changed  to carvedilol 12.5 mg twice a day for better blood pressure control.  Her [id number] score is 3.    Continue chronic anticoagulation Eliquis 5 mg twice a day.     2.   Primary hypertension: Her blood pressure is high.  Her amlodipine was discontinued.  We discussed the management of hypertension.  The patient does not want to add new medications.  After discussion, discontinue metoprolol 25 mg twice a day, start carvedilol 12.5 mg twice a day for better blood pressure control.  If her blood pressure still not well controlled, and then consider to add hydrochlorothiazide 25 mg daily or Lasix 20 mg daily because she has mild left leg edema.  Prescription is sent to her pharmacy.    3.  Type 2 diabetes, stage III CKD, morbid obesity: Stable, no change in treatment.    Thank you for the opportunity to be involved in the care of Jazmin Bauman. If you have any questions, please feel free to contact me.  I will see the patient again in 6 months and as needed.    Much or all of the text in this note was generated through the use of Dragon Dictate voice-to-text software. Errors in spelling or words which seem out of context are unintentional. Sound alike errors, in particular, may have escaped editing.       History of Present Illness:   It is my pleasure to see Jazmin Bauman at the [facility] clinic for routine cardiology follow up.  Jazmin Bauman is a 67 year old female with a medical history of paroxysmal atrial fibrillation, morbid obesity, essential hypertension, type 2 diabetes,  stage III CKD, right AKA.    She states that she has no chest pain, shortness of breath.  Her dyspnea on exertion has been stable.  She has no palpitations, dizziness, orthopnea.  She has mild left leg edema, right AKA.  Her blood pressure is high 162/78 this morning.  The patient discontinued amlodipine since she said amlodipine increased her blood pressure.    Past Medical History:     Patient Active Problem List   Diagnosis     Carpal Tunnel Syndrome     Urinary Tract Infection     Endometrial Hyperplasia     Cholelithiasis     Leukocytosis     Urine Tests Nonspecific Abnormal Findings     Obesity     Essential hypertension     Pernicious Anemia     Immunology Studies Raised Immunoglobulin Level     Vaginal bleeding     Type 2 diabetes mellitus with other specified complication, with long-term current use of insulin (H)     Atrial fibrillation with RVR (H)     Acute kidney injury superimposed on CKD (H)     Non-traumatic rhabdomyolysis     Metabolic acidosis     Troponin level elevated     Bacteremia     Acute respiratory failure with hypoxia (H)     Acute encephalopathy     Acute pulmonary edema (H)     Wheezing     Moderate protein malnutrition (H)     Abnormal cytological findings in specimens from other female genital organs     Chronic kidney disease, stage III (moderate) (H)     Hyperkalemia     Osteoarthritis     Acute kidney injury (H)     Sepsis (H)     Sepsis, due to unspecified organism, unspecified whether acute organ dysfunction present (H)     Cellulitis of right foot     CKD (chronic kidney disease) stage 4, GFR 15-29 ml/min (H)     Chronic wound infection of abdomen, subsequent encounter     Acute post-operative pain     Phantom limb pain (H)     Paroxysmal atrial fibrillation (H)     Urinary retention     Coronary artery disease without angina pectoris     Hx of right BKA (H)     Weakness     Slow transit constipation     Wound infection     Fever and chills     Acute pain of left shoulder      Physical deconditioning     Gram-positive bacteremia     Chronic pain syndrome     MRSA bacteremia     Hypercalcemia     Paronychia of great toe, left     Below-knee amputation (H)     Other depression     Unintended weight loss     2019 novel coronavirus disease (COVID-19)     Frequent loose stools     Anticoagulated     Primary osteoarthritis of both knees     Asthma     Wellness examination     Clostridium difficile infection     Morbid obesity (H)       Past Surgical History:     Past Surgical History:   Procedure Laterality Date     AMPUTATE LEG BELOW KNEE Right 6/18/2020    Procedure: AMPUTATION, BELOW KNEE;  Surgeon: Epi Corcoran MD;  Location: [facility] OR;  Service: General     AMPUTATE LEG BELOW KNEE Right 6/23/2020    Procedure: REVISION AMPUTATION, BELOW KNEE;  Surgeon: Epi Corcoran MD;  Location: [facility] OR;  Service: General     CHOLECYSTECTOMY       COLONOSCOPY N/A 9/11/2017    Procedure: COLONOSCOPY;  Surgeon: Tyler Bhakta MD;  Location: [facility];  Service:      DILATION AND CURETTAGE       DILATION AND CURETTAGE, OPERATIVE HYSTEROSCOPY, COMBINED N/A 1/28/2015    Procedure: DILATION AND CURETTAGE WITH HYSTEROSCOPY;  Surgeon: Grant Beal MD;  Location: [facility] OR;  Service:      DILATION AND CURETTAGE, OPERATIVE HYSTEROSCOPY, COMBINED N/A 10/12/2021    Procedure: HYSTEROSCOPY,  DILATION AND CURETTAGE;  Surgeon: Grant Beal MD;  Location: [facility]     DILATION AND CURETTAGE, OPERATIVE HYSTEROSCOPY, COMBINED N/A 6/27/2022    Procedure: HYSTEROSCOPY, POLYPECTOMY; DILATION AND CURETTAGE;  Surgeon: Alexander Valdivia MD;  Location: [facility] [place]     IR CVC NON TUNNEL PLACEMENT > 5 YRS  1/21/2020     IR NON TUNNELED CATHETER >5 YEARS  1/21/2020     OTHER SURGICAL HISTORY  2009    bilateral carpal tunnel release     PICC  1/20/2020          PICC  8/21/2020          AZ HYSTEROSCOPY,W/ENDO BX N/A 9/5/2019    Procedure: HYSTEROSCOPY,  "DILATION AND CURETTAGE;  Surgeon: Grant Beal MD;  Location: [facility] [place];  Service: Gynecology     NE HYSTEROSCOPY,W/ENDO BX N/A 12/9/2019    Procedure: HYSTEROSCOPY, DILATION AND CURETTAGE;  Surgeon: Grant Beal MD;  Location: [facility] [place];  Service: Gynecology       Family History:     Family History   Problem Relation Age of Onset     Colon Cancer Father         Social History:    reports that she has never smoked. She has never used smokeless tobacco. She reports previous alcohol use. She reports that she does not use drugs.    Review of Systems:   12 systems are reviewed negative except for in HPI.    Meds:     Current Outpatient Medications:      acetaminophen (TYLENOL) 500 MG tablet, [ACETAMINOPHEN (TYLENOL) 500 MG TABLET] Take 2 tablets (1,000 mg total) by mouth 3 (three) times a day., Disp: , Rfl: 0     apixaban ANTICOAGULANT (ELIQUIS) 5 mg Tab tablet, [APIXABAN ANTICOAGULANT (ELIQUIS) 5 MG TAB TABLET] Take 5 mg by mouth 2 (two) times a day., Disp: , Rfl:      BD ULTRA-FINE MICRO PEN NEEDLE 32 gauge x 1/4\" Ndle, [BD ULTRA-FINE MICRO PEN NEEDLE 32 GAUGE X 1/4\" NDLE] USE AS DIRECTED 4 TIMES DAILY., Disp: 360 each, Rfl: 3     calcium polycarbophil (FIBERCON) 625 MG tablet, Take 2 tablets by mouth daily, Disp: , Rfl:      carvedilol (COREG) 12.5 MG tablet, Take 1 tablet (12.5 mg) by mouth 2 times daily (with meals), Disp: 60 tablet, Rfl: 11     cholecalciferol (VITAMIN D3) 25 mcg (1000 units) capsule, Take 1,000 Units by mouth, Disp: , Rfl:      ferrous sulfate 325 (65 FE) MG tablet, [FERROUS SULFATE 325 (65 FE) MG TABLET] Take 1 tablet by mouth daily with breakfast. , Disp: , Rfl:      gabapentin (NEURONTIN) 100 MG capsule, Take 200 mg by mouth every evening , Disp: , Rfl:      gabapentin (NEURONTIN) 300 MG capsule, Take 300 mg by mouth every morning, Disp: , Rfl:      hydrocortisone 1 % cream, Apply topically 2 times daily Apply to skin under stump for itching., Disp: , Rfl:      " "insulin aspart (NOVOLOG FLEXPEN) 100 UNIT/ML pen, Inject 16 Units Subcutaneous 3 times daily (with meals) Breakfast and Lunch, Disp: , Rfl:      insulin glargine (LANTUS PEN) 100 UNIT/ML pen, Inject 36 Units Subcutaneous At Bedtime, Disp: , Rfl:      insulin lispro (HUMALOG VIAL) 100 UNIT/ML vial, Humalog U-100 Insulin 100 unit/mL subcutaneous solution, Disp: , Rfl:      lancets (ACCU-CHEK MULTICLIX LANCET) Misc, [LANCETS (ACCU-CHEK MULTICLIX LANCET) MISC] TEST 6 TIMES A DAY, Disp: 200 each, Rfl: 11     Lidocaine (LIDOCARE) 4 % Patch, Place 1 patch onto the skin every 24 hours To prevent lidocaine toxicity, patient should be patch free for 12 hrs daily.  Apply to left shoulder, Disp: , Rfl:      linaGLIPtin 5 mg Tab, [LINAGLIPTIN 5 MG TAB] Take 5 mg by mouth daily., Disp: , Rfl:      miconazole (MICATIN) 2 % AERP powder, Apply topically 2 times daily, Disp: , Rfl:      Saccharomyces boulardii (FLORASTOR) 250 mg capsule, [SACCHAROMYCES BOULARDII (FLORASTOR) 250 MG CAPSULE] Take 250 mg by mouth daily., Disp: , Rfl:      sodium bicarbonate 650 MG tablet, [SODIUM BICARBONATE 650 MG TABLET] Take 1 tablet (650 mg total) by mouth 2 (two) times a day. OTC product, Disp: , Rfl: 0     empagliflozin (JARDIANCE) 10 MG TABS tablet, Take 10 mg by mouth daily, Disp: , Rfl:     Allergies:   Hydralazine, Latex, Naprosyn [naproxen], Nitrofurantoin, Sulfa (sulfonamide antibiotics) [sulfa drugs], Vicks vaporub, and Vicks vaporub [mentholatum cherry chest]      Objective:      Physical Exam     1.549 m (5' 1\")  Body mass index is 49.45 kg/m .  BP (!) 162/78 (BP Location: Left arm, Patient Position: Sitting, Cuff Size: Adult Regular)   Pulse 72   Resp 18   Ht 1.549 m (5' 1\")   BMI 49.45 kg/m      General Appearance:   Awake, Alert, No acute distress.   HEENT:  Pupil equal and reactive to light. No scleral icterus; the mucous membranes were moist.   Neck: No cervical bruits. No JVD. No thyromegaly.     Chest: The spine was straight. " The chest was symmetric.   Lungs:   Respirations unlabored; Lungs are clear to auscultation. No crackles. No wheezing.   Cardiovascular:   Regular rhythm and rate, normal first and second heart sounds with no murmurs. No rubs or gallops.    Abdomen:  Obese. Soft. No tenderness. Non-distended. Bowels sounds are present   Extremities: Right AKA. Mild left leg edema.   Skin: No rashes or ulcers. Warm, Dry.   Musculoskeletal: No tenderness. No deformity.   Neurologic: Mood and affect are appropriate. No focal deficits.         EKG: Personally reviewed  Normal sinus rhythm   Left axis deviation   Abnormal ECG   When compared with ECG of 20-JAN-2020 19:34,   Sinus rhythm has replaced Atrial fibrillation     Cardiac Imaging Studies  ECHO on 1-:    Technically difficult study. When compared to the previous study dated 7/19/2017, no significant change.    Left ventricle ejection fraction is normal. The calculated left ventricular ejection fraction is 63%.    Normal left ventricular size and systolic function.    TAPSE is abnormal, which is consistent with abnormal right ventricular systolic function.    Mild aortic regurgitation.    The ascending aorta is mildly dilated.     ECHO on 6-:    Normal left ventricular size and systolic function.    Left ventricle ejection fraction is normal. The calculated left ventricular ejection fraction is 61%.    Normal right ventricular size and systolic function.    No hemodynamically significant valvular heart abnormalities.    When compared to the previous study dated 1/21/2020, normal RV function on current study.     Nuclear stress test on 7-:     The nuclear stress test is probably negative for inducible myocardial ischemia or infarction.     The patient is at a low risk of future cardiac ischemic events.     The left ventricular ejection fraction at stress is 66%.     The  images demonstrate breast attenuation as well as subdiaphragmatic RAMP  artifacts.     There is no prior study for comparison.    Lab Review   Lab Results   Component Value Date     08/20/2020    CO2 22 08/20/2020    BUN 25 08/20/2020     Lab Results   Component Value Date    WBC 7.9 08/21/2020    HGB 9.2 08/21/2020    HCT 28.6 08/21/2020    MCV 89 08/21/2020     08/21/2020     Lab Results   Component Value Date    CHOL 137 02/27/2020    CHOL 142 10/25/2019    CHOL 129 08/30/2019     Lab Results   Component Value Date    HDL 30 (L) 02/27/2020    HDL 41 (L) 10/25/2019    HDL 35 (L) 08/30/2019     No components found for: LDLCALC  Lab Results   Component Value Date    TRIG 204 (H) 02/27/2020    TRIG 136 10/25/2019    TRIG 128 08/30/2019     No results found for: CHOLHDL  Lab Results   Component Value Date    TROPONINI <0.01 08/17/2020     Lab Results   Component Value Date     01/20/2020     Lab Results   Component Value Date    TSH 1.83 06/17/2020                 Thank you for allowing me to participate in the care of your patient.      Sincerely,     Alejandrina Patel MD     [facility]  cc:   Alejandrina Patel MD  [address] [facility] [address]  [place] [address]

## 2022-10-09 ENCOUNTER — APPOINTMENT (OUTPATIENT)
Dept: CT IMAGING | Facility: HOSPITAL | Age: 67
End: 2022-10-09
Attending: EMERGENCY MEDICINE
Payer: COMMERCIAL

## 2022-10-09 ENCOUNTER — HOSPITAL ENCOUNTER (EMERGENCY)
Facility: HOSPITAL | Age: 67
Discharge: HOME | End: 2022-10-09
Attending: EMERGENCY MEDICINE | Admitting: EMERGENCY MEDICINE
Payer: COMMERCIAL

## 2022-10-09 ENCOUNTER — APPOINTMENT (OUTPATIENT)
Dept: RADIOLOGY | Facility: HOSPITAL | Age: 67
End: 2022-10-09
Attending: EMERGENCY MEDICINE
Payer: COMMERCIAL

## 2022-10-09 VITALS
HEART RATE: 81 BPM | TEMPERATURE: 99.8 F | DIASTOLIC BLOOD PRESSURE: 54 MMHG | RESPIRATION RATE: 16 BRPM | SYSTOLIC BLOOD PRESSURE: 117 MMHG | OXYGEN SATURATION: 96 % | HEIGHT: 61 IN | BODY MASS INDEX: 49.45 KG/M2

## 2022-10-09 DIAGNOSIS — N39.0 URINARY TRACT INFECTION WITH HEMATURIA, SITE UNSPECIFIED: ICD-10-CM

## 2022-10-09 DIAGNOSIS — R31.9 URINARY TRACT INFECTION WITH HEMATURIA, SITE UNSPECIFIED: ICD-10-CM

## 2022-10-09 DIAGNOSIS — M62.81 GENERALIZED MUSCLE WEAKNESS: ICD-10-CM

## 2022-10-09 LAB
ALBUMIN SERPL BCG-MCNC: 2.8 G/DL (ref 3.5–5.2)
ALBUMIN UR-MCNC: 50 MG/DL
ALP SERPL-CCNC: 141 U/L (ref 35–104)
ALT SERPL W P-5'-P-CCNC: 23 U/L (ref 10–35)
ANION GAP SERPL CALCULATED.3IONS-SCNC: 10 MMOL/L (ref 7–15)
APPEARANCE UR: ABNORMAL
AST SERPL W P-5'-P-CCNC: 24 U/L (ref 10–35)
BASE EXCESS BLDV CALC-SCNC: -4.1 MMOL/L
BASOPHILS # BLD AUTO: 0 10E3/UL (ref 0–0.2)
BASOPHILS NFR BLD AUTO: 0 %
BILIRUB SERPL-MCNC: 0.3 MG/DL
BILIRUB UR QL STRIP: NEGATIVE
BUN SERPL-MCNC: 72 MG/DL (ref 8–23)
CALCIUM SERPL-MCNC: 8.3 MG/DL (ref 8.8–10.2)
CHLORIDE SERPL-SCNC: 99 MMOL/L (ref 98–107)
COLOR UR AUTO: YELLOW
CREAT SERPL-MCNC: 2.6 MG/DL (ref 0.51–0.95)
DEPRECATED HCO3 PLAS-SCNC: 21 MMOL/L (ref 22–29)
EOSINOPHIL # BLD AUTO: 0 10E3/UL (ref 0–0.7)
EOSINOPHIL NFR BLD AUTO: 0 %
ERYTHROCYTE [DISTWIDTH] IN BLOOD BY AUTOMATED COUNT: 13.2 % (ref 10–15)
FLUAV RNA SPEC QL NAA+PROBE: NEGATIVE
FLUBV RNA RESP QL NAA+PROBE: NEGATIVE
GFR SERPL CREATININE-BSD FRML MDRD: 20 ML/MIN/1.73M2
GLUCOSE BLDC GLUCOMTR-MCNC: 303 MG/DL (ref 70–99)
GLUCOSE SERPL-MCNC: 337 MG/DL (ref 70–99)
GLUCOSE UR STRIP-MCNC: 150 MG/DL
HCO3 BLDV-SCNC: 21 MMOL/L (ref 24–30)
HCT VFR BLD AUTO: 31 % (ref 35–47)
HGB BLD-MCNC: 10.1 G/DL (ref 11.7–15.7)
HGB UR QL STRIP: ABNORMAL
HOLD SPECIMEN: NORMAL
IMM GRANULOCYTES # BLD: 0.1 10E3/UL
IMM GRANULOCYTES NFR BLD: 1 %
INR PPP: 1.77 (ref 0.85–1.15)
KETONES BLD-SCNC: 0 MMOL/L (ref 0–0.6)
KETONES UR STRIP-MCNC: NEGATIVE MG/DL
LACTATE SERPL-SCNC: 1.5 MMOL/L (ref 0.7–2)
LEUKOCYTE ESTERASE UR QL STRIP: ABNORMAL
LIPASE SERPL-CCNC: 20 U/L (ref 13–60)
LYMPHOCYTES # BLD AUTO: 0.9 10E3/UL (ref 0.8–5.3)
LYMPHOCYTES NFR BLD AUTO: 7 %
MCH RBC QN AUTO: 30.7 PG (ref 26.5–33)
MCHC RBC AUTO-ENTMCNC: 32.6 G/DL (ref 31.5–36.5)
MCV RBC AUTO: 94 FL (ref 78–100)
MONOCYTES # BLD AUTO: 0.9 10E3/UL (ref 0–1.3)
MONOCYTES NFR BLD AUTO: 8 %
NEUTROPHILS # BLD AUTO: 9.9 10E3/UL (ref 1.6–8.3)
NEUTROPHILS NFR BLD AUTO: 84 %
NITRATE UR QL: NEGATIVE
NRBC # BLD AUTO: 0 10E3/UL
NRBC BLD AUTO-RTO: 0 /100
OXYHGB MFR BLDV: 87.7 % (ref 70–75)
PCO2 BLDV: 41 MM HG (ref 35–50)
PH BLDV: 7.34 [PH] (ref 7.35–7.45)
PH UR STRIP: 6 [PH] (ref 5–7)
PLATELET # BLD AUTO: 229 10E3/UL (ref 150–450)
PO2 BLDV: 54 MM HG (ref 25–47)
POTASSIUM SERPL-SCNC: 4.7 MMOL/L (ref 3.4–5.3)
PROT SERPL-MCNC: 6.1 G/DL (ref 6.4–8.3)
RBC # BLD AUTO: 3.29 10E6/UL (ref 3.8–5.2)
RBC URINE: 74 /HPF
RSV RNA SPEC NAA+PROBE: NEGATIVE
SAO2 % BLDV: 89.5 % (ref 70–75)
SARS-COV-2 RNA RESP QL NAA+PROBE: NEGATIVE
SODIUM SERPL-SCNC: 130 MMOL/L (ref 136–145)
SP GR UR STRIP: 1.01 (ref 1–1.03)
UROBILINOGEN UR STRIP-MCNC: <2 MG/DL
WBC # BLD AUTO: 11.8 10E3/UL (ref 4–11)
WBC CLUMPS #/AREA URNS HPF: PRESENT /HPF
WBC URINE: >182 /HPF

## 2022-10-09 PROCEDURE — 83605 ASSAY OF LACTIC ACID: CPT | Performed by: EMERGENCY MEDICINE

## 2022-10-09 PROCEDURE — 93005 ELECTROCARDIOGRAM TRACING: CPT | Performed by: EMERGENCY MEDICINE

## 2022-10-09 PROCEDURE — 36415 COLL VENOUS BLD VENIPUNCTURE: CPT | Performed by: EMERGENCY MEDICINE

## 2022-10-09 PROCEDURE — 87086 URINE CULTURE/COLONY COUNT: CPT | Performed by: EMERGENCY MEDICINE

## 2022-10-09 PROCEDURE — C9803 HOPD COVID-19 SPEC COLLECT: HCPCS

## 2022-10-09 PROCEDURE — 96361 HYDRATE IV INFUSION ADD-ON: CPT

## 2022-10-09 PROCEDURE — 96365 THER/PROPH/DIAG IV INF INIT: CPT | Mod: 59

## 2022-10-09 PROCEDURE — 81001 URINALYSIS AUTO W/SCOPE: CPT | Performed by: EMERGENCY MEDICINE

## 2022-10-09 PROCEDURE — 82010 KETONE BODYS QUAN: CPT | Performed by: EMERGENCY MEDICINE

## 2022-10-09 PROCEDURE — 70450 CT HEAD/BRAIN W/O DYE: CPT

## 2022-10-09 PROCEDURE — 83690 ASSAY OF LIPASE: CPT | Performed by: EMERGENCY MEDICINE

## 2022-10-09 PROCEDURE — 250N000013 HC RX MED GY IP 250 OP 250 PS 637: Performed by: EMERGENCY MEDICINE

## 2022-10-09 PROCEDURE — 80053 COMPREHEN METABOLIC PANEL: CPT | Performed by: EMERGENCY MEDICINE

## 2022-10-09 PROCEDURE — 73560 X-RAY EXAM OF KNEE 1 OR 2: CPT | Mod: LT

## 2022-10-09 PROCEDURE — 258N000003 HC RX IP 258 OP 636: Performed by: EMERGENCY MEDICINE

## 2022-10-09 PROCEDURE — 99285 EMERGENCY DEPT VISIT HI MDM: CPT | Mod: 25

## 2022-10-09 PROCEDURE — 82805 BLOOD GASES W/O2 SATURATION: CPT | Performed by: EMERGENCY MEDICINE

## 2022-10-09 PROCEDURE — 250N000011 HC RX IP 250 OP 636: Performed by: EMERGENCY MEDICINE

## 2022-10-09 PROCEDURE — 96375 TX/PRO/DX INJ NEW DRUG ADDON: CPT

## 2022-10-09 PROCEDURE — 85025 COMPLETE CBC W/AUTO DIFF WBC: CPT | Performed by: EMERGENCY MEDICINE

## 2022-10-09 PROCEDURE — 85610 PROTHROMBIN TIME: CPT | Performed by: EMERGENCY MEDICINE

## 2022-10-09 PROCEDURE — 87637 SARSCOV2&INF A&B&RSV AMP PRB: CPT | Performed by: EMERGENCY MEDICINE

## 2022-10-09 PROCEDURE — 74176 CT ABD & PELVIS W/O CONTRAST: CPT

## 2022-10-09 RX ORDER — HYDROMORPHONE HYDROCHLORIDE 1 MG/ML
0.5 INJECTION, SOLUTION INTRAMUSCULAR; INTRAVENOUS; SUBCUTANEOUS
Status: DISCONTINUED | OUTPATIENT
Start: 2022-10-09 | End: 2022-10-09 | Stop reason: HOSPADM

## 2022-10-09 RX ORDER — CEFDINIR 300 MG/1
300 CAPSULE ORAL DAILY
Qty: 10 CAPSULE | Refills: 0 | Status: SHIPPED | OUTPATIENT
Start: 2022-10-10

## 2022-10-09 RX ORDER — ACETAMINOPHEN 325 MG/1
975 TABLET ORAL ONCE
Status: COMPLETED | OUTPATIENT
Start: 2022-10-09 | End: 2022-10-09

## 2022-10-09 RX ORDER — CEFTRIAXONE 1 G/1
1 INJECTION, POWDER, FOR SOLUTION INTRAMUSCULAR; INTRAVENOUS ONCE
Status: COMPLETED | OUTPATIENT
Start: 2022-10-09 | End: 2022-10-09

## 2022-10-09 RX ADMIN — ACETAMINOPHEN 975 MG: 325 TABLET, FILM COATED ORAL at 17:54

## 2022-10-09 RX ADMIN — CEFTRIAXONE SODIUM 1 G: 1 INJECTION, POWDER, FOR SOLUTION INTRAMUSCULAR; INTRAVENOUS at 16:11

## 2022-10-09 RX ADMIN — HYDROMORPHONE HYDROCHLORIDE 0.5 MG: 1 INJECTION, SOLUTION INTRAMUSCULAR; INTRAVENOUS; SUBCUTANEOUS at 15:53

## 2022-10-09 RX ADMIN — SODIUM CHLORIDE, POTASSIUM CHLORIDE, SODIUM LACTATE AND CALCIUM CHLORIDE 1000 ML: 600; 310; 30; 20 INJECTION, SOLUTION INTRAVENOUS at 14:15

## 2022-10-09 ASSESSMENT — ACTIVITIES OF DAILY LIVING (ADL)
ADLS_ACUITY_SCORE: 35

## 2022-10-09 ASSESSMENT — ENCOUNTER SYMPTOMS
DIARRHEA: 0
FATIGUE: 1
COUGH: 0
VOMITING: 1
ROS GI COMMENTS: NEGATIVE FOR HEMATEMESIS.
ABDOMINAL PAIN: 1
DYSURIA: 1

## 2022-10-09 NOTE — ED NOTES
Bed: JNEDH-04  Expected date:   Expected time:   Means of arrival:   Comments:  [facility] - 67 F Fatigue

## 2022-10-09 NOTE — ED TRIAGE NOTES
Pt arrived via EMS from the [facility] for evaluation of generalized weakness and body aches. EMS stated she has had evaluated blood glucose in the 400's over the last two days.      Triage Assessment     Row Name 10/09/22 1149       Triage Assessment (Adult)    Airway WDL WDL       Respiratory WDL    Respiratory WDL WDL       Skin Circulation/Temperature WDL    Skin Circulation/Temperature WDL WDL       Cardiac WDL    Cardiac WDL WDL       Peripheral/Neurovascular WDL    Peripheral Neurovascular WDL WDL       Cognitive/Neuro/Behavioral WDL    Cognitive/Neuro/Behavioral WDL WDL

## 2022-10-09 NOTE — DISCHARGE INSTRUCTIONS
Your testing shows a urinary tract infection  You little dehydrated and were given IV fluids  You are given a dose of IV ceftriaxone  Start cefdinir 300 mg once daily based on your kidney function  Continue this until it is completed  Continue cares to your pannus with antifungal creams  Your high blood sugars are likely from your infection  Follow-up closely with your primary provider

## 2022-10-09 NOTE — ED PROVIDER NOTES
EMERGENCY DEPARTMENT ENCOUNTER      NAME: Jazmin Bauman  AGE: 67 year old female  YOB: 1955  MRN: [id number]  EVALUATION DATE & TIME: 10/9/2022 11:41 AM    PCP: Lester Flores    ED PROVIDER: Fiorella Kenney DO      Chief Complaint   Patient presents with     Generalized Weakness     Hyperglycemia         FINAL IMPRESSION:  1. Urinary tract infection with hematuria, site unspecified    2. Generalized muscle weakness          ED COURSE & MEDICAL DECISION MAKING:    Pertinent Labs & Imaging studies reviewed. (See chart for details)  11:45 AM I met the patient and performed my initial interview and exam.  4:27 PM Checked in on and updated patient.    5:40 PM 5:49 PM Checked in on and updated patient. We discussed the plan for discharge and the patient is agreeable. Reviewed supportive cares, symptomatic treatment, outpatient follow up, and reasons to return to the Emergency Department. Patient to be discharged by ED RN.       67 year old female presents to the Emergency Department for evaluation of generalized weakness, body aches.  Sounds like symptoms have been progressive over the past few days.  She reports a fall while trying to transfer to her wheelchair yesterday.  She does not think she hit her head.  Patient notes generalized abdominal pain, vomiting, polyuria.  Blood sugars have been reportedly been in the 400s.  Patient with significant past medical history of diabetes, chronic kidney disease, paroxysmal atrial fibrillation on eliquis, status post right above-knee amputation, morbid obesitylan for a broad work-up to evaluate for signs of infection, electrolyte abnormality, intracranial hemorrhage, intra-abdominal infection.  She does have erythema and a fungal like rash to her pannus.  Blood sugar 303 here.  Mild leukocytosis on labs.  She is not acidotic.  Lactic acid normal at 1.5.  No elevation in her beta hydroxybutyrate.  Creatinine slightly above baseline with elevated BUN,  fluids initiated.  CT of the abdomen pelvis does show likely cystitis but no other acute findings.  Influenza, COVID testing negative.  Urinalysis obtained and consistent with infection.  Vitals remain unremarkable.  She was given IV ceftriaxone and a liter of fluids.  Patient resides in a long-term care facility with many orders and cares.  I feel she will be safe back at home.  She was written for cefdinir daily based on her creatinine and kidney function.  Discussed symptomatic care and return precautions.    At the conclusion of the encounter I discussed the results of all of the tests and the disposition. The questions were answered. The patient or family acknowledged understanding and was agreeable with the care plan.       MEDICATIONS GIVEN IN THE EMERGENCY:  Medications   HYDROmorphone (PF) (DILAUDID) injection 0.5 mg (0.5 mg Intravenous Given 10/9/22 1553)   lactated ringers BOLUS 1,000 mL (0 mLs Intravenous Stopped 10/9/22 1559)   cefTRIAXone (ROCEPHIN) 1 g vial to attach to  mL bag for ADULTS or NS 50 mL bag for PEDS (0 g Intravenous Stopped 10/9/22 1718)   acetaminophen (TYLENOL) tablet 975 mg (975 mg Oral Given 10/9/22 1754)       NEW PRESCRIPTIONS STARTED AT TODAY'S ER VISIT  New Prescriptions    CEFDINIR (OMNICEF) 300 MG CAPSULE    Take 1 capsule (300 mg) by mouth daily          =================================================================    HPI    Patient information was obtained from: Patient and EMS     Use of : N/A         Jazmin Bauman is a 67 year old female with a pertinent history of hypertension, diabetes mellitus, CKD, s/p amputation of right leg below knee, s/p cholecystectomy,  who presents to this ED via EMS for evaluation of body aches and generalized fatigue.     Patient endorses body aches and generalized weakness for a week. On 10/07/2022, patient said she feel out of bed as she tried to reach her wheelchair and feels pain below her left knee. Of note,  "patient states she does not walk much at baseline. Yesterday, patient endorsed several episodes of vomiting.     Today, symptoms worsened causing presentation to ED. She currently states, \"I feel sick all over\". She endorses current abdominal pain and rash below her waist. Patient denies cough, hematemesis, diarrhea, or any other complaints at this time. Patient notes that she has chronic dysuria.       REVIEW OF SYSTEMS   Review of Systems   Constitutional: Positive for fatigue.        Positive for body aches   Respiratory: Negative for cough.    Gastrointestinal: Positive for abdominal pain (generalized) and vomiting. Negative for diarrhea.        Negative for hematemesis.   Genitourinary: Positive for dysuria (chronic).   Musculoskeletal:        Positive for left leg pain (below knee)   Skin: Positive for rash (pelvic region).   All other systems reviewed and are negative.       PAST MEDICAL HISTORY:  Past Medical History:   Diagnosis Date     Anemia     pernicious     Chronic atrial fibrillation (H)      Chronic kidney disease      Chronic pain      Diabetes mellitus (H)     borderline     Endometrial hyperplasia      Fibromyalgia      History of recurrent UTI (urinary tract infection)      History of transfusion      Hypertension      Muscle weakness (generalized)      Neuropathy      Obesity      Osteoarthritis      Renal disease      Thrombocytopenia (H)      Vaginal bleeding 01/2015     Vitamin B12 deficiency (non anemic)      Vitamin D deficiency        PAST SURGICAL HISTORY:  Past Surgical History:   Procedure Laterality Date     AMPUTATE LEG BELOW KNEE Right 6/18/2020    Procedure: AMPUTATION, BELOW KNEE;  Surgeon: Epi Corcoran MD;  Location: [facility];  Service: General     AMPUTATE LEG BELOW KNEE Right 6/23/2020    Procedure: REVISION AMPUTATION, BELOW KNEE;  Surgeon: Epi Corcoran MD;  Location: [facility] OR;  Service: General     CHOLECYSTECTOMY       COLONOSCOPY N/A " "9/11/2017    Procedure: COLONOSCOPY;  Surgeon: Tyler Bhakta MD;  Location: [facility];  Service:      DILATION AND CURETTAGE       DILATION AND CURETTAGE, OPERATIVE HYSTEROSCOPY, COMBINED N/A 1/28/2015    Procedure: DILATION AND CURETTAGE WITH HYSTEROSCOPY;  Surgeon: Grant Beal MD;  Location: [facility] OR;  Service:      DILATION AND CURETTAGE, OPERATIVE HYSTEROSCOPY, COMBINED N/A 10/12/2021    Procedure: HYSTEROSCOPY,  DILATION AND CURETTAGE;  Surgeon: Grant Beal MD;  Location: [facility]     DILATION AND CURETTAGE, OPERATIVE HYSTEROSCOPY, COMBINED N/A 6/27/2022    Procedure: HYSTEROSCOPY, POLYPECTOMY; DILATION AND CURETTAGE;  Surgeon: Alexander Valdivia MD;  [place] [facility]     IR CVC NON TUNNEL PLACEMENT > 5 YRS  1/21/2020     IR NON TUNNELED CATHETER >5 YEARS  1/21/2020     OTHER SURGICAL HISTORY  2009    bilateral carpal tunnel release     PIC  1/20/2020          PIC  8/21/2020          MA HYSTEROSCOPY,W/ENDO BX N/A 9/5/2019    Procedure: HYSTEROSCOPY, DILATION AND CURETTAGE;  Surgeon: Grant Beal MD;  Location: [facility] OR;  Service: Gynecology     MA HYSTEROSCOPY,W/ENDO BX N/A 12/9/2019    Procedure: HYSTEROSCOPY, DILATION AND CURETTAGE;  Surgeon: Grant Beal MD;  [facility];  Service: Gynecology           CURRENT MEDICATIONS:    [START ON 10/10/2022] cefdinir (OMNICEF) 300 MG capsule  acetaminophen (TYLENOL) 500 MG tablet  apixaban ANTICOAGULANT (ELIQUIS) 5 mg Tab tablet  BD ULTRA-FINE MICRO PEN NEEDLE 32 gauge x 1/4\" Ndle  calcium polycarbophil (FIBERCON) 625 MG tablet  carvedilol (COREG) 12.5 MG tablet  cholecalciferol (VITAMIN D3) 25 mcg (1000 units) capsule  empagliflozin (JARDIANCE) 10 MG TABS tablet  ferrous sulfate 325 (65 FE) MG tablet  gabapentin (NEURONTIN) 100 MG capsule  gabapentin (NEURONTIN) 300 MG capsule  hydrocortisone 1 % cream  insulin aspart (NOVOLOG FLEXPEN) 100 UNIT/ML pen  insulin glargine (LANTUS " "PEN) 100 UNIT/ML pen  insulin lispro (HUMALOG VIAL) 100 UNIT/ML vial  lancets (ACCU-CHEK MULTICLIX LANCET) Misc  Lidocaine (LIDOCARE) 4 % Patch  linaGLIPtin 5 mg Tab  miconazole (MICATIN) 2 % AERP powder  Saccharomyces boulardii (FLORASTOR) 250 mg capsule  sodium bicarbonate 650 MG tablet         ALLERGIES:  Allergies   Allergen Reactions     Hydralazine Unknown     Paralysis of legs     Latex Itching     Skin Burns     Naprosyn [Naproxen] Swelling     throat     Nitrofurantoin Hives     Sulfa (Sulfonamide Antibiotics) [Sulfa Drugs] Rash     Vicks Vaporub Rash     Vicks Vaporub [Mentholatum Cherry Chest] Rash       FAMILY HISTORY:  Family History   Problem Relation Age of Onset     Colon Cancer Father        SOCIAL HISTORY:   Social History     Socioeconomic History     Marital status: Single   Tobacco Use     Smoking status: Never     Smokeless tobacco: Never   Substance and Sexual Activity     Alcohol use: Not Currently     Comment: Alcoholic Drinks/day: rare     Drug use: No       VITALS:  /59   Pulse 83   Temp 99.8  F (37.7  C) (Oral)   Resp 16   Ht 1.549 m (5' 1\")   SpO2 94%   BMI 49.45 kg/m      PHYSICAL EXAM    Physical Exam  Constitutional:       General: She is not in acute distress.  HENT:      Head: Normocephalic and atraumatic.      Mouth/Throat:      Pharynx: Oropharynx is clear.   Eyes:      Pupils: Pupils are equal, round, and reactive to light.   Cardiovascular:      Rate and Rhythm: Normal rate and regular rhythm.      Pulses: Normal pulses.      Heart sounds: Normal heart sounds.   Pulmonary:      Effort: Pulmonary effort is normal.      Breath sounds: Normal breath sounds.   Abdominal:      General: Abdomen is flat. Bowel sounds are normal.      Palpations: Abdomen is soft.      Tenderness: There is generalized abdominal tenderness.      Comments: Erythema and yeast like rash to pannus.    Musculoskeletal:         General: Normal range of motion.      Left knee: Tenderness present.     "  Right Lower Extremity: Right leg is amputated below knee.   Skin:     General: Skin is warm and dry.      Capillary Refill: Capillary refill takes less than 2 seconds.   Neurological:      General: No focal deficit present.      Mental Status: She is alert and oriented to person, place, and time.            LAB:  All pertinent labs reviewed and interpreted.  Labs Ordered and Resulted from Time of ED Arrival to Time of ED Departure   INR - Abnormal       Result Value    INR 1.77 (*)    COMPREHENSIVE METABOLIC PANEL - Abnormal    Sodium 130 (*)     Potassium 4.7      Chloride 99      Carbon Dioxide (CO2) 21 (*)     Anion Gap 10      Urea Nitrogen 72.0 (*)     Creatinine 2.60 (*)     Calcium 8.3 (*)     Glucose 337 (*)     Alkaline Phosphatase 141 (*)     AST 24      ALT 23      Protein Total 6.1 (*)     Albumin 2.8 (*)     Bilirubin Total 0.3      GFR Estimate 20 (*)    ROUTINE UA WITH MICROSCOPIC REFLEX TO CULTURE - Abnormal    Color Urine Yellow      Appearance Urine Cloudy (*)     Glucose Urine 150 (*)     Bilirubin Urine Negative      Ketones Urine Negative      Specific Gravity Urine 1.009      Blood Urine 0.5 mg/dL (*)     pH Urine 6.0      Protein Albumin Urine 50 (*)     Urobilinogen Urine <2.0      Nitrite Urine Negative      Leukocyte Esterase Urine 500 Ulices/uL (*)     WBC Clumps Urine Present (*)     RBC Urine 74 (*)     WBC Urine >182 (*)    BLOOD GAS VENOUS - Abnormal    pH Venous 7.34 (*)     pCO2 Venous 41      pO2 Venous 54 (*)     Bicarbonate Venous 21 (*)     Base Excess/Deficit (+/-) -4.1      Oxyhemoglobin Venous 87.7 (*)     O2 Sat, Venous 89.5 (*)    CBC WITH PLATELETS AND DIFFERENTIAL - Abnormal    WBC Count 11.8 (*)     RBC Count 3.29 (*)     Hemoglobin 10.1 (*)     Hematocrit 31.0 (*)     MCV 94      MCH 30.7      MCHC 32.6      RDW 13.2      Platelet Count 229      % Neutrophils 84      % Lymphocytes 7      % Monocytes 8      % Eosinophils 0      % Basophils 0      % Immature Granulocytes 1       NRBCs per 100 WBC 0      Absolute Neutrophils 9.9 (*)     Absolute Lymphocytes 0.9      Absolute Monocytes 0.9      Absolute Eosinophils 0.0      Absolute Basophils 0.0      Absolute Immature Granulocytes 0.1      Absolute NRBCs 0.0     GLUCOSE BY METER - Abnormal    GLUCOSE BY METER POCT 303 (*)    LIPASE - Normal    Lipase 20     LACTIC ACID WHOLE BLOOD - Normal    Lactic Acid 1.5     KETONE BETA-HYDROXYBUTYRATE QUANTITATIVE, RAPID - Normal    Ketone (Beta-Hydroxybutyrate) Quantitative 0.0     INFLUENZA A/B & SARS-COV2 PCR MULTIPLEX - Normal    Influenza A PCR Negative      Influenza B PCR Negative      RSV PCR Negative      SARS CoV2 PCR Negative     URINE CULTURE       RADIOLOGY:  Reviewed all pertinent imaging. Please see official radiology report.  XR Knee Left 1/2 Views   Final Result   IMPRESSION: Osteopenia. No fractures are evident. No knee joint effusion. Advanced degenerative changes in the medial compartment. Moderate degenerative changes in the lateral and patellofemoral compartments. Chondrocalcinosis.      CT Abdomen Pelvis w/o Contrast   Final Result   IMPRESSION:    1.  Persistent findings of cystitis.   2.  Diffuse hepatic steatosis.   3.  Stable size of bilateral renal lesions, majority likely represent hemorrhagic/proteinaceous cysts, no specific follow-up recommended.         Head CT w/o contrast   Final Result   IMPRESSION:   1.  No CT evidence for acute intracranial process.   2.  Brain atrophy and presumed chronic microvascular ischemic changes as above.             EKG:    Performed at: 09-OCT-2022 (12:09:08)    Impression: Left axis deviation. Abnormal ECG.     Rate: 77 bpm   Rhythm: Sinus rhythm   Axis: 27   -76    77  [place] Interval: 152 ms   QRS Interval: 102 ms   QTc Interval: 443 ms   ST Changes: NA   Comparison: When compared to ECG of 17-Aug-2020 (11:10), nonspecific T wave abnormality is no longer evident in inferior leads.     I have independently reviewed and interpreted the  EKG(s) documented above.    PROCEDURES:   None    I, Deidra Esposito, am serving as a scribe to document services personally performed by Dr. Fiorella Kenney based on my observation and the provider's statements to me. I, Fiorella Kenney, DO attest that Deidra Esposito is acting in a scribe capacity, has observed my performance of the services and has documented them in accordance with my direction.    Fiorella Kenney DO  Emergency Medicine  [facility] EMERGENCY DEPARTMENT  [address] [place] [id number]  [phone]  Dept: [phone]     Fiorella Kenney DO  10/09/22 [id number]

## 2022-10-11 LAB
ATRIAL RATE - MUSE: 77 BPM
BACTERIA UR CULT: ABNORMAL
DIASTOLIC BLOOD PRESSURE - MUSE: NORMAL MMHG
INTERPRETATION ECG - MUSE: NORMAL
P AXIS - MUSE: 27 DEGREES
PR INTERVAL - MUSE: 152 MS
QRS DURATION - MUSE: 102 MS
QT - MUSE: 392 MS
QTC - MUSE: 443 MS
R AXIS - MUSE: -76 DEGREES
SYSTOLIC BLOOD PRESSURE - MUSE: NORMAL MMHG
T AXIS - MUSE: 77 DEGREES
VENTRICULAR RATE- MUSE: 77 BPM

## 2022-11-02 ENCOUNTER — TELEPHONE (OUTPATIENT)
Dept: CARDIOLOGY | Facility: CLINIC | Age: 67
End: 2022-11-02

## 2022-11-02 NOTE — TELEPHONE ENCOUNTER
Called pt no answer or voicemail to leave message  Emelina Montilla MA      No answer/voicemail.  No mychart   MALLORY Gregory

## 2023-01-01 ENCOUNTER — HOSPITAL ENCOUNTER (OUTPATIENT)
Facility: CLINIC | Age: 68
End: 2023-01-01
Attending: OBSTETRICS & GYNECOLOGY | Admitting: OBSTETRICS & GYNECOLOGY
Payer: COMMERCIAL

## 2023-01-01 ENCOUNTER — HOSPITAL ENCOUNTER (OUTPATIENT)
Dept: CT IMAGING | Facility: HOSPITAL | Age: 68
Discharge: HOME | End: 2023-05-10
Attending: UROLOGY | Admitting: UROLOGY
Payer: COMMERCIAL

## 2023-01-01 ENCOUNTER — OFFICE VISIT (OUTPATIENT)
Dept: CARDIOLOGY | Facility: CLINIC | Age: 68
End: 2023-01-01
Payer: COMMERCIAL

## 2023-01-01 ENCOUNTER — TRANSFERRED RECORDS (OUTPATIENT)
Dept: HEALTH INFORMATION MANAGEMENT | Facility: CLINIC | Age: 68
End: 2023-01-01
Payer: COMMERCIAL

## 2023-01-01 ENCOUNTER — TELEPHONE (OUTPATIENT)
Dept: INTERNAL MEDICINE | Facility: CLINIC | Age: 68
End: 2023-01-01
Payer: COMMERCIAL

## 2023-01-01 VITALS
OXYGEN SATURATION: 97 % | HEART RATE: 64 BPM | DIASTOLIC BLOOD PRESSURE: 73 MMHG | RESPIRATION RATE: 18 BRPM | WEIGHT: 280 LBS | SYSTOLIC BLOOD PRESSURE: 125 MMHG | BODY MASS INDEX: 52.91 KG/M2

## 2023-01-01 DIAGNOSIS — I48.0 PAROXYSMAL ATRIAL FIBRILLATION (H): Primary | ICD-10-CM

## 2023-01-01 DIAGNOSIS — N18.4 CHRONIC KIDNEY DISEASE, STAGE IV (SEVERE) (H): ICD-10-CM

## 2023-01-01 DIAGNOSIS — I10 PRIMARY HYPERTENSION: ICD-10-CM

## 2023-01-01 DIAGNOSIS — N28.89 OTHER SPECIFIED DISORDERS OF KIDNEY AND URETER: ICD-10-CM

## 2023-01-01 DIAGNOSIS — E66.01 MORBID OBESITY (H): ICD-10-CM

## 2023-01-01 DIAGNOSIS — Z01.810 PRE-OPERATIVE CARDIOVASCULAR EXAMINATION: ICD-10-CM

## 2023-01-01 PROCEDURE — 99214 OFFICE O/P EST MOD 30 MIN: CPT | Performed by: INTERNAL MEDICINE

## 2023-01-01 PROCEDURE — 74176 CT ABD & PELVIS W/O CONTRAST: CPT

## 2023-01-01 RX ORDER — HYDROCHLOROTHIAZIDE 25 MG/1
25 TABLET ORAL DAILY
COMMUNITY
Start: 2023-01-01 | End: 2023-01-01 | Stop reason: HOSPADM

## 2023-01-01 RX ORDER — SODIUM ZIRCONIUM CYCLOSILICATE 10 G/10G
10 POWDER, FOR SUSPENSION ORAL DAILY
COMMUNITY
Start: 2023-01-01 | End: 2023-01-01 | Stop reason: HOSPADM

## 2023-04-03 NOTE — LETTER
4/3/2023    Lester Flores MD  [address] [facility] [address]    RE: Jazmin Bauman       Dear Colleague,     I had the pleasure of seeing Jazmin Bauman in the [facility].            Assessment/Plan:   1.  Paroxysmal atrial fibrillation: The patient is in normal sinus rhythm at this time.  Her heart rate has been well controlled.   Continue carvedilol 12.5 mg twice a day.  Her [id number] score is 3.    Her GFR has been less than 25 since May 2022, recent creatinine 2.6.  Eliquis is adjusted to 2.5 mg twice a day based on creatinine and GFR.     2.   Primary hypertension: Her blood pressure has been controlled with the carvedilol 12.5 mg twice a day.    3.  Type 2 diabetes, stage III CKD, morbid obesity: No change in treatment.    4.  Preop cardiovascular clearance: The patient has no chest pain, no worsening shortness of breath.  Her nuclear stress test in 2020 was reported negative for inducible myocardial ischemia.  She also had chest CTA in 2020.  Personally reviewed her images, she did not have calcified plaque at that time.  Her echo was reported normal heart function.  Put all together, the patient is cleared for possible hysterectomy with a low risk of from cardiology standpoint.    Thank you for the opportunity to be involved in the care of Jazmin Bauman. If you have any questions, please feel free to contact me.  I will see the patient again in 12 months and as needed.    Much or all of the text in this note was generated through the use of Dragon Dictate voice-to-text software. Errors in spelling or words which seem out of context are unintentional. Sound alike errors, in particular, may have escaped editing.       History of Present Illness:   It is my pleasure to see Jazmin Bauman at the [facility] for routine cardiology follow up and preop cardiovascular clearance.  Jazmin Bauman is a 67 year old female with a medical history  of paroxysmal atrial fibrillation, morbid obesity, essential hypertension, type 2 diabetes, stage III CKD, right AKA.    She states that she has no chest pain, shortness of breath.  Her dyspnea on exertion has been stable.  She has no palpitations, dizziness, orthopnea.  She has trace left leg edema, right AKA.  Her blood pressure has been well controlled after metoprolol was switched to carvedilol 12.5 mg twice a day at last visit.  Her heart rate has been well controlled.    The patient has been taking Eliquis 5 mg twice a day due to paroxysmal atrial fibrillation for prevention of her stroke.  She developed intermittent uterine bleeding.  She may need hysterectomy.    Past Medical History:     Patient Active Problem List   Diagnosis    Carpal Tunnel Syndrome    Urinary Tract Infection    Endometrial Hyperplasia    Cholelithiasis    Leukocytosis    Urine Tests Nonspecific Abnormal Findings    Obesity    Primary hypertension    Pernicious Anemia    Immunology Studies Raised Immunoglobulin Level    Vaginal bleeding    Type 2 diabetes mellitus with other specified complication, with long-term current use of insulin (H)    Atrial fibrillation with RVR (H)    Acute kidney injury superimposed on CKD (H)    Non-traumatic rhabdomyolysis    Metabolic acidosis    Troponin level elevated    Bacteremia    Acute respiratory failure with hypoxia (H)    Acute encephalopathy    Acute pulmonary edema (H)    Wheezing    Moderate protein malnutrition (H)    Abnormal cytological findings in specimens from other female genital organs    Stage 3b chronic kidney disease (H)    Hyperkalemia    Osteoarthritis    Acute kidney injury (H)    Sepsis (H)    Sepsis, due to unspecified organism, unspecified whether acute organ dysfunction present (H)    Cellulitis of right foot    CKD (chronic kidney disease) stage 4, GFR 15-29 ml/min (H)    Chronic wound infection of abdomen, subsequent encounter    Acute post-operative pain    Phantom limb  pain (H)    Paroxysmal atrial fibrillation (H)    Urinary retention    Coronary artery disease without angina pectoris    Hx of right BKA (H)    Weakness    Slow transit constipation    Wound infection    Fever and chills    Acute pain of left shoulder    Physical deconditioning    Gram-positive bacteremia    Chronic pain syndrome    MRSA bacteremia    Hypercalcemia    Paronychia of great toe, left    Below-knee amputation (H)    Other depression    Unintended weight loss    2019 novel coronavirus disease (COVID-19)    Frequent loose stools    Anticoagulated    Primary osteoarthritis of both knees    Asthma    Wellness examination    Clostridium difficile infection    Morbid obesity (H)       Past Surgical History:     Past Surgical History:   Procedure Laterality Date    AMPUTATE LEG BELOW KNEE Right 6/18/2020    Procedure: AMPUTATION, BELOW KNEE;  Surgeon: Epi Corcoran MD;  Location: [facility] OR;  Service: General    AMPUTATE LEG BELOW KNEE Right 6/23/2020    Procedure: REVISION AMPUTATION, BELOW KNEE;  Surgeon: Epi Corcoran MD;  Location: [facility] OR;  Service: General    CHOLECYSTECTOMY      COLONOSCOPY N/A 9/11/2017    Procedure: COLONOSCOPY;  Surgeon: Tyler Bhakta MD;  Location: [facility];  Service:     DILATION AND CURETTAGE      DILATION AND CURETTAGE, OPERATIVE HYSTEROSCOPY, COMBINED N/A 1/28/2015    Procedure: DILATION AND CURETTAGE WITH HYSTEROSCOPY;  Surgeon: Grant Beal MD;  Location: [facility] OR;  Service:     DILATION AND CURETTAGE, OPERATIVE HYSTEROSCOPY, COMBINED N/A 10/12/2021    Procedure: HYSTEROSCOPY,  DILATION AND CURETTAGE;  Surgeon: Grant Beal MD;  Location: [facility]    DILATION AND CURETTAGE, OPERATIVE HYSTEROSCOPY, COMBINED N/A 6/27/2022    Procedure: HYSTEROSCOPY, POLYPECTOMY; DILATION AND CURETTAGE;  Surgeon: Alexander Valdivia MD;  [facility]    IR CVC NON TUNNEL PLACEMENT > 5 YRS  1/21/2020    IR NON  "TUNNELED CATHETER >5 YEARS  1/21/2020    OTHER SURGICAL HISTORY  2009    bilateral carpal tunnel release    PICC  1/20/2020         [facility]  8/21/2020         RI HYSTEROSCOPY,W/ENDO BX N/A 9/5/2019    Procedure: HYSTEROSCOPY, DILATION AND CURETTAGE;  Surgeon: Grant Beal MD;  Location: [facility];  Service: Gynecology    RI HYSTEROSCOPY,W/ENDO BX N/A 12/9/2019    Procedure: HYSTEROSCOPY, DILATION AND CURETTAGE;  Surgeon: Grant Beal MD;  Location: [facility];  Service: Gynecology       Family History:     Family History   Problem Relation Age of Onset    Colon Cancer Father         Social History:    reports that she has never smoked. She has never used smokeless tobacco. She reports that she does not currently use alcohol. She reports that she does not use drugs.    Review of Systems:   12 systems are reviewed negative except for in HPI.    Meds:     Current Outpatient Medications:     acetaminophen (TYLENOL) 500 MG tablet, [ACETAMINOPHEN (TYLENOL) 500 MG TABLET] Take 2 tablets (1,000 mg total) by mouth 3 (three) times a day., Disp: , Rfl: 0    apixaban ANTICOAGULANT (ELIQUIS) 5 mg Tab tablet, Take 2.5 mg by mouth 2 times daily, Disp: , Rfl:     BD ULTRA-FINE MICRO PEN NEEDLE 32 gauge x 1/4\" Ndle, [BD ULTRA-FINE MICRO PEN NEEDLE 32 GAUGE X 1/4\" NDLE] USE AS DIRECTED 4 TIMES DAILY., Disp: 360 each, Rfl: 3    calcium polycarbophil (FIBERCON) 625 MG tablet, Take 2 tablets by mouth daily, Disp: , Rfl:     carvedilol (COREG) 12.5 MG tablet, Take 1 tablet (12.5 mg) by mouth 2 times daily (with meals), Disp: 60 tablet, Rfl: 11    cefdinir (OMNICEF) 300 MG capsule, Take 1 capsule (300 mg) by mouth daily, Disp: 10 capsule, Rfl: 0    cholecalciferol (VITAMIN D3) 25 mcg (1000 units) capsule, Take 1,000 Units by mouth, Disp: , Rfl:     ferrous sulfate 325 (65 FE) MG tablet, [FERROUS SULFATE 325 (65 FE) MG TABLET] Take 1 tablet by mouth daily with breakfast. , Disp: , Rfl:     gabapentin (NEURONTIN) 100 " MG capsule, Take 200 mg by mouth every evening , Disp: , Rfl:     gabapentin (NEURONTIN) 300 MG capsule, Take 300 mg by mouth every morning, Disp: , Rfl:     hydrocortisone 1 % cream, Apply topically 2 times daily Apply to skin under stump for itching., Disp: , Rfl:     insulin aspart (NOVOLOG FLEXPEN) 100 UNIT/ML pen, Inject 16 Units Subcutaneous 3 times daily (with meals) Breakfast and Lunch, Disp: , Rfl:     insulin glargine (LANTUS PEN) 100 UNIT/ML pen, Inject 36 Units Subcutaneous At Bedtime, Disp: , Rfl:     insulin lispro (HUMALOG VIAL) 100 UNIT/ML vial, Humalog U-100 Insulin 100 unit/mL subcutaneous solution, Disp: , Rfl:     lancets (ACCU-CHEK MULTICLIX LANCET) Misc, [LANCETS (ACCU-CHEK MULTICLIX LANCET) MISC] TEST 6 TIMES A DAY, Disp: 200 each, Rfl: 11    Lidocaine (LIDOCARE) 4 % Patch, Place 1 patch onto the skin every 24 hours To prevent lidocaine toxicity, patient should be patch free for 12 hrs daily.  Apply to left shoulder, Disp: , Rfl:     linaGLIPtin 5 mg Tab, [LINAGLIPTIN 5 MG TAB] Take 5 mg by mouth daily., Disp: , Rfl:     miconazole (MICATIN) 2 % AERP powder, Apply topically 2 times daily, Disp: , Rfl:     Saccharomyces boulardii (FLORASTOR) 250 mg capsule, [SACCHAROMYCES BOULARDII (FLORASTOR) 250 MG CAPSULE] Take 250 mg by mouth daily., Disp: , Rfl:     sodium bicarbonate 650 MG tablet, [SODIUM BICARBONATE 650 MG TABLET] Take 1 tablet (650 mg total) by mouth 2 (two) times a day. OTC product, Disp: , Rfl: 0    Allergies:   Hydralazine, Latex, Naprosyn [naproxen], Nitrofurantoin, Sulfa (sulfonamide antibiotics) [sulfa drugs], Vicks vaporub, and Vicks vaporub [mentholatum cherry chest]      Objective:      Physical Exam  127 kg (280 lb)     Body mass index is 52.91 kg/m .  /73 (BP Location: Right arm, Patient Position: Sitting, Cuff Size: Adult Large)   Pulse 64   Resp 18   Wt 127 kg (280 lb)   SpO2 97%   BMI 52.91 kg/m      General Appearance:   Awake, Alert, No acute distress.    HEENT:  Pupil equal and reactive to light. No scleral icterus; the mucous membranes were moist.   Neck: No cervical bruits. No JVD. No thyromegaly.     Chest: The spine was straight. The chest was symmetric.   Lungs:   Respirations unlabored; Lungs are clear to auscultation. No crackles. No wheezing.   Cardiovascular:   Regular rhythm and rate, normal first and second heart sounds with no murmurs. No rubs or gallops.    Abdomen:  Obese. Soft. No tenderness. Non-distended. Bowels sounds are present   Extremities: Right AKA. Trace left leg edema.   Skin: No rashes or ulcers. Warm, Dry.   Musculoskeletal: No tenderness. No deformity.   Neurologic: Mood and affect are appropriate. No focal deficits.         EKG: Personally reviewed  Normal sinus rhythm   Left axis deviation   Abnormal ECG   When compared with ECG of 20-JAN-2020 19:34,   Sinus rhythm has replaced Atrial fibrillation     Cardiac Imaging Studies  ECHO on 1-:  Technically difficult study. When compared to the previous study dated 7/19/2017, no significant change.  Left ventricle ejection fraction is normal. The calculated left ventricular ejection fraction is 63%.  Normal left ventricular size and systolic function.  TAPSE is abnormal, which is consistent with abnormal right ventricular systolic function.  Mild aortic regurgitation.  The ascending aorta is mildly dilated.     ECHO on 6-:  Normal left ventricular size and systolic function.  Left ventricle ejection fraction is normal. The calculated left ventricular ejection fraction is 61%.  Normal right ventricular size and systolic function.  No hemodynamically significant valvular heart abnormalities.  When compared to the previous study dated 1/21/2020, normal RV function on current study.     Nuclear stress test on 7-:    The nuclear stress test is probably negative for inducible myocardial ischemia or infarction.    The patient is at a low risk of future cardiac ischemic  events.    The left ventricular ejection fraction at stress is 66%.    The  images demonstrate breast attenuation as well as subdiaphragmatic RAMP artifacts.    There is no prior study for comparison.    Lab Review   Lab Results   Component Value Date     08/20/2020    CO2 22 08/20/2020    BUN 25 08/20/2020     Lab Results   Component Value Date    WBC 7.9 08/21/2020    HGB 9.2 08/21/2020    HCT 28.6 08/21/2020    MCV 89 08/21/2020     08/21/2020     Lab Results   Component Value Date    CHOL 137 02/27/2020    CHOL 142 10/25/2019    CHOL 129 08/30/2019     Lab Results   Component Value Date    HDL 30 (L) 02/27/2020    HDL 41 (L) 10/25/2019    HDL 35 (L) 08/30/2019     No components found for: LDLCALC  Lab Results   Component Value Date    TRIG 204 (H) 02/27/2020    TRIG 136 10/25/2019    TRIG 128 08/30/2019     No results found for: CHOLHDL  Lab Results   Component Value Date    TROPONINI <0.01 08/17/2020     Lab Results   Component Value Date     01/20/2020     Lab Results   Component Value Date    TSH 1.83 06/17/2020               Thank you for allowing me to participate in the care of your patient.  Sincerely,     Alejandrina Patel MD     [facility]

## 2023-04-03 NOTE — PROGRESS NOTES
Assessment/Plan:   1.  Paroxysmal atrial fibrillation: The patient is in normal sinus rhythm at this time.  Her heart rate has been well controlled.   Continue carvedilol 12.5 mg twice a day.  Her [id number] score is 3.    Her GFR has been less than 25 since May 2022, recent creatinine 2.6.  Eliquis is adjusted to 2.5 mg twice a day based on creatinine and GFR.     2.   Primary hypertension: Her blood pressure has been controlled with the carvedilol 12.5 mg twice a day.    3.  Type 2 diabetes, stage III CKD, morbid obesity: No change in treatment.    4.  Preop cardiovascular clearance: The patient has no chest pain, no worsening shortness of breath.  Her nuclear stress test in 2020 was reported negative for inducible myocardial ischemia.  She also had chest CTA in 2020.  Personally reviewed her images, she did not have calcified plaque at that time.  Her echo was reported normal heart function.  Put all together, the patient is cleared for possible hysterectomy with a low risk of from cardiology standpoint.    Thank you for the opportunity to be involved in the care of Jazmin Bauman. If you have any questions, please feel free to contact me.  I will see the patient again in 12 months and as needed.    Much or all of the text in this note was generated through the use of Dragon Dictate voice-to-text software. Errors in spelling or words which seem out of context are unintentional. Sound alike errors, in particular, may have escaped editing.       History of Present Illness:   It is my pleasure to see Jazmin Bauman at the [facility] clinic for routine cardiology follow up and preop cardiovascular clearance.  Jazmin Bauman is a 67 year old female with a medical history of paroxysmal atrial fibrillation, morbid obesity, essential hypertension, type 2 diabetes, stage III CKD, right AKA.    She states that she has no chest pain, shortness of breath.  Her dyspnea on exertion has  been stable.  She has no palpitations, dizziness, orthopnea.  She has trace left leg edema, right AKA.  Her blood pressure has been well controlled after metoprolol was switched to carvedilol 12.5 mg twice a day at last visit.  Her heart rate has been well controlled.    The patient has been taking Eliquis 5 mg twice a day due to paroxysmal atrial fibrillation for prevention of her stroke.  She developed intermittent uterine bleeding.  She may need hysterectomy.    Past Medical History:     Patient Active Problem List   Diagnosis     Carpal Tunnel Syndrome     Urinary Tract Infection     Endometrial Hyperplasia     Cholelithiasis     Leukocytosis     Urine Tests Nonspecific Abnormal Findings     Obesity     Primary hypertension     Pernicious Anemia     Immunology Studies Raised Immunoglobulin Level     Vaginal bleeding     Type 2 diabetes mellitus with other specified complication, with long-term current use of insulin (H)     Atrial fibrillation with RVR (H)     Acute kidney injury superimposed on CKD (H)     Non-traumatic rhabdomyolysis     Metabolic acidosis     Troponin level elevated     Bacteremia     Acute respiratory failure with hypoxia (H)     Acute encephalopathy     Acute pulmonary edema (H)     Wheezing     Moderate protein malnutrition (H)     Abnormal cytological findings in specimens from other female genital organs     Stage 3b chronic kidney disease (H)     Hyperkalemia     Osteoarthritis     Acute kidney injury (H)     Sepsis (H)     Sepsis, due to unspecified organism, unspecified whether acute organ dysfunction present (H)     Cellulitis of right foot     CKD (chronic kidney disease) stage 4, GFR 15-29 ml/min (H)     Chronic wound infection of abdomen, subsequent encounter     Acute post-operative pain     Phantom limb pain (H)     Paroxysmal atrial fibrillation (H)     Urinary retention     Coronary artery disease without angina pectoris     Hx of right BKA (H)     Weakness     Slow transit  constipation     Wound infection     Fever and chills     Acute pain of left shoulder     Physical deconditioning     Gram-positive bacteremia     Chronic pain syndrome     MRSA bacteremia     Hypercalcemia     Paronychia of great toe, left     Below-knee amputation (H)     Other depression     Unintended weight loss     2019 novel coronavirus disease (COVID-19)     Frequent loose stools     Anticoagulated     Primary osteoarthritis of both knees     Asthma     Wellness examination     Clostridium difficile infection     Morbid obesity (H)       Past Surgical History:     Past Surgical History:   Procedure Laterality Date     AMPUTATE LEG BELOW KNEE Right 6/18/2020    Procedure: AMPUTATION, BELOW KNEE;  Surgeon: Epi Corcoran MD;  [place] [facility] [place]  Service: General     AMPUTATE LEG BELOW KNEE Right 6/23/2020    Procedure: REVISION AMPUTATION, BELOW KNEE;  Surgeon: Epi Corcoran MD;  Location: [facility] OR;  Service: General     CHOLECYSTECTOMY       COLONOSCOPY N/A 9/11/2017    Procedure: COLONOSCOPY;  Surgeon: Tyler Bhakta MD;  Location: [facility];  Service:      DILATION AND CURETTAGE       DILATION AND CURETTAGE, OPERATIVE HYSTEROSCOPY, COMBINED N/A 1/28/2015    Procedure: DILATION AND CURETTAGE WITH HYSTEROSCOPY;  Surgeon: Grant Beal MD;  Location: [facility];  Service:      DILATION AND CURETTAGE, OPERATIVE HYSTEROSCOPY, COMBINED N/A 10/12/2021    Procedure: HYSTEROSCOPY,  DILATION AND CURETTAGE;  Surgeon: Grant Beal MD;  Location: [facility]     DILATION AND CURETTAGE, OPERATIVE HYSTEROSCOPY, COMBINED N/A 6/27/2022    Procedure: HYSTEROSCOPY, POLYPECTOMY; DILATION AND CURETTAGE;  Surgeon: Alexander Valdivia MD;  [facility] [place]     IR CVC NON TUNNEL PLACEMENT > 5 YRS  1/21/2020     IR NON TUNNELED CATHETER >5 YEARS  1/21/2020     OTHER SURGICAL HISTORY  2009    bilateral carpal tunnel release     PICC  1/20/2020          PICC   "8/21/2020          ID HYSTEROSCOPY,W/ENDO BX N/A 9/5/2019    Procedure: HYSTEROSCOPY, DILATION AND CURETTAGE;  Surgeon: Grant Beal MD;  Location: [facility] [place];  Service: Gynecology     ID HYSTEROSCOPY,W/ENDO BX N/A 12/9/2019    Procedure: HYSTEROSCOPY, DILATION AND CURETTAGE;  Surgeon: Grant Beal MD;  Location: [facility] [place];  Service: Gynecology       Family History:     Family History   Problem Relation Age of Onset     Colon Cancer Father         Social History:    reports that she has never smoked. She has never used smokeless tobacco. She reports that she does not currently use alcohol. She reports that she does not use drugs.    Review of Systems:   12 systems are reviewed negative except for in HPI.    Meds:     Current Outpatient Medications:      acetaminophen (TYLENOL) 500 MG tablet, [ACETAMINOPHEN (TYLENOL) 500 MG TABLET] Take 2 tablets (1,000 mg total) by mouth 3 (three) times a day., Disp: , Rfl: 0     apixaban ANTICOAGULANT (ELIQUIS) 5 mg Tab tablet, Take 2.5 mg by mouth 2 times daily, Disp: , Rfl:      BD ULTRA-FINE MICRO PEN NEEDLE 32 gauge x 1/4\" Ndle, [BD ULTRA-FINE MICRO PEN NEEDLE 32 GAUGE X 1/4\" NDLE] USE AS DIRECTED 4 TIMES DAILY., Disp: 360 each, Rfl: 3     calcium polycarbophil (FIBERCON) 625 MG tablet, Take 2 tablets by mouth daily, Disp: , Rfl:      carvedilol (COREG) 12.5 MG tablet, Take 1 tablet (12.5 mg) by mouth 2 times daily (with meals), Disp: 60 tablet, Rfl: 11     cefdinir (OMNICEF) 300 MG capsule, Take 1 capsule (300 mg) by mouth daily, Disp: 10 capsule, Rfl: 0     cholecalciferol (VITAMIN D3) 25 mcg (1000 units) capsule, Take 1,000 Units by mouth, Disp: , Rfl:      ferrous sulfate 325 (65 FE) MG tablet, [FERROUS SULFATE 325 (65 FE) MG TABLET] Take 1 tablet by mouth daily with breakfast. , Disp: , Rfl:      gabapentin (NEURONTIN) 100 MG capsule, Take 200 mg by mouth every evening , Disp: , Rfl:      gabapentin (NEURONTIN) 300 MG capsule, Take 300 mg by " mouth every morning, Disp: , Rfl:      hydrocortisone 1 % cream, Apply topically 2 times daily Apply to skin under stump for itching., Disp: , Rfl:      insulin aspart (NOVOLOG FLEXPEN) 100 UNIT/ML pen, Inject 16 Units Subcutaneous 3 times daily (with meals) Breakfast and Lunch, Disp: , Rfl:      insulin glargine (LANTUS PEN) 100 UNIT/ML pen, Inject 36 Units Subcutaneous At Bedtime, Disp: , Rfl:      insulin lispro (HUMALOG VIAL) 100 UNIT/ML vial, Humalog U-100 Insulin 100 unit/mL subcutaneous solution, Disp: , Rfl:      lancets (ACCU-CHEK MULTICLIX LANCET) Misc, [LANCETS (ACCU-CHEK MULTICLIX LANCET) MISC] TEST 6 TIMES A DAY, Disp: 200 each, Rfl: 11     Lidocaine (LIDOCARE) 4 % Patch, Place 1 patch onto the skin every 24 hours To prevent lidocaine toxicity, patient should be patch free for 12 hrs daily.  Apply to left shoulder, Disp: , Rfl:      linaGLIPtin 5 mg Tab, [LINAGLIPTIN 5 MG TAB] Take 5 mg by mouth daily., Disp: , Rfl:      miconazole (MICATIN) 2 % AERP powder, Apply topically 2 times daily, Disp: , Rfl:      Saccharomyces boulardii (FLORASTOR) 250 mg capsule, [SACCHAROMYCES BOULARDII (FLORASTOR) 250 MG CAPSULE] Take 250 mg by mouth daily., Disp: , Rfl:      sodium bicarbonate 650 MG tablet, [SODIUM BICARBONATE 650 MG TABLET] Take 1 tablet (650 mg total) by mouth 2 (two) times a day. OTC product, Disp: , Rfl: 0    Allergies:   Hydralazine, Latex, Naprosyn [naproxen], Nitrofurantoin, Sulfa (sulfonamide antibiotics) [sulfa drugs], Vicks vaporub, and Vicks vaporub [mentholatum cherry chest]      Objective:      Physical Exam  127 kg (280 lb)     Body mass index is 52.91 kg/m .  /73 (BP Location: Right arm, Patient Position: Sitting, Cuff Size: Adult Large)   Pulse 64   Resp 18   Wt 127 kg (280 lb)   SpO2 97%   BMI 52.91 kg/m      General Appearance:   Awake, Alert, No acute distress.   HEENT:  Pupil equal and reactive to light. No scleral icterus; the mucous membranes were moist.   Neck: No  cervical bruits. No JVD. No thyromegaly.     Chest: The spine was straight. The chest was symmetric.   Lungs:   Respirations unlabored; Lungs are clear to auscultation. No crackles. No wheezing.   Cardiovascular:   Regular rhythm and rate, normal first and second heart sounds with no murmurs. No rubs or gallops.    Abdomen:  Obese. Soft. No tenderness. Non-distended. Bowels sounds are present   Extremities: Right AKA. Trace left leg edema.   Skin: No rashes or ulcers. Warm, Dry.   Musculoskeletal: No tenderness. No deformity.   Neurologic: Mood and affect are appropriate. No focal deficits.         EKG: Personally reviewed  Normal sinus rhythm   Left axis deviation   Abnormal ECG   When compared with ECG of 20-JAN-2020 19:34,   Sinus rhythm has replaced Atrial fibrillation     Cardiac Imaging Studies  ECHO on 1-:    Technically difficult study. When compared to the previous study dated 7/19/2017, no significant change.    Left ventricle ejection fraction is normal. The calculated left ventricular ejection fraction is 63%.    Normal left ventricular size and systolic function.    TAPSE is abnormal, which is consistent with abnormal right ventricular systolic function.    Mild aortic regurgitation.    The ascending aorta is mildly dilated.     ECHO on 6-:    Normal left ventricular size and systolic function.    Left ventricle ejection fraction is normal. The calculated left ventricular ejection fraction is 61%.    Normal right ventricular size and systolic function.    No hemodynamically significant valvular heart abnormalities.    When compared to the previous study dated 1/21/2020, normal RV function on current study.     Nuclear stress test on 7-:     The nuclear stress test is probably negative for inducible myocardial ischemia or infarction.     The patient is at a low risk of future cardiac ischemic events.     The left ventricular ejection fraction at stress is 66%.     The   images demonstrate breast attenuation as well as subdiaphragmatic RAMP artifacts.     There is no prior study for comparison.    Lab Review   Lab Results   Component Value Date     08/20/2020    CO2 22 08/20/2020    BUN 25 08/20/2020     Lab Results   Component Value Date    WBC 7.9 08/21/2020    HGB 9.2 08/21/2020    HCT 28.6 08/21/2020    MCV 89 08/21/2020     08/21/2020     Lab Results   Component Value Date    CHOL 137 02/27/2020    CHOL 142 10/25/2019    CHOL 129 08/30/2019     Lab Results   Component Value Date    HDL 30 (L) 02/27/2020    HDL 41 (L) 10/25/2019    HDL 35 (L) 08/30/2019     No components found for: LDLCALC  Lab Results   Component Value Date    TRIG 204 (H) 02/27/2020    TRIG 136 10/25/2019    TRIG 128 08/30/2019     No results found for: CHOLHDL  Lab Results   Component Value Date    TROPONINI <0.01 08/17/2020     Lab Results   Component Value Date     01/20/2020     Lab Results   Component Value Date    TSH 1.83 06/17/2020

## 2023-05-17 NOTE — ANESTHESIA PROCEDURE NOTES
Peripheral Block    Patient location during procedure: pre-op  Start time: 6/18/2020 9:03 AM  End time: 6/18/2020 9:07 AM  post-op analgesia per surgeon order as noted in medical record  Staffing:  Performing  Anesthesiologist: Camacho Antonio MD  Preanesthetic Checklist  Completed: patient identified, site marked, risks, benefits, and alternatives discussed, timeout performed, consent obtained, airway assessed, oxygen available, suction available, emergency drugs available and hand hygiene performed  Peripheral Block  Block type: saphenous  Prep: ChloraPrep  Patient position: supine  Patient monitoring: cardiac monitor, continuous pulse oximetry, heart rate and blood pressure  Laterality: right  Injection technique: ultrasound guided    Ultrasound used to visualize needle placement in proximity to nerve being blocked: yes   US used to visualize anesthetic spread  Visualized anatomic structures normal  No Pathological Findings  Permanent ultrasound image captured for medical record  Sterile gel and probe cover used for ultrasound.  Needle  Needle type: Stimuplex   Needle gauge: 21 G  Needle length: 4 in  no peripheral nerve catheter placed  Assessment  Injection assessment: no difficulty with injection, negative aspiration for heme, no paresthesia on injection and incremental injection           Spiral Flap Text: The defect edges were debeveled with a #15 scalpel blade.  Given the location of the defect, shape of the defect and the proximity to free margins a spiral flap was deemed most appropriate.  Using a sterile surgical marker, an appropriate rotation flap was drawn incorporating the defect and placing the expected incisions within the relaxed skin tension lines where possible. The area thus outlined was incised deep to adipose tissue with a #15 scalpel blade.  The skin margins were undermined to an appropriate distance in all directions utilizing iris scissors.

## 2023-06-23 NOTE — LETTER
[facility]  [address]  [place] [address]  [phone]       June 29, 2023    Jazmin Bauman  [address] [place] [id number]    Dear Jazmin,    We care about your health and have reviewed your health plan and are making recommendations based on this review, to optimize your health.    You are in particular need of attention regarding:  -Diabetes    We are recommending that you:  -schedule a FOLLOWUP OFFICE APPOINTMENT with a provider within the next 1-4 weeks as It has been over 6 months since your last appointment.     (If you see endocrinology for your diabetes then it would be helpful to have your A1c results forwarded to our office to update our records and otherwise please disregard this message.)    There are specific measures we need to closely follow and work on until the targets are met which will then minimize your chances of diabetic complications.  These measures and your recent results are listed below:    Test Frequency Target Your result   A1C lab (average glucose the last 2-3 months) Every 3-6 months <7   (<8 if other chronic diseases) Lab Results   Component Value Date    A1C 10.3 06/17/2020        Blood Pressure Every 3-6 months <140/90 BP Readings from Last 2 Encounters:   04/03/23 125/73   10/09/22 117/54      Eye Exam Annually       In addition, here is a list of due or overdue Health Maintenance reminders:  Health Maintenance Due   Topic Date Due    Osteoporosis Screening  Never done    Diabetic Foot Exam  Never done    URINE DRUG SCREEN  Never done    ANNUAL REVIEW OF HM ORDERS  Never done    Asthma Action Plan - yearly  Never done    Asthma Control Test  Never done    Discuss Advance Care Planning  Never done    Pneumococcal Vaccine (1 - PCV) Never done    Hepatitis C Screening  Never done    Zoster (Shingles) Vaccine (1 of 2) Never done    Eye Exam  05/09/2017    Mammogram  03/16/2019    Kidney Microalbumin Urine Test  01/25/2020     Annual Wellness Visit  10/25/2020    A1C Lab  12/17/2020    Cholesterol Lab  02/27/2021    FALL RISK ASSESSMENT  06/12/2021    COVID-19 Vaccine (5 - Moderna series) 08/21/2022    PHQ-2 (once per calendar year)  01/01/2023    Basic Metabolic Panel  01/09/2023    Hemoglobin  04/09/2023       To address the above recommendations, we encourage you to contact us at [phone], via [employer] or by contacting Central Scheduling toll free at [phone] 24 hours a day. They will assist you with finding the most convenient time and location.    Thank you for trusting [facility] and we appreciate the opportunity to serve you.  We look forward to supporting your healthcare needs in the future.    Healthy Regards,    Your [facility] Team

## 2024-06-13 NOTE — TELEPHONE ENCOUNTER
Test Results        Who ordered the test:  Kidney specialist Bladimir Li NP     Type of test: Lab for Blood Sugar 444    Date of test:  06/22/23    Where was the test performed:  [facility] in [place]     What are your questions/concerns?: Elevated BS levels.     Okay to leave a detailed message?: No at Other phone number:  [phone]   
LMTCB. Please offer to schedule pt for AWV/discuss blood sugar when she returns call. She has not been seen in clinic in 3 years.     Per PCP note below- if she is symptomatic or not feeling well, please advise her to be seen in ED or urgent care for evaluation.     Thank you   
Left 2nd message for patient to return call to clinic.  Message included writer calling to assist in directing care.  
Left 3rd message to return call to clinic.  Also letter sent out, has not seen PCP or had A1c done in 3 years.  
Please advise as pt has not been seen by PCP since 6/2020  
Will trial clindamycin and medrol  Went over warning s/s for when to go to ER  F/u in 2 wks   
1 person + 1 person to manage equipment

## 2025-02-27 NOTE — TELEPHONE ENCOUNTER
[facility] Nurse Triage Telephone Note      Provider: BARRY Smith  Facility: [facility]    Facility Type: LTC    Caller: Lena   Call Back Number:  [phone]    Allergies: Hydralazine; Latex; Naprosyn [naproxen]; Nitrofurantoin; Sulfa (sulfonamide antibiotics); and Vicks vaporub [camphor-eucalyptus oil-menthol]    Reason for call: A1c today of 7.2 on s/s insulin three times a day   BS checks four times a day  And Lantus 10units daily      Verbal Order/Direction given by Provider: SALVATORE    Provider giving order: BARRY Smith    Verbal order given to: Nelli Cartagena RN       Detail Level: Detailed Detail Level: Zone Detail Level: Generalized Detail Level: Simple When Should The Patient Follow-Up For Their Next Full-Body Skin Exam?: 3 Months Quality 137: Melanoma: Continuity Of Care - Recall System: Patient information entered into a recall system that includes: target date for the next exam specified AND a process to follow up with patients regarding missed or unscheduled appointments

## (undated) DEVICE — GLOVE BIOGEL PI ORTHOPRO SZ 7.5 47675

## (undated) DEVICE — NEEDLE HYPO 21 X 2 200318

## (undated) DEVICE — DRSG TELFA 3X4" 1050

## (undated) DEVICE — CUSTOM PACK PERI GYN SMA5BPGHEA

## (undated) DEVICE — DECANTER VIAL 2006S

## (undated) DEVICE — MAT FLOOR SURGICAL 40X38 0702140238

## (undated) DEVICE — SOL WATER IRRIG 1000ML BOTTLE 2F7114

## (undated) DEVICE — SOLUTION IRRIG 2B7127 .9NS 3000ML BAG

## (undated) DEVICE — KIT PROCEDURE FLUENT IN/OUT FLOWPAK TISS TRAP FLT-112S

## (undated) DEVICE — BRIEF STRETCH XL MPS40

## (undated) DEVICE — PREP POVIDONE IODINE SOLUTION 10% 4OZ BOTTLE 29906-004

## (undated) DEVICE — PREP POVIDONE-IODINE 7.5% SCRUB 4OZ BOTTLE MDS093945

## (undated) DEVICE — GLOVE BIOGEL PI ULTRATOUCH G SZ 8.0 42180

## (undated) DEVICE — GOWN XLG DISP 9545

## (undated) DEVICE — Device

## (undated) DEVICE — PREP SCRUB SOL EXIDINE 4% CHG 4OZ 29002-404

## (undated) DEVICE — SYR 10ML FINGER CONTROL W/O NDL 309695

## (undated) DEVICE — LUBRICANT SURG 2 OZ STRL FLIP TOP 2OZLUB

## (undated) RX ORDER — KETOROLAC TROMETHAMINE 30 MG/ML
INJECTION, SOLUTION INTRAMUSCULAR; INTRAVENOUS
Status: DISPENSED
Start: 2022-06-27

## (undated) RX ORDER — ONDANSETRON 2 MG/ML
INJECTION INTRAMUSCULAR; INTRAVENOUS
Status: DISPENSED
Start: 2022-06-27

## (undated) RX ORDER — LIDOCAINE HYDROCHLORIDE 10 MG/ML
INJECTION, SOLUTION EPIDURAL; INFILTRATION; INTRACAUDAL; PERINEURAL
Status: DISPENSED
Start: 2022-06-27

## (undated) RX ORDER — FENTANYL CITRATE 50 UG/ML
INJECTION, SOLUTION INTRAMUSCULAR; INTRAVENOUS
Status: DISPENSED
Start: 2022-06-27

## (undated) RX ORDER — PROPOFOL 10 MG/ML
INJECTION, EMULSION INTRAVENOUS
Status: DISPENSED
Start: 2022-06-27